# Patient Record
Sex: FEMALE | Race: BLACK OR AFRICAN AMERICAN | Employment: OTHER | URBAN - METROPOLITAN AREA
[De-identification: names, ages, dates, MRNs, and addresses within clinical notes are randomized per-mention and may not be internally consistent; named-entity substitution may affect disease eponyms.]

---

## 2018-05-13 ENCOUNTER — APPOINTMENT (OUTPATIENT)
Dept: GENERAL RADIOLOGY | Facility: HOSPITAL | Age: 63
DRG: 193 | End: 2018-05-13
Attending: EMERGENCY MEDICINE
Payer: COMMERCIAL

## 2018-05-13 ENCOUNTER — APPOINTMENT (OUTPATIENT)
Dept: CT IMAGING | Facility: HOSPITAL | Age: 63
DRG: 193 | End: 2018-05-13
Attending: EMERGENCY MEDICINE
Payer: COMMERCIAL

## 2018-05-13 ENCOUNTER — HOSPITAL ENCOUNTER (INPATIENT)
Facility: HOSPITAL | Age: 63
LOS: 6 days | Discharge: HOME | DRG: 193 | End: 2018-05-19
Attending: EMERGENCY MEDICINE | Admitting: HOSPITALIST
Payer: COMMERCIAL

## 2018-05-13 DIAGNOSIS — E87.6 HYPOKALEMIA: ICD-10-CM

## 2018-05-13 DIAGNOSIS — E10.10 TYPE 1 DIABETES MELLITUS WITH KETOACIDOSIS WITHOUT COMA (HCC): ICD-10-CM

## 2018-05-13 DIAGNOSIS — J18.9 PNEUMONIA OF LEFT LOWER LOBE DUE TO INFECTIOUS ORGANISM: Primary | ICD-10-CM

## 2018-05-13 PROBLEM — D64.9 ANEMIA: Status: ACTIVE | Noted: 2018-05-13

## 2018-05-13 PROBLEM — R79.89 AZOTEMIA: Status: ACTIVE | Noted: 2018-05-13

## 2018-05-13 PROBLEM — R73.9 HYPERGLYCEMIA: Status: ACTIVE | Noted: 2018-05-13

## 2018-05-13 PROBLEM — E87.1 HYPONATREMIA: Status: ACTIVE | Noted: 2018-05-13

## 2018-05-13 PROCEDURE — 99254 IP/OBS CNSLTJ NEW/EST MOD 60: CPT | Performed by: INTERNAL MEDICINE

## 2018-05-13 PROCEDURE — 71045 X-RAY EXAM CHEST 1 VIEW: CPT | Performed by: EMERGENCY MEDICINE

## 2018-05-13 PROCEDURE — 99223 1ST HOSP IP/OBS HIGH 75: CPT | Performed by: HOSPITALIST

## 2018-05-13 PROCEDURE — 71260 CT THORAX DX C+: CPT | Performed by: EMERGENCY MEDICINE

## 2018-05-13 RX ORDER — HYDROMORPHONE HYDROCHLORIDE 1 MG/ML
0.4 INJECTION, SOLUTION INTRAMUSCULAR; INTRAVENOUS; SUBCUTANEOUS EVERY 2 HOUR PRN
Status: DISCONTINUED | OUTPATIENT
Start: 2018-05-13 | End: 2018-05-15

## 2018-05-13 RX ORDER — LISINOPRIL 40 MG/1
40 TABLET ORAL DAILY
Status: DISCONTINUED | OUTPATIENT
Start: 2018-05-14 | End: 2018-05-19

## 2018-05-13 RX ORDER — SODIUM CHLORIDE 0.9 % (FLUSH) 0.9 %
3 SYRINGE (ML) INJECTION AS NEEDED
Status: DISCONTINUED | OUTPATIENT
Start: 2018-05-13 | End: 2018-05-19

## 2018-05-13 RX ORDER — SODIUM PHOSPHATE, DIBASIC AND SODIUM PHOSPHATE, MONOBASIC 7; 19 G/133ML; G/133ML
1 ENEMA RECTAL ONCE AS NEEDED
Status: DISCONTINUED | OUTPATIENT
Start: 2018-05-13 | End: 2018-05-19

## 2018-05-13 RX ORDER — LISINOPRIL 40 MG/1
40 TABLET ORAL DAILY
COMMUNITY
End: 2021-03-21

## 2018-05-13 RX ORDER — INSULIN ASPART 100 [IU]/ML
0.2 INJECTION, SOLUTION INTRAVENOUS; SUBCUTANEOUS ONCE
Status: COMPLETED | OUTPATIENT
Start: 2018-05-13 | End: 2018-05-13

## 2018-05-13 RX ORDER — INSULIN LISPRO 100 [IU]/ML
4 INJECTION, SOLUTION INTRAVENOUS; SUBCUTANEOUS
Status: ON HOLD | COMMUNITY
End: 2018-05-19

## 2018-05-13 RX ORDER — MORPHINE SULFATE 4 MG/ML
2 INJECTION, SOLUTION INTRAMUSCULAR; INTRAVENOUS ONCE
Status: COMPLETED | OUTPATIENT
Start: 2018-05-13 | End: 2018-05-13

## 2018-05-13 RX ORDER — HYDROMORPHONE HYDROCHLORIDE 1 MG/ML
0.2 INJECTION, SOLUTION INTRAMUSCULAR; INTRAVENOUS; SUBCUTANEOUS EVERY 2 HOUR PRN
Status: DISCONTINUED | OUTPATIENT
Start: 2018-05-13 | End: 2018-05-15

## 2018-05-13 RX ORDER — POTASSIUM CHLORIDE 14.9 MG/ML
20 INJECTION INTRAVENOUS ONCE
Status: COMPLETED | OUTPATIENT
Start: 2018-05-13 | End: 2018-05-14

## 2018-05-13 RX ORDER — HYDROMORPHONE HYDROCHLORIDE 1 MG/ML
0.8 INJECTION, SOLUTION INTRAMUSCULAR; INTRAVENOUS; SUBCUTANEOUS EVERY 2 HOUR PRN
Status: DISCONTINUED | OUTPATIENT
Start: 2018-05-13 | End: 2018-05-15

## 2018-05-13 RX ORDER — MORPHINE SULFATE 4 MG/ML
INJECTION, SOLUTION INTRAMUSCULAR; INTRAVENOUS
Status: COMPLETED
Start: 2018-05-13 | End: 2018-05-13

## 2018-05-13 RX ORDER — ACETAMINOPHEN 325 MG/1
650 TABLET ORAL EVERY 6 HOURS PRN
Status: DISCONTINUED | OUTPATIENT
Start: 2018-05-13 | End: 2018-05-19

## 2018-05-13 RX ORDER — POTASSIUM CHLORIDE 14.9 MG/ML
20 INJECTION INTRAVENOUS ONCE
Status: DISCONTINUED | OUTPATIENT
Start: 2018-05-13 | End: 2018-05-19

## 2018-05-13 RX ORDER — MORPHINE SULFATE 4 MG/ML
2 INJECTION, SOLUTION INTRAMUSCULAR; INTRAVENOUS ONCE
Status: DISCONTINUED | OUTPATIENT
Start: 2018-05-13 | End: 2018-05-13

## 2018-05-13 RX ORDER — POLYETHYLENE GLYCOL 3350 17 G/17G
17 POWDER, FOR SOLUTION ORAL DAILY PRN
Status: DISCONTINUED | OUTPATIENT
Start: 2018-05-13 | End: 2018-05-19

## 2018-05-13 RX ORDER — HEPARIN SODIUM 5000 [USP'U]/ML
5000 INJECTION, SOLUTION INTRAVENOUS; SUBCUTANEOUS EVERY 12 HOURS SCHEDULED
Status: DISCONTINUED | OUTPATIENT
Start: 2018-05-13 | End: 2018-05-19

## 2018-05-13 RX ORDER — HYDROCHLOROTHIAZIDE 25 MG/1
25 TABLET ORAL DAILY
Status: ON HOLD | COMMUNITY
End: 2018-05-19

## 2018-05-13 RX ORDER — ONDANSETRON 2 MG/ML
4 INJECTION INTRAMUSCULAR; INTRAVENOUS EVERY 6 HOURS PRN
Status: DISCONTINUED | OUTPATIENT
Start: 2018-05-13 | End: 2018-05-19

## 2018-05-13 RX ORDER — IPRATROPIUM BROMIDE AND ALBUTEROL SULFATE 2.5; .5 MG/3ML; MG/3ML
3 SOLUTION RESPIRATORY (INHALATION)
Status: DISCONTINUED | OUTPATIENT
Start: 2018-05-13 | End: 2018-05-15

## 2018-05-13 RX ORDER — SODIUM CHLORIDE 9 MG/ML
INJECTION, SOLUTION INTRAVENOUS CONTINUOUS
Status: DISCONTINUED | OUTPATIENT
Start: 2018-05-13 | End: 2018-05-14

## 2018-05-13 RX ORDER — BISACODYL 10 MG
10 SUPPOSITORY, RECTAL RECTAL
Status: DISCONTINUED | OUTPATIENT
Start: 2018-05-13 | End: 2018-05-19

## 2018-05-13 RX ORDER — POTASSIUM CHLORIDE 20 MEQ/1
40 TABLET, EXTENDED RELEASE ORAL ONCE
Status: COMPLETED | OUTPATIENT
Start: 2018-05-13 | End: 2018-05-13

## 2018-05-13 NOTE — CONSULTS
[facility]    Report of Consultation    Hector Maldonado Patient Status:  Inpatient    10/6/1955 MRN [id number]   Location [facility] 2W/SW Attending Sunday Asif MD   Hosp Day # 1 PCP No primary care provider on file.      Date of HYDROmorphone HCl (DILAUDID) 1 MG/ML injection 0.4 mg, 0.4 mg, Intravenous, Q2H PRN **OR** HYDROmorphone HCl (DILAUDID) 1 MG/ML injection 0.8 mg, 0.8 mg, Intravenous, Q2H PRN  •  PEG 3350 (MIRALAX) powder packet 17 g, 17 g, Oral, Daily PRN  •  magnesium hy the consult. We will follow.      Oral Mackey

## 2018-05-13 NOTE — H&P
[place] [facility] - [facility]  History & Physical     Hannah Knapp  : 10/6/1955    Status: Inpatient  Day #: 0    Attending: Kathi Beltrán MD  PCP: No primary care provider on file.      Date of Encounter:  2018  Date of Admission:  2018     Chief Clear to auscultation bilaterally, respirations unlabored  Gastrointestinal:  Soft, non-tender, normal bowel sounds  Musculoskeletal:  No joint swelling  Extremities:  No edema, no cyanosis, no clubbing  Neurologic:  nonfocal  Psychiatric:  Normal affect, drip  -endocrinology consulted from ED    HTN  -monitor on home BP meds    Hypokalemia  -replete    Dehydration - IVF    DVT proph: heparin sq           Aria Rinaldi MD

## 2018-05-13 NOTE — CONSULTS
[facility]    Consult Note    Date:  5/13/2018  Date of Admission:  5/13/2018      Chief Complaint:   Sonam Gomez is a(n) 63year old female with cough and chest congestion.     HPI:   The patient described how she got a head cold severa normal. Bladder function normal. No depression. No thyroid disease. No rash. Muscles and joints unremarkable. No weight loss no weight gain.     Physical Exam:   Vital Signs:  Blood pressure 132/72, pulse 79, temperature 98.5 °F (36.9 °C), temperature sourc temporarily  2.  4-6 weeks of antibiotics, and switch to oral at discharge  3. Repeat CT scan the chest at the 6-8 week interval  4. Nebulizer therapy  5. Will follow clinically. 6.  Follow white blood cell count  7.   If lesions persist, patient will n

## 2018-05-14 ENCOUNTER — APPOINTMENT (OUTPATIENT)
Dept: GENERAL RADIOLOGY | Facility: HOSPITAL | Age: 63
DRG: 193 | End: 2018-05-14
Attending: HOSPITALIST
Payer: COMMERCIAL

## 2018-05-14 PROCEDURE — 99254 IP/OBS CNSLTJ NEW/EST MOD 60: CPT | Performed by: INTERNAL MEDICINE

## 2018-05-14 PROCEDURE — 71045 X-RAY EXAM CHEST 1 VIEW: CPT | Performed by: HOSPITALIST

## 2018-05-14 PROCEDURE — 99233 SBSQ HOSP IP/OBS HIGH 50: CPT | Performed by: INTERNAL MEDICINE

## 2018-05-14 PROCEDURE — 99233 SBSQ HOSP IP/OBS HIGH 50: CPT | Performed by: HOSPITALIST

## 2018-05-14 RX ORDER — POTASSIUM CHLORIDE 20 MEQ/1
40 TABLET, EXTENDED RELEASE ORAL EVERY 4 HOURS
Status: COMPLETED | OUTPATIENT
Start: 2018-05-14 | End: 2018-05-14

## 2018-05-14 RX ORDER — POTASSIUM CHLORIDE 20 MEQ/1
40 TABLET, EXTENDED RELEASE ORAL EVERY 4 HOURS
Status: COMPLETED | OUTPATIENT
Start: 2018-05-14 | End: 2018-05-15

## 2018-05-14 NOTE — PLAN OF CARE
Problem: Patient Centered Care  Goal: Patient preferences are identified and integrated in the patient's plan of care  Interventions:  - What would you like us to know as we care for you?  Keep family informed  - Provide timely, complete, and accurate infor of falls.   - [place] fall precautions as indicated by assessment.  - Educate pt/family on patient safety including physical limitations  - Instruct pt to call for assistance with activity based on assessment  - Modify environment to reduce risk of injury Respiratory Therapy support as indicated  - Manage/alleviate anxiety  - Monitor for signs/symptoms of CO2 retention   Outcome: Progressing  Patient on 2L NC at this time.  patien on meropenum for pneumonia

## 2018-05-14 NOTE — CM/SW NOTE
SW received order for POL. SW met w/ pt to discuss eventual discharge needs. Pt lives at home w/ her 2 adult children in [place]. Pt lives in a 2 level house w/ 10 stairs. Pt stated that both of her children go to college during the day.  Pt reports to w

## 2018-05-14 NOTE — ED PROVIDER NOTES
Patient Seen in: [facility] 2w/sw    History   Patient presents with:  Cough/URI    Stated Complaint: COUGH/CHEST PAIN    HPI    Patient complains of shortness of breath that began 4 days ago.  + cough. + sharp pleuritic l sided chest pain.    sub side of chest with coughing  HEAD: normocephalic, atraumatic,   EYES: PERRLA, EOMI,  THROAT: mm dry, no lesions  NECK: supple, no meningeal signs  LUNGS:rhonchi l mid upper lungs, with exp wheezing bilateral  CARDIO: rr  GI: abdomen is soft and non tender, Notable for the following:     POC Glucose  >500 (*)     All other components within normal limits   POCT GLUCOSE - Abnormal; Notable for the following:     POC Glucose  482 (*)     All other components within normal limits   POCT GLUCOSE - Abnormal; Notab components within normal limits   CBC W/ DIFFERENTIAL - Abnormal; Notable for the following:     WBC 18.8 (*)     HGB 10.1 (*)     HCT 30.2 (*)     MCV 78.0 (*)     MCH 26.0 (*)     RDW 15.4 (*)     Neutrophil Absolute 15.8 (*)     All other components wit containing central areas of lucency. Cavitating pneumonia? Cavitating neoplasm? Close followup suggested. 3. Atherosclerosis. 4. Osteoarthritis. Dictated by (CST): Vikas Gil MD on 5/13/2018 at 9:43     Approved by (CST):  Vikas Gil MD on specified.     Medications Prescribed:  Current Discharge Medication List        Present on Admission           ICD-10-CM Noted POA    * (Principal)Pneumonia of left lower lobe due to infectious organism ([facility] [address] J18.1 5/13/2018     Anemia D64.9 5/13/2018 Yes

## 2018-05-14 NOTE — PLAN OF CARE
PAIN - ADULT    • Verbalizes/displays adequate comfort level or patient's stated pain goal  States generalized pain/left abdomen, hydromorphone IVP per dr's order.  Pain level reassessed frequently  Not Progressing          Diabetes/Glucose Control    • Glu

## 2018-05-14 NOTE — PROGRESS NOTES
[facility] HOSP - [facility]  Progress Note     Alma Burnett  : 10/6/1955    Status: Inpatient  Day #: 1    Attending: Kaiden Nino MD  PCP: No primary care provider on file. Assessment and Plan     Pneumonia  -CT chest images reviewed.  ?cavitary PNA BUN  27*   --    --   15   CREATSERUM  1.22   --    --   0.89   GFRAA  56*   --    --   >60   GFRNAA  48*   --    --   >60   CA  9.1   --    --   8.5   NA  131*   --    --   139   K  2.1*   --   2.4*  3.0*  3.0*   CL  94*   --    --   112*   CO2  23   -- Report  Interpretation  -------------------------- Sinus Rhythm - Nonspecific ST-abnormality.  ABNORMAL No previous ECGs available Electronically signed on 05/13/2018 at 22:47 by Chaim Reyes.     Medications     • Potassium Chloride ER  40 mEq Oral Q4H

## 2018-05-14 NOTE — PLAN OF CARE
+continuing ABX for LLL pneumonia and UTI  +transitioned off of insulin gtt, prandial insulin coverage obtained via endocrinology    Diabetes/Glucose Control    • Glucose maintained within prescribed range Progressing        PAIN - ADULT    • Verbalizes/di

## 2018-05-14 NOTE — PROGRESS NOTES
[facility] - [facility]    Progress Note      Assessment and Plan:    1. Infiltrative lung disease with abscess– the patient likely has an infective process although certainly neoplasm could manifest in this way.   The fact that she has yellow phlegm wi without hepatosplenomegaly and no mass appreciable. Extremities without clubbing cyanosis nor edema. Neurologic grossly intact with symmetric tone and strength and reflex.      Results:     Lab Results  Component Value Date   WBC 21.7 05/14/2018   HGB 9

## 2018-05-14 NOTE — PLAN OF CARE
Problem: Diabetes/Glucose Control  Goal: Glucose maintained within prescribed range  INTERVENTIONS:  - Monitor Blood Glucose as ordered  - Assess for signs and symptoms of hyperglycemia and hypoglycemia  - Administer ordered medications to maintain glucose improved. NSR on the monitor. Up to bathroom with standby assist. Patient turning self in bed. Bed in low locked position, bed alarm on, call light within reach. Hourly rounding done to assess patient needs. Will continue to monitor.

## 2018-05-15 ENCOUNTER — APPOINTMENT (OUTPATIENT)
Dept: GENERAL RADIOLOGY | Facility: HOSPITAL | Age: 63
DRG: 193 | End: 2018-05-15
Attending: HOSPITALIST
Payer: COMMERCIAL

## 2018-05-15 PROCEDURE — 99232 SBSQ HOSP IP/OBS MODERATE 35: CPT | Performed by: INTERNAL MEDICINE

## 2018-05-15 PROCEDURE — 71045 X-RAY EXAM CHEST 1 VIEW: CPT | Performed by: HOSPITALIST

## 2018-05-15 PROCEDURE — 99233 SBSQ HOSP IP/OBS HIGH 50: CPT | Performed by: HOSPITALIST

## 2018-05-15 RX ORDER — IPRATROPIUM BROMIDE AND ALBUTEROL SULFATE 2.5; .5 MG/3ML; MG/3ML
3 SOLUTION RESPIRATORY (INHALATION) EVERY 6 HOURS PRN
Status: DISCONTINUED | OUTPATIENT
Start: 2018-05-15 | End: 2018-05-19

## 2018-05-15 RX ORDER — IPRATROPIUM BROMIDE AND ALBUTEROL SULFATE 2.5; .5 MG/3ML; MG/3ML
3 SOLUTION RESPIRATORY (INHALATION)
Status: DISCONTINUED | OUTPATIENT
Start: 2018-05-15 | End: 2018-05-16

## 2018-05-15 RX ORDER — TRAMADOL HYDROCHLORIDE 50 MG/1
100 TABLET ORAL EVERY 6 HOURS PRN
Status: DISCONTINUED | OUTPATIENT
Start: 2018-05-15 | End: 2018-05-19

## 2018-05-15 RX ORDER — TRAMADOL HYDROCHLORIDE 50 MG/1
50 TABLET ORAL EVERY 6 HOURS PRN
Status: DISCONTINUED | OUTPATIENT
Start: 2018-05-15 | End: 2018-05-19

## 2018-05-15 RX ORDER — HYDROMORPHONE HYDROCHLORIDE 1 MG/ML
0.2 INJECTION, SOLUTION INTRAMUSCULAR; INTRAVENOUS; SUBCUTANEOUS EVERY 4 HOURS PRN
Status: DISCONTINUED | OUTPATIENT
Start: 2018-05-15 | End: 2018-05-16

## 2018-05-15 NOTE — PROGRESS NOTES
[facility]    Progress Note    Luz Bautista Patient Status:  Inpatient    10/6/1955 MRN [id number]   Location [facility] 5SW/SE Attending Tiffanie Sidhu MD   Hosp Day # 2 PCP No primary care provider on file.      Subjective:  Fee

## 2018-05-15 NOTE — PLAN OF CARE
Problem: Patient Centered Care  Goal: Patient preferences are identified and integrated in the patient's plan of care  Interventions:  - What would you like us to know as we care for you?  Keep family informed  - Provide timely, complete, and accurate infor to reduce risk of injury  - Provide assistive devices as appropriate  - Consider OT/PT consult to assist with strengthening/mobility  - Encourage toileting schedule   Outcome: Progressing      Problem: PAIN - ADULT  Goal: Verbalizes/displays adequate comfo

## 2018-05-15 NOTE — PLAN OF CARE
Problem: Patient Centered Care  Goal: Patient preferences are identified and integrated in the patient's plan of care  Interventions:  - What would you like us to know as we care for you?  Keep family informed  - Provide timely, complete, and accurate infor needs  - Identify cognitive and physical deficits and behaviors that affect risk of falls.   - [place] fall precautions as indicated by assessment.  - Educate pt/family on patient safety including physical limitations  - Instruct pt to call for assistance needed  - Assess and instruct to report SOB or any respiratory difficulty  - Respiratory Therapy support as indicated  - Manage/alleviate anxiety  - Monitor for signs/symptoms of CO2 retention   Outcome: Progressing  Patient on room at this time.  patien on

## 2018-05-15 NOTE — PROGRESS NOTES
[place] [facility] - [facility]     Progress Note        Sonam Gomez Patient Status:  Inpatient    10/6/1955 MRN [id number]   Location [facility] 5SW/SE Attending Odette Mays MD   Hosp Day # 2 PCP No primary care provider on file.        Subjective: magnesium hydroxide (MILK OF MAGNESIA) 400 MG/5ML suspension 30 mL 30 mL Oral Daily PRN   bisacodyl (DULCOLAX) rectal suppository 10 mg 10 mg Rectal Daily PRN   FLEET ENEMA (FLEET) 7-19 GM/118ML enema 133 mL 1 enema Rectal Once PRN   ondansetron HCl (ZOF possible cavitary pneumonia like process versus abscess.   -Agreeable with 4-6 week course of antibiotic therapy  -CT chest in 6-8 weeks time to monitor resolution of disease  -TB QuantiFERON gold pending  -We will transition to oral antibiotics upon discha

## 2018-05-15 NOTE — PROGRESS NOTES
[facility]  Progress Note     Hector Maldonado  : 10/6/1955    Status: Inpatient  Day #: 2    Attending: Norah Tipton MD  PCP: No primary care provider on file. Assessment and Plan     Pneumonia  -CT chest images reviewed.  ?cavitary PNA 500 ml   Net              527 ml         Recent Labs   Lab  05/13/18   0727  05/14/18   0421  05/15/18   0535   WBC  18.8*  21.7*  15.0*   HGB  10.1*  9.1*  7.9*   HCT  30.2*  26.7*  23.7*   PLT  372  405*  372   RBC  3.88  3.48*  3.03*   MCV  78.0*  76 left upper and left lower lobes containing central areas of lucency. Cavitating pneumonia? Cavitating neoplasm? Close followup suggested. 3. Atherosclerosis. 4. Osteoarthritis. Dictated by (CST):  Leonor Jean MD on 5/13/2018 at 9:43     Approved by

## 2018-05-15 NOTE — PAYOR COMM NOTE
--------------  ADMISSION REVIEW     Payor: [employer] (NON CONTRACTED IPA)  Subscriber #:  [id number]  Authorization Number: [id number]    Admit date: 5/13/18  Admit time: 1109         Patient Seen in: [facility] EXTREMITIES: from, 5/5 strength in all 4 ext,no edema  NEURO: alert and oiented *3, 2-12 intact, no focal deficit noted  SKIN: good skin turgor, no  rashes  PSYCH: calm, cooperative,    Differential includes: pneumonia vs. CHF vs. bronchitis vs. PE Glucose  361 (*)     All other components within normal limits   POCT GLUCOSE - Abnormal; Notable for the following:     POC Glucose  220 (*)     All other components within normal limits   POCT GLUCOSE - Abnormal; Notable for the following:     POC Glucos Report.   Rate: 90  Rhythm: Sinus Rhythm  Reading: non spec st changes, nl intervals      Clinical Impression:  Pneumonia of left lower lobe due to infectious organism ([facility] [address]  (primary encounter diagnosis)  Type 1 diabetes mellitus with ketoacidosis without No joint swelling  Extremities:  No edema, no cyanosis, no clubbing  Neurologic:  nonfocal  Psychiatric:  Normal affect, calm and appropriate  Skin:  No rash, no lesion     Results     Recent Labs   Lab  05/13/18   0727   WBC  18.8*   HGB  10.1*   HCT  30 sq           5/14/18    Assessment and Plan      Pneumonia  -CT chest images reviewed. ?cavitary PNA  -pulmonary consulted  -abx - azithro/ctx. Will need 4-6 weeks abx per pulm  -nebs  -quantiferon gold pending.  Low likelihood of TB.     L chest pain - ple 9.1   --    --   8.5   NA  131*   --    --   139   K  2.1*   --   2.4*  3.0*  3.0*   CL  94*   --    --   112*   CO2  23   --    --   22   GLU  518*   --    --   125*   DDIMER  2.41*   --    --    --    TROP  0.04*  0.03   --    --          Xr Chest Ap Por ECGs available Electronically signed on 05/13/2018 at 22:47 by Juan Antonio Vu.      Medications      • Potassium Chloride ER  40 mEq Oral Q4H   • Insulin Aspart Pen  1-5 Units Subcutaneous TID CC   • [START ON 5/15/2018] insulin detemir  30 Units Subcutane

## 2018-05-16 PROCEDURE — 99232 SBSQ HOSP IP/OBS MODERATE 35: CPT | Performed by: INTERNAL MEDICINE

## 2018-05-16 PROCEDURE — 99233 SBSQ HOSP IP/OBS HIGH 50: CPT | Performed by: HOSPITALIST

## 2018-05-16 PROCEDURE — 99233 SBSQ HOSP IP/OBS HIGH 50: CPT | Performed by: INTERNAL MEDICINE

## 2018-05-16 RX ORDER — IPRATROPIUM BROMIDE AND ALBUTEROL SULFATE 2.5; .5 MG/3ML; MG/3ML
3 SOLUTION RESPIRATORY (INHALATION)
Status: DISCONTINUED | OUTPATIENT
Start: 2018-05-16 | End: 2018-05-19

## 2018-05-16 NOTE — PROGRESS NOTES
[facility]    Progress Note    Jaxson Damian Patient Status:  Inpatient    10/6/1955 MRN [id number]   Location [facility] 5SW/SE Attending Artur Rios MD   Hosp Day # 3 PCP No primary care provider on file.      Subjective:  Fee

## 2018-05-16 NOTE — PROGRESS NOTES
[facility]    Progress Note    Louieeliseo Palmer Patient Status:  Inpatient    10/6/1955 MRN [id number]   Location [facility] 5SW/SE Attending Sy Rodriguez, [address] [facility] Day # 3 PCP No primary care provider on file.        Subjective iron level low  - check ferritin  - check stool hemoccult     DM2, poorly controlled  - insulin drip - transitioned to sq  - endocrinology on consult  - sq insulin per endocrinology     Mild acute encephalopathy  Due to dilaudid. Improved.   - stop dilaudi

## 2018-05-16 NOTE — PROGRESS NOTES
[place] [facility] - [facility]    Progress Note    Hannah Knapp Patient Status:  Inpatient    10/6/1955 MRN [id number]   Location [facility] 5SW/SE Attending Madeleine Ornelas,  [facility]  Day # 3 PCP No primary care provider on file.      Subjective 5/15/2018  CONCLUSION:  1. Unchanged nodular density left mid chest thought to represent pneumonia, but it was better seen on previous CT scan imaging. Followup chest x-ray and/or CT scanning is advised. Minimal bibasilar atelectasis.      Dictated by (CST)

## 2018-05-16 NOTE — PLAN OF CARE
Problem: Patient Centered Care  Goal: Patient preferences are identified and integrated in the patient's plan of care  Interventions:  - What would you like us to know as we care for you?  Keep family informed  - Provide timely, complete, and accurate infor to reduce risk of injury  - Provide assistive devices as appropriate  - Consider OT/PT consult to assist with strengthening/mobility  - Encourage toileting schedule   Outcome: Progressing      Problem: RESPIRATORY - ADULT  Goal: Achieves optimal ventilatio

## 2018-05-17 PROCEDURE — 99232 SBSQ HOSP IP/OBS MODERATE 35: CPT | Performed by: INTERNAL MEDICINE

## 2018-05-17 PROCEDURE — 99233 SBSQ HOSP IP/OBS HIGH 50: CPT | Performed by: HOSPITALIST

## 2018-05-17 RX ORDER — POLYETHYLENE GLYCOL 3350 17 G/17G
17 POWDER, FOR SOLUTION ORAL ONCE
Status: COMPLETED | OUTPATIENT
Start: 2018-05-17 | End: 2018-05-17

## 2018-05-17 RX ORDER — MELATONIN
325 2 TIMES DAILY WITH MEALS
Status: DISCONTINUED | OUTPATIENT
Start: 2018-05-17 | End: 2018-05-19

## 2018-05-17 NOTE — PROGRESS NOTES
[facility]    Progress Note    Allen Govea Patient Status:  Inpatient    10/6/1955 MRN [id number]   Location [facility] 5SW/SE Attending Brandon Soliman, [address] [facility] Day # 4 PCP No primary care provider on file.        Subjective dilaudid  - tramadol prn     Smoker  - counseled on cessation     HTN  - monitor on home BP med     Hypokalemia  - replete per protocol     Dehydration - Resolved with IVF.     dvt proph:   heparin     Code status:   DNR     >35 minutes spent     [employer]

## 2018-05-17 NOTE — PHYSICAL THERAPY NOTE
PHYSICAL THERAPY QUICK EVALUATION - INPATIENT    Room Number: 535/535-A  Evaluation Date: 5/17/2018  Presenting Problem: PNA  Physician Order: PT Eval and Treat    Problem List  Principal Problem:    Pneumonia of left lower lobe due to infectious organis walk in hospital room?: None   -   Climbing 3-5 steps with a railing?: A Little       AM-PAC Score:  Raw Score: 23   PT Approx Degree of Impairment Score: 11.2%   Standardized Score (AM-PAC Scale): 56.93   CMS Modifier (G-Code): CI      FUNCTIONAL ABILITY

## 2018-05-17 NOTE — PROGRESS NOTES
[facility]    [facility] Note    Berna Gowers Patient Status:  Inpatient    10/6/1955 MRN [id number]   Location [facility] 5SW/SE Attending Javid Light MD   Hosp Day # 4 PCP No primary care provider on file.      Subjective:  Fee

## 2018-05-17 NOTE — PAYOR COMM NOTE
INITIAL CLINICALS FAXED ON 5/15/18- REQUESTING APPROVAL FOR DAYS      5/16/18  Subjective:      Pt notes left chest soreness due to coughing.    Denies SOB.     Objective:   Blood pressure 125/67, pulse 94, temperature 98.5 °F (36.9 °C), temperature source insulin per endocrinology     Mild acute encephalopathy  Due to dilaudid. Improved.   - stop dilaudid  - tramadol prn       HTN  - monitor on home BP med     Hypokalemia  - replete per protocol     Dehydration - Resolved with IVF.     dvt proph:   heparin acute encephalopathy  Due to dilaudid. Improved.   - off dilaudid  - tramadol prn       HTN  - monitor on home BP med     Hypokalemia  - replete per protocol     Dehydration - Resolved with IVF.     dvt proph:   heparin     Code status: [facility]

## 2018-05-17 NOTE — DIABETES ED
[facility]    Diabetes Education  Note    Giovany Nava Patient Status:  Inpatient   10/6/1955 MRN [id number]  Location [facility] [place] 5SW/SE Attending Steven Alexander, [address] Day # 4 PCP No primary care provider on file.     Reaso

## 2018-05-17 NOTE — PROGRESS NOTES
[facility]     Progress Note        Hannah Knapp Patient Status:  Inpatient    10/6/1955 MRN [id number]   Location [facility] 5SW/SE Attending Mary Dyson MD   Hosp Day # 4 PCP No primary care provider on file.        Subjective: mL Oral Daily PRN   bisacodyl (DULCOLAX) rectal suppository 10 mg 10 mg Rectal Daily PRN   FLEET ENEMA (FLEET) 7-19 GM/118ML enema 133 mL 1 enema Rectal Once PRN   ondansetron HCl (ZOFRAN) injection 4 mg 4 mg Intravenous Q6H PRN   lisinopril (PRINIVIL,ZEST

## 2018-05-18 PROCEDURE — 99232 SBSQ HOSP IP/OBS MODERATE 35: CPT | Performed by: INTERNAL MEDICINE

## 2018-05-18 PROCEDURE — 99233 SBSQ HOSP IP/OBS HIGH 50: CPT | Performed by: HOSPITALIST

## 2018-05-18 RX ORDER — POLYETHYLENE GLYCOL 3350 17 G/17G
17 POWDER, FOR SOLUTION ORAL DAILY
Status: DISCONTINUED | OUTPATIENT
Start: 2018-05-18 | End: 2018-05-19

## 2018-05-18 RX ORDER — SENNA AND DOCUSATE SODIUM 50; 8.6 MG/1; MG/1
1 TABLET, FILM COATED ORAL 2 TIMES DAILY
Status: DISCONTINUED | OUTPATIENT
Start: 2018-05-18 | End: 2018-05-19

## 2018-05-18 RX ORDER — POTASSIUM CHLORIDE 20 MEQ/1
40 TABLET, EXTENDED RELEASE ORAL ONCE
Status: COMPLETED | OUTPATIENT
Start: 2018-05-18 | End: 2018-05-18

## 2018-05-18 RX ORDER — PANTOPRAZOLE SODIUM 40 MG/1
40 TABLET, DELAYED RELEASE ORAL
Status: DISCONTINUED | OUTPATIENT
Start: 2018-05-18 | End: 2018-05-19

## 2018-05-18 NOTE — PROGRESS NOTES
[facility]    Progress Note    Rad Gutierrez Patient Status:  Inpatient    10/6/1955 MRN [id number]   Location [facility] 5SW/SE Attending Saskia Collado, [address] [facility] Day # 5 PCP No primary care provider on file.        Subjective endocrinology on consult  - sq insulin per endocrinology     Mild acute encephalopathy  Due to dilaudid. Resolved.   - off dilaudid  - tramadol prn     Smoker  - counseled on cessation     HTN  - monitor on lisinopril     Hypokalemia  - replete per abraham

## 2018-05-18 NOTE — PROGRESS NOTES
[facility]     Progress Note        Alma Burnett Patient Status:  Inpatient    10/6/1955 MRN [id number]   Location [facility] 5SW/SE Attending Sarah Smith MD   Hosp Day # 5 PCP No primary care provider on file.        Subjective: Oral Daily PRN   bisacodyl (DULCOLAX) rectal suppository 10 mg 10 mg Rectal Daily PRN   FLEET ENEMA (FLEET) 7-19 GM/118ML enema 133 mL 1 enema Rectal Once PRN   ondansetron HCl (ZOFRAN) injection 4 mg 4 mg Intravenous Q6H PRN   lisinopril (PRINIVIL,ZESTRIL

## 2018-05-18 NOTE — PROGRESS NOTES
[facility]    Progress Note    Jaxson Damian Patient Status:  Inpatient    10/6/1955 MRN [id number]   Location [facility] 5SW/SE Attending Artur Rios MD   Hosp Day # 5 PCP No primary care provider on file.      Subjective:  Fee

## 2018-05-18 NOTE — PLAN OF CARE
Diabetes/Glucose Control    • Glucose maintained within prescribed range Progressing        PAIN - ADULT    • Verbalizes/displays adequate comfort level or patient's stated pain goal Progressing        Patient Centered Care    • Patient preferences are reji

## 2018-05-19 VITALS
OXYGEN SATURATION: 99 % | TEMPERATURE: 98 F | BODY MASS INDEX: 27.32 KG/M2 | RESPIRATION RATE: 18 BRPM | SYSTOLIC BLOOD PRESSURE: 127 MMHG | HEIGHT: 63 IN | HEART RATE: 97 BPM | WEIGHT: 154.19 LBS | DIASTOLIC BLOOD PRESSURE: 66 MMHG

## 2018-05-19 PROCEDURE — 99239 HOSP IP/OBS DSCHRG MGMT >30: CPT | Performed by: HOSPITALIST

## 2018-05-19 PROCEDURE — 99232 SBSQ HOSP IP/OBS MODERATE 35: CPT | Performed by: INTERNAL MEDICINE

## 2018-05-19 RX ORDER — AMOXICILLIN AND CLAVULANATE POTASSIUM 875; 125 MG/1; MG/1
1 TABLET, FILM COATED ORAL 2 TIMES DAILY
Qty: 10 TABLET | Refills: 0 | Status: SHIPPED | OUTPATIENT
Start: 2018-05-19 | End: 2018-05-19

## 2018-05-19 RX ORDER — IPRATROPIUM BROMIDE AND ALBUTEROL SULFATE 2.5; .5 MG/3ML; MG/3ML
3 SOLUTION RESPIRATORY (INHALATION)
Status: DISCONTINUED | OUTPATIENT
Start: 2018-05-19 | End: 2018-05-19

## 2018-05-19 RX ORDER — MELATONIN
325 2 TIMES DAILY WITH MEALS
Qty: 60 TABLET | Refills: 1 | Status: SHIPPED | OUTPATIENT
Start: 2018-05-19 | End: 2019-11-01

## 2018-05-19 RX ORDER — CEFPODOXIME PROXETIL 200 MG/1
200 TABLET, FILM COATED ORAL 2 TIMES DAILY
Qty: 56 TABLET | Refills: 0 | Status: SHIPPED | OUTPATIENT
Start: 2018-05-19 | End: 2018-06-16

## 2018-05-19 NOTE — PROGRESS NOTES
[facility]    Progress Note    Tyshawn Han Patient Status:  Inpatient    10/6/1955 MRN [id number]   Location [facility] 5SW/SE Attending Ida Najera MD   Hosp Day # 6 PCP No primary care provider on file.      Subjective:  Fee

## 2018-05-19 NOTE — PROGRESS NOTES
Pulmonary/Critical Care Follow Up Note    HPI:   Brandon Veras is a 63year old female with Patient presents with:  Cough/URI      PCP No primary care provider on file.   Admission Attending Елена Bhakta 15 Day #6    No fever  No cough  No (FLEET) 7-19 GM/118ML enema 133 mL, 1 enema, Rectal, Once PRN  •  ondansetron HCl (ZOFRAN) injection 4 mg, 4 mg, Intravenous, Q6H PRN  •  lisinopril (PRINIVIL,ZESTRIL) tab 40 mg, 40 mg, Oral, Daily      Allergies:    Penicillins             ANGIOEDEMA    C

## 2018-05-19 NOTE — PLAN OF CARE
Problem: Patient Centered Care  Goal: Patient preferences are identified and integrated in the patient's plan of care  Interventions:  - What would you like us to know as we care for you?  Keep family informed  - Provide timely, complete, and accurate infor based on assessment  - Modify environment to reduce risk of injury  - Provide assistive devices as appropriate  - Consider OT/PT consult to assist with strengthening/mobility  - Encourage toileting schedule   Outcome: Adequate for Discharge      Problem: P

## 2018-05-19 NOTE — DISCHARGE SUMMARY
[place] [facility] - [facility]    Discharge Summary    Vernell Ken Patient Status:  Inpatient    10/6/1955 MRN [id number]   Location [facility] 5SW/SE Attending No att. providers found   [facility] Day # 6 PCP No primary care provider on file.      Melissa Carmen no PE but she had evidence of left upper and lower pneumonia, possibly cavitating. No recent travel outside the country. She is being admitted for further management.     Hospital Course:     Pt was admitted and treated as below:    Pneumonia  - CT chest Tbec      Take 1 tablet (325 mg total) by mouth 2 (two) times daily with meals. Quantity:  60 tablet  Refills:  1     insulin detemir 100 UNIT/ML Sopn  Commonly known as:  LEVEMIR  Start taking on:  5/20/2018      Inject 30 Units into the skin daily.    Q 15341  [phone]                 Hospital Discharge Diagnoses: Cavitary pneumonia    Lace+ Score: 56  59-90 High Risk  29-58 Medium Risk  0-28   Low Risk. TCM Follow-Up Recommendation:  LACE > 58:  High Risk of readmission after discharge from the hos

## 2018-05-21 ENCOUNTER — TELEPHONE (OUTPATIENT)
Dept: CARDIOLOGY UNIT | Facility: HOSPITAL | Age: 63
End: 2018-05-21

## 2018-05-21 NOTE — SIGNIFICANT EVENT
Pt called me after discharge and said she woke up with half of her face swollen. She was discharged on cefpodoxime and has a hx of PCN allergy however has tolerated amoxicillin and meropenem. I told her to stop the cefpodoxime.   I called in a prescriptio

## 2018-09-30 ENCOUNTER — APPOINTMENT (OUTPATIENT)
Dept: MRI IMAGING | Facility: HOSPITAL | Age: 63
End: 2018-09-30
Attending: EMERGENCY MEDICINE
Payer: COMMERCIAL

## 2018-09-30 ENCOUNTER — HOSPITAL ENCOUNTER (EMERGENCY)
Facility: HOSPITAL | Age: 63
Discharge: HOME | End: 2018-09-30
Attending: EMERGENCY MEDICINE
Payer: COMMERCIAL

## 2018-09-30 VITALS
TEMPERATURE: 97 F | DIASTOLIC BLOOD PRESSURE: 83 MMHG | RESPIRATION RATE: 16 BRPM | HEART RATE: 83 BPM | WEIGHT: 163 LBS | HEIGHT: 63 IN | BODY MASS INDEX: 28.88 KG/M2 | OXYGEN SATURATION: 99 % | SYSTOLIC BLOOD PRESSURE: 104 MMHG

## 2018-09-30 DIAGNOSIS — M54.9 BACK PAIN WITH RADIATION: Primary | ICD-10-CM

## 2018-09-30 DIAGNOSIS — R73.9 HYPERGLYCEMIA: ICD-10-CM

## 2018-09-30 DIAGNOSIS — E87.6 HYPOKALEMIA: ICD-10-CM

## 2018-09-30 PROCEDURE — 80048 BASIC METABOLIC PNL TOTAL CA: CPT | Performed by: EMERGENCY MEDICINE

## 2018-09-30 PROCEDURE — 82962 GLUCOSE BLOOD TEST: CPT

## 2018-09-30 PROCEDURE — 96366 THER/PROPH/DIAG IV INF ADDON: CPT

## 2018-09-30 PROCEDURE — A9575 INJ GADOTERATE MEGLUMI 0.1ML: HCPCS | Performed by: EMERGENCY MEDICINE

## 2018-09-30 PROCEDURE — 72158 MRI LUMBAR SPINE W/O & W/DYE: CPT | Performed by: EMERGENCY MEDICINE

## 2018-09-30 PROCEDURE — 96365 THER/PROPH/DIAG IV INF INIT: CPT

## 2018-09-30 PROCEDURE — 99285 EMERGENCY DEPT VISIT HI MDM: CPT

## 2018-09-30 PROCEDURE — 83735 ASSAY OF MAGNESIUM: CPT | Performed by: EMERGENCY MEDICINE

## 2018-09-30 RX ORDER — LIDOCAINE 50 MG/G
2 PATCH TOPICAL EVERY 24 HOURS
Qty: 10 PATCH | Refills: 0 | Status: SHIPPED | OUTPATIENT
Start: 2018-09-30 | End: 2018-10-05

## 2018-09-30 RX ORDER — SODIUM CHLORIDE 9 MG/ML
125 INJECTION, SOLUTION INTRAVENOUS CONTINUOUS
Status: DISCONTINUED | OUTPATIENT
Start: 2018-09-30 | End: 2018-09-30

## 2018-09-30 RX ORDER — LIDOCAINE 50 MG/G
1 PATCH TOPICAL ONCE
Status: DISCONTINUED | OUTPATIENT
Start: 2018-09-30 | End: 2018-09-30

## 2018-09-30 RX ORDER — HYDROCHLOROTHIAZIDE 25 MG/1
25 TABLET ORAL DAILY
COMMUNITY
End: 2020-01-03

## 2018-09-30 RX ORDER — POTASSIUM CHLORIDE 20 MEQ/1
80 TABLET, EXTENDED RELEASE ORAL ONCE
Status: COMPLETED | OUTPATIENT
Start: 2018-09-30 | End: 2018-09-30

## 2018-09-30 RX ORDER — GABAPENTIN 300 MG/1
300 CAPSULE ORAL 3 TIMES DAILY
COMMUNITY
End: 2019-07-05

## 2018-09-30 RX ORDER — POTASSIUM CHLORIDE 14.9 MG/ML
20 INJECTION INTRAVENOUS ONCE
Status: COMPLETED | OUTPATIENT
Start: 2018-09-30 | End: 2018-09-30

## 2018-09-30 RX ORDER — POTASSIUM CHLORIDE 20 MEQ/1
40 TABLET, EXTENDED RELEASE ORAL ONCE
Status: COMPLETED | OUTPATIENT
Start: 2018-09-30 | End: 2018-09-30

## 2018-09-30 RX ORDER — SODIUM CHLORIDE AND POTASSIUM CHLORIDE .9; .15 G/100ML; G/100ML
SOLUTION INTRAVENOUS CONTINUOUS
Status: DISCONTINUED | OUTPATIENT
Start: 2018-09-30 | End: 2018-09-30

## 2018-09-30 RX ORDER — POTASSIUM CHLORIDE 20 MEQ/1
20 TABLET, EXTENDED RELEASE ORAL 2 TIMES DAILY
Qty: 10 TABLET | Refills: 0 | Status: SHIPPED | OUTPATIENT
Start: 2018-09-30 | End: 2018-10-05

## 2018-09-30 NOTE — ED NOTES
Discharge instructions and prescriptions given to pt. Pt verbalized understanding of home care, medication use, and to follow up with pcp in next few days for K+ recheck. Pt denied further questions or concerns.  Pt discharged to lobby via wheelchair, pt di

## 2018-09-30 NOTE — ED PROVIDER NOTES
Patient Seen in: [facility] AND [facility] Emergency Department    History   Patient presents with:  Back Pain (musculoskeletal)    Stated Complaint: feeling sick for 4-5 days    HPI    24-year-old female with history of chronic low back pain status post lumbar Resp 16   Temp 97.3 °F (36.3 °C)   Temp src Oral   SpO2 98 %   O2 Device None (Room air)       Current:/62   Pulse 80   Temp 97.3 °F (36.3 °C) (Oral)   Resp 18   Ht 160 cm (5' 3\")   Wt 73.9 kg   SpO2 98%   BMI 28.87 kg/m²         Physical Exam   C DARK GREEN   RAINBOW DRAW LIGHT GREEN   RAINBOW DRAW GOLD   RAINBOW DRAW LAVENDER TALL (BNP)          Pulse Ox: 98%, Normal, RA    Radiology findings: Mri Spine Lumbar (w+wo) (cpt=72158)    Result Date: 9/30/2018  CONCLUSION:  1.  Levoscoliosis and multilev Clinical Impression:  Back pain with radiation  (primary encounter diagnosis)  Hypokalemia  Hyperglycemia    Disposition:  There is no disposition on file for this visit. There is no disposition time on file for this visit.     Follow-up:  No follow-up

## 2018-09-30 NOTE — CM/SW NOTE
PT does not want to be transferred. Understands the possibility of coverage through her insurance.  Dr Lachelle Kebede aware

## 2018-09-30 NOTE — ED PROVIDER NOTES
Patient endorsed pending BMP. K+ slighly decreased, likely due to hydration and blood glucose reduction. Discussed with Dr. Zoie Parker who still recommends discharge with clinic follow up.  Patient comfortable with this, discussed potassium rich diet, stable fo

## 2018-09-30 NOTE — ED INITIAL ASSESSMENT (HPI)
Patient has history of back issue and had surgery in 2015. Patient complain of increasing back pain for the last few days.

## 2019-04-25 ENCOUNTER — APPOINTMENT (OUTPATIENT)
Dept: GENERAL RADIOLOGY | Facility: HOSPITAL | Age: 64
End: 2019-04-25
Attending: EMERGENCY MEDICINE
Payer: COMMERCIAL

## 2019-04-25 ENCOUNTER — HOSPITAL ENCOUNTER (EMERGENCY)
Facility: HOSPITAL | Age: 64
Discharge: HOME | End: 2019-04-25
Attending: EMERGENCY MEDICINE
Payer: COMMERCIAL

## 2019-04-25 VITALS
TEMPERATURE: 98 F | BODY MASS INDEX: 25.47 KG/M2 | RESPIRATION RATE: 18 BRPM | WEIGHT: 143.75 LBS | DIASTOLIC BLOOD PRESSURE: 54 MMHG | SYSTOLIC BLOOD PRESSURE: 120 MMHG | OXYGEN SATURATION: 96 % | HEART RATE: 88 BPM | HEIGHT: 63 IN

## 2019-04-25 DIAGNOSIS — M54.50 ACUTE BILATERAL LOW BACK PAIN WITHOUT SCIATICA: Primary | ICD-10-CM

## 2019-04-25 DIAGNOSIS — N39.0 URINARY TRACT INFECTION WITHOUT HEMATURIA, SITE UNSPECIFIED: ICD-10-CM

## 2019-04-25 PROCEDURE — 81001 URINALYSIS AUTO W/SCOPE: CPT | Performed by: EMERGENCY MEDICINE

## 2019-04-25 PROCEDURE — 87088 URINE BACTERIA CULTURE: CPT | Performed by: EMERGENCY MEDICINE

## 2019-04-25 PROCEDURE — 87186 SC STD MICRODIL/AGAR DIL: CPT | Performed by: EMERGENCY MEDICINE

## 2019-04-25 PROCEDURE — 72100 X-RAY EXAM L-S SPINE 2/3 VWS: CPT | Performed by: EMERGENCY MEDICINE

## 2019-04-25 PROCEDURE — 99284 EMERGENCY DEPT VISIT MOD MDM: CPT

## 2019-04-25 PROCEDURE — 87086 URINE CULTURE/COLONY COUNT: CPT | Performed by: EMERGENCY MEDICINE

## 2019-04-25 RX ORDER — SULFAMETHOXAZOLE AND TRIMETHOPRIM 800; 160 MG/1; MG/1
1 TABLET ORAL 2 TIMES DAILY
Qty: 14 TABLET | Refills: 0 | Status: SHIPPED | OUTPATIENT
Start: 2019-04-25 | End: 2019-05-02

## 2019-04-25 RX ORDER — NAPROXEN 500 MG/1
500 TABLET ORAL 2 TIMES DAILY WITH MEALS
Status: DISCONTINUED | OUTPATIENT
Start: 2019-04-25 | End: 2019-04-25

## 2019-04-25 RX ORDER — NAPROXEN 500 MG/1
500 TABLET ORAL 2 TIMES DAILY PRN
Qty: 20 TABLET | Refills: 0 | Status: SHIPPED | OUTPATIENT
Start: 2019-04-25 | End: 2019-05-02

## 2019-04-25 NOTE — ED PROVIDER NOTES
Patient Seen in: [facility] AND [facility] Emergency Department    History   Patient presents with:  Abdomen/Flank Pain (GI/)    Stated Complaint: right side low back pain x 2 weeks    HPI    Patient is a 37-year-old female who presents to the emergency depart Constitutional: She is oriented to person, place, and time. She appears well-developed and well-nourished. HENT:   Head: Normocephalic and atraumatic. Eyes: Pupils are equal, round, and reactive to light.  Conjunctivae and EOM are normal.   Neck: Neck s Take 1 tablet by mouth 2 (two) times daily for 7 days. Qty: 14 tablet Refills: 0    naproxen 500 MG Oral Tab  Take 1 tablet (500 mg total) by mouth 2 (two) times daily as needed.   Qty: 20 tablet Refills: 0

## 2019-04-25 NOTE — ED INITIAL ASSESSMENT (HPI)
Pt c/o right side flank pain radiating up into back. No urinary symptoms, denies n/v/d. No fever. Denies sob, denies c/p.

## 2019-05-14 ENCOUNTER — OFFICE VISIT (OUTPATIENT)
Dept: INTERNAL MEDICINE CLINIC | Facility: CLINIC | Age: 64
End: 2019-05-14

## 2019-05-14 ENCOUNTER — TELEPHONE (OUTPATIENT)
Dept: INTERNAL MEDICINE CLINIC | Facility: CLINIC | Age: 64
End: 2019-05-14

## 2019-05-14 VITALS
RESPIRATION RATE: 18 BRPM | SYSTOLIC BLOOD PRESSURE: 135 MMHG | HEART RATE: 78 BPM | DIASTOLIC BLOOD PRESSURE: 80 MMHG | BODY MASS INDEX: 25 KG/M2 | WEIGHT: 143 LBS | TEMPERATURE: 99 F

## 2019-05-14 DIAGNOSIS — Z76.89 ENCOUNTER TO ESTABLISH CARE: Primary | ICD-10-CM

## 2019-05-14 DIAGNOSIS — Z00.00 ANNUAL PHYSICAL EXAM: ICD-10-CM

## 2019-05-14 DIAGNOSIS — I10 HTN (HYPERTENSION), BENIGN: ICD-10-CM

## 2019-05-14 DIAGNOSIS — Z12.39 SCREENING FOR BREAST CANCER: ICD-10-CM

## 2019-05-14 PROBLEM — J18.9 PNEUMONIA: Status: RESOLVED | Noted: 2018-05-13 | Resolved: 2019-05-14

## 2019-05-14 PROBLEM — E87.1 HYPONATREMIA: Status: RESOLVED | Noted: 2018-05-13 | Resolved: 2019-05-14

## 2019-05-14 PROBLEM — R73.9 HYPERGLYCEMIA: Status: RESOLVED | Noted: 2018-05-13 | Resolved: 2019-05-14

## 2019-05-14 PROBLEM — E87.6 HYPOKALEMIA: Status: RESOLVED | Noted: 2018-05-13 | Resolved: 2019-05-14

## 2019-05-14 PROBLEM — J18.9 PNEUMONIA OF LEFT LOWER LOBE DUE TO INFECTIOUS ORGANISM: Status: RESOLVED | Noted: 2018-05-13 | Resolved: 2019-05-14

## 2019-05-14 PROCEDURE — 99203 OFFICE O/P NEW LOW 30 MIN: CPT | Performed by: INTERNAL MEDICINE

## 2019-05-14 NOTE — PATIENT INSTRUCTIONS
Diabetes type 2 on insulin advised her to watch her diet avoid carbohydrates advised her to check her blood sugar fasting and bring logbook next visit advised to continue with current medication will need to check her HbA1c.   Request records from her previ

## 2019-05-14 NOTE — PROGRESS NOTES
HPI:    Patient ID: Sasha Dan is a 66year old female. HPI  She  is here to establish care    htn -she is not checking her blood pressure at home but she is taking her medications regularly. She is trying to watch her diet.      dm type 2 -she i OR Take by mouth 2 (two) times daily as needed. Disp:  Rfl:    hydrochlorothiazide 25 MG Oral Tab Take 25 mg by mouth daily. Disp:  Rfl:    MetFORMIN HCl 1000 MG Oral Tab Take 1,000 mg by mouth 2 (two) times daily.  Disp:  Rfl:    insulin detemir 100 UNIT/M Uvula is midline, oropharynx is clear and moist and mucous membranes are normal. Mucous membranes are not cyanotic. No oropharyngeal exudate, posterior oropharyngeal edema or posterior oropharyngeal erythema.    Eyes: Pupils are equal, round, and reactive t PCP    htn-advised her to check blood pressure at home and local pharmacy and bring logbook next visit advised to watch her diet avoid salty food continue with current medication. Smoker advised her to quit -we will try Chantix discussed side effects.

## 2019-05-14 NOTE — TELEPHONE ENCOUNTER
ivonne signed and mailed:    [facility]   Dr Alexis Last  [address] [address]

## 2019-06-18 ENCOUNTER — APPOINTMENT (OUTPATIENT)
Dept: LAB | Facility: HOSPITAL | Age: 64
End: 2019-06-18
Attending: INTERNAL MEDICINE
Payer: COMMERCIAL

## 2019-06-18 PROCEDURE — 80053 COMPREHEN METABOLIC PANEL: CPT | Performed by: INTERNAL MEDICINE

## 2019-06-18 PROCEDURE — 85025 COMPLETE CBC W/AUTO DIFF WBC: CPT | Performed by: INTERNAL MEDICINE

## 2019-06-18 PROCEDURE — 80061 LIPID PANEL: CPT | Performed by: INTERNAL MEDICINE

## 2019-06-18 PROCEDURE — 83036 HEMOGLOBIN GLYCOSYLATED A1C: CPT | Performed by: INTERNAL MEDICINE

## 2019-06-18 PROCEDURE — 36415 COLL VENOUS BLD VENIPUNCTURE: CPT | Performed by: INTERNAL MEDICINE

## 2019-06-18 PROCEDURE — 84439 ASSAY OF FREE THYROXINE: CPT | Performed by: INTERNAL MEDICINE

## 2019-06-18 PROCEDURE — 84443 ASSAY THYROID STIM HORMONE: CPT | Performed by: INTERNAL MEDICINE

## 2019-06-25 ENCOUNTER — APPOINTMENT (OUTPATIENT)
Dept: LAB | Facility: HOSPITAL | Age: 64
End: 2019-06-25
Attending: INTERNAL MEDICINE
Payer: COMMERCIAL

## 2019-06-25 ENCOUNTER — HOSPITAL ENCOUNTER (OUTPATIENT)
Dept: ULTRASOUND IMAGING | Facility: HOSPITAL | Age: 64
Discharge: HOME | End: 2019-06-25
Attending: INTERNAL MEDICINE
Payer: COMMERCIAL

## 2019-06-25 DIAGNOSIS — N17.9 AKI (ACUTE KIDNEY INJURY) (HCC): ICD-10-CM

## 2019-06-25 PROCEDURE — 36415 COLL VENOUS BLD VENIPUNCTURE: CPT

## 2019-06-25 PROCEDURE — 80048 BASIC METABOLIC PNL TOTAL CA: CPT

## 2019-06-25 PROCEDURE — 76775 US EXAM ABDO BACK WALL LIM: CPT | Performed by: INTERNAL MEDICINE

## 2019-07-05 ENCOUNTER — OFFICE VISIT (OUTPATIENT)
Dept: ENDOCRINOLOGY CLINIC | Facility: CLINIC | Age: 64
End: 2019-07-05

## 2019-07-05 VITALS
WEIGHT: 140.63 LBS | DIASTOLIC BLOOD PRESSURE: 79 MMHG | BODY MASS INDEX: 25 KG/M2 | SYSTOLIC BLOOD PRESSURE: 153 MMHG | HEART RATE: 89 BPM

## 2019-07-05 DIAGNOSIS — E78.1 HYPERTRIGLYCERIDEMIA: Primary | ICD-10-CM

## 2019-07-05 DIAGNOSIS — E10.10 TYPE 1 DIABETES MELLITUS WITH KETOACIDOSIS WITHOUT COMA (HCC): ICD-10-CM

## 2019-07-05 LAB
GLUCOSE BLOOD: 210
TEST STRIP LOT #: NORMAL NUMERIC

## 2019-07-05 PROCEDURE — 36416 COLLJ CAPILLARY BLOOD SPEC: CPT | Performed by: NURSE PRACTITIONER

## 2019-07-05 PROCEDURE — 99205 OFFICE O/P NEW HI 60 MIN: CPT | Performed by: NURSE PRACTITIONER

## 2019-07-05 PROCEDURE — 82962 GLUCOSE BLOOD TEST: CPT | Performed by: NURSE PRACTITIONER

## 2019-07-05 RX ORDER — GLIMEPIRIDE 2 MG/1
2 TABLET ORAL
Qty: 30 TABLET | Refills: 0 | Status: SHIPPED | OUTPATIENT
Start: 2019-07-05 | End: 2019-07-27

## 2019-07-05 RX ORDER — AMITRIPTYLINE HYDROCHLORIDE 25 MG/1
25 TABLET, FILM COATED ORAL NIGHTLY PRN
Qty: 30 TABLET | Refills: 0 | Status: SHIPPED | OUTPATIENT
Start: 2019-07-05 | End: 2019-07-27

## 2019-07-05 RX ORDER — ATORVASTATIN CALCIUM 20 MG/1
20 TABLET, FILM COATED ORAL NIGHTLY
Qty: 30 TABLET | Refills: 0 | Status: SHIPPED | OUTPATIENT
Start: 2019-07-05 | End: 2019-07-27

## 2019-07-05 NOTE — PATIENT INSTRUCTIONS
Levemir 30 units SC inject daily at hs   Metformin 1,000 mg po bid   ADD   Trulicity 5.97 mg SC inject weekly   Glimepiride 2 mg po daily

## 2019-07-05 NOTE — PROGRESS NOTES
Diabetes Consult    Name: Lester Melendez  Date: 7/5/2019    Referring Physician: Edelmira Hunter    HISTORY OF PRESENT ILLNESS   Lester Melendez is a 66year old female who presents for diabetes consult     Diagnosed with TY2 diabetes when she was [de-identified] Yes    Skin: Infection or ulceration: No            Follows with Podiatry: Yes @ [id number]     Feet: Denies History of ulceration, amputation, Charcot foot, angioplasty or vascular surgery, Positive neuropathy (pain, burning, numbness)     Osteoporosis: No of children: Not on file      Years of education: Not on file      Highest education level: Not on file    Tobacco Use      Smoking status: Current Every Day Smoker        Packs/day: 0.00      Smokeless tobacco: Never Used      Tobacco comment: per pt, 1-2 Mellitus Type 2 Uncontrolled  Target (6.5%)  Type 2 DM Reviewed Diabetes mellitus type 2 is an endocrine disorder with insulin resistance and deficiency, resulting in high blood sugars.  Complications can include cardiovascular disease, neuropathy (nerve da in patients with a history of Multiple Endocrine Neoplasia syndrome type 2.  Denies pancreatitis/ gastroparesis/ Thyroid tumor/ MENST2/ understands risk/ benefit/ s/e Demonstrated injection technique in the office. / discussed weight in office / pt to monit is MOST accurate when individual's Triglyceride (TG) is <200. Calculation is COMPROMISED when TG is 200-400 mg/dl.     Calculation is INVALID when patient: is Non-Fasting, has TG > 400 mg/dl, Chylomicrons are present, Type III Hyperlipidemia, or Type II

## 2019-07-09 ENCOUNTER — OFFICE VISIT (OUTPATIENT)
Dept: INTERNAL MEDICINE CLINIC | Facility: CLINIC | Age: 64
End: 2019-07-09

## 2019-07-09 VITALS
WEIGHT: 141 LBS | HEART RATE: 74 BPM | TEMPERATURE: 99 F | BODY MASS INDEX: 25 KG/M2 | SYSTOLIC BLOOD PRESSURE: 128 MMHG | DIASTOLIC BLOOD PRESSURE: 78 MMHG | RESPIRATION RATE: 18 BRPM

## 2019-07-09 DIAGNOSIS — E11.00 TYPE 2 DIABETES MELLITUS WITH HYPEROSMOLARITY WITHOUT COMA, WITH LONG-TERM CURRENT USE OF INSULIN (HCC): ICD-10-CM

## 2019-07-09 DIAGNOSIS — D50.8 OTHER IRON DEFICIENCY ANEMIA: Primary | ICD-10-CM

## 2019-07-09 DIAGNOSIS — Z79.4 TYPE 2 DIABETES MELLITUS WITH HYPEROSMOLARITY WITHOUT COMA, WITH LONG-TERM CURRENT USE OF INSULIN (HCC): ICD-10-CM

## 2019-07-09 DIAGNOSIS — F17.200 TOBACCO USE DISORDER: ICD-10-CM

## 2019-07-09 DIAGNOSIS — J98.4 CAVITATING MASS OF LUNG: ICD-10-CM

## 2019-07-09 LAB
DEPRECATED HBV CORE AB SER IA-ACNC: 150.4 NG/ML (ref 18–340)
IRON SATURATION: 11 % (ref 15–50)
IRON SERPL-MCNC: 34 UG/DL (ref 50–170)
TOTAL IRON BINDING CAPACITY: 320 UG/DL (ref 240–450)
TRANSFERRIN SERPL-MCNC: 215 MG/DL (ref 200–360)

## 2019-07-09 PROCEDURE — 99407 BEHAV CHNG SMOKING > 10 MIN: CPT | Performed by: INTERNAL MEDICINE

## 2019-07-09 PROCEDURE — 99214 OFFICE O/P EST MOD 30 MIN: CPT | Performed by: INTERNAL MEDICINE

## 2019-07-09 NOTE — PATIENT INSTRUCTIONS
Dm type -2 not well controlled , continue with levemir 30 unit, metformin, trulicity and glimepiride , follow up with ophthalmology , check blood sugar fasting and bring log book next vis t   diabetic neuropathy - continue with  amytriptilin   microcystic

## 2019-07-09 NOTE — PROGRESS NOTES
HPI:    Patient ID: Yamilex Dixon is a 66year old female. HPI she is here for follow up on her labs   Dm type 2- she was seeing endocrinology and they added trulicity and glimeperide, on top of metformin and levemir 30 units.  She is watching her di Hematological: Negative for adenopathy. Does not bruise/bleed easily. Psychiatric/Behavioral: Negative for agitation, behavioral problems, confusion, hallucinations and sleep disturbance. The patient is not nervous/anxious.             Current Outpatien Tobacco Use      Smoking status: Current Every Day Smoker        Packs/day: 0.00      Smokeless tobacco: Never Used      Tobacco comment: per pt, 1-2 cigarettes per day    Alcohol use: Not Currently      Frequency: Never      Comment: socially    Drug use: no tenderness. There is no rigidity, no rebound, no guarding and no CVA tenderness. Musculoskeletal: Normal range of motion. She exhibits no edema or tenderness.    Bilateral barefoot skin diabetic exam is normal, visualized feet and the appearance is nor

## 2019-07-18 ENCOUNTER — HOSPITAL ENCOUNTER (OUTPATIENT)
Dept: CT IMAGING | Facility: HOSPITAL | Age: 64
Discharge: HOME | End: 2019-07-18
Attending: INTERNAL MEDICINE
Payer: COMMERCIAL

## 2019-07-18 DIAGNOSIS — J98.4 CAVITATING MASS OF LUNG: ICD-10-CM

## 2019-07-18 PROCEDURE — 71260 CT THORAX DX C+: CPT | Performed by: INTERNAL MEDICINE

## 2019-07-23 ENCOUNTER — OFFICE VISIT (OUTPATIENT)
Dept: NEPHROLOGY | Facility: CLINIC | Age: 64
End: 2019-07-23

## 2019-07-23 VITALS
HEART RATE: 85 BPM | WEIGHT: 142.38 LBS | DIASTOLIC BLOOD PRESSURE: 80 MMHG | TEMPERATURE: 99 F | SYSTOLIC BLOOD PRESSURE: 123 MMHG | HEIGHT: 64 IN | BODY MASS INDEX: 24.31 KG/M2

## 2019-07-23 DIAGNOSIS — N17.9 AKI (ACUTE KIDNEY INJURY) (HCC): Primary | ICD-10-CM

## 2019-07-23 DIAGNOSIS — E83.52 HYPERCALCEMIA: ICD-10-CM

## 2019-07-23 PROCEDURE — 99245 OFF/OP CONSLTJ NEW/EST HI 55: CPT | Performed by: INTERNAL MEDICINE

## 2019-07-23 NOTE — PROGRESS NOTES
Consult Requested By: Dr. Ulices Bautista    Reason for Consult: OMEGA and hypercalcemia     HPI:     Patient is a 66 yrs old female with pmh of DM II x 40 yrs, HTN, anemia, current smoking, back surgery, sickle cell trait who presented for work up and evaluation Use      Smoking status: Current Every Day Smoker        Packs/day: 0.00      Smokeless tobacco: Never Used      Tobacco comment: per pt, 1-2 cigarettes per day    Alcohol use: Not Currently      Frequency: Never      Comment: socially    Drug use:  No weight loss   Genitourinary:  Negative for dysuria and hematuria  Endocrine:  Negative for abnormal sleep patterns, increased activity  Hema/Lymph:  Negative for easy bleeding and easy bruising  Integumentary:  Negative for pruritus and rash  Musculoskelet months  - Hgb improved 10 g/dl    4. HTN  - on lisinopril and hctz - stable BP   - doesn't check BP at home     Counseled for smoking cessation     Total time >40 mins       Orders This Visit:  No orders of the defined types were placed in this encounter.

## 2019-07-23 NOTE — PATIENT INSTRUCTIONS
Try to reduce and stop ibuprofen  Blood and urine test as ordered in next 1-2 days  Follow up in 2-3 weeks

## 2019-07-27 ENCOUNTER — HOSPITAL ENCOUNTER (OUTPATIENT)
Dept: MAMMOGRAPHY | Facility: HOSPITAL | Age: 64
Discharge: HOME | End: 2019-07-27
Attending: INTERNAL MEDICINE
Payer: COMMERCIAL

## 2019-07-27 DIAGNOSIS — Z12.39 SCREENING FOR BREAST CANCER: ICD-10-CM

## 2019-07-27 DIAGNOSIS — E78.1 HYPERTRIGLYCERIDEMIA: ICD-10-CM

## 2019-07-27 DIAGNOSIS — E10.10 TYPE 1 DIABETES MELLITUS WITH KETOACIDOSIS WITHOUT COMA (HCC): ICD-10-CM

## 2019-07-27 PROCEDURE — 77067 SCR MAMMO BI INCL CAD: CPT | Performed by: INTERNAL MEDICINE

## 2019-07-27 PROCEDURE — 77063 BREAST TOMOSYNTHESIS BI: CPT | Performed by: INTERNAL MEDICINE

## 2019-07-29 ENCOUNTER — TELEPHONE (OUTPATIENT)
Dept: ENDOCRINOLOGY CLINIC | Facility: CLINIC | Age: 64
End: 2019-07-29

## 2019-07-29 DIAGNOSIS — E10.10 TYPE 1 DIABETES MELLITUS WITH KETOACIDOSIS WITHOUT COMA (HCC): Primary | ICD-10-CM

## 2019-07-29 RX ORDER — GLIMEPIRIDE 2 MG/1
2 TABLET ORAL
Qty: 30 TABLET | Refills: 0 | Status: SHIPPED | OUTPATIENT
Start: 2019-07-29 | End: 2019-08-28

## 2019-07-29 RX ORDER — AMITRIPTYLINE HYDROCHLORIDE 25 MG/1
25 TABLET, FILM COATED ORAL NIGHTLY PRN
Qty: 30 TABLET | Refills: 0 | Status: SHIPPED | OUTPATIENT
Start: 2019-07-29 | End: 2019-08-21

## 2019-07-29 RX ORDER — ATORVASTATIN CALCIUM 20 MG/1
TABLET, FILM COATED ORAL
Qty: 30 TABLET | Refills: 0 | Status: ON HOLD | OUTPATIENT
Start: 2019-07-29 | End: 2019-11-09

## 2019-07-30 NOTE — TELEPHONE ENCOUNTER
ZEKETCB to review message below. Endo RNs: Spoke with Shanika Blandon. There is a BMP ordered by Dr. Eldon Obrien on 6/25/19. Pt will be due for labs in 2-3 weeks as ordered by Dr. Ramana Price.  She can repeat the ordered BMP at that time with the nephro labs for further

## 2019-07-30 NOTE — TELEPHONE ENCOUNTER
Called the patient. Informed her of Anna's message below. Pt states she does not have chronic kidney disease. She states that she had an acute kidney problem at at last visit with nephrologist was told her kidneys are doing well.      Carlos Alberto Phlegm please adv

## 2019-07-30 NOTE — TELEPHONE ENCOUNTER
Called the patient. She is experiencing symptoms of peripheral neuropathy at night bilateral feet. It is keeping her awake at night. States she has tried amitriptyline x 2 weeks and it is not working.  Requesting Rx for lyrica sent as alternative to [facility] in

## 2019-07-30 NOTE — TELEPHONE ENCOUNTER
I suggest trying 50 mg of the Amitriptyline to see if that is more effective.    I would avoid the lyrica due to her CKD   Thank you   LG

## 2019-07-30 NOTE — TELEPHONE ENCOUNTER
LAst GFR 46 which is decreased from previous GFR of 48 normal previously,   I still recommend trying increasing the dose of Amitrtyline to 50 mg prior to introducing a renal medication. As we discussed at LOV the 25 mg is the lowest starting dose.       Her

## 2019-08-20 ENCOUNTER — OFFICE VISIT (OUTPATIENT)
Dept: PODIATRY CLINIC | Facility: CLINIC | Age: 64
End: 2019-08-20

## 2019-08-20 DIAGNOSIS — E11.42 TYPE 2 DIABETES MELLITUS WITH DIABETIC POLYNEUROPATHY, WITHOUT LONG-TERM CURRENT USE OF INSULIN (HCC): Primary | ICD-10-CM

## 2019-08-20 DIAGNOSIS — B35.1 ONYCHOMYCOSIS: ICD-10-CM

## 2019-08-20 PROCEDURE — 11721 DEBRIDE NAIL 6 OR MORE: CPT | Performed by: PODIATRIST

## 2019-08-20 PROCEDURE — 99243 OFF/OP CNSLTJ NEW/EST LOW 30: CPT | Performed by: PODIATRIST

## 2019-08-20 RX ORDER — IBUPROFEN 600 MG/1
TABLET ORAL
Refills: 12 | Status: ON HOLD | COMMUNITY
Start: 2019-07-29 | End: 2019-11-12

## 2019-08-20 NOTE — PROGRESS NOTES
HPI:    Patient ID: Gale Elliott is a 66year old female. This 62-year-old diabetic presents as new patient to me on consult from [address] [place] [address]. Patient states that she is here for diabetic foot evaluation and care associated with her toenails.   She stat dorsalis pedis pulses noted but quite diminished. I was unable to elicit the posterior tibial pulse. She has mild edema in both feet and lower legs. She has bunion deformities with a callus on the interphalangeal joint.   Her nails are dystrophic with ch

## 2019-08-21 DIAGNOSIS — E10.10 TYPE 1 DIABETES MELLITUS WITH KETOACIDOSIS WITHOUT COMA (HCC): ICD-10-CM

## 2019-08-21 RX ORDER — AMITRIPTYLINE HYDROCHLORIDE 25 MG/1
25 TABLET, FILM COATED ORAL NIGHTLY PRN
Qty: 30 TABLET | Refills: 0 | Status: SHIPPED | OUTPATIENT
Start: 2019-08-21 | End: 2019-08-26

## 2019-08-21 NOTE — TELEPHONE ENCOUNTER
Pt requesting refills for Amitriptylene      Current Outpatient Medications:  AMITRIPTYLINE HCL 25 MG Oral Tab TAKE 1 TABLET (25 MG TOTAL) BY MOUTH NIGHTLY AS NEEDED FOR PAIN (FOR PERIPHERAL NEUROPATHY).  Disp: 30 tablet Rfl: 0

## 2019-08-26 ENCOUNTER — TELEPHONE (OUTPATIENT)
Dept: ENDOCRINOLOGY CLINIC | Facility: CLINIC | Age: 64
End: 2019-08-26

## 2019-08-26 DIAGNOSIS — E10.10 TYPE 1 DIABETES MELLITUS WITH KETOACIDOSIS WITHOUT COMA (HCC): ICD-10-CM

## 2019-08-26 RX ORDER — AMITRIPTYLINE HYDROCHLORIDE 25 MG/1
50 TABLET, FILM COATED ORAL NIGHTLY PRN
Qty: 60 TABLET | Refills: 2 | Status: SHIPPED | OUTPATIENT
Start: 2019-08-26 | End: 2019-08-27

## 2019-08-26 NOTE — TELEPHONE ENCOUNTER
Per TE from 7/29/2019 Anna advised increasing amitriptyline to 50mg PO daily.  Updated prescription as written in note from 7/29

## 2019-08-26 NOTE — TELEPHONE ENCOUNTER
Current Outpatient Medications:  Amitriptyline HCl 25 MG Oral Tab Take 1 tablet (25 mg total) by mouth nightly as needed for Pain (for peripheral neuropathy).  Disp: 30 tablet Rfl: 0     Per pharmacy pt states she was told to take 2 tabs now pls clarify

## 2019-08-27 DIAGNOSIS — E10.10 TYPE 1 DIABETES MELLITUS WITH KETOACIDOSIS WITHOUT COMA (HCC): ICD-10-CM

## 2019-08-27 RX ORDER — AMITRIPTYLINE HYDROCHLORIDE 25 MG/1
25 TABLET, FILM COATED ORAL NIGHTLY PRN
Qty: 30 TABLET | Refills: 0 | Status: SHIPPED | OUTPATIENT
Start: 2019-08-27 | End: 2019-09-26

## 2019-09-03 ENCOUNTER — LAB ENCOUNTER (OUTPATIENT)
Dept: LAB | Facility: HOSPITAL | Age: 64
End: 2019-09-03
Attending: INTERNAL MEDICINE
Payer: COMMERCIAL

## 2019-09-03 ENCOUNTER — HOSPITAL ENCOUNTER (OUTPATIENT)
Dept: CT IMAGING | Facility: HOSPITAL | Age: 64
Discharge: HOME | End: 2019-09-03
Attending: INTERNAL MEDICINE
Payer: COMMERCIAL

## 2019-09-03 ENCOUNTER — TELEPHONE (OUTPATIENT)
Dept: NEPHROLOGY | Facility: CLINIC | Age: 64
End: 2019-09-03

## 2019-09-03 DIAGNOSIS — E11.00 TYPE 2 DIABETES MELLITUS WITH HYPEROSMOLARITY WITHOUT COMA, WITHOUT LONG-TERM CURRENT USE OF INSULIN (HCC): ICD-10-CM

## 2019-09-03 DIAGNOSIS — D50.8 OTHER IRON DEFICIENCY ANEMIA: ICD-10-CM

## 2019-09-03 DIAGNOSIS — E83.52 HYPERCALCEMIA: ICD-10-CM

## 2019-09-03 DIAGNOSIS — N17.9 AKI (ACUTE KIDNEY INJURY) (HCC): ICD-10-CM

## 2019-09-03 DIAGNOSIS — N28.89 PERINEPHRIC FLUID COLLECTION: ICD-10-CM

## 2019-09-03 DIAGNOSIS — N17.9 AKI (ACUTE KIDNEY INJURY) (HCC): Primary | ICD-10-CM

## 2019-09-03 DIAGNOSIS — N18.30 CKD (CHRONIC KIDNEY DISEASE) STAGE 3, GFR 30-59 ML/MIN (HCC): ICD-10-CM

## 2019-09-03 LAB
25(OH)D3 SERPL-MCNC: 13 NG/ML (ref 30–100)
ANION GAP SERPL CALC-SCNC: 9 MMOL/L (ref 0–18)
BASOPHILS # BLD AUTO: 0.13 X10(3) UL (ref 0–0.2)
BASOPHILS NFR BLD AUTO: 1.6 %
BILIRUB UR QL: NEGATIVE
BUN BLD-MCNC: 37 MG/DL (ref 7–18)
BUN/CREAT SERPL: 33 (ref 10–20)
CALCIUM BLD-MCNC: 10.1 MG/DL (ref 8.5–10.1)
CHLORIDE SERPL-SCNC: 108 MMOL/L (ref 98–112)
CLARITY UR: CLEAR
CO2 SERPL-SCNC: 25 MMOL/L (ref 21–32)
COLOR UR: YELLOW
CREAT BLD-MCNC: 1.12 MG/DL (ref 0.55–1.02)
CREAT UR-SCNC: 14 MG/DL
DEPRECATED RDW RBC AUTO: 51.5 FL (ref 35.1–46.3)
EOSINOPHIL # BLD AUTO: 0.41 X10(3) UL (ref 0–0.7)
EOSINOPHIL NFR BLD AUTO: 5.1 %
ERYTHROCYTE [DISTWIDTH] IN BLOOD BY AUTOMATED COUNT: 16.5 % (ref 11–15)
ERYTHROCYTE [SEDIMENTATION RATE] IN BLOOD: 50 MM/HR (ref 0–30)
GLUCOSE BLD-MCNC: 99 MG/DL (ref 70–99)
GLUCOSE UR-MCNC: NEGATIVE MG/DL
HCT VFR BLD AUTO: 34.9 % (ref 35–48)
HGB BLD-MCNC: 11.3 G/DL (ref 12–16)
IGA SERPL-MCNC: 378 MG/DL (ref 70–312)
IGM SERPL-MCNC: 95.2 MG/DL (ref 43–279)
IMM GRANULOCYTES # BLD AUTO: 0.02 X10(3) UL (ref 0–1)
IMM GRANULOCYTES NFR BLD: 0.2 %
IMMUNOGLOBULIN PNL SER-MCNC: 1320 MG/DL (ref 791–1643)
KETONES UR-MCNC: NEGATIVE MG/DL
LYMPHOCYTES # BLD AUTO: 2.68 X10(3) UL (ref 1–4)
LYMPHOCYTES NFR BLD AUTO: 33.1 %
MCH RBC QN AUTO: 27.6 PG (ref 26–34)
MCHC RBC AUTO-ENTMCNC: 32.4 G/DL (ref 31–37)
MCV RBC AUTO: 85.3 FL (ref 80–100)
MICROALBUMIN UR-MCNC: 2.68 MG/DL
MICROALBUMIN/CREAT 24H UR-RTO: 191.4 UG/MG (ref ?–30)
MONOCYTES # BLD AUTO: 0.57 X10(3) UL (ref 0.1–1)
MONOCYTES NFR BLD AUTO: 7 %
NEUTROPHILS # BLD AUTO: 4.29 X10 (3) UL (ref 1.5–7.7)
NEUTROPHILS # BLD AUTO: 4.29 X10(3) UL (ref 1.5–7.7)
NEUTROPHILS NFR BLD AUTO: 53 %
NITRITE UR QL STRIP.AUTO: NEGATIVE
OSMOLALITY SERPL CALC.SUM OF ELEC: 303 MOSM/KG (ref 275–295)
PH UR: 7 [PH] (ref 5–8)
PLATELET # BLD AUTO: 370 10(3)UL (ref 150–450)
POTASSIUM SERPL-SCNC: 4.7 MMOL/L (ref 3.5–5.1)
PROT UR-MCNC: 10.2 MG/DL
PROT UR-MCNC: NEGATIVE MG/DL
PTH-INTACT SERPL-MCNC: 59.6 PG/ML (ref 18.5–88)
RBC # BLD AUTO: 4.09 X10(6)UL (ref 3.8–5.3)
RBC #/AREA URNS AUTO: 4 /HPF
SODIUM SERPL-SCNC: 142 MMOL/L (ref 136–145)
SP GR UR STRIP: 1.01 (ref 1–1.03)
UROBILINOGEN UR STRIP-ACNC: <2
VIT B12 SERPL-MCNC: 963 PG/ML (ref 193–986)
VIT C UR-MCNC: NEGATIVE MG/DL
WBC # BLD AUTO: 8.1 X10(3) UL (ref 4–11)
WBC #/AREA URNS AUTO: 76 /HPF

## 2019-09-03 PROCEDURE — 84165 PROTEIN E-PHORESIS SERUM: CPT

## 2019-09-03 PROCEDURE — 80048 BASIC METABOLIC PNL TOTAL CA: CPT

## 2019-09-03 PROCEDURE — 83516 IMMUNOASSAY NONANTIBODY: CPT | Performed by: INTERNAL MEDICINE

## 2019-09-03 PROCEDURE — 83876 ASSAY MYELOPEROXIDASE: CPT | Performed by: INTERNAL MEDICINE

## 2019-09-03 PROCEDURE — 82542 COL CHROMOTOGRAPHY QUAL/QUAN: CPT

## 2019-09-03 PROCEDURE — 74176 CT ABD & PELVIS W/O CONTRAST: CPT | Performed by: INTERNAL MEDICINE

## 2019-09-03 PROCEDURE — 82043 UR ALBUMIN QUANTITATIVE: CPT

## 2019-09-03 PROCEDURE — 85025 COMPLETE CBC W/AUTO DIFF WBC: CPT

## 2019-09-03 PROCEDURE — 82607 VITAMIN B-12: CPT

## 2019-09-03 PROCEDURE — 82652 VIT D 1 25-DIHYDROXY: CPT

## 2019-09-03 PROCEDURE — 85652 RBC SED RATE AUTOMATED: CPT

## 2019-09-03 PROCEDURE — 83883 ASSAY NEPHELOMETRY NOT SPEC: CPT

## 2019-09-03 PROCEDURE — 83970 ASSAY OF PARATHORMONE: CPT

## 2019-09-03 PROCEDURE — 84156 ASSAY OF PROTEIN URINE: CPT

## 2019-09-03 PROCEDURE — 86334 IMMUNOFIX E-PHORESIS SERUM: CPT

## 2019-09-03 PROCEDURE — 86038 ANTINUCLEAR ANTIBODIES: CPT

## 2019-09-03 PROCEDURE — 81001 URINALYSIS AUTO W/SCOPE: CPT

## 2019-09-03 PROCEDURE — 86255 FLUORESCENT ANTIBODY SCREEN: CPT | Performed by: INTERNAL MEDICINE

## 2019-09-03 PROCEDURE — 82570 ASSAY OF URINE CREATININE: CPT

## 2019-09-03 PROCEDURE — 82306 VITAMIN D 25 HYDROXY: CPT | Performed by: INTERNAL MEDICINE

## 2019-09-03 PROCEDURE — 36415 COLL VENOUS BLD VENIPUNCTURE: CPT

## 2019-09-03 PROCEDURE — 82784 ASSAY IGA/IGD/IGG/IGM EACH: CPT

## 2019-09-03 NOTE — TELEPHONE ENCOUNTER
Spoke to pt and relayed Dr. Odin Rodriguez message. appt scheduled for 9/12/19 at 1420. Pt agreed to appt. No further action required.

## 2019-09-04 LAB — 1,25-DIHYDROXYVITAMIN D: 17.9 PG/ML

## 2019-09-05 LAB
ALBUMIN SERPL ELPH-MCNC: 4.01 G/DL (ref 3.75–5.21)
ALBUMIN/GLOB SERPL: 1.26 {RATIO} (ref 1–2)
ALPHA1 GLOB SERPL ELPH-MCNC: 0.36 G/DL (ref 0.19–0.46)
ALPHA2 GLOB SERPL ELPH-MCNC: 0.73 G/DL (ref 0.48–1.05)
B-GLOBULIN SERPL ELPH-MCNC: 0.91 G/DL (ref 0.68–1.23)
GAMMA GLOB SERPL ELPH-MCNC: 1.19 G/DL (ref 0.62–1.7)
NUCLEAR IGG TITR SER IF: NEGATIVE {TITER}
TOTAL PROTEIN (SPECIAL TESTING): 7.2 G/DL (ref 6.5–9.1)

## 2019-09-06 LAB
KAPPA FREE LIGHT CHAIN: 4.65 MG/DL (ref 0.33–1.94)
KAPPA/LAMBDA FLC RATIO: 1.57 (ref 0.26–1.65)
LAMBDA FREE LIGHT CHAIN: 2.96 MG/DL (ref 0.57–2.63)
MYELOPEROX ANTIBODIES, IGG: 0 AU/ML
SERINE PROTEASE3, IGG: 5 AU/ML

## 2019-09-07 LAB — PTH RELATED PEPTIDE: 3.2 PMOL/L

## 2019-09-10 DIAGNOSIS — R89.9 ABNORMAL LABORATORY TEST: Primary | ICD-10-CM

## 2019-09-12 ENCOUNTER — OFFICE VISIT (OUTPATIENT)
Dept: NEPHROLOGY | Facility: CLINIC | Age: 64
End: 2019-09-12

## 2019-09-12 ENCOUNTER — TELEPHONE (OUTPATIENT)
Dept: INTERNAL MEDICINE CLINIC | Facility: CLINIC | Age: 64
End: 2019-09-12

## 2019-09-12 VITALS
HEART RATE: 78 BPM | DIASTOLIC BLOOD PRESSURE: 67 MMHG | HEIGHT: 64 IN | SYSTOLIC BLOOD PRESSURE: 119 MMHG | TEMPERATURE: 99 F | WEIGHT: 143.63 LBS | BODY MASS INDEX: 24.52 KG/M2

## 2019-09-12 DIAGNOSIS — E83.52 HYPERCALCEMIA: ICD-10-CM

## 2019-09-12 DIAGNOSIS — N17.9 AKI (ACUTE KIDNEY INJURY) (HCC): ICD-10-CM

## 2019-09-12 DIAGNOSIS — R31.29 MICROSCOPIC HEMATURIA: Primary | ICD-10-CM

## 2019-09-12 DIAGNOSIS — N18.2 CKD (CHRONIC KIDNEY DISEASE), STAGE II: ICD-10-CM

## 2019-09-12 DIAGNOSIS — N18.30 CKD (CHRONIC KIDNEY DISEASE) STAGE 3, GFR 30-59 ML/MIN (HCC): Primary | ICD-10-CM

## 2019-09-12 PROCEDURE — 99214 OFFICE O/P EST MOD 30 MIN: CPT | Performed by: INTERNAL MEDICINE

## 2019-09-12 NOTE — PROGRESS NOTES
Progress Note:      Patient is a 66 yrs old female with pmh of DM II x 40 yrs c/b diabetic neuropathy, HTN, anemia, current smoking, back surgery, sickle cell trait who presented for follow up      Lab test showed BUN/Cr 42/1.43 mg/dl with an eGFR 46 m transplant due to complications of DM   • Kidney Disease Sister         Kidney failure secondary to diabetes; received kidney transplant in 2004   • Diabetes Brother    • Sickle Cell Son         Cause of death      Social History: Social History    Tobacco wheezing  Gastrointestinal:  Negative for abdominal pain, constipation.  Decrease appetite and some weight loss   Genitourinary:  Negative for dysuria and hematuria  Endocrine:  Negative for abnormal sleep patterns, increased activity  Hema/Lymph:  Negative lesion - +ve nodule which needs 6 months follow up   - chronic smoker - cut down to 10 cigs a day - CT chest noted from July 2019 - 6 months follow up on 4 mm right UL nodule   - PTH wnl. PTHrp wnl.  Vitamin D, 25 and 1,25 low - start vitamin D 1000 units o

## 2019-09-12 NOTE — TELEPHONE ENCOUNTER
Pt called in stating that she received a call from Dr. Russ Mills office because they do not have a referral on file for her.      She is asking for one prior to her appt at 2pm. She is also asking for a call back ASAP so she knows if she should cancel the

## 2019-09-12 NOTE — PATIENT INSTRUCTIONS
Follow up with endocrinologist for diabetes and hypercalcemia   Increase fluid intake to 55-60 ouces a day   Stop ibuprofen   Start on vitamin D 1000 units a day   Lab test in 4 weeks   Follow up in 6 months

## 2019-09-12 NOTE — TELEPHONE ENCOUNTER
Created referral, one on file to Dr. Shakir Kirby- but no visits left.     Please sign off referral      Thanks,    [facility]

## 2019-09-13 ENCOUNTER — HOSPITAL ENCOUNTER (OUTPATIENT)
Dept: GENERAL RADIOLOGY | Facility: HOSPITAL | Age: 64
Discharge: HOME | End: 2019-09-13
Attending: INTERNAL MEDICINE
Payer: COMMERCIAL

## 2019-09-13 ENCOUNTER — MED REC SCAN ONLY (OUTPATIENT)
Dept: INTERNAL MEDICINE CLINIC | Facility: CLINIC | Age: 64
End: 2019-09-13

## 2019-09-13 ENCOUNTER — HOSPITAL ENCOUNTER (OUTPATIENT)
Dept: ULTRASOUND IMAGING | Facility: HOSPITAL | Age: 64
Discharge: HOME | End: 2019-09-13
Attending: INTERNAL MEDICINE
Payer: COMMERCIAL

## 2019-09-13 ENCOUNTER — OFFICE VISIT (OUTPATIENT)
Dept: INTERNAL MEDICINE CLINIC | Facility: CLINIC | Age: 64
End: 2019-09-13

## 2019-09-13 VITALS
WEIGHT: 146 LBS | OXYGEN SATURATION: 96 % | BODY MASS INDEX: 24.92 KG/M2 | HEIGHT: 64 IN | DIASTOLIC BLOOD PRESSURE: 78 MMHG | SYSTOLIC BLOOD PRESSURE: 128 MMHG | HEART RATE: 81 BPM | TEMPERATURE: 99 F | RESPIRATION RATE: 18 BRPM

## 2019-09-13 DIAGNOSIS — M79.89 PAIN AND SWELLING OF LEFT LOWER LEG: ICD-10-CM

## 2019-09-13 DIAGNOSIS — M79.662 PAIN AND SWELLING OF LEFT LOWER LEG: ICD-10-CM

## 2019-09-13 DIAGNOSIS — M79.89 PAIN AND SWELLING OF LEFT LOWER LEG: Primary | ICD-10-CM

## 2019-09-13 DIAGNOSIS — M79.662 PAIN AND SWELLING OF LEFT LOWER LEG: Primary | ICD-10-CM

## 2019-09-13 PROCEDURE — 99214 OFFICE O/P EST MOD 30 MIN: CPT | Performed by: INTERNAL MEDICINE

## 2019-09-13 PROCEDURE — 93971 EXTREMITY STUDY: CPT | Performed by: INTERNAL MEDICINE

## 2019-09-13 PROCEDURE — 73590 X-RAY EXAM OF LOWER LEG: CPT | Performed by: INTERNAL MEDICINE

## 2019-09-13 RX ORDER — GABAPENTIN 100 MG/1
100 CAPSULE ORAL 3 TIMES DAILY
Qty: 90 CAPSULE | Refills: 0 | Status: ON HOLD | OUTPATIENT
Start: 2019-09-13 | End: 2019-11-09

## 2019-09-13 NOTE — PROGRESS NOTES
HPI:    Patient ID: Eva Chavarria is a 66year old female. HPI She is here complaining of left leg pain      She is complaining of left leg and foot swelling . Per her is ongoing for 2 months . She does have pain and tenderness. No redness.   She is disturbance. The patient is not nervous/anxious. Current Outpatient Medications:  AMITRIPTYLINE HCL 25 MG Oral Tab TAKE 1 TABLET (25 MG TOTAL) BY MOUTH NIGHTLY AS NEEDED FOR PAIN (FOR PERIPHERAL NEUROPATHY).  Disp: 30 tablet Rfl: 0   ibuprofen 60 per pt, 1-2 cigarettes per day    Alcohol use: Not Currently      Frequency: Never      Comment: socially    Drug use: No       PHYSICAL EXAM:    Physical Exam   Constitutional: She is oriented to person, place, and time.  She appears well-developed and wel swelling , tender to touch, no redness , , pulsesl palpable, weak,Lymphadenopathy:     She has no cervical adenopathy. She has no axillary adenopathy. Neurological: She is alert and oriented to person, place, and time. She has normal reflexes.  No cra

## 2019-10-15 ENCOUNTER — OFFICE VISIT (OUTPATIENT)
Dept: OPHTHALMOLOGY | Facility: CLINIC | Age: 64
End: 2019-10-15

## 2019-10-15 DIAGNOSIS — H25.13 AGE-RELATED NUCLEAR CATARACT OF BOTH EYES: ICD-10-CM

## 2019-10-15 DIAGNOSIS — E11.9 TYPE 2 DIABETES MELLITUS WITHOUT RETINOPATHY (HCC): Primary | ICD-10-CM

## 2019-10-15 DIAGNOSIS — H40.003 GLAUCOMA SUSPECT OF BOTH EYES: ICD-10-CM

## 2019-10-15 PROCEDURE — 99243 OFF/OP CNSLTJ NEW/EST LOW 30: CPT | Performed by: OPHTHALMOLOGY

## 2019-10-15 PROCEDURE — 92250 FUNDUS PHOTOGRAPHY W/I&R: CPT | Performed by: OPHTHALMOLOGY

## 2019-10-15 NOTE — PATIENT INSTRUCTIONS
Age-related nuclear cataract of both eyes  Discussed mild cataracts in both eyes that are not affecting vision and are not surgical at this time. Suggest +2.50 over the counter glasses for reading.       Type 2 diabetes mellitus without retinopathy ([address]

## 2019-10-15 NOTE — ASSESSMENT & PLAN NOTE
Discussed mild cataracts in both eyes that are not affecting vision and are not surgical at this time. Suggest +2.50 over the counter glasses for reading.

## 2019-10-15 NOTE — ASSESSMENT & PLAN NOTE
Discussed with patient that she is a glaucoma suspect based on increased cupping of the optic nerves in both eyes and family history of 2 sisters with glaucoma. Retinal photos taken today to document optic nerves. Glaucoma diagnostic testing ordered.   Thamas Necessary

## 2019-10-15 NOTE — PROGRESS NOTES
Melissa Lew is a 60year old female.     HPI:     HPI     Consult      Additional comments: Consult per Dr. Pompa Shoulder              Diabetic Eye Exam      Additional comments: Pt has been a diabetic for 20 years  20 years on pills/  3 years on Insulin pepe HOURS FOR 30 DAYS, Disp: , Rfl: 12  ATORVASTATIN 20 MG Oral Tab, TAKE 1 TABLET BY MOUTH EVERY DAY AT NIGHT, Disp: 30 tablet, Rfl: 0  Varenicline Tartrate (CHANTIX STARTING MONTH MELIDA) 0.5 MG X 11 & 1 MG X 42 Oral Misc, Follow instruction, Disp: 1 Package, R Deep and quiet    Iris Normal Normal    Lens 1+ Nuclear sclerosis, 1+ Cortical cataract 1+ Nuclear sclerosis, 1+ Cortical cataract    Vitreous Clear Clear          Fundus Exam       Right Left    Disc Sloping margin, Temporal crescent Sloping margin, thin Placed This Encounter      *FUTURE OCT  - OU - Both Eyes      *FUTURE VF- OU - Both Eyes      Fundus Photos - OU - Both Eyes      Meds This Visit:  Requested Prescriptions      No prescriptions requested or ordered in this encounter        Follow up instru

## 2019-10-16 ENCOUNTER — HOSPITAL ENCOUNTER (OUTPATIENT)
Dept: ULTRASOUND IMAGING | Facility: HOSPITAL | Age: 64
Discharge: HOME | End: 2019-10-16
Attending: INTERNAL MEDICINE
Payer: COMMERCIAL

## 2019-10-16 DIAGNOSIS — I70.219 ATHEROSCLEROTIC PVD WITH INTERMITTENT CLAUDICATION (HCC): ICD-10-CM

## 2019-10-16 PROCEDURE — 93923 UPR/LXTR ART STDY 3+ LVLS: CPT | Performed by: INTERNAL MEDICINE

## 2019-10-30 ENCOUNTER — NURSE ONLY (OUTPATIENT)
Dept: OPHTHALMOLOGY | Facility: CLINIC | Age: 64
End: 2019-10-30

## 2019-10-30 ENCOUNTER — LAB ENCOUNTER (OUTPATIENT)
Dept: LAB | Facility: HOSPITAL | Age: 64
End: 2019-10-30
Attending: INTERNAL MEDICINE
Payer: COMMERCIAL

## 2019-10-30 DIAGNOSIS — N18.2 CKD (CHRONIC KIDNEY DISEASE), STAGE II: ICD-10-CM

## 2019-10-30 DIAGNOSIS — E11.00 TYPE 2 DIABETES MELLITUS WITH HYPEROSMOLARITY WITHOUT COMA, WITHOUT LONG-TERM CURRENT USE OF INSULIN (HCC): ICD-10-CM

## 2019-10-30 DIAGNOSIS — R31.29 MICROSCOPIC HEMATURIA: ICD-10-CM

## 2019-10-30 DIAGNOSIS — H40.003 GLAUCOMA SUSPECT OF BOTH EYES: ICD-10-CM

## 2019-10-30 DIAGNOSIS — R89.9 ABNORMAL LABORATORY TEST: ICD-10-CM

## 2019-10-30 DIAGNOSIS — E83.52 HYPERCALCEMIA: ICD-10-CM

## 2019-10-30 PROCEDURE — 76514 ECHO EXAM OF EYE THICKNESS: CPT | Performed by: OPHTHALMOLOGY

## 2019-10-30 PROCEDURE — 92133 CPTRZD OPH DX IMG PST SGM ON: CPT | Performed by: OPHTHALMOLOGY

## 2019-10-30 PROCEDURE — 87086 URINE CULTURE/COLONY COUNT: CPT

## 2019-10-30 PROCEDURE — 83036 HEMOGLOBIN GLYCOSYLATED A1C: CPT

## 2019-10-30 PROCEDURE — 83540 ASSAY OF IRON: CPT

## 2019-10-30 PROCEDURE — 80048 BASIC METABOLIC PNL TOTAL CA: CPT

## 2019-10-30 PROCEDURE — 81001 URINALYSIS AUTO W/SCOPE: CPT

## 2019-10-30 PROCEDURE — 92083 EXTENDED VISUAL FIELD XM: CPT | Performed by: OPHTHALMOLOGY

## 2019-10-30 PROCEDURE — 87088 URINE BACTERIA CULTURE: CPT

## 2019-10-30 PROCEDURE — 36415 COLL VENOUS BLD VENIPUNCTURE: CPT

## 2019-10-30 PROCEDURE — 85025 COMPLETE CBC W/AUTO DIFF WBC: CPT

## 2019-10-30 PROCEDURE — 87186 SC STD MICRODIL/AGAR DIL: CPT

## 2019-10-30 PROCEDURE — 84466 ASSAY OF TRANSFERRIN: CPT

## 2019-10-30 NOTE — PROGRESS NOTES
Frankey Plank is a 60year old female.     HPI:     HPI     Pt is here for a VF, OCT and PACHY with no MD.     Last edited by Zane Reese OT on 10/30/2019  7:52 AM. (History)        Patient History:  Past Medical History:   Diagnosis Date   • Back pa sulfate 325 (65 FE) MG Oral Tab EC, Take 1 tablet (325 mg total) by mouth 2 (two) times daily with meals. , Disp: 60 tablet, Rfl: 1  lisinopril 40 MG Oral Tab, Take 40 mg by mouth daily. , Disp: , Rfl:         Allergies:    Penicillins             ANGIOEDEMA

## 2019-11-01 ENCOUNTER — TELEPHONE (OUTPATIENT)
Dept: NEPHROLOGY | Facility: CLINIC | Age: 64
End: 2019-11-01

## 2019-11-01 RX ORDER — MELATONIN
325
Qty: 90 TABLET | Refills: 2 | Status: SHIPPED | OUTPATIENT
Start: 2019-11-01 | End: 2021-08-20

## 2019-11-01 RX ORDER — NITROFURANTOIN 25; 75 MG/1; MG/1
100 CAPSULE ORAL 2 TIMES DAILY
Qty: 14 CAPSULE | Refills: 0 | Status: ON HOLD | OUTPATIENT
Start: 2019-11-01 | End: 2019-11-12

## 2019-11-01 NOTE — TELEPHONE ENCOUNTER
Patient needs refill on ferrous sulfate,out of medication.  advised patient to continue ferrous sulfate. Patient currently takes ferrous sulfate 325mg three times daily.

## 2019-11-01 NOTE — TELEPHONE ENCOUNTER
Contacted pt and notified her of RSA's message below. Pt recalls having hives on her eyelids when she took PCN. It happened when she was in her 25s. Angioedema is also listed as her reaction.

## 2019-11-01 NOTE — TELEPHONE ENCOUNTER
Kidney function stable.    Urine CS +ve for infection - start on nitrofurantoin 100 mg BID x 7 days    Also find out what kind of allergy she has to PCN

## 2019-11-08 ENCOUNTER — APPOINTMENT (OUTPATIENT)
Dept: CT IMAGING | Facility: HOSPITAL | Age: 64
DRG: 192 | End: 2019-11-08
Attending: EMERGENCY MEDICINE
Payer: COMMERCIAL

## 2019-11-08 ENCOUNTER — APPOINTMENT (OUTPATIENT)
Dept: GENERAL RADIOLOGY | Facility: HOSPITAL | Age: 64
DRG: 192 | End: 2019-11-08
Attending: EMERGENCY MEDICINE
Payer: COMMERCIAL

## 2019-11-08 ENCOUNTER — HOSPITAL ENCOUNTER (INPATIENT)
Facility: HOSPITAL | Age: 64
LOS: 4 days | Discharge: HOME | DRG: 192 | End: 2019-11-12
Attending: EMERGENCY MEDICINE | Admitting: HOSPITALIST
Payer: COMMERCIAL

## 2019-11-08 DIAGNOSIS — R73.9 HYPERGLYCEMIA: ICD-10-CM

## 2019-11-08 DIAGNOSIS — J43.9 PULMONARY EMPHYSEMA, UNSPECIFIED EMPHYSEMA TYPE (HCC): Primary | ICD-10-CM

## 2019-11-08 PROCEDURE — 99223 1ST HOSP IP/OBS HIGH 75: CPT | Performed by: HOSPITALIST

## 2019-11-08 PROCEDURE — 71260 CT THORAX DX C+: CPT | Performed by: EMERGENCY MEDICINE

## 2019-11-08 PROCEDURE — 71045 X-RAY EXAM CHEST 1 VIEW: CPT | Performed by: EMERGENCY MEDICINE

## 2019-11-08 PROCEDURE — 99254 IP/OBS CNSLTJ NEW/EST MOD 60: CPT | Performed by: INTERNAL MEDICINE

## 2019-11-08 RX ORDER — HEPARIN SODIUM 5000 [USP'U]/ML
5000 INJECTION, SOLUTION INTRAVENOUS; SUBCUTANEOUS EVERY 12 HOURS SCHEDULED
Status: DISCONTINUED | OUTPATIENT
Start: 2019-11-08 | End: 2019-11-09

## 2019-11-08 RX ORDER — AZITHROMYCIN 250 MG/1
250 TABLET, FILM COATED ORAL DAILY
Status: DISCONTINUED | OUTPATIENT
Start: 2019-11-09 | End: 2019-11-08

## 2019-11-08 RX ORDER — ALBUTEROL SULFATE 2.5 MG/3ML
SOLUTION RESPIRATORY (INHALATION)
Status: COMPLETED
Start: 2019-11-08 | End: 2019-11-08

## 2019-11-08 RX ORDER — IPRATROPIUM BROMIDE AND ALBUTEROL SULFATE 2.5; .5 MG/3ML; MG/3ML
3 SOLUTION RESPIRATORY (INHALATION) EVERY 6 HOURS
Status: DISCONTINUED | OUTPATIENT
Start: 2019-11-08 | End: 2019-11-12

## 2019-11-08 RX ORDER — SODIUM CHLORIDE 0.9 % (FLUSH) 0.9 %
3 SYRINGE (ML) INJECTION AS NEEDED
Status: DISCONTINUED | OUTPATIENT
Start: 2019-11-08 | End: 2019-11-12

## 2019-11-08 RX ORDER — METHYLPREDNISOLONE SODIUM SUCCINATE 125 MG/2ML
125 INJECTION, POWDER, LYOPHILIZED, FOR SOLUTION INTRAMUSCULAR; INTRAVENOUS ONCE
Status: COMPLETED | OUTPATIENT
Start: 2019-11-08 | End: 2019-11-08

## 2019-11-08 RX ORDER — AZITHROMYCIN 250 MG/1
500 TABLET, FILM COATED ORAL ONCE
Status: COMPLETED | OUTPATIENT
Start: 2019-11-08 | End: 2019-11-08

## 2019-11-08 RX ORDER — AZITHROMYCIN 250 MG/1
500 TABLET, FILM COATED ORAL DAILY
Status: COMPLETED | OUTPATIENT
Start: 2019-11-09 | End: 2019-11-12

## 2019-11-08 RX ORDER — GABAPENTIN 300 MG/1
300 CAPSULE ORAL 2 TIMES DAILY
COMMUNITY
End: 2020-08-13

## 2019-11-08 RX ORDER — IPRATROPIUM BROMIDE AND ALBUTEROL SULFATE 2.5; .5 MG/3ML; MG/3ML
3 SOLUTION RESPIRATORY (INHALATION) CONTINUOUS
Status: DISCONTINUED | OUTPATIENT
Start: 2019-11-08 | End: 2019-11-08

## 2019-11-08 RX ORDER — ACETAMINOPHEN 325 MG/1
650 TABLET ORAL EVERY 6 HOURS PRN
Status: DISCONTINUED | OUTPATIENT
Start: 2019-11-08 | End: 2019-11-12

## 2019-11-08 RX ORDER — SODIUM CHLORIDE 9 MG/ML
INJECTION, SOLUTION INTRAVENOUS CONTINUOUS
Status: DISCONTINUED | OUTPATIENT
Start: 2019-11-08 | End: 2019-11-09

## 2019-11-08 RX ORDER — ONDANSETRON 2 MG/ML
4 INJECTION INTRAMUSCULAR; INTRAVENOUS EVERY 6 HOURS PRN
Status: DISCONTINUED | OUTPATIENT
Start: 2019-11-08 | End: 2019-11-12

## 2019-11-08 RX ORDER — NICOTINE 21 MG/24HR
1 PATCH, TRANSDERMAL 24 HOURS TRANSDERMAL DAILY
Status: DISCONTINUED | OUTPATIENT
Start: 2019-11-08 | End: 2019-11-12

## 2019-11-08 RX ORDER — ALBUTEROL SULFATE 2.5 MG/3ML
10 SOLUTION RESPIRATORY (INHALATION) CONTINUOUS
Status: DISCONTINUED | OUTPATIENT
Start: 2019-11-08 | End: 2019-11-09

## 2019-11-08 RX ORDER — IPRATROPIUM BROMIDE AND ALBUTEROL SULFATE 2.5; .5 MG/3ML; MG/3ML
3 SOLUTION RESPIRATORY (INHALATION) EVERY 6 HOURS PRN
Status: DISCONTINUED | OUTPATIENT
Start: 2019-11-08 | End: 2019-11-12

## 2019-11-08 RX ORDER — METHYLPREDNISOLONE SODIUM SUCCINATE 40 MG/ML
40 INJECTION, POWDER, LYOPHILIZED, FOR SOLUTION INTRAMUSCULAR; INTRAVENOUS EVERY 6 HOURS
Status: DISCONTINUED | OUTPATIENT
Start: 2019-11-08 | End: 2019-11-09

## 2019-11-08 NOTE — PROGRESS NOTES
Lino'd telephone report from JESSIE Reyes in ER. Patient arrived to pccu via ED cart, patient was able to move self from cart to bed but was SOB after movement.  VSS, was on 6L o2 via NC when arrived, but o2 saturation was %, patient now currently down

## 2019-11-08 NOTE — CONSULTS
Pulmonary/Critical Care Consultation Note    HPI:   Sasha Dan is a 60year old female with Patient presents with:  Dyspnea FARIDEH SOB (respiratory)    Castillo Alva MD    Pt is a 61 yo smoker with presumed COPD, DM, HL, HTN, mild CKD, and other med p nebulizer solution 3 mL, 3 mL, Nebulization, Q6H PRN  methylPREDNISolone Sodium Succ (Solu-MEDROL) injection 40 mg, 40 mg, Intravenous, Q6H  [COMPLETED] azithromycin (ZITHROMAX) tab 500 mg, 500 mg, Oral, Once    Followed by  [START ON 11/9/2019] azithromyc to diabetes; received kidney transplant in 2004   • Glaucoma Sister    • Diabetes Brother    • Sickle Cell Son         Cause of death   • Macular degeneration Neg             PHYSICAL EXAM:   /71 (BP Location: Right arm)   Pulse 99   Temp 97.9 °F (36

## 2019-11-08 NOTE — ED INITIAL ASSESSMENT (HPI)
Patient reports cough and feeling sick since yesterday. Worsening sob today. EMS administered 2 neb tx for SpO2 of 87% on room air, .

## 2019-11-08 NOTE — ED PROVIDER NOTES
Patient Seen in: [facility] AND [facility] Emergency Department      History   Patient presents with:  Dyspnea FARIDEH SOB (respiratory)    Stated Complaint:     HPI    44-year-old female with CKD, diabetes, hypertension, emphysema, sickle cell trait, current smoker Cardiovascular:      Rate and Rhythm: Regular rhythm. Tachycardia present. Heart sounds: Normal heart sounds.       Comments: No peripheral edema  Pulmonary:      Effort: Pulmonary effort is normal.      Comments: Tachypnea, bilateral and expiratory CBC WITH DIFFERENTIAL WITH PLATELET.   Procedure                               Abnormality         Status                     ---------                               -----------         ------                     CBC W/ DIFFERENTIAL[[id number]]          Abno arrival to the ED, she is still very tight with diminished air movement. She was given another DuoNeb. On reassessment after 2 DuoNeb's, will start continuous albuterol, ipratropium treatment.     Differential to include COPD exacerbation, pneumonia, PE

## 2019-11-08 NOTE — H&P
[facility]    PATIENT'S NAME: Spike, [de-identified] D   ATTENDING PHYSICIAN: Arash Galloway MD   PATIENT ACCOUNT#:   [id number]    LOCATION:  [place] [address]  MEDICAL RECORD #:   [id number]       YOB: 1955  ADMISSION DATE:       11/0 No fever or chills. Cough is nonproductive of phlegm. No body aches. No abdominal pain. Other 12-point review of systems negative. PHYSICAL EXAMINATION:    GENERAL:  Alert. Oriented to time, place, and person. Moderate distress.   VITAL SIGNS:  T

## 2019-11-08 NOTE — PROGRESS NOTES
[facility] Pharmacy Note:  Renal Adjustment for azithromycin (Anisa Ringer)    Bethanie Burkitt is a 60year old female who has been prescribed azithromycin (ZITHROMAX) 250 mg every 24 hrs.   CrCl is estimated creatinine clearance is 47.1 mL/min (A) (based on SCr of

## 2019-11-09 PROCEDURE — 99232 SBSQ HOSP IP/OBS MODERATE 35: CPT | Performed by: INTERNAL MEDICINE

## 2019-11-09 PROCEDURE — 99222 1ST HOSP IP/OBS MODERATE 55: CPT | Performed by: INTERNAL MEDICINE

## 2019-11-09 PROCEDURE — 99233 SBSQ HOSP IP/OBS HIGH 50: CPT | Performed by: HOSPITALIST

## 2019-11-09 RX ORDER — MELATONIN
325
Status: DISCONTINUED | OUTPATIENT
Start: 2019-11-09 | End: 2019-11-12

## 2019-11-09 RX ORDER — LISINOPRIL 40 MG/1
40 TABLET ORAL DAILY
Status: DISCONTINUED | OUTPATIENT
Start: 2019-11-09 | End: 2019-11-12

## 2019-11-09 RX ORDER — HEPARIN SODIUM 5000 [USP'U]/ML
5000 INJECTION, SOLUTION INTRAVENOUS; SUBCUTANEOUS EVERY 12 HOURS SCHEDULED
Status: DISCONTINUED | OUTPATIENT
Start: 2019-11-09 | End: 2019-11-12

## 2019-11-09 RX ORDER — BENZONATATE 100 MG/1
100 CAPSULE ORAL 3 TIMES DAILY PRN
Status: DISCONTINUED | OUTPATIENT
Start: 2019-11-09 | End: 2019-11-12

## 2019-11-09 RX ORDER — DEXTROSE MONOHYDRATE 25 G/50ML
50 INJECTION, SOLUTION INTRAVENOUS AS NEEDED
Status: DISCONTINUED | OUTPATIENT
Start: 2019-11-09 | End: 2019-11-12

## 2019-11-09 RX ORDER — GABAPENTIN 300 MG/1
300 CAPSULE ORAL 2 TIMES DAILY
Status: DISCONTINUED | OUTPATIENT
Start: 2019-11-09 | End: 2019-11-12

## 2019-11-09 RX ORDER — POTASSIUM CHLORIDE 20 MEQ/1
40 TABLET, EXTENDED RELEASE ORAL EVERY 4 HOURS
Status: COMPLETED | OUTPATIENT
Start: 2019-11-09 | End: 2019-11-09

## 2019-11-09 RX ORDER — METHYLPREDNISOLONE SODIUM SUCCINATE 40 MG/ML
40 INJECTION, POWDER, LYOPHILIZED, FOR SOLUTION INTRAMUSCULAR; INTRAVENOUS EVERY 8 HOURS
Status: DISCONTINUED | OUTPATIENT
Start: 2019-11-09 | End: 2019-11-10

## 2019-11-09 NOTE — PROGRESS NOTES
[facility]  Progress Note     Sandra Carnes  : 10/6/1955    Status: Inpatient  Day #: 1    Attending: Holli Silverman MD  PCP: Ximena Mcconnell MD      Assessment and Plan     COPD with acute exacerbation  Acute bronchitis, acute rhinovirus acute disease in the chest.    Dictated by (CST): Gisela Stack MD on 11/08/2019 at 10:43     Approved by (CST): Gisela Stack MD on 11/08/2019 at 10:45          Ct Chest Pain/pe (iv Only) (cpt=71260)    Result Date: 11/8/2019  CONCLUSION:  [place]

## 2019-11-09 NOTE — CONSULTS
[facility]    Report of Consultation    Yamilex Dixon Patient Status:  Inpatient    10/6/1955 MRN [id number]   Location [facility] 2W/SW Attending Roger James MD   Hosp Day # 1 PCP Breanna Juárez MD     Date of Admissio reports that she does not use drugs. Allergies:    Penicillins             ANGIOEDEMA    Comment:Hives on eyelids (occurred when pt was in her             25s).  Tolerated amoxicillin per chart    Medications:    Current Facility-Administered Medications flexpen 5 Units, 5 Units, Subcutaneous, TID CC    A comprehensive 10 point review of systems was completed. Pertinent positives and negatives noted in the the HPI.     Physical Exam:  Blood pressure (!) 122/105, pulse 89, temperature 97.7 °F (36.5 °C), tem

## 2019-11-09 NOTE — PROGRESS NOTES
Report called to Anthony Goetz. Double RN skin check done prior to transfer off Unit. Skin check performed by this RN and [facility].      Wounds are as follows: None    Will remain available for any further questions or concerns at ext [phone]

## 2019-11-09 NOTE — PROGRESS NOTES
[facility]     Progress Note        Leida Rubinstein Patient Status:  Inpatient    10/6/1955 MRN [id number]   Location [facility] 2W/SW Attending Peri Carr MD   [facility] Day # 1 PCP Sarah Barksdale MD       Subjective:   Patient seen Intravenous, PRN  0.9% NaCl infusion, , Intravenous, Continuous  acetaminophen (TYLENOL) tab 650 mg, 650 mg, Oral, Q6H PRN  ondansetron HCl (ZOFRAN) injection 4 mg, 4 mg, Intravenous, Q6H PRN  nicotine (NICODERM CQ) 14 MG/24HR 1 patch, 1 patch, Transdermal in morphology to previous. 6. Lesser incidental findings as above.     Dictated by (CST): Lisbet Martinez MD on 11/08/2019 at 11:50     Approved by (CST): Lisbet Martinez MD on 11/08/2019 at 11:59            Ekg 12-lead    Result Date: 11/8/2019  ECG Repo

## 2019-11-10 PROCEDURE — 99233 SBSQ HOSP IP/OBS HIGH 50: CPT | Performed by: HOSPITALIST

## 2019-11-10 PROCEDURE — 99232 SBSQ HOSP IP/OBS MODERATE 35: CPT | Performed by: INTERNAL MEDICINE

## 2019-11-10 RX ORDER — METHYLPREDNISOLONE SODIUM SUCCINATE 40 MG/ML
40 INJECTION, POWDER, LYOPHILIZED, FOR SOLUTION INTRAMUSCULAR; INTRAVENOUS 2 TIMES DAILY
Status: COMPLETED | OUTPATIENT
Start: 2019-11-10 | End: 2019-11-11

## 2019-11-10 NOTE — PLAN OF CARE
Problem: Patient Centered Care  Goal: Patient preferences are identified and integrated in the patient's plan of care  Description  Interventions:  - What would you like us to know as we care for you?  Lives at home with family, unit secretary at [address] limitations  - Instruct pt to call for assistance with activity based on assessment  - Modify environment to reduce risk of injury  - Provide assistive devices as appropriate  - Consider OT/PT consult to assist with strengthening/mobility  - Encourage toil

## 2019-11-10 NOTE — PROGRESS NOTES
[facility] HOSP - [facility]  Progress Note     Camden Crigler  : 10/6/1955    Status: Inpatient  Day #: 2    Attending: Kaiden Nino MD  PCP: Jessica Berry MD      Assessment and Plan     COPD with acute exacerbation  Acute bronchitis, acute rhinovirus Medications     • MethylPREDNISolone Sodium Succ  40 mg Intravenous BID   • Heparin Sodium (Porcine)  5,000 Units Subcutaneous 2 times per day   • insulin detemir  20 Units Subcutaneous Daily   • Insulin Aspart Pen  8 Units Subcutaneous TID CC   • I

## 2019-11-10 NOTE — PLAN OF CARE
Problem: Patient Centered Care  Goal: Patient preferences are identified and integrated in the patient's plan of care  Description  Interventions:  - What would you like us to know as we care for you?  Lives at home with family, unit secretary at [address]

## 2019-11-10 NOTE — PROGRESS NOTES
Pulmonary/Critical Care Follow Up Note    HPI:   Sasha Dan is a 60year old female with Patient presents with:  Dyspnea FARIDEH SOB (respiratory)      PCP Castillo Alva MD  Admission Attending David Thornton MD    Hospital Day #2    Feeling much bett Saline Flush 0.9 % injection 3 mL, 3 mL, Intravenous, PRN  •  acetaminophen (TYLENOL) tab 650 mg, 650 mg, Oral, Q6H PRN  •  ondansetron HCl (ZOFRAN) injection 4 mg, 4 mg, Intravenous, Q6H PRN  •  nicotine (NICODERM CQ) 14 MG/24HR 1 patch, 1 patch, Transder

## 2019-11-10 NOTE — CM/SW NOTE
SW received MDO for home health assessment. SW spoke with pt for assessment and d/c planning. Pt is a 60 y.o female admitted on 11/08 with COPD exacerbation and emphysema. Pt is A&O and reports living with two adult children in a 2-story home.  Pt reports

## 2019-11-10 NOTE — PLAN OF CARE
Problem: Patient Centered Care  Goal: Patient preferences are identified and integrated in the patient's plan of care  Description  Interventions:  - What would you like us to know as we care for you?  Lives at home with family, unit secretary at [address] pt/family on patient safety including physical limitations  - Instruct pt to call for assistance with activity based on assessment  - Modify environment to reduce risk of injury  - Provide assistive devices as appropriate  - Consider OT/PT consult to reina

## 2019-11-11 PROCEDURE — 99232 SBSQ HOSP IP/OBS MODERATE 35: CPT | Performed by: INTERNAL MEDICINE

## 2019-11-11 PROCEDURE — 99232 SBSQ HOSP IP/OBS MODERATE 35: CPT | Performed by: HOSPITALIST

## 2019-11-11 RX ORDER — PREDNISONE 20 MG/1
40 TABLET ORAL
Status: DISCONTINUED | OUTPATIENT
Start: 2019-11-12 | End: 2019-11-12

## 2019-11-11 NOTE — PROGRESS NOTES
[facility] HOSP - [facility]  Progress Note     Luis Miguel Duffy  : 10/6/1955    Status: Inpatient  Day #: 3    Attending: Cristofer Dominique MD  PCP: Toya Shah MD      Assessment and Plan     COPD with acute exacerbation  Acute bronchitis, acute rhinovirus Medications     • Insulin Aspart Pen  12 Units Subcutaneous TID CC   • [START ON 11/12/2019] insulin detemir  24 Units Subcutaneous Daily   • MethylPREDNISolone Sodium Succ  40 mg Intravenous BID   • Heparin Sodium (Porcine)  5,000 Units Subcutaneous 2

## 2019-11-11 NOTE — PLAN OF CARE
Problem: Patient Centered Care  Goal: Patient preferences are identified and integrated in the patient's plan of care  Description  Interventions:  - What would you like us to know as we care for you?  Lives at home with family, unit secretary at [address] behavior  - Position to facilitate oxygenation and minimize respiratory effort  - Oxygen supplementation based on oxygen saturation or ABGs  - Provide Smoking Cessation handout, if applicable  - Encourage broncho-pulmonary hygiene including cough, deep lesvia

## 2019-11-11 NOTE — DIABETES ED
[facility] HOSP - [facility]    Diabetes Education  Note    Sandra Carnes Patient Status:  Inpatient   10/6/1955 MRN [id number]  Location [facility] 5SW/SE Attending Gregorio Engel MD  Hosp Day # 3 PCP Ximena Mcconnell MD    Reason for Visit:

## 2019-11-11 NOTE — PLAN OF CARE
Problem: Patient Centered Care  Goal: Patient preferences are identified and integrated in the patient's plan of care  Description  Interventions:  - What would you like us to know as we care for you?  Lives at home with family, unit secretary at [address] limitations  - Instruct pt to call for assistance with activity based on assessment  - Modify environment to reduce risk of injury  - Provide assistive devices as appropriate  - Consider OT/PT consult to assist with strengthening/mobility  - Encourage toil appropriate  - Assess patient's ability to be responsible for managing their own health  - Refer to Case Management Department for coordinating discharge planning if the patient needs post-hospital services based on physician/LIP order or complex needs rel

## 2019-11-11 NOTE — PROGRESS NOTES
Pulmonary/Critical Care Follow Up Note    HPI:   Shanice Cano is a 60year old female with Patient presents with:  Dyspnea FARIDEH SOB (respiratory)      PCP oK Higuera MD  Admission Attending Son Kaye MD    Hospital Day #3    Feeling much bett Nebulization, Q6H PRN  •  Normal Saline Flush 0.9 % injection 3 mL, 3 mL, Intravenous, PRN  •  acetaminophen (TYLENOL) tab 650 mg, 650 mg, Oral, Q6H PRN  •  ondansetron HCl (ZOFRAN) injection 4 mg, 4 mg, Intravenous, Q6H PRN  •  nicotine (NICODERM CQ) 14 M

## 2019-11-11 NOTE — PLAN OF CARE
Pulmonary Rehab handout given to patient. Discussed features of Pulm Rehab, and answered questions. Instructed patient to discuss rehab options with Doctor after discharge, and obtain an order if appropriate.   Sharad Goldsmith RN

## 2019-11-11 NOTE — PROGRESS NOTES
[facility]    Progress Note    Redd Gabriel Patient Status:  Inpatient    10/6/1955 MRN [id number]   Location [facility] 5SW/SE Attending David Colmenares MD   Hosp Day # 3 PCP Solitario Mesa MD     Subjective:  Feels wel

## 2019-11-12 VITALS
SYSTOLIC BLOOD PRESSURE: 140 MMHG | DIASTOLIC BLOOD PRESSURE: 64 MMHG | TEMPERATURE: 97 F | HEIGHT: 64 IN | WEIGHT: 175 LBS | RESPIRATION RATE: 20 BRPM | HEART RATE: 91 BPM | OXYGEN SATURATION: 98 % | BODY MASS INDEX: 29.88 KG/M2

## 2019-11-12 PROCEDURE — 99232 SBSQ HOSP IP/OBS MODERATE 35: CPT | Performed by: INTERNAL MEDICINE

## 2019-11-12 PROCEDURE — 99239 HOSP IP/OBS DSCHRG MGMT >30: CPT | Performed by: HOSPITALIST

## 2019-11-12 RX ORDER — IPRATROPIUM BROMIDE AND ALBUTEROL SULFATE 2.5; .5 MG/3ML; MG/3ML
3 SOLUTION RESPIRATORY (INHALATION) EVERY 6 HOURS PRN
Qty: 100 VIAL | Refills: 0 | Status: SHIPPED | OUTPATIENT
Start: 2019-11-12

## 2019-11-12 RX ORDER — NICOTINE 21 MG/24HR
1 PATCH, TRANSDERMAL 24 HOURS TRANSDERMAL DAILY
Qty: 30 PATCH | Refills: 0 | Status: ON HOLD | OUTPATIENT
Start: 2019-11-13 | End: 2020-05-03

## 2019-11-12 RX ORDER — PREDNISONE 20 MG/1
TABLET ORAL
Qty: 9 TABLET | Refills: 0 | Status: SHIPPED | OUTPATIENT
Start: 2019-11-13 | End: 2019-11-19

## 2019-11-12 RX ORDER — ALBUTEROL SULFATE 90 UG/1
1 AEROSOL, METERED RESPIRATORY (INHALATION) EVERY 4 HOURS PRN
Qty: 1 INHALER | Refills: 0 | Status: SHIPPED | OUTPATIENT
Start: 2019-11-12 | End: 2020-08-13

## 2019-11-12 NOTE — PROGRESS NOTES
[facility]     Progress Note        Viola Crawford Patient Status:  Inpatient    10/6/1955 MRN [id number]   Location [facility] 2W/SW Attending Janice Hernández MD   Hosp Day # 4 PCP Jade Duran MD       Subjective:   Patient seen PRN  acetaminophen (TYLENOL) tab 650 mg, 650 mg, Oral, Q6H PRN  ondansetron HCl (ZOFRAN) injection 4 mg, 4 mg, Intravenous, Q6H PRN  nicotine (NICODERM CQ) 14 MG/24HR 1 patch, 1 patch, Transdermal, Daily        Continuous Infusions:       Physical Exam  Co

## 2019-11-12 NOTE — DISCHARGE SUMMARY
[facility]    Discharge Summary    Jaya Stuart Patient Status:  Inpatient    10/6/1955 MRN [id number]   Location [facility] 5SW/SE Attending Christie Rahman MD   Hosp Day # 4 PCP Manfred Foreman MD     Date of Admission: prenisone 20 mg daily  Levemir 28 daily  Novolog 12 with meals along with medium dose scale     Once of prednisone, please resume levemir 30 daily and Metformin as she was previously taking     Check sugars before meals and at bedtime  Call PCP/ Endocrine 06/18/2019    T4F 1.3 06/18/2019    TSH 1.060 06/18/2019    DDIMER 1.45 (H) 11/08/2019    ESRML 50 (H) 09/03/2019    MG 2.0 09/30/2018    TROP <0.045 11/08/2019    B12 963 09/03/2019       Recent Labs   Lab 11/08/19  0920 11/09/19  0432   RBC 4.50 3.45* HFA).   Quantity:  100 vial  Refills:  0     nicotine 14 MG/24HR Pt24  Commonly known as:  Leia Riggins  Start taking on:  November 13, 2019      Place 1 patch onto the skin daily.    Quantity:  30 patch  Refills:  0     predniSONE 20 MG Tabs  Commonly known [address] [phone], [phone]  [address] [place] [address] Road    Phone:  [phone]   · Albuterol Sulfate  (90 Base) MCG/ACT Aers  · Insulin Aspart Pen 100 UNIT/ML Sopn  · insulin detemir 100 UNIT/ML Sopn  · ipratropium-albuterol 0.

## 2019-11-12 NOTE — HOME CARE LIAISON
Received update during discharge rounds that patient may no longer have home health needs. Spoke with patient who states she does not feel she needs home health at this time and will be returning to work in 2 weeks.  Referral with [facility]

## 2019-11-12 NOTE — PROGRESS NOTES
Patient dc home. IV removed. Understands to follow up with PCP in 1 week. Patient understands to  scripts from pharmacy.        RN spoke with Shala Spencer from [address] [place] [address] who will set up neb machine and have it delivered to patient's home, per HME ok to dc and w

## 2019-11-12 NOTE — PLAN OF CARE
Problem: Patient Centered Care  Goal: Patient preferences are identified and integrated in the patient's plan of care  Description  Interventions:  - What would you like us to know as we care for you?  Lives at home with family, unit secretary at [address] patient safety including physical limitations  - Instruct pt to call for assistance with activity based on assessment  - Modify environment to reduce risk of injury  - Provide assistive devices as appropriate  - Consider OT/PT consult to assist with streng preferences of patient/family/discharge partner  - Complete POLST form as appropriate  - Assess patient's ability to be responsible for managing their own health  - Refer to Case Management Department for coordinating discharge planning if the patient need

## 2019-11-12 NOTE — PROGRESS NOTES
[facility]    Progress Note    Frankey Plank Patient Status:  Inpatient    10/6/1955 MRN [id number]   Location [facility] 5SW/SE Attending Ashlie Melchor MD   Hosp Day # 4 PCP Kavon Valerio MD     Subjective:  Feels wel 20 mg daily  Levemir 28 daily  Novolog 12 with meals along with medium dose scale    Once of prednisone, please resume levemir 30 daily and Metformin as she was previously taking    Check sugars before meals and at bedtime  Call PCP/ Endocrine clinic ( 31

## 2019-11-12 NOTE — CM/SW NOTE
MDO received for home health services. However, pt does not intend to be homebound, plans to return to work. Referral cancelled.     Christal Warren, [facility], [facility], [address] [facility]

## 2019-11-13 ENCOUNTER — PATIENT OUTREACH (OUTPATIENT)
Dept: CASE MANAGEMENT | Age: 64
End: 2019-11-13

## 2019-11-13 DIAGNOSIS — R73.9 HYPERGLYCEMIA: ICD-10-CM

## 2019-11-13 DIAGNOSIS — Z02.9 ENCOUNTERS FOR UNSPECIFIED ADMINISTRATIVE PURPOSE: ICD-10-CM

## 2019-11-13 PROCEDURE — 1111F DSCHRG MED/CURRENT MED MERGE: CPT

## 2019-11-13 NOTE — PROGRESS NOTES
Initial Post Discharge Follow Up   Discharge Date: 11/12/19  Contact Date: 11/13/2019    Consent Verification:  Assessment Completed With: Patient  HIPAA Verified?   Yes    Discharge Dx:  COPD Exacerbation  Hyperglycemia    General:   • How have you been Dispense Refill   • ipratropium-albuterol 0.5-2.5 (3) MG/3ML Inhalation Solution Take 3 mL by nebulization every 6 (six) hours as needed (shortness of breath refractory to [facility]).  100 vial 0   • nicotine 14 MG/24HR Transdermal Patch 24 Hr Place 1 patch onto yes  o If so, were these medication changes discussed with you prior to leaving the hospital? yes  • (NCM) If a new medication was prescribed:    o Was the new medication’s purpose & side effects reviewed?  yes  o Do you have any questions about your new me Clinic, [facility]  [address]  [address] [address]  [phone]          PCP TCM/HFU appointment: scheduled at D/C within 7-14 days  no     NCM Reviewed/scheduled/rescheduled PCP TCM/HFU appointment with

## 2019-11-14 ENCOUNTER — OFFICE VISIT (OUTPATIENT)
Dept: INTERNAL MEDICINE CLINIC | Facility: CLINIC | Age: 64
End: 2019-11-14

## 2019-11-14 VITALS
DIASTOLIC BLOOD PRESSURE: 75 MMHG | HEIGHT: 64 IN | BODY MASS INDEX: 24.24 KG/M2 | TEMPERATURE: 98 F | RESPIRATION RATE: 18 BRPM | HEART RATE: 86 BPM | WEIGHT: 142 LBS | SYSTOLIC BLOOD PRESSURE: 150 MMHG

## 2019-11-14 DIAGNOSIS — J42 CHRONIC BRONCHITIS, UNSPECIFIED CHRONIC BRONCHITIS TYPE (HCC): Primary | ICD-10-CM

## 2019-11-14 PROCEDURE — 99495 TRANSJ CARE MGMT MOD F2F 14D: CPT | Performed by: INTERNAL MEDICINE

## 2019-11-14 PROCEDURE — 1111F DSCHRG MED/CURRENT MED MERGE: CPT | Performed by: INTERNAL MEDICINE

## 2019-11-14 NOTE — PROGRESS NOTES
HPI:    Leida Rubinstein is a 60year old female here today for hospital follow up.    She was discharged from Inpatient hospital, [facility]  to Home   Admission Date: 11/8/19   Discharge Date: 11/12/19  Hospital Discharge Diagnoses (since 10/15/20 with breakfast for 3 days, THEN 1 tablet (20 mg total) daily with breakfast for 3 days. Albuterol Sulfate  (90 Base) MCG/ACT Inhalation Aero Soln, Inhale 1 puff into the lungs every 4 (four) hours as needed for Wheezing or Shortness of Breath.   met her brother, mother, and sister; Glaucoma in her sister; Kidney Disease in her sister; Sickle Cell in her son. She  reports that she has been smoking cigarettes. She has a 30.00 pack-year smoking history.  She has never used smokeless tobacco. She reports atraumatic. Eyes: Pupils are equal, round, and reactive to light. EOM are normal.   Neck: Normal range of motion. Neck supple. Cardiovascular: Normal rate and regular rhythm. Exam reveals no gallop and no friction rub. No murmur heard.   Pulmonary/C of Data to Be Reviewed: moderate  · Risk of Significant Complications, Morbidity, and/or Mortality: moderate    Overall Risk:   moderate    Patient seen within 7 days from date of discharge.      Castillo Alva MD, 11/14/2019

## 2019-11-14 NOTE — PATIENT INSTRUCTIONS
COPD exacerbation improving continue with tapering dose of steroids nebulizer every 4-6 hours as needed she will follow-up with pulmonary    Diabetes type 2 on insulin blood sugars better continue with current medication for now check blood sugar fasting a

## 2019-11-15 ENCOUNTER — TELEPHONE (OUTPATIENT)
Dept: PULMONOLOGY | Facility: CLINIC | Age: 64
End: 2019-11-15

## 2019-11-15 ENCOUNTER — TELEPHONE (OUTPATIENT)
Dept: INTERNAL MEDICINE CLINIC | Facility: CLINIC | Age: 64
End: 2019-11-15

## 2019-11-15 DIAGNOSIS — J44.89 COPD (CHRONIC OBSTRUCTIVE PULMONARY DISEASE) WITH CHRONIC BRONCHITIS: Primary | ICD-10-CM

## 2019-11-15 NOTE — TELEPHONE ENCOUNTER
Managed Care received fax from [facility] requesting referral for below codes:     - Nebulizer with compression   - Nondisposable nebulizer set   - Nebulizer administration set    [facility]  [address]  [address] [facility] [address]  P [phone]  F [phone]    Please include DX code and sign off if agree.   Thank you,  [address] [place] [address] Road

## 2019-11-19 RX ORDER — AMITRIPTYLINE HYDROCHLORIDE 25 MG/1
25 TABLET, FILM COATED ORAL NIGHTLY PRN
Qty: 60 TABLET | Refills: 0 | Status: SHIPPED | OUTPATIENT
Start: 2019-11-19 | End: 2020-07-07

## 2019-11-19 NOTE — TELEPHONE ENCOUNTER
Current Outpatient Medications   Medication Sig Dispense Refill   Medication: Amitripytline HCL 25mg Tab #60 requesting 2 refills  Sig:  Take 2 tablets (50 mg Total) by mouth nightly as needed for pain (For Peripheral Neuropathy).

## 2019-11-25 NOTE — TELEPHONE ENCOUNTER
Pt Informed of an cancellation for 11/27 at 11:30. Pt accepted aparna time and date. Pt provided with [employer] ins. Pt voiced understanding. Pt denied any further questions at this time.

## 2019-11-26 ENCOUNTER — OFFICE VISIT (OUTPATIENT)
Dept: INTERNAL MEDICINE CLINIC | Facility: CLINIC | Age: 64
End: 2019-11-26

## 2019-11-26 VITALS
WEIGHT: 139 LBS | TEMPERATURE: 99 F | DIASTOLIC BLOOD PRESSURE: 70 MMHG | HEIGHT: 64 IN | SYSTOLIC BLOOD PRESSURE: 134 MMHG | HEART RATE: 74 BPM | BODY MASS INDEX: 23.73 KG/M2 | RESPIRATION RATE: 18 BRPM

## 2019-11-26 DIAGNOSIS — Z23 NEED FOR VACCINATION: ICD-10-CM

## 2019-11-26 DIAGNOSIS — Z12.4 SCREENING FOR CERVICAL CANCER: Primary | ICD-10-CM

## 2019-11-26 DIAGNOSIS — F17.200 SMOKER: ICD-10-CM

## 2019-11-26 PROCEDURE — 99396 PREV VISIT EST AGE 40-64: CPT | Performed by: INTERNAL MEDICINE

## 2019-11-26 PROCEDURE — 90732 PPSV23 VACC 2 YRS+ SUBQ/IM: CPT | Performed by: INTERNAL MEDICINE

## 2019-11-26 PROCEDURE — 99406 BEHAV CHNG SMOKING 3-10 MIN: CPT | Performed by: INTERNAL MEDICINE

## 2019-11-26 PROCEDURE — 90471 IMMUNIZATION ADMIN: CPT | Performed by: INTERNAL MEDICINE

## 2019-11-26 NOTE — PROGRESS NOTES
HPI:   Redd Gabriel is a 60year old female who presents for a complete physical exam. She denies any complains   She feels much better    She has aparna with pulmonary tomorrow . She quit smoking .       Wt Readings from Last 3 Encounters:  11/26/19 : 13 Solution Pen-injector 28 units daily while on prednisone then back to 30 units daily once off. 1 Device 1   • Insulin Aspart Pen 100 UNIT/ML Subcutaneous Solution Pen-injector Inject 12 Units into the skin 3 (three) times daily with meals.  1 pen 0   • Insu of death   • Macular degeneration Neg       Social History:   Social History    Tobacco Use      Smoking status: Current Every Day Smoker        Packs/day: 1.00        Years: 30.00        Pack years: 30        Types: Cigarettes      Smokeless tobacco: Bernadette Hollow Normal. Oropharynx - clear no erythema or exudate   Neck Exam Normal Palpation - Normal. No thyromegaly or lymphadenopathy   Breast Normal Inspection, palpation, nipples normal bilaterally. Self breast exam has been taught.  Patient performs self breast exa an in depth therapy session with Jose L Siomara about her tobacco use risks and options using the USPSTF's Five A's approach:    Ask: Suhas Stephens is using tobacco products. Assess:  We asked about/assessed behavioral health risk and factors affecting choice o

## 2019-11-27 ENCOUNTER — OFFICE VISIT (OUTPATIENT)
Dept: PULMONOLOGY | Facility: CLINIC | Age: 64
End: 2019-11-27

## 2019-11-27 ENCOUNTER — TELEPHONE (OUTPATIENT)
Dept: INTERNAL MEDICINE CLINIC | Facility: CLINIC | Age: 64
End: 2019-11-27

## 2019-11-27 VITALS
OXYGEN SATURATION: 98 % | HEART RATE: 83 BPM | BODY MASS INDEX: 23.73 KG/M2 | WEIGHT: 139 LBS | SYSTOLIC BLOOD PRESSURE: 134 MMHG | DIASTOLIC BLOOD PRESSURE: 72 MMHG | HEIGHT: 64 IN

## 2019-11-27 DIAGNOSIS — J43.2 CENTRILOBULAR EMPHYSEMA (HCC): Primary | ICD-10-CM

## 2019-11-27 DIAGNOSIS — Z72.0 TOBACCO ABUSE: ICD-10-CM

## 2019-11-27 DIAGNOSIS — R91.1 PULMONARY NODULE: ICD-10-CM

## 2019-11-27 PROCEDURE — 99214 OFFICE O/P EST MOD 30 MIN: CPT | Performed by: INTERNAL MEDICINE

## 2019-11-27 NOTE — PATIENT INSTRUCTIONS
If you feel short of breath or chest tightness or cough, you can use your albuterol inhaler OR nebulizer 4 times per day As Needed.

## 2019-11-27 NOTE — PROGRESS NOTES
Pulmonary Follow Up Note    HPI:   Kirk Ziegler is a 60year old female with Patient presents with:   Follow - Up  COPD    Tash William MD    No more smoking  Still on patch  Worried about getting another AECOPD    Cough at times    Inhalers  Proair daily with meals. 90 tablet 2   • hydrochlorothiazide 25 MG Oral Tab Take 25 mg by mouth daily. • lisinopril 40 MG Oral Tab Take 40 mg by mouth daily.            Allergies:    Penicillins             ANGIOEDEMA    Comment:Hives on eyelids (occurred when Rhinitis  CTD Connective tissue disease, ex lupus  GGO Ground glass opacity  HFpEF Heart failure with preserved ejection fraction  HRCT High Resolution CT scan of the chest  HVS Hyperventilation syndrome  ICS Inhaled corticosteroids  ARASELI Short acting beta

## 2019-11-29 ENCOUNTER — TELEPHONE (OUTPATIENT)
Dept: PULMONOLOGY | Facility: CLINIC | Age: 64
End: 2019-11-29

## 2019-11-29 RX ORDER — BUDESONIDE AND FORMOTEROL FUMARATE DIHYDRATE 160; 4.5 UG/1; UG/1
2 AEROSOL RESPIRATORY (INHALATION) 2 TIMES DAILY
Qty: 3 INHALER | Refills: 4 | Status: SHIPPED | OUTPATIENT
Start: 2019-11-29 | End: 2020-08-06 | Stop reason: ALTCHOICE

## 2019-12-11 ENCOUNTER — HOSPITAL ENCOUNTER (OUTPATIENT)
Dept: RESPIRATORY THERAPY | Facility: HOSPITAL | Age: 64
Discharge: HOME | End: 2019-12-11
Attending: INTERNAL MEDICINE
Payer: COMMERCIAL

## 2019-12-11 DIAGNOSIS — J43.2 CENTRILOBULAR EMPHYSEMA (HCC): ICD-10-CM

## 2019-12-11 PROCEDURE — 94060 EVALUATION OF WHEEZING: CPT | Performed by: INTERNAL MEDICINE

## 2019-12-11 PROCEDURE — 94726 PLETHYSMOGRAPHY LUNG VOLUMES: CPT | Performed by: INTERNAL MEDICINE

## 2019-12-11 PROCEDURE — 94729 DIFFUSING CAPACITY: CPT | Performed by: INTERNAL MEDICINE

## 2019-12-13 NOTE — PROCEDURES
[facility]    Patient's Name Jose L Stringer MRN [id number]    10/6/1955 Pulmonologist Italo Ireland MD   Location [address] PCP Claudia Chicas MD     IMPRESSION:    The PFTs are Abnormal.    There is moder

## 2019-12-13 NOTE — ADDENDUM NOTE
Encounter addended by: Chace Shoemaker MD on: 12/13/2019 3:59 PM   Actions taken: Clinical Note Signed, Charge Capture section accepted

## 2020-01-03 RX ORDER — HYDROCHLOROTHIAZIDE 25 MG/1
TABLET ORAL
Qty: 90 TABLET | Refills: 1 | Status: SHIPPED | OUTPATIENT
Start: 2020-01-03 | End: 2020-04-14

## 2020-01-03 NOTE — TELEPHONE ENCOUNTER
pls advise on refill request? rx passes protocol, however does not appear to have been previously refilled.   Requested Prescriptions   Pending Prescriptions Disp Refills   • HYDROCHLOROTHIAZIDE 25 MG Oral Tab [Pharmacy Med Name: HYDROCHLOROTHIAZIDE 25 MG T

## 2020-01-06 ENCOUNTER — TELEPHONE (OUTPATIENT)
Dept: PULMONOLOGY | Facility: CLINIC | Age: 65
End: 2020-01-06

## 2020-01-06 NOTE — TELEPHONE ENCOUNTER
Patient calling for results from Pulmonary function test completed in December, please call at:[phone],thanks.

## 2020-01-07 NOTE — TELEPHONE ENCOUNTER
Spoke to pt and relayed Dr. Digna Phillips message as seen below. Pt verbalized understanding of whole message appt scheduled for 3/18/2020. Pt agreed to appt scheduled. No further action required at this time.

## 2020-01-07 NOTE — TELEPHONE ENCOUNTER
Can you let her know it is c/w moderate COPD? We can discuss more when she f/u in clinic on the date as per my last note.     thx

## 2020-01-14 ENCOUNTER — TELEPHONE (OUTPATIENT)
Dept: INTERNAL MEDICINE CLINIC | Facility: CLINIC | Age: 65
End: 2020-01-14

## 2020-01-14 ENCOUNTER — OFFICE VISIT (OUTPATIENT)
Dept: ENDOCRINOLOGY CLINIC | Facility: CLINIC | Age: 65
End: 2020-01-14

## 2020-01-14 VITALS
BODY MASS INDEX: 25 KG/M2 | SYSTOLIC BLOOD PRESSURE: 125 MMHG | DIASTOLIC BLOOD PRESSURE: 71 MMHG | WEIGHT: 148.19 LBS | HEART RATE: 69 BPM

## 2020-01-14 DIAGNOSIS — E11.65 TYPE 2 DIABETES MELLITUS WITH HYPERGLYCEMIA, WITH LONG-TERM CURRENT USE OF INSULIN (HCC): Primary | ICD-10-CM

## 2020-01-14 DIAGNOSIS — E78.1 HYPERTRIGLYCERIDEMIA: ICD-10-CM

## 2020-01-14 DIAGNOSIS — Z79.4 TYPE 2 DIABETES MELLITUS WITH HYPERGLYCEMIA, WITH LONG-TERM CURRENT USE OF INSULIN (HCC): Primary | ICD-10-CM

## 2020-01-14 LAB
CARTRIDGE LOT#: ABNORMAL NUMERIC
GLUCOSE BLOOD: 151
HEMOGLOBIN A1C: 7.8 % (ref 4.3–5.6)
TEST STRIP LOT #: NORMAL NUMERIC

## 2020-01-14 PROCEDURE — 82962 GLUCOSE BLOOD TEST: CPT | Performed by: INTERNAL MEDICINE

## 2020-01-14 PROCEDURE — 83036 HEMOGLOBIN GLYCOSYLATED A1C: CPT | Performed by: INTERNAL MEDICINE

## 2020-01-14 PROCEDURE — 99213 OFFICE O/P EST LOW 20 MIN: CPT | Performed by: INTERNAL MEDICINE

## 2020-01-14 PROCEDURE — 36416 COLLJ CAPILLARY BLOOD SPEC: CPT | Performed by: INTERNAL MEDICINE

## 2020-01-14 NOTE — H&P
CHIEF COMPLAINT:  Patient presents with:  Consult: Diabetes/post hospitalization visit from Nov       HISTORY OF PRESENT ILLNESS:   Giovany Nava is a 60year old female who is here to establish care for DM.      DM HISTORY  Diagnosed: around age 38 • gabapentin 300 MG Oral Cap Take 300 mg by mouth 2 (two) times daily. • ferrous sulfate 325 (65 FE) MG Oral Tab EC Take 1 tablet (325 mg total) by mouth 3 (three) times daily with meals.  90 tablet 2   • lisinopril 40 MG Oral Tab Take 40 mg by mouth in 2004   • Glaucoma Sister    • Diabetes Brother    • Sickle Cell Son         Cause of death   • Macular degeneration Neg        ASSESSMENTS:        REVIEW OF SYSTEMS:  Constitutional: Negative for: weight change, fever, fatigue, cold/heat intolerance  Ey ketoacidosis and various micro vascular and macrovascular complications. a).  Medications:  C/w Metformin 1000 mg BID  Discussed GI SE  Take with food    C/w levemir 30 daily    Discussed adding januvia, patient states that she has been very non comp

## 2020-02-04 ENCOUNTER — OFFICE VISIT (OUTPATIENT)
Dept: PODIATRY CLINIC | Facility: CLINIC | Age: 65
End: 2020-02-04

## 2020-02-04 DIAGNOSIS — B35.1 ONYCHOMYCOSIS: ICD-10-CM

## 2020-02-04 DIAGNOSIS — E11.42 TYPE 2 DIABETES MELLITUS WITH DIABETIC POLYNEUROPATHY, WITHOUT LONG-TERM CURRENT USE OF INSULIN (HCC): Primary | ICD-10-CM

## 2020-02-04 DIAGNOSIS — I73.9 PERIPHERAL VASCULAR DISEASE (HCC): ICD-10-CM

## 2020-02-04 PROCEDURE — 11721 DEBRIDE NAIL 6 OR MORE: CPT | Performed by: PODIATRIST

## 2020-02-04 RX ORDER — TRAMADOL HYDROCHLORIDE 50 MG/1
50 TABLET ORAL DAILY
Qty: 20 TABLET | Refills: 0 | Status: SHIPPED | OUTPATIENT
Start: 2020-02-04 | End: 2020-06-16

## 2020-02-04 RX ORDER — TRAMADOL HYDROCHLORIDE 50 MG/1
50 TABLET ORAL DAILY
Qty: 20 TABLET | Refills: 0 | Status: SHIPPED | OUTPATIENT
Start: 2020-02-04 | End: 2020-02-04

## 2020-02-04 NOTE — PROGRESS NOTES
HPI:    Patient ID: Abbey Mcghee is a 60year old female. This 28-year-old diabetic presents to the office today having not been seen in about 6 months. She saw Pablo Gusman on January 14, 2020.   Her most recent A1c was 7.8 and her fasting blood sug Inhaler 0   • metFORMIN HCl 1000 MG Oral Tab Resume in one week when you are off of prednisone  0   • insulin detemir 100 UNIT/ML Subcutaneous Solution Pen-injector 28 units daily while on prednisone then back to 30 units daily once off. 1 Device 1   • dinorah an understanding of my concerns         ASSESSMENT/PLAN:   Type 2 diabetes mellitus with diabetic polyneuropathy, without long-term current use of insulin (hcc)  (primary encounter diagnosis)  Onychomycosis  Peripheral vascular disease (hcc)    No orders o

## 2020-02-20 NOTE — TELEPHONE ENCOUNTER
Current Outpatient Medications:   ••  insulin detemir 100 UNIT/ML Subcutaneous Solution Pen-injector, 28 units daily while on prednisone then back to 30 units daily once off., Disp: 1 Device, Rfl: 1  •

## 2020-02-21 NOTE — TELEPHONE ENCOUNTER
LR 11-12-19 by Marci Ames MD    Diabetes Medications  Protocol Criteria:  · Appointment scheduled in the past 6 months or the next 3 months  · A1C < 7.5 in the past 6 months  · Creatinine in the past 12 months  · Creatinine result < 1.5   Recent Ou

## 2020-02-28 DIAGNOSIS — I73.9 PAD (PERIPHERAL ARTERY DISEASE) (HCC): Primary | ICD-10-CM

## 2020-03-10 ENCOUNTER — HOSPITAL ENCOUNTER (OUTPATIENT)
Dept: CT IMAGING | Facility: HOSPITAL | Age: 65
Discharge: HOME | End: 2020-03-10
Attending: RADIOLOGY
Payer: COMMERCIAL

## 2020-03-10 ENCOUNTER — TELEPHONE (OUTPATIENT)
Dept: INTERNAL MEDICINE CLINIC | Facility: CLINIC | Age: 65
End: 2020-03-10

## 2020-03-10 DIAGNOSIS — I73.9 PAD (PERIPHERAL ARTERY DISEASE) (HCC): ICD-10-CM

## 2020-03-10 LAB — CREAT BLD-MCNC: 2.2 MG/DL (ref 0.55–1.02)

## 2020-03-10 PROCEDURE — 82565 ASSAY OF CREATININE: CPT

## 2020-03-10 NOTE — TELEPHONE ENCOUNTER
Please call the patient and I need to see her at the office I did talk to interventional radiology she had to worsen kidney function.

## 2020-03-12 ENCOUNTER — OFFICE VISIT (OUTPATIENT)
Dept: INTERNAL MEDICINE CLINIC | Facility: CLINIC | Age: 65
End: 2020-03-12

## 2020-03-12 VITALS
TEMPERATURE: 98 F | SYSTOLIC BLOOD PRESSURE: 128 MMHG | BODY MASS INDEX: 25 KG/M2 | DIASTOLIC BLOOD PRESSURE: 69 MMHG | WEIGHT: 144 LBS | HEART RATE: 85 BPM | OXYGEN SATURATION: 99 %

## 2020-03-12 DIAGNOSIS — N17.9 AKI (ACUTE KIDNEY INJURY) (HCC): Primary | ICD-10-CM

## 2020-03-12 DIAGNOSIS — E11.00 TYPE 2 DIABETES MELLITUS WITH HYPEROSMOLARITY WITHOUT COMA, WITH LONG-TERM CURRENT USE OF INSULIN (HCC): ICD-10-CM

## 2020-03-12 DIAGNOSIS — Z79.4 TYPE 2 DIABETES MELLITUS WITH HYPEROSMOLARITY WITHOUT COMA, WITH LONG-TERM CURRENT USE OF INSULIN (HCC): ICD-10-CM

## 2020-03-12 PROCEDURE — 1111F DSCHRG MED/CURRENT MED MERGE: CPT | Performed by: INTERNAL MEDICINE

## 2020-03-12 PROCEDURE — 99214 OFFICE O/P EST MOD 30 MIN: CPT | Performed by: INTERNAL MEDICINE

## 2020-03-12 RX ORDER — ATORVASTATIN CALCIUM 20 MG/1
20 TABLET, FILM COATED ORAL NIGHTLY
Qty: 90 TABLET | Refills: 0 | Status: SHIPPED | OUTPATIENT
Start: 2020-03-12 | End: 2020-06-04

## 2020-03-12 RX ORDER — CYCLOBENZAPRINE HCL 5 MG
5 TABLET ORAL NIGHTLY PRN
Qty: 30 TABLET | Refills: 0 | Status: SHIPPED | OUTPATIENT
Start: 2020-03-12 | End: 2020-04-08

## 2020-03-12 NOTE — PATIENT INSTRUCTIONS
pvd - continue with aspirin and statin, angiogram on hold due to worsened kidney function ,  Repeat bmp, follow up with nephrology    andres - dc metformin and hctz, drink more fluids  Will recheck in 3 days , follow up with nephrology      lower back painCar

## 2020-03-12 NOTE — PROGRESS NOTES
HPI:    Patient ID: Bethanie Burkitt is a 60year old female. HPI She is here today for follow up  She was not able to do the angiogram yesterday because of worsening kidney function.   She states that she still is in severe pain in her leg is worse whe Negative. Neurological: Negative for dizziness, tremors, speech difficulty, weakness, light-headedness, numbness and headaches. Hematological: Negative for adenopathy. Does not bruise/bleed easily.    Psychiatric/Behavioral: Negative for agitation, beh Allergies:  Penicillins             ANGIOEDEMA    Comment:Hives on eyelids (occurred when pt was in her             25s).  Tolerated amoxicillin per chart    HISTORY:  Past Medical History:   Diagnosis Date   • Anemia    • Back pain    • Chronic kidney sinus tenderness and no frontal sinus tenderness. Left sinus exhibits no maxillary sinus tenderness and no frontal sinus tenderness.    Mouth/Throat: Uvula is midline, oropharynx is clear and moist and mucous membranes are normal. Mucous membranes are not c and affect. Her behavior is normal.   Vitals reviewed.            ASSESSMENT/PLAN:   pvdd - continue with aspirin and statin, angiogram on hold due to worsened kidney function ,  Repeat bmp, follow up with nephrology    andres - dc metformin and hctz, drink mo

## 2020-03-14 ENCOUNTER — APPOINTMENT (OUTPATIENT)
Dept: LAB | Facility: HOSPITAL | Age: 65
End: 2020-03-14
Attending: INTERNAL MEDICINE
Payer: COMMERCIAL

## 2020-03-14 DIAGNOSIS — N17.9 AKI (ACUTE KIDNEY INJURY) (HCC): ICD-10-CM

## 2020-03-14 LAB
ANION GAP SERPL CALC-SCNC: 8 MMOL/L (ref 0–18)
BUN BLD-MCNC: 43 MG/DL (ref 7–18)
BUN/CREAT SERPL: 26.1 (ref 10–20)
CALCIUM BLD-MCNC: 9.4 MG/DL (ref 8.5–10.1)
CHLORIDE SERPL-SCNC: 114 MMOL/L (ref 98–112)
CO2 SERPL-SCNC: 18 MMOL/L (ref 21–32)
CREAT BLD-MCNC: 1.65 MG/DL (ref 0.55–1.02)
GLUCOSE BLD-MCNC: 287 MG/DL (ref 70–99)
OSMOLALITY SERPL CALC.SUM OF ELEC: 311 MOSM/KG (ref 275–295)
PATIENT FASTING Y/N/NP: YES
POTASSIUM SERPL-SCNC: 2.9 MMOL/L (ref 3.5–5.1)
SODIUM SERPL-SCNC: 140 MMOL/L (ref 136–145)

## 2020-03-14 PROCEDURE — 80048 BASIC METABOLIC PNL TOTAL CA: CPT

## 2020-03-14 PROCEDURE — 36415 COLL VENOUS BLD VENIPUNCTURE: CPT

## 2020-03-16 ENCOUNTER — TELEPHONE (OUTPATIENT)
Dept: INTERNAL MEDICINE CLINIC | Facility: CLINIC | Age: 65
End: 2020-03-16

## 2020-03-17 ENCOUNTER — TELEPHONE (OUTPATIENT)
Dept: INTERNAL MEDICINE CLINIC | Facility: CLINIC | Age: 65
End: 2020-03-17

## 2020-03-18 ENCOUNTER — OFFICE VISIT (OUTPATIENT)
Dept: PULMONOLOGY | Facility: CLINIC | Age: 65
End: 2020-03-18

## 2020-03-18 ENCOUNTER — LAB ENCOUNTER (OUTPATIENT)
Dept: LAB | Facility: HOSPITAL | Age: 65
End: 2020-03-18
Attending: INTERNAL MEDICINE
Payer: COMMERCIAL

## 2020-03-18 VITALS
HEART RATE: 80 BPM | WEIGHT: 151 LBS | RESPIRATION RATE: 18 BRPM | HEIGHT: 64 IN | BODY MASS INDEX: 25.78 KG/M2 | OXYGEN SATURATION: 100 % | SYSTOLIC BLOOD PRESSURE: 149 MMHG | DIASTOLIC BLOOD PRESSURE: 81 MMHG

## 2020-03-18 DIAGNOSIS — Z79.4 TYPE 2 DIABETES MELLITUS WITH HYPEROSMOLARITY WITHOUT COMA, WITH LONG-TERM CURRENT USE OF INSULIN (HCC): ICD-10-CM

## 2020-03-18 DIAGNOSIS — Z71.6 TOBACCO ABUSE COUNSELING: ICD-10-CM

## 2020-03-18 DIAGNOSIS — E11.00 TYPE 2 DIABETES MELLITUS WITH HYPEROSMOLARITY WITHOUT COMA, WITH LONG-TERM CURRENT USE OF INSULIN (HCC): ICD-10-CM

## 2020-03-18 DIAGNOSIS — R91.8 PULMONARY NODULES: ICD-10-CM

## 2020-03-18 DIAGNOSIS — D50.8 OTHER IRON DEFICIENCY ANEMIA: ICD-10-CM

## 2020-03-18 DIAGNOSIS — E87.6 LOW SERUM POTASSIUM LEVEL: ICD-10-CM

## 2020-03-18 DIAGNOSIS — J43.2 CENTRILOBULAR EMPHYSEMA (HCC): Primary | ICD-10-CM

## 2020-03-18 DIAGNOSIS — E78.1 HYPERTRIGLYCERIDEMIA: ICD-10-CM

## 2020-03-18 LAB
BASOPHILS # BLD AUTO: 0.15 X10(3) UL (ref 0–0.2)
BASOPHILS NFR BLD AUTO: 1.6 %
CHOLEST SMN-MCNC: 125 MG/DL (ref ?–200)
DEPRECATED RDW RBC AUTO: 44.4 FL (ref 35.1–46.3)
EOSINOPHIL # BLD AUTO: 0.45 X10(3) UL (ref 0–0.7)
EOSINOPHIL NFR BLD AUTO: 4.7 %
ERYTHROCYTE [DISTWIDTH] IN BLOOD BY AUTOMATED COUNT: 15.4 % (ref 11–15)
EST. AVERAGE GLUCOSE BLD GHB EST-MCNC: 183 MG/DL (ref 68–126)
HBA1C MFR BLD HPLC: 8 % (ref ?–5.7)
HCT VFR BLD AUTO: 28.6 % (ref 35–48)
HDLC SERPL-MCNC: 40 MG/DL (ref 40–59)
HGB BLD-MCNC: 9.6 G/DL (ref 12–16)
IMM GRANULOCYTES # BLD AUTO: 0.09 X10(3) UL (ref 0–1)
IMM GRANULOCYTES NFR BLD: 0.9 %
LDLC SERPL CALC-MCNC: 57 MG/DL (ref ?–100)
LYMPHOCYTES # BLD AUTO: 1.68 X10(3) UL (ref 1–4)
LYMPHOCYTES NFR BLD AUTO: 17.4 %
MCH RBC QN AUTO: 26.5 PG (ref 26–34)
MCHC RBC AUTO-ENTMCNC: 33.6 G/DL (ref 31–37)
MCV RBC AUTO: 79 FL (ref 80–100)
MONOCYTES # BLD AUTO: 0.81 X10(3) UL (ref 0.1–1)
MONOCYTES NFR BLD AUTO: 8.4 %
NEUTROPHILS # BLD AUTO: 6.48 X10 (3) UL (ref 1.5–7.7)
NEUTROPHILS # BLD AUTO: 6.48 X10(3) UL (ref 1.5–7.7)
NEUTROPHILS NFR BLD AUTO: 67 %
NONHDLC SERPL-MCNC: 85 MG/DL (ref ?–130)
PATIENT FASTING Y/N/NP: YES
PLATELET # BLD AUTO: 373 10(3)UL (ref 150–450)
RBC # BLD AUTO: 3.62 X10(6)UL (ref 3.8–5.3)
TRIGL SERPL-MCNC: 141 MG/DL (ref 30–149)
VLDLC SERPL CALC-MCNC: 28 MG/DL (ref 0–30)
WBC # BLD AUTO: 9.7 X10(3) UL (ref 4–11)

## 2020-03-18 PROCEDURE — 85025 COMPLETE CBC W/AUTO DIFF WBC: CPT

## 2020-03-18 PROCEDURE — 83036 HEMOGLOBIN GLYCOSYLATED A1C: CPT

## 2020-03-18 PROCEDURE — 36415 COLL VENOUS BLD VENIPUNCTURE: CPT

## 2020-03-18 PROCEDURE — 80061 LIPID PANEL: CPT

## 2020-03-18 PROCEDURE — 99214 OFFICE O/P EST MOD 30 MIN: CPT | Performed by: INTERNAL MEDICINE

## 2020-03-18 PROCEDURE — 80048 BASIC METABOLIC PNL TOTAL CA: CPT

## 2020-03-18 NOTE — PROGRESS NOTES
Pulmonary Follow Up Note    HPI:   Sasha Dan is a 60year old female with Patient presents with:   Follow - Up: PFT results     Castillo Alva MD    Breathing w/o sob   Occ use of alb MDI  Worse with rainy weather    Now on Symbicort  2 puff BID nightly as needed for Pain. 60 tablet 0   • ipratropium-albuterol 0.5-2.5 (3) MG/3ML Inhalation Solution Take 3 mL by nebulization every 6 (six) hours as needed (shortness of breath refractory to [facility]).  100 vial 0   • nicotine 14 MG/24HR Transdermal Patch 2 edema  Skin No rash  Psych Normal mood           ASSESSMENT/PLAN:     (J43.2) Centrilobular emphysema (HCC)  (primary encounter diagnosis)  Doing well  No sx now  Worried about URIs and COVID  Plan: cont Symbicort   Prn alb   Vax up to date    (R91.8) Pulm

## 2020-03-19 LAB
ANION GAP SERPL CALC-SCNC: 8 MMOL/L (ref 0–18)
BUN BLD-MCNC: 21 MG/DL (ref 7–18)
BUN/CREAT SERPL: 15.6 (ref 10–20)
CALCIUM BLD-MCNC: 9.4 MG/DL (ref 8.5–10.1)
CHLORIDE SERPL-SCNC: 113 MMOL/L (ref 98–112)
CO2 SERPL-SCNC: 18 MMOL/L (ref 21–32)
CREAT BLD-MCNC: 1.35 MG/DL (ref 0.55–1.02)
GLUCOSE BLD-MCNC: 251 MG/DL (ref 70–99)
OSMOLALITY SERPL CALC.SUM OF ELEC: 299 MOSM/KG (ref 275–295)
POTASSIUM SERPL-SCNC: 4.4 MMOL/L (ref 3.5–5.1)
SODIUM SERPL-SCNC: 139 MMOL/L (ref 136–145)

## 2020-04-02 ENCOUNTER — TELEPHONE (OUTPATIENT)
Dept: NEPHROLOGY | Facility: CLINIC | Age: 65
End: 2020-04-02

## 2020-04-07 ENCOUNTER — VIRTUAL PHONE E/M (OUTPATIENT)
Dept: NEPHROLOGY | Facility: CLINIC | Age: 65
End: 2020-04-07

## 2020-04-07 DIAGNOSIS — E55.9 VITAMIN D DEFICIENCY: ICD-10-CM

## 2020-04-07 DIAGNOSIS — N18.30 CKD (CHRONIC KIDNEY DISEASE), STAGE III (HCC): ICD-10-CM

## 2020-04-07 DIAGNOSIS — N18.2 CKD (CHRONIC KIDNEY DISEASE), STAGE II: Primary | ICD-10-CM

## 2020-04-07 DIAGNOSIS — N17.9 AKI (ACUTE KIDNEY INJURY) (HCC): ICD-10-CM

## 2020-04-07 PROCEDURE — 99441 PHONE E/M BY PHYS 5-10 MIN: CPT | Performed by: INTERNAL MEDICINE

## 2020-04-07 NOTE — PROGRESS NOTES
Virtual/Telephone Check-In    Batista End verbally consents to a Virtual/Telephone Check-In service on 04/07/20. Patient understands and accepts financial responsibility for any deductible, co-insurance and/or co-pays associated with this service. prior to next visit

## 2020-04-08 RX ORDER — CYCLOBENZAPRINE HCL 5 MG
TABLET ORAL
Qty: 30 TABLET | Refills: 0 | Status: SHIPPED | OUTPATIENT
Start: 2020-04-08 | End: 2020-05-03

## 2020-04-10 ENCOUNTER — TELEPHONE (OUTPATIENT)
Dept: INTERNAL MEDICINE CLINIC | Facility: CLINIC | Age: 65
End: 2020-04-10

## 2020-04-14 ENCOUNTER — VIRTUAL PHONE E/M (OUTPATIENT)
Dept: ENDOCRINOLOGY CLINIC | Facility: CLINIC | Age: 65
End: 2020-04-14

## 2020-04-14 DIAGNOSIS — E78.5 DYSLIPIDEMIA: ICD-10-CM

## 2020-04-14 DIAGNOSIS — E11.65 TYPE 2 DIABETES MELLITUS WITH HYPERGLYCEMIA, UNSPECIFIED WHETHER LONG TERM INSULIN USE (HCC): Primary | ICD-10-CM

## 2020-04-14 PROCEDURE — 99213 OFFICE O/P EST LOW 20 MIN: CPT | Performed by: INTERNAL MEDICINE

## 2020-04-14 NOTE — PROGRESS NOTES
Virtual Telephone Check-In    Jeison Rainey verbally consents to a Virtual/Telephone Check-In visit on 04/14/20. Patient understands and accepts financial responsibility for any deductible, co-insurance and/or co-pays associated with this service. Disp: , Rfl:   lisinopril 40 MG Oral Tab, Take 40 mg by mouth daily. , Disp: , Rfl:   nicotine 14 MG/24HR Transdermal Patch 24 Hr, Place 1 patch onto the skin daily. , Disp: 30 patch, Rfl: 0  Albuterol Sulfate  (90 Base) MCG/ACT Inhalation Aero Soln, mg daily     Do not resume Metformin    Continue to check sugars and call in a week with update. b). Has CKD, discussed importance of good blood glucose ontrol  c). Instructed on importance of annual eye exams  d).  Foot exam: Daily feet exam explained

## 2020-04-15 ENCOUNTER — TELEPHONE (OUTPATIENT)
Dept: ENDOCRINOLOGY CLINIC | Facility: CLINIC | Age: 65
End: 2020-04-15

## 2020-04-15 DIAGNOSIS — E11.65 UNCONTROLLED TYPE 2 DIABETES MELLITUS WITH HYPERGLYCEMIA (HCC): Primary | ICD-10-CM

## 2020-04-15 NOTE — TELEPHONE ENCOUNTER
Noted.     Lets d/c Tradjenta and start Januvia 50mg once daily. Please inform patient that both medications are in the same drug class and they both function similarly and have similar side effects as discussed in her virtual visit yesterday.      We c

## 2020-04-15 NOTE — TELEPHONE ENCOUNTER
Called patient and gave her message by provider about medication change due to insurance coverage. verbalized understanding, reviewed se.      Patient had no further questions

## 2020-04-15 NOTE — TELEPHONE ENCOUNTER
Received fax from pharmacy. Tradjenta not covered. Onglyza and Januvia are preferred/covered. Please advise?

## 2020-04-20 ENCOUNTER — NURSE TRIAGE (OUTPATIENT)
Dept: INTERNAL MEDICINE CLINIC | Facility: CLINIC | Age: 65
End: 2020-04-20

## 2020-04-20 ENCOUNTER — VIRTUAL PHONE E/M (OUTPATIENT)
Dept: INTERNAL MEDICINE CLINIC | Facility: CLINIC | Age: 65
End: 2020-04-20

## 2020-04-20 DIAGNOSIS — I73.9 PVD (PERIPHERAL VASCULAR DISEASE) (HCC): ICD-10-CM

## 2020-04-20 PROCEDURE — 99442 PHONE E/M BY PHYS 11-20 MIN: CPT | Performed by: INTERNAL MEDICINE

## 2020-04-20 RX ORDER — HYDROCODONE BITARTRATE AND ACETAMINOPHEN 5; 325 MG/1; MG/1
1 TABLET ORAL EVERY 8 HOURS PRN
Qty: 30 TABLET | Refills: 0 | Status: CANCELLED | OUTPATIENT
Start: 2020-04-20

## 2020-04-20 RX ORDER — HYDROCODONE BITARTRATE AND ACETAMINOPHEN 5; 325 MG/1; MG/1
1 TABLET ORAL EVERY 6 HOURS PRN
Qty: 30 TABLET | Refills: 0 | Status: ON HOLD | OUTPATIENT
Start: 2020-04-20 | End: 2020-05-05

## 2020-04-20 NOTE — TELEPHONE ENCOUNTER
Action Requested: Summary for Provider     []  Critical Lab, Recommendations Needed  [] Need Additional Advice  []   FYI    []   Need Orders  [] Need Medications Sent to Pharmacy  []  Other     SUMMARY: Dr Francesca Denver, Patient c/o severe leg pain, worse

## 2020-04-20 NOTE — PROGRESS NOTES
Virtual Telephone Check-In    Jaya Stuart verbally consents to a Virtual/Telephone Check-In visit on 04/20/20. Patient understands and accepts financial responsibility for any deductible, co-insurance and/or co-pays associated with this service.

## 2020-04-28 ENCOUNTER — HOSPITAL ENCOUNTER (OUTPATIENT)
Dept: CT IMAGING | Facility: HOSPITAL | Age: 65
Discharge: HOME | End: 2020-04-28
Attending: RADIOLOGY
Payer: COMMERCIAL

## 2020-04-28 PROCEDURE — 75635 CT ANGIO ABDOMINAL ARTERIES: CPT | Performed by: RADIOLOGY

## 2020-04-28 PROCEDURE — 82565 ASSAY OF CREATININE: CPT

## 2020-05-03 ENCOUNTER — HOSPITAL ENCOUNTER (OUTPATIENT)
Facility: HOSPITAL | Age: 65
Setting detail: OBSERVATION
Discharge: HOME | End: 2020-05-05
Attending: EMERGENCY MEDICINE | Admitting: HOSPITALIST
Payer: COMMERCIAL

## 2020-05-03 ENCOUNTER — TELEPHONE (OUTPATIENT)
Dept: INTERNAL MEDICINE CLINIC | Facility: CLINIC | Age: 65
End: 2020-05-03

## 2020-05-03 DIAGNOSIS — I73.9 PVD (PERIPHERAL VASCULAR DISEASE) (HCC): ICD-10-CM

## 2020-05-03 DIAGNOSIS — M79.605 PAIN OF LEFT LOWER EXTREMITY: Primary | ICD-10-CM

## 2020-05-03 DIAGNOSIS — D64.9 ANEMIA, UNSPECIFIED TYPE: ICD-10-CM

## 2020-05-03 DIAGNOSIS — L97.529: ICD-10-CM

## 2020-05-03 RX ORDER — ATORVASTATIN CALCIUM 20 MG/1
20 TABLET, FILM COATED ORAL NIGHTLY
Status: DISCONTINUED | OUTPATIENT
Start: 2020-05-03 | End: 2020-05-05

## 2020-05-03 RX ORDER — HEPARIN SODIUM 5000 [USP'U]/ML
5000 INJECTION, SOLUTION INTRAVENOUS; SUBCUTANEOUS EVERY 8 HOURS SCHEDULED
Status: DISCONTINUED | OUTPATIENT
Start: 2020-05-03 | End: 2020-05-05

## 2020-05-03 RX ORDER — LISINOPRIL 40 MG/1
40 TABLET ORAL DAILY
Status: DISCONTINUED | OUTPATIENT
Start: 2020-05-03 | End: 2020-05-05

## 2020-05-03 RX ORDER — MELATONIN
325
Status: DISCONTINUED | OUTPATIENT
Start: 2020-05-03 | End: 2020-05-05

## 2020-05-03 RX ORDER — DEXTROSE MONOHYDRATE 25 G/50ML
50 INJECTION, SOLUTION INTRAVENOUS
Status: DISCONTINUED | OUTPATIENT
Start: 2020-05-03 | End: 2020-05-05

## 2020-05-03 RX ORDER — AMITRIPTYLINE HYDROCHLORIDE 25 MG/1
25 TABLET, FILM COATED ORAL NIGHTLY PRN
Status: DISCONTINUED | OUTPATIENT
Start: 2020-05-03 | End: 2020-05-05

## 2020-05-03 RX ORDER — GABAPENTIN 300 MG/1
300 CAPSULE ORAL 2 TIMES DAILY
Status: DISCONTINUED | OUTPATIENT
Start: 2020-05-03 | End: 2020-05-05

## 2020-05-03 RX ORDER — MORPHINE SULFATE 4 MG/ML
4 INJECTION, SOLUTION INTRAMUSCULAR; INTRAVENOUS ONCE
Status: COMPLETED | OUTPATIENT
Start: 2020-05-03 | End: 2020-05-03

## 2020-05-03 RX ORDER — ONDANSETRON 2 MG/ML
4 INJECTION INTRAMUSCULAR; INTRAVENOUS EVERY 6 HOURS PRN
Status: DISCONTINUED | OUTPATIENT
Start: 2020-05-03 | End: 2020-05-05

## 2020-05-03 RX ORDER — HYDROCODONE BITARTRATE AND ACETAMINOPHEN 5; 325 MG/1; MG/1
2 TABLET ORAL EVERY 6 HOURS PRN
Status: DISCONTINUED | OUTPATIENT
Start: 2020-05-03 | End: 2020-05-05

## 2020-05-03 RX ORDER — MORPHINE SULFATE 2 MG/ML
2 INJECTION, SOLUTION INTRAMUSCULAR; INTRAVENOUS EVERY 8 HOURS PRN
Status: DISCONTINUED | OUTPATIENT
Start: 2020-05-03 | End: 2020-05-04

## 2020-05-03 NOTE — ED PROVIDER NOTES
Patient Seen in: [facility] AND [facility] Emergency Department      History   Patient presents with:  Pain    Stated Complaint: LLE pain    HPI     28-year-old female with multiple medical problems including PVD presents for evaluation of left leg pain.   Todd src Temporal   SpO2 100 %   O2 Device None (Room air)       Current:BP (!) 193/91   Pulse 83   Temp 97.6 °F (36.4 °C) (Temporal)   Resp 16   Ht 160 cm (5' 3\")   Wt 72.6 kg   SpO2 100%   BMI 28.34 kg/m²         Physical Exam  Vitals signs and nursing note complexity of this ED evaluation.     Oxygen Saturation: 99% on room air, Normal    Consults: Orders Placed This Encounter      ED Consult to Primary Care/Medicine      Consult to Interventional Radiology      MD Discussions or Sign-Outs: Discussed with and

## 2020-05-03 NOTE — ED NOTES
Orders for admission, patient is aware of plan and ready to go upstairs. Any questions, please call ED JESSIE Charles  at extension [phone]. Patient is A&Ox4, ambulatory and independent.

## 2020-05-04 ENCOUNTER — APPOINTMENT (OUTPATIENT)
Dept: INTERVENTIONAL RADIOLOGY/VASCULAR | Facility: HOSPITAL | Age: 65
End: 2020-05-04
Attending: CLINICAL NURSE SPECIALIST
Payer: COMMERCIAL

## 2020-05-04 PROCEDURE — B41F1ZZ FLUOROSCOPY OF RIGHT LOWER EXTREMITY ARTERIES USING LOW OSMOLAR CONTRAST: ICD-10-PCS | Performed by: RADIOLOGY

## 2020-05-04 PROCEDURE — B41G1ZZ FLUOROSCOPY OF LEFT LOWER EXTREMITY ARTERIES USING LOW OSMOLAR CONTRAST: ICD-10-PCS | Performed by: RADIOLOGY

## 2020-05-04 PROCEDURE — 047L3Z1 DILATION OF LEFT FEMORAL ARTERY USING DRUG-COATED BALLOON, PERCUTANEOUS APPROACH: ICD-10-PCS | Performed by: RADIOLOGY

## 2020-05-04 RX ORDER — CLOPIDOGREL BISULFATE 75 MG/1
75 TABLET ORAL DAILY
Status: DISCONTINUED | OUTPATIENT
Start: 2020-05-05 | End: 2020-05-05

## 2020-05-04 RX ORDER — MORPHINE SULFATE 2 MG/ML
2 INJECTION, SOLUTION INTRAMUSCULAR; INTRAVENOUS EVERY 4 HOURS PRN
Status: DISCONTINUED | OUTPATIENT
Start: 2020-05-04 | End: 2020-05-05

## 2020-05-04 RX ORDER — CLOPIDOGREL BISULFATE 75 MG/1
TABLET ORAL
Status: COMPLETED
Start: 2020-05-04 | End: 2020-05-04

## 2020-05-04 RX ORDER — ASPIRIN 81 MG/1
81 TABLET ORAL DAILY
Status: DISCONTINUED | OUTPATIENT
Start: 2020-05-04 | End: 2020-05-05

## 2020-05-04 RX ORDER — LIDOCAINE HYDROCHLORIDE 20 MG/ML
INJECTION, SOLUTION EPIDURAL; INFILTRATION; INTRACAUDAL; PERINEURAL
Status: COMPLETED
Start: 2020-05-04 | End: 2020-05-04

## 2020-05-04 RX ORDER — TRAMADOL HYDROCHLORIDE 50 MG/1
50 TABLET ORAL NIGHTLY PRN
Status: DISCONTINUED | OUTPATIENT
Start: 2020-05-04 | End: 2020-05-05

## 2020-05-04 RX ORDER — ASPIRIN 81 MG/1
TABLET, CHEWABLE ORAL
Status: COMPLETED
Start: 2020-05-04 | End: 2020-05-04

## 2020-05-04 RX ORDER — SODIUM CHLORIDE 9 MG/ML
INJECTION, SOLUTION INTRAVENOUS CONTINUOUS
Status: DISCONTINUED | OUTPATIENT
Start: 2020-05-04 | End: 2020-05-05

## 2020-05-04 RX ORDER — ASPIRIN 81 MG/1
81 TABLET, CHEWABLE ORAL DAILY
Status: DISCONTINUED | OUTPATIENT
Start: 2020-05-05 | End: 2020-05-04

## 2020-05-04 RX ORDER — MIDAZOLAM HYDROCHLORIDE 1 MG/ML
INJECTION INTRAMUSCULAR; INTRAVENOUS
Status: COMPLETED
Start: 2020-05-04 | End: 2020-05-04

## 2020-05-04 RX ORDER — HEPARIN SODIUM 1000 [USP'U]/ML
INJECTION, SOLUTION INTRAVENOUS; SUBCUTANEOUS
Status: COMPLETED
Start: 2020-05-04 | End: 2020-05-04

## 2020-05-04 NOTE — CM/SW NOTE
Patient has [place] 2. Due to pt's insurance [facility] hospitalized and Dr. Francesca Denver was assigned as pt's attending as she is pt's PCP.   Paged and notified Dr. Quezada Skill

## 2020-05-04 NOTE — PROCEDURES
[facility]  Procedure Note    Gale Earl Patient Status:  Observation    10/6/1955 MRN [id number]   Location [facility] Attending Marielena Fuchs # 0 PCP Alisa Ellis MD     Proc
Oriented - self; Oriented - place; Oriented - time

## 2020-05-04 NOTE — CONSULTS
[facility]  Vascular Interventional Radiology Consultation    Monique Hassan Patient Status:  Observation    10/6/1955 MRN [id number]   Location [facility] 5SW/SE Attending Marielena Fuchs [place] Day # 0 PCP Kamaljit Trujillo MD tobacco. She reports previous alcohol use. She reports that she does not use drugs. Allergies:    Penicillins             ANGIOEDEMA    Comment:Hives on eyelids (occurred when pt was in her             25s).  Tolerated amoxicillin per chart    Medication drainage/congestion  CHEST/CVS: denies cough, sputum, trouble breathing/SOB, chest pain, SELBY  SKIN: denies any unusual skin lesions or rashes  MUSCULOSKELETAL: c/o left calf cramping, left foot pain.  Denies back pain, joint pain, leg swelling  All other sy

## 2020-05-04 NOTE — TELEPHONE ENCOUNTER
Paging    Message # [id number] 2020 09:51a [AMANDA]  To:  From: GRAHAM Rock MD:  Phone#:  ----------------------------------------------------------------------  WALT RHODES [phone]  [birth year] RE: SEVERE LEG PAIN  Paged  at number : PAGE: 6

## 2020-05-04 NOTE — PROGRESS NOTES
Post midnight follow up note. Patient was seen and examined. C/o left leg pain. No cough or sob. Feels well otherwise. Hopes to get discharged tomorrow if possible. Creatinine 1.28, improved from March.    Plan for angioplasty today   Will need to be on

## 2020-05-04 NOTE — PROGRESS NOTES
Procedure hand off report given to Yoana Bo RN. Procedure site remains dry and intact with no signs of bleeding and hematoma, no symptoms of infection. Bedrest maintained per order, 4 hours from 1600.  Patient stable at transfer, transferred to floor with An

## 2020-05-04 NOTE — PLAN OF CARE
Problem: Patient Centered Care  Goal: Patient preferences are identified and integrated in the patient's plan of care  Description  Interventions:  - What would you like us to know as we care for you?  \" I still work as a \"  - Provide timely, compl type and severity of pain and evaluate response  - Implement non-pharmacological measures as appropriate and evaluate response  - Consider cultural and social influences on pain and pain management  - Manage/alleviate anxiety  - Utilize distraction and/or ability to be responsible for managing their own health  - Refer to Case Management Department for coordinating discharge planning if the patient needs post-hospital services based on physician/LIP order or complex needs related to functional status, cogni

## 2020-05-04 NOTE — H&P
[facility]    PATIENT'S NAME: Vinay Mitchell   ATTENDING PHYSICIAN: Mary Christy MD   PATIENT ACCOUNT#:   [id number]    LOCATION:  [address] [place] [address] Street RECORD #:   [id number]       YOB: 1955  ADMISSION DATE: tibial artery and dorsalis pedis artery. There was occluded left anterior tibial artery with distal reconstruction of the dorsalis pedal artery.   The patient was to undergo lower extremity angiography with PCI or stenting of the right lower extremity pop at 60.  She had coronary artery disease and diabetes. She  following a coronary angiogram.  She does not know much about her father's history. SOCIAL HISTORY:  She was a fairly heavy smoker, quit about 25 years ago.   Does not drink alcohol or use d Interventional Radiology's intervention. 4.   Chronic kidney disease, stage 3, stable to improved. 5.   Anemia, likely of chronic disease. Hemoglobin stable to slightly improved. 6.   DVT prophylaxis, subcutaneous heparin following procedure.   7.   Per

## 2020-05-04 NOTE — PROGRESS NOTES
ADMISSION NOTE    60year old female with h/o DM, HTN HL PVD Known to have lower extremity arterial disease presents with intractable L foot pain. Available medical records partially reviewed. Dictation to follow.     Stanford Morse M.D.  5/4/2020

## 2020-05-04 NOTE — CM/SW NOTE
Flaco Hunter - Dr. Peter Olmstead is o/c for Dr. Edelmira Hunter.   Dr. Peter Olmstead returned page informed him of pt's insurance and [facility] and that [facility], [facility]. already informed Dr. Gale Fleischer of th

## 2020-05-05 VITALS
SYSTOLIC BLOOD PRESSURE: 152 MMHG | RESPIRATION RATE: 19 BRPM | BODY MASS INDEX: 28.35 KG/M2 | WEIGHT: 160 LBS | DIASTOLIC BLOOD PRESSURE: 62 MMHG | HEIGHT: 63 IN | TEMPERATURE: 98 F | HEART RATE: 87 BPM | OXYGEN SATURATION: 100 %

## 2020-05-05 PROCEDURE — 99217 OBSERVATION CARE DISCHARGE: CPT | Performed by: NURSE PRACTITIONER

## 2020-05-05 RX ORDER — HYDROCODONE BITARTRATE AND ACETAMINOPHEN 5; 325 MG/1; MG/1
1 TABLET ORAL EVERY 8 HOURS PRN
Qty: 20 TABLET | Refills: 0 | Status: SHIPPED | OUTPATIENT
Start: 2020-05-05 | End: 2020-06-18

## 2020-05-05 RX ORDER — PANTOPRAZOLE SODIUM 40 MG/1
40 TABLET, DELAYED RELEASE ORAL
Status: DISCONTINUED | OUTPATIENT
Start: 2020-05-06 | End: 2020-05-05

## 2020-05-05 RX ORDER — CLOPIDOGREL BISULFATE 75 MG/1
75 TABLET ORAL DAILY
Qty: 30 TABLET | Refills: 0 | Status: SHIPPED | OUTPATIENT
Start: 2020-05-06 | End: 2020-07-07

## 2020-05-05 NOTE — PLAN OF CARE
Problem: Patient Centered Care  Goal: Patient preferences are identified and integrated in the patient's plan of care  Description  Interventions:  - What would you like us to know as we care for you?  \" I still work as a \"  - Provide timely, compl severity of pain and evaluate response  - Implement non-pharmacological measures as appropriate and evaluate response  - Consider cultural and social influences on pain and pain management  - Manage/alleviate anxiety  - Utilize distraction and/or relaxatio responsible for managing their own health  - Refer to Case Management Department for coordinating discharge planning if the patient needs post-hospital services based on physician/LIP order or complex needs related to functional status, cognitive ability o

## 2020-05-05 NOTE — DISCHARGE SUMMARY
[facility]    Discharge Summary    Ritu Zhang Patient Status:  Observation    10/6/1955 MRN [id number]   Location [facility] 5SW/SE Attending Marielena Fuchs # 0 PCP Chapo Germain MD     Date of Admiss Apparently, initially, she was diagnosed with peripheral neuropathy, but medication did not help very much.   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 She also reports that there is a dime-sized ulceration. She has been unable to sleep because of the pain. After receiving morphine and Norco in the emergency room, her pain is much better controlled currently.     Hospital Course:   Peripheral Quantity:  20 tablet  Refills:  0        CONTINUE taking these medications      Instructions Prescription details   Albuterol Sulfate  (90 Base) MCG/ACT Aers      Inhale 1 puff into the lungs every 4 (four) hours as needed for Wheezing or Shortness [facility] [address], [address] [facility]  [address] [place] [address] [phone], [phone]  [facility] [address], [address] [facility]    Phone:  [phone]   · Clopidogrel Bisulfate 75 MG Tabs  · HYDROcodone-acetaminophen 5-325 MG Tabs         Side Effects: watch for drow

## 2020-05-05 NOTE — PROGRESS NOTES
[facility] - [facility]  Progress Note    Kristofer Erwin Patient Status:  Observation    10/6/1955 MRN [id number]   Location [facility] 5SW/SE Attending No att. providers found   Hosp Day # 0 PCP Raquel Kaye MD       Subjective:   Left f

## 2020-05-05 NOTE — PROGRESS NOTES
Pt seen 10/10 pain in right groin and leg; pulse =68 pt talking on phone, eating dinner  Tops of feet were tender left > right earlier all tenderness gone now post angio  Post tibial pulse found with doppler right ankle  No swelling or pain in calf  Groin

## 2020-05-05 NOTE — PROGRESS NOTES
[facility] Note: Route Optimization for Pantoprazole (PROTONIX)    Patient is currently on Pantoprazole (PROTONIX) 40 mg IV every 24 hours.    The patient meets the criteria to convert to the oral equivalent as established by the IV to Oral conversion pro

## 2020-05-05 NOTE — PLAN OF CARE
Patient A/Ox4, VSS. Patient had angioplasty without stent placement. No complications during surgery. Pt received on floor, c/o pain 10/10 right groin and thigh. MD paged. Insertion site monitored for bleeding or complication.  Pain medication order changed PT/OT eval  - See additional Care Plan goals for specific interventions   Outcome: Progressing     Problem: PAIN - ADULT  Goal: Verbalizes/displays adequate comfort level or patient's stated pain goal  Description  INTERVENTIONS:  - Encourage pt to monitor partner in discharge planning  - Arrange for needed discharge resources and transportation as appropriate  - Identify discharge learning needs (meds, wound care, etc)  - Arrange for interpreters to assist at discharge as needed  - Consider post-discharge p

## 2020-05-06 ENCOUNTER — TELEPHONE (OUTPATIENT)
Dept: MEDSURG UNIT | Facility: HOSPITAL | Age: 65
End: 2020-05-06

## 2020-05-06 ENCOUNTER — TELEPHONE (OUTPATIENT)
Dept: INTERNAL MEDICINE CLINIC | Facility: CLINIC | Age: 65
End: 2020-05-06

## 2020-05-06 ENCOUNTER — VIRTUAL PHONE E/M (OUTPATIENT)
Dept: INTERNAL MEDICINE CLINIC | Facility: CLINIC | Age: 65
End: 2020-05-06

## 2020-05-06 ENCOUNTER — PATIENT OUTREACH (OUTPATIENT)
Dept: CASE MANAGEMENT | Age: 65
End: 2020-05-06

## 2020-05-06 DIAGNOSIS — I73.9 PVD (PERIPHERAL VASCULAR DISEASE) (HCC): ICD-10-CM

## 2020-05-06 DIAGNOSIS — M79.605 PAIN OF LEFT LOWER EXTREMITY: ICD-10-CM

## 2020-05-06 DIAGNOSIS — S91.302D OPEN WOUND OF LEFT FOOT, SUBSEQUENT ENCOUNTER: Primary | ICD-10-CM

## 2020-05-06 DIAGNOSIS — Z02.9 ENCOUNTERS FOR ADMINISTRATIVE PURPOSE: ICD-10-CM

## 2020-05-06 DIAGNOSIS — M54.9 BACK PAIN WITH RADIATION: ICD-10-CM

## 2020-05-06 PROBLEM — J43.9 PULMONARY EMPHYSEMA (HCC): Status: RESOLVED | Noted: 2019-11-08 | Resolved: 2020-05-06

## 2020-05-06 PROBLEM — R73.9 HYPERGLYCEMIA: Status: RESOLVED | Noted: 2019-11-08 | Resolved: 2020-05-06

## 2020-05-06 PROCEDURE — 1111F DSCHRG MED/CURRENT MED MERGE: CPT

## 2020-05-06 PROCEDURE — 99213 OFFICE O/P EST LOW 20 MIN: CPT | Performed by: INTERNAL MEDICINE

## 2020-05-06 RX ORDER — DOCUSATE SODIUM 100 MG/1
100 CAPSULE, LIQUID FILLED ORAL 2 TIMES DAILY PRN
COMMUNITY
End: 2020-10-13

## 2020-05-06 NOTE — TELEPHONE ENCOUNTER
Patient states she was trying to make an appointment for wound care at [facility], however, patient states she was advised by Wound Care office to request referral first to proceed. Ph# [phone]    Patient couldn't relay further detail.

## 2020-05-06 NOTE — TELEPHONE ENCOUNTER
Patient was seen today by Dr. Tammy Quinteros, referral was entered. This message was taken prior to patient being seen.

## 2020-05-06 NOTE — PROGRESS NOTES
Virtual Telephone Check-In    Leida Rubinstein verbally consents to a Virtual/Telephone Check-In visit on 05/06/20. Patient understands and accepts financial responsibility for any deductible, co-insurance and/or co-pays associated with this service.

## 2020-05-06 NOTE — TELEPHONE ENCOUNTER
Pt dc'd 5/5/2020. She states that since coming home she has developed a strange sensation in her right buttock. She states that when she tries going up or down the stairs she has a feeling of her muscles in her right buttock pulling.  She could not explain

## 2020-05-06 NOTE — PROGRESS NOTES
Initial Post Discharge Follow Up   Discharge Date: 5/5/20  Contact Date: 5/6/2020    Consent Verification:  Assessment Completed With: Patient  HIPAA Verified?   Yes    Discharge Dx:  Pain of left lower extremity         General:   • How have you been sin by mouth nightly. 90 tablet 0   • insulin detemir 100 UNIT/ML Subcutaneous Solution Pen-injector Inject 30 Units into the skin daily. 5 pen 1   • traMADol HCl 50 MG Oral Tab Take 1 tablet (50 mg total) by mouth daily.  Take at bedtime 20 tablet 0   • Budeso before your next visit with your PCP?  (DME, meds, disease concerns, Etc): Yes     Follow up appointments:      Your appointments     Date & Time Appointment Department [facility])    May 14, 2020 10:45 AM T Hospital Follow Up with MD Adiel Canothanh

## 2020-05-08 ENCOUNTER — HOSPITAL ENCOUNTER (OUTPATIENT)
Dept: GENERAL RADIOLOGY | Facility: HOSPITAL | Age: 65
Discharge: HOME | End: 2020-05-08
Attending: NURSE PRACTITIONER
Payer: COMMERCIAL

## 2020-05-08 ENCOUNTER — OFFICE VISIT (OUTPATIENT)
Dept: WOUND CARE | Facility: HOSPITAL | Age: 65
End: 2020-05-08
Attending: NURSE PRACTITIONER
Payer: COMMERCIAL

## 2020-05-08 ENCOUNTER — LAB ENCOUNTER (OUTPATIENT)
Dept: LAB | Facility: HOSPITAL | Age: 65
End: 2020-05-08
Attending: NURSE PRACTITIONER
Payer: COMMERCIAL

## 2020-05-08 DIAGNOSIS — E08.621 DIABETIC ULCER OF MIDFOOT ASSOCIATED WITH DIABETES MELLITUS DUE TO UNDERLYING CONDITION, WITH FAT LAYER EXPOSED, UNSPECIFIED LATERALITY (HCC): ICD-10-CM

## 2020-05-08 DIAGNOSIS — L97.402 DIABETIC ULCER OF MIDFOOT ASSOCIATED WITH DIABETES MELLITUS DUE TO UNDERLYING CONDITION, WITH FAT LAYER EXPOSED, UNSPECIFIED LATERALITY (HCC): Primary | ICD-10-CM

## 2020-05-08 DIAGNOSIS — L97.402 DIABETIC ULCER OF MIDFOOT ASSOCIATED WITH DIABETES MELLITUS DUE TO UNDERLYING CONDITION, WITH FAT LAYER EXPOSED, UNSPECIFIED LATERALITY (HCC): ICD-10-CM

## 2020-05-08 DIAGNOSIS — D64.9 ANEMIA, UNSPECIFIED TYPE: ICD-10-CM

## 2020-05-08 DIAGNOSIS — E08.621 DIABETIC ULCER OF MIDFOOT ASSOCIATED WITH DIABETES MELLITUS DUE TO UNDERLYING CONDITION, WITH FAT LAYER EXPOSED, UNSPECIFIED LATERALITY (HCC): Primary | ICD-10-CM

## 2020-05-08 PROBLEM — L97.422 DIABETIC ULCER OF LEFT MIDFOOT ASSOCIATED WITH TYPE 2 DIABETES MELLITUS, WITH FAT LAYER EXPOSED (HCC): Status: ACTIVE | Noted: 2020-05-08

## 2020-05-08 PROBLEM — E11.621 DIABETIC ULCER OF LEFT MIDFOOT ASSOCIATED WITH TYPE 2 DIABETES MELLITUS, WITH FAT LAYER EXPOSED (HCC): Status: ACTIVE | Noted: 2020-05-08

## 2020-05-08 PROCEDURE — 86140 C-REACTIVE PROTEIN: CPT

## 2020-05-08 PROCEDURE — 99215 OFFICE O/P EST HI 40 MIN: CPT

## 2020-05-08 PROCEDURE — 85025 COMPLETE CBC W/AUTO DIFF WBC: CPT

## 2020-05-08 PROCEDURE — 36415 COLL VENOUS BLD VENIPUNCTURE: CPT

## 2020-05-08 PROCEDURE — 85652 RBC SED RATE AUTOMATED: CPT

## 2020-05-08 PROCEDURE — 73630 X-RAY EXAM OF FOOT: CPT | Performed by: NURSE PRACTITIONER

## 2020-05-08 RX ORDER — CYCLOBENZAPRINE HCL 5 MG
TABLET ORAL
Qty: 30 TABLET | Refills: 0 | Status: SHIPPED | OUTPATIENT
Start: 2020-05-08 | End: 2020-06-25

## 2020-05-08 NOTE — PROGRESS NOTES
Subjective    Chief Complaint  This information was obtained from the patient  The patient is new to the [address] [facility] here for an initial visit for the evaluation and management of non-healing wound(s).  left diabetic foot ulcer    Allergies  penicillin (Se Patient has a surgical history of:  excis lumbar disk one    Review of Systems (ROS)  This information was obtained from the patient    Complaints and Symptoms  Patient complains of:  Musculoskeletal: Decreased Activity, Muscle Pain  Integumentary (Hair/Sk Norco - oral 5 mg-325 mg tablet every 8 hours as needed  aspirin - oral 81 mg tablet,chewable once daily  Plavix - oral 75 mg tablet once daily  amitriptyline - oral 25 mg tablet once daily        Objective    Wound Assessment(s)  Wound #1 Left, Medial [facility] Musculoskeletal:  BLE with no joint deformity, muscle strength 5/5, full range of motion, no clubbing, no cyanosis of digits and nails, missing left great toe nail, left hallux hammer toe, gait is effected by foot and leg pain.     Integumentary (Hair, Skin Hydrofera ready  Other: - secure with tape  Change dressing three times per week. Additional Orders:    Offloading  Other: - two offloading felt cut in the shape donuts    Misc / Additional orders  Supplement with a daily multivitamin.   Increase dietary Discussed the documentation needed for obtaining diabetic shoe with insert to prevent re-ulceration. Discussed the treatment plan with patient, patient verbalized understanding and agreed to these plan.     Electronic Signature(s)  Signed By: Date:  Enio Cox

## 2020-05-11 ENCOUNTER — TELEPHONE (OUTPATIENT)
Dept: WOUND CARE | Facility: HOSPITAL | Age: 65
End: 2020-05-11

## 2020-05-11 NOTE — TELEPHONE ENCOUNTER
Please place a referral for a skin substitute Epifix code  for 10 applications x 30 units  CPT code for application: 52729 as part of the the treatment plan for her diabetic foot ulcer. For any questions , please call the outpatient wound center.   Panchito Lilly

## 2020-05-14 ENCOUNTER — OFFICE VISIT (OUTPATIENT)
Dept: INTERNAL MEDICINE CLINIC | Facility: CLINIC | Age: 65
End: 2020-05-14

## 2020-05-14 VITALS
BODY MASS INDEX: 28 KG/M2 | SYSTOLIC BLOOD PRESSURE: 155 MMHG | DIASTOLIC BLOOD PRESSURE: 80 MMHG | OXYGEN SATURATION: 97 % | TEMPERATURE: 98 F | HEART RATE: 80 BPM | WEIGHT: 159 LBS

## 2020-05-14 DIAGNOSIS — I73.9 PVD (PERIPHERAL VASCULAR DISEASE) (HCC): Primary | ICD-10-CM

## 2020-05-14 PROCEDURE — 99495 TRANSJ CARE MGMT MOD F2F 14D: CPT | Performed by: INTERNAL MEDICINE

## 2020-05-14 PROCEDURE — 1111F DSCHRG MED/CURRENT MED MERGE: CPT | Performed by: INTERNAL MEDICINE

## 2020-05-14 RX ORDER — AMLODIPINE BESYLATE 10 MG/1
10 TABLET ORAL DAILY
Qty: 90 TABLET | Refills: 0 | Status: SHIPPED | OUTPATIENT
Start: 2020-05-14 | End: 2020-08-13

## 2020-05-14 NOTE — PROGRESS NOTES
HPI:    Shanice Cano is a 60year old female here today for hospital follow up.    She was discharged from Inpatient hospital[facility]  to Home   Admission Date: 5/3/20   Discharge Date: 5/5/20  Hospital Discharge Diagnoses (since 4/14/2020) penicillins. Current Meds:  CYCLOBENZAPRINE 5 MG Oral Tab, TAKE 1 TABLET BY MOUTH NIGHTLY AS NEEDED FOR MUSCLE SPASMS  docusate sodium 100 MG Oral Cap, Take 100 mg by mouth 2 (two) times daily.   HYDROcodone-acetaminophen 5-325 MG Oral Tab, Take 1 tablet obstructive pulmonary disease) ([facility] Diabetes ([facility] [address] Essential hypertension, PNA (pneumonia) (05/2018), Pulmonary nodule, Renal disorder, Sickle-cell anemia ([facility] [address] and Tobacco abuse.     She  has a past surgical history that includes excis lumbar disk,one Sitting, Cuff Size: adult)   Pulse 80   Temp 97.7 °F (36.5 °C) (Tympanic)   Wt 159 lb (72.1 kg)   SpO2 97%   BMI 28.17 kg/m²   Physical Exam   Nursing note and vitals reviewed. Constitutional: She is oriented to person, place, and time.  She appears well- placed in this encounter.       Meds & Refills for this Visit:  Requested Prescriptions      No prescriptions requested or ordered in this encounter       Imaging & Consults:  None         Transitional Care Management Certification:  I certify that the foll

## 2020-05-19 ENCOUNTER — TELEPHONE (OUTPATIENT)
Dept: PAIN CLINIC | Facility: HOSPITAL | Age: 65
End: 2020-05-19

## 2020-05-19 NOTE — TELEPHONE ENCOUNTER
Pt called inquiring regarding becoming out pt. Pt is looking for pain medication due to a sore pt has on her foot. Pt will have PCP write a referral out.

## 2020-05-20 ENCOUNTER — TELEPHONE (OUTPATIENT)
Dept: WOUND CARE | Facility: HOSPITAL | Age: 65
End: 2020-05-20

## 2020-05-20 ENCOUNTER — OFFICE VISIT (OUTPATIENT)
Dept: WOUND CARE | Facility: HOSPITAL | Age: 65
End: 2020-05-20
Attending: NURSE PRACTITIONER
Payer: COMMERCIAL

## 2020-05-20 DIAGNOSIS — E08.621 DIABETIC ULCER OF MIDFOOT ASSOCIATED WITH DIABETES MELLITUS DUE TO UNDERLYING CONDITION, WITH FAT LAYER EXPOSED, UNSPECIFIED LATERALITY (HCC): ICD-10-CM

## 2020-05-20 DIAGNOSIS — L97.422 DIABETIC ULCER OF LEFT MIDFOOT ASSOCIATED WITH TYPE 2 DIABETES MELLITUS, WITH FAT LAYER EXPOSED (HCC): Primary | ICD-10-CM

## 2020-05-20 DIAGNOSIS — E11.621 DIABETIC ULCER OF LEFT MIDFOOT ASSOCIATED WITH TYPE 2 DIABETES MELLITUS, WITH FAT LAYER EXPOSED (HCC): Primary | ICD-10-CM

## 2020-05-20 DIAGNOSIS — L97.402 DIABETIC ULCER OF MIDFOOT ASSOCIATED WITH DIABETES MELLITUS DUE TO UNDERLYING CONDITION, WITH FAT LAYER EXPOSED, UNSPECIFIED LATERALITY (HCC): ICD-10-CM

## 2020-05-20 PROCEDURE — 97597 DBRDMT OPN WND 1ST 20 CM/<: CPT

## 2020-05-20 NOTE — PROGRESS NOTES
Subjective    Chief Complaint  This information was obtained from the patient  The patient was seen today for follow up and management of non-healing left diabetic foot ulcer. Patient verbalized pain on the wound area.     Allergies  penicillin (Severity: S Genitourinary (): Urinary Incontinence  Musculoskeletal: Assistive Devices  Neurological: Abnormal Gait, Headaches  Endocrine: Cold Intolerance, Heat Intolerance  Hematologic/Lymphatic: Swollen Glands  Allergic/Immunologic: Frequent Rashes, Recurrent Fev (Encounter Diagnosis) E11.621 - Type 2 diabetes mellitus with foot ulcer  (Encounter Diagnosis) I73.9 - Peripheral vascular disease, unspecified    Diagnoses    ICD-10  I73.9: Peripheral vascular disease, unspecified  E11.621: Type 2 diabetes mellitus with Entered By: Elena Walter on 05/20/2020 11:13:56 AM       Treatment Notes Summary  Wound #1 (Left, Medial Foot)  . Wound Treatment Note  Assessed patient’s pain status and effectiveness of pain management plan.   Limb cleansed using Soap and water (1)  Clean

## 2020-05-20 NOTE — TELEPHONE ENCOUNTER
Patient needs a referral for a Terri NPWT  to be placed under DME as part of the treatment plan for her diabetic foot ulcer. Procedure code: 60347   Vendor name: Vince Guerrier  If you have any questions, please call the wound care clinic. Thank you.

## 2020-05-20 NOTE — TELEPHONE ENCOUNTER
That needs to be ordered from wound clinic , I have tried in the past for different patient they always denied

## 2020-05-21 NOTE — TELEPHONE ENCOUNTER
Dr. Kimmy Rubio, wound clinic is the one requesting the DME order. This will need to come from PCP due to patients insurance. Order has been pended.      Please reply to pool: [facility] MANAGED CARE - MAIN

## 2020-05-22 NOTE — TELEPHONE ENCOUNTER
Hello, please note that [employer] is not within patient's network. Leila Rizo can supply DME supply. Please advise if referral can be changed to Leila Rizo. Thank you, Alia Perez Specialist    Managed Care.

## 2020-05-22 NOTE — TELEPHONE ENCOUNTER
Spoke to pt, she is ok with us switching DME companies. Also she s requesting a DME for a BP machine.

## 2020-05-22 NOTE — TELEPHONE ENCOUNTER
Hello,    Please note that BP machines are not covered by health plan. Per health plan this is an out of pocket expense. Thank you, George Feliciano Specialist    Managed Care.

## 2020-05-26 NOTE — TELEPHONE ENCOUNTER
Called patient, no answer. Left VM informing her that BP machine is an out of pocket expense, not covered by her insurance.

## 2020-05-28 ENCOUNTER — APPOINTMENT (OUTPATIENT)
Dept: WOUND CARE | Facility: HOSPITAL | Age: 65
End: 2020-05-28
Attending: NURSE PRACTITIONER
Payer: COMMERCIAL

## 2020-05-29 ENCOUNTER — APPOINTMENT (OUTPATIENT)
Dept: WOUND CARE | Facility: HOSPITAL | Age: 65
End: 2020-05-29
Attending: NURSE PRACTITIONER
Payer: COMMERCIAL

## 2020-06-02 ENCOUNTER — OFFICE VISIT (OUTPATIENT)
Dept: WOUND CARE | Facility: HOSPITAL | Age: 65
End: 2020-06-02
Attending: NURSE PRACTITIONER
Payer: COMMERCIAL

## 2020-06-02 DIAGNOSIS — L97.422 DIABETIC ULCER OF LEFT MIDFOOT ASSOCIATED WITH TYPE 2 DIABETES MELLITUS, WITH FAT LAYER EXPOSED (HCC): Primary | ICD-10-CM

## 2020-06-02 DIAGNOSIS — E11.621 DIABETIC ULCER OF LEFT MIDFOOT ASSOCIATED WITH TYPE 2 DIABETES MELLITUS, WITH FAT LAYER EXPOSED (HCC): Primary | ICD-10-CM

## 2020-06-02 PROCEDURE — 97597 DBRDMT OPN WND 1ST 20 CM/<: CPT

## 2020-06-02 NOTE — PROGRESS NOTES
Subjective    Chief Complaint  This information was obtained from the patient  The patient was seen today for follow up and management of non-healing left diabetic foot ulcer. 6/2 - no additional concerns for todays visit.     Allergies  penicillin (Severi Allergic/Immunologic: Frequent Rashes, Recurrent Fevers  Psychiatric: Memory Loss, Suicidal        Objective    Wound Assessment(s)  Wound #1 Left, Medial Foot is a chronic Krishnan Grade 1 Diabetic Ulcer and has received a status of Not Healed.  Subsequent w Height/Length: 64 in (162.56 cm), Weight: 160 lbs (72.73 kgs), BMI: 27.5, Temperature: 98.9 °F (37.17 °C), Pulse: 81 bpm, Respiratory Rate: 18 breaths/min, Blood Pressure: 171/75 mmHg, Pulse Oximetry: 100 %.   Vitals Signs Notes: 6/2/20 blood sugar this am Wound #1 (Diabetic Ulcer) is located on the left, medial foot. A selective debridement with a total area debrided of 0.36 sq cm was performed by MARIO Rhoades to remove devitalized tissue: slough. The following instrument(s) were used: curette. A mi - Record current medications and treatments. Status: Initiated Date: 5/8/2020  - Doppler if unable to palpate pulse.   Status: Initiated Date: 5/8/2020  - X-ray Stage III-IV wounds or Krishnan Grade 3 or > wounds, and full thickness wounds with exposed struc Physician / Extender: Rudi Young: Outpatient  Debridement Performed for Assessment: Wound #1 Left, Medial Foot  Performed By: Physician LUPE Owen-PIA  Debridement: Selective  Post Debridement Measurements  Length: (cm) 0.6  Width: (cm)

## 2020-06-04 RX ORDER — ATORVASTATIN CALCIUM 20 MG/1
TABLET, FILM COATED ORAL
Qty: 90 TABLET | Refills: 0 | Status: SHIPPED | OUTPATIENT
Start: 2020-06-04 | End: 2021-02-09

## 2020-06-08 ENCOUNTER — APPOINTMENT (OUTPATIENT)
Dept: WOUND CARE | Facility: HOSPITAL | Age: 65
End: 2020-06-08
Attending: NURSE PRACTITIONER
Payer: COMMERCIAL

## 2020-06-12 ENCOUNTER — HOSPITAL ENCOUNTER (EMERGENCY)
Facility: HOSPITAL | Age: 65
Discharge: HOME | End: 2020-06-12
Attending: EMERGENCY MEDICINE
Payer: COMMERCIAL

## 2020-06-12 VITALS
HEART RATE: 94 BPM | WEIGHT: 158.94 LBS | OXYGEN SATURATION: 100 % | DIASTOLIC BLOOD PRESSURE: 84 MMHG | BODY MASS INDEX: 28 KG/M2 | RESPIRATION RATE: 18 BRPM | SYSTOLIC BLOOD PRESSURE: 175 MMHG | TEMPERATURE: 98 F

## 2020-06-12 DIAGNOSIS — L02.31 ABSCESS, GLUTEAL, LEFT: Primary | ICD-10-CM

## 2020-06-12 DIAGNOSIS — R73.9 HYPERGLYCEMIA: ICD-10-CM

## 2020-06-12 PROCEDURE — 10061 I&D ABSCESS COMP/MULTIPLE: CPT

## 2020-06-12 PROCEDURE — 80048 BASIC METABOLIC PNL TOTAL CA: CPT | Performed by: EMERGENCY MEDICINE

## 2020-06-12 PROCEDURE — 85025 COMPLETE CBC W/AUTO DIFF WBC: CPT | Performed by: EMERGENCY MEDICINE

## 2020-06-12 PROCEDURE — 36415 COLL VENOUS BLD VENIPUNCTURE: CPT

## 2020-06-12 PROCEDURE — 99285 EMERGENCY DEPT VISIT HI MDM: CPT

## 2020-06-12 RX ORDER — CLINDAMYCIN HYDROCHLORIDE 300 MG/1
300 CAPSULE ORAL 3 TIMES DAILY
Qty: 30 CAPSULE | Refills: 0 | Status: SHIPPED | OUTPATIENT
Start: 2020-06-12 | End: 2020-06-22

## 2020-06-12 RX ORDER — HYDROCODONE BITARTRATE AND ACETAMINOPHEN 5; 325 MG/1; MG/1
1 TABLET ORAL ONCE
Status: COMPLETED | OUTPATIENT
Start: 2020-06-12 | End: 2020-06-12

## 2020-06-12 RX ORDER — HYDROCODONE BITARTRATE AND ACETAMINOPHEN 5; 325 MG/1; MG/1
2 TABLET ORAL EVERY 6 HOURS PRN
Qty: 16 TABLET | Refills: 0 | Status: SHIPPED | OUTPATIENT
Start: 2020-06-12 | End: 2020-06-16

## 2020-06-12 RX ORDER — GARLIC EXTRACT 500 MG
1 CAPSULE ORAL DAILY
Qty: 14 CAPSULE | Refills: 0 | Status: SHIPPED | OUTPATIENT
Start: 2020-06-12 | End: 2020-06-26

## 2020-06-12 NOTE — ED INITIAL ASSESSMENT (HPI)
Patient here with c/o abscess to left buttocks x 2 weeks. Patient is diabetic. States she has been self treating with warm compresses.

## 2020-06-13 NOTE — ED PROVIDER NOTES
Patient Seen in: [facility] AND [facility] Emergency Department      History   Patient presents with:  Abscess    Stated Complaint: left buttock abscess     HPI    60year old female with h/o DM, CKD, COPD, HTN who presents with L buttock abscess.  Pt states her Current:BP (!) 175/84   Pulse 94   Temp 98.3 °F (36.8 °C) (Temporal)   Resp 18   Wt 72.1 kg   SpO2 100%   BMI 28.16 kg/m²         Physical Exam  Vitals signs and nursing note reviewed. Constitutional:       General: She is not in acute distress.      Appe Please view results for these tests on the individual orders.    RAINBOW DRAW BLUE   RAINBOW DRAW LAVENDER   RAINBOW DRAW LIGHT GREEN   RAINBOW DRAW GOLD          MDM     Pulse Ox: 100%, Normal, RA    Medications   HYDROcodone-acetaminophen (NORCO) 5-325 MG

## 2020-06-15 ENCOUNTER — OFFICE VISIT (OUTPATIENT)
Dept: WOUND CARE | Facility: HOSPITAL | Age: 65
End: 2020-06-15
Attending: NURSE PRACTITIONER
Payer: COMMERCIAL

## 2020-06-15 DIAGNOSIS — E11.621 DIABETIC ULCER OF LEFT MIDFOOT ASSOCIATED WITH TYPE 2 DIABETES MELLITUS, WITH FAT LAYER EXPOSED (HCC): Primary | ICD-10-CM

## 2020-06-15 DIAGNOSIS — L97.422 DIABETIC ULCER OF LEFT MIDFOOT ASSOCIATED WITH TYPE 2 DIABETES MELLITUS, WITH FAT LAYER EXPOSED (HCC): Primary | ICD-10-CM

## 2020-06-15 PROCEDURE — 97597 DBRDMT OPN WND 1ST 20 CM/<: CPT

## 2020-06-15 NOTE — PROGRESS NOTES
Subjective    Chief Complaint  This information was obtained from the patient  The patient was seen today for follow up and management of non-healing left diabetic foot ulcer. No additional concerns for today's visit.     Allergies  penicillin (Severity: Se Musculoskeletal: Assistive Devices  Integumentary (Hair/Skin/Nails): Hemosiderin Staining  Neurological: Abnormal Gait, Headaches  Endocrine: Cold Intolerance, Heat Intolerance  Hematologic/Lymphatic: Swollen Glands  Allergic/Immunologic: Frequent Rashes, Height/Length: 64 in (162.56 cm), Weight: 160 lbs (72.73 kgs), BMI: 27.5, Temperature: 97.9 °F (36.61 °C), Pulse: 69 bpm, Respiratory Rate: 17 breaths/min, Blood Pressure: 155/80 mmHg, Pulse Oximetry: 99 %.   Vitals Signs Notes: B/S last night 180    Physic Wound #1 (Diabetic Ulcer) is located on the left, medial foot. A selective debridement with a total area debrided of 0.12 sq cm was performed by MARIO Kelly to remove devitalized tissue: biofilm. The following instrument(s) were used: blade.  Pain Applied topical product to fly-wound area avoiding wound base. using Zinc Paste (1)  Applied topical agent to base of wound bed. using Medihoney gel (1)  Applied Primary Wound Dressing.  using Hydrofera ready 4x5 (1)  Dressing secured with non-allergenic t

## 2020-06-16 ENCOUNTER — OFFICE VISIT (OUTPATIENT)
Dept: SURGERY | Facility: CLINIC | Age: 65
End: 2020-06-16

## 2020-06-16 ENCOUNTER — TELEPHONE (OUTPATIENT)
Dept: INTERNAL MEDICINE CLINIC | Facility: CLINIC | Age: 65
End: 2020-06-16

## 2020-06-16 ENCOUNTER — OFFICE VISIT (OUTPATIENT)
Dept: INTERNAL MEDICINE CLINIC | Facility: CLINIC | Age: 65
End: 2020-06-16

## 2020-06-16 VITALS
HEART RATE: 74 BPM | DIASTOLIC BLOOD PRESSURE: 75 MMHG | TEMPERATURE: 99 F | OXYGEN SATURATION: 98 % | WEIGHT: 161 LBS | SYSTOLIC BLOOD PRESSURE: 150 MMHG | BODY MASS INDEX: 29 KG/M2

## 2020-06-16 VITALS
SYSTOLIC BLOOD PRESSURE: 148 MMHG | WEIGHT: 161 LBS | BODY MASS INDEX: 29 KG/M2 | DIASTOLIC BLOOD PRESSURE: 82 MMHG | HEART RATE: 88 BPM

## 2020-06-16 DIAGNOSIS — L02.31 LEFT BUTTOCK ABSCESS: Primary | ICD-10-CM

## 2020-06-16 DIAGNOSIS — L02.91 ABSCESS: Primary | ICD-10-CM

## 2020-06-16 DIAGNOSIS — Z79.4 TYPE 2 DIABETES MELLITUS WITH HYPEROSMOLARITY WITHOUT COMA, WITH LONG-TERM CURRENT USE OF INSULIN (HCC): ICD-10-CM

## 2020-06-16 DIAGNOSIS — S91.301D OPEN WOUND OF RIGHT FOOT, SUBSEQUENT ENCOUNTER: ICD-10-CM

## 2020-06-16 DIAGNOSIS — E11.00 TYPE 2 DIABETES MELLITUS WITH HYPEROSMOLARITY WITHOUT COMA, WITH LONG-TERM CURRENT USE OF INSULIN (HCC): ICD-10-CM

## 2020-06-16 PROCEDURE — 99203 OFFICE O/P NEW LOW 30 MIN: CPT | Performed by: SURGERY

## 2020-06-16 PROCEDURE — 10060 I&D ABSCESS SIMPLE/SINGLE: CPT | Performed by: SURGERY

## 2020-06-16 PROCEDURE — 99214 OFFICE O/P EST MOD 30 MIN: CPT | Performed by: INTERNAL MEDICINE

## 2020-06-16 RX ORDER — HYDROCHLOROTHIAZIDE 25 MG/1
25 TABLET ORAL DAILY
Qty: 90 TABLET | Refills: 0 | Status: SHIPPED | OUTPATIENT
Start: 2020-06-16 | End: 2020-06-28

## 2020-06-16 NOTE — TELEPHONE ENCOUNTER
Patient dropped off [facility] paper work to [employer] office forms faxed to [address] [place] [address] [facility] [employer] [facility] [place] [facility]

## 2020-06-16 NOTE — PATIENT INSTRUCTIONS
Left buttock abscess  (primary encounter diagnosis)-advised to continue with clindamycin I will refer her to see surgeon for I&D  Diabetes type 2 not very well controlled advised to increase her Levemir to 32 units at night and continue with Januvia advise

## 2020-06-16 NOTE — PROCEDURES
Procedure Note      Preop:  Left gluteal abscess  Postop:  Same  Procedure: Incision and drainage  Anesthesia:  Local  EBL:  Minimal  Description:  The skin was prepped and draped in sterile fashion.   The skin and subcutaneous tissue of the left gluteal a

## 2020-06-16 NOTE — PROGRESS NOTES
HPI:    Patient ID: Amil Crigler is a 60year old female. HPI she is here today for follow-up after she was discharged from emergency room. She went there due to her left  buttock abscess.   They did lancet while she was in the ER and they did a pa adenopathy. Does not bruise/bleed easily. Psychiatric/Behavioral: Negative for agitation, behavioral problems, confusion, hallucinations and sleep disturbance. The patient is not nervous/anxious.           Current Outpatient Medications   Medication Sig D sulfate 325 (65 FE) MG Oral Tab EC Take 1 tablet (325 mg total) by mouth 3 (three) times daily with meals. 90 tablet 2   • lisinopril 40 MG Oral Tab Take 40 mg by mouth daily.      • Amitriptyline HCl 25 MG Oral Tab Take 1 tablet (25 mg total) by mouth gris Constitutional: She is oriented to person, place, and time. She appears well-developed and well-nourished. No distress. HENT:   Head: Normocephalic and atraumatic. Right Ear: Tympanic membrane and external ear normal. No drainage or tenderness.  No ma adenopathy. She has no axillary adenopathy. Neurological: She is alert and oriented to person, place, and time. She has normal reflexes. No cranial nerve deficit. She exhibits normal muscle tone.  Coordination normal.   Skin: Skin is warm, dry and int

## 2020-06-16 NOTE — PROGRESS NOTES
HPI:    Patient ID: Melissa Lew is a 60year old female presenting with left gluteal discomfort. She underwent an incision and drainage in ER. Clinically slightly less but persistent discomfort. [place]     HPI    Past Medical History:   Diagnosis Date Alcohol use: Not Currently        Frequency: Never        Comment: socially      Drug use: No      Sexual activity: Not on file    Lifestyle      Physical activity:        Days per week: Not on file        Minutes per session: Not on file      Stress: • amLODIPine Besylate 10 MG Oral Tab Take 1 tablet (10 mg total) by mouth daily.  90 tablet 0   • CYCLOBENZAPRINE 5 MG Oral Tab TAKE 1 TABLET BY MOUTH NIGHTLY AS NEEDED FOR MUSCLE SPASMS 30 tablet 0   • docusate sodium 100 MG Oral Cap Take 100 mg by mouth 2 Cardiovascular: Normal rate. Pulmonary/Chest: Effort normal.   Abdominal: Soft. Musculoskeletal: Normal range of motion. Neurological: She is alert and oriented to person, place, and time. Skin: Skin is warm and dry. Lesion noted.         Psychiatric

## 2020-06-18 ENCOUNTER — TELEPHONE (OUTPATIENT)
Dept: INTERNAL MEDICINE CLINIC | Facility: CLINIC | Age: 65
End: 2020-06-18

## 2020-06-18 RX ORDER — HYDROCODONE BITARTRATE AND ACETAMINOPHEN 5; 325 MG/1; MG/1
1-2 TABLET ORAL EVERY 6 HOURS PRN
Qty: 25 TABLET | Refills: 0 | Status: CANCELLED | OUTPATIENT
Start: 2020-06-18

## 2020-06-18 NOTE — TELEPHONE ENCOUNTER
Spoke with patient, (  verified ) informed of Dr. Erica Kayser  instructions below    Rx was sent and has f/u appt     Future Appointments   Date Time Provider Marielena Plaza   2020 10:00 AM Richard Jean[address]   2020 11:1

## 2020-06-18 NOTE — TELEPHONE ENCOUNTER
Patient requesting pain medication, states on 6/12/20 her left buttock was cleaned and re pack again with the surgeon, now hurting a lot and did not go to work due to pain, states family member clean  and pack the wound with gauze today,states cannot take

## 2020-06-22 NOTE — TELEPHONE ENCOUNTER
Dr. Olvin Dailey,    Pt wants intermittent  FMLA for flare ups and pain. She is seeking 1-3 days per month. Do you approve?     Thank you,  Carlos

## 2020-06-23 ENCOUNTER — OFFICE VISIT (OUTPATIENT)
Dept: WOUND CARE | Facility: HOSPITAL | Age: 65
End: 2020-06-23
Attending: NURSE PRACTITIONER
Payer: COMMERCIAL

## 2020-06-23 DIAGNOSIS — E11.621 DIABETIC ULCER OF LEFT MIDFOOT ASSOCIATED WITH TYPE 2 DIABETES MELLITUS, WITH FAT LAYER EXPOSED (HCC): Primary | ICD-10-CM

## 2020-06-23 DIAGNOSIS — L97.422 DIABETIC ULCER OF LEFT MIDFOOT ASSOCIATED WITH TYPE 2 DIABETES MELLITUS, WITH FAT LAYER EXPOSED (HCC): Primary | ICD-10-CM

## 2020-06-23 PROCEDURE — 97597 DBRDMT OPN WND 1ST 20 CM/<: CPT

## 2020-06-23 NOTE — PROGRESS NOTES
Subjective    Chief Complaint  This information was obtained from the patient  The patient was seen today for follow up and management of non-healing left diabetic foot ulcer. No additional concerns for today's visit.     Allergies  penicillin (Severity: Se Allergic/Immunologic: Frequent Rashes, Recurrent Fevers  Psychiatric: Memory Loss, Suicidal        Objective    Wound Assessment(s)  Wound #1 Left, Medial Foot is a chronic Krishnan Grade 1 Diabetic Ulcer and has received a status of Not Healed.  Subsequent w Appropriate judgement and insight. Oriented to time, place and person. Appropriate mood and affect.         Assessment    Active Problems    ICD-10  (Encounter Diagnosis) E11.621 - Type 2 diabetes mellitus with foot ulcer  (Encounter Diagnosis) I73.9 - Lisbet Additional Orders: Follow-Up Appointments  Return Appointment in 2 weeks. - APRN visit for 30 mins x 2    Misc / Additional orders  Supplement with a daily multivitamin. Increase dietary protein intake. Moisturizer.   S/S of infection - monitor for S&S Vanessa Ulloa RN 06/23/2020 11:46:10 AM  Bradley Reyes FNP-BC 06/23/2020 12:05:11 PM       Entered By: Vanessa Ulloa RN on 06/23/2020 10:28:45 AM

## 2020-06-23 NOTE — TELEPHONE ENCOUNTER
Dr. Tammy Quinteros,     Please sign off on form: Fmla   -Highlight the patient and hit \"Chart\" button.   -In Chart Review, w/in the Encounter tab - click 1 time on the Telephone call encounter for 6/16/2020 Scroll down the telephone encounter.  -Click \"scan on\"

## 2020-06-25 RX ORDER — CYCLOBENZAPRINE HCL 5 MG
TABLET ORAL
Qty: 30 TABLET | Refills: 0 | Status: SHIPPED | OUTPATIENT
Start: 2020-06-25 | End: 2020-07-14

## 2020-06-26 ENCOUNTER — OFFICE VISIT (OUTPATIENT)
Dept: INTERNAL MEDICINE CLINIC | Facility: CLINIC | Age: 65
End: 2020-06-26

## 2020-06-26 VITALS
DIASTOLIC BLOOD PRESSURE: 75 MMHG | WEIGHT: 156.38 LBS | OXYGEN SATURATION: 97 % | RESPIRATION RATE: 19 BRPM | HEART RATE: 94 BPM | TEMPERATURE: 98 F | SYSTOLIC BLOOD PRESSURE: 138 MMHG | BODY MASS INDEX: 26.7 KG/M2 | HEIGHT: 64 IN

## 2020-06-26 DIAGNOSIS — N17.9 AKI (ACUTE KIDNEY INJURY) (HCC): Primary | ICD-10-CM

## 2020-06-26 DIAGNOSIS — E11.00 TYPE 2 DIABETES MELLITUS WITH HYPEROSMOLARITY WITHOUT COMA, WITH LONG-TERM CURRENT USE OF INSULIN (HCC): ICD-10-CM

## 2020-06-26 DIAGNOSIS — Z79.4 TYPE 2 DIABETES MELLITUS WITH HYPEROSMOLARITY WITHOUT COMA, WITH LONG-TERM CURRENT USE OF INSULIN (HCC): ICD-10-CM

## 2020-06-26 PROCEDURE — 36415 COLL VENOUS BLD VENIPUNCTURE: CPT | Performed by: INTERNAL MEDICINE

## 2020-06-26 PROCEDURE — 99214 OFFICE O/P EST MOD 30 MIN: CPT | Performed by: INTERNAL MEDICINE

## 2020-06-26 RX ORDER — HYDROCODONE BITARTRATE AND ACETAMINOPHEN 5; 325 MG/1; MG/1
1 TABLET ORAL EVERY 8 HOURS PRN
Qty: 30 TABLET | Refills: 0 | Status: SHIPPED | OUTPATIENT
Start: 2020-06-26 | End: 2020-07-07

## 2020-06-26 NOTE — PATIENT INSTRUCTIONS
htn-could be precipitated by pain as well advised to continue with current medication will check a BMP since he started on hydrochlorothiazide  Diabetes type 2 not very well controlled I will increase her Levemir to 34 units continue with Januvia, anthony

## 2020-06-26 NOTE — PROGRESS NOTES
HPI:    Patient ID: Lary Fletcher is a 60year old female. HPI she is here today for follow-up.   On her buttock abscess and blood pressure. ,  And right leg pain    htn-she noticed that the blood pressure is running on the higher side, she started t Negative. Neurological: Negative for dizziness, tremors, speech difficulty, weakness, light-headedness, numbness and headaches. Hematological: Negative for adenopathy. Does not bruise/bleed easily.    Psychiatric/Behavioral: Negative for agitation, beh Shortness of Breath. 1 Inhaler 0   • gabapentin 300 MG Oral Cap Take 300 mg by mouth 2 (two) times daily. • ferrous sulfate 325 (65 FE) MG Oral Tab EC Take 1 tablet (325 mg total) by mouth 3 (three) times daily with meals.  90 tablet 2   • lisinopril 40 No       PHYSICAL EXAM:    Physical Exam   Constitutional: She is oriented to person, place, and time. She appears well-developed and well-nourished. No distress. HENT:   Head: Normocephalic and atraumatic.    Right Ear: Tympanic membrane and external ear adenopathy. Neurological: She is alert and oriented to person, place, and time. She has normal reflexes. No cranial nerve deficit. She exhibits normal muscle tone. Coordination normal.   Skin: Skin is warm, dry and intact. No rash noted.  No cyanosis or e

## 2020-06-30 ENCOUNTER — APPOINTMENT (OUTPATIENT)
Dept: WOUND CARE | Facility: HOSPITAL | Age: 65
End: 2020-06-30
Attending: NURSE PRACTITIONER
Payer: COMMERCIAL

## 2020-07-06 ENCOUNTER — HOSPITAL ENCOUNTER (OUTPATIENT)
Dept: CT IMAGING | Facility: HOSPITAL | Age: 65
Discharge: HOME | End: 2020-07-06
Attending: INTERNAL MEDICINE
Payer: COMMERCIAL

## 2020-07-06 ENCOUNTER — LAB ENCOUNTER (OUTPATIENT)
Dept: LAB | Facility: HOSPITAL | Age: 65
End: 2020-07-06
Attending: RADIOLOGY
Payer: COMMERCIAL

## 2020-07-06 DIAGNOSIS — R91.8 PULMONARY NODULES: ICD-10-CM

## 2020-07-06 DIAGNOSIS — Z01.818 PRE-OP TESTING: ICD-10-CM

## 2020-07-06 PROCEDURE — 71250 CT THORAX DX C-: CPT | Performed by: INTERNAL MEDICINE

## 2020-07-07 LAB — SARS-COV-2 RNA RESP QL NAA+PROBE: NOT DETECTED

## 2020-07-08 ENCOUNTER — HOSPITAL ENCOUNTER (OUTPATIENT)
Dept: INTERVENTIONAL RADIOLOGY/VASCULAR | Facility: HOSPITAL | Age: 65
Setting detail: OBSERVATION
Discharge: HOME | End: 2020-07-09
Attending: RADIOLOGY | Admitting: RADIOLOGY
Payer: COMMERCIAL

## 2020-07-08 ENCOUNTER — APPOINTMENT (OUTPATIENT)
Dept: INTERVENTIONAL RADIOLOGY/VASCULAR | Facility: HOSPITAL | Age: 65
End: 2020-07-08
Attending: RADIOLOGY
Payer: COMMERCIAL

## 2020-07-08 DIAGNOSIS — I73.9 PAD (PERIPHERAL ARTERY DISEASE) (HCC): ICD-10-CM

## 2020-07-08 DIAGNOSIS — Z01.818 PRE-OP TESTING: Primary | ICD-10-CM

## 2020-07-08 LAB
GLUCOSE BLDC GLUCOMTR-MCNC: 238 MG/DL (ref 70–99)
GLUCOSE BLDC GLUCOMTR-MCNC: 356 MG/DL (ref 70–99)
GLUCOSE BLDC GLUCOMTR-MCNC: 398 MG/DL (ref 70–99)
ISTAT ACTIVATED CLOTTING TIME: 142 SECONDS (ref 125–137)
ISTAT ACTIVATED CLOTTING TIME: 186 SECONDS (ref 125–137)
ISTAT ACTIVATED CLOTTING TIME: 186 SECONDS (ref 125–137)
ISTAT ACTIVATED CLOTTING TIME: 191 SECONDS (ref 125–137)
ISTAT ACTIVATED CLOTTING TIME: 197 SECONDS (ref 125–137)
ISTAT ACTIVATED CLOTTING TIME: 197 SECONDS (ref 125–137)
ISTAT ACTIVATED CLOTTING TIME: 202 SECONDS (ref 125–137)

## 2020-07-08 PROCEDURE — 047K3DZ DILATION OF RIGHT FEMORAL ARTERY WITH INTRALUMINAL DEVICE, PERCUTANEOUS APPROACH: ICD-10-PCS | Performed by: RADIOLOGY

## 2020-07-08 PROCEDURE — 99220 INITIAL OBSERVATION CARE,LEVL III: CPT | Performed by: HOSPITALIST

## 2020-07-08 RX ORDER — GABAPENTIN 300 MG/1
300 CAPSULE ORAL 2 TIMES DAILY
Status: DISCONTINUED | OUTPATIENT
Start: 2020-07-08 | End: 2020-07-09

## 2020-07-08 RX ORDER — IPRATROPIUM BROMIDE AND ALBUTEROL SULFATE 2.5; .5 MG/3ML; MG/3ML
3 SOLUTION RESPIRATORY (INHALATION) EVERY 6 HOURS PRN
Status: DISCONTINUED | OUTPATIENT
Start: 2020-07-08 | End: 2020-07-09

## 2020-07-08 RX ORDER — SODIUM CHLORIDE 9 MG/ML
1 INJECTION, SOLUTION INTRAVENOUS CONTINUOUS
Status: DISCONTINUED | OUTPATIENT
Start: 2020-07-08 | End: 2020-07-09

## 2020-07-08 RX ORDER — MIDAZOLAM HYDROCHLORIDE 1 MG/ML
INJECTION INTRAMUSCULAR; INTRAVENOUS
Status: COMPLETED
Start: 2020-07-08 | End: 2020-07-08

## 2020-07-08 RX ORDER — HYDRALAZINE HYDROCHLORIDE 25 MG/1
25 TABLET, FILM COATED ORAL 2 TIMES DAILY
Status: DISCONTINUED | OUTPATIENT
Start: 2020-07-08 | End: 2020-07-08

## 2020-07-08 RX ORDER — CYCLOBENZAPRINE HCL 5 MG
5 TABLET ORAL NIGHTLY PRN
Status: DISCONTINUED | OUTPATIENT
Start: 2020-07-08 | End: 2020-07-09

## 2020-07-08 RX ORDER — DOCUSATE SODIUM 100 MG/1
100 CAPSULE, LIQUID FILLED ORAL 2 TIMES DAILY PRN
Status: DISCONTINUED | OUTPATIENT
Start: 2020-07-08 | End: 2020-07-09

## 2020-07-08 RX ORDER — MORPHINE SULFATE 2 MG/ML
INJECTION, SOLUTION INTRAMUSCULAR; INTRAVENOUS
Status: COMPLETED
Start: 2020-07-08 | End: 2020-07-08

## 2020-07-08 RX ORDER — MORPHINE SULFATE 2 MG/ML
2 INJECTION, SOLUTION INTRAMUSCULAR; INTRAVENOUS EVERY 2 HOUR PRN
Status: DISCONTINUED | OUTPATIENT
Start: 2020-07-08 | End: 2020-07-09

## 2020-07-08 RX ORDER — AMLODIPINE BESYLATE 5 MG/1
10 TABLET ORAL DAILY
Status: DISCONTINUED | OUTPATIENT
Start: 2020-07-09 | End: 2020-07-08

## 2020-07-08 RX ORDER — AMLODIPINE BESYLATE 5 MG/1
TABLET ORAL
Status: COMPLETED
Start: 2020-07-08 | End: 2020-07-08

## 2020-07-08 RX ORDER — AMLODIPINE BESYLATE 10 MG/1
10 TABLET ORAL DAILY
Status: DISCONTINUED | OUTPATIENT
Start: 2020-07-08 | End: 2020-07-09

## 2020-07-08 RX ORDER — MORPHINE SULFATE 2 MG/ML
2 INJECTION, SOLUTION INTRAMUSCULAR; INTRAVENOUS ONCE
Status: COMPLETED | OUTPATIENT
Start: 2020-07-08 | End: 2020-07-08

## 2020-07-08 RX ORDER — MORPHINE SULFATE 2 MG/ML
3 INJECTION, SOLUTION INTRAMUSCULAR; INTRAVENOUS ONCE
Status: DISCONTINUED | OUTPATIENT
Start: 2020-07-08 | End: 2020-07-09

## 2020-07-08 RX ORDER — DEXTROSE MONOHYDRATE 25 G/50ML
50 INJECTION, SOLUTION INTRAVENOUS
Status: DISCONTINUED | OUTPATIENT
Start: 2020-07-08 | End: 2020-07-09

## 2020-07-08 RX ORDER — ACETAMINOPHEN 325 MG/1
650 TABLET ORAL EVERY 6 HOURS PRN
Status: DISCONTINUED | OUTPATIENT
Start: 2020-07-08 | End: 2020-07-09

## 2020-07-08 RX ORDER — ONDANSETRON 2 MG/ML
4 INJECTION INTRAMUSCULAR; INTRAVENOUS EVERY 6 HOURS PRN
Status: DISCONTINUED | OUTPATIENT
Start: 2020-07-08 | End: 2020-07-09

## 2020-07-08 RX ORDER — CLOPIDOGREL BISULFATE 75 MG/1
75 TABLET ORAL DAILY
Status: DISCONTINUED | OUTPATIENT
Start: 2020-07-08 | End: 2020-07-09

## 2020-07-08 RX ORDER — METOCLOPRAMIDE HYDROCHLORIDE 5 MG/ML
5 INJECTION INTRAMUSCULAR; INTRAVENOUS EVERY 8 HOURS PRN
Status: DISCONTINUED | OUTPATIENT
Start: 2020-07-08 | End: 2020-07-09

## 2020-07-08 RX ORDER — CLOPIDOGREL BISULFATE 75 MG/1
TABLET ORAL
Status: COMPLETED
Start: 2020-07-08 | End: 2020-07-08

## 2020-07-08 RX ORDER — MELATONIN
325
Status: DISCONTINUED | OUTPATIENT
Start: 2020-07-08 | End: 2020-07-09

## 2020-07-08 RX ORDER — MORPHINE SULFATE 4 MG/ML
4 INJECTION, SOLUTION INTRAMUSCULAR; INTRAVENOUS EVERY 2 HOUR PRN
Status: DISCONTINUED | OUTPATIENT
Start: 2020-07-08 | End: 2020-07-09

## 2020-07-08 RX ORDER — ASPIRIN 81 MG/1
81 TABLET ORAL DAILY
Status: DISCONTINUED | OUTPATIENT
Start: 2020-07-08 | End: 2020-07-09

## 2020-07-08 RX ORDER — MORPHINE SULFATE 2 MG/ML
1 INJECTION, SOLUTION INTRAMUSCULAR; INTRAVENOUS EVERY 2 HOUR PRN
Status: DISCONTINUED | OUTPATIENT
Start: 2020-07-08 | End: 2020-07-09

## 2020-07-08 RX ORDER — SODIUM CHLORIDE 0.9 % (FLUSH) 0.9 %
3 SYRINGE (ML) INJECTION AS NEEDED
Status: DISCONTINUED | OUTPATIENT
Start: 2020-07-08 | End: 2020-07-09

## 2020-07-08 RX ORDER — LISINOPRIL 40 MG/1
40 TABLET ORAL DAILY
Status: DISCONTINUED | OUTPATIENT
Start: 2020-07-09 | End: 2020-07-09

## 2020-07-08 RX ORDER — LIDOCAINE HYDROCHLORIDE 20 MG/ML
INJECTION, SOLUTION EPIDURAL; INFILTRATION; INTRACAUDAL; PERINEURAL
Status: COMPLETED
Start: 2020-07-08 | End: 2020-07-08

## 2020-07-08 RX ORDER — NITROGLYCERIN 20 MG/100ML
INJECTION INTRAVENOUS
Status: COMPLETED
Start: 2020-07-08 | End: 2020-07-08

## 2020-07-08 RX ORDER — HYDRALAZINE HYDROCHLORIDE 25 MG/1
25 TABLET, FILM COATED ORAL 2 TIMES DAILY
Status: DISCONTINUED | OUTPATIENT
Start: 2020-07-08 | End: 2020-07-09

## 2020-07-08 RX ORDER — ASPIRIN 81 MG/1
TABLET ORAL
Status: COMPLETED
Start: 2020-07-08 | End: 2020-07-08

## 2020-07-08 RX ORDER — HYDROCODONE BITARTRATE AND ACETAMINOPHEN 5; 325 MG/1; MG/1
1-2 TABLET ORAL EVERY 8 HOURS PRN
Status: DISCONTINUED | OUTPATIENT
Start: 2020-07-08 | End: 2020-07-09

## 2020-07-08 RX ORDER — ALBUTEROL SULFATE 90 UG/1
1 AEROSOL, METERED RESPIRATORY (INHALATION) EVERY 4 HOURS PRN
Status: DISCONTINUED | OUTPATIENT
Start: 2020-07-08 | End: 2020-07-09

## 2020-07-08 RX ORDER — VERAPAMIL HYDROCHLORIDE 2.5 MG/ML
INJECTION, SOLUTION INTRAVENOUS
Status: COMPLETED
Start: 2020-07-08 | End: 2020-07-08

## 2020-07-08 RX ORDER — ATORVASTATIN CALCIUM 20 MG/1
20 TABLET, FILM COATED ORAL NIGHTLY
Status: DISCONTINUED | OUTPATIENT
Start: 2020-07-08 | End: 2020-07-09

## 2020-07-08 RX ORDER — HYDRALAZINE HYDROCHLORIDE 25 MG/1
TABLET, FILM COATED ORAL
Status: COMPLETED
Start: 2020-07-08 | End: 2020-07-08

## 2020-07-08 RX ORDER — HYDROCODONE BITARTRATE AND ACETAMINOPHEN 5; 325 MG/1; MG/1
TABLET ORAL
Status: COMPLETED
Start: 2020-07-08 | End: 2020-07-08

## 2020-07-08 RX ORDER — MORPHINE SULFATE 4 MG/ML
INJECTION, SOLUTION INTRAMUSCULAR; INTRAVENOUS
Status: COMPLETED
Start: 2020-07-08 | End: 2020-07-08

## 2020-07-08 RX ORDER — PROTAMINE SULFATE 10 MG/ML
INJECTION, SOLUTION INTRAVENOUS
Status: COMPLETED
Start: 2020-07-08 | End: 2020-07-08

## 2020-07-08 RX ORDER — HYDROCODONE BITARTRATE AND ACETAMINOPHEN 5; 325 MG/1; MG/1
2 TABLET ORAL ONCE
Status: COMPLETED | OUTPATIENT
Start: 2020-07-08 | End: 2020-07-08

## 2020-07-08 RX ORDER — HEPARIN SODIUM 1000 [USP'U]/ML
INJECTION, SOLUTION INTRAVENOUS; SUBCUTANEOUS
Status: COMPLETED
Start: 2020-07-08 | End: 2020-07-08

## 2020-07-08 RX ADMIN — SODIUM CHLORIDE 3 ML/KG/HR: 9 INJECTION, SOLUTION INTRAVENOUS at 07:45:00

## 2020-07-08 RX ADMIN — HYDROCODONE BITARTRATE AND ACETAMINOPHEN 2 TABLET: 5; 325 TABLET ORAL at 15:00:00

## 2020-07-08 RX ADMIN — CLOPIDOGREL BISULFATE 75 MG: 75 TABLET ORAL at 16:35:00

## 2020-07-08 RX ADMIN — AMLODIPINE BESYLATE 10 MG: 5 TABLET ORAL at 16:35:00

## 2020-07-08 RX ADMIN — HYDRALAZINE HYDROCHLORIDE 25 MG: 25 TABLET, FILM COATED ORAL at 16:36:00

## 2020-07-08 RX ADMIN — ATORVASTATIN CALCIUM 20 MG: 20 TABLET, FILM COATED ORAL at 20:59:00

## 2020-07-08 RX ADMIN — MORPHINE SULFATE 2 MG: 2 INJECTION, SOLUTION INTRAMUSCULAR; INTRAVENOUS at 14:35:00

## 2020-07-08 RX ADMIN — GABAPENTIN 300 MG: 300 CAPSULE ORAL at 20:59:00

## 2020-07-08 RX ADMIN — MORPHINE SULFATE 2 MG: 2 INJECTION, SOLUTION INTRAMUSCULAR; INTRAVENOUS at 16:53:00

## 2020-07-08 RX ADMIN — SODIUM CHLORIDE 1 ML/KG/HR: 9 INJECTION, SOLUTION INTRAVENOUS at 08:45:00

## 2020-07-08 RX ADMIN — ASPIRIN 81 MG: 81 TABLET ORAL at 16:36:00

## 2020-07-08 RX ADMIN — MORPHINE SULFATE 3 MG: 4 INJECTION, SOLUTION INTRAMUSCULAR; INTRAVENOUS at 12:45:00

## 2020-07-08 RX ADMIN — MELATONIN 325 MG: at 18:10:00

## 2020-07-08 RX ADMIN — AMLODIPINE BESYLATE 10 MG: 10 TABLET ORAL at 16:35:00

## 2020-07-08 RX ADMIN — MORPHINE SULFATE 2 MG: 2 INJECTION, SOLUTION INTRAMUSCULAR; INTRAVENOUS at 18:56:00

## 2020-07-08 RX ADMIN — HYDROCODONE BITARTRATE AND ACETAMINOPHEN 2 TABLET: 5; 325 TABLET ORAL at 20:59:00

## 2020-07-08 NOTE — H&P
[facility]    PATIENT'S NAME: [facility]   ATTENDING PHYSICIAN: Tenisha Sprague MD   PATIENT ACCOUNT#:   [id number]    LOCATION:  [address] [place] [address] Street RECORD #:   [id number]       YOB: 1955  ADMISSION DATE:       07/08/20 catheterization holding area. She has no pain. Other 12-point review of systems negative. PHYSICAL EXAMINATION:    GENERAL:  Alert, oriented to time, place, and person, no acute distress.   VITAL SIGNS:  Temperature 98.0, pulse 88, respiratory rate 1

## 2020-07-08 NOTE — PROCEDURES
[facility] HOSP - [facility]  Procedure Note    Kristofer Erwin Patient Status:  Outpatient in a Bed    10/6/1955 MRN [id number]   Location [facility] Attending Miladys Zamarripa MD   Hosp Day # 0 PCP Raquel Kaye MD     Proce

## 2020-07-08 NOTE — IVS NOTE
Procedure hand off report given to [facility]. Pt's vital signs are stable. Procedural access site is dry and intact with no signs and symptoms of bleeding and hematoma.    Pt in bedrest status   Dr. Anam Cha and Dr. Zeeshan Mabry spoke with patient/famil

## 2020-07-09 VITALS
BODY MASS INDEX: 28 KG/M2 | OXYGEN SATURATION: 99 % | SYSTOLIC BLOOD PRESSURE: 158 MMHG | DIASTOLIC BLOOD PRESSURE: 77 MMHG | WEIGHT: 160.88 LBS | TEMPERATURE: 98 F | RESPIRATION RATE: 18 BRPM | HEART RATE: 90 BPM

## 2020-07-09 LAB
ANION GAP SERPL CALC-SCNC: 6 MMOL/L (ref 0–18)
BASOPHILS # BLD AUTO: 0.07 X10(3) UL (ref 0–0.2)
BASOPHILS NFR BLD AUTO: 0.6 %
BUN BLD-MCNC: 22 MG/DL (ref 7–18)
BUN/CREAT SERPL: 18.3 (ref 10–20)
CALCIUM BLD-MCNC: 9 MG/DL (ref 8.5–10.1)
CHLORIDE SERPL-SCNC: 111 MMOL/L (ref 98–112)
CO2 SERPL-SCNC: 23 MMOL/L (ref 21–32)
CREAT BLD-MCNC: 1.2 MG/DL (ref 0.55–1.02)
DEPRECATED RDW RBC AUTO: 45 FL (ref 35.1–46.3)
EOSINOPHIL # BLD AUTO: 0.15 X10(3) UL (ref 0–0.7)
EOSINOPHIL NFR BLD AUTO: 1.3 %
ERYTHROCYTE [DISTWIDTH] IN BLOOD BY AUTOMATED COUNT: 14.7 % (ref 11–15)
GLUCOSE BLD-MCNC: 147 MG/DL (ref 70–99)
GLUCOSE BLDC GLUCOMTR-MCNC: 165 MG/DL (ref 70–99)
GLUCOSE BLDC GLUCOMTR-MCNC: 230 MG/DL (ref 70–99)
HCT VFR BLD AUTO: 26.8 % (ref 35–48)
HGB BLD-MCNC: 8.5 G/DL (ref 12–16)
IMM GRANULOCYTES # BLD AUTO: 0.07 X10(3) UL (ref 0–1)
IMM GRANULOCYTES NFR BLD: 0.6 %
LYMPHOCYTES # BLD AUTO: 1.39 X10(3) UL (ref 1–4)
LYMPHOCYTES NFR BLD AUTO: 12.2 %
MCH RBC QN AUTO: 26.6 PG (ref 26–34)
MCHC RBC AUTO-ENTMCNC: 31.7 G/DL (ref 31–37)
MCV RBC AUTO: 83.8 FL (ref 80–100)
MONOCYTES # BLD AUTO: 1.1 X10(3) UL (ref 0.1–1)
MONOCYTES NFR BLD AUTO: 9.7 %
NEUTROPHILS # BLD AUTO: 8.58 X10 (3) UL (ref 1.5–7.7)
NEUTROPHILS # BLD AUTO: 8.58 X10(3) UL (ref 1.5–7.7)
NEUTROPHILS NFR BLD AUTO: 75.6 %
OSMOLALITY SERPL CALC.SUM OF ELEC: 296 MOSM/KG (ref 275–295)
PLATELET # BLD AUTO: 283 10(3)UL (ref 150–450)
POTASSIUM SERPL-SCNC: 4.5 MMOL/L (ref 3.5–5.1)
RBC # BLD AUTO: 3.2 X10(6)UL (ref 3.8–5.3)
SODIUM SERPL-SCNC: 140 MMOL/L (ref 136–145)
WBC # BLD AUTO: 11.4 X10(3) UL (ref 4–11)

## 2020-07-09 PROCEDURE — 99225 SUBSEQUENT OBSERVATION CARE: CPT | Performed by: HOSPITALIST

## 2020-07-09 RX ORDER — HYDROCODONE BITARTRATE AND ACETAMINOPHEN 5; 325 MG/1; MG/1
1-2 TABLET ORAL EVERY 8 HOURS PRN
Qty: 10 TABLET | Refills: 0 | Status: SHIPPED | OUTPATIENT
Start: 2020-07-09 | End: 2020-07-14

## 2020-07-09 RX ADMIN — MELATONIN 325 MG: at 12:15:00

## 2020-07-09 RX ADMIN — MORPHINE SULFATE 2 MG: 2 INJECTION, SOLUTION INTRAMUSCULAR; INTRAVENOUS at 08:59:00

## 2020-07-09 RX ADMIN — GABAPENTIN 300 MG: 300 CAPSULE ORAL at 08:23:00

## 2020-07-09 RX ADMIN — HYDROCODONE BITARTRATE AND ACETAMINOPHEN 2 TABLET: 5; 325 TABLET ORAL at 06:05:00

## 2020-07-09 RX ADMIN — ASPIRIN 81 MG: 81 TABLET ORAL at 08:23:00

## 2020-07-09 RX ADMIN — HYDROCODONE BITARTRATE AND ACETAMINOPHEN 1 TABLET: 5; 325 TABLET ORAL at 13:50:00

## 2020-07-09 RX ADMIN — LISINOPRIL 40 MG: 40 TABLET ORAL at 08:23:00

## 2020-07-09 RX ADMIN — MELATONIN 325 MG: at 08:23:00

## 2020-07-09 RX ADMIN — CLOPIDOGREL BISULFATE 75 MG: 75 TABLET ORAL at 08:23:00

## 2020-07-09 RX ADMIN — HYDRALAZINE HYDROCHLORIDE 25 MG: 25 TABLET, FILM COATED ORAL at 08:22:00

## 2020-07-09 RX ADMIN — AMLODIPINE BESYLATE 10 MG: 10 TABLET ORAL at 08:23:00

## 2020-07-09 NOTE — PLAN OF CARE
Aox4, PRN norco given for pain to right groin. Dressings clean, dry, intact. Site is soft, no evidence of hematoma. Pulses checked by doppler. Denies any numbness/tingling. VSS. Standby assist to bathroom.  Bed locked lowest position, call light within reac

## 2020-07-09 NOTE — PLAN OF CARE
Problem: Patient Centered Care  Goal: Patient preferences are identified and integrated in the patient's plan of care  Description  Interventions:  - What would you like us to know as we care for you?  I live at home with my family  - Provide timely, comp

## 2020-07-09 NOTE — PROGRESS NOTES
[facility]  Progress Note    Dariel Villalba Patient Status:  Observation    10/6/1955 MRN [id number]   Location [facility] 3W/SW Attending Cassius Balderas MD   Hosp Day # 0 PCP Onofre Gordon MD       Subjective:   Mild disc

## 2020-07-10 ENCOUNTER — TELEPHONE (OUTPATIENT)
Dept: INTERNAL MEDICINE CLINIC | Facility: CLINIC | Age: 65
End: 2020-07-10

## 2020-07-10 ENCOUNTER — PATIENT OUTREACH (OUTPATIENT)
Dept: CASE MANAGEMENT | Age: 65
End: 2020-07-10

## 2020-07-10 DIAGNOSIS — I73.9 PAD (PERIPHERAL ARTERY DISEASE) (HCC): ICD-10-CM

## 2020-07-10 DIAGNOSIS — Z02.9 ENCOUNTERS FOR ADMINISTRATIVE PURPOSE: ICD-10-CM

## 2020-07-10 PROCEDURE — 1111F DSCHRG MED/CURRENT MED MERGE: CPT

## 2020-07-10 NOTE — PROGRESS NOTES
Initial Post Discharge Follow Up   Discharge Date: 7/9/20  Contact Date: 7/10/2020    Consent Verification:  Assessment Completed With: Patient  HIPAA Verified?   Yes    Discharge Dx:   PAD        General:   • How have you been since your discharge from t mg total) by mouth daily. 90 tablet 0   • hydrALAzine HCl 25 MG Oral Tab Take 1 tablet (25 mg total) by mouth 2 (two) times a day.  180 tablet 0   • CYCLOBENZAPRINE 5 MG Oral Tab TAKE 1 TABLET BY MOUTH NIGHTLY AS NEEDED FOR MUSCLE SPASMS 30 tablet 0   • DELFINA are home, are there any needs or concerns you need addressed before your next visit with your PCP?  (DME, meds, disease concerns, Etc): No     Follow up appointments:          PCP TCM/HFU appointment: scheduled at D/C within 7-14 days  no     NCM Reviewed/

## 2020-07-10 NOTE — DISCHARGE SUMMARY
[facility]    PATIENT'S NAME: Lc Mckenzie   ATTENDING PHYSICIAN: Roxie Conteh MD   PATIENT ACCOUNT#:   [id number]    LOCATION:  [address] [place] [address] RECORD #:   [id number]       YOB: 1955  ADMISSION DATE:       07/08 sounds. EXTREMITIES:  Peripheral pulses are positive. NEUROLOGIC:  No focal neurologic deficits noted at this time. LABORATORY DATA:  Reviewed. CONDITION ON DISCHARGE:  At this time, the patient is deemed stable to be discharged home.     Dictated B

## 2020-07-10 NOTE — TELEPHONE ENCOUNTER
Advised patient of Dr Juliet acevedo. Patient verbalized understanding and had no further questions.

## 2020-07-10 NOTE — TELEPHONE ENCOUNTER
Patient dc'd 7/9/2020, PAD. She states that the pain in her right leg is a 10/10 (but tolerable) she states it's severe but she is handling it.  She states that the [address] does not work for her and does not decrease her pain in the slightest. She states that

## 2020-07-14 ENCOUNTER — OFFICE VISIT (OUTPATIENT)
Dept: INTERNAL MEDICINE CLINIC | Facility: CLINIC | Age: 65
End: 2020-07-14

## 2020-07-14 VITALS
OXYGEN SATURATION: 98 % | DIASTOLIC BLOOD PRESSURE: 75 MMHG | SYSTOLIC BLOOD PRESSURE: 150 MMHG | TEMPERATURE: 99 F | BODY MASS INDEX: 28 KG/M2 | WEIGHT: 161 LBS | HEART RATE: 87 BPM

## 2020-07-14 DIAGNOSIS — I73.9 PVD (PERIPHERAL VASCULAR DISEASE) (HCC): Primary | ICD-10-CM

## 2020-07-14 PROCEDURE — 99495 TRANSJ CARE MGMT MOD F2F 14D: CPT | Performed by: INTERNAL MEDICINE

## 2020-07-14 PROCEDURE — 1111F DSCHRG MED/CURRENT MED MERGE: CPT | Performed by: INTERNAL MEDICINE

## 2020-07-14 RX ORDER — HYDROCODONE BITARTRATE AND ACETAMINOPHEN 5; 325 MG/1; MG/1
1-2 TABLET ORAL EVERY 8 HOURS PRN
Qty: 60 TABLET | Refills: 0 | Status: SHIPPED | OUTPATIENT
Start: 2020-07-14 | End: 2020-08-13

## 2020-07-14 NOTE — PROGRESS NOTES
HPI:    Luis Miguel Duffy is a 60year old female here today for hospital follow up.    She was discharged from Inpatient hospital[facility]  to Home   Admission Date: 7/8/20   Discharge Date: 7/9/20  Hospital Discharge Diagnoses (since 6/14/2020) Tab, TAKE 1 TABLET BY MOUTH EVERY DAY AT NIGHT  insulin detemir 100 UNIT/ML Subcutaneous Solution Pen-injector, INJECT 30 UNITS INTO THE SKIN DAILY.  (Patient taking differently: Inject 36 Units into the skin nightly.  )  amLODIPine Besylate 10 MG Oral Tab, in her sister; Kidney Disease in her sister; Sickle Cell in her son. She  reports that she quit smoking about 8 months ago. Her smoking use included cigarettes. She has a 30.00 pack-year smoking history.  She has never used smokeless tobacco. She reports and no friction rub. No murmur heard. Pulmonary/Chest: Effort normal and breath sounds normal. No respiratory distress. She has no wheezes. She has no rales. She exhibits no tenderness. no palpable masses  Abdominal: Soft.  Bowel sounds are normal. She

## 2020-07-14 NOTE — PATIENT INSTRUCTIONS
htn- not weel controlled , take hydralazine 25 mg twice a day not once a day , amlodipine 10 mg and lisinopril 40mg, check bp at home and call with bp readings within one week,     pvd s/p stent - I will give her norco for pain, aspirin and plavix , follow

## 2020-07-20 ENCOUNTER — OFFICE VISIT (OUTPATIENT)
Dept: WOUND CARE | Facility: HOSPITAL | Age: 65
End: 2020-07-20
Attending: NURSE PRACTITIONER
Payer: COMMERCIAL

## 2020-07-20 DIAGNOSIS — E11.621 DIABETIC ULCER OF LEFT MIDFOOT ASSOCIATED WITH TYPE 2 DIABETES MELLITUS, WITH FAT LAYER EXPOSED (HCC): ICD-10-CM

## 2020-07-20 DIAGNOSIS — L97.422 DIABETIC ULCER OF LEFT MIDFOOT ASSOCIATED WITH TYPE 2 DIABETES MELLITUS, WITH FAT LAYER EXPOSED (HCC): ICD-10-CM

## 2020-07-20 PROCEDURE — 11055 PARING/CUTG B9 HYPRKER LES 1: CPT

## 2020-07-20 NOTE — PROGRESS NOTES
Subjective    Chief Complaint  This information was obtained from the patient  The patient was seen today for follow up and management of non-healing left diabetic foot ulcer. No additional concerns for today's visit.  7/20/20 no additional concerns    Earl Genitourinary (): Urinary Incontinence  Musculoskeletal: Assistive Devices  Neurological: Abnormal Gait, Headaches  Endocrine: Cold Intolerance, Heat Intolerance  Hematologic/Lymphatic: Swollen Glands  Allergic/Immunologic: Frequent Rashes, Recurrent Fev (Encounter Diagnosis) I73.9 - Peripheral vascular disease, unspecified    Diagnoses    ICD-10  I73.9: Peripheral vascular disease, unspecified  E11.621: Type 2 diabetes mellitus with foot ulcer        Left dorsal foot, base of the 1st great toe:  -100% socorro Please do not hesitate to call with concern or questions.     Electronic Signature(s)  Signed By: Date:  Ana Simon FNP-BC 07/20/2020 11:33:24 AM       Entered By: Ana Simon on 07/20/2020 11:33:04 AM       Treatment Notes Summary  Wound #1 (Left, Med

## 2020-07-21 ENCOUNTER — HOSPITAL ENCOUNTER (EMERGENCY)
Facility: HOSPITAL | Age: 65
Discharge: HOME | End: 2020-07-22
Attending: EMERGENCY MEDICINE
Payer: COMMERCIAL

## 2020-07-21 ENCOUNTER — APPOINTMENT (OUTPATIENT)
Dept: ULTRASOUND IMAGING | Facility: HOSPITAL | Age: 65
End: 2020-07-21
Attending: EMERGENCY MEDICINE
Payer: COMMERCIAL

## 2020-07-21 ENCOUNTER — TELEPHONE (OUTPATIENT)
Dept: INTERNAL MEDICINE CLINIC | Facility: CLINIC | Age: 65
End: 2020-07-21

## 2020-07-21 VITALS
WEIGHT: 160 LBS | SYSTOLIC BLOOD PRESSURE: 158 MMHG | RESPIRATION RATE: 16 BRPM | TEMPERATURE: 98 F | HEART RATE: 84 BPM | DIASTOLIC BLOOD PRESSURE: 74 MMHG | BODY MASS INDEX: 27.31 KG/M2 | OXYGEN SATURATION: 100 % | HEIGHT: 64 IN

## 2020-07-21 DIAGNOSIS — R60.0 LOWER EXTREMITY EDEMA: Primary | ICD-10-CM

## 2020-07-21 DIAGNOSIS — G56.21 ULNAR NEUROPATHY AT ELBOW OF RIGHT UPPER EXTREMITY: ICD-10-CM

## 2020-07-21 LAB
ANION GAP SERPL CALC-SCNC: 11 MMOL/L (ref 0–18)
BASOPHILS # BLD AUTO: 0.05 X10(3) UL (ref 0–0.2)
BASOPHILS NFR BLD AUTO: 0.7 %
BUN BLD-MCNC: 16 MG/DL (ref 7–18)
BUN/CREAT SERPL: 11.4 (ref 10–20)
CALCIUM BLD-MCNC: 9.2 MG/DL (ref 8.5–10.1)
CHLORIDE SERPL-SCNC: 111 MMOL/L (ref 98–112)
CO2 SERPL-SCNC: 19 MMOL/L (ref 21–32)
CREAT BLD-MCNC: 1.4 MG/DL (ref 0.55–1.02)
DEPRECATED RDW RBC AUTO: 47.1 FL (ref 35.1–46.3)
EOSINOPHIL # BLD AUTO: 0.44 X10(3) UL (ref 0–0.7)
EOSINOPHIL NFR BLD AUTO: 5.8 %
ERYTHROCYTE [DISTWIDTH] IN BLOOD BY AUTOMATED COUNT: 15.6 % (ref 11–15)
GLUCOSE BLD-MCNC: 292 MG/DL (ref 70–99)
HCT VFR BLD AUTO: 26.7 % (ref 35–48)
HGB BLD-MCNC: 8.7 G/DL (ref 12–16)
IMM GRANULOCYTES # BLD AUTO: 0.06 X10(3) UL (ref 0–1)
IMM GRANULOCYTES NFR BLD: 0.8 %
LYMPHOCYTES # BLD AUTO: 1.18 X10(3) UL (ref 1–4)
LYMPHOCYTES NFR BLD AUTO: 15.6 %
MCH RBC QN AUTO: 26.9 PG (ref 26–34)
MCHC RBC AUTO-ENTMCNC: 32.6 G/DL (ref 31–37)
MCV RBC AUTO: 82.4 FL (ref 80–100)
MONOCYTES # BLD AUTO: 0.59 X10(3) UL (ref 0.1–1)
MONOCYTES NFR BLD AUTO: 7.8 %
NEUTROPHILS # BLD AUTO: 5.24 X10 (3) UL (ref 1.5–7.7)
NEUTROPHILS # BLD AUTO: 5.24 X10(3) UL (ref 1.5–7.7)
NEUTROPHILS NFR BLD AUTO: 69.3 %
OSMOLALITY SERPL CALC.SUM OF ELEC: 304 MOSM/KG (ref 275–295)
PLATELET # BLD AUTO: 403 10(3)UL (ref 150–450)
POTASSIUM SERPL-SCNC: 3.6 MMOL/L (ref 3.5–5.1)
RBC # BLD AUTO: 3.24 X10(6)UL (ref 3.8–5.3)
SODIUM SERPL-SCNC: 141 MMOL/L (ref 136–145)
WBC # BLD AUTO: 7.6 X10(3) UL (ref 4–11)

## 2020-07-21 PROCEDURE — 85025 COMPLETE CBC W/AUTO DIFF WBC: CPT | Performed by: EMERGENCY MEDICINE

## 2020-07-21 PROCEDURE — 93005 ELECTROCARDIOGRAM TRACING: CPT

## 2020-07-21 PROCEDURE — 93010 ELECTROCARDIOGRAM REPORT: CPT | Performed by: EMERGENCY MEDICINE

## 2020-07-21 PROCEDURE — 80048 BASIC METABOLIC PNL TOTAL CA: CPT | Performed by: EMERGENCY MEDICINE

## 2020-07-21 PROCEDURE — 36415 COLL VENOUS BLD VENIPUNCTURE: CPT

## 2020-07-21 PROCEDURE — 99284 EMERGENCY DEPT VISIT MOD MDM: CPT

## 2020-07-21 PROCEDURE — 93971 EXTREMITY STUDY: CPT | Performed by: EMERGENCY MEDICINE

## 2020-07-22 ENCOUNTER — TELEPHONE (OUTPATIENT)
Dept: INTERNAL MEDICINE CLINIC | Facility: CLINIC | Age: 65
End: 2020-07-22

## 2020-07-22 NOTE — ED INITIAL ASSESSMENT (HPI)
R leg peripheral stent placed 7/8, entry through left groin. C/o right arm numbness and heaviness for a \"couple days. \"  Went to work today.

## 2020-07-22 NOTE — ED PROVIDER NOTES
Patient Seen in: [facility] Emergency Department      History   Patient presents with:  Numbness Weakness    Stated Complaint: stent placed on 7/08/2020 pt states tingling in r arm    HPI    31-year-old female with past medical history significant Alcohol use: Not Currently      Frequency: Never      Comment: socially    Drug use: No             Review of Systems    Positive for stated complaint: stent placed on 7/08/2020 pt states tingling in r arm  Other systems are as noted in HPI.   Constitutiona Creatinine 1.40 (*)     Calculated Osmolality 304 (*)     GFR, Non- 40 (*)     GFR, -American 46 (*)     All other components within normal limits   CBC W/ DIFFERENTIAL - Abnormal; Notable for the following components:    RBC 3.24 Raul Wheeling Please call [phone] ext [phone] and ask for the next available Radiologist.      Desmond Gunter MD  This report has been electronically signed and verified by the Radiologist whose name is printed above.     DD:  07/21/2020/DT:  07/22/2020          ANTIONETTE CONDE

## 2020-07-22 NOTE — TELEPHONE ENCOUNTER
Called patient and she states her leg is swollen and throbbing. Right arm tingling and feeling heavy, slight headache. No chest pain, no nausea or vomiting. She is more concerned about the tingling and heaviness in right arm.  She wanted to know if she s

## 2020-07-22 NOTE — TELEPHONE ENCOUNTER
Message # [id number] 2020 07:28p [DANTEM]  To:  From: GRAHAM Rock MD:  Phone#:  ----------------------------------------------------------------------  [phone] PT ELVIA LOU, REINALDO [birth year] RE; PT HAD A PROCEDURE  DONE BEFORE AND IS NOW HAVING

## 2020-07-23 ENCOUNTER — OFFICE VISIT (OUTPATIENT)
Dept: INTERNAL MEDICINE CLINIC | Facility: CLINIC | Age: 65
End: 2020-07-23

## 2020-07-23 VITALS
DIASTOLIC BLOOD PRESSURE: 70 MMHG | OXYGEN SATURATION: 98 % | WEIGHT: 162 LBS | HEART RATE: 76 BPM | SYSTOLIC BLOOD PRESSURE: 139 MMHG | TEMPERATURE: 98 F | BODY MASS INDEX: 28 KG/M2

## 2020-07-23 DIAGNOSIS — R20.2 NUMBNESS AND TINGLING OF RIGHT ARM: Primary | ICD-10-CM

## 2020-07-23 DIAGNOSIS — R20.0 NUMBNESS AND TINGLING OF RIGHT ARM: Primary | ICD-10-CM

## 2020-07-23 PROCEDURE — 99214 OFFICE O/P EST MOD 30 MIN: CPT | Performed by: INTERNAL MEDICINE

## 2020-07-23 PROCEDURE — 3078F DIAST BP <80 MM HG: CPT | Performed by: INTERNAL MEDICINE

## 2020-07-23 PROCEDURE — 3075F SYST BP GE 130 - 139MM HG: CPT | Performed by: INTERNAL MEDICINE

## 2020-07-23 NOTE — PROGRESS NOTES
HPI:    Patient ID: Barbara Vanessa is a 60year old female. HPI She is here today for follow up from er . She went there due to tingling on her right arm. She states that  Happened few days ago .  She woke up  With right arm tingling but no pain or num Refill   • traMADol HCl 50 MG Oral Tab Take 1 tablet (50 mg total) by mouth every 6 (six) hours as needed for Pain. 30 tablet 0   • HYDROcodone-acetaminophen 5-325 MG Oral Tab Take 1-2 tablets by mouth every 8 (eight) hours as needed for Pain.  60 tablet 0 Allergies:  Penicillins             ANGIOEDEMA    Comment:Hives on eyelids (occurred when pt was in her             25s).  Tolerated amoxicillin per chart    HISTORY:  Past Medical History:   Diagnosis Date   • Anemia    • Back pain    • Chronic kidney tenderness. No mastoid tenderness. Nose: Nose normal. Right sinus exhibits no maxillary sinus tenderness and no frontal sinus tenderness. Left sinus exhibits no maxillary sinus tenderness and no frontal sinus tenderness.    Mouth/Throat: Uvula is midline, behavior is normal.   Vitals reviewed. ASSESSMENT/PLAN:   Right arm tingling -?   Radiculopathy advised her to avoid pressure on the elbow when sitting on the table, can use splint , if persisist follow up     Right leg swelling her ultrasound michael

## 2020-08-06 ENCOUNTER — TELEPHONE (OUTPATIENT)
Dept: CASE MANAGEMENT | Age: 65
End: 2020-08-06

## 2020-08-06 NOTE — TELEPHONE ENCOUNTER
Insurance prefers Advair Diskus inhaler over Symbicort. Spoke with patient stating ok to switch to Advair inhaler. Provided education on administration as well as rinsing mouth after each use.  Pended prescription for Advair 250-50 mcg/dose to preferred

## 2020-08-08 ENCOUNTER — HOSPITAL ENCOUNTER (EMERGENCY)
Facility: HOSPITAL | Age: 65
Discharge: HOME | End: 2020-08-08
Attending: EMERGENCY MEDICINE
Payer: COMMERCIAL

## 2020-08-08 ENCOUNTER — APPOINTMENT (OUTPATIENT)
Dept: GENERAL RADIOLOGY | Facility: HOSPITAL | Age: 65
End: 2020-08-08
Attending: EMERGENCY MEDICINE
Payer: COMMERCIAL

## 2020-08-08 VITALS
OXYGEN SATURATION: 98 % | TEMPERATURE: 98 F | RESPIRATION RATE: 17 BRPM | DIASTOLIC BLOOD PRESSURE: 71 MMHG | HEIGHT: 64 IN | BODY MASS INDEX: 27.31 KG/M2 | WEIGHT: 160 LBS | SYSTOLIC BLOOD PRESSURE: 160 MMHG | HEART RATE: 73 BPM

## 2020-08-08 DIAGNOSIS — M54.31 SCIATICA OF RIGHT SIDE: Primary | ICD-10-CM

## 2020-08-08 PROCEDURE — 72100 X-RAY EXAM L-S SPINE 2/3 VWS: CPT | Performed by: EMERGENCY MEDICINE

## 2020-08-08 PROCEDURE — 99283 EMERGENCY DEPT VISIT LOW MDM: CPT

## 2020-08-08 RX ORDER — HYDROCODONE BITARTRATE AND ACETAMINOPHEN 5; 325 MG/1; MG/1
1 TABLET ORAL ONCE
Status: COMPLETED | OUTPATIENT
Start: 2020-08-08 | End: 2020-08-08

## 2020-08-08 RX ORDER — HYDROCODONE BITARTRATE AND ACETAMINOPHEN 5; 325 MG/1; MG/1
1-2 TABLET ORAL EVERY 6 HOURS PRN
Qty: 6 TABLET | Refills: 0 | Status: SHIPPED | OUTPATIENT
Start: 2020-08-08 | End: 2020-08-13

## 2020-08-08 RX ORDER — METHYLPREDNISOLONE 4 MG/1
TABLET ORAL
Qty: 1 PACKAGE | Refills: 0 | Status: SHIPPED | OUTPATIENT
Start: 2020-08-08 | End: 2020-09-30 | Stop reason: ALTCHOICE

## 2020-08-08 NOTE — ED PROVIDER NOTES
Patient Seen in: [facility] Emergency Department      History   Patient presents with:  Leg Pain    Stated Complaint: bilateral leg pain    HPI    Patient is a 82-year-old female with a history of progressive vascular disease.   She complains of pa reviewed and negative except as noted above.     Physical Exam     ED Triage Vitals [08/08/20 1113]   BP (!) 172/78   Pulse 82   Resp 20   Temp 98 °F (36.7 °C)   Temp src Oral   SpO2 98 %   O2 Device None (Room air)       Current:BP (!) 168/74   Pulse 81 Nursing note and vitals reviewed. ED Course   Labs Reviewed - No data to display       Xr Lumbar Spine (min 2 Views) (cpt=72100)    Result Date: 8/8/2020  CONCLUSION:  1.  Redemonstration of posterior fusion changes at L4-L5 with intervertebral d

## 2020-08-08 NOTE — ED NOTES
Actions and side effects of Norco and Medrol dose pack reviewed. Importance of MD follow up reviewed.

## 2020-08-08 NOTE — ED NOTES
Pt to ED with c/o bilateral leg pain right worse than left for 2 days. Pt denies fall or injury. Pt s/p Right leg angiogram with recanalization of SFA with stent 7-8-2020 here at [facility] with IR. Pt ambulating with slow steady gait.  Bilateral pedal pulses +wit

## 2020-08-10 RX ORDER — FLUTICASONE PROPIONATE AND SALMETEROL 50; 250 UG/1; UG/1
1 POWDER RESPIRATORY (INHALATION) EVERY 12 HOURS SCHEDULED
Qty: 3 EACH | Refills: 1 | Status: SHIPPED | OUTPATIENT
Start: 2020-08-10 | End: 2021-12-03

## 2020-08-13 ENCOUNTER — OFFICE VISIT (OUTPATIENT)
Dept: INTERNAL MEDICINE CLINIC | Facility: CLINIC | Age: 65
End: 2020-08-13

## 2020-08-13 VITALS
WEIGHT: 158 LBS | BODY MASS INDEX: 27 KG/M2 | OXYGEN SATURATION: 98 % | DIASTOLIC BLOOD PRESSURE: 71 MMHG | TEMPERATURE: 100 F | HEART RATE: 80 BPM | SYSTOLIC BLOOD PRESSURE: 133 MMHG

## 2020-08-13 DIAGNOSIS — M54.40 ACUTE RIGHT-SIDED LOW BACK PAIN WITH SCIATICA, SCIATICA LATERALITY UNSPECIFIED: Primary | ICD-10-CM

## 2020-08-13 PROCEDURE — 3075F SYST BP GE 130 - 139MM HG: CPT | Performed by: INTERNAL MEDICINE

## 2020-08-13 PROCEDURE — 3078F DIAST BP <80 MM HG: CPT | Performed by: INTERNAL MEDICINE

## 2020-08-13 PROCEDURE — 99214 OFFICE O/P EST MOD 30 MIN: CPT | Performed by: INTERNAL MEDICINE

## 2020-08-13 RX ORDER — DOXYCYCLINE HYCLATE 100 MG
100 TABLET ORAL 2 TIMES DAILY
Qty: 10 TABLET | Refills: 0 | Status: SHIPPED | OUTPATIENT
Start: 2020-08-13 | End: 2020-09-30 | Stop reason: ALTCHOICE

## 2020-08-13 RX ORDER — ALBUTEROL SULFATE 90 UG/1
1 AEROSOL, METERED RESPIRATORY (INHALATION) EVERY 4 HOURS PRN
Qty: 1 INHALER | Refills: 0 | Status: SHIPPED | OUTPATIENT
Start: 2020-08-13 | End: 2021-10-07

## 2020-08-13 RX ORDER — HYDROCODONE BITARTRATE AND ACETAMINOPHEN 5; 325 MG/1; MG/1
1 TABLET ORAL EVERY 12 HOURS PRN
Qty: 50 TABLET | Refills: 0 | Status: ON HOLD | OUTPATIENT
Start: 2020-08-13 | End: 2020-10-06

## 2020-08-13 RX ORDER — AMLODIPINE BESYLATE 10 MG/1
10 TABLET ORAL DAILY
Qty: 90 TABLET | Refills: 0 | Status: SHIPPED | OUTPATIENT
Start: 2020-08-13 | End: 2020-11-04

## 2020-08-13 RX ORDER — GABAPENTIN 300 MG/1
300 CAPSULE ORAL 2 TIMES DAILY
Qty: 180 CAPSULE | Refills: 0 | Status: SHIPPED | OUTPATIENT
Start: 2020-08-13 | End: 2021-05-10

## 2020-08-13 NOTE — PROGRESS NOTES
HPI:    Patient ID: Dariel Villalba is a 60year old female. HPI She is here today for follow up after er visit. She went there due to right sided back pain radiating to her right buttock and leg.   They told her that is sciatica   Patient was started Neurological: Negative for dizziness, tremors, speech difficulty, weakness, light-headedness, numbness and headaches. Hematological: Negative for adenopathy. Does not bruise/bleed easily.    Psychiatric/Behavioral: Negative for agitation, behavioral probl • insulin detemir 100 UNIT/ML Subcutaneous Solution Pen-injector INJECT 30 UNITS INTO THE SKIN DAILY. (Patient taking differently: Inject 36 Units into the skin nightly.  ) 15 pen 1   • aspirin 81 MG Oral Tab Take 81 mg by mouth daily.      • ipratropium-al Smoking status: Former Smoker        Packs/day: 1.00        Years: 30.00        Pack years: 30        Types: Cigarettes        Quit date: 2019        Years since quittin.7      Smokeless tobacco: Never Used    Alcohol use: Not Currently      F Abdominal: Soft. Bowel sounds are normal. She exhibits no shifting dullness, no distension and no mass. There is no hepatosplenomegaly. There is no tenderness. There is no rigidity, no rebound, no guarding and no CVA tenderness.    Musculoskeletal: • Albuterol Sulfate  (90 Base) MCG/ACT Inhalation Aero Soln 1 Inhaler 0     Sig: Inhale 1 puff into the lungs every 4 (four) hours as needed for Wheezing or Shortness of Breath.    • HYDROcodone-acetaminophen 5-325 MG Oral Tab 50 tablet 0     Sig: Ta

## 2020-09-24 ENCOUNTER — HOSPITAL ENCOUNTER (OUTPATIENT)
Dept: ULTRASOUND IMAGING | Facility: HOSPITAL | Age: 65
Discharge: HOME | End: 2020-09-24
Attending: INTERNAL MEDICINE
Payer: COMMERCIAL

## 2020-09-24 DIAGNOSIS — I73.9 PAD (PERIPHERAL ARTERY DISEASE) (HCC): ICD-10-CM

## 2020-09-24 PROCEDURE — 93926 LOWER EXTREMITY STUDY: CPT | Performed by: CLINICAL NURSE SPECIALIST

## 2020-09-25 RX ORDER — HYDRALAZINE HYDROCHLORIDE 25 MG/1
TABLET, FILM COATED ORAL
Qty: 180 TABLET | Refills: 0 | Status: SHIPPED | OUTPATIENT
Start: 2020-09-25 | End: 2021-02-09

## 2020-09-25 RX ORDER — SITAGLIPTIN 100 MG/1
TABLET, FILM COATED ORAL
Qty: 90 TABLET | Refills: 0 | Status: ON HOLD | OUTPATIENT
Start: 2020-09-25 | End: 2020-10-06

## 2020-09-29 ENCOUNTER — APPOINTMENT (OUTPATIENT)
Dept: LAB | Facility: HOSPITAL | Age: 65
End: 2020-09-29
Attending: RADIOLOGY
Payer: COMMERCIAL

## 2020-09-29 ENCOUNTER — NURSE ONLY (OUTPATIENT)
Dept: LAB | Facility: HOSPITAL | Age: 65
End: 2020-09-29
Attending: RADIOLOGY
Payer: COMMERCIAL

## 2020-09-29 DIAGNOSIS — Z01.818 PRE-OP TESTING: ICD-10-CM

## 2020-09-29 PROCEDURE — 36415 COLL VENOUS BLD VENIPUNCTURE: CPT

## 2020-09-29 PROCEDURE — 85025 COMPLETE CBC W/AUTO DIFF WBC: CPT

## 2020-09-29 PROCEDURE — 80048 BASIC METABOLIC PNL TOTAL CA: CPT

## 2020-10-02 ENCOUNTER — APPOINTMENT (OUTPATIENT)
Dept: INTERVENTIONAL RADIOLOGY/VASCULAR | Facility: HOSPITAL | Age: 65
DRG: 271 | End: 2020-10-02
Attending: CLINICAL NURSE SPECIALIST
Payer: COMMERCIAL

## 2020-10-02 ENCOUNTER — HOSPITAL ENCOUNTER (INPATIENT)
Dept: INTERVENTIONAL RADIOLOGY/VASCULAR | Facility: HOSPITAL | Age: 65
LOS: 4 days | Discharge: HOME | DRG: 271 | End: 2020-10-06
Attending: RADIOLOGY | Admitting: RADIOLOGY
Payer: COMMERCIAL

## 2020-10-02 DIAGNOSIS — I73.9 PAD (PERIPHERAL ARTERY DISEASE) (HCC): ICD-10-CM

## 2020-10-02 DIAGNOSIS — Z01.818 PRE-OP TESTING: Primary | ICD-10-CM

## 2020-10-02 PROCEDURE — 3E06317 INTRODUCTION OF OTHER THROMBOLYTIC INTO CENTRAL ARTERY, PERCUTANEOUS APPROACH: ICD-10-PCS | Performed by: RADIOLOGY

## 2020-10-02 PROCEDURE — 99223 1ST HOSP IP/OBS HIGH 75: CPT | Performed by: HOSPITALIST

## 2020-10-02 PROCEDURE — 04CR3ZZ EXTIRPATION OF MATTER FROM RIGHT POSTERIOR TIBIAL ARTERY, PERCUTANEOUS APPROACH: ICD-10-PCS | Performed by: RADIOLOGY

## 2020-10-02 PROCEDURE — 0YH733Z INSERTION OF INFUSION DEVICE INTO RIGHT FEMORAL REGION, PERCUTANEOUS APPROACH: ICD-10-PCS | Performed by: RADIOLOGY

## 2020-10-02 PROCEDURE — 04HM3DZ INSERTION OF INTRALUMINAL DEVICE INTO RIGHT POPLITEAL ARTERY, PERCUTANEOUS APPROACH: ICD-10-PCS | Performed by: RADIOLOGY

## 2020-10-02 PROCEDURE — 04CK3ZZ EXTIRPATION OF MATTER FROM RIGHT FEMORAL ARTERY, PERCUTANEOUS APPROACH: ICD-10-PCS | Performed by: RADIOLOGY

## 2020-10-02 PROCEDURE — B41F1ZZ FLUOROSCOPY OF RIGHT LOWER EXTREMITY ARTERIES USING LOW OSMOLAR CONTRAST: ICD-10-PCS | Performed by: RADIOLOGY

## 2020-10-02 RX ORDER — HEPARIN SODIUM AND DEXTROSE 10000; 5 [USP'U]/100ML; G/100ML
INJECTION INTRAVENOUS CONTINUOUS
Status: DISCONTINUED | OUTPATIENT
Start: 2020-10-02 | End: 2020-10-03

## 2020-10-02 RX ORDER — INSULIN ASPART 100 [IU]/ML
INJECTION, SOLUTION INTRAVENOUS; SUBCUTANEOUS
Status: COMPLETED
Start: 2020-10-02 | End: 2020-10-02

## 2020-10-02 RX ORDER — LIDOCAINE HYDROCHLORIDE 20 MG/ML
INJECTION, SOLUTION EPIDURAL; INFILTRATION; INTRACAUDAL; PERINEURAL
Status: COMPLETED
Start: 2020-10-02 | End: 2020-10-02

## 2020-10-02 RX ORDER — INSULIN ASPART 100 [IU]/ML
INJECTION, SOLUTION INTRAVENOUS; SUBCUTANEOUS ONCE
Status: COMPLETED | OUTPATIENT
Start: 2020-10-02 | End: 2020-10-02

## 2020-10-02 RX ORDER — SODIUM CHLORIDE 0.9 % (FLUSH) 0.9 %
10 SYRINGE (ML) INJECTION AS NEEDED
Status: DISCONTINUED | OUTPATIENT
Start: 2020-10-02 | End: 2020-10-06

## 2020-10-02 RX ORDER — DEXTROSE MONOHYDRATE 25 G/50ML
50 INJECTION, SOLUTION INTRAVENOUS
Status: DISCONTINUED | OUTPATIENT
Start: 2020-10-02 | End: 2020-10-06

## 2020-10-02 RX ORDER — ONDANSETRON 2 MG/ML
4 INJECTION INTRAMUSCULAR; INTRAVENOUS EVERY 6 HOURS PRN
Status: DISCONTINUED | OUTPATIENT
Start: 2020-10-02 | End: 2020-10-06

## 2020-10-02 RX ORDER — ACETAMINOPHEN 325 MG/1
650 TABLET ORAL EVERY 6 HOURS PRN
Status: DISCONTINUED | OUTPATIENT
Start: 2020-10-02 | End: 2020-10-06

## 2020-10-02 RX ORDER — SODIUM CHLORIDE 9 MG/ML
INJECTION, SOLUTION INTRAVENOUS CONTINUOUS
Status: DISCONTINUED | OUTPATIENT
Start: 2020-10-02 | End: 2020-10-05

## 2020-10-02 RX ORDER — HEPARIN SODIUM AND DEXTROSE 10000; 5 [USP'U]/100ML; G/100ML
12 INJECTION INTRAVENOUS ONCE
Status: DISCONTINUED | OUTPATIENT
Start: 2020-10-02 | End: 2020-10-02

## 2020-10-02 RX ORDER — NITROGLYCERIN 20 MG/100ML
INJECTION INTRAVENOUS
Status: COMPLETED
Start: 2020-10-02 | End: 2020-10-02

## 2020-10-02 RX ORDER — SODIUM CHLORIDE 0.9 % (FLUSH) 0.9 %
3 SYRINGE (ML) INJECTION AS NEEDED
Status: DISCONTINUED | OUTPATIENT
Start: 2020-10-02 | End: 2020-10-06

## 2020-10-02 RX ORDER — DEXTROSE MONOHYDRATE 50 MG/ML
INJECTION, SOLUTION INTRAVENOUS CONTINUOUS
Status: DISCONTINUED | OUTPATIENT
Start: 2020-10-02 | End: 2020-10-05

## 2020-10-02 RX ORDER — ENALAPRILAT 2.5 MG/2ML
1.25 INJECTION INTRAVENOUS EVERY 6 HOURS PRN
Status: DISCONTINUED | OUTPATIENT
Start: 2020-10-02 | End: 2020-10-06

## 2020-10-02 RX ORDER — METOCLOPRAMIDE HYDROCHLORIDE 5 MG/ML
5 INJECTION INTRAMUSCULAR; INTRAVENOUS EVERY 8 HOURS PRN
Status: DISCONTINUED | OUTPATIENT
Start: 2020-10-02 | End: 2020-10-04

## 2020-10-02 RX ORDER — TEMAZEPAM 7.5 MG/1
7.5 CAPSULE ORAL NIGHTLY PRN
Status: DISCONTINUED | OUTPATIENT
Start: 2020-10-02 | End: 2020-10-06

## 2020-10-02 RX ORDER — SODIUM CHLORIDE 9 MG/ML
INJECTION, SOLUTION INTRAVENOUS CONTINUOUS
Status: DISCONTINUED | OUTPATIENT
Start: 2020-10-02 | End: 2020-10-02

## 2020-10-02 RX ORDER — MIDAZOLAM HYDROCHLORIDE 1 MG/ML
INJECTION INTRAMUSCULAR; INTRAVENOUS
Status: COMPLETED
Start: 2020-10-02 | End: 2020-10-02

## 2020-10-02 RX ORDER — DEXTROSE MONOHYDRATE 25 G/50ML
50 INJECTION, SOLUTION INTRAVENOUS
Status: DISCONTINUED | OUTPATIENT
Start: 2020-10-02 | End: 2020-10-05

## 2020-10-02 RX ORDER — HEPARIN SODIUM AND DEXTROSE 10000; 5 [USP'U]/100ML; G/100ML
12 INJECTION INTRAVENOUS ONCE
Status: COMPLETED | OUTPATIENT
Start: 2020-10-02 | End: 2020-10-02

## 2020-10-02 RX ORDER — HEPARIN SODIUM AND DEXTROSE 10000; 5 [USP'U]/100ML; G/100ML
INJECTION INTRAVENOUS CONTINUOUS
Status: DISCONTINUED | OUTPATIENT
Start: 2020-10-03 | End: 2020-10-02

## 2020-10-02 RX ORDER — HYDRALAZINE HYDROCHLORIDE 20 MG/ML
INJECTION INTRAMUSCULAR; INTRAVENOUS
Status: COMPLETED
Start: 2020-10-02 | End: 2020-10-02

## 2020-10-02 RX ORDER — HEPARIN SODIUM 1000 [USP'U]/ML
INJECTION, SOLUTION INTRAVENOUS; SUBCUTANEOUS
Status: COMPLETED
Start: 2020-10-02 | End: 2020-10-02

## 2020-10-02 RX ADMIN — SODIUM CHLORIDE: 9 INJECTION, SOLUTION INTRAVENOUS at 09:20:00

## 2020-10-02 RX ADMIN — SODIUM CHLORIDE: 9 INJECTION, SOLUTION INTRAVENOUS at 12:42:00

## 2020-10-02 RX ADMIN — SODIUM CHLORIDE: 9 INJECTION, SOLUTION INTRAVENOUS at 20:19:00

## 2020-10-02 RX ADMIN — HEPARIN SODIUM AND DEXTROSE 10.55 UNITS/KG/HR: 10000; 5 INJECTION INTRAVENOUS at 21:09:00

## 2020-10-02 RX ADMIN — HEPARIN SODIUM AND DEXTROSE 12 UNITS/KG/HR: 10000; 5 INJECTION INTRAVENOUS at 20:28:00

## 2020-10-02 RX ADMIN — INSULIN ASPART 5 UNITS: 100 INJECTION, SOLUTION INTRAVENOUS; SUBCUTANEOUS at 09:45:00

## 2020-10-02 NOTE — H&P
[facility]    PATIENT'S NAME: Spike, [de-identified] D   ATTENDING PHYSICIAN: Belkys Marino MD   PATIENT ACCOUNT#:   [id number]    LOCATION:  [place] [address]  MEDICAL RECORD #:   [id number]       YOB: 1955  ADMISSION DATE: even at rest, has been progressive for several weeks. Other 12-point review of systems is negative. PHYSICAL EXAMINATION:    GENERAL:  Alert. Oriented to time, place, and person. Currently on IV tPA infusion. No acute distress.     VITAL SIGNS:  Te

## 2020-10-02 NOTE — PROGRESS NOTES
[facility]  Progress Note    Jaya Stuart Patient Status:  Inpatient    10/6/1955 MRN [id number]   Location [facility] 2W/SW Attending Armando Edge MD   Hosp Day # 0 PCP Manfred Foreman MD       Subjective:   Right on and off

## 2020-10-02 NOTE — PLAN OF CARE
Anjel Fairchild is A&O x4. She was admitted here this afternoon after a scheduled IR procedure. Upon arrival she had x2 TPA infusion and x1 heparin infusion running to the left groin that was to be left dwelling x6 hours per MD orders.   She then went back to IR this

## 2020-10-02 NOTE — PLAN OF CARE
Received pt @ 1200 from IR with JESSIE Guajardo). Pt was A&O x 4. Left groin dressing in place, site soft, old drainage.  The following infusions were running:  Alteplase @ 0.75 mg/hr  Alteplase (infucsion B) @ 0.25 mg/hr  D5 w/ 8000 units of Heparin running @ 30

## 2020-10-02 NOTE — INTERVAL H&P NOTE
The above referenced H&P was reviewed by Jaja Clark MD on 10/2/2020, the patient was examined and no significant changes have occurred in the patient's condition since the H&P was performed.   Risks, benefits, alternative treatments and consequences of no

## 2020-10-02 NOTE — BRIEF PROCEDURE NOTE
[facility] HOSP - [facility]  Procedure Note    Richrd Blank Patient Status:  Outpatient in a Bed    10/6/1955 MRN [id number]   Location [facility] Attending Surya Torres MD   Hosp Day # 0 PCP Onofre Gordon MD     P

## 2020-10-03 ENCOUNTER — APPOINTMENT (OUTPATIENT)
Dept: CV DIAGNOSTICS | Facility: HOSPITAL | Age: 65
DRG: 271 | End: 2020-10-03
Attending: HOSPITALIST
Payer: COMMERCIAL

## 2020-10-03 PROCEDURE — 93306 TTE W/DOPPLER COMPLETE: CPT | Performed by: HOSPITALIST

## 2020-10-03 PROCEDURE — 99233 SBSQ HOSP IP/OBS HIGH 50: CPT | Performed by: HOSPITALIST

## 2020-10-03 PROCEDURE — 99254 IP/OBS CNSLTJ NEW/EST MOD 60: CPT | Performed by: SURGERY

## 2020-10-03 RX ORDER — CLOPIDOGREL BISULFATE 75 MG/1
75 TABLET ORAL DAILY
Status: DISCONTINUED | OUTPATIENT
Start: 2020-10-03 | End: 2020-10-06

## 2020-10-03 RX ORDER — HYDROCODONE BITARTRATE AND ACETAMINOPHEN 10; 325 MG/1; MG/1
2 TABLET ORAL EVERY 4 HOURS PRN
Status: DISCONTINUED | OUTPATIENT
Start: 2020-10-03 | End: 2020-10-06

## 2020-10-03 RX ORDER — HYDROCODONE BITARTRATE AND ACETAMINOPHEN 10; 325 MG/1; MG/1
1 TABLET ORAL EVERY 4 HOURS PRN
Status: DISCONTINUED | OUTPATIENT
Start: 2020-10-03 | End: 2020-10-06

## 2020-10-03 RX ORDER — HYDROCODONE BITARTRATE AND ACETAMINOPHEN 5; 325 MG/1; MG/1
1 TABLET ORAL EVERY 6 HOURS PRN
Status: DISCONTINUED | OUTPATIENT
Start: 2020-10-03 | End: 2020-10-03

## 2020-10-03 RX ORDER — HYDROXYZINE HYDROCHLORIDE 10 MG/1
25 TABLET, FILM COATED ORAL EVERY 4 HOURS PRN
Status: DISCONTINUED | OUTPATIENT
Start: 2020-10-03 | End: 2020-10-04

## 2020-10-03 RX ADMIN — HYDROCODONE BITARTRATE AND ACETAMINOPHEN 2 TABLET: 10; 325 TABLET ORAL at 12:19:00

## 2020-10-03 RX ADMIN — CLOPIDOGREL BISULFATE 75 MG: 75 TABLET ORAL at 12:17:00

## 2020-10-03 RX ADMIN — HYDROCODONE BITARTRATE AND ACETAMINOPHEN 1 TABLET: 5; 325 TABLET ORAL at 08:36:00

## 2020-10-03 RX ADMIN — SODIUM CHLORIDE: 9 INJECTION, SOLUTION INTRAVENOUS at 23:38:00

## 2020-10-03 RX ADMIN — HYDROCODONE BITARTRATE AND ACETAMINOPHEN 1 TABLET: 5; 325 TABLET ORAL at 03:03:00

## 2020-10-03 RX ADMIN — SODIUM CHLORIDE: 9 INJECTION, SOLUTION INTRAVENOUS at 16:23:00

## 2020-10-03 RX ADMIN — ENALAPRILAT 1.25 MG: 2.5 INJECTION INTRAVENOUS at 18:02:00

## 2020-10-03 RX ADMIN — HYDROCODONE BITARTRATE AND ACETAMINOPHEN 2 TABLET: 10; 325 TABLET ORAL at 17:32:00

## 2020-10-03 RX ADMIN — ACETAMINOPHEN 650 MG: 325 TABLET ORAL at 23:40:00

## 2020-10-03 RX ADMIN — HEPARIN SODIUM AND DEXTROSE 800 UNITS/HR: 10000; 5 INJECTION INTRAVENOUS at 03:00:00

## 2020-10-03 NOTE — PLAN OF CARE
Pt is A&O x4. She underwent a thrombolytic study with Dr Lakisha Hyde where she had her occluded stent in SFA cleared with a laser and a popliteal stent placed. Her left groin site remains intact, soft with no signs of a hematoma.  She remained flat for 2hrs post p

## 2020-10-03 NOTE — PROGRESS NOTES
[facility]    Progress Note    Redd Gabriel Patient Status:  Inpatient    10/6/1955 MRN [id number]   [facility] 2W/SW Attending Marielena Fuchs Day # 1 PCP Solitario Mesa MD        Subjective: (H) 10/03/2020    HGB 8.6 (L) 10/03/2020    HCT 26.3 (L) 10/03/2020    .0 10/03/2020    .0 10/03/2020    CREATSERUM 1.33 (H) 10/03/2020    BUN 22 (H) 10/03/2020     10/03/2020    K 3.5 10/03/2020     (H) 10/03/2020    CO2 20.0 (L)

## 2020-10-03 NOTE — PROGRESS NOTES
Patient was transferred to room 324 in stable condition. Patient stated that she notified son of transfer to new room. Skin assessment was done with HARDEEP Collins and this nurse. Patient noted to have an abscessed area to her sacral area.  Scattered rednes

## 2020-10-03 NOTE — CONSULTS
[facility]    Report of Consultation    Eva Chavarria Patient Status:  Inpatient    10/6/1955 MRN [id number]   Location [facility] 3W/SW Attending Marielena Fuchs Day # 1 PCP Tato Doran MD     Date of Admi No    Allergies/Medications: Allergies:   Penicillins             ANGIOEDEMA    Comment:Hives on eyelids (occurred when pt was in her             25s).  Tolerated amoxicillin per chart    •  HYDRALAZINE HCL 25 MG Oral Tab, TAKE 1 TABLET BY MOUTH TWICE A D Tab, Take 40 mg by mouth daily.         Review of Systems:   Review of Systems     Review of Systems:   GENERAL: feels generally well  SKIN: no ulcerated or worrisome skin lesions  EYES:denies blurred vision or double vision  HEENT: denies new nasal congest T4F 1.3 06/18/2019    TSH 1.060 06/18/2019    DDIMER 1.45 (H) 11/08/2019    ESRML 27 05/08/2020    CRP 3.11 (H) 05/08/2020    MG 2.0 10/03/2020    PHOS 3.3 10/03/2020    TROP <0.045 11/08/2019    B12 963 09/03/2019     No results found.   Ekg 12-lead    Res

## 2020-10-03 NOTE — BRIEF PROCEDURE NOTE
BP noted, continue to monitor. [facility] HOSP - [facility]  Procedure Note    Aneesh Ace Patient Status:  Inpatient    10/6/1955 MRN [id number]   Location [facility] Attending Sherri Kirkpatrick MD   Hosp Day # 0 PCP Rom Bautista MD     Procedur

## 2020-10-03 NOTE — PLAN OF CARE
Problem: Patient Centered Care  Goal: Patient preferences are identified and integrated in the patient's plan of care  Description: Interventions:  - What would you like us to know as we care for you? I like to have red juice from [employer].    - Provide

## 2020-10-04 ENCOUNTER — APPOINTMENT (OUTPATIENT)
Dept: GENERAL RADIOLOGY | Facility: HOSPITAL | Age: 65
DRG: 271 | End: 2020-10-04
Attending: HOSPITALIST
Payer: COMMERCIAL

## 2020-10-04 PROCEDURE — 71046 X-RAY EXAM CHEST 2 VIEWS: CPT | Performed by: HOSPITALIST

## 2020-10-04 PROCEDURE — 10061 I&D ABSCESS COMP/MULTIPLE: CPT | Performed by: SURGERY

## 2020-10-04 PROCEDURE — 99232 SBSQ HOSP IP/OBS MODERATE 35: CPT | Performed by: SURGERY

## 2020-10-04 PROCEDURE — 99233 SBSQ HOSP IP/OBS HIGH 50: CPT | Performed by: HOSPITALIST

## 2020-10-04 PROCEDURE — 0J990ZZ DRAINAGE OF BUTTOCK SUBCUTANEOUS TISSUE AND FASCIA, OPEN APPROACH: ICD-10-PCS | Performed by: SURGERY

## 2020-10-04 RX ORDER — HYDROXYZINE HYDROCHLORIDE 25 MG/1
50 TABLET, FILM COATED ORAL EVERY 6 HOURS PRN
Status: DISCONTINUED | OUTPATIENT
Start: 2020-10-04 | End: 2020-10-05

## 2020-10-04 RX ORDER — POTASSIUM CHLORIDE 20 MEQ/1
40 TABLET, EXTENDED RELEASE ORAL ONCE
Status: COMPLETED | OUTPATIENT
Start: 2020-10-04 | End: 2020-10-04

## 2020-10-04 RX ORDER — VANCOMYCIN HYDROCHLORIDE
25 ONCE
Status: COMPLETED | OUTPATIENT
Start: 2020-10-04 | End: 2020-10-04

## 2020-10-04 RX ORDER — METOCLOPRAMIDE 10 MG/1
5 TABLET ORAL EVERY 8 HOURS PRN
Status: DISCONTINUED | OUTPATIENT
Start: 2020-10-04 | End: 2020-10-06

## 2020-10-04 RX ORDER — LIDOCAINE HYDROCHLORIDE AND EPINEPHRINE 10; 10 MG/ML; UG/ML
10 INJECTION, SOLUTION INFILTRATION; PERINEURAL ONCE
Status: COMPLETED | OUTPATIENT
Start: 2020-10-04 | End: 2020-10-04

## 2020-10-04 RX ADMIN — LIDOCAINE HYDROCHLORIDE AND EPINEPHRINE 10 ML: 10; 10 INJECTION, SOLUTION INFILTRATION; PERINEURAL at 19:39:00

## 2020-10-04 RX ADMIN — CLOPIDOGREL BISULFATE 75 MG: 75 TABLET ORAL at 08:55:00

## 2020-10-04 RX ADMIN — TEMAZEPAM 7.5 MG: 7.5 CAPSULE ORAL at 21:09:00

## 2020-10-04 RX ADMIN — SODIUM CHLORIDE: 9 INJECTION, SOLUTION INTRAVENOUS at 17:56:00

## 2020-10-04 RX ADMIN — POTASSIUM CHLORIDE 40 MEQ: 20 TABLET, EXTENDED RELEASE ORAL at 08:55:00

## 2020-10-04 RX ADMIN — VANCOMYCIN HYDROCHLORIDE 1500 MG: at 12:12:00

## 2020-10-04 RX ADMIN — HYDROCODONE BITARTRATE AND ACETAMINOPHEN 2 TABLET: 10; 325 TABLET ORAL at 16:13:00

## 2020-10-04 RX ADMIN — SODIUM CHLORIDE: 9 INJECTION, SOLUTION INTRAVENOUS at 03:38:00

## 2020-10-04 RX ADMIN — ENALAPRILAT 1.25 MG: 2.5 INJECTION INTRAVENOUS at 05:20:00

## 2020-10-04 RX ADMIN — HYDROCODONE BITARTRATE AND ACETAMINOPHEN 2 TABLET: 10; 325 TABLET ORAL at 09:09:00

## 2020-10-04 NOTE — OCCUPATIONAL THERAPY NOTE
OCCUPATIONAL THERAPY EVALUATION- INPATIENT     Room Number: 334/334-A  Evaluation Date: 10/4/2020  Type of Evaluation: Initial  Presenting Problem: PAD, RT buttocks abcess    Physician Order: IP Consult to Occupational Therapy  Reason for Therapy: ADL/IA admission.     OCCUPATIONAL THERAPY MEDICAL/SOCIAL HISTORY     Problem List  Active Problems:    PAD (peripheral artery disease) ([facility] [address]    Pre-op testing      Past Medical History  Past Medical History:   Diagnosis Date   • Anemia    • Back pain    • Chronic taking off regular lower body clothing?: None  -   Bathing (including washing, rinsing, drying)?: A Little  -   Toileting, which includes using toilet, bedpan or urinal? : None  -   Putting on and taking off regular upper body clothing?: None  -   Taking c

## 2020-10-04 NOTE — PROGRESS NOTES
[facility]    Progress Note    Eva Chavarria Patient Status:  Inpatient    10/6/1955 MRN [id number]   Location [facility] 3W/SW Attending Marielena Fuchs Day # 2 PCP Tato Doran MD        Subjective:    Dam[place] 10/04/2020    PHOS 2.3 (L) 10/04/2020    TROP <0.045 11/08/2019    B12 963 09/03/2019       Xr Chest Pa + Lat Chest (cpt=71046)    Result Date: 10/4/2020  CONCLUSION:  Stable chest without radiographically evident acute intrathoracic process.     Dictated b

## 2020-10-04 NOTE — PLAN OF CARE
Seen by md & updated, temp , cxr, bl.cx, uac/s, iv vanco, iv fluids, cont. Pasquale Burnett To monitor rt. Buttock area absess, l.groin prior procedure access site.    Problem: Patient Centered Care  Goal: Patient preferences are identified and integrated in Diabetes/Glucose Control  Goal: Glucose maintained within prescribed range  Description: INTERVENTIONS:  - Monitor Blood Glucose as ordered  - Assess for signs and symptoms of hyperglycemia and hypoglycemia  - Administer ordered medications to maintain glu appropriate  - Consider OT/PT consult to assist with strengthening/mobility  - Encourage toileting schedule  Outcome: Progressing     Problem: DISCHARGE PLANNING  Goal: Discharge to home or other facility with appropriate resources  Description: INTERVENTI consults as needed  - Advance activity as appropriate  - Communicate ordered activity level and limitations with patient/family  Outcome: Progressing     Problem: Impaired Functional Mobility  Goal: Achieve highest/safest level of mobility/gait  Descriptio

## 2020-10-04 NOTE — PROGRESS NOTES
[facility] Dosing Service    Initial Pharmacokinetic Consult for Vancomycin Dosing     Gale Elliott is a 60year old patient who is being treated for cellulitis. Pharmacy has been asked to dose Vancomycin by Dr. Siobhan Bustos.      India Perera

## 2020-10-04 NOTE — PLAN OF CARE
R buttock abscess appears popped and spontaneously draining bloody/purulent discharge. Site is hard and painful to the touch. Mepilex applied to sacrum and onto abscess. Heat pack applied to affected area per order. Pt admits to skin picking.  Slight fever Long Term Goal  Description: Patient's Long Term Goal: Go home    Interventions:  - Med compliance  - Tele monitoring  - Px management  - See additional Care Plan goals for specific interventions  Outcome: Progressing  Goal: Patient/Family Short Term Goal Problem: SAFETY ADULT - FALL  Goal: Free from fall injury  Description: INTERVENTIONS:  - Assess pt frequently for physical needs  - Identify cognitive and physical deficits and behaviors that affect risk of falls.   - [place] fall precautions as indica S/S of infection  Description: INTERVENTIONS:  - Assess and document risk factors for pressure ulcer development  - Assess and document skin integrity  - Assess and document dressing/incision, wound bed, drain sites and surrounding tissue  - Implement woun

## 2020-10-04 NOTE — PROGRESS NOTES
[facility]    Progress Note    Aneesh Ace Patient Status:  Inpatient    10/6/1955 MRN [id number]   [facility] 2W/SW Attending Marielena Fuchs Day # 2 PCP Rom Bautista MD        Subjective: Component Value Date    WBC 16.4 (H) 10/04/2020    HGB 8.7 (L) 10/04/2020    HCT 27.5 (L) 10/04/2020    .0 10/04/2020    .0 10/04/2020    CREATSERUM 1.15 (H) 10/04/2020    BUN 22 (H) 10/04/2020     10/04/2020    K 3.8 10/04/2020    CL

## 2020-10-04 NOTE — PROGRESS NOTES
[facility] Pharmacy Note: Route Optimization for  metoclopramide. Patient is currently on  metoclopramide  5 mg IV every 8 hours.    The patient meets the criteria to convert to the oral equivalent as established by the IV to Oral conversion protocol approved

## 2020-10-05 PROCEDURE — 99233 SBSQ HOSP IP/OBS HIGH 50: CPT | Performed by: HOSPITALIST

## 2020-10-05 RX ORDER — POTASSIUM CHLORIDE 20 MEQ/1
40 TABLET, EXTENDED RELEASE ORAL EVERY 4 HOURS
Status: COMPLETED | OUTPATIENT
Start: 2020-10-05 | End: 2020-10-05

## 2020-10-05 RX ORDER — DOXEPIN HYDROCHLORIDE 25 MG/1
25 CAPSULE ORAL NIGHTLY
Status: DISCONTINUED | OUTPATIENT
Start: 2020-10-05 | End: 2020-10-06

## 2020-10-05 RX ORDER — HYDROXYZINE HYDROCHLORIDE 25 MG/1
75 TABLET, FILM COATED ORAL EVERY 6 HOURS PRN
Status: DISCONTINUED | OUTPATIENT
Start: 2020-10-05 | End: 2020-10-06

## 2020-10-05 RX ORDER — DEXTROSE MONOHYDRATE 25 G/50ML
50 INJECTION, SOLUTION INTRAVENOUS
Status: DISCONTINUED | OUTPATIENT
Start: 2020-10-05 | End: 2020-10-05

## 2020-10-05 RX ADMIN — HYDROXYZINE HYDROCHLORIDE 50 MG: 25 TABLET, FILM COATED ORAL at 00:11:00

## 2020-10-05 RX ADMIN — POTASSIUM CHLORIDE 40 MEQ: 20 TABLET, EXTENDED RELEASE ORAL at 10:07:00

## 2020-10-05 RX ADMIN — HYDROXYZINE HYDROCHLORIDE 75 MG: 25 TABLET, FILM COATED ORAL at 14:49:00

## 2020-10-05 RX ADMIN — POTASSIUM CHLORIDE 40 MEQ: 20 TABLET, EXTENDED RELEASE ORAL at 13:04:00

## 2020-10-05 RX ADMIN — DOXEPIN HYDROCHLORIDE 25 MG: 25 CAPSULE ORAL at 20:41:00

## 2020-10-05 RX ADMIN — HYDROCODONE BITARTRATE AND ACETAMINOPHEN 2 TABLET: 10; 325 TABLET ORAL at 14:43:00

## 2020-10-05 RX ADMIN — SODIUM CHLORIDE: 9 INJECTION, SOLUTION INTRAVENOUS at 06:39:00

## 2020-10-05 RX ADMIN — CLOPIDOGREL BISULFATE 75 MG: 75 TABLET ORAL at 10:07:00

## 2020-10-05 RX ADMIN — HYDROCODONE BITARTRATE AND ACETAMINOPHEN 2 TABLET: 10; 325 TABLET ORAL at 06:39:00

## 2020-10-05 NOTE — PROGRESS NOTES
[facility] Dosing Service  Antibiotic Dosing    Monique Hassan is a 60year old for whom pharmacy is dosing meropenem for treatment of  UTI. Allergies: is allergic to penicillins. Vitals: /56 (BP Location: Right arm)   Pulse 79   Temp 99.

## 2020-10-05 NOTE — PLAN OF CARE
Problem: Patient Centered Care  Goal: Patient preferences are identified and integrated in the patient's plan of care  Description: Interventions:  - What would you like us to know as we care for you?  Hx of abscesses  - Provide timely, complete, and accu symptoms of hyperglycemia and hypoglycemia  - Administer ordered medications to maintain glucose within target range  - Assess barriers to adequate nutritional intake and initiate nutrition consult as needed  - Instruct patient on self management of diabet PLANNING  Goal: Discharge to home or other facility with appropriate resources  Description: INTERVENTIONS:  - Identify barriers to discharge w/pt and caregiver  - Include patient/family/discharge partner in discharge planning  - Arrange for needed dischar Assist with transfers and ambulation using safe patient handling equipment as needed  - Ensure adequate protection for wounds/incisions during mobilization  - Obtain PT/OT consults as needed  - Advance activity as appropriate  - Communicate ordered activit

## 2020-10-05 NOTE — CM/SW NOTE
12: 25PM  SW self referred pt for d/c planning. Per chart review, [facility] Per RN rounds, pt was started on IV anbx Daniel 10/4 and it is unknown if pt will need long term at this time. SW met w/ pt in her room.  Pt verified her remain available for support and/or discharge planning.        Katlin Linares, [address] [facility]

## 2020-10-05 NOTE — PROGRESS NOTES
[facility]    Progress Note    Shanice Cano Patient Status:  Inpatient    10/6/1955 MRN [id number]   [facility] 2W/SW Attending Marielena Fuchsissa Day # 3 PCP Ko Higuera MD        Subjective: Results:     Lab Results   Component Value Date    WBC 13.6 (H) 10/05/2020    HGB 8.2 (L) 10/05/2020    HCT 24.8 (L) 10/05/2020    .0 10/05/2020    CREATSERUM 1.15 (H) 10/05/2020    BUN 24 (H) 10/05/2020     10/05/2020    K 3.6 10/05/2020

## 2020-10-05 NOTE — PROCEDURES
Procedure Note  The risks, benefits, alternatives and expected recovery were explained. The pt expressed understanding and wished to proceed with the procedure. Time out performed. Preop:  Right buttock abscess  Postop:  Same  Procedure:   Incision a

## 2020-10-06 VITALS
RESPIRATION RATE: 18 BRPM | SYSTOLIC BLOOD PRESSURE: 142 MMHG | WEIGHT: 157.19 LBS | DIASTOLIC BLOOD PRESSURE: 66 MMHG | BODY MASS INDEX: 27 KG/M2 | HEART RATE: 81 BPM | TEMPERATURE: 98 F | OXYGEN SATURATION: 99 %

## 2020-10-06 PROCEDURE — 99239 HOSP IP/OBS DSCHRG MGMT >30: CPT | Performed by: HOSPITALIST

## 2020-10-06 PROCEDURE — 3E023GC INTRODUCTION OF OTHER THERAPEUTIC SUBSTANCE INTO MUSCLE, PERCUTANEOUS APPROACH: ICD-10-PCS | Performed by: HOSPITALIST

## 2020-10-06 RX ORDER — HYDROXYZINE 50 MG/1
50 TABLET, FILM COATED ORAL EVERY 6 HOURS PRN
Qty: 30 TABLET | Refills: 0 | Status: SHIPPED | OUTPATIENT
Start: 2020-10-06 | End: 2020-10-22

## 2020-10-06 RX ORDER — DOXEPIN HYDROCHLORIDE 25 MG/1
25 CAPSULE ORAL NIGHTLY
Qty: 30 CAPSULE | Refills: 0 | Status: SHIPPED | OUTPATIENT
Start: 2020-10-06 | End: 2020-10-22

## 2020-10-06 RX ORDER — HYDROCODONE BITARTRATE AND ACETAMINOPHEN 10; 325 MG/1; MG/1
1 TABLET ORAL EVERY 6 HOURS PRN
Qty: 15 TABLET | Refills: 0 | Status: SHIPPED | OUTPATIENT
Start: 2020-10-06 | End: 2020-10-13

## 2020-10-06 RX ORDER — ACETAMINOPHEN 325 MG/1
650 TABLET ORAL EVERY 6 HOURS PRN
Qty: 1 TABLET | Refills: 0 | Status: SHIPPED | COMMUNITY
Start: 2020-10-06 | End: 2020-10-13

## 2020-10-06 RX ORDER — NITROFURANTOIN 25; 75 MG/1; MG/1
100 CAPSULE ORAL 2 TIMES DAILY
Qty: 7 CAPSULE | Refills: 0 | Status: SHIPPED | OUTPATIENT
Start: 2020-10-06 | End: 2020-10-10

## 2020-10-06 RX ADMIN — HYDROCODONE BITARTRATE AND ACETAMINOPHEN 2 TABLET: 10; 325 TABLET ORAL at 06:48:00

## 2020-10-06 RX ADMIN — HYDROCODONE BITARTRATE AND ACETAMINOPHEN 2 TABLET: 10; 325 TABLET ORAL at 11:21:00

## 2020-10-06 RX ADMIN — ENALAPRILAT 1.25 MG: 2.5 INJECTION INTRAVENOUS at 05:38:00

## 2020-10-06 RX ADMIN — CLOPIDOGREL BISULFATE 75 MG: 75 TABLET ORAL at 10:18:00

## 2020-10-06 NOTE — PLAN OF CARE
Ambulates with assistance. Receiving IV abx, switched to PO for DC. Accucheck. Received PRN norco for pain in buttocks. Medically cleared for DC.     Problem: Patient Centered Care  Goal: Patient preferences are identified and integrated in the patient's pl Discharge     Problem: Diabetes/Glucose Control  Goal: Glucose maintained within prescribed range  Description: INTERVENTIONS:  - Monitor Blood Glucose as ordered  - Assess for signs and symptoms of hyperglycemia and hypoglycemia  - Administer ordered medi to reduce risk of injury  - Provide assistive devices as appropriate  - Consider OT/PT consult to assist with strengthening/mobility  - Encourage toileting schedule  Outcome: Adequate for Discharge     Problem: DISCHARGE PLANNING  Goal: Discharge to home o Discharge     Problem: MUSCULOSKELETAL - ADULT  Goal: Return mobility to safest level of function  Description: INTERVENTIONS:  - Assess patient stability and activity tolerance for standing, transferring and ambulating w/ or w/o assistive devices  - [employer]

## 2020-10-06 NOTE — DISCHARGE SUMMARY
Dc summary#[id number]  > 30 min spent on [address] Discharge Diagnoses: per arterial diease; gluteal abscess    Lace+ Score: 65  59-90 High Risk  29-58 Medium Risk  0-28   Low Risk. TCM Follow-Up Recommendation:  LACE > 58:  High Risk of readmission a

## 2020-10-06 NOTE — CM/SW NOTE
FAREED called pt's pharmacy at: [phone] and spoke w/ Jaylyn Vargas. She confirmed pt has a co-pay of $40. Per Jaylyn Vargas, pt qualifies for free 30 day of Xarelto. Pt's prescription will be ready for  later today.     Per request, FAREED updated Dr. Rick Larsen who con

## 2020-10-06 NOTE — PLAN OF CARE
IVABX for UTI. PT OT. Up with assistance. Prn meds for fever/pain. Electrolytes covered. Plan for home.       Problem: Patient Centered Care  Goal: Patient preferences are identified and integrated in the patient's plan of care  Description: Interventions: prescribed range  Description: INTERVENTIONS:  - Monitor Blood Glucose as ordered  - Assess for signs and symptoms of hyperglycemia and hypoglycemia  - Administer ordered medications to maintain glucose within target range  - Assess barriers to adequate nu strengthening/mobility  - Encourage toileting schedule  Outcome: Progressing     Problem: DISCHARGE PLANNING  Goal: Discharge to home or other facility with appropriate resources  Description: INTERVENTIONS:  - Identify barriers to discharge w/pt and careg and activity tolerance for standing, transferring and ambulating w/ or w/o assistive devices  - Assist with transfers and ambulation using safe patient handling equipment as needed  - Ensure adequate protection for wounds/incisions during mobilization  - O

## 2020-10-06 NOTE — PLAN OF CARE
Problem: Patient Centered Care  Goal: Patient preferences are identified and integrated in the patient's plan of care  Description: Interventions:  - What would you like us to know as we care for you?  Hx of abscesses  - Provide timely, complete, and accu Progressing     Problem: RISK FOR INFECTION - ADULT  Goal: Absence of fever/infection during anticipated neutropenic period  Description: INTERVENTIONS  - Monitor WBC  - Administer growth factors as ordered  - Implement neutropenic guidelines  Outcome: Pro

## 2020-10-07 ENCOUNTER — PATIENT OUTREACH (OUTPATIENT)
Dept: CASE MANAGEMENT | Age: 65
End: 2020-10-07

## 2020-10-07 ENCOUNTER — TELEPHONE (OUTPATIENT)
Dept: MEDSURG UNIT | Facility: HOSPITAL | Age: 65
End: 2020-10-07

## 2020-10-07 ENCOUNTER — TELEPHONE (OUTPATIENT)
Dept: INTERNAL MEDICINE CLINIC | Facility: CLINIC | Age: 65
End: 2020-10-07

## 2020-10-07 DIAGNOSIS — I73.9 PVD (PERIPHERAL VASCULAR DISEASE) (HCC): Primary | ICD-10-CM

## 2020-10-07 DIAGNOSIS — L02.31 LEFT BUTTOCK ABSCESS: ICD-10-CM

## 2020-10-07 DIAGNOSIS — Z02.9 ENCOUNTERS FOR ADMINISTRATIVE PURPOSE: ICD-10-CM

## 2020-10-07 PROCEDURE — 1111F DSCHRG MED/CURRENT MED MERGE: CPT

## 2020-10-07 NOTE — TELEPHONE ENCOUNTER
Dr. Ariana Winters, patient is requesting a referral to Dr. Katharine Vaz. Referral has been pended, please advise.

## 2020-10-07 NOTE — TELEPHONE ENCOUNTER
Per patient she needs a referral to go and see Dr Halle Donohue for follow up after surgery. She needs this referral so that she can make an appointment as soon as possible.

## 2020-10-08 NOTE — PROGRESS NOTES
Initial Post Discharge Follow Up   Discharge Date: 10/6/20  Contact Date: 10/7/2020    Consent Verification:  Assessment Completed With: Patient  HIPAA Verified?   Yes    Discharge Dx:   per arterial diease; gluteal abscess        General:   • How have y capsule 0   • hydrOXYzine HCl 50 MG Oral Tab Take 1 tablet (50 mg total) by mouth every 6 (six) hours as needed for Itching.  30 tablet 0   • Insulin Aspart Pen 100 UNIT/ML Subcutaneous Solution Pen-injector Inject 1-11 Units into the skin 3 (three) times d Tab EC Take 1 tablet (325 mg total) by mouth 3 (three) times daily with meals. 90 tablet 2   • lisinopril 40 MG Oral Tab Take 40 mg by mouth daily.        • (NCM)  Were there any medication changes noted on AVS?  yes  o If so, were these medication changes attention with the patient. She states that she is feeling better. She states she still has pain in the right leg but not any worse.  She has completed her Norco. NCM explained that she could use Tylenol but she simply stated that she does not use that and

## 2020-10-08 NOTE — DISCHARGE SUMMARY
[facility]    PATIENT'S NAME: WALT LOU   ATTENDING PHYSICIAN: Isamar Fuchs MD   PATIENT ACCOUNT#:   [id number]    LOCATION:  [address] [place] [address] RECORD #:   [id number]       YOB: 1955  ADMISSION DATE:       10/0 Soft.  EXTREMITIES:  Without significant edema. NEUROLOGIC:  She is alert, oriented, friendly, and cooperative. She understands my discharge instructions. I also discussed them with her sister with the patient's permission. ASSESSMENT AND PLAN:    1. tolerated. FOLLOWUP:  Dr. Groves Most as he wishes and Dr. Roman Orozco, call for followup advice. Return if fevers, chills, sweats, nausea, vomiting, chest pain, shortness of breath, or any other complaints. RISK OF READMISSION:  High. TCM followup recommended.

## 2020-10-13 ENCOUNTER — OFFICE VISIT (OUTPATIENT)
Dept: INTERNAL MEDICINE CLINIC | Facility: CLINIC | Age: 65
End: 2020-10-13

## 2020-10-13 VITALS
OXYGEN SATURATION: 97 % | SYSTOLIC BLOOD PRESSURE: 170 MMHG | BODY MASS INDEX: 28.99 KG/M2 | HEIGHT: 63 IN | TEMPERATURE: 98 F | DIASTOLIC BLOOD PRESSURE: 90 MMHG | HEART RATE: 96 BPM | WEIGHT: 163.63 LBS

## 2020-10-13 DIAGNOSIS — I73.9 PVD (PERIPHERAL VASCULAR DISEASE) (HCC): Primary | ICD-10-CM

## 2020-10-13 PROCEDURE — 3008F BODY MASS INDEX DOCD: CPT | Performed by: INTERNAL MEDICINE

## 2020-10-13 PROCEDURE — 3077F SYST BP >= 140 MM HG: CPT | Performed by: INTERNAL MEDICINE

## 2020-10-13 PROCEDURE — 1111F DSCHRG MED/CURRENT MED MERGE: CPT | Performed by: INTERNAL MEDICINE

## 2020-10-13 PROCEDURE — 99495 TRANSJ CARE MGMT MOD F2F 14D: CPT | Performed by: INTERNAL MEDICINE

## 2020-10-13 PROCEDURE — 3080F DIAST BP >= 90 MM HG: CPT | Performed by: INTERNAL MEDICINE

## 2020-10-13 RX ORDER — SULFAMETHOXAZOLE AND TRIMETHOPRIM 800; 160 MG/1; MG/1
1 TABLET ORAL 2 TIMES DAILY
Qty: 10 TABLET | Refills: 0 | Status: SHIPPED | OUTPATIENT
Start: 2020-10-13 | End: 2021-01-05

## 2020-10-13 RX ORDER — LANCETS
EACH MISCELLANEOUS
COMMUNITY
Start: 2020-10-08

## 2020-10-13 RX ORDER — HYDROCODONE BITARTRATE AND ACETAMINOPHEN 10; 325 MG/1; MG/1
1 TABLET ORAL EVERY 8 HOURS PRN
Qty: 30 TABLET | Refills: 0 | Status: SHIPPED | OUTPATIENT
Start: 2020-10-13 | End: 2020-10-22

## 2020-10-13 NOTE — PROGRESS NOTES
HPI:    Gale Elliott is a 63year old female here today for hospital follow up.    She was discharged from Inpatient hospital, [facility]  to Home   Admission Date: 10/2/20   Discharge Date: 10/6/20  Hospital Discharge Diagnoses (since 9/13/2020 Take 1 tablet (50 mg total) by mouth every 6 (six) hours as needed for Itching. •  Insulin Aspart Pen 100 UNIT/ML Subcutaneous Solution Pen-injector, Inject 1-11 Units into the skin 3 (three) times daily with meals.     •  rivaroxaban 2.5 MG Oral Tab, Ta anemia ([address] and Tobacco abuse. She  has a past surgical history that includes excis lumbar disk,one level.     She family history includes Diabetes in her brother, mother, and sister; Glaucoma in her sister; Kidney Disease in her sister; Sickle Cell in round, and reactive to light. EOM are normal.   Cardiovascular: Normal rate and regular rhythm. Exam reveals no gallop and no friction rub. No murmur heard. Pulmonary/Chest: Effort normal and breath sounds normal. No respiratory distress.  She has no w types were placed in this encounter.       Meds & Refills for this Visit:  Requested Prescriptions     Signed Prescriptions Disp Refills   • Sulfamethoxazole-TMP -160 MG Oral Tab per tablet 10 tablet 0     Sig: Take 1 tablet by mouth 2 (two) times stan

## 2020-10-13 NOTE — PROGRESS NOTES
HPI:   Jeison Rainey is a 63year old female who presents for a {Medicare Annual Wellness Description:340}.     ***  Annual Physical due on 2020        Fall/Risk Assessment   She has been screened for Falls and is low risk: Fall/Risk Scorin directives standard forms performed Face to Face with patient and Family/surrogate (if present), and forms available to patient in AVS\"}       She smoked tobacco in the past but just quit smoking in the last 12 months.   Social History    Tobacco Use HGB 7.9 (L) 10/06/2020    .0 10/06/2020        ALLERGIES:   She is allergic to penicillins.     CURRENT MEDICATIONS:     •  Accu-Chek FastClix Lancets Does not apply Misc,     •  HYDROcodone-acetaminophen  MG Oral Tab, Take 1 tablet by mouth lisinopril 40 MG Oral Tab, Take 40 mg by mouth daily.        MEDICAL INFORMATION:   She  has a past medical history of Anemia, Back pain, Chronic kidney disease (CKD), CKD (chronic kidney disease), COPD (chronic obstructive pulmonary disease) ([facility] [address] Diabet older PFS 0.5 ml (00153) 11/02/2019   • Pneumovax 23 11/26/2019        ASSESSMENT AND OTHER RELEVANT CHRONIC CONDITIONS:   Dariel Villalba is a 63year old female who presents for a Medicare Assessment.      PLAN SUMMARY:   Diagnoses and all orders for th and Pelvic      Pap: Every 3 yrs age 21-65 or Pap+HPV every 5 yrs age 33-67, age 72 and older at high risk Pap Smear,3 Years due on 11/26/2022 Update Health Maintenance if applicable    Chlamydia  Annually if high risk No results found for: CHLAMYDIA No fl found.       Diabetes      HgbA1C  Annually HEMOGLOBIN A1C (%)   Date Value   01/14/2020 7.8 (A)     HgbA1C (%)   Date Value   10/02/2020 8.2 (H)       No flowsheet data found.     Creat/alb ratio  Annually Malb/Cre Calc (ug/mg)   Date Value   09/03/2019 19

## 2020-10-13 NOTE — PATIENT INSTRUCTIONS
Ava Bowen's SCREENING SCHEDULE   Tests on this list are recommended by your physician but may not be covered, or covered at this frequency, by your insurer. Please check with your insurance carrier before scheduling to verify coverage.    Emerita Joya Screening  Covered up to Age 76     Colonoscopy Screen   Covered every 10 years- more often if abnormal Colonoscopy due on 01/15/2026 Update Health Maintenance if applicable    Flex Sigmoidoscopy Screen  Covered every 5 years No results found for this or a birthday    Pneumococcal 23 (Pneumovax)  Covered Once after 72 Orders placed or performed in visit on 11/26/19   • PNEUMOCOCCAL IMM (PNEUMOVAX)    Please get once after your 65th birthday    Hepatitis B for Moderate/High Risk       No orders found for this

## 2020-10-14 DIAGNOSIS — I73.9 PAD (PERIPHERAL ARTERY DISEASE) (HCC): Primary | ICD-10-CM

## 2020-10-19 ENCOUNTER — OFFICE VISIT (OUTPATIENT)
Dept: SURGERY | Facility: CLINIC | Age: 65
End: 2020-10-19

## 2020-10-19 ENCOUNTER — TELEPHONE (OUTPATIENT)
Dept: INTERNAL MEDICINE CLINIC | Facility: CLINIC | Age: 65
End: 2020-10-19

## 2020-10-19 DIAGNOSIS — L02.31 ABSCESS OF RIGHT BUTTOCK: Primary | ICD-10-CM

## 2020-10-19 PROCEDURE — 99243 OFF/OP CNSLTJ NEW/EST LOW 30: CPT | Performed by: SURGERY

## 2020-10-19 RX ORDER — SULFAMETHOXAZOLE AND TRIMETHOPRIM 800; 160 MG/1; MG/1
1 TABLET ORAL 2 TIMES DAILY
Qty: 28 TABLET | Refills: 0 | Status: SHIPPED | OUTPATIENT
Start: 2020-10-19 | End: 2020-10-22

## 2020-10-19 NOTE — H&P
Reason for Consultation:   Consults     History provided by:patient  HPI:   No chief complaint on file.     HPI  Consult requested for gluteal pain, swelling, possible abscess.  She has a history of R groin and other abscesses in the past.       History     Tab, TAKE 1 TABLET BY MOUTH TWICE A DAY     •  JANUVIA 100 MG Oral Tab, TAKE 1 TABLET BY MOUTH EVERY DAY     •  HYDROcodone-acetaminophen 5-325 MG Oral Tab, Take 1-2 tablets by mouth every 6 (six) hours as needed for Pain.     •  amLODIPine Besylate 10 MG blurred vision or double vision  HEENT: denies new nasal congestion, sinus pain or ST  LUNGS: denies shortness of breath with exertion  CARDIOVASCULAR: denies chest pain on exertion  GI: no hematemesis, no BRBPR, no worsening heartburn  : no dysuria, no 3.3 10/03/2020     TROP <0.045 11/08/2019     B12 963 09/03/2019      No results found.   Ekg 12-lead     Result Date: 10/3/2020  ECG Report  Interpretation  --------------------------         Impression:     PAD (peripheral artery disease) ([facility])       Misael

## 2020-10-19 NOTE — TELEPHONE ENCOUNTER
Received fax from [employer] [id number] for AccuChek meter, lancets, strips and for 100 pen needles for her insulin. Per patient she already has these items.

## 2020-10-22 ENCOUNTER — OFFICE VISIT (OUTPATIENT)
Dept: INTERNAL MEDICINE CLINIC | Facility: CLINIC | Age: 65
End: 2020-10-22

## 2020-10-22 VITALS
OXYGEN SATURATION: 97 % | HEART RATE: 80 BPM | DIASTOLIC BLOOD PRESSURE: 90 MMHG | HEIGHT: 62.25 IN | SYSTOLIC BLOOD PRESSURE: 160 MMHG | TEMPERATURE: 98 F | BODY MASS INDEX: 28.82 KG/M2 | WEIGHT: 158.63 LBS

## 2020-10-22 DIAGNOSIS — Z12.31 ENCOUNTER FOR SCREENING MAMMOGRAM FOR MALIGNANT NEOPLASM OF BREAST: ICD-10-CM

## 2020-10-22 DIAGNOSIS — E11.00 TYPE 2 DIABETES MELLITUS WITH HYPEROSMOLARITY WITHOUT COMA, WITH LONG-TERM CURRENT USE OF INSULIN (HCC): ICD-10-CM

## 2020-10-22 DIAGNOSIS — I10 ESSENTIAL HYPERTENSION: Primary | ICD-10-CM

## 2020-10-22 DIAGNOSIS — Z79.4 TYPE 2 DIABETES MELLITUS WITH HYPEROSMOLARITY WITHOUT COMA, WITH LONG-TERM CURRENT USE OF INSULIN (HCC): ICD-10-CM

## 2020-10-22 DIAGNOSIS — M79.651 RIGHT THIGH PAIN: ICD-10-CM

## 2020-10-22 DIAGNOSIS — I73.9 PVD (PERIPHERAL VASCULAR DISEASE) (HCC): ICD-10-CM

## 2020-10-22 PROCEDURE — 3008F BODY MASS INDEX DOCD: CPT | Performed by: INTERNAL MEDICINE

## 2020-10-22 PROCEDURE — 3077F SYST BP >= 140 MM HG: CPT | Performed by: INTERNAL MEDICINE

## 2020-10-22 PROCEDURE — 3080F DIAST BP >= 90 MM HG: CPT | Performed by: INTERNAL MEDICINE

## 2020-10-22 PROCEDURE — 99214 OFFICE O/P EST MOD 30 MIN: CPT | Performed by: INTERNAL MEDICINE

## 2020-10-22 RX ORDER — DOXYCYCLINE HYCLATE 100 MG
100 TABLET ORAL 2 TIMES DAILY
COMMUNITY
Start: 2020-08-13 | End: 2020-10-22

## 2020-10-22 RX ORDER — HYDROCODONE BITARTRATE AND ACETAMINOPHEN 10; 325 MG/1; MG/1
1 TABLET ORAL EVERY 8 HOURS PRN
Qty: 30 TABLET | Refills: 0 | Status: SHIPPED | OUTPATIENT
Start: 2020-10-22 | End: 2021-01-05

## 2020-10-22 RX ORDER — CYCLOBENZAPRINE HCL 10 MG
5 TABLET ORAL
COMMUNITY
End: 2021-01-05

## 2020-10-22 RX ORDER — HYDROCHLOROTHIAZIDE 25 MG/1
25 TABLET ORAL
COMMUNITY
End: 2020-10-22

## 2020-10-22 RX ORDER — BLOOD-GLUCOSE METER
1 EACH MISCELLANEOUS AS DIRECTED
COMMUNITY
Start: 2020-10-07

## 2020-10-22 RX ORDER — PEN NEEDLE, DIABETIC 32GX 5/32"
1 NEEDLE, DISPOSABLE MISCELLANEOUS 4 TIMES DAILY
COMMUNITY
Start: 2020-10-06

## 2020-10-22 RX ORDER — BLOOD SUGAR DIAGNOSTIC
STRIP MISCELLANEOUS
COMMUNITY
Start: 2020-10-07 | End: 2020-11-08

## 2020-10-22 RX ORDER — IBUPROFEN 600 MG/1
TABLET ORAL
COMMUNITY
Start: 2020-10-04 | End: 2020-10-22

## 2020-10-22 RX ORDER — METHYLPREDNISOLONE 4 MG/1
TABLET ORAL
COMMUNITY
Start: 2020-08-08 | End: 2020-10-22

## 2020-10-22 NOTE — PROGRESS NOTES
HPI:    Patient ID: Dariel Villalba is a 63year old female. HPI    She is here today for follow-up on her blood pressure and her pain. According to the patient she still complains of right leg pain it is not better.   She spoke with Dr. Tanvir Cordero she has Neurological: Negative for dizziness, tremors, speech difficulty, weakness, light-headedness, numbness and headaches. Hematological: Negative for adenopathy. Does not bruise/bleed easily.    Psychiatric/Behavioral: Negative for agitation, behavioral probl • gabapentin 300 MG Oral Cap Take 1 capsule (300 mg total) by mouth 2 (two) times daily. 180 capsule 0   • ADVAIR DISKUS 250-50 MCG/DOSE Inhalation Aerosol Powder, Breath Activated Inhale 1 puff into the lungs every 12 (twelve) hours.  3 each 1   • Clopidog • Glaucoma Sister    • Diabetes Brother    • Sickle Cell Son         Cause of death   • Macular degeneration Neg       Social History: Social History    Tobacco Use      Smoking status: Former Smoker        Packs/day: 1.00        Years: 30.00        Pack y Radial pulses are 2+ on the right side and 2+ on the left side. Femoral pulses are 2+ on the right side and 2+ on the left side. Popliteal pulses are 2+ on the right side and 2+ on the left side.         Dorsalis pedis pulses are 2+ on the Right thigh pain-etiology remains unclear it could be referred pain from her hip due to osteoarthritis less likely due to to her peripheral vascular disease I will refill her Norco for this  time and I did advise her to follow-up with the pain management. negative...

## 2020-11-04 RX ORDER — AMLODIPINE BESYLATE 10 MG/1
TABLET ORAL
Qty: 90 TABLET | Refills: 0 | Status: SHIPPED | OUTPATIENT
Start: 2020-11-04 | End: 2021-02-09

## 2020-11-08 RX ORDER — DOXEPIN HYDROCHLORIDE 25 MG/1
CAPSULE ORAL
Qty: 30 CAPSULE | Refills: 0 | Status: SHIPPED | OUTPATIENT
Start: 2020-11-08 | End: 2020-11-23

## 2020-11-08 RX ORDER — BLOOD SUGAR DIAGNOSTIC
STRIP MISCELLANEOUS
Qty: 100 STRIP | Refills: 0 | Status: SHIPPED | OUTPATIENT
Start: 2020-11-08

## 2020-11-23 ENCOUNTER — TELEPHONE (OUTPATIENT)
Dept: INTERNAL MEDICINE CLINIC | Facility: CLINIC | Age: 65
End: 2020-11-23

## 2020-11-23 RX ORDER — DOXEPIN HYDROCHLORIDE 25 MG/1
25 CAPSULE ORAL NIGHTLY
Qty: 90 CAPSULE | Refills: 0 | Status: SHIPPED | OUTPATIENT
Start: 2020-11-23 | End: 2021-02-02

## 2020-12-23 ENCOUNTER — TELEPHONE (OUTPATIENT)
Dept: ENDOCRINOLOGY CLINIC | Facility: CLINIC | Age: 65
End: 2020-12-23

## 2020-12-24 NOTE — TELEPHONE ENCOUNTER
Called patient to inquire about refill. Patient states medication prescribed by dr. Charles Ortiz. Please advise.

## 2020-12-27 NOTE — TELEPHONE ENCOUNTER
Please call and book a phone visit if patient is okay with it , this Wednesday  a1c is high at 8.2 %   If she wants this to be addressed by Dr. Ariana Winters then please have patient call their office and forward this encounter to Dr Lorette Shone staff    Thanks

## 2020-12-28 ENCOUNTER — HOSPITAL ENCOUNTER (OUTPATIENT)
Dept: ULTRASOUND IMAGING | Facility: HOSPITAL | Age: 65
Discharge: HOME | End: 2020-12-28
Attending: CLINICAL NURSE SPECIALIST
Payer: COMMERCIAL

## 2020-12-28 DIAGNOSIS — I73.9 PAD (PERIPHERAL ARTERY DISEASE) (HCC): ICD-10-CM

## 2020-12-28 PROCEDURE — 93926 LOWER EXTREMITY STUDY: CPT | Performed by: CLINICAL NURSE SPECIALIST

## 2020-12-28 RX ORDER — TRAMADOL HYDROCHLORIDE 50 MG/1
TABLET ORAL EVERY 8 HOURS PRN
Qty: 20 TABLET | Refills: 0 | Status: SHIPPED | OUTPATIENT
Start: 2020-12-28 | End: 2021-02-02

## 2020-12-30 ENCOUNTER — TELEMEDICINE (OUTPATIENT)
Dept: ENDOCRINOLOGY CLINIC | Facility: CLINIC | Age: 65
End: 2020-12-30

## 2020-12-30 DIAGNOSIS — E11.65 TYPE 2 DIABETES MELLITUS WITH HYPERGLYCEMIA, WITH LONG-TERM CURRENT USE OF INSULIN (HCC): Primary | ICD-10-CM

## 2020-12-30 DIAGNOSIS — Z79.4 TYPE 2 DIABETES MELLITUS WITH HYPERGLYCEMIA, WITH LONG-TERM CURRENT USE OF INSULIN (HCC): Primary | ICD-10-CM

## 2020-12-30 DIAGNOSIS — E78.5 DYSLIPIDEMIA: ICD-10-CM

## 2020-12-30 PROCEDURE — 99214 OFFICE O/P EST MOD 30 MIN: CPT | Performed by: INTERNAL MEDICINE

## 2020-12-30 NOTE — PROGRESS NOTES
Spoke to pt. Daughter. Informed her about results. Recommendations and medication. Pt daughter verbalized understanding    Telehealth outside of [address] Verbal Consent   I conducted a telehealth visit with Sandra Carnes today, 12/30/20, which was completed using two-way, real-time interactive audio and video communication.  This has been done in good santhosh to pr yes     ASSOCIATED COMPLICATIONS:   HTN: yes  Hyperlipidemia: denies, noted high TG on labs  Coronary Artery Disease:  denies  Cerebrovascular Disease: denies  + PAD        HOME GLUCOSE READINGS:   Before  lunch  160-180     No low BG under 70        CURRE with meals.  60 tablet 0   • HYDRALAZINE HCL 25 MG Oral Tab TAKE 1 TABLET BY MOUTH TWICE A  tablet 0   • Albuterol Sulfate  (90 Base) MCG/ACT Inhalation Aero Soln Inhale 1 puff into the lungs every 4 (four) hours as needed for Wheezing or Hanna constipation, bleeding  Neurology: Negative for: headache, numbness, weakness  Genito-Urinary: Negative for: dysuria, frequency  Psychiatric: Negative for:  depression, anxiety  Hematology/Lymphatics: Negative for: bruising, lower extremity edema  Endocrin Discussed importance of good BG control. c). Discussed importance of annual eye exams. d). Foot exam: Daily feet exam explained. e). BG log maintainence explained in great detail, to get log and glucometer on next visit. f).  Life style changes review

## 2021-01-07 ENCOUNTER — HOSPITAL ENCOUNTER (OUTPATIENT)
Dept: INTERVENTIONAL RADIOLOGY/VASCULAR | Facility: HOSPITAL | Age: 66
Discharge: HOME | End: 2021-01-07
Attending: RADIOLOGY | Admitting: RADIOLOGY
Payer: COMMERCIAL

## 2021-01-07 VITALS
WEIGHT: 170 LBS | RESPIRATION RATE: 17 BRPM | DIASTOLIC BLOOD PRESSURE: 73 MMHG | BODY MASS INDEX: 31 KG/M2 | SYSTOLIC BLOOD PRESSURE: 158 MMHG | TEMPERATURE: 99 F | HEART RATE: 68 BPM | OXYGEN SATURATION: 98 %

## 2021-01-07 DIAGNOSIS — I73.9 PAD (PERIPHERAL ARTERY DISEASE) (HCC): ICD-10-CM

## 2021-01-07 LAB
ANION GAP SERPL CALC-SCNC: 6 MMOL/L (ref 0–18)
BUN BLD-MCNC: 23 MG/DL (ref 7–18)
BUN/CREAT SERPL: 13.1 (ref 10–20)
CALCIUM BLD-MCNC: 10.3 MG/DL (ref 8.5–10.1)
CHLORIDE SERPL-SCNC: 109 MMOL/L (ref 98–112)
CO2 SERPL-SCNC: 28 MMOL/L (ref 21–32)
CREAT BLD-MCNC: 1.75 MG/DL
DEPRECATED RDW RBC AUTO: 40.6 FL (ref 35.1–46.3)
ERYTHROCYTE [DISTWIDTH] IN BLOOD BY AUTOMATED COUNT: 14.6 % (ref 11–15)
GLUCOSE BLD-MCNC: 200 MG/DL (ref 70–99)
GLUCOSE BLDC GLUCOMTR-MCNC: 215 MG/DL (ref 70–99)
HCT VFR BLD AUTO: 30 %
HGB BLD-MCNC: 9.9 G/DL
ISTAT ACTIVATED CLOTTING TIME: 186 SECONDS (ref 125–137)
MCH RBC QN AUTO: 25.8 PG (ref 26–34)
MCHC RBC AUTO-ENTMCNC: 33 G/DL (ref 31–37)
MCV RBC AUTO: 78.3 FL
OSMOLALITY SERPL CALC.SUM OF ELEC: 305 MOSM/KG (ref 275–295)
PLATELET # BLD AUTO: 399 10(3)UL (ref 150–450)
POTASSIUM SERPL-SCNC: 3.6 MMOL/L (ref 3.5–5.1)
RBC # BLD AUTO: 3.83 X10(6)UL
SARS-COV-2 RNA RESP QL NAA+PROBE: NOT DETECTED
SODIUM SERPL-SCNC: 143 MMOL/L (ref 136–145)
WBC # BLD AUTO: 9.7 X10(3) UL (ref 4–11)

## 2021-01-07 PROCEDURE — 047L3ZZ DILATION OF LEFT FEMORAL ARTERY, PERCUTANEOUS APPROACH: ICD-10-PCS | Performed by: RADIOLOGY

## 2021-01-07 PROCEDURE — 99153 MOD SED SAME PHYS/QHP EA: CPT

## 2021-01-07 PROCEDURE — 85027 COMPLETE CBC AUTOMATED: CPT | Performed by: RADIOLOGY

## 2021-01-07 PROCEDURE — 36415 COLL VENOUS BLD VENIPUNCTURE: CPT

## 2021-01-07 PROCEDURE — X27J385 DILATION OF LEFT FEMORAL ARTERY WITH SUSTAINED RELEASE DRUG-ELUTING INTRALUMINAL DEVICE, PERCUTANEOUS APPROACH, NEW TECHNOLOGY GROUP 5: ICD-10-PCS | Performed by: RADIOLOGY

## 2021-01-07 PROCEDURE — 82962 GLUCOSE BLOOD TEST: CPT

## 2021-01-07 PROCEDURE — 75710 ARTERY X-RAYS ARM/LEG: CPT

## 2021-01-07 PROCEDURE — 85347 COAGULATION TIME ACTIVATED: CPT

## 2021-01-07 PROCEDURE — 37226 HC TRANSLUM ANGIOPLASTY W STENT FEM POP UNILAT: CPT

## 2021-01-07 PROCEDURE — 37228 HC TRANSLUMINAL ANGIO TIBIAL PERONEAL UNILAT INIT: CPT

## 2021-01-07 PROCEDURE — 80048 BASIC METABOLIC PNL TOTAL CA: CPT | Performed by: RADIOLOGY

## 2021-01-07 PROCEDURE — 99152 MOD SED SAME PHYS/QHP 5/>YRS: CPT

## 2021-01-07 RX ORDER — HEPARIN SODIUM 1000 [USP'U]/ML
INJECTION, SOLUTION INTRAVENOUS; SUBCUTANEOUS
Status: COMPLETED
Start: 2021-01-07 | End: 2021-01-07

## 2021-01-07 RX ORDER — SODIUM CHLORIDE 9 MG/ML
INJECTION, SOLUTION INTRAVENOUS CONTINUOUS
Status: DISCONTINUED | OUTPATIENT
Start: 2021-01-07 | End: 2021-01-07

## 2021-01-07 RX ORDER — LIDOCAINE HYDROCHLORIDE 20 MG/ML
INJECTION, SOLUTION EPIDURAL; INFILTRATION; INTRACAUDAL; PERINEURAL
Status: COMPLETED
Start: 2021-01-07 | End: 2021-01-07

## 2021-01-07 RX ORDER — PROTAMINE SULFATE 10 MG/ML
INJECTION, SOLUTION INTRAVENOUS
Status: COMPLETED
Start: 2021-01-07 | End: 2021-01-07

## 2021-01-07 RX ORDER — MIDAZOLAM HYDROCHLORIDE 1 MG/ML
INJECTION INTRAMUSCULAR; INTRAVENOUS
Status: COMPLETED
Start: 2021-01-07 | End: 2021-01-07

## 2021-01-07 RX ORDER — HYDROCODONE BITARTRATE AND ACETAMINOPHEN 10; 325 MG/1; MG/1
1 TABLET ORAL EVERY 8 HOURS PRN
Qty: 10 TABLET | Refills: 0 | Status: SHIPPED | OUTPATIENT
Start: 2021-01-07 | End: 2021-02-02

## 2021-01-07 NOTE — IVS NOTE
DISCHARGE NOTE     Pt is able to sit up and ambulate without difficulty. Pt voided and tolerated fluids and food. Procedural site remains dry and intact with good circulation, motion, and sensation. No signs and symptoms of bleeding/hematoma noted.  Instr

## 2021-01-07 NOTE — PROCEDURES
[facility] HOSP - [facility]  Procedure Note    Leida Rubinstein Patient Status:  Outpatient in a Bed    10/6/1955 MRN [id number]   Location [facility] Attending Jenny Marte MD   Hosp Day # 0 PCP Sarah Barksdale MD     Proce

## 2021-01-07 NOTE — PRE-SEDATION ASSESSMENT
[facility]  IR Pre-Procedure Sedation Assessment    History of snoring or sleep or apnea?    No    History of previous problems with anesthesia or sedation  No    Physical Findings:  Neck: nl ROM  CV: RRR  PULM: normal respiratory rate/effor

## 2021-01-07 NOTE — INTERVAL H&P NOTE
The above referenced H&P was reviewed by Yves Olsen MD on 1/7/2021, the patient was examined and no significant changes have occurred in the patient's condition since the H&P was performed.   Risks, benefits, alternative treatments and consequences

## 2021-01-26 ENCOUNTER — TELEPHONE (OUTPATIENT)
Dept: INTERNAL MEDICINE CLINIC | Facility: CLINIC | Age: 66
End: 2021-01-26

## 2021-02-02 ENCOUNTER — OFFICE VISIT (OUTPATIENT)
Dept: INTERNAL MEDICINE CLINIC | Facility: CLINIC | Age: 66
End: 2021-02-02

## 2021-02-02 VITALS
DIASTOLIC BLOOD PRESSURE: 80 MMHG | SYSTOLIC BLOOD PRESSURE: 160 MMHG | BODY MASS INDEX: 29.43 KG/M2 | WEIGHT: 162 LBS | HEART RATE: 70 BPM | HEIGHT: 62.25 IN

## 2021-02-02 DIAGNOSIS — Z12.2 ENCOUNTER FOR SCREENING FOR LUNG CANCER: ICD-10-CM

## 2021-02-02 DIAGNOSIS — E11.00 TYPE 2 DIABETES MELLITUS WITH HYPEROSMOLARITY WITHOUT COMA, WITHOUT LONG-TERM CURRENT USE OF INSULIN (HCC): Primary | ICD-10-CM

## 2021-02-02 LAB
EST. AVERAGE GLUCOSE BLD GHB EST-MCNC: 174 MG/DL (ref 68–126)
HBA1C MFR BLD HPLC: 7.7 % (ref ?–5.7)

## 2021-02-02 PROCEDURE — 3051F HG A1C>EQUAL 7.0%<8.0%: CPT | Performed by: INTERNAL MEDICINE

## 2021-02-02 PROCEDURE — 3079F DIAST BP 80-89 MM HG: CPT | Performed by: INTERNAL MEDICINE

## 2021-02-02 PROCEDURE — 3008F BODY MASS INDEX DOCD: CPT | Performed by: INTERNAL MEDICINE

## 2021-02-02 PROCEDURE — 36415 COLL VENOUS BLD VENIPUNCTURE: CPT | Performed by: INTERNAL MEDICINE

## 2021-02-02 PROCEDURE — 99215 OFFICE O/P EST HI 40 MIN: CPT | Performed by: INTERNAL MEDICINE

## 2021-02-02 PROCEDURE — 3077F SYST BP >= 140 MM HG: CPT | Performed by: INTERNAL MEDICINE

## 2021-02-02 RX ORDER — CLOPIDOGREL BISULFATE 75 MG/1
75 TABLET ORAL DAILY
Qty: 90 TABLET | Refills: 2 | Status: SHIPPED | OUTPATIENT
Start: 2021-02-02 | End: 2021-02-04

## 2021-02-02 RX ORDER — HYDROCODONE BITARTRATE AND ACETAMINOPHEN 10; 325 MG/1; MG/1
1 TABLET ORAL EVERY 8 HOURS PRN
Qty: 60 TABLET | Refills: 0 | Status: SHIPPED | OUTPATIENT
Start: 2021-02-02 | End: 2021-04-06

## 2021-02-02 NOTE — PROGRESS NOTES
HPI:    Patient ID: Shanice Cano is a 63year old female. HPI    She came in today for follow-up by interventional radiology and had angio plasty .   According to her she continues to have pain they took her off Xarelto and advised to continue only speech difficulty, weakness, light-headedness, numbness and headaches. Hematological: Negative for adenopathy. Does not bruise/bleed easily.    Psychiatric/Behavioral: Negative for agitation, behavioral problems, confusion, hallucinations and sleep distur every 12 (twelve) hours. 3 each 1   • ATORVASTATIN 20 MG Oral Tab TAKE 1 TABLET BY MOUTH EVERY DAY AT NIGHT 90 tablet 0   • aspirin 81 MG Oral Tab Take 81 mg by mouth daily.      • ipratropium-albuterol 0.5-2.5 (3) MG/3ML Inhalation Solution Take 3 mL by ne Tobacco Use      Smoking status: Former Smoker        Packs/day: 1.00        Years: 30.00        Pack years: 30        Types: Cigarettes        Quit date: 2019        Years since quittin.2      Smokeless tobacco: Never Used    Alcohol use: Not Cur distension and no mass. There is no hepatosplenomegaly. There is no abdominal tenderness. There is no rigidity, no rebound, no guarding and no CVA tenderness. Musculoskeletal: Normal range of motion. General: No edema.    Lymphadenopathy:     She mouth every 8 (eight) hours as needed for Pain.        Imaging & Referrals:  OPHTHALMOLOGY - INTERNAL  CT LUNG LD SCREENING(CPT=71271)        OP#[id number]

## 2021-02-02 NOTE — PATIENT INSTRUCTIONS
htn- not well controlled , increase hydralazine to 50 mg tid, continue with lisinopril, amlodpine , check bp at home and bring log book next vistt    Severe pvd - s/p angioplasty - continue plavix, will add norco for pain control, follow up with pain md

## 2021-02-04 ENCOUNTER — DOCUMENTATION ONLY (OUTPATIENT)
Dept: PAIN CLINIC | Facility: HOSPITAL | Age: 66
End: 2021-02-04

## 2021-02-04 ENCOUNTER — OFFICE VISIT (OUTPATIENT)
Dept: PAIN CLINIC | Facility: HOSPITAL | Age: 66
End: 2021-02-04
Attending: INTERNAL MEDICINE
Payer: COMMERCIAL

## 2021-02-04 VITALS — WEIGHT: 158 LBS | HEIGHT: 60 IN | RESPIRATION RATE: 18 BRPM | BODY MASS INDEX: 31.02 KG/M2

## 2021-02-04 DIAGNOSIS — M79.651 RIGHT THIGH PAIN: ICD-10-CM

## 2021-02-04 DIAGNOSIS — I73.9 LEFT LEG CLAUDICATION (HCC): ICD-10-CM

## 2021-02-04 DIAGNOSIS — I73.9 PAD (PERIPHERAL ARTERY DISEASE) (HCC): ICD-10-CM

## 2021-02-04 DIAGNOSIS — M54.42 LOW BACK PAIN DUE TO BILATERAL SCIATICA: Primary | ICD-10-CM

## 2021-02-04 DIAGNOSIS — M54.41 LOW BACK PAIN DUE TO BILATERAL SCIATICA: Primary | ICD-10-CM

## 2021-02-04 PROCEDURE — 99211 OFF/OP EST MAY X REQ PHY/QHP: CPT

## 2021-02-04 RX ORDER — PREGABALIN 25 MG/1
25 CAPSULE ORAL 2 TIMES DAILY
Qty: 60 CAPSULE | Refills: 0 | Status: SHIPPED | OUTPATIENT
Start: 2021-02-04 | End: 2021-03-06

## 2021-02-04 NOTE — PROGRESS NOTES
02/04/2021-Presents ambutory to [facility] to establish care; NEW CONSULT C/O LBP RADIATING DOWN BILAT. LOER EXTREMITIES; rates her pain 8/10;   Pt referred to [facility] by Dr. Perico Baldwin;  Pt has hx of multiple angiogram /c stent placement, last  1/7/21 by Dr. Starla Aguilar;  Since

## 2021-02-04 NOTE — PROGRESS NOTES
Procedure code [address] [place] [facility]--- PA started via [facility] [employer] web portal   All clinicals submitted    Ref # [id number]

## 2021-02-04 NOTE — CHRONIC PAIN
[facility]  Pain Consultation     HISTORY OF PRESENT ILLNESS:  Jennifer Monet is a 63year old old female referred to the pain clinic by Dr. Estevez for Low back pain due to bilateral sciatica  (primary encounter diagnosis)  PAD (periphera Medication Sig Dispense Refill   • HYDROcodone-acetaminophen  MG Oral Tab Take 1 tablet by mouth every 8 (eight) hours as needed for Pain. 60 tablet 0   • SITagliptin Phosphate (JANUVIA) 100 MG Oral Tab Take 1 tablet (100 mg total) by mouth daily. every 6 (six) hours as needed (shortness of breath refractory to [facility]). 100 vial 0   • ferrous sulfate 325 (65 FE) MG Oral Tab EC Take 1 tablet (325 mg total) by mouth 3 (three) times daily with meals.  90 tablet 2   • lisinopril 40 MG Oral Tab Take 40 mg by Diabetes ([facility] [address]     DM neuropathy   • Essential hypertension    • H/O angioplasty     07/08/2020   • High blood pressure    • High cholesterol    • Peripheral vascular disease (HCC)    • PNA (pneumonia) 05/2018   • Pulmonary nodule     4 mm RUL   • Renal di member of club or organization: Not on file        Attends meetings of clubs or organizations: Not on file        Relationship status: Not on file      Intimate partner violence        Fear of current or ex partner: Not on file        Emotionally abused: N IMAGING:  PROCEDURE:  XR LUMBAR SPINE (MIN 2 VIEWS) (CPT=72100)     COMPARISON: [facility], XR LUMBAR SPINE (MIN 2 VIEWS) (CPT=72100), 4/25/2019, 10:05 AM.     INDICATIONS:  Right posterior thigh to right hip pain x two days.   No known suspected pathology. Images were performed before and after the administration of intravenous gadolinium   contrast.       FINDINGS:          PARASPINAL AREA:     Normal with no visible mass. BONES:             Levoscoliosis and multilevel spondylosis. Levoscoliosis and multilevel lumbar spondylosis. Disc osteophyte complex at each lumbar levels contributes to ventral effacement of the thecal sac. Hypertrophic facet arthrosis throughout. 2. L3-4: Severe central spinal stenosis on a multifactorial basis. and draped in sterile fashion. Under sonographic guidance, retrograde access to the right common femoral artery was obtained with a 21G needle.   Over a guidewire, a 6Fr vascular sheath was placed.     A 5 [employer] Omni flush catheter was advanced level balloon angioplasty. Post angioplasty angiography demonstrates residual moderate grade (50%) stenosis of the proximal superficial femoral artery.   Given this finding and history of recent drug-coated balloon angioplasty, endovascular stent placement was u angiography demonstrates occlusion of existing stents within the left superficial femoral artery.     Successful 5 mm drug-eluting stent placement within the left superficial femoral artery resulting in resolution of stenoses.     Successful 4 mm balloon a inflammatories, muscle relaxants, neuropathic medications, oral steroids, analgesics), injections, and further testing. Risks and benefits of all options were discussed at length to patients satisfaction during a comprehensive interactive discussion.  All q

## 2021-02-09 RX ORDER — HYDRALAZINE HYDROCHLORIDE 25 MG/1
TABLET, FILM COATED ORAL
Qty: 180 TABLET | Refills: 0 | Status: SHIPPED | OUTPATIENT
Start: 2021-02-09 | End: 2021-05-27

## 2021-02-09 RX ORDER — ATORVASTATIN CALCIUM 20 MG/1
TABLET, FILM COATED ORAL
Qty: 90 TABLET | Refills: 0 | Status: SHIPPED | OUTPATIENT
Start: 2021-02-09 | End: 2021-07-22

## 2021-02-09 RX ORDER — AMLODIPINE BESYLATE 10 MG/1
TABLET ORAL
Qty: 90 TABLET | Refills: 0 | Status: SHIPPED | OUTPATIENT
Start: 2021-02-09 | End: 2021-06-16

## 2021-02-12 ENCOUNTER — DOCUMENTATION ONLY (OUTPATIENT)
Dept: PAIN CLINIC | Facility: HOSPITAL | Age: 66
End: 2021-02-12

## 2021-02-12 ENCOUNTER — TELEPHONE (OUTPATIENT)
Dept: INTERVENTIONAL RADIOLOGY/VASCULAR | Facility: HOSPITAL | Age: 66
End: 2021-02-12

## 2021-02-12 NOTE — PROGRESS NOTES
Procedure code 40080---86/22/2021    [facility] -- PA received   auth # [id number]  Valid from 02/04/21---05/05/21      Order form faxed to the surgery center, confirmation rcv;d

## 2021-02-24 ENCOUNTER — SURGERY CENTER DOCUMENTATION (OUTPATIENT)
Dept: SURGERY | Age: 66
End: 2021-02-24

## 2021-02-24 NOTE — PROCEDURES
Joseph [address].            Patient: Melissa Lew  MR #: [id number]  : 10/6/1955        Operative Report        Date of procedure: 2021    Preoperative diagnosis:  Failed back surgery syndrome/ radiculopathy  Postop diagnosis:F

## 2021-03-01 ENCOUNTER — TELEPHONE (OUTPATIENT)
Dept: INTERNAL MEDICINE CLINIC | Facility: CLINIC | Age: 66
End: 2021-03-01

## 2021-03-01 NOTE — TELEPHONE ENCOUNTER
Spoke with Janee Arteaga from [facility], pt   verified, she stated pt told her that she takes hydralazine 25 mg  2 po BID. Per EMR, hydralazine 25 mg one tab BID.    Per LOV 21 notes - htn- not well controlled , increase hydralazine to 50 mg tid, continue

## 2021-03-09 ENCOUNTER — OFFICE VISIT (OUTPATIENT)
Dept: PAIN CLINIC | Facility: HOSPITAL | Age: 66
End: 2021-03-09
Attending: NURSE PRACTITIONER
Payer: COMMERCIAL

## 2021-03-09 VITALS — OXYGEN SATURATION: 95 % | HEART RATE: 70 BPM | DIASTOLIC BLOOD PRESSURE: 77 MMHG | SYSTOLIC BLOOD PRESSURE: 177 MMHG

## 2021-03-09 DIAGNOSIS — M79.651 RIGHT THIGH PAIN: ICD-10-CM

## 2021-03-09 DIAGNOSIS — M54.41 LOW BACK PAIN DUE TO BILATERAL SCIATICA: Primary | ICD-10-CM

## 2021-03-09 DIAGNOSIS — I73.9 PAD (PERIPHERAL ARTERY DISEASE) (HCC): ICD-10-CM

## 2021-03-09 DIAGNOSIS — I73.9 LEFT LEG CLAUDICATION (HCC): ICD-10-CM

## 2021-03-09 DIAGNOSIS — M54.42 LOW BACK PAIN DUE TO BILATERAL SCIATICA: Primary | ICD-10-CM

## 2021-03-09 RX ORDER — PREGABALIN 25 MG/1
25 CAPSULE ORAL 3 TIMES DAILY
Qty: 90 CAPSULE | Refills: 1 | Status: SHIPPED | OUTPATIENT
Start: 2021-03-09 | End: 2021-04-08

## 2021-03-09 RX ORDER — HYDROCODONE BITARTRATE AND ACETAMINOPHEN 10; 325 MG/1; MG/1
1 TABLET ORAL DAILY PRN
Qty: 30 TABLET | Refills: 0 | Status: SHIPPED | OUTPATIENT
Start: 2021-03-09 | End: 2021-04-06

## 2021-03-09 RX ORDER — PREGABALIN 25 MG/1
25 CAPSULE ORAL 2 TIMES DAILY
Qty: 60 CAPSULE | Refills: 0 | OUTPATIENT
Start: 2021-03-09 | End: 2021-04-08

## 2021-03-09 NOTE — CHRONIC PAIN
Follow-up Note  CC: injection follow up   HISTORY OF PRESENT ILLNESS:  Kirk Ziegler is a 63year old old female, originally referred to the pain clinic by Dr. Tia torres.  provider found, with history of Low back pain due to bilateral sciatica  (primary en (JANUVIA) 100 MG Oral Tab Take 1 tablet (100 mg total) by mouth daily. 90 tablet 0   • HYDROcodone-acetaminophen 5-325 MG Oral Tab Take 1-2 tablets by mouth every 6 (six) hours as needed for Pain.  (Patient not taking: Reported on 2/2/2021 ) 30 tablet 0   • above  Weight Loss: Negative   Fever: Negative   Cardiovascular:  No current chest pain or palpitations   Respiratory:  No current shortness of breath   Gastrointestinal:  No active ulcer  Genitourinary:  Negative  Integumentary :  Negative  Psychiatric: Diabetes Brother    • Sickle Cell Son         Cause of death   • Macular degeneration Neg        SOCIAL HISTORY:  Social History    Socioeconomic History      Marital status: Single      Spouse name: Not on file      Number of children: Not on file      [place] oriented x3, NAD, appears stated age, appropriate disposition and demeanor, answers questions appropriately   Head: normocephalic, atraumatic  Eyes: anicteric; no injection  Ears: no obvious deformities noted   Nose: externally grossly within normal limits analgesics), injections, and further testing. Risks and benefits of all options were discussed at length to patients satisfaction during a comprehensive interactive discussion. All questions were answered during extended questions and answer session.  Cathi

## 2021-03-12 ENCOUNTER — DOCUMENTATION ONLY (OUTPATIENT)
Dept: PAIN CLINIC | Facility: HOSPITAL | Age: 66
End: 2021-03-12

## 2021-03-12 NOTE — PROGRESS NOTES
Procedure code 37136---IISLMQI APPROVAL       [employer]--PA needed via [facility] web portal   All clinicals submitted via the web portal   Once case has been approved writer will reach out to pt and schedule procedure

## 2021-03-17 DIAGNOSIS — Z23 NEED FOR VACCINATION: ICD-10-CM

## 2021-03-21 RX ORDER — LISINOPRIL 40 MG/1
TABLET ORAL
Qty: 90 TABLET | Refills: 1 | Status: SHIPPED | OUTPATIENT
Start: 2021-03-21 | End: 2021-09-17

## 2021-03-25 RX ORDER — HYDRALAZINE HYDROCHLORIDE 50 MG/1
TABLET, FILM COATED ORAL
Qty: 90 TABLET | Refills: 0 | Status: SHIPPED | OUTPATIENT
Start: 2021-03-25 | End: 2021-04-17

## 2021-03-27 ENCOUNTER — TELEPHONE (OUTPATIENT)
Dept: ENDOCRINOLOGY CLINIC | Facility: CLINIC | Age: 66
End: 2021-03-27

## 2021-03-27 RX ORDER — SITAGLIPTIN 100 MG/1
TABLET, FILM COATED ORAL
Qty: 90 TABLET | Refills: 0 | Status: SHIPPED | OUTPATIENT
Start: 2021-03-27 | End: 2021-09-15

## 2021-03-30 ENCOUNTER — DOCUMENTATION ONLY (OUTPATIENT)
Dept: PAIN CLINIC | Facility: HOSPITAL | Age: 66
End: 2021-03-30

## 2021-03-30 NOTE — PROGRESS NOTES
Procedure code 62264--APPROVED 4/22/2021      [facility]-- krissy lopes'nicholas pt scheduled for 4/22/2021.      auth # [id number]  Valid from 03/12/21---06/10/21

## 2021-04-05 RX ORDER — RIVAROXABAN 10 MG/1
TABLET, FILM COATED ORAL
Qty: 20 TABLET | Refills: 0 | Status: SHIPPED | OUTPATIENT
Start: 2021-04-05 | End: 2021-04-06

## 2021-04-06 ENCOUNTER — OFFICE VISIT (OUTPATIENT)
Dept: INTERNAL MEDICINE CLINIC | Facility: CLINIC | Age: 66
End: 2021-04-06

## 2021-04-06 ENCOUNTER — NURSE TRIAGE (OUTPATIENT)
Dept: INTERNAL MEDICINE CLINIC | Facility: CLINIC | Age: 66
End: 2021-04-06

## 2021-04-06 VITALS
DIASTOLIC BLOOD PRESSURE: 85 MMHG | SYSTOLIC BLOOD PRESSURE: 165 MMHG | HEIGHT: 60 IN | OXYGEN SATURATION: 98 % | HEART RATE: 80 BPM | WEIGHT: 165 LBS | BODY MASS INDEX: 32.39 KG/M2

## 2021-04-06 DIAGNOSIS — M54.16 SPINAL STENOSIS OF LUMBAR REGION WITH RADICULOPATHY: Primary | ICD-10-CM

## 2021-04-06 DIAGNOSIS — M48.061 SPINAL STENOSIS OF LUMBAR REGION WITH RADICULOPATHY: Primary | ICD-10-CM

## 2021-04-06 PROCEDURE — 99214 OFFICE O/P EST MOD 30 MIN: CPT | Performed by: INTERNAL MEDICINE

## 2021-04-06 PROCEDURE — 3077F SYST BP >= 140 MM HG: CPT | Performed by: INTERNAL MEDICINE

## 2021-04-06 PROCEDURE — 3079F DIAST BP 80-89 MM HG: CPT | Performed by: INTERNAL MEDICINE

## 2021-04-06 PROCEDURE — 3008F BODY MASS INDEX DOCD: CPT | Performed by: INTERNAL MEDICINE

## 2021-04-06 RX ORDER — CLOPIDOGREL BISULFATE 75 MG/1
75 TABLET ORAL DAILY
COMMUNITY
End: 2021-09-14

## 2021-04-06 RX ORDER — HYDROCODONE BITARTRATE AND ACETAMINOPHEN 10; 325 MG/1; MG/1
1 TABLET ORAL EVERY 6 HOURS PRN
Qty: 30 TABLET | Refills: 0 | Status: SHIPPED | OUTPATIENT
Start: 2021-04-06 | End: 2021-05-06

## 2021-04-06 RX ORDER — CYCLOBENZAPRINE HCL 5 MG
5 TABLET ORAL NIGHTLY PRN
Qty: 20 TABLET | Refills: 0 | Status: SHIPPED | OUTPATIENT
Start: 2021-04-06 | End: 2021-04-27

## 2021-04-06 NOTE — TELEPHONE ENCOUNTER
Pt stated that she has been having back pain since 2015. Pt had a procedure done at the pain clinic on 2/24/2021 and it only helped her for 2 weeks.  (Caudal epidural steroid injection fluoroscopic guidance and  contrast) Pt started to have the lower back p

## 2021-04-06 NOTE — PROGRESS NOTES
HPI/Subjective:     Patient ID: Redd Gabriel is a 63year old female. HPI she came in today complaining of lower back pain.   According to the patient she was seen by pain management beginning of March, for follow-up after her injection she did not arthralgias, gait problem, joint swelling, myalgias, neck pain and neck stiffness. Allergic/Immunologic: Negative. Neurological: Negative for dizziness, tremors, speech difficulty, weakness, light-headedness, numbness and headaches.    Hematological: N 1 pen 1   • Albuterol Sulfate  (90 Base) MCG/ACT Inhalation Aero Soln Inhale 1 puff into the lungs every 4 (four) hours as needed for Wheezing or Shortness of Breath.  1 Inhaler 0   • gabapentin 300 MG Oral Cap Take 1 capsule (300 mg total) by mouth Family History   Problem Relation Age of Onset   • Diabetes Mother         Passed from DM complications   • Diabetes Sister         Had kidney transplant due to complications of DM   • Kidney Disease Sister         Kidney failure secondary to diabet Cardiovascular:      Rate and Rhythm: Normal rate and regular rhythm. Heart sounds: Normal heart sounds. No murmur heard. No friction rub. No gallop.     Pulmonary:      Effort: Pulmonary effort is normal. No accessory muscle usage or respiratory d food  No orders of the defined types were placed in this encounter.       Meds This Visit:  Requested Prescriptions     Signed Prescriptions Disp Refills   • cyclobenzaprine 5 MG Oral Tab 20 tablet 0     Sig: Take 1 tablet (5 mg total) by mouth nightly as n

## 2021-04-17 RX ORDER — HYDRALAZINE HYDROCHLORIDE 50 MG/1
TABLET, FILM COATED ORAL
Qty: 90 TABLET | Refills: 0 | Status: SHIPPED | OUTPATIENT
Start: 2021-04-17 | End: 2021-05-23

## 2021-04-19 ENCOUNTER — LAB REQUISITION (OUTPATIENT)
Dept: LAB | Facility: HOSPITAL | Age: 66
End: 2021-04-19
Payer: COMMERCIAL

## 2021-04-19 DIAGNOSIS — Z01.818 ENCOUNTER FOR OTHER PREPROCEDURAL EXAMINATION: ICD-10-CM

## 2021-04-22 ENCOUNTER — TELEPHONE (OUTPATIENT)
Dept: INTERNAL MEDICINE CLINIC | Facility: CLINIC | Age: 66
End: 2021-04-22

## 2021-04-22 NOTE — TELEPHONE ENCOUNTER
Pt is due for yearly mammogram, pt has been informed, mammogram order in system. Pt states she will call to schedule appt.

## 2021-04-26 ENCOUNTER — TELEPHONE (OUTPATIENT)
Dept: PAIN CLINIC | Facility: HOSPITAL | Age: 66
End: 2021-04-26

## 2021-04-26 NOTE — TELEPHONE ENCOUNTER
Returned patients call. She reports increased pain this morning. She did a \"warm soak\" and felt a little better. She went to work and the pain increased some. Denies changes in pain pattern. Denies new injury.  We discussed allowing the full 2 weeks to se

## 2021-04-27 RX ORDER — CYCLOBENZAPRINE HCL 5 MG
TABLET ORAL
Qty: 20 TABLET | Refills: 0 | Status: SHIPPED | OUTPATIENT
Start: 2021-04-27 | End: 2021-05-28

## 2021-05-04 ENCOUNTER — OFFICE VISIT (OUTPATIENT)
Dept: OPHTHALMOLOGY | Facility: CLINIC | Age: 66
End: 2021-05-04

## 2021-05-04 DIAGNOSIS — Z83.511 FAMILY HISTORY OF GLAUCOMA IN SISTER: ICD-10-CM

## 2021-05-04 DIAGNOSIS — E11.9 TYPE 2 DIABETES MELLITUS WITHOUT RETINOPATHY (HCC): ICD-10-CM

## 2021-05-04 DIAGNOSIS — H25.13 AGE-RELATED NUCLEAR CATARACT OF BOTH EYES: ICD-10-CM

## 2021-05-04 DIAGNOSIS — H40.003 GLAUCOMA SUSPECT OF BOTH EYES: Primary | ICD-10-CM

## 2021-05-04 PROCEDURE — 92014 COMPRE OPH EXAM EST PT 1/>: CPT | Performed by: OPHTHALMOLOGY

## 2021-05-04 PROCEDURE — 92250 FUNDUS PHOTOGRAPHY W/I&R: CPT | Performed by: OPHTHALMOLOGY

## 2021-05-04 NOTE — ASSESSMENT & PLAN NOTE
Discussed mild cataracts in both eyes that are not affecting vision and are not surgical at this time. Patient declines glasses at this time and will continue with over the counter glasses for reading.

## 2021-05-04 NOTE — ASSESSMENT & PLAN NOTE
Discussed with patient that she is a glaucoma suspect based on increased cupping of the optic nerves in both eyes and family history of 2 sisters with glaucoma. Retinal photos taken today to document optic nerves. Glaucoma diagnostic testing ordered.   Salima Rodgers

## 2021-05-04 NOTE — PROGRESS NOTES
Jennifer Monet is a 63year old female.     HPI:     HPI     Consult      Additional comments: Consult per Dr. Luz Oneil              Diabetic Eye Exam      Additional comments: Pt has been a diabetic for 22 years       Pt's diabetes is currently controlled 2019        Years since quittin.4      Smokeless tobacco: Never Used    Vaping Use      Vaping Use: Never used    Alcohol use: Not Currently      Comment: socially    Drug use: No      Medications:  Current Outpatient Medications   Medication Sig Breath. 1 Inhaler 0   • gabapentin 300 MG Oral Cap Take 1 capsule (300 mg total) by mouth 2 (two) times daily. (Patient taking differently: Take 300 mg by mouth 2 (two) times daily.  As needed ) 180 capsule 0   • ADVAIR DISKUS 250-50 MCG/DOSE Inhalation Aer Left    Lids/Lashes Dermatochalasis, Meibomian gland dysfunction Dermatochalasis, Meibomian gland dysfunction    Conjunctiva/Sclera ocular melanosis  ocular melanosis     Cornea Clear Clear    Anterior Chamber Deep and quiet Deep and quiet    Iris Normal N counter glasses for reading.        Orders Placed This Encounter      Fundus Photos - OU - Both Eyes      *FUTURE OCT  - OU - Both Eyes      *FUTURE VF- OU - Both Eyes      Meds This Visit:  Requested Prescriptions      No prescriptions requested or ordered

## 2021-05-04 NOTE — PATIENT INSTRUCTIONS
Glaucoma suspect of both eyes  Discussed with patient that she is a glaucoma suspect based on increased cupping of the optic nerves in both eyes and family history of 2 sisters with glaucoma. Retinal photos taken today to document optic nerves.   Glaucoma loss of side (peripheral) vision. You may not even notice changes until much of your vision is lost.   Closed-angle glaucoma  Closed-angle glaucoma is less common than open-angle. It often comes on quickly.  The drainage area in the eye suddenly becomes com professional's instructions.

## 2021-05-10 ENCOUNTER — OFFICE VISIT (OUTPATIENT)
Dept: PAIN CLINIC | Facility: HOSPITAL | Age: 66
End: 2021-05-10
Attending: ANESTHESIOLOGY
Payer: COMMERCIAL

## 2021-05-10 VITALS
BODY MASS INDEX: 32.39 KG/M2 | DIASTOLIC BLOOD PRESSURE: 88 MMHG | HEIGHT: 60 IN | SYSTOLIC BLOOD PRESSURE: 162 MMHG | WEIGHT: 165 LBS | RESPIRATION RATE: 18 BRPM | HEART RATE: 78 BPM

## 2021-05-10 DIAGNOSIS — M54.41 LOW BACK PAIN DUE TO BILATERAL SCIATICA: Primary | ICD-10-CM

## 2021-05-10 DIAGNOSIS — I73.9 LEFT LEG CLAUDICATION (HCC): ICD-10-CM

## 2021-05-10 DIAGNOSIS — M79.651 RIGHT THIGH PAIN: ICD-10-CM

## 2021-05-10 DIAGNOSIS — M54.42 LOW BACK PAIN DUE TO BILATERAL SCIATICA: Primary | ICD-10-CM

## 2021-05-10 DIAGNOSIS — I73.9 PAD (PERIPHERAL ARTERY DISEASE) (HCC): ICD-10-CM

## 2021-05-10 PROCEDURE — 99211 OFF/OP EST MAY X REQ PHY/QHP: CPT

## 2021-05-10 RX ORDER — HYDROCODONE BITARTRATE AND ACETAMINOPHEN 10; 325 MG/1; MG/1
1 TABLET ORAL DAILY PRN
Qty: 30 TABLET | Refills: 0 | Status: SHIPPED | OUTPATIENT
Start: 2021-06-09 | End: 2021-05-27

## 2021-05-10 RX ORDER — HYDROCODONE BITARTRATE AND ACETAMINOPHEN 10; 325 MG/1; MG/1
1 TABLET ORAL DAILY PRN
Qty: 30 TABLET | Refills: 0 | Status: SHIPPED | OUTPATIENT
Start: 2021-05-10 | End: 2021-05-27

## 2021-05-10 RX ORDER — PREGABALIN 25 MG/1
25 CAPSULE ORAL 3 TIMES DAILY
Qty: 90 CAPSULE | Refills: 1 | Status: SHIPPED | OUTPATIENT
Start: 2021-05-10 | End: 2021-06-09

## 2021-05-10 NOTE — CHRONIC PAIN
Follow-up Note  CC: injection follow up   HISTORY OF PRESENT ILLNESS:  Rivera Terry is a 63year old old female, originally referred to the pain clinic by Dr. Weber ref.  provider found, with history of Low back pain due to bilateral sciatica  (primary en TAKE 1 TABLET BY MOUTH EVERY DAY 90 tablet 1   • ATORVASTATIN 20 MG Oral Tab TAKE 1 TABLET BY MOUTH EVERY DAY AT NIGHT 90 tablet 0   • AMLODIPINE BESYLATE 10 MG Oral Tab TAKE 1 TABLET BY MOUTH EVERY DAY 90 tablet 0   • HYDRALAZINE HCL 25 MG Oral Tab TAKE 1 Incontinence: as above  Coughing/sneezing/straining does not exacerbate the pain.   Numbness/tingling: as above  Weakness: as above  Weight Loss: Negative   Fever: Negative   Cardiovascular:  No current chest pain or palpitations   Respiratory:  No current due to complications of DM   • Kidney Disease Sister         Kidney failure secondary to diabetes; received kidney transplant in 2004   • Glaucoma Sister    • Diabetes Brother    • Sickle Cell Son         Cause of death   • Macular degeneration Neg Abused:   ADVANCE CARE PLANNING:    Advance Care Plan NOT discussed.   PHYSICAL EXAMINATION:   05/10/21  0854   Resp: 18      General: Alert and oriented x3, NAD, appears stated age, appropriate disposition and demeanor, answers questions appropriately   He injections, PT, and non narcotic medications. She verbalized understanding      Pt will return to clinic for followup 2  months following physical therapy and a surgical consult   total Time: 25 minutes   Comprehensive analgesic plan was formulated.  Patel

## 2021-05-10 NOTE — PROGRESS NOTES
5/10/2021-presents ambulatory to [facility];  F?U POST CAUDAL /C MAC 4/22/2021-BELAVIC;  Pt reports she had relief for 3-4 days then her pain retuned;'  Rates her pain 9/10;  Still c/o LBP Down BLE;  Seen by Dr. Mini Martínez; refer to dictation for the continued plan

## 2021-05-12 ENCOUNTER — NURSE ONLY (OUTPATIENT)
Dept: OPHTHALMOLOGY | Facility: CLINIC | Age: 66
End: 2021-05-12

## 2021-05-12 DIAGNOSIS — H40.003 GLAUCOMA SUSPECT OF BOTH EYES: ICD-10-CM

## 2021-05-12 PROCEDURE — 92133 CPTRZD OPH DX IMG PST SGM ON: CPT | Performed by: OPHTHALMOLOGY

## 2021-05-12 PROCEDURE — 92083 EXTENDED VISUAL FIELD XM: CPT | Performed by: OPHTHALMOLOGY

## 2021-05-12 NOTE — PROGRESS NOTES
Brian Rivera is a 63year old female.     HPI:     HPI     Here for a VF and OCT no MD.     Last edited by Don Lujan O.T. on 5/12/2021  3:13 PM. (History)        Patient History:  Past Medical History:   Diagnosis Date   • Anemia    • Back pain HYDROcodone-acetaminophen  MG Oral Tab Take 1 tablet by mouth daily as needed for Pain.  30 tablet 0   • CYCLOBENZAPRINE 5 MG Oral Tab TAKE 1 TABLET BY MOUTH NIGHTLY AS NEEDED FOR MUSCLE SPASMS 20 tablet 0   • HYDRALAZINE HCL 50 MG Oral Tab TAKE 1 TAB nebulization every 6 (six) hours as needed (shortness of breath refractory to [facility]). 100 vial 0   • ferrous sulfate 325 (65 FE) MG Oral Tab EC Take 1 tablet (325 mg total) by mouth 3 (three) times daily with meals.  90 tablet 2       Allergies:    Penicillin

## 2021-05-13 ENCOUNTER — HOSPITAL ENCOUNTER (OUTPATIENT)
Dept: MAMMOGRAPHY | Facility: HOSPITAL | Age: 66
Discharge: HOME | End: 2021-05-13
Attending: INTERNAL MEDICINE
Payer: COMMERCIAL

## 2021-05-13 DIAGNOSIS — Z12.31 ENCOUNTER FOR SCREENING MAMMOGRAM FOR MALIGNANT NEOPLASM OF BREAST: ICD-10-CM

## 2021-05-13 PROCEDURE — 77063 BREAST TOMOSYNTHESIS BI: CPT | Performed by: INTERNAL MEDICINE

## 2021-05-13 PROCEDURE — 77067 SCR MAMMO BI INCL CAD: CPT | Performed by: INTERNAL MEDICINE

## 2021-05-17 ENCOUNTER — TELEPHONE (OUTPATIENT)
Dept: CASE MANAGEMENT | Age: 66
End: 2021-05-17

## 2021-05-17 DIAGNOSIS — E11.65 UNCONTROLLED TYPE 2 DIABETES MELLITUS WITH HYPERGLYCEMIA (HCC): ICD-10-CM

## 2021-05-17 DIAGNOSIS — J43.9 PULMONARY EMPHYSEMA, UNSPECIFIED EMPHYSEMA TYPE (HCC): Primary | ICD-10-CM

## 2021-05-17 RX ORDER — CYCLOBENZAPRINE HCL 5 MG
TABLET ORAL
Qty: 20 TABLET | Refills: 0 | OUTPATIENT
Start: 2021-05-17

## 2021-05-17 NOTE — TELEPHONE ENCOUNTER
Pt calling and requesting referrals for upcoming appointments.    Please sign off on referrals so pt can continue care

## 2021-05-18 ENCOUNTER — OFFICE VISIT (OUTPATIENT)
Dept: ENDOCRINOLOGY CLINIC | Facility: CLINIC | Age: 66
End: 2021-05-18

## 2021-05-18 VITALS
SYSTOLIC BLOOD PRESSURE: 189 MMHG | WEIGHT: 165 LBS | HEART RATE: 94 BPM | BODY MASS INDEX: 32 KG/M2 | DIASTOLIC BLOOD PRESSURE: 82 MMHG

## 2021-05-18 DIAGNOSIS — E78.5 DYSLIPIDEMIA: ICD-10-CM

## 2021-05-18 DIAGNOSIS — E11.65 TYPE 2 DIABETES MELLITUS WITH HYPERGLYCEMIA, WITH LONG-TERM CURRENT USE OF INSULIN (HCC): Primary | ICD-10-CM

## 2021-05-18 DIAGNOSIS — Z79.4 TYPE 2 DIABETES MELLITUS WITH HYPERGLYCEMIA, WITH LONG-TERM CURRENT USE OF INSULIN (HCC): Primary | ICD-10-CM

## 2021-05-18 PROCEDURE — 82947 ASSAY GLUCOSE BLOOD QUANT: CPT | Performed by: INTERNAL MEDICINE

## 2021-05-18 PROCEDURE — 99214 OFFICE O/P EST MOD 30 MIN: CPT | Performed by: INTERNAL MEDICINE

## 2021-05-18 PROCEDURE — 36416 COLLJ CAPILLARY BLOOD SPEC: CPT | Performed by: INTERNAL MEDICINE

## 2021-05-18 PROCEDURE — 3079F DIAST BP 80-89 MM HG: CPT | Performed by: INTERNAL MEDICINE

## 2021-05-18 PROCEDURE — 83036 HEMOGLOBIN GLYCOSYLATED A1C: CPT | Performed by: INTERNAL MEDICINE

## 2021-05-18 PROCEDURE — 3046F HEMOGLOBIN A1C LEVEL >9.0%: CPT | Performed by: INTERNAL MEDICINE

## 2021-05-18 PROCEDURE — 3077F SYST BP >= 140 MM HG: CPT | Performed by: INTERNAL MEDICINE

## 2021-05-18 RX ORDER — GLIPIZIDE 5 MG/1
5 TABLET ORAL
Qty: 180 TABLET | Refills: 0 | Status: SHIPPED | OUTPATIENT
Start: 2021-05-18 | End: 2021-08-20

## 2021-05-18 NOTE — PROGRESS NOTES
Return Office Visit     CHIEF COMPLAINT:    DM     HISTORY OF PRESENT ILLNESS:  Leida Rubinstein is a 63year old female who presents for follow up for DM    DM HISTORY  Diagnosed: around age 38        HISTORY OF DIABETES COMPLICATIONS: :  History of  tablet 0   • HYDROcodone-acetaminophen 5-325 MG Oral Tab Take 1-2 tablets by mouth every 6 (six) hours as needed for Pain.  30 tablet 0   • ACCU-CHEK GUIDE In Vitro Strip USE 4 TIMES A DAY TO TEST BLOOD SUGAR 3 TIMES A DAY WITH MEALS AND AT BEDTIME Negative for:  Visual changes, proptosis, blurring  ENT: Negative for:  dysphagia, neck swelling, dysphonia  Respiratory: Negative for:  dyspnea, cough  Cardiovascular: Negative for:  chest pain, palpitations, orthopnea  GI: Negative for:  abdominal pain, hypoglycemia and hence importance of eating regular meals    She also  work on dietary changes        b). She has CKD  c). Discussed importance of annual eye exams. d). Foot exam: Daily feet exam explained. e).  BG log maintainence explained in great det

## 2021-05-23 ENCOUNTER — HOSPITAL ENCOUNTER (EMERGENCY)
Facility: HOSPITAL | Age: 66
Discharge: HOME | End: 2021-05-23
Attending: EMERGENCY MEDICINE
Payer: COMMERCIAL

## 2021-05-23 VITALS
SYSTOLIC BLOOD PRESSURE: 163 MMHG | RESPIRATION RATE: 18 BRPM | OXYGEN SATURATION: 95 % | DIASTOLIC BLOOD PRESSURE: 84 MMHG | HEART RATE: 83 BPM | HEIGHT: 64 IN | WEIGHT: 165 LBS | TEMPERATURE: 99 F | BODY MASS INDEX: 28.17 KG/M2

## 2021-05-23 DIAGNOSIS — M54.16 LUMBAR RADICULOPATHY: Primary | ICD-10-CM

## 2021-05-23 PROCEDURE — 99283 EMERGENCY DEPT VISIT LOW MDM: CPT

## 2021-05-23 PROCEDURE — 96372 THER/PROPH/DIAG INJ SC/IM: CPT

## 2021-05-23 RX ORDER — HYDROCODONE BITARTRATE AND ACETAMINOPHEN 10; 325 MG/1; MG/1
1 TABLET ORAL EVERY 6 HOURS PRN
Qty: 5 TABLET | Refills: 0 | Status: SHIPPED | OUTPATIENT
Start: 2021-05-23 | End: 2021-05-27

## 2021-05-23 RX ORDER — HYDRALAZINE HYDROCHLORIDE 50 MG/1
TABLET, FILM COATED ORAL
Qty: 90 TABLET | Refills: 0 | Status: SHIPPED | OUTPATIENT
Start: 2021-05-23 | End: 2021-06-09

## 2021-05-23 RX ORDER — MORPHINE SULFATE 4 MG/ML
6 INJECTION, SOLUTION INTRAMUSCULAR; INTRAVENOUS ONCE
Status: COMPLETED | OUTPATIENT
Start: 2021-05-23 | End: 2021-05-23

## 2021-05-23 NOTE — ED PROVIDER NOTES
Patient Seen in: [facility] AND [facility] Emergency Department    History   Patient presents with:  Back Pain    Stated Complaint: back pain    HPI    Chief complaint: Back pain    73-year-old female with longstanding history of low back pain.   No falls accidents HYDROcodone-acetaminophen  MG Oral Tab,  Take 1 tablet by mouth daily as needed for Pain. CYCLOBENZAPRINE 5 MG Oral Tab,  TAKE 1 TABLET BY MOUTH NIGHTLY AS NEEDED FOR MUSCLE SPASMS   Clopidogrel Bisulfate 75 MG Oral Tab,  Take 75 mg by mouth daily. Problem Relation Age of Onset   • Diabetes Mother         Passed from DM complications   • Diabetes Sister         Had kidney transplant due to complications of DM   • Kidney Disease Sister         Kidney failure secondary to diabetes; received kidney tr and extension, dorsiflexion and plantarflexion of the foot preserved bilateral 5 out of 5 strength, sensation intact from sacral region throughout lower extremities down to the dorsal surface of the foot, 2+ DTRs of the knee and ankle  Extremities:Nontende

## 2021-05-23 NOTE — ED INITIAL ASSESSMENT (HPI)
Paradise Schaffer is here for evaluation of acute on chronic lower back pain. States pain radiates from buttocks down both legs. She sees a pain management doctor. She is prescribed norco and lyrica.   She states her pain is managed well with these medications but s

## 2021-05-27 ENCOUNTER — OFFICE VISIT (OUTPATIENT)
Dept: INTERNAL MEDICINE CLINIC | Facility: CLINIC | Age: 66
End: 2021-05-27

## 2021-05-27 VITALS
WEIGHT: 168 LBS | OXYGEN SATURATION: 96 % | TEMPERATURE: 98 F | BODY MASS INDEX: 29 KG/M2 | DIASTOLIC BLOOD PRESSURE: 85 MMHG | HEART RATE: 91 BPM | SYSTOLIC BLOOD PRESSURE: 175 MMHG

## 2021-05-27 DIAGNOSIS — I10 ESSENTIAL HYPERTENSION: Primary | ICD-10-CM

## 2021-05-27 PROCEDURE — 3077F SYST BP >= 140 MM HG: CPT | Performed by: INTERNAL MEDICINE

## 2021-05-27 PROCEDURE — 3079F DIAST BP 80-89 MM HG: CPT | Performed by: INTERNAL MEDICINE

## 2021-05-27 PROCEDURE — 99214 OFFICE O/P EST MOD 30 MIN: CPT | Performed by: INTERNAL MEDICINE

## 2021-05-27 RX ORDER — HYDRALAZINE HYDROCHLORIDE 25 MG/1
25 TABLET, FILM COATED ORAL 3 TIMES DAILY
Qty: 270 TABLET | Refills: 0 | Status: SHIPPED | OUTPATIENT
Start: 2021-05-27 | End: 2021-08-03 | Stop reason: ALTCHOICE

## 2021-05-27 RX ORDER — HYDROCODONE BITARTRATE AND ACETAMINOPHEN 10; 325 MG/1; MG/1
1 TABLET ORAL DAILY PRN
Qty: 30 TABLET | Refills: 0 | Status: SHIPPED | OUTPATIENT
Start: 2021-05-27 | End: 2021-06-24

## 2021-05-27 NOTE — PATIENT INSTRUCTIONS
hypertension not very well controlled it could be precipitated by pain I will increase her hydralazine to 75 mg 3 times a day continue with rest of medication, watch diet avoid salty food check blood pressure at home and bring logbook next visit follow-up

## 2021-05-27 NOTE — PROGRESS NOTES
HPI/Subjective:     Patient ID: Leida Rubinstein is a 63year old female. She came in today for follow-up from emergency room.   She went to emergency room due to severe back pain according to her she is not able to see pain management on 10 Ginny 2015 and swelling, myalgias, neck pain and neck stiffness. Allergic/Immunologic: Negative. Neurological: Negative for dizziness, tremors, speech difficulty, weakness, light-headedness, numbness and headaches. Hematological: Negative for adenopathy.  Does not differently: Inject 38 Units into the skin nightly.  ) 1 pen 1   • Albuterol Sulfate  (90 Base) MCG/ACT Inhalation Aero Soln Inhale 1 puff into the lungs every 4 (four) hours as needed for Wheezing or Shortness of Breath.  1 Inhaler 0   • ADVAIR DISK Mother         Passed from DM complications   • Diabetes Sister         Had kidney transplant due to complications of DM   • Kidney Disease Sister         Kidney failure secondary to diabetes; received kidney transplant in 2004   • Glaucoma Sister    • Ivone Heart sounds: Normal heart sounds. No murmur heard. No friction rub. No gallop. Pulmonary:      Effort: Pulmonary effort is normal. No accessory muscle usage or respiratory distress. Breath sounds: Normal breath sounds. No wheezing or rales.    Ch defined types were placed in this encounter. Meds This Visit:  Requested Prescriptions     Pending Prescriptions Disp Refills   • insulin detemir 100 UNIT/ML Subcutaneous Solution Pen-injector 3 each 0     Sig: Inject 38 Units into the skin daily.    •

## 2021-05-28 RX ORDER — CYCLOBENZAPRINE HCL 5 MG
TABLET ORAL
Qty: 20 TABLET | Refills: 0 | Status: SHIPPED | OUTPATIENT
Start: 2021-05-28 | End: 2021-06-25

## 2021-06-08 ENCOUNTER — TELEPHONE (OUTPATIENT)
Dept: ENDOCRINOLOGY CLINIC | Facility: CLINIC | Age: 66
End: 2021-06-08

## 2021-06-08 ENCOUNTER — TELEPHONE (OUTPATIENT)
Dept: PHYSICAL THERAPY | Facility: HOSPITAL | Age: 66
End: 2021-06-08

## 2021-06-08 NOTE — TELEPHONE ENCOUNTER
Pharmacy states medication refill requires PA. Pharmacist also states pt is unsure if she should be taking medication. Please contact pt to clarify.       Current Outpatient Medications:     •  JANUVIA 100 MG Oral Tab, TAKE 1 TABLET BY MOUTH EVERY DAY, Disp

## 2021-06-08 NOTE — TELEPHONE ENCOUNTER
Chart reviewed and per LOV 5/18/21, patient is supposed to be on Januvia 100 mg, it was not discontinued. Phoned patient and informed her about this as well as the need for the PA.   She was made aware office will reach out to her again once a determinatio

## 2021-06-09 ENCOUNTER — APPOINTMENT (OUTPATIENT)
Dept: PHYSICAL THERAPY | Facility: HOSPITAL | Age: 66
End: 2021-06-09
Attending: ANESTHESIOLOGY
Payer: COMMERCIAL

## 2021-06-09 RX ORDER — HYDRALAZINE HYDROCHLORIDE 50 MG/1
50 TABLET, FILM COATED ORAL 3 TIMES DAILY
Qty: 180 TABLET | Refills: 0 | Status: SHIPPED | OUTPATIENT
Start: 2021-06-09 | End: 2021-08-03 | Stop reason: ALTCHOICE

## 2021-06-10 NOTE — TELEPHONE ENCOUNTER
Medication PA Requested: Januvia 100 mg                                                          CoverMyMeds Used: Yes  Key:[id number]  Sig: Take one tablet by mouth daily  DX Code: E11.65         PA submitted with last office visit note and A1C results.  Nemo

## 2021-06-14 ENCOUNTER — OFFICE VISIT (OUTPATIENT)
Dept: PHYSICAL THERAPY | Facility: HOSPITAL | Age: 66
End: 2021-06-14
Attending: ANESTHESIOLOGY
Payer: COMMERCIAL

## 2021-06-14 DIAGNOSIS — M54.42 LOW BACK PAIN DUE TO BILATERAL SCIATICA: ICD-10-CM

## 2021-06-14 DIAGNOSIS — M54.41 LOW BACK PAIN DUE TO BILATERAL SCIATICA: ICD-10-CM

## 2021-06-14 PROCEDURE — 97162 PT EVAL MOD COMPLEX 30 MIN: CPT

## 2021-06-14 PROCEDURE — 97110 THERAPEUTIC EXERCISES: CPT

## 2021-06-14 NOTE — PROGRESS NOTES
LUMBAR SPINE EVALUATION:   Referring Physician: Dr. Thena Gowers  Diagnosis:  Low back pain due to bilateral sciatica (M54.41,M54.42)  Initial FOTO: 37/100   Projected FOTO: 49/100  FOTO at 5th visit: **/100  FOTO at DC:  **/100   Insurance: [facility] O[employer] PPO E-Stimulator for pain management, but is \"tired of surgeries\" and is still thinking about Stimulator for pain.      Current functional limitations include : standing limited 10 mins, and walking 8 mins, can't do housework, sleeping with medication of nor Therapy to address the above impairments to pursue goals as described  .      OBJECTIVE:   Observation/Posture: moderate guarding in standing, wide based posture, increased fwd head posture  Gait: wide based minimal antalgia also guarded motion  ROM: Trunk demonstrate reduction of back and LE pain to less than 5/10 90% of time by 8-12 sessions to allow for painfree transfers and bed mobility  4.  Patient will be able to demonstrate independent HEP for basic lower extremity and spinal mobility and basic core s

## 2021-06-14 NOTE — TELEPHONE ENCOUNTER
PA has been approved. Contacted [facility] pharmacy in [employer] and notified of PA approval. Prescription is ready for . Endo staff, can you please let patient know of PA approval? MyChart is pending. Thank you!

## 2021-06-14 NOTE — TELEPHONE ENCOUNTER
Called patient and informed her of below approval.  She voiced understanding and denied further questions at this time.

## 2021-06-16 ENCOUNTER — OFFICE VISIT (OUTPATIENT)
Dept: PAIN CLINIC | Facility: HOSPITAL | Age: 66
End: 2021-06-16
Attending: ANESTHESIOLOGY
Payer: COMMERCIAL

## 2021-06-16 VITALS — SYSTOLIC BLOOD PRESSURE: 174 MMHG | OXYGEN SATURATION: 99 % | DIASTOLIC BLOOD PRESSURE: 78 MMHG | HEART RATE: 98 BPM

## 2021-06-16 DIAGNOSIS — M48.062 SPINAL STENOSIS OF LUMBAR REGION WITH NEUROGENIC CLAUDICATION: ICD-10-CM

## 2021-06-16 DIAGNOSIS — M54.41 LOW BACK PAIN DUE TO BILATERAL SCIATICA: ICD-10-CM

## 2021-06-16 DIAGNOSIS — M54.42 LOW BACK PAIN DUE TO BILATERAL SCIATICA: ICD-10-CM

## 2021-06-16 DIAGNOSIS — M96.1 FAILED BACK SURGICAL SYNDROME: Primary | ICD-10-CM

## 2021-06-16 DIAGNOSIS — Z01.818 PREOPERATIVE EVALUATION TO RULE OUT SURGICAL CONTRAINDICATION: ICD-10-CM

## 2021-06-16 PROCEDURE — 99211 OFF/OP EST MAY X REQ PHY/QHP: CPT

## 2021-06-16 RX ORDER — HYDROCODONE BITARTRATE AND ACETAMINOPHEN 10; 325 MG/1; MG/1
1 TABLET ORAL EVERY 12 HOURS PRN
Qty: 60 TABLET | Refills: 0 | Status: SHIPPED | OUTPATIENT
Start: 2021-06-23 | End: 2021-07-23

## 2021-06-16 RX ORDER — AMLODIPINE BESYLATE 10 MG/1
TABLET ORAL
Qty: 90 TABLET | Refills: 0 | Status: SHIPPED | OUTPATIENT
Start: 2021-06-16 | End: 2021-06-24

## 2021-06-16 NOTE — PROGRESS NOTES
PT presents ambulatory to the [facility].  Pt reports LBP that radiates in to her buttocks. Dr Helen Rivera saw PT for med eval.  See notes for POC.

## 2021-06-16 NOTE — CHRONIC PAIN
Follow-up Note  CC: injection follow up   HISTORY OF PRESENT ILLNESS:  Brian Rivera is a 63year old old female, originally referred to the pain clinic by Dr. Josiah De Luna, with history of Failed back surgical syndrome  (primary encounter diagnosis)  Low willian caudal Rax 4/22/2021    ALLERGIES:    Penicillins             ANGIOEDEMA    Comment:Hives on eyelids (occurred when pt was in her             25s).  Tolerated amoxicillin per chart    MEDICATION LIST:  Current Outpatient Medications   Medication Sig Dispens Inhalation Aero Soln Inhale 1 puff into the lungs every 4 (four) hours as needed for Wheezing or Shortness of Breath.  1 Inhaler 0   • ADVAIR DISKUS 250-50 MCG/DOSE Inhalation Aerosol Powder, Breath Activated Inhale 1 puff into the lungs every 12 (twelve) h kidney disease (CKD)    • CKD (chronic kidney disease)    • COPD (chronic obstructive pulmonary disease) ([facility])     FEV 1 1.25 50% 2/20   • Diabetes ([facility])     DM neuropathy   • Essential hypertension    • H/O angioplasty     07/08/2020   • High blood pressu Running Out of Food in the Last Year:       Ran Out of Food in the Last Year:   Transportation Needs:       Lack of Transportation (Medical):       Lack of Transportation (Non-Medical):   Physical Activity:       Days of Exercise per Week:       Minutes of Female, with history of severe peripheral vascular disease in her lower extremities as well as failed back surgery syndrome with severe spinal stenosis and  neurogenic claudication aS well as peripheral neuropathy.   - we discussed treating cause vs treatin discussing the care with the patients health care providers.

## 2021-06-17 ENCOUNTER — OFFICE VISIT (OUTPATIENT)
Dept: PHYSICAL THERAPY | Facility: HOSPITAL | Age: 66
End: 2021-06-17
Attending: ANESTHESIOLOGY
Payer: COMMERCIAL

## 2021-06-17 PROCEDURE — 97110 THERAPEUTIC EXERCISES: CPT

## 2021-06-17 NOTE — PROGRESS NOTES
Referring Physician: Dr. Sia Walls  Diagnosis:  Low back pain due to bilateral sciatica (M54.41,M54.42)  Initial FOTO: 37/100   Projected FOTO: 49/100  FOTO at 5th visit: **/100  FOTO at DC:  **/100   Insurance: [facility] OOS PPO       Date of Onset: chronic  Pre s/l 10 x5 sec  -S/L clams 10x with TA  Activation  -TA activation H/L 5 secs x 10 reps  -TA activation/stabilization with pressure feedback  -Nu step arms and legs seat #8 level 2 5 mins                            HEP: initial Piriformis stretch  6/17/21:

## 2021-06-22 ENCOUNTER — APPOINTMENT (OUTPATIENT)
Dept: PHYSICAL THERAPY | Facility: HOSPITAL | Age: 66
End: 2021-06-22
Payer: COMMERCIAL

## 2021-06-22 ENCOUNTER — TELEPHONE (OUTPATIENT)
Dept: PHYSICAL THERAPY | Age: 66
End: 2021-06-22

## 2021-06-22 RX ORDER — CYCLOBENZAPRINE HCL 5 MG
TABLET ORAL
Qty: 20 TABLET | Refills: 0 | OUTPATIENT
Start: 2021-06-22

## 2021-06-24 ENCOUNTER — OFFICE VISIT (OUTPATIENT)
Dept: INTERNAL MEDICINE CLINIC | Facility: CLINIC | Age: 66
End: 2021-06-24
Payer: COMMERCIAL

## 2021-06-24 ENCOUNTER — OFFICE VISIT (OUTPATIENT)
Dept: PHYSICAL THERAPY | Facility: HOSPITAL | Age: 66
End: 2021-06-24
Attending: ANESTHESIOLOGY
Payer: COMMERCIAL

## 2021-06-24 VITALS
HEIGHT: 64 IN | OXYGEN SATURATION: 98 % | HEART RATE: 80 BPM | WEIGHT: 166 LBS | SYSTOLIC BLOOD PRESSURE: 160 MMHG | DIASTOLIC BLOOD PRESSURE: 85 MMHG | BODY MASS INDEX: 28.34 KG/M2

## 2021-06-24 DIAGNOSIS — I10 ESSENTIAL HYPERTENSION: ICD-10-CM

## 2021-06-24 DIAGNOSIS — N18.30 STAGE 3 CHRONIC KIDNEY DISEASE, UNSPECIFIED WHETHER STAGE 3A OR 3B CKD (HCC): Primary | ICD-10-CM

## 2021-06-24 PROCEDURE — 97140 MANUAL THERAPY 1/> REGIONS: CPT

## 2021-06-24 PROCEDURE — 99396 PREV VISIT EST AGE 40-64: CPT | Performed by: INTERNAL MEDICINE

## 2021-06-24 PROCEDURE — 3079F DIAST BP 80-89 MM HG: CPT | Performed by: INTERNAL MEDICINE

## 2021-06-24 PROCEDURE — 97110 THERAPEUTIC EXERCISES: CPT

## 2021-06-24 PROCEDURE — 3077F SYST BP >= 140 MM HG: CPT | Performed by: INTERNAL MEDICINE

## 2021-06-24 PROCEDURE — 3008F BODY MASS INDEX DOCD: CPT | Performed by: INTERNAL MEDICINE

## 2021-06-24 PROCEDURE — 36415 COLL VENOUS BLD VENIPUNCTURE: CPT | Performed by: INTERNAL MEDICINE

## 2021-06-24 RX ORDER — AMLODIPINE BESYLATE 10 MG/1
10 TABLET ORAL DAILY
Qty: 90 TABLET | Refills: 0 | Status: SHIPPED | OUTPATIENT
Start: 2021-06-24 | End: 2021-12-19

## 2021-06-24 NOTE — PATIENT INSTRUCTIONS
htn- not well controlled , I will increase hydralazine to 100 mg, continue with lisinopril, amlodipine, check bmp , referl for nephrology

## 2021-06-24 NOTE — PROGRESS NOTES
HPI:   Melissa Lew is a 63year old female who presents for a complete physical exam.  Her blood pressure is still high. She says that she is taking medications regularly and she is trying to watch her diet.     She was seen by pain management kole Subcutaneous Solution Pen-injector Inject 38 Units into the skin daily. 7 each 0   • hydrALAzine HCl 25 MG Oral Tab Take 1 tablet (25 mg total) by mouth 3 (three) times daily.  270 tablet 0   • glipiZIDE 5 MG Oral Tab Take 1 tablet (5 mg total) by mouth 2 ( 5/27/2021 ) 30 tablet 0   • ACCU-CHEK GUIDE In Vitro Strip USE 4 TIMES A DAY TO TEST BLOOD SUGAR 3 TIMES A DAY WITH MEALS AND AT BEDTIME 100 strip 0      Past Medical History:   Diagnosis Date   • Anemia    • Back pain    • Chronic kidney disease (CKD) heartbeat/palpitations. GI Negative Abdominal pain, blood in stool, constipation, diarrhea, heartburn, nausea and vomiting.  Negative Dysuria, hematuria, urinary incontinence. Menses regular, not heavy.    Endocrine Negative Cold intolerance and heat Memory - Normal. Sensory - Normal. Motor - Normal. Balance & gait - Normal.   Psychiatric Normal Orientation - Oriented to time, place, person & situation. Appropriate mood and affect.           ASSESSMENT AND PLAN:   Ritu Zhang is a 63year old fema

## 2021-06-24 NOTE — PROGRESS NOTES
Referring Physician: Dr. Ruchi Watt  Diagnosis:  Low back pain due to bilateral sciatica (M54.41,M54.42)  Initial FOTO: 37/100   Projected FOTO: 49/100  FOTO at 5th visit: **/100  FOTO at DC:  **/100   Insurance: [facility] OOS PPO       Date of Onset: chronic  Pre Continue to work on core strengthening ex. Date: 6/17/2021  TX#: 2/8 Date:   6/24/21           TX#: 3/8 Date:                 TX#: 4/ Date:                 TX#: 5/ Date:    Tx#: 6/ Therap ex:   -transfer training,   -TA activation s/l 10 x5 sec  -S/L cla

## 2021-06-25 RX ORDER — CYCLOBENZAPRINE HCL 5 MG
TABLET ORAL
Qty: 20 TABLET | Refills: 0 | Status: SHIPPED | OUTPATIENT
Start: 2021-06-25 | End: 2021-07-29

## 2021-06-29 ENCOUNTER — OFFICE VISIT (OUTPATIENT)
Dept: PHYSICAL THERAPY | Facility: HOSPITAL | Age: 66
End: 2021-06-29
Attending: ANESTHESIOLOGY
Payer: COMMERCIAL

## 2021-06-29 PROCEDURE — 97140 MANUAL THERAPY 1/> REGIONS: CPT

## 2021-06-29 PROCEDURE — 97110 THERAPEUTIC EXERCISES: CPT

## 2021-06-29 NOTE — PROGRESS NOTES
Referring Physician: Dr. Alarcon Greg  Diagnosis:  Low back pain due to bilateral sciatica (M54.41,M54.42)  Initial FOTO: 37/100   Projected FOTO: 49/100  FOTO at 5th visit: **/100  FOTO at DC:  **/100   Insurance: [employer] OOS PPO       Date of Onset: chronic  Pre or better by 12 sessions         Plan: Continue to work on core strengthening ex, lower extremity strengthening, balance training and downtraining at both TFL  Date: 6/17/2021  TX#: 2/8 Date:   6/24/21           TX#: 3/8 Date: 6/29/21                TX#: 4 min  Total Treatment Time: 53 min

## 2021-07-01 ENCOUNTER — APPOINTMENT (OUTPATIENT)
Dept: PHYSICAL THERAPY | Facility: HOSPITAL | Age: 66
End: 2021-07-01
Payer: COMMERCIAL

## 2021-07-06 ENCOUNTER — OFFICE VISIT (OUTPATIENT)
Dept: PHYSICAL THERAPY | Facility: HOSPITAL | Age: 66
End: 2021-07-06
Attending: ANESTHESIOLOGY
Payer: COMMERCIAL

## 2021-07-06 PROCEDURE — 97110 THERAPEUTIC EXERCISES: CPT

## 2021-07-06 PROCEDURE — 97140 MANUAL THERAPY 1/> REGIONS: CPT

## 2021-07-06 NOTE — PROGRESS NOTES
Referring Physician: Dr. Sandra Holguin  Diagnosis:  Low back pain due to bilateral sciatica (M54.41,M54.42)  Initial FOTO: 37/100   Projected FOTO: 49/100  FOTO at 5th visit: **/100  FOTO at DC:  **/100   Insurance: [facility] OOS PPO       Date of Onset: chronic  Pre independent HEP for basic lower extremity and spinal mobility and basic core stabilization program by 8 sessions to assist in being able to achieve safe body mechanics/transfers/posture  5.  Patient will be able to demonstrate good understanding of basics transfers   -prone laying hip IR, ext and abd on right with long leg pull  -patient educated in sleep postures and trial use of blanket or bolster under knees for alternate rest postures  -     Manual therapy:   -STM to right and left L-S p/s while perform

## 2021-07-12 ENCOUNTER — TELEPHONE (OUTPATIENT)
Dept: INTERNAL MEDICINE CLINIC | Facility: CLINIC | Age: 66
End: 2021-07-12

## 2021-07-12 ENCOUNTER — HOSPITAL ENCOUNTER (OUTPATIENT)
Dept: MRI IMAGING | Age: 66
Discharge: HOME | End: 2021-07-12
Attending: ANESTHESIOLOGY
Payer: COMMERCIAL

## 2021-07-12 ENCOUNTER — TELEPHONE (OUTPATIENT)
Dept: PAIN CLINIC | Facility: HOSPITAL | Age: 66
End: 2021-07-12

## 2021-07-12 DIAGNOSIS — M54.41 LOW BACK PAIN DUE TO BILATERAL SCIATICA: ICD-10-CM

## 2021-07-12 DIAGNOSIS — M96.1 FAILED BACK SURGICAL SYNDROME: ICD-10-CM

## 2021-07-12 DIAGNOSIS — Z01.818 PREOPERATIVE EVALUATION TO RULE OUT SURGICAL CONTRAINDICATION: ICD-10-CM

## 2021-07-12 DIAGNOSIS — M48.062 SPINAL STENOSIS OF LUMBAR REGION WITH NEUROGENIC CLAUDICATION: ICD-10-CM

## 2021-07-12 DIAGNOSIS — M54.42 LOW BACK PAIN DUE TO BILATERAL SCIATICA: ICD-10-CM

## 2021-07-12 PROCEDURE — 72146 MRI CHEST SPINE W/O DYE: CPT | Performed by: ANESTHESIOLOGY

## 2021-07-12 PROCEDURE — 72148 MRI LUMBAR SPINE W/O DYE: CPT | Performed by: ANESTHESIOLOGY

## 2021-07-12 RX ORDER — DIAZEPAM 5 MG/1
5 TABLET ORAL
Qty: 2 TABLET | Refills: 0 | Status: SHIPPED | OUTPATIENT
Start: 2021-07-12 | End: 2021-07-12

## 2021-07-12 NOTE — TELEPHONE ENCOUNTER
RX for valium 5mg one tab one hour prior to MRI, may repeat 1X.  Patient informed she will need a  home from MRI

## 2021-07-13 ENCOUNTER — OFFICE VISIT (OUTPATIENT)
Dept: PHYSICAL THERAPY | Facility: HOSPITAL | Age: 66
End: 2021-07-13
Attending: ANESTHESIOLOGY
Payer: COMMERCIAL

## 2021-07-13 ENCOUNTER — DOCUMENTATION ONLY (OUTPATIENT)
Dept: PAIN CLINIC | Facility: HOSPITAL | Age: 66
End: 2021-07-13

## 2021-07-13 ENCOUNTER — TELEPHONE (OUTPATIENT)
Dept: INTERNAL MEDICINE CLINIC | Facility: CLINIC | Age: 66
End: 2021-07-13

## 2021-07-13 PROCEDURE — 97140 MANUAL THERAPY 1/> REGIONS: CPT

## 2021-07-13 PROCEDURE — 97110 THERAPEUTIC EXERCISES: CPT

## 2021-07-13 NOTE — PROGRESS NOTES
Referring Physician: Dr. Dann Brown  Diagnosis:  Low back pain due to bilateral sciatica (M54.41,M54.42)  Initial FOTO: 37/100   Projected FOTO: 49/100  FOTO at 5th visit: **/100  FOTO at DC:  **/100   Insurance: [facility] OOS PPO       Date of Onset: chronic  Pre assist in being able to achieve safe body mechanics/transfers/posture  5.  Patient will be able to demonstrate good understanding of basics of proper body mechanics and posture by 8 sessions  6.  Patient will demonstrate improved tolerance of ADL's as evid ex:   -PROM: right hip flexion  -sit to stand transfers   -prone laying hip IR, ext and abd on right with long leg pull  -patient educated in sleep postures and trial use of blanket or bolster under knees for alternate rest postures  - Therap ex:   -PROM ri

## 2021-07-13 NOTE — PROGRESS NOTES
Procedure code 56855, [id number]-- PENDING APPROVAL     [facility]---- PA needed via [employer] at # [phone]  Called at 8:13 am spoke with Mona Fair, clinical info given over the phone, case is in pending medical review.  Office MD or APN need to call [facility] and do a peer to pe

## 2021-07-13 NOTE — TELEPHONE ENCOUNTER
Pt states that dose for Hydralazine has increased to 100 mg 3X daily, please send new Rx to pharmacy. None

## 2021-07-15 ENCOUNTER — TELEPHONE (OUTPATIENT)
Dept: PAIN CLINIC | Facility: HOSPITAL | Age: 66
End: 2021-07-15

## 2021-07-15 NOTE — TELEPHONE ENCOUNTER
Peer to Peer done with Dr. Mine Nelson on 7/15/21 at 5586 for patients MRI lumbar spine and MRI thoracic spine. Approved.  Auth # [id number]

## 2021-07-20 ENCOUNTER — OFFICE VISIT (OUTPATIENT)
Dept: PHYSICAL THERAPY | Facility: HOSPITAL | Age: 66
End: 2021-07-20
Attending: ANESTHESIOLOGY
Payer: COMMERCIAL

## 2021-07-20 PROCEDURE — 97110 THERAPEUTIC EXERCISES: CPT

## 2021-07-20 PROCEDURE — 97140 MANUAL THERAPY 1/> REGIONS: CPT

## 2021-07-20 NOTE — PROGRESS NOTES
Referring Physician: Dr. Sandra Holguin  Diagnosis:  Low back pain due to bilateral sciatica (M54.41,M54.42)  Initial FOTO: 37/100   Projected FOTO: 49/100  FOTO at 5th visit: **/100  FOTO at DC:  **/100   Insurance: [facility] OOS PPO       Date of Onset: chronic  Pre demonstrate reduction of back and LE pain to less than 5/10 90% of time by 8-12 sessions to allow for painfree transfers and bed mobility  4.  Patient will be able to demonstrate independent HEP for basic lower extremity and spinal mobility and basic core s flexion  -Hamstring stretches 4 x 15 secs  -SAQ on bolster 10 x 2 sets  -Passive Hip ER/IR left and right  -h/l: clams 10 x RTB  -LAQ 10x lower on left more difficult than right  -standing hip abd in II bars  -Nu Step for arms and legs seat and arms at #8 leg pull supine  -light foam rolling to quad on right Manual therapy:  -STM to right and left L-S p/s while performing LTR bilateral  -STM to both TFL Supine with knees on bolsters   -Rhythmic harmonics for TFL hip Rotation  Right LE on bolster for ER/IR

## 2021-07-22 ENCOUNTER — APPOINTMENT (OUTPATIENT)
Dept: PHYSICAL THERAPY | Facility: HOSPITAL | Age: 66
End: 2021-07-22
Attending: ANESTHESIOLOGY
Payer: COMMERCIAL

## 2021-07-22 RX ORDER — ATORVASTATIN CALCIUM 20 MG/1
TABLET, FILM COATED ORAL
Qty: 90 TABLET | Refills: 0 | Status: SHIPPED | OUTPATIENT
Start: 2021-07-22

## 2021-07-24 RX ORDER — INSULIN DETEMIR 100 [IU]/ML
38 INJECTION, SOLUTION SUBCUTANEOUS DAILY
Qty: 34.2 ML | Refills: 0 | Status: SHIPPED | OUTPATIENT
Start: 2021-07-24 | End: 2021-08-20 | Stop reason: ALTCHOICE

## 2021-07-26 ENCOUNTER — DOCUMENTATION ONLY (OUTPATIENT)
Dept: PAIN CLINIC | Facility: HOSPITAL | Age: 66
End: 2021-07-26

## 2021-07-26 ENCOUNTER — TELEPHONE (OUTPATIENT)
Dept: INTERNAL MEDICINE CLINIC | Facility: CLINIC | Age: 66
End: 2021-07-26

## 2021-07-26 DIAGNOSIS — M54.40 ACUTE RIGHT-SIDED LOW BACK PAIN WITH SCIATICA, SCIATICA LATERALITY UNSPECIFIED: ICD-10-CM

## 2021-07-26 DIAGNOSIS — M54.50 LOW BACK PAIN, UNSPECIFIED BACK PAIN LATERALITY, UNSPECIFIED CHRONICITY, UNSPECIFIED WHETHER SCIATICA PRESENT: Primary | ICD-10-CM

## 2021-07-27 NOTE — TELEPHONE ENCOUNTER
Can put the referral in for Dr. Daisy Zhang who is out on vacation. But not sure the reason. There is nothing documented that I am seeing.

## 2021-07-27 NOTE — TELEPHONE ENCOUNTER
----- Message from Inocencio Min sent at 5/26/2020 10:10 AM CDT -----  Regarding: Referrals Needed  Good Morning,  Can you please submit additional referral for 8 visits to [facility].   Thanks  [facility]

## 2021-07-28 ENCOUNTER — TELEPHONE (OUTPATIENT)
Dept: INTERNAL MEDICINE CLINIC | Facility: CLINIC | Age: 66
End: 2021-07-28

## 2021-07-28 DIAGNOSIS — E11.00 TYPE 2 DIABETES MELLITUS WITH HYPEROSMOLARITY WITHOUT COMA, WITHOUT LONG-TERM CURRENT USE OF INSULIN (HCC): Primary | ICD-10-CM

## 2021-07-28 NOTE — TELEPHONE ENCOUNTER
Endo staff please see pharmacy message below. Pt stated that's he does see Cherryl Sever.  Pt will like a 6 month supply

## 2021-07-28 NOTE — TELEPHONE ENCOUNTER
Pharm states that pt's insurance no longer covers levemir. Options that are covered are lantus and toujeo. .please advise.

## 2021-07-28 NOTE — TELEPHONE ENCOUNTER
Dr. Brandi Sears,  Patient's insurance does not cover levemir - lantus is covered    RX for lantus at same dose (38 units daily) pended for approval    F/U scheduled 10/12/21    Thanks

## 2021-07-29 RX ORDER — INSULIN GLARGINE 100 [IU]/ML
38 INJECTION, SOLUTION SUBCUTANEOUS DAILY
Qty: 36 ML | Refills: 0 | Status: SHIPPED | OUTPATIENT
Start: 2021-07-29

## 2021-07-30 ENCOUNTER — HOSPITAL ENCOUNTER (OUTPATIENT)
Dept: CT IMAGING | Facility: HOSPITAL | Age: 66
Discharge: HOME | End: 2021-07-30
Attending: NEUROLOGICAL SURGERY
Payer: COMMERCIAL

## 2021-07-30 DIAGNOSIS — M48.062 SPINAL STENOSIS OF LUMBAR REGION WITH NEUROGENIC CLAUDICATION: ICD-10-CM

## 2021-07-30 DIAGNOSIS — M43.16 SPONDYLOLISTHESIS, LUMBAR REGION: ICD-10-CM

## 2021-07-30 DIAGNOSIS — M96.1 POSTLAMINECTOMY SYNDROME, LUMBAR REGION: ICD-10-CM

## 2021-07-30 PROCEDURE — 72131 CT LUMBAR SPINE W/O DYE: CPT | Performed by: NEUROLOGICAL SURGERY

## 2021-07-30 RX ORDER — CYCLOBENZAPRINE HCL 5 MG
5 TABLET ORAL NIGHTLY PRN
Qty: 20 TABLET | Refills: 0 | Status: SHIPPED | OUTPATIENT
Start: 2021-07-30 | End: 2021-08-15

## 2021-07-30 NOTE — TELEPHONE ENCOUNTER
MyChart message sent,  Will postpone for a day and will re check the [employer].

## 2021-08-02 NOTE — TELEPHONE ENCOUNTER
Spoke to patient and she said she needs a referral for physical therapy for her low back pain not wound care.      Referral has been pended please approve if appropriate

## 2021-08-03 ENCOUNTER — OFFICE VISIT (OUTPATIENT)
Dept: NEPHROLOGY | Facility: CLINIC | Age: 66
End: 2021-08-03
Payer: COMMERCIAL

## 2021-08-03 ENCOUNTER — TELEPHONE (OUTPATIENT)
Dept: PHYSICAL THERAPY | Facility: HOSPITAL | Age: 66
End: 2021-08-03

## 2021-08-03 ENCOUNTER — APPOINTMENT (OUTPATIENT)
Dept: PHYSICAL THERAPY | Facility: HOSPITAL | Age: 66
End: 2021-08-03
Attending: INTERNAL MEDICINE
Payer: COMMERCIAL

## 2021-08-03 VITALS
WEIGHT: 170 LBS | HEART RATE: 108 BPM | SYSTOLIC BLOOD PRESSURE: 187 MMHG | HEIGHT: 64 IN | BODY MASS INDEX: 29.02 KG/M2 | DIASTOLIC BLOOD PRESSURE: 71 MMHG

## 2021-08-03 DIAGNOSIS — I10 UNCONTROLLED HYPERTENSION: Primary | ICD-10-CM

## 2021-08-03 DIAGNOSIS — N18.30 STAGE 3 CHRONIC KIDNEY DISEASE, UNSPECIFIED WHETHER STAGE 3A OR 3B CKD (HCC): ICD-10-CM

## 2021-08-03 DIAGNOSIS — E83.52 HYPERCALCEMIA: ICD-10-CM

## 2021-08-03 PROCEDURE — 3008F BODY MASS INDEX DOCD: CPT | Performed by: INTERNAL MEDICINE

## 2021-08-03 PROCEDURE — 3077F SYST BP >= 140 MM HG: CPT | Performed by: INTERNAL MEDICINE

## 2021-08-03 PROCEDURE — 99215 OFFICE O/P EST HI 40 MIN: CPT | Performed by: INTERNAL MEDICINE

## 2021-08-03 PROCEDURE — 3078F DIAST BP <80 MM HG: CPT | Performed by: INTERNAL MEDICINE

## 2021-08-03 RX ORDER — HYDROCHLOROTHIAZIDE 25 MG/1
25 TABLET ORAL DAILY
Qty: 30 TABLET | Refills: 2 | Status: SHIPPED | OUTPATIENT
Start: 2021-08-03 | End: 2021-09-01

## 2021-08-03 NOTE — PATIENT INSTRUCTIONS
Increase water intake 65 ounces a day and stop soda   US of kidney with doppler  Start on hydrochlorothiazide at 25 mg daily dose   Monitor blood sugars - need to be better controlled  Lab test in one week   Follow up in 8 weeks   call in 2 weeks with josselyn

## 2021-08-03 NOTE — TELEPHONE ENCOUNTER
Patient was called re no show had to cancel due to need for new referral due to change in Insurance. Was rescheduled for Thursday.

## 2021-08-03 NOTE — PROGRESS NOTES
Progress Note:      Patient is a 63 yrs old female with pmh of DM II x 40 yrs c/b diabetic neuropathy, HTN, anemia, current smoking, back surgery, sickle cell trait, PVD s/p LAKHWINDER in left superficial femoral artery  who presented for follow up      Lab t Sickle-cell anemia (HCC)    • Tobacco abuse       Past Surgical History:   Procedure Laterality Date   • EXCIS LUMBAR DISK,ONE LEVEL        Family History   Problem Relation Age of Onset   • Diabetes Mother         Passed from DM complications   • Diabetes MG Oral Tab Take 1-2 tablets by mouth every 6 (six) hours as needed for Pain.  30 tablet 0   • ACCU-CHEK GUIDE In Vitro Strip USE 4 TIMES A DAY TO TEST BLOOD SUGAR 3 TIMES A DAY WITH MEALS AND AT BEDTIME 100 strip 0   • Blood Glucose Monitoring Suppl (ACCU- eye discharge and vision loss  Cardiovascular:  Negative for chest pain, sobs  Respiratory:  Negative for cough, dyspnea and wheezing  Gastrointestinal:  Negative for abdominal pain, constipation.  Decrease appetite and some weight loss   Genitourinary:  Ne or vitamin D  - drinks 2 glasses of water a day - increase water intake and repeat   - chronic smoker - stopped in Nov 2019  -  CT chest noted from July 2019 - 6 months follow up on 4 mm right UL nodule - follows with Dr. Lynette Verdugo wnl. PTHrp wnl.  Leila

## 2021-08-05 ENCOUNTER — APPOINTMENT (OUTPATIENT)
Dept: PHYSICAL THERAPY | Facility: HOSPITAL | Age: 66
End: 2021-08-05
Attending: ANESTHESIOLOGY
Payer: COMMERCIAL

## 2021-08-10 ENCOUNTER — OFFICE VISIT (OUTPATIENT)
Dept: PAIN CLINIC | Facility: HOSPITAL | Age: 66
End: 2021-08-10
Attending: NURSE PRACTITIONER
Payer: COMMERCIAL

## 2021-08-10 ENCOUNTER — APPOINTMENT (OUTPATIENT)
Dept: PHYSICAL THERAPY | Facility: HOSPITAL | Age: 66
End: 2021-08-10
Payer: COMMERCIAL

## 2021-08-10 VITALS — SYSTOLIC BLOOD PRESSURE: 169 MMHG | DIASTOLIC BLOOD PRESSURE: 84 MMHG | HEART RATE: 95 BPM | OXYGEN SATURATION: 98 %

## 2021-08-10 DIAGNOSIS — M96.1 FAILED BACK SURGICAL SYNDROME: ICD-10-CM

## 2021-08-10 DIAGNOSIS — M48.062 SPINAL STENOSIS OF LUMBAR REGION WITH NEUROGENIC CLAUDICATION: ICD-10-CM

## 2021-08-10 DIAGNOSIS — M54.41 LOW BACK PAIN DUE TO BILATERAL SCIATICA: Primary | ICD-10-CM

## 2021-08-10 DIAGNOSIS — M54.42 LOW BACK PAIN DUE TO BILATERAL SCIATICA: Primary | ICD-10-CM

## 2021-08-10 DIAGNOSIS — M79.651 RIGHT THIGH PAIN: ICD-10-CM

## 2021-08-10 DIAGNOSIS — Z01.818 PREOPERATIVE EVALUATION TO RULE OUT SURGICAL CONTRAINDICATION: ICD-10-CM

## 2021-08-10 PROCEDURE — 99211 OFF/OP EST MAY X REQ PHY/QHP: CPT

## 2021-08-10 RX ORDER — PREGABALIN 25 MG/1
25 CAPSULE ORAL 3 TIMES DAILY
Qty: 90 CAPSULE | Refills: 0 | Status: ON HOLD | OUTPATIENT
Start: 2021-08-10 | End: 2021-09-14

## 2021-08-10 RX ORDER — HYDROCODONE BITARTRATE AND ACETAMINOPHEN 10; 325 MG/1; MG/1
1 TABLET ORAL EVERY 12 HOURS PRN
Qty: 60 TABLET | Refills: 0 | Status: SHIPPED | OUTPATIENT
Start: 2021-08-10 | End: 2021-09-14

## 2021-08-10 NOTE — PROGRESS NOTES
PT presents ambulatory to the CPM with use of a straight cane. Pt reports back surgery scheduled on 08/2521. ZAIRA Cary saw PT for 2 month med eval.  See notes for POC.

## 2021-08-10 NOTE — CHRONIC PAIN
Follow-up Note  CC: follow up   HISTORY OF PRESENT ILLNESS:  Jose L Stringer is a 63year old old female, originally referred to the pain clinic by Dr. Weber ref.  provider found, with history of Low back pain due to bilateral sciatica  (primary encounter di that she saw Dr. Perlita Talamantes. She states that shes scheduled for surgery on 8/25. She states that Dr. Perlita Talamantes said the stimulator wont work. She continues to have the same pain, unchanged. She is unsure what she plans to do.  She continues to take lyrica and norco NH HYDROcodone-acetaminophen 5-325 MG Oral Tab Take 1-2 tablets by mouth every 6 (six) hours as needed for Pain.  30 tablet 0   • ACCU-CHEK GUIDE In Vitro Strip USE 4 TIMES A DAY TO TEST BLOOD SUGAR 3 TIMES A DAY WITH MEALS AND AT BEDTIME 100 strip 0   • Blood Anemia     Azotemia     Back pain with radiation     Type 2 diabetes mellitus without retinopathy (HCC)     Age-related nuclear cataract of both eyes     Glaucoma suspect of both eyes     Pulmonary emphysema, unspecified emphysema type (HCC)     PVD (perip years: 30        Types: Cigarettes        Quit date: 2019        Years since quittin.7      Smokeless tobacco: Never Used    Vaping Use      Vaping Use: Never used    Substance and Sexual Activity      Alcohol use: Not Currently        Comment: so ROM:    LUMBAR SPINE    Degree Pain   Flexion 70 No   Extension 15 No                         KNEES    Degree Pain   Right Flexion 120 No   Right Extension 0 No   Left Flexion 120 No   Left Extension 0 No     MOTOR EXAMINATION:  LOWER EXTREMITY      LEF questions and answer session. Patient agreeable to discussion plan.  Greater than 50% of the time was spent with counseling (nature of discussion centered around pain, therapy, and treatment options), face to face time, time spent reviewing data, obtaining

## 2021-08-12 ENCOUNTER — TELEPHONE (OUTPATIENT)
Dept: PHYSICAL THERAPY | Facility: HOSPITAL | Age: 66
End: 2021-08-12

## 2021-08-12 ENCOUNTER — APPOINTMENT (OUTPATIENT)
Dept: PHYSICAL THERAPY | Facility: HOSPITAL | Age: 66
End: 2021-08-12
Payer: COMMERCIAL

## 2021-08-13 ENCOUNTER — OFFICE VISIT (OUTPATIENT)
Dept: INTERNAL MEDICINE CLINIC | Facility: CLINIC | Age: 66
End: 2021-08-13
Payer: COMMERCIAL

## 2021-08-13 VITALS
BODY MASS INDEX: 28.68 KG/M2 | DIASTOLIC BLOOD PRESSURE: 67 MMHG | OXYGEN SATURATION: 98 % | SYSTOLIC BLOOD PRESSURE: 106 MMHG | HEART RATE: 93 BPM | WEIGHT: 168 LBS | HEIGHT: 64 IN

## 2021-08-13 DIAGNOSIS — M48.061 NEUROFORAMINAL STENOSIS OF LUMBAR SPINE: Primary | ICD-10-CM

## 2021-08-13 DIAGNOSIS — Z01.818 PREOP EXAM FOR INTERNAL MEDICINE: ICD-10-CM

## 2021-08-13 DIAGNOSIS — Z01.810 PREOP CARDIOVASCULAR EXAM: ICD-10-CM

## 2021-08-13 PROCEDURE — 99214 OFFICE O/P EST MOD 30 MIN: CPT | Performed by: INTERNAL MEDICINE

## 2021-08-13 PROCEDURE — 3074F SYST BP LT 130 MM HG: CPT | Performed by: INTERNAL MEDICINE

## 2021-08-13 PROCEDURE — 3078F DIAST BP <80 MM HG: CPT | Performed by: INTERNAL MEDICINE

## 2021-08-13 PROCEDURE — 3008F BODY MASS INDEX DOCD: CPT | Performed by: INTERNAL MEDICINE

## 2021-08-13 NOTE — PROGRESS NOTES
Aneesh Pinedo is a 63year old female who presents for a pre-operative physical exam. Patient is to have laminectomy lumbar spine , to be done by Dr. Janes Saez at [facility]  on 8/25/2021    Pt has had previous anesthesia:  Yes.   Previous complicatio Does not apply route 4 (four) times daily.      • Accu-Chek FastClix Lancets Does not apply Misc      • Albuterol Sulfate  (90 Base) MCG/ACT Inhalation Aero Soln Inhale 1 puff into the lungs every 4 (four) hours as needed for Wheezing or Shortness of LEVEL        Family History   Problem Relation Age of Onset   • Diabetes Mother         Passed from DM complications   • Diabetes Sister         Had kidney transplant due to complications of DM   • Kidney Disease Sister         Kidney failure secondary to are clear  NECK: supple,no adenopathy,no bruits  CHEST: no chest tenderness  BREAST: no dominant or suspicious mass  LUNGS: clear to auscultation  CARDIO: RRR without murmur  GI: good BS's,no masses, HSM or tenderness  : deferred  EXTREMITIES: edema  JACKIE

## 2021-08-15 RX ORDER — CYCLOBENZAPRINE HCL 5 MG
TABLET ORAL
Qty: 20 TABLET | Refills: 0 | Status: SHIPPED | OUTPATIENT
Start: 2021-08-15 | End: 2021-09-14

## 2021-08-17 ENCOUNTER — LAB ENCOUNTER (OUTPATIENT)
Dept: LAB | Facility: HOSPITAL | Age: 66
End: 2021-08-17
Attending: NEUROLOGICAL SURGERY
Payer: COMMERCIAL

## 2021-08-17 ENCOUNTER — HOSPITAL ENCOUNTER (OUTPATIENT)
Dept: GENERAL RADIOLOGY | Facility: HOSPITAL | Age: 66
Discharge: HOME | End: 2021-08-17
Attending: NEUROLOGICAL SURGERY
Payer: COMMERCIAL

## 2021-08-17 ENCOUNTER — EKG ENCOUNTER (OUTPATIENT)
Dept: LAB | Facility: HOSPITAL | Age: 66
End: 2021-08-17
Attending: INTERNAL MEDICINE
Payer: COMMERCIAL

## 2021-08-17 DIAGNOSIS — Z01.818 PREOP EXAM FOR INTERNAL MEDICINE: ICD-10-CM

## 2021-08-17 DIAGNOSIS — Z01.810 PRE-OPERATIVE CARDIOVASCULAR EXAMINATION: Primary | ICD-10-CM

## 2021-08-17 DIAGNOSIS — Z01.811 ENCOUNTER FOR PREPROCEDURAL RESPIRATORY EXAMINATION: ICD-10-CM

## 2021-08-17 LAB
ANION GAP SERPL CALC-SCNC: 9 MMOL/L (ref 0–18)
BASOPHILS # BLD AUTO: 0.1 X10(3) UL (ref 0–0.2)
BASOPHILS NFR BLD AUTO: 1.3 %
BILIRUB UR QL: NEGATIVE
BUN BLD-MCNC: 38 MG/DL (ref 7–18)
BUN/CREAT SERPL: 16.6 (ref 10–20)
CALCIUM BLD-MCNC: 9.7 MG/DL (ref 8.5–10.1)
CHLORIDE SERPL-SCNC: 110 MMOL/L (ref 98–112)
CO2 SERPL-SCNC: 22 MMOL/L (ref 21–32)
COLOR UR: YELLOW
CREAT BLD-MCNC: 2.29 MG/DL
DEPRECATED RDW RBC AUTO: 42.8 FL (ref 35.1–46.3)
EOSINOPHIL # BLD AUTO: 0.4 X10(3) UL (ref 0–0.7)
EOSINOPHIL NFR BLD AUTO: 5 %
ERYTHROCYTE [DISTWIDTH] IN BLOOD BY AUTOMATED COUNT: 14 % (ref 11–15)
GLUCOSE BLD-MCNC: 241 MG/DL (ref 70–99)
GLUCOSE UR-MCNC: NEGATIVE MG/DL
HCT VFR BLD AUTO: 32.1 %
HGB BLD-MCNC: 10 G/DL
IMM GRANULOCYTES # BLD AUTO: 0.04 X10(3) UL (ref 0–1)
IMM GRANULOCYTES NFR BLD: 0.5 %
INR BLD: 1.09 (ref 0.9–1.2)
KETONES UR-MCNC: NEGATIVE MG/DL
LYMPHOCYTES # BLD AUTO: 1.55 X10(3) UL (ref 1–4)
LYMPHOCYTES NFR BLD AUTO: 19.5 %
MCH RBC QN AUTO: 26 PG (ref 26–34)
MCHC RBC AUTO-ENTMCNC: 31.2 G/DL (ref 31–37)
MCV RBC AUTO: 83.6 FL
MONOCYTES # BLD AUTO: 0.83 X10(3) UL (ref 0.1–1)
MONOCYTES NFR BLD AUTO: 10.5 %
MRSA DNA SPEC QL NAA+PROBE: NEGATIVE
NEUTROPHILS # BLD AUTO: 5.01 X10 (3) UL (ref 1.5–7.7)
NEUTROPHILS # BLD AUTO: 5.01 X10(3) UL (ref 1.5–7.7)
NEUTROPHILS NFR BLD AUTO: 63.2 %
NITRITE UR QL STRIP.AUTO: NEGATIVE
OSMOLALITY SERPL CALC.SUM OF ELEC: 309 MOSM/KG (ref 275–295)
PATIENT FASTING Y/N/NP: NO
PH UR: 5 [PH] (ref 5–8)
PLATELET # BLD AUTO: 364 10(3)UL (ref 150–450)
POTASSIUM SERPL-SCNC: 4.4 MMOL/L (ref 3.5–5.1)
PROT UR-MCNC: 100 MG/DL
PROTHROMBIN TIME: 13.9 SECONDS (ref 11.8–14.5)
RBC # BLD AUTO: 3.84 X10(6)UL
RBC #/AREA URNS AUTO: >10 /HPF
SODIUM SERPL-SCNC: 141 MMOL/L (ref 136–145)
SP GR UR STRIP: 1.02 (ref 1–1.03)
UROBILINOGEN UR STRIP-ACNC: <2
WBC # BLD AUTO: 7.9 X10(3) UL (ref 4–11)
WBC #/AREA URNS AUTO: >50 /HPF
WBC CLUMPS UR QL AUTO: PRESENT /HPF

## 2021-08-17 PROCEDURE — 87086 URINE CULTURE/COLONY COUNT: CPT

## 2021-08-17 PROCEDURE — 71046 X-RAY EXAM CHEST 2 VIEWS: CPT | Performed by: NEUROLOGICAL SURGERY

## 2021-08-17 PROCEDURE — 85610 PROTHROMBIN TIME: CPT

## 2021-08-17 PROCEDURE — 87186 SC STD MICRODIL/AGAR DIL: CPT

## 2021-08-17 PROCEDURE — 81001 URINALYSIS AUTO W/SCOPE: CPT

## 2021-08-17 PROCEDURE — 36415 COLL VENOUS BLD VENIPUNCTURE: CPT

## 2021-08-17 PROCEDURE — 93010 ELECTROCARDIOGRAM REPORT: CPT | Performed by: NEUROLOGICAL SURGERY

## 2021-08-17 PROCEDURE — 87088 URINE BACTERIA CULTURE: CPT

## 2021-08-17 PROCEDURE — 87641 MR-STAPH DNA AMP PROBE: CPT

## 2021-08-17 PROCEDURE — 93005 ELECTROCARDIOGRAM TRACING: CPT

## 2021-08-17 PROCEDURE — 80048 BASIC METABOLIC PNL TOTAL CA: CPT

## 2021-08-17 PROCEDURE — 85025 COMPLETE CBC W/AUTO DIFF WBC: CPT

## 2021-08-20 ENCOUNTER — TELEPHONE (OUTPATIENT)
Dept: NEPHROLOGY | Facility: CLINIC | Age: 66
End: 2021-08-20

## 2021-08-20 DIAGNOSIS — N18.30 STAGE 3 CHRONIC KIDNEY DISEASE, UNSPECIFIED WHETHER STAGE 3A OR 3B CKD (HCC): Primary | ICD-10-CM

## 2021-08-20 RX ORDER — GLIPIZIDE 5 MG/1
5 TABLET ORAL
Qty: 180 TABLET | Refills: 0 | Status: SHIPPED | OUTPATIENT
Start: 2021-08-20 | End: 2021-10-07

## 2021-08-20 NOTE — TELEPHONE ENCOUNTER
Spoke to patient. She was started back on hydrochlorothiazide about 2 weeks ago.  Dr. Matthias Serna did labs before patient's surgery scheduled for 8/25/21 and noted that kidney numbers are higher so Dr. Matthias Serna told patient to check and see if she should stop taking

## 2021-08-20 NOTE — TELEPHONE ENCOUNTER
Patient reqeusting to speak with Dr. Arben Alfaro about new medication Hydrocodone, please call at [phone],[id number].     Current Outpatient Medications:   •  sulfamethoxazole-trimethoprim 400-80 MG   •  HYDROcodone-acetaminophen  MG Oral Tab, Take 1 ta

## 2021-08-23 NOTE — TELEPHONE ENCOUNTER
Hold hydrochlorothiazide and repeat BMP in 3-5 days.  pls order     Also remind patient to schedule the US as was ordered

## 2021-08-23 NOTE — TELEPHONE ENCOUNTER
BMP order placed. Spoke with patient, discussed below message. Pt verbalizes understanding. Central scheduling phone number provided to patient to set up US kidney.

## 2021-08-26 ENCOUNTER — HOSPITAL ENCOUNTER (OUTPATIENT)
Dept: CV DIAGNOSTICS | Facility: HOSPITAL | Age: 66
Discharge: HOME | End: 2021-08-26
Attending: INTERNAL MEDICINE
Payer: COMMERCIAL

## 2021-08-26 ENCOUNTER — HOSPITAL ENCOUNTER (OUTPATIENT)
Dept: NUCLEAR MEDICINE | Facility: HOSPITAL | Age: 66
Discharge: HOME | End: 2021-08-26
Attending: INTERNAL MEDICINE
Payer: COMMERCIAL

## 2021-08-26 DIAGNOSIS — Z01.810 PREOP CARDIOVASCULAR EXAM: ICD-10-CM

## 2021-08-26 PROCEDURE — 78452 HT MUSCLE IMAGE SPECT MULT: CPT | Performed by: INTERNAL MEDICINE

## 2021-08-26 PROCEDURE — 93018 CV STRESS TEST I&R ONLY: CPT | Performed by: INTERNAL MEDICINE

## 2021-08-26 PROCEDURE — 93016 CV STRESS TEST SUPVJ ONLY: CPT | Performed by: INTERNAL MEDICINE

## 2021-08-26 PROCEDURE — 93017 CV STRESS TEST TRACING ONLY: CPT | Performed by: INTERNAL MEDICINE

## 2021-08-31 RX ORDER — HYDRALAZINE HYDROCHLORIDE 25 MG/1
25 TABLET, FILM COATED ORAL 3 TIMES DAILY
Qty: 270 TABLET | Refills: 1 | OUTPATIENT
Start: 2021-08-31

## 2021-08-31 NOTE — TELEPHONE ENCOUNTER
Please review. Protocol failed/ No protocol.     Requested Prescriptions   Pending Prescriptions Disp Refills    HYDRALAZINE 25 MG Oral Tab [Pharmacy Med Name: HYDRALAZINE 25 MG TABLET] 270 tablet 0     Sig: TAKE 1 TABLET BY MOUTH THREE TIMES A DAY        Hypertensive Medications Protocol Failed - 8/31/2021 12:41 AM        Failed - GFR  > 50     Lab Results   Component Value Date    GFRAA 25 (L) 08/17/2021               Passed - CMP or BMP in past 12 months        Passed - Appointment in past 6 or next 3 months              Recent Outpatient Visits              2 weeks ago Neuroforaminal stenosis of lumbar spine    [facility], [place], Kevan Angelucci, MD    Office Visit    3 weeks ago Low back pain due to bilateral sciatica    [facility] KARINE Talbot    Office Visit    4 weeks ago Uncontrolled hypertension    [facility] Asa Older, MD    Office Visit    1 month ago     [facility] [address], [place]    Office Visit    1 month ago     [facility] [address], [place]    Office Visit            Future Appointments         Provider Department Appt Notes    In 6 days [address] [facility] Lab draw t&s bmp    In 3 weeks rBian Cartagena MD [facility], [address] [facility], [place], [facility] informed    In 1 month Seth Aranda, [address] [facility] Endocrinology DM f/u

## 2021-09-01 ENCOUNTER — TELEPHONE (OUTPATIENT)
Dept: NEPHROLOGY | Facility: CLINIC | Age: 66
End: 2021-09-01

## 2021-09-01 NOTE — TELEPHONE ENCOUNTER
Spoke with patient- states she has lumbar spine surgery on 9/9/21. States she needs pre op clearance- advised pt you are out of the office this week and next. Ok to do virtual visit or any tests should be ordered? Thank you!

## 2021-09-04 RX ORDER — HYDRALAZINE HYDROCHLORIDE 50 MG/1
TABLET, FILM COATED ORAL
Qty: 180 TABLET | Refills: 0 | Status: SHIPPED | OUTPATIENT
Start: 2021-09-04 | End: 2021-09-08

## 2021-09-06 ENCOUNTER — LAB ENCOUNTER (OUTPATIENT)
Dept: LAB | Facility: HOSPITAL | Age: 66
End: 2021-09-06
Attending: NEUROLOGICAL SURGERY
Payer: COMMERCIAL

## 2021-09-06 DIAGNOSIS — N17.9 AKI (ACUTE KIDNEY INJURY) (HCC): ICD-10-CM

## 2021-09-06 DIAGNOSIS — Z01.818 PRE-OP TESTING: ICD-10-CM

## 2021-09-06 LAB
ANION GAP SERPL CALC-SCNC: 9 MMOL/L (ref 0–18)
ANTIBODY SCREEN: NEGATIVE
BUN BLD-MCNC: 32 MG/DL (ref 7–18)
BUN/CREAT SERPL: 20.4 (ref 10–20)
CALCIUM BLD-MCNC: 9.1 MG/DL (ref 8.5–10.1)
CHLORIDE SERPL-SCNC: 109 MMOL/L (ref 98–112)
CO2 SERPL-SCNC: 21 MMOL/L (ref 21–32)
CREAT BLD-MCNC: 1.57 MG/DL
GLUCOSE BLD-MCNC: 353 MG/DL (ref 70–99)
OSMOLALITY SERPL CALC.SUM OF ELEC: 309 MOSM/KG (ref 275–295)
PATIENT FASTING Y/N/NP: YES
POTASSIUM SERPL-SCNC: 4.6 MMOL/L (ref 3.5–5.1)
RH BLOOD TYPE: POSITIVE
SODIUM SERPL-SCNC: 139 MMOL/L (ref 136–145)

## 2021-09-06 PROCEDURE — 86850 RBC ANTIBODY SCREEN: CPT

## 2021-09-06 PROCEDURE — 80048 BASIC METABOLIC PNL TOTAL CA: CPT

## 2021-09-06 PROCEDURE — 36415 COLL VENOUS BLD VENIPUNCTURE: CPT

## 2021-09-06 PROCEDURE — 86900 BLOOD TYPING SEROLOGIC ABO: CPT

## 2021-09-06 PROCEDURE — 86901 BLOOD TYPING SEROLOGIC RH(D): CPT

## 2021-09-07 LAB — SARS-COV-2 RNA RESP QL NAA+PROBE: NOT DETECTED

## 2021-09-07 NOTE — PROGRESS NOTES
Pt informed of results, will continue to hold hydrochlorothiazide, pt informed she is cleared for surgery.  Pt states she is in extreme pain and would like something for pain until then just a couple of pills, pt is crying from pain, surgeons office never r

## 2021-09-08 ENCOUNTER — TELEPHONE (OUTPATIENT)
Dept: INTERNAL MEDICINE CLINIC | Facility: CLINIC | Age: 66
End: 2021-09-08

## 2021-09-08 NOTE — TELEPHONE ENCOUNTER
Pain management is managing her pain medication I do see that she prescribed Norco 60 pills in August 10 and she should have enough pills until tomorrow sinc she has surgery .   Since they are managing her pain I cannot send refill

## 2021-09-08 NOTE — TELEPHONE ENCOUNTER
Patient informed of provider's response below and voiced understating and denied further questions at this time.

## 2021-09-08 NOTE — TELEPHONE ENCOUNTER
Dr. Ruchi Brooks progress notes and Dr. Claudene Rei progress notes have been faxed to [facility] at Dr. Wendi Blanco office.

## 2021-09-08 NOTE — PAT NURSING NOTE
Spoke with DR. Chapman's office regarding Nephrology clearance and 353 glucose on repeat BMP done 9/6. Office will discuss with patient and return call to PAT office.

## 2021-09-08 NOTE — TELEPHONE ENCOUNTER
Patient calling to follow-up on message below. Requesting Rx for hydrocodone for low back pain. Surgery tomorrow on the lower back but in a lot of pain today. Please advise.         Ena Anne   9/7/2021 12:01 PM CDT       Pt informed of results, will con

## 2021-09-08 NOTE — TELEPHONE ENCOUNTER
Ayla/Dr. Chapman's office called to request surgery clearance. Pt is having surgery tomorrow. Please fax to [phone].

## 2021-09-09 ENCOUNTER — ANESTHESIA EVENT (OUTPATIENT)
Dept: SURGERY | Facility: HOSPITAL | Age: 66
DRG: 454 | End: 2021-09-09
Payer: COMMERCIAL

## 2021-09-09 ENCOUNTER — ANESTHESIA (OUTPATIENT)
Dept: SURGERY | Facility: HOSPITAL | Age: 66
DRG: 454 | End: 2021-09-09
Payer: COMMERCIAL

## 2021-09-09 ENCOUNTER — APPOINTMENT (OUTPATIENT)
Dept: GENERAL RADIOLOGY | Facility: HOSPITAL | Age: 66
DRG: 454 | End: 2021-09-09
Attending: NEUROLOGICAL SURGERY
Payer: COMMERCIAL

## 2021-09-09 ENCOUNTER — HOSPITAL ENCOUNTER (INPATIENT)
Facility: HOSPITAL | Age: 66
LOS: 5 days | Discharge: NURSING, REHAB OR LONG-TERM CARE | DRG: 454 | End: 2021-09-14
Attending: NEUROLOGICAL SURGERY | Admitting: NEUROLOGICAL SURGERY
Payer: COMMERCIAL

## 2021-09-09 DIAGNOSIS — M54.9 BACK PAIN WITH RADIATION: ICD-10-CM

## 2021-09-09 DIAGNOSIS — Z01.818 PRE-OP TESTING: Primary | ICD-10-CM

## 2021-09-09 DIAGNOSIS — Z98.890 S/P LAMINECTOMY: ICD-10-CM

## 2021-09-09 LAB
GLUCOSE BLDC GLUCOMTR-MCNC: 241 MG/DL (ref 70–99)
GLUCOSE BLDC GLUCOMTR-MCNC: 265 MG/DL (ref 70–99)
GLUCOSE BLDC GLUCOMTR-MCNC: 319 MG/DL (ref 70–99)
GLUCOSE BLDC GLUCOMTR-MCNC: 357 MG/DL (ref 70–99)
GLUCOSE BLDC GLUCOMTR-MCNC: 384 MG/DL (ref 70–99)
GLUCOSE BLDC GLUCOMTR-MCNC: 385 MG/DL (ref 70–99)
GLUCOSE BLDC GLUCOMTR-MCNC: 460 MG/DL (ref 70–99)

## 2021-09-09 PROCEDURE — 00NY0ZZ RELEASE LUMBAR SPINAL CORD, OPEN APPROACH: ICD-10-PCS | Performed by: NEUROLOGICAL SURGERY

## 2021-09-09 PROCEDURE — 76000 FLUOROSCOPY <1 HR PHYS/QHP: CPT | Performed by: NEUROLOGICAL SURGERY

## 2021-09-09 PROCEDURE — 99232 SBSQ HOSP IP/OBS MODERATE 35: CPT | Performed by: INTERNAL MEDICINE

## 2021-09-09 PROCEDURE — 0SG0071 FUSION OF LUMBAR VERTEBRAL JOINT WITH AUTOLOGOUS TISSUE SUBSTITUTE, POSTERIOR APPROACH, POSTERIOR COLUMN, OPEN APPROACH: ICD-10-PCS | Performed by: NEUROLOGICAL SURGERY

## 2021-09-09 PROCEDURE — 4A11X4G MONITORING OF PERIPHERAL NERVOUS ELECTRICAL ACTIVITY, INTRAOPERATIVE, EXTERNAL APPROACH: ICD-10-PCS | Performed by: NEUROLOGICAL SURGERY

## 2021-09-09 PROCEDURE — 0SG00A0 FUSION OF LUMBAR VERTEBRAL JOINT WITH INTERBODY FUSION DEVICE, ANTERIOR APPROACH, ANTERIOR COLUMN, OPEN APPROACH: ICD-10-PCS | Performed by: NEUROLOGICAL SURGERY

## 2021-09-09 PROCEDURE — 3E0U0GB INTRODUCTION OF RECOMBINANT BONE MORPHOGENETIC PROTEIN INTO JOINTS, OPEN APPROACH: ICD-10-PCS | Performed by: NEUROLOGICAL SURGERY

## 2021-09-09 DEVICE — XLIF AMS PLATE, 12MM DUAL-SIDED
Type: IMPLANTABLE DEVICE | Site: BACK | Status: FUNCTIONAL
Brand: XLIF

## 2021-09-09 DEVICE — COHERE XLW, 12X22X50MM 10°
Type: IMPLANTABLE DEVICE | Site: BACK | Status: FUNCTIONAL
Brand: COHERE

## 2021-09-09 DEVICE — IMPLANTABLE DEVICE: Type: IMPLANTABLE DEVICE | Site: BACK | Status: FUNCTIONAL

## 2021-09-09 DEVICE — BONE GRAFT KIT 7510200 INFUSE SMALL
Type: IMPLANTABLE DEVICE | Site: BACK | Status: FUNCTIONAL
Brand: INFUSE® BONE GRAFT

## 2021-09-09 DEVICE — OSTEOCEL PRO LARGE BULK BUY: Type: IMPLANTABLE DEVICE | Site: BACK | Status: FUNCTIONAL

## 2021-09-09 RX ORDER — HYDROMORPHONE HYDROCHLORIDE 1 MG/ML
0.4 INJECTION, SOLUTION INTRAMUSCULAR; INTRAVENOUS; SUBCUTANEOUS EVERY 5 MIN PRN
Status: DISCONTINUED | OUTPATIENT
Start: 2021-09-09 | End: 2021-09-09 | Stop reason: HOSPADM

## 2021-09-09 RX ORDER — LISINOPRIL 40 MG/1
40 TABLET ORAL DAILY
Status: DISCONTINUED | OUTPATIENT
Start: 2021-09-10 | End: 2021-09-14

## 2021-09-09 RX ORDER — HYDROMORPHONE HYDROCHLORIDE 1 MG/ML
0.2 INJECTION, SOLUTION INTRAMUSCULAR; INTRAVENOUS; SUBCUTANEOUS EVERY 5 MIN PRN
Status: DISCONTINUED | OUTPATIENT
Start: 2021-09-09 | End: 2021-09-09 | Stop reason: HOSPADM

## 2021-09-09 RX ORDER — DEXTROSE MONOHYDRATE 25 G/50ML
50 INJECTION, SOLUTION INTRAVENOUS
Status: DISCONTINUED | OUTPATIENT
Start: 2021-09-09 | End: 2021-09-09 | Stop reason: HOSPADM

## 2021-09-09 RX ORDER — PROCHLORPERAZINE EDISYLATE 5 MG/ML
5 INJECTION INTRAMUSCULAR; INTRAVENOUS ONCE AS NEEDED
Status: DISCONTINUED | OUTPATIENT
Start: 2021-09-09 | End: 2021-09-09 | Stop reason: HOSPADM

## 2021-09-09 RX ORDER — BUPIVACAINE HYDROCHLORIDE AND EPINEPHRINE 2.5; 5 MG/ML; UG/ML
INJECTION, SOLUTION INFILTRATION; PERINEURAL AS NEEDED
Status: DISCONTINUED | OUTPATIENT
Start: 2021-09-09 | End: 2021-09-09 | Stop reason: HOSPADM

## 2021-09-09 RX ORDER — SODIUM CHLORIDE, SODIUM LACTATE, POTASSIUM CHLORIDE, CALCIUM CHLORIDE 600; 310; 30; 20 MG/100ML; MG/100ML; MG/100ML; MG/100ML
INJECTION, SOLUTION INTRAVENOUS CONTINUOUS
Status: DISCONTINUED | OUTPATIENT
Start: 2021-09-09 | End: 2021-09-11

## 2021-09-09 RX ORDER — DEXTROSE MONOHYDRATE 25 G/50ML
50 INJECTION, SOLUTION INTRAVENOUS
Status: DISCONTINUED | OUTPATIENT
Start: 2021-09-09 | End: 2021-09-14

## 2021-09-09 RX ORDER — SODIUM CHLORIDE, SODIUM LACTATE, POTASSIUM CHLORIDE, CALCIUM CHLORIDE 600; 310; 30; 20 MG/100ML; MG/100ML; MG/100ML; MG/100ML
INJECTION, SOLUTION INTRAVENOUS CONTINUOUS
Status: DISCONTINUED | OUTPATIENT
Start: 2021-09-09 | End: 2021-09-09 | Stop reason: HOSPADM

## 2021-09-09 RX ORDER — ONDANSETRON 2 MG/ML
4 INJECTION INTRAMUSCULAR; INTRAVENOUS ONCE AS NEEDED
Status: DISCONTINUED | OUTPATIENT
Start: 2021-09-09 | End: 2021-09-09 | Stop reason: HOSPADM

## 2021-09-09 RX ORDER — DEXAMETHASONE SODIUM PHOSPHATE 4 MG/ML
VIAL (ML) INJECTION AS NEEDED
Status: DISCONTINUED | OUTPATIENT
Start: 2021-09-09 | End: 2021-09-09 | Stop reason: SURG

## 2021-09-09 RX ORDER — ONDANSETRON 2 MG/ML
INJECTION INTRAMUSCULAR; INTRAVENOUS AS NEEDED
Status: DISCONTINUED | OUTPATIENT
Start: 2021-09-09 | End: 2021-09-09 | Stop reason: SURG

## 2021-09-09 RX ORDER — MORPHINE SULFATE 4 MG/ML
4 INJECTION, SOLUTION INTRAMUSCULAR; INTRAVENOUS EVERY 10 MIN PRN
Status: DISCONTINUED | OUTPATIENT
Start: 2021-09-09 | End: 2021-09-09 | Stop reason: HOSPADM

## 2021-09-09 RX ORDER — MORPHINE SULFATE 4 MG/ML
2 INJECTION, SOLUTION INTRAMUSCULAR; INTRAVENOUS EVERY 2 HOUR PRN
Status: DISCONTINUED | OUTPATIENT
Start: 2021-09-09 | End: 2021-09-13

## 2021-09-09 RX ORDER — LIDOCAINE HYDROCHLORIDE 10 MG/ML
INJECTION, SOLUTION EPIDURAL; INFILTRATION; INTRACAUDAL; PERINEURAL AS NEEDED
Status: DISCONTINUED | OUTPATIENT
Start: 2021-09-09 | End: 2021-09-09 | Stop reason: SURG

## 2021-09-09 RX ORDER — HYDROCODONE BITARTRATE AND ACETAMINOPHEN 5; 325 MG/1; MG/1
1 TABLET ORAL AS NEEDED
Status: DISCONTINUED | OUTPATIENT
Start: 2021-09-09 | End: 2021-09-09 | Stop reason: HOSPADM

## 2021-09-09 RX ORDER — HYDROMORPHONE HYDROCHLORIDE 1 MG/ML
0.6 INJECTION, SOLUTION INTRAMUSCULAR; INTRAVENOUS; SUBCUTANEOUS EVERY 5 MIN PRN
Status: DISCONTINUED | OUTPATIENT
Start: 2021-09-09 | End: 2021-09-09 | Stop reason: HOSPADM

## 2021-09-09 RX ORDER — MORPHINE SULFATE 4 MG/ML
2 INJECTION, SOLUTION INTRAMUSCULAR; INTRAVENOUS EVERY 10 MIN PRN
Status: DISCONTINUED | OUTPATIENT
Start: 2021-09-09 | End: 2021-09-09 | Stop reason: HOSPADM

## 2021-09-09 RX ORDER — PREGABALIN 25 MG/1
25 CAPSULE ORAL 3 TIMES DAILY
Status: DISCONTINUED | OUTPATIENT
Start: 2021-09-09 | End: 2021-09-14

## 2021-09-09 RX ORDER — MORPHINE SULFATE 10 MG/ML
INJECTION, SOLUTION INTRAMUSCULAR; INTRAVENOUS AS NEEDED
Status: DISCONTINUED | OUTPATIENT
Start: 2021-09-09 | End: 2021-09-09 | Stop reason: SURG

## 2021-09-09 RX ORDER — VANCOMYCIN HYDROCHLORIDE
15 ONCE
Status: DISCONTINUED | OUTPATIENT
Start: 2021-09-09 | End: 2021-09-09 | Stop reason: HOSPADM

## 2021-09-09 RX ORDER — ACETAMINOPHEN 500 MG
1000 TABLET ORAL ONCE
Status: DISCONTINUED | OUTPATIENT
Start: 2021-09-09 | End: 2021-09-09 | Stop reason: HOSPADM

## 2021-09-09 RX ORDER — HYDROCODONE BITARTRATE AND ACETAMINOPHEN 5; 325 MG/1; MG/1
2 TABLET ORAL AS NEEDED
Status: DISCONTINUED | OUTPATIENT
Start: 2021-09-09 | End: 2021-09-09 | Stop reason: HOSPADM

## 2021-09-09 RX ORDER — INSULIN ASPART 100 [IU]/ML
INJECTION, SOLUTION INTRAVENOUS; SUBCUTANEOUS ONCE
Status: COMPLETED | OUTPATIENT
Start: 2021-09-09 | End: 2021-09-09

## 2021-09-09 RX ORDER — METHOCARBAMOL 500 MG/1
1000 TABLET, FILM COATED ORAL 2 TIMES DAILY
Status: DISCONTINUED | OUTPATIENT
Start: 2021-09-09 | End: 2021-09-13

## 2021-09-09 RX ORDER — ATORVASTATIN CALCIUM 20 MG/1
20 TABLET, FILM COATED ORAL NIGHTLY
Status: DISCONTINUED | OUTPATIENT
Start: 2021-09-09 | End: 2021-09-14

## 2021-09-09 RX ORDER — ONDANSETRON 2 MG/ML
4 INJECTION INTRAMUSCULAR; INTRAVENOUS EVERY 6 HOURS PRN
Status: DISCONTINUED | OUTPATIENT
Start: 2021-09-09 | End: 2021-09-14

## 2021-09-09 RX ORDER — GLYCOPYRROLATE 0.2 MG/ML
INJECTION, SOLUTION INTRAMUSCULAR; INTRAVENOUS AS NEEDED
Status: DISCONTINUED | OUTPATIENT
Start: 2021-09-09 | End: 2021-09-09 | Stop reason: SURG

## 2021-09-09 RX ORDER — MORPHINE SULFATE 10 MG/ML
6 INJECTION, SOLUTION INTRAMUSCULAR; INTRAVENOUS EVERY 10 MIN PRN
Status: DISCONTINUED | OUTPATIENT
Start: 2021-09-09 | End: 2021-09-09 | Stop reason: HOSPADM

## 2021-09-09 RX ORDER — HALOPERIDOL 5 MG/ML
0.25 INJECTION INTRAMUSCULAR ONCE AS NEEDED
Status: DISCONTINUED | OUTPATIENT
Start: 2021-09-09 | End: 2021-09-09 | Stop reason: HOSPADM

## 2021-09-09 RX ORDER — HYDROCODONE BITARTRATE AND ACETAMINOPHEN 10; 325 MG/1; MG/1
1 TABLET ORAL EVERY 4 HOURS PRN
Status: DISCONTINUED | OUTPATIENT
Start: 2021-09-09 | End: 2021-09-11

## 2021-09-09 RX ORDER — NALOXONE HYDROCHLORIDE 0.4 MG/ML
80 INJECTION, SOLUTION INTRAMUSCULAR; INTRAVENOUS; SUBCUTANEOUS AS NEEDED
Status: DISCONTINUED | OUTPATIENT
Start: 2021-09-09 | End: 2021-09-09 | Stop reason: HOSPADM

## 2021-09-09 RX ORDER — PHENYLEPHRINE HCL 10 MG/ML
VIAL (ML) INJECTION AS NEEDED
Status: DISCONTINUED | OUTPATIENT
Start: 2021-09-09 | End: 2021-09-09 | Stop reason: SURG

## 2021-09-09 RX ORDER — AMLODIPINE BESYLATE 10 MG/1
10 TABLET ORAL DAILY
Status: DISCONTINUED | OUTPATIENT
Start: 2021-09-10 | End: 2021-09-14

## 2021-09-09 RX ADMIN — GLYCOPYRROLATE 0.1 MG: 0.2 INJECTION, SOLUTION INTRAMUSCULAR; INTRAVENOUS at 10:45:00

## 2021-09-09 RX ADMIN — MORPHINE SULFATE 2 MG: 10 INJECTION, SOLUTION INTRAMUSCULAR; INTRAVENOUS at 13:54:00

## 2021-09-09 RX ADMIN — MORPHINE SULFATE 1 MG: 10 INJECTION, SOLUTION INTRAMUSCULAR; INTRAVENOUS at 12:51:00

## 2021-09-09 RX ADMIN — MORPHINE SULFATE 1 MG: 10 INJECTION, SOLUTION INTRAMUSCULAR; INTRAVENOUS at 13:06:00

## 2021-09-09 RX ADMIN — LIDOCAINE HYDROCHLORIDE 30 MG: 10 INJECTION, SOLUTION EPIDURAL; INFILTRATION; INTRACAUDAL; PERINEURAL at 10:23:00

## 2021-09-09 RX ADMIN — DEXAMETHASONE SODIUM PHOSPHATE 4 MG: 4 MG/ML VIAL (ML) INJECTION at 12:44:00

## 2021-09-09 RX ADMIN — PHENYLEPHRINE HCL 100 MCG: 10 MG/ML VIAL (ML) INJECTION at 10:39:00

## 2021-09-09 RX ADMIN — MORPHINE SULFATE 1 MG: 10 INJECTION, SOLUTION INTRAMUSCULAR; INTRAVENOUS at 12:57:00

## 2021-09-09 RX ADMIN — MORPHINE SULFATE 1 MG: 10 INJECTION, SOLUTION INTRAMUSCULAR; INTRAVENOUS at 12:37:00

## 2021-09-09 RX ADMIN — MORPHINE SULFATE 1 MG: 10 INJECTION, SOLUTION INTRAMUSCULAR; INTRAVENOUS at 13:55:00

## 2021-09-09 RX ADMIN — GLYCOPYRROLATE 0.2 MG: 0.2 INJECTION, SOLUTION INTRAMUSCULAR; INTRAVENOUS at 10:23:00

## 2021-09-09 RX ADMIN — PHENYLEPHRINE HCL 100 MCG: 10 MG/ML VIAL (ML) INJECTION at 10:35:00

## 2021-09-09 RX ADMIN — MORPHINE SULFATE 1 MG: 10 INJECTION, SOLUTION INTRAMUSCULAR; INTRAVENOUS at 11:36:00

## 2021-09-09 RX ADMIN — PHENYLEPHRINE HCL 50 MCG: 10 MG/ML VIAL (ML) INJECTION at 10:56:00

## 2021-09-09 RX ADMIN — MORPHINE SULFATE 2 MG: 10 INJECTION, SOLUTION INTRAMUSCULAR; INTRAVENOUS at 12:25:00

## 2021-09-09 RX ADMIN — SODIUM CHLORIDE, SODIUM LACTATE, POTASSIUM CHLORIDE, CALCIUM CHLORIDE: 600; 310; 30; 20 INJECTION, SOLUTION INTRAVENOUS at 10:21:00

## 2021-09-09 RX ADMIN — ONDANSETRON 4 MG: 2 INJECTION INTRAMUSCULAR; INTRAVENOUS at 12:44:00

## 2021-09-09 RX ADMIN — LIDOCAINE HYDROCHLORIDE 20 MG: 10 INJECTION, SOLUTION EPIDURAL; INFILTRATION; INTRACAUDAL; PERINEURAL at 10:24:00

## 2021-09-09 RX ADMIN — SODIUM CHLORIDE, SODIUM LACTATE, POTASSIUM CHLORIDE, CALCIUM CHLORIDE: 600; 310; 30; 20 INJECTION, SOLUTION INTRAVENOUS at 12:44:00

## 2021-09-09 RX ADMIN — PHENYLEPHRINE HCL 50 MCG: 10 MG/ML VIAL (ML) INJECTION at 10:44:00

## 2021-09-09 NOTE — ANESTHESIA POSTPROCEDURE EVALUATION
Patient: Rivera Terry    Procedure Summary     Date: 09/09/21 Room / Location: [address] [place] [address] MAIN OR 09 / [address] [facility] MAIN OR    Anesthesia Start: 9693 Anesthesia Stop: 6176    Procedures:       Right L3-4 extreme lateral interbody fusion, posterior decompression (Righ

## 2021-09-09 NOTE — ANESTHESIA PROCEDURE NOTES
Airway  Date/Time: 9/9/2021 10:27 AM  Urgency: Elective    Airway not difficult    General Information and Staff    Patient location during procedure: OR  Anesthesiologist: Judith Monahan MD  Resident/CRNA: Trina Duncan CRNA  Performed: CRNA     Indications a

## 2021-09-09 NOTE — ANESTHESIA PREPROCEDURE EVALUATION
Anesthesia PreOp Note    HPI:     Eva Chavarria is a 63year old female who presents for preoperative consultation requested by: German Craven MD    Date of Surgery: 9/9/2021    Procedure(s):  Right L3-4 extreme lateral interbody fusion, posterior decom Renal disorder     monitoring kidney labs   • Sickle cell trait (HCC)    • Sickle-cell anemia (HCC)    • Tobacco abuse        Past Surgical History:   Procedure Laterality Date   • BACK SURGERY Right 09/09/2021    Right L3-4 extreme lateral interbody fusio Tab, Take 81 mg by mouth daily. , Disp: , Rfl: , 9/5/2021  ACCU-CHEK GUIDE In Vitro Strip, USE 4 TIMES A DAY TO TEST BLOOD SUGAR 3 TIMES A DAY WITH MEALS AND AT BEDTIME, Disp: 100 strip, Rfl: 0  Blood Glucose Monitoring Suppl (ACCU-CHEK GUIDE) w/Device Does death   • Macular degeneration Neg      Social History    Socioeconomic History      Marital status: Single      Spouse name: Not on file      Number of children: Not on file      Years of education: Not on file      Highest education level: Not on file 08/17/2021    RDW 14.0 08/17/2021    .0 08/17/2021     Lab Results   Component Value Date     09/06/2021    K 4.6 09/06/2021     09/06/2021    CO2 21.0 09/06/2021    BUN 32 (H) 09/06/2021    CREATSERUM 1.57 (H) 09/06/2021     (H)

## 2021-09-09 NOTE — PLAN OF CARE
Arrived post op. Alert and oriented. HX COPD, Room air. Pascual patent. Right flank incision and lumbar incision with skin glue KARISHMA, without drainage. Ice gel packs for pain. Robaxin and Norco given for pain. SCDs on bilaterally.  Turned with assist and repo Verbalizes/displays adequate comfort level or patient's stated pain goal  Description: INTERVENTIONS:  - Encourage pt to monitor pain and request assistance  - Assess pain using appropriate pain scale  - Administer analgesics based on type and severity of learning needs (meds, wound care, etc)  - Arrange for interpreters to assist at discharge as needed  - Consider post-discharge preferences of patient/family/discharge partner  - Complete POLST form as appropriate  - Assess patient's ability to be responsib

## 2021-09-09 NOTE — PROGRESS NOTES
[facility] HOSP - [facility]    Progress Note    Melissa Lew Patient Status:  Inpatient    10/6/1955 MRN [id number]   Location [address] [facility] Attending Willeen Mortimer, MD   Hosp Day # 0 PCP Rusty Shoemaker MD     HPI/Subje pvd - on aspirin and plavix - resume  When ok with nsx  Gi an dvt prophylaxis                   Ko Higuera MD  9/9/2021

## 2021-09-09 NOTE — PLAN OF CARE
NEUROLOGICAL SURGERY & SPINE SURGERY      9/9/2021  9:25 AM     PRE-OPERATIVE CONSULTATION    Camden Joseer was again informed of the nature of the problem, planned treatment, indications and alternatives.   We again reviewed the expected cira

## 2021-09-09 NOTE — OPERATIVE REPORT
[facility]    PATIENT'S NAME: Rufina Smith   ATTENDING PHYSICIAN: Elsy Rojas MD   OPERATING PHYSICIAN: Elsy Rojas MD   PATIENT ACCOUNT#:   [id number]    LOCATION:  [address] [place] [address] RECORD #:   [id number]       DATE OF BIRTH:  [birth year] and we proceeded to the operating room on the 9th of September 2021. OPERATIVE TECHNIQUE:  Patient was brought to the operating room, put to sleep, intubated. Electrophysiologic monitoring was initiated after electrodes were applied.   She was position disc knife, and then we proceeded with a complete disc resection utilizing curettes, box cutters, Kerrisons, and rasps. Once the endplates were prepared for arthrodesis by decorticating the surface, we irrigated.   We trialed for the proper size interbody utilized for the rest of the procedure as we completed it with microsurgical technique.   Soft tissues overlying the bony structures were removed with monopolar and bipolar electrocautery, and then we used a high-speed bur to drill into the lamina as well a

## 2021-09-10 LAB
EST. AVERAGE GLUCOSE BLD GHB EST-MCNC: 272 MG/DL (ref 68–126)
GLUCOSE BLDC GLUCOMTR-MCNC: 186 MG/DL (ref 70–99)
GLUCOSE BLDC GLUCOMTR-MCNC: 256 MG/DL (ref 70–99)
GLUCOSE BLDC GLUCOMTR-MCNC: 286 MG/DL (ref 70–99)
GLUCOSE BLDC GLUCOMTR-MCNC: 311 MG/DL (ref 70–99)
HBA1C MFR BLD HPLC: 11.1 % (ref ?–5.7)

## 2021-09-10 PROCEDURE — 99232 SBSQ HOSP IP/OBS MODERATE 35: CPT | Performed by: INTERNAL MEDICINE

## 2021-09-10 PROCEDURE — 3046F HEMOGLOBIN A1C LEVEL >9.0%: CPT | Performed by: INTERNAL MEDICINE

## 2021-09-10 RX ORDER — HYDRALAZINE HYDROCHLORIDE 50 MG/1
100 TABLET, FILM COATED ORAL EVERY 8 HOURS SCHEDULED
Status: DISCONTINUED | OUTPATIENT
Start: 2021-09-10 | End: 2021-09-14

## 2021-09-10 RX ORDER — PANTOPRAZOLE SODIUM 40 MG/1
40 TABLET, DELAYED RELEASE ORAL
Status: DISCONTINUED | OUTPATIENT
Start: 2021-09-11 | End: 2021-09-14

## 2021-09-10 NOTE — CONSULTS
[facility]  Report of Consultation    Melissa Lew Patient Status:  Inpatient    10/6/1955 MRN [id number]   Location [facility] 4W/SW/SE Attending Willeen Mortimer, MD   Hosp Day # 1 PCP Rusty Shoemaker MD     Date of Admission:  5 kidney transplant in 2004   • Glaucoma Sister    • Diabetes Brother    • Sickle Cell Son         Cause of death   • Macular degeneration Neg       reports that she quit smoking about 22 months ago. Her smoking use included cigarettes.  She has a 30.00 pack- Oral, TID  •  ondansetron (ZOFRAN) injection 4 mg, 4 mg, Intravenous, Q6H PRN    Review of Systems:  Pertinent items are noted in HPI. Physical Exam:  Blood pressure 158/59, pulse 75, temperature 99.6 °F (37.6 °C), temperature source Oral, resp.  rate 16 were discussed at length including pharmacotherapy (eg. Anti- inflammatories, muscle relaxants, neuropathic medications, oral steroids, analgesics), injections, and further testing.  Risks and benefits of all options were discussed at length to patients sat

## 2021-09-10 NOTE — CM/SW NOTE
PT/OT evaluations pending. Met with patient for assessment. She lives with her sister Frances Mayfield & son in a 2 level home w/ 14 stairs. She uses a cane. She states she is independent with ADLs & Frances Mayfield manages IADLs. Denies h/o SNF or HH.     Discussed disch

## 2021-09-10 NOTE — OCCUPATIONAL THERAPY NOTE
OCCUPATIONAL THERAPY EVALUATION - INPATIENT      Room Number: 662/978-B  Evaluation Date: 9/10/2021  Type of Evaluation: Initial  Presenting Problem:  (L3-4 anterior lateral fusion)    Physician Order: IP Consult to Occupational Therapy  Reason for Therapy RECOMMENDATIONS  OT Discharge Recommendations: Home;24 hour care/supervision  OT Device Recommendations: TBD    PLAN  OT Treatment Plan: Compensatory technique education;Patient/Family training;Patient/Family education; Endurance training;Functional transfe tub. I don't shower\" Need to avoid tub baths and recommendation made for shower chair which pt reports she has ordered    OCCUPATIONAL THERAPY EXAMINATION     OBJECTIVE  Precautions: Spine;Bed/chair alarm  Fall Risk: Standard fall risk    WEIGHT BEARING R strategies practiced. Patient End of Session: Up in chair;Needs met;Call light within reach;RN aware of session/findings; All patient questions and concerns addressed; Alarm set; Ice applied    OT Goals  Patient self-stated goal is: to return home     [address]

## 2021-09-10 NOTE — PROGRESS NOTES
[facility] Note: Route Optimization for Pantoprazole (PROTONIX)    Patient is currently on Pantoprazole (PROTONIX) 40 mg IV every 24 hours.    The patient meets the criteria to convert to the oral equivalent as established by the IV to Oral conversion pro

## 2021-09-10 NOTE — PROGRESS NOTES
[facility]    Neurosurgery Progress Note    Aneesh Ace Patient Status:  Inpatient    10/6/1955 MRN [id number]   Location [facility] 4W/SW/SE Attending Cristina Walters MD   Hosp Day # 1 PCP Rom Bautista MD     Subjective: (PROTONIX) 40 mg in Sodium Chloride (PF) 0.9 % 10 mL IV push, 40 mg, Intravenous, Daily    Labs:  Lab Results   Component Value Date    WBC 7.9 08/17/2021    HGB 10.0 (L) 08/17/2021    .0 08/17/2021    BUN 32 (H) 09/06/2021     09/06/2021    K

## 2021-09-10 NOTE — PHYSICAL THERAPY NOTE
PHYSICAL THERAPY EVALUATION - INPATIENT     Room Number: 045/219-O  Evaluation Date: 9/10/2021  Type of Evaluation: Initial   Physician Order: PT Eval and Treat    Presenting Problem:  s/p L3-4 XLIF with posterior decompression and arthrodesis  Reason for activated- ice pack applied to back. The patient's Approx Degree of Impairment: 50.57% has been calculated based on documentation in the [facility] '6 clicks' Inpatient Basic Mobility Short Form.   Research supports that patients with this level of impairme HOME SITUATION  Type of Home: House   Home Layout: Two level  Stairs to Enter : 3  Railing: Yes  Stairs to Bedroom: 12  Railing: Yes    Lives With: Son (sister. Son works during the day.  Sister is at home w her)  Drives: No  Patient Owned Equipment: Approx Degree of Impairment: 50.57%   Standardized Score (AM-PAC Scale): 42.13   CMS Modifier (G-Code): CK    FUNCTIONAL ABILITY STATUS  Gait Assessment   Gait Assistance: Minimum assistance;Contact guard assist  Distance (ft): 40ft  Assistive Device: Ro

## 2021-09-10 NOTE — PLAN OF CARE
Patient is POD 1, alert and oriented X4, on room air and resting in bed. Patient is on Accuchecks AC and HS. Patient BG elevated, MD notified, med given per orders (see MAR). Patient has a joseph in place to DC in AM per protocol.  Patient is moving side to anxiety  - Utilize distraction and/or relaxation techniques  - Monitor for opioid side effects  - Notify MD/LIP if interventions unsuccessful or patient reports new pain  - Anticipate increased pain with activity and pre-medicate as appropriate  Outcome: P related to functional status, cognitive ability or social support system  Outcome: Progressing

## 2021-09-10 NOTE — PROGRESS NOTES
[facility] - [facility]    Progress Note    Sandra Carnes Patient Status:  Inpatient    10/6/1955 MRN [id number]   Location [address] UNIT Attending Nir Haney MD   Hosp Day # 1 PCP Ximena Mcconnell MD     HPI/Subje 2.4 (L) 10/06/2020    TROP <0.045 11/08/2019    B12 963 09/03/2019       XR FLUOROSCOPY C-ARM TIME <1 HOUR  (CPT=76000)    Result Date: 9/9/2021  CONCLUSION:   FLUOROSCOPY TIME: 142.1 sec # OF FLUOROGRAPHIC IMAGES SAVED: 3  Fluoroscopy and imaging was prov

## 2021-09-11 LAB
ANION GAP SERPL CALC-SCNC: 7 MMOL/L (ref 0–18)
BASOPHILS # BLD AUTO: 0.07 X10(3) UL (ref 0–0.2)
BASOPHILS NFR BLD AUTO: 0.7 %
BUN BLD-MCNC: 19 MG/DL (ref 7–18)
BUN/CREAT SERPL: 16.4 (ref 10–20)
CALCIUM BLD-MCNC: 8.9 MG/DL (ref 8.5–10.1)
CHLORIDE SERPL-SCNC: 115 MMOL/L (ref 98–112)
CO2 SERPL-SCNC: 20 MMOL/L (ref 21–32)
CREAT BLD-MCNC: 1.16 MG/DL
DEPRECATED RDW RBC AUTO: 44.1 FL (ref 35.1–46.3)
EOSINOPHIL # BLD AUTO: 0.15 X10(3) UL (ref 0–0.7)
EOSINOPHIL NFR BLD AUTO: 1.6 %
ERYTHROCYTE [DISTWIDTH] IN BLOOD BY AUTOMATED COUNT: 14.8 % (ref 11–15)
GLUCOSE BLD-MCNC: 153 MG/DL (ref 70–99)
GLUCOSE BLDC GLUCOMTR-MCNC: 138 MG/DL (ref 70–99)
GLUCOSE BLDC GLUCOMTR-MCNC: 192 MG/DL (ref 70–99)
GLUCOSE BLDC GLUCOMTR-MCNC: 217 MG/DL (ref 70–99)
GLUCOSE BLDC GLUCOMTR-MCNC: 250 MG/DL (ref 70–99)
HCT VFR BLD AUTO: 28.4 %
HGB BLD-MCNC: 9.2 G/DL
IMM GRANULOCYTES # BLD AUTO: 0.05 X10(3) UL (ref 0–1)
IMM GRANULOCYTES NFR BLD: 0.5 %
LYMPHOCYTES # BLD AUTO: 1.43 X10(3) UL (ref 1–4)
LYMPHOCYTES NFR BLD AUTO: 15.1 %
MCH RBC QN AUTO: 26.4 PG (ref 26–34)
MCHC RBC AUTO-ENTMCNC: 32.4 G/DL (ref 31–37)
MCV RBC AUTO: 81.4 FL
MONOCYTES # BLD AUTO: 1.03 X10(3) UL (ref 0.1–1)
MONOCYTES NFR BLD AUTO: 10.9 %
NEUTROPHILS # BLD AUTO: 6.72 X10 (3) UL (ref 1.5–7.7)
NEUTROPHILS # BLD AUTO: 6.72 X10(3) UL (ref 1.5–7.7)
NEUTROPHILS NFR BLD AUTO: 71.2 %
OSMOLALITY SERPL CALC.SUM OF ELEC: 299 MOSM/KG (ref 275–295)
PLATELET # BLD AUTO: 229 10(3)UL (ref 150–450)
POTASSIUM SERPL-SCNC: 3.4 MMOL/L (ref 3.5–5.1)
RBC # BLD AUTO: 3.49 X10(6)UL
SODIUM SERPL-SCNC: 142 MMOL/L (ref 136–145)
WBC # BLD AUTO: 9.5 X10(3) UL (ref 4–11)

## 2021-09-11 PROCEDURE — 99233 SBSQ HOSP IP/OBS HIGH 50: CPT | Performed by: INTERNAL MEDICINE

## 2021-09-11 RX ORDER — HYDROCODONE BITARTRATE AND ACETAMINOPHEN 10; 325 MG/1; MG/1
1-2 TABLET ORAL EVERY 6 HOURS PRN
Status: DISCONTINUED | OUTPATIENT
Start: 2021-09-11 | End: 2021-09-14

## 2021-09-11 RX ORDER — POTASSIUM CHLORIDE 20 MEQ/1
40 TABLET, EXTENDED RELEASE ORAL ONCE
Status: COMPLETED | OUTPATIENT
Start: 2021-09-11 | End: 2021-09-11

## 2021-09-11 NOTE — PLAN OF CARE
Problem: Patient Centered Care  Goal: Patient preferences are identified and integrated in the patient's plan of care  Description: Interventions:  - What would you like us to know as we care for you?  Pt would like to meet with spiritual care for prayers appropriate  Outcome: Progressing     Problem: RISK FOR INFECTION - ADULT  Goal: Absence of fever/infection during anticipated neutropenic period  Description: INTERVENTIONS  - Monitor WBC  - Administer growth factors as ordered  - Implement neutropenic gu Glucose as ordered  - Assess for signs and symptoms of hyperglycemia and hypoglycemia  - Administer ordered medications to maintain glucose within target range  - Assess barriers to adequate nutritional intake and initiate nutrition consult as needed  - In

## 2021-09-11 NOTE — PLAN OF CARE
Pt is A&Ox4. [place] Awilda is in place. Bilateral SCDs below the knee are on. Pt roll side to side and is up with 1 assist, GB and walker. Pt is a type II Diabetic and has ordered accuchecks.  Pt's blood sugar was 311 last night; 38 units of Levemir and a one ti Encourage pt to monitor pain and request assistance  - Assess pain using appropriate pain scale  - Administer analgesics based on type and severity of pain and evaluate response  - Implement non-pharmacological measures as appropriate and evaluate response Consider post-discharge preferences of patient/family/discharge partner  - Complete POLST form as appropriate  - Assess patient's ability to be responsible for managing their own health  - Refer to Case Management Department for coordinating discharge plan

## 2021-09-11 NOTE — PROGRESS NOTES
[facility]    Progress Note    Kirk Ziegler Patient Status:  Inpatient    10/6/1955 MRN [id number]   Location [address] [facility] Attending Andrea Almaraz MD   Hosp Day # 2 PCP MD Juanjose Jacome 9/9/2021  CONCLUSION:   FLUOROSCOPY TIME: 142.1 sec # OF FLUOROGRAPHIC IMAGES SAVED: 3  Fluoroscopy and imaging was provided in the operating room. Dictated by (CST): Emley Ojeda MD on 9/09/2021 at 3:04 PM     Finalized by (CST):  Emely Ojeda,

## 2021-09-11 NOTE — PLAN OF CARE
Patient alert and oriented x 4, on room air, voiding freely through [address], tolerating general diet, Accuchecks ACHS. Pain clinic put on consult today- MD advised Norco q 4 hour and IV Morphine for breakthrough pain.  Worked with PT/OT today, up with 1 an on pain and pain management  - Manage/alleviate anxiety  - Utilize distraction and/or relaxation techniques  - Monitor for opioid side effects  - Notify MD/LIP if interventions unsuccessful or patient reports new pain  - Anticipate increased pain with acti services based on physician/LIP order or complex needs related to functional status, cognitive ability or social support system  Outcome: Progressing     Problem: Diabetes/Glucose Control  Goal: Glucose maintained within prescribed range  Description: INTE

## 2021-09-11 NOTE — PHYSICAL THERAPY NOTE
PHYSICAL THERAPY TREATMENT NOTE - INPATIENT     Room Number: 556/363-J       Presenting Problem:  s/p L3-4 XLIF with posterior decompression and arthrodesis    Problem List  Active Problems:    Pre-op testing    Primary hypertension    S/P laminectomy education;  Family education; Gait training; Range of motion; Strengthening; Stoop training; Transfer training; Stair training; Balance training    SUBJECTIVE  patient agreeable to therapy session    OBJECTIVE  Precautions: Spine; Bed/chair alarm    WEIGHT modified independent with walker - rolling     Goal #2  Current Status CGA   Goal #3 Patient is able to ambulate 300 feet with assist device: walker - rolling at assistance level: supervision   Goal #3   Current Status CGA   Goal #4 Patient will negotiate

## 2021-09-11 NOTE — PROGRESS NOTES
NEUROSURGERY - POD #2 S/P L3-4 XLIF    S:  Patient complaining of bilateral anterior thigh pain, right more than left. Pain better controlled under direction of pain management.   Has been up ambulating with walker    O;   09/11/21  0630   BP: 153/67   Pul

## 2021-09-11 NOTE — PROGRESS NOTES
Pt. sitting in chair with TV on when CP arrived. Pt. muted TV for visit. Pt. wanted to see a CP for prayer. Pt. attends a [facility]. She is experiencing some pain post surgery. Pt. Indicated that pain wears her out.  CP validated feelings and prayed f

## 2021-09-11 NOTE — CHRONIC PAIN
[facility]  Progress note    Bethanie Burkitt Patient Status:  Inpatient    10/6/1955 MRN [id number]   Location [facility] 4W/SW/SE Attending Cassidy Liang MD   Hosp Day # 3 PCP Fatou Tamayo MD         Reason for Consultation/Ch transplant in 2004   • Glaucoma Sister    • Diabetes Brother    • Sickle Cell Son         Cause of death   • Macular degeneration Neg       reports that she quit smoking about 22 months ago. Her smoking use included cigarettes.  She has a 30.00 pack-year sm (ZOFRAN) injection 4 mg, 4 mg, Intravenous, Q6H PRN    Review of Systems:  Pertinent items are noted in HPI. Physical Exam:  Blood pressure 124/67, pulse 83, temperature 99.1 °F (37.3 °C), temperature source Oral, resp.  rate 18, height 5' 4\" (1.626 m), norco 10/325mg 1-2 tab every 6 hours PRN pain   Continue Morphine for now and plan to wean later today/tomorrow       Thank you for allowing me to participate in the care of your patient.     Total Time: 15 minutes     Comprehensive analgesic plan was formu

## 2021-09-12 LAB
GLUCOSE BLDC GLUCOMTR-MCNC: 158 MG/DL (ref 70–99)
GLUCOSE BLDC GLUCOMTR-MCNC: 218 MG/DL (ref 70–99)
GLUCOSE BLDC GLUCOMTR-MCNC: 330 MG/DL (ref 70–99)
GLUCOSE BLDC GLUCOMTR-MCNC: 355 MG/DL (ref 70–99)
POTASSIUM SERPL-SCNC: 4.1 MMOL/L (ref 3.5–5.1)

## 2021-09-12 PROCEDURE — 99232 SBSQ HOSP IP/OBS MODERATE 35: CPT | Performed by: INTERNAL MEDICINE

## 2021-09-12 NOTE — PLAN OF CARE
Problem: Patient Centered Care  Goal: Patient preferences are identified and integrated in the patient's plan of care  Description: Interventions:  - What would you like us to know as we care for you?  Pt would like to meet with spiritual care for prayers appropriate  Outcome: Not Progressing  Note: RTC pain meds for leg pain.  CNS intact     Problem: RISK FOR INFECTION - ADULT  Goal: Absence of fever/infection during anticipated neutropenic period  Description: INTERVENTIONS  - Monitor WBC  - Administer estuardo range  Description: INTERVENTIONS:  - Monitor Blood Glucose as ordered  - Assess for signs and symptoms of hyperglycemia and hypoglycemia  - Administer ordered medications to maintain glucose within target range  - Assess barriers to adequate nutritional i

## 2021-09-12 NOTE — PROGRESS NOTES
NEUROSURGERY - POD #3 S/P L3-4 XLIF     S:  Patient states pain is a little better. Continues to complain of right anterior thigh pain, especially when putting weight on the leg.   Has been up ambulating with walker with therapy for short distances in room

## 2021-09-12 NOTE — PHYSICAL THERAPY NOTE
PHYSICAL THERAPY TREATMENT NOTE - INPATIENT     Room Number: 489/863-T       Presenting Problem:  s/p L3-4 XLIF with posterior decompression and arthrodesis    Problem List  Active Problems:    Hypokalemia    Pre-op testing    Primary hypertension    S/P l PLAN  PT Treatment Plan: Bed mobility; Body mechanics; Endurance; Energy conservation; Patient education; Family education; Gait training; Range of motion; Neuromuscular re-educate; Strengthening; Stoop training; Transfer training; Stair training;  Lorenza Och Shuffle; Comment (moderate instability)  Stoop/Curb Assistance: Not tested     Patient End of Session: Up in chair; Needs met; RN aware of session/findings; Call light within reach; All patient questions and concerns addressed;  Alarm set    CURRENT GOALS

## 2021-09-12 NOTE — PLAN OF CARE
Patient alert and oriented, reports pain is relieved by norco prn, while morphine relaxes her. Managing pain with norco q4h. Plan for LEESA, needs referrals/placement.      Problem: Patient Centered Care  Goal: Patient preferences are identified and Rm Zimmerman Monitor for opioid side effects  - Notify MD/LIP if interventions unsuccessful or patient reports new pain  - Anticipate increased pain with activity and pre-medicate as appropriate  Outcome: Progressing     Problem: RISK FOR INFECTION - ADULT  Goal: Absen interpreters to assist at discharge as needed  - Consider post-discharge preferences of patient/family/discharge partner  - Complete POLST form as appropriate  - Assess patient's ability to be responsible for managing their own health  - Refer to [facility]

## 2021-09-12 NOTE — CM/SW NOTE
09/12/21 0900   CM/SW Referral Data   Referral Source    Reason for Referral Discharge planning   Informant Patient   Patient Info   Patient's Current Mental Status at Time of Assessment Alert; [address]

## 2021-09-12 NOTE — PROGRESS NOTES
[facility]    Progress Note    Diana Hernadez Patient Status:  Inpatient    10/6/1955 MRN [id number]   Location [address] [facility] Attending Iliana Apodaca MD   Hosp Day # 3 PCP MD Marimar Arriola Assessment & Plan:   Lumbar stenosis with neurogenic claudication s/p fusion and laminectomy-pain management optimizing pain. Physical therapy recommended rehab    Hypokalemia-resolved    Diabetes-blood sugars are better after increasing Levemir.     H

## 2021-09-13 LAB
ANION GAP SERPL CALC-SCNC: 7 MMOL/L (ref 0–18)
BUN BLD-MCNC: 53 MG/DL (ref 7–18)
BUN/CREAT SERPL: 28.6 (ref 10–20)
CALCIUM BLD-MCNC: 9.3 MG/DL (ref 8.5–10.1)
CHLORIDE SERPL-SCNC: 114 MMOL/L (ref 98–112)
CO2 SERPL-SCNC: 20 MMOL/L (ref 21–32)
CREAT BLD-MCNC: 1.85 MG/DL
DEPRECATED RDW RBC AUTO: 44 FL (ref 35.1–46.3)
ERYTHROCYTE [DISTWIDTH] IN BLOOD BY AUTOMATED COUNT: 14.6 % (ref 11–15)
GLUCOSE BLD-MCNC: 165 MG/DL (ref 70–99)
GLUCOSE BLDC GLUCOMTR-MCNC: 179 MG/DL (ref 70–99)
GLUCOSE BLDC GLUCOMTR-MCNC: 241 MG/DL (ref 70–99)
GLUCOSE BLDC GLUCOMTR-MCNC: 285 MG/DL (ref 70–99)
GLUCOSE BLDC GLUCOMTR-MCNC: 334 MG/DL (ref 70–99)
HCT VFR BLD AUTO: 28.1 %
HGB BLD-MCNC: 9.1 G/DL
MCH RBC QN AUTO: 26.8 PG (ref 26–34)
MCHC RBC AUTO-ENTMCNC: 32.4 G/DL (ref 31–37)
MCV RBC AUTO: 82.6 FL
OSMOLALITY SERPL CALC.SUM OF ELEC: 310 MOSM/KG (ref 275–295)
PLATELET # BLD AUTO: 249 10(3)UL (ref 150–450)
POTASSIUM SERPL-SCNC: 4.1 MMOL/L (ref 3.5–5.1)
RBC # BLD AUTO: 3.4 X10(6)UL
SODIUM SERPL-SCNC: 141 MMOL/L (ref 136–145)
WBC # BLD AUTO: 10.3 X10(3) UL (ref 4–11)

## 2021-09-13 PROCEDURE — 99232 SBSQ HOSP IP/OBS MODERATE 35: CPT | Performed by: INTERNAL MEDICINE

## 2021-09-13 RX ORDER — SODIUM CHLORIDE 9 MG/ML
INJECTION, SOLUTION INTRAVENOUS CONTINUOUS
Status: DISCONTINUED | OUTPATIENT
Start: 2021-09-13 | End: 2021-09-14

## 2021-09-13 RX ORDER — METHOCARBAMOL 500 MG/1
500 TABLET, FILM COATED ORAL 2 TIMES DAILY PRN
Status: DISCONTINUED | OUTPATIENT
Start: 2021-09-13 | End: 2021-09-14

## 2021-09-13 NOTE — PHYSICAL THERAPY NOTE
PHYSICAL THERAPY TREATMENT NOTE - INPATIENT     Room Number: 911/619-Q       Presenting Problem:  s/p L3-4 XLIF with posterior decompression and arthrodesis    Problem List  Active Problems:    Hypokalemia    Pre-op testing    Primary hypertension    S/P l training; Balance training    SUBJECTIVE  \"I am doing much better each day but still think I need some rehab. \"     OBJECTIVE  Precautions: Spine; Bed/chair alarm    WEIGHT BEARING RESTRICTION  Weight Bearing Restriction: None                PAIN ASSESSME by:  9/17/21  Patient Goal Patient's self-stated goal is: return to PLOF   Goal #1 Patient is able to demonstrate supine - sit EOB @ level: independent      Goal #1   Current Status Supine to sit, CGA   Goal #2 Patient is able to demonstrate transfers Sit t

## 2021-09-13 NOTE — PROGRESS NOTES
[facility]    Progress Note    Melissa Lew Patient Status:  Inpatient    10/6/1955 MRN [id number]   Location [facility] 4W/SW/SE Attending Willeen Mortimer, MD   Hosp Day # 4 PCP Rusty Shoemaker MD     Subjective:   Subjective: better , plan for rehab    Hypokalemia-resolved     Diabetes-blood sugars are better continue with ssi, levemir , hypoglycemia protocol     Hypertension controlled monitor bp     Anemia without any evidence of bleeding-hemoglobin stable     andres onCKD-creat

## 2021-09-13 NOTE — CHRONIC PAIN
[facility]  Progress note    Juanita Key Patient Status:  Inpatient    10/6/1955 MRN [id number]   Location [facility] 4W/SW/SE Attending Janette Vo MD   Hosp Day # 5 PCP Yael Serna MD         Reason for Consultation/Ch death   • Macular degeneration Neg       reports that she quit smoking about 22 months ago. Her smoking use included cigarettes. She has a 30.00 pack-year smoking history. She has never used smokeless tobacco. She reports previous alcohol use.  She reports Oral, resp. rate 16, height 5' 4\" (1.626 m), weight 161 lb (73 kg), SpO2 95 %, not currently breastfeeding.     General: Alert and oriented x3, NAD, appears stated age, appropriate disposition and demeanor, answers questions appropriately sitting in chair to 500 bid  Cont lyrica 25 tid       Thank you for allowing me to participate in the care of your patient. Total Time: 16 minutes     Comprehensive analgesic plan was formulated.  Conservative vs. Aggressive measures were discussed at length including ph

## 2021-09-13 NOTE — PLAN OF CARE
Problem: Patient Centered Care  Goal: Patient preferences are identified and integrated in the patient's plan of care  Description: Interventions:  - What would you like us to know as we care for you?  Pt would like to meet with spiritual care for prayers measures as appropriate and evaluate response  - Consider cultural and social influences on pain and pain management  - Manage/alleviate anxiety  - Utilize distraction and/or relaxation techniques  - Monitor for opioid side effects  - Notify MD/LIP if inte for interpreters to assist at discharge as needed  - Consider post-discharge preferences of patient/family/discharge partner  - Complete POLST form as appropriate  - Assess patient's ability to be responsible for managing their own health  - Refer to Case

## 2021-09-13 NOTE — CM/SW NOTE
SW followed up on DC planning. SW printed out SNF list for pt to review. Pt after review would like [facility] pending med clear/ ins Gem needed for Rehab    PLAN: [facility] pending ins auth     Cassandra Elder, LSW, MSW ext.  [phone]

## 2021-09-14 VITALS
DIASTOLIC BLOOD PRESSURE: 83 MMHG | RESPIRATION RATE: 18 BRPM | BODY MASS INDEX: 27.49 KG/M2 | HEIGHT: 64 IN | OXYGEN SATURATION: 95 % | WEIGHT: 161 LBS | TEMPERATURE: 99 F | HEART RATE: 92 BPM | SYSTOLIC BLOOD PRESSURE: 126 MMHG

## 2021-09-14 LAB
ANION GAP SERPL CALC-SCNC: 8 MMOL/L (ref 0–18)
BUN BLD-MCNC: 39 MG/DL (ref 7–18)
BUN/CREAT SERPL: 28.7 (ref 10–20)
CALCIUM BLD-MCNC: 9.2 MG/DL (ref 8.5–10.1)
CHLORIDE SERPL-SCNC: 117 MMOL/L (ref 98–112)
CO2 SERPL-SCNC: 17 MMOL/L (ref 21–32)
CREAT BLD-MCNC: 1.36 MG/DL
GLUCOSE BLD-MCNC: 168 MG/DL (ref 70–99)
GLUCOSE BLDC GLUCOMTR-MCNC: 142 MG/DL (ref 70–99)
GLUCOSE BLDC GLUCOMTR-MCNC: 330 MG/DL (ref 70–99)
OSMOLALITY SERPL CALC.SUM OF ELEC: 307 MOSM/KG (ref 275–295)
POTASSIUM SERPL-SCNC: 4 MMOL/L (ref 3.5–5.1)
SODIUM SERPL-SCNC: 142 MMOL/L (ref 136–145)

## 2021-09-14 PROCEDURE — 99239 HOSP IP/OBS DSCHRG MGMT >30: CPT | Performed by: INTERNAL MEDICINE

## 2021-09-14 RX ORDER — DOCUSATE SODIUM 100 MG/1
100 CAPSULE, LIQUID FILLED ORAL 2 TIMES DAILY
Status: DISCONTINUED | OUTPATIENT
Start: 2021-09-14 | End: 2021-09-14

## 2021-09-14 RX ORDER — POLYETHYLENE GLYCOL 3350 17 G/17G
17 POWDER, FOR SOLUTION ORAL DAILY PRN
Status: DISCONTINUED | OUTPATIENT
Start: 2021-09-14 | End: 2021-09-14

## 2021-09-14 RX ORDER — BISACODYL 10 MG
10 SUPPOSITORY, RECTAL RECTAL
Status: DISCONTINUED | OUTPATIENT
Start: 2021-09-14 | End: 2021-09-14

## 2021-09-14 RX ORDER — HYDROCODONE BITARTRATE AND ACETAMINOPHEN 10; 325 MG/1; MG/1
1-2 TABLET ORAL EVERY 6 HOURS PRN
Qty: 30 TABLET | Refills: 0 | Status: SHIPPED | OUTPATIENT
Start: 2021-09-14 | End: 2021-10-07

## 2021-09-14 RX ORDER — HYDRALAZINE HYDROCHLORIDE 100 MG/1
100 TABLET, FILM COATED ORAL EVERY 8 HOURS SCHEDULED
Qty: 30 TABLET | Refills: 0 | Status: SHIPPED | COMMUNITY
Start: 2021-09-14

## 2021-09-14 RX ORDER — PREGABALIN 25 MG/1
25 CAPSULE ORAL 3 TIMES DAILY
Qty: 90 CAPSULE | Refills: 0 | Status: SHIPPED | OUTPATIENT
Start: 2021-09-14 | End: 2021-10-14

## 2021-09-14 RX ORDER — METHOCARBAMOL 500 MG/1
500 TABLET, FILM COATED ORAL 2 TIMES DAILY PRN
Qty: 20 TABLET | Refills: 0 | Status: SHIPPED | OUTPATIENT
Start: 2021-09-14 | End: 2021-09-30

## 2021-09-14 NOTE — DISCHARGE SUMMARY
DISCHARGE SUMMARY     Jeison Rainey Patient Status:  Inpatient    10/6/1955 MRN [id number]   Location [facility] 4W/SW/SE Attending Elías Najera MD   Hosp Day # 5 PCP Hilaria Mobley MD     Date of Admission: 2021  Date of Discharge:  tenderness   Neurological: She is alert and oriented to person, place, and time.    Psychiatric: She has a normal mood and affect.             Discharge Medication List:     Discharge Medications      START taking these medications      Instructions Prescri tablet  Refills: 0     atorvastatin 20 MG Tabs  Commonly known as: LIPITOR      TAKE 1 TABLET BY MOUTH EVERY DAY AT NIGHT   Quantity: 90 tablet  Refills: 0     BD Pen Needle Rita U/F 32G X 4 MM Misc  Generic drug: Insulin Pen Needle      1 each by Does not tablets by mouth every 6 (six) hours as needed. pregabalin 25 MG Oral Cap  Take 1 capsule (25 mg total) by mouth 3 (three) times daily.       Home Meds - Unchanged    GLIPIZIDE 5 MG Oral Tab  TAKE 1 TABLET (5 MG TOTAL) BY MOUTH 2 (TWO) TIMES DAILY BEFORE 00524  [phone]    In 2 weeks        Vital signs:  Temp:  [98.5 °F (36.9 °C)-99 °F (37.2 °C)] 98.8 °F (37.1 °C)  Pulse:  [83-95] 92  Resp:  [16-18] 18  BP: (817-146)/(5983) 126/83    --------------------------------------------------------------------

## 2021-09-14 NOTE — PROGRESS NOTES
[facility]    Progress Note    Frankey Plank Patient Status:  Inpatient    10/6/1955 MRN [id number]   Location [facility] 4W/SW/SE Attending Sara Matute MD   Hosp Day # 5 PCP Kavon Valerio MD     Subjective:   Subjective: with ptot , pain management . managing the pain , better , plan for rehab, continue ptot    Constipation- mirilax , senna      Diabetes-blood sugars are better continue with ssi, levemir , hypoglycemia protocol     Hypertension controlled monitor bp     Ane

## 2021-09-14 NOTE — PHYSICAL THERAPY NOTE
PHYSICAL THERAPY ASSESSMENT   PHYSICAL THERAPY TREATMENT NOTE - INPATIENT     Room Number: 826/089-A       Presenting Problem:  s/p L3-4 XLIF with posterior decompression and arthrodesis    Problem List  Active Problems:    Hypokalemia    Pre-op testin Bearing Restriction: None                PAIN ASSESSMENT   Ratin  Location: R>L leg  Management Techniques: Activity promotion;  Body mechanics    BALANCE rolling      Goal #2  Current Status Sit to stand, RW, Min A   Goal #3 Patient is able to ambulate 300 feet with assist device: walker - rolling at assistance level: supervision   Goal #3   Current Status 12 ft RW and min A   Goal #4 Patient will negotiate

## 2021-09-14 NOTE — PLAN OF CARE
Problem: PAIN - ADULT  Goal: Verbalizes/displays adequate comfort level or patient's stated pain goal  Description: INTERVENTIONS:  - Encourage pt to monitor pain and request assistance  - Assess pain using appropriate pain scale  - Administer analgesics resources and transportation as appropriate  - Identify discharge learning needs (meds, wound care, etc)  - Arrange for interpreters to assist at discharge as needed  - Consider post-discharge preferences of patient/family/discharge partner  - Complete YIFAN

## 2021-09-14 NOTE — CHRONIC PAIN
[facility]  Progress note    Salome Vieyra Patient Status:  Inpatient    10/6/1955 MRN [id number]   Location [facility] 4W/SW/SE Attending Lizeth Baird MD   Hosp Day # 6 PCP Jazmin Holbrook MD         Reason for Consultation/Ch Sickle Cell Son         Cause of death   • Macular degeneration Neg       reports that she quit smoking about 22 months ago. Her smoking use included cigarettes. She has a 30.00 pack-year smoking history.  She has never used smokeless tobacco. She reports p source Oral, resp. rate 16, height 5' 4\" (1.626 m), weight 161 lb (73 kg), SpO2 98 %, not currently breastfeeding.     General: Alert and oriented x3, NAD, appears stated age, appropriate disposition and demeanor, answers questions appropriately sitting in relaxants, neuropathic medications, oral steroids, analgesics), injections, and further testing. Risks and benefits of all options were discussed at length to patients satisfaction during a comprehensive interactive discussion.  All questions were answered

## 2021-09-14 NOTE — PLAN OF CARE
Postop day 4 to 5. Patient up with 1 assist and rolling walker. Bedtime blood sugar 285, 40 units Levemir administered. IVF ordered and running d/t decreased kidney function. Purewick in place d/t incont. Plan for LEESA once medically cleared.     Proble effects  - Notify MD/LIP if interventions unsuccessful or patient reports new pain  - Anticipate increased pain with activity and pre-medicate as appropriate  Outcome: Progressing     Problem: RISK FOR INFECTION - ADULT  Goal: Absence of fever/infection du Progressing     Problem: Diabetes/Glucose Control  Goal: Glucose maintained within prescribed range  Description: INTERVENTIONS:  - Monitor Blood Glucose as ordered  - Assess for signs and symptoms of hyperglycemia and hypoglycemia  - Administer ordered me

## 2021-09-14 NOTE — CM/SW NOTE
FAREED received confirmation of insurance authorization. FAREED confirmed with RN that pt is medically ready for discharge today. FAREED called and spoke with Ar Beasley to arrange a time for discharge. RN is aware of discharge time and location and will inform family.  RN

## 2021-09-15 PROCEDURE — 99306 1ST NF CARE HIGH MDM 50: CPT | Performed by: INTERNAL MEDICINE

## 2021-09-15 RX ORDER — SITAGLIPTIN 100 MG/1
TABLET, FILM COATED ORAL
Qty: 90 TABLET | Refills: 0 | Status: SHIPPED | OUTPATIENT
Start: 2021-09-15 | End: 2021-12-22

## 2021-09-16 ENCOUNTER — EXTERNAL FACILITY (OUTPATIENT)
Dept: INTERNAL MEDICINE CLINIC | Facility: CLINIC | Age: 66
End: 2021-09-16

## 2021-09-16 DIAGNOSIS — I10 PRIMARY HYPERTENSION: ICD-10-CM

## 2021-09-16 DIAGNOSIS — G89.18 POST-OPERATIVE PAIN: ICD-10-CM

## 2021-09-16 DIAGNOSIS — M54.9 BACK PAIN WITH RADIATION: ICD-10-CM

## 2021-09-16 DIAGNOSIS — I73.9 PAD (PERIPHERAL ARTERY DISEASE) (HCC): ICD-10-CM

## 2021-09-16 NOTE — PROGRESS NOTES
twila 9/15/2021   63year old female with multiple medical problems admitted to rehab s/p lumbar spine laminectomy  HISTORY:  Past Medical History:  Diagnosis Date  • Anemia    • Back pain    • Chronic kidney disease (CKD)    • CKD (chronic kidney disease)   normocephalic  Eyes/Vision: normal extraocular motion is intact  Nose/Mouth/Throat:mucous membranes are moist   Neck/Thyroid: neck is supple without adenopathy  Lymphatic: no abnormal cervical  Respiratory:  lungs are clear to auscultation bilaterally  Car

## 2021-09-17 PROCEDURE — 99308 SBSQ NF CARE LOW MDM 20: CPT | Performed by: INTERNAL MEDICINE

## 2021-09-17 NOTE — TELEPHONE ENCOUNTER
Please review. Protocol failed / No protocol.     Requested Prescriptions   Pending Prescriptions Disp Refills    LISINOPRIL 40 MG Oral Tab [Pharmacy Med Name: LISINOPRIL 40 MG TABLET] 90 tablet 1     Sig: TAKE 1 TABLET BY MOUTH EVERY DAY        Hypertensi

## 2021-09-18 ENCOUNTER — EXTERNAL FACILITY (OUTPATIENT)
Dept: INTERNAL MEDICINE CLINIC | Facility: CLINIC | Age: 66
End: 2021-09-18

## 2021-09-18 DIAGNOSIS — E11.65 TYPE 2 DIABETES MELLITUS WITH HYPERGLYCEMIA, WITH LONG-TERM CURRENT USE OF INSULIN (HCC): ICD-10-CM

## 2021-09-18 DIAGNOSIS — I73.9 PAD (PERIPHERAL ARTERY DISEASE) (HCC): ICD-10-CM

## 2021-09-18 DIAGNOSIS — Z98.890 S/P LAMINECTOMY: ICD-10-CM

## 2021-09-18 DIAGNOSIS — Z79.4 TYPE 2 DIABETES MELLITUS WITH HYPERGLYCEMIA, WITH LONG-TERM CURRENT USE OF INSULIN (HCC): ICD-10-CM

## 2021-09-18 RX ORDER — LISINOPRIL 40 MG/1
40 TABLET ORAL DAILY
Qty: 90 TABLET | Refills: 1 | Status: SHIPPED | OUTPATIENT
Start: 2021-09-18

## 2021-09-18 NOTE — PROGRESS NOTES
follow up   seen 9/17/2021  Past Medical History:  Diagnosis Date  Anemia  Back pain  Chronic kidney disease (CKD)  CKD (chronic kidney disease)  COPD (chronic obstructive pulmonary disease) (HCC)  FEV 1 1.25 50% 2/20  Diabetes ([facility]  DM neuropathy  Essent abnormalities noted  Musculoskeletal: no deformities  Extremities: ankle edema  Neurological: Grossly normal      Lumbar stenosis with neurogenic claudication s/p fusion and laminectomy-continue with ptot , pain management , pain is better controlled    Co

## 2021-09-21 ENCOUNTER — EXTERNAL FACILITY (OUTPATIENT)
Dept: INTERNAL MEDICINE CLINIC | Facility: CLINIC | Age: 66
End: 2021-09-21

## 2021-09-21 DIAGNOSIS — E11.621 DIABETIC ULCER OF LEFT MIDFOOT ASSOCIATED WITH TYPE 2 DIABETES MELLITUS, WITH FAT LAYER EXPOSED (HCC): ICD-10-CM

## 2021-09-21 DIAGNOSIS — I73.9 PAD (PERIPHERAL ARTERY DISEASE) (HCC): ICD-10-CM

## 2021-09-21 DIAGNOSIS — I10 PRIMARY HYPERTENSION: ICD-10-CM

## 2021-09-21 DIAGNOSIS — L97.422 DIABETIC ULCER OF LEFT MIDFOOT ASSOCIATED WITH TYPE 2 DIABETES MELLITUS, WITH FAT LAYER EXPOSED (HCC): ICD-10-CM

## 2021-09-21 PROCEDURE — 99308 SBSQ NF CARE LOW MDM 20: CPT | Performed by: INTERNAL MEDICINE

## 2021-09-21 NOTE — PROGRESS NOTES
follow up     Past Medical History:  Diagnosis Date  Anemia  Back pain  Chronic kidney disease (CKD)  CKD (chronic kidney disease)  COPD (chronic obstructive pulmonary disease) (HCC)  FEV 1 1.25 50% 2/20  Diabetes ([facility]  DM neuropathy  Essential hypertensi noted  Musculoskeletal: no deformities  Extremities: ankle edema  Neurological: Grossly normal    a/p        Lumbar stenosis with neurogenic claudication s/p fusion and laminectomy-continue with ptot , pain management , pain is slightly better but still pr

## 2021-09-23 ENCOUNTER — EXTERNAL FACILITY (OUTPATIENT)
Dept: INTERNAL MEDICINE CLINIC | Facility: CLINIC | Age: 66
End: 2021-09-23

## 2021-09-23 DIAGNOSIS — Z98.890 S/P LAMINECTOMY: ICD-10-CM

## 2021-09-23 DIAGNOSIS — I73.9 PAD (PERIPHERAL ARTERY DISEASE) (HCC): ICD-10-CM

## 2021-09-23 DIAGNOSIS — M54.9 BACK PAIN WITH RADIATION: ICD-10-CM

## 2021-09-23 DIAGNOSIS — L97.422 DIABETIC ULCER OF LEFT MIDFOOT ASSOCIATED WITH TYPE 2 DIABETES MELLITUS, WITH FAT LAYER EXPOSED (HCC): ICD-10-CM

## 2021-09-23 DIAGNOSIS — E11.621 DIABETIC ULCER OF LEFT MIDFOOT ASSOCIATED WITH TYPE 2 DIABETES MELLITUS, WITH FAT LAYER EXPOSED (HCC): ICD-10-CM

## 2021-09-23 PROCEDURE — 99308 SBSQ NF CARE LOW MDM 20: CPT | Performed by: INTERNAL MEDICINE

## 2021-09-23 NOTE — PROGRESS NOTES
follow up    Past Medical History:  Diagnosis Date  Anemia  Back pain  Chronic kidney disease (CKD)  CKD (chronic kidney disease)  COPD (chronic obstructive pulmonary disease) (HCC)  FEV 1 1.25 50% 2/20  Diabetes ([facility]  DM neuropathy  Essential hypertensio noted  Musculoskeletal: no deformities  Extremities: ankle edema  Neurological:   a/p   a/p  a/p    Lumbar stenosis with neurogenic claudication s/p fusion and laminectomy-continue with ptot , pain management , pain is better , s [place] with dr Beauchamp Lies today

## 2021-09-27 ENCOUNTER — ORDER TRANSCRIPTION (OUTPATIENT)
Dept: PHYSICAL THERAPY | Facility: HOSPITAL | Age: 66
End: 2021-09-27

## 2021-09-27 DIAGNOSIS — M48.062 SPINAL STENOSIS OF LUMBAR REGION WITH NEUROGENIC CLAUDICATION: Primary | ICD-10-CM

## 2021-09-28 ENCOUNTER — EXTERNAL FACILITY (OUTPATIENT)
Dept: INTERNAL MEDICINE CLINIC | Facility: CLINIC | Age: 66
End: 2021-09-28

## 2021-09-28 DIAGNOSIS — I10 PRIMARY HYPERTENSION: ICD-10-CM

## 2021-09-28 DIAGNOSIS — Z98.890 S/P LAMINECTOMY: ICD-10-CM

## 2021-09-28 DIAGNOSIS — I73.9 PAD (PERIPHERAL ARTERY DISEASE) (HCC): ICD-10-CM

## 2021-09-28 PROCEDURE — 99308 SBSQ NF CARE LOW MDM 20: CPT | Performed by: INTERNAL MEDICINE

## 2021-09-28 NOTE — PROGRESS NOTES
dc summary  Past Medical History:  Diagnosis Date  Anemia  Back pain  Chronic kidney disease (CKD)  CKD (chronic kidney disease)  COPD (chronic obstructive pulmonary disease) (HCC)  FEV 1 1.25 50% 2/20  Diabetes ([facility]  DM neuropathy  Essential hypertension noted  Musculoskeletal: no deformities  Extremities: ankle edema  Neurological:  a/p    Lumbar stenosis with neurogenic claudication s/p fusion and laminectomy-continue with ptot , pain management , pain is better , see by dr Alfredo Judge, continue with same medic

## 2021-09-30 DIAGNOSIS — M54.9 BACK PAIN WITH RADIATION: ICD-10-CM

## 2021-09-30 DIAGNOSIS — Z98.890 S/P LAMINECTOMY: ICD-10-CM

## 2021-09-30 RX ORDER — METHOCARBAMOL 500 MG/1
500 TABLET, FILM COATED ORAL 2 TIMES DAILY PRN
Qty: 20 TABLET | Refills: 0 | Status: SHIPPED | OUTPATIENT
Start: 2021-09-30 | End: 2021-10-07

## 2021-09-30 RX ORDER — HYDROCODONE BITARTRATE AND ACETAMINOPHEN 10; 325 MG/1; MG/1
1-2 TABLET ORAL EVERY 6 HOURS PRN
Qty: 30 TABLET | Refills: 0 | OUTPATIENT
Start: 2021-09-30

## 2021-09-30 RX ORDER — METHOCARBAMOL 500 MG/1
500 TABLET, FILM COATED ORAL 2 TIMES DAILY PRN
Qty: 20 TABLET | Refills: 0 | OUTPATIENT
Start: 2021-09-30

## 2021-09-30 NOTE — TELEPHONE ENCOUNTER
Please review. Protocol failed/ No protocol      Requested Prescriptions   Pending Prescriptions Disp Refills    methocarbamol 500 MG Oral Tab 20 tablet 0     Sig: Take 1 tablet (500 mg total) by mouth 2 (two) times daily as needed. There is no refill protocol information for this order        HYDROcodone-acetaminophen  MG Oral Tab 30 tablet 0     Sig: Take 1-2 tablets by mouth every 6 (six) hours as needed.         There is no refill protocol information for this order           Future Appointments         Provider Department Appt Notes    In 1 week Katie Oseguera MD [facility], [facility], [address] / scheduled by [facility]     In 1 week Clif Tipton MD [facility], [facility] Endocrinology DM f/u             Recent Outpatient Visits              1 month ago Neuroforaminal stenosis of lumbar spine    [facility], CliffordKarine MD    Office Visit    1 month ago Low back pain due to bilateral sciatica    [facility] KARINE Hoffman    Office Visit    1 month ago Uncontrolled hypertension    [facility], Dianne Lyman MD    Office Visit    2 months ago     [address], [place]    Office Visit    2 months ago     [address] [facility] Doren Kayser, [place]    Office Visit

## 2021-09-30 NOTE — TELEPHONE ENCOUNTER
HYDROcodone-acetaminophen  MG Oral Tab    Pt calling stating that she needs refill on Norco and also her muscle relaxer medication. Please advise .

## 2021-10-07 ENCOUNTER — OFFICE VISIT (OUTPATIENT)
Dept: INTERNAL MEDICINE CLINIC | Facility: CLINIC | Age: 66
End: 2021-10-07
Payer: COMMERCIAL

## 2021-10-07 ENCOUNTER — TELEPHONE (OUTPATIENT)
Dept: INTERNAL MEDICINE CLINIC | Facility: CLINIC | Age: 66
End: 2021-10-07

## 2021-10-07 VITALS
BODY MASS INDEX: 29.95 KG/M2 | HEART RATE: 84 BPM | WEIGHT: 169 LBS | HEIGHT: 63 IN | SYSTOLIC BLOOD PRESSURE: 138 MMHG | DIASTOLIC BLOOD PRESSURE: 70 MMHG

## 2021-10-07 DIAGNOSIS — M54.9 BACK PAIN WITH RADIATION: ICD-10-CM

## 2021-10-07 DIAGNOSIS — Z98.890 S/P LAMINECTOMY: ICD-10-CM

## 2021-10-07 PROCEDURE — 3078F DIAST BP <80 MM HG: CPT | Performed by: INTERNAL MEDICINE

## 2021-10-07 PROCEDURE — 99495 TRANSJ CARE MGMT MOD F2F 14D: CPT | Performed by: INTERNAL MEDICINE

## 2021-10-07 PROCEDURE — 3008F BODY MASS INDEX DOCD: CPT | Performed by: INTERNAL MEDICINE

## 2021-10-07 PROCEDURE — 3075F SYST BP GE 130 - 139MM HG: CPT | Performed by: INTERNAL MEDICINE

## 2021-10-07 RX ORDER — HYDROCODONE BITARTRATE AND ACETAMINOPHEN 10; 325 MG/1; MG/1
1-2 TABLET ORAL EVERY 6 HOURS PRN
Qty: 30 TABLET | Refills: 0 | Status: CANCELLED | OUTPATIENT
Start: 2021-10-07

## 2021-10-07 RX ORDER — GLIPIZIDE 5 MG/1
5 TABLET ORAL
Qty: 180 TABLET | Refills: 0 | Status: SHIPPED | OUTPATIENT
Start: 2021-10-07 | End: 2021-11-17

## 2021-10-07 RX ORDER — ASPIRIN 81 MG/1
81 TABLET ORAL DAILY
COMMUNITY

## 2021-10-07 RX ORDER — CLOPIDOGREL BISULFATE 75 MG/1
75 TABLET ORAL DAILY
COMMUNITY

## 2021-10-07 RX ORDER — ALBUTEROL SULFATE 90 UG/1
1 AEROSOL, METERED RESPIRATORY (INHALATION) EVERY 6 HOURS PRN
Qty: 1 EACH | Refills: 0 | Status: SHIPPED | OUTPATIENT
Start: 2021-10-07

## 2021-10-07 NOTE — PROGRESS NOTES
HPI:    Luis Miguel Duffy is a 61year old female here today for hospital follow up.    She was discharged from Inpatient hospital, [facility] to Home   Admission Date: 9/9/21   Discharge Date: 9/14/21  Hospital Discharge Diagnoses (since 9/7/2021 daily.  GLIPIZIDE 5 MG Oral Tab, TAKE 1 TABLET (5 MG TOTAL) BY MOUTH 2 (TWO) TIMES DAILY BEFORE MEALS. insulin glargine (LANTUS SOLOSTAR) 100 UNIT/ML Subcutaneous Solution Pen-injector, Inject 38 Units into the skin daily.  (Patient taking differently: Inj trait ([facility] Sickle-cell anemia ([facility] and Tobacco abuse. She  has a past surgical history that includes excis lumbar disk,one level; ; other; and back surgery (Right, 2021).     She family history includes Diabetes in her brother, mother, Heart sounds: Normal heart sounds. Pulmonary:      Effort: Pulmonary effort is normal.      Breath sounds: Normal breath sounds. Musculoskeletal:      Cervical back: Normal range of motion and neck supple. Lumbar back: Tenderness present.  No swell

## 2021-10-07 NOTE — TELEPHONE ENCOUNTER
Patient stated that saw Dr Kimmy Rubio today in the office and was going to send hydrocodone to the pharmacy but nothing sent. Please advise.

## 2021-10-12 ENCOUNTER — OFFICE VISIT (OUTPATIENT)
Dept: ENDOCRINOLOGY CLINIC | Facility: CLINIC | Age: 66
End: 2021-10-12
Payer: COMMERCIAL

## 2021-10-12 VITALS
SYSTOLIC BLOOD PRESSURE: 144 MMHG | WEIGHT: 168 LBS | BODY MASS INDEX: 30 KG/M2 | HEART RATE: 84 BPM | DIASTOLIC BLOOD PRESSURE: 70 MMHG

## 2021-10-12 DIAGNOSIS — E11.65 TYPE 2 DIABETES MELLITUS WITH HYPERGLYCEMIA, WITH LONG-TERM CURRENT USE OF INSULIN (HCC): Primary | ICD-10-CM

## 2021-10-12 DIAGNOSIS — Z79.4 TYPE 2 DIABETES MELLITUS WITH HYPERGLYCEMIA, WITH LONG-TERM CURRENT USE OF INSULIN (HCC): Primary | ICD-10-CM

## 2021-10-12 PROCEDURE — 36416 COLLJ CAPILLARY BLOOD SPEC: CPT | Performed by: INTERNAL MEDICINE

## 2021-10-12 PROCEDURE — 82947 ASSAY GLUCOSE BLOOD QUANT: CPT | Performed by: INTERNAL MEDICINE

## 2021-10-12 PROCEDURE — 99213 OFFICE O/P EST LOW 20 MIN: CPT | Performed by: INTERNAL MEDICINE

## 2021-10-12 PROCEDURE — 3077F SYST BP >= 140 MM HG: CPT | Performed by: INTERNAL MEDICINE

## 2021-10-12 PROCEDURE — 3078F DIAST BP <80 MM HG: CPT | Performed by: INTERNAL MEDICINE

## 2021-10-12 PROCEDURE — 83036 HEMOGLOBIN GLYCOSYLATED A1C: CPT | Performed by: INTERNAL MEDICINE

## 2021-10-12 RX ORDER — BLOOD-GLUCOSE METER
1 EACH MISCELLANEOUS AS NEEDED
Qty: 1 KIT | Refills: 0 | Status: SHIPPED | OUTPATIENT
Start: 2021-10-12

## 2021-10-12 RX ORDER — BLOOD SUGAR DIAGNOSTIC
STRIP MISCELLANEOUS
Qty: 200 EACH | Refills: 0 | Status: SHIPPED | OUTPATIENT
Start: 2021-10-12

## 2021-10-12 RX ORDER — LANCETS 33 GAUGE
EACH MISCELLANEOUS
Qty: 200 EACH | Refills: 0 | Status: SHIPPED | OUTPATIENT
Start: 2021-10-12

## 2021-10-12 NOTE — PROGRESS NOTES
Return Office Visit     CHIEF COMPLAINT:    DM     HISTORY OF PRESENT ILLNESS:  Flo Obregon is a 61year old female who presents for follow up for DM    DM HISTORY  Diagnosed: around age 38        HISTORY OF DIABETES COMPLICATIONS: :  History of 100 MG Oral Tab TAKE 1 TABLET BY MOUTH EVERY DAY 90 tablet 0   • pregabalin 25 MG Oral Cap Take 1 capsule (25 mg total) by mouth 3 (three) times daily.  90 capsule 0   • hydrALAZINE 100 MG Oral Tab Take 1 tablet (100 mg total) by mouth every 8 (eight) hours blurring  ENT: Negative for:  dysphagia, neck swelling, dysphonia  Respiratory: Negative for:  dyspnea, cough  Cardiovascular: Negative for:  chest pain, palpitations, orthopnea  GI: Negative for:  abdominal pain, nausea, vomiting, diarrhea, constipation, prescribed    C/w levemir 34 daily    Januvia 100 mg daily        Glipizide 5 mg with BF and 5 mg with dinner  Take with food  Reviewed risk of hypoglycemia and hence importance of eating regular meals       b).  She has CKD, reviewed importance if good BG

## 2021-10-13 ENCOUNTER — TELEPHONE (OUTPATIENT)
Dept: PHYSICAL THERAPY | Facility: HOSPITAL | Age: 66
End: 2021-10-13

## 2021-10-14 RX ORDER — PREGABALIN 25 MG/1
25 CAPSULE ORAL 3 TIMES DAILY
Qty: 90 CAPSULE | Refills: 0 | OUTPATIENT
Start: 2021-10-14 | End: 2021-11-13

## 2021-10-15 ENCOUNTER — APPOINTMENT (OUTPATIENT)
Dept: PHYSICAL THERAPY | Facility: HOSPITAL | Age: 66
End: 2021-10-15
Attending: NEUROLOGICAL SURGERY
Payer: COMMERCIAL

## 2021-10-15 ENCOUNTER — TELEPHONE (OUTPATIENT)
Dept: PHYSICAL THERAPY | Facility: HOSPITAL | Age: 66
End: 2021-10-15

## 2021-10-15 NOTE — TELEPHONE ENCOUNTER
Called the patient when she DNS for her PT appointment. The pt stated she had a death in the family and attempted to cancel via MyChart and stated she is unable to come in today. Confirmed Monday's appointment.

## 2021-10-18 ENCOUNTER — OFFICE VISIT (OUTPATIENT)
Dept: PHYSICAL THERAPY | Facility: HOSPITAL | Age: 66
End: 2021-10-18
Attending: NEUROLOGICAL SURGERY
Payer: COMMERCIAL

## 2021-10-18 DIAGNOSIS — M48.062 SPINAL STENOSIS OF LUMBAR REGION WITH NEUROGENIC CLAUDICATION: ICD-10-CM

## 2021-10-18 PROCEDURE — 97110 THERAPEUTIC EXERCISES: CPT | Performed by: PHYSICAL THERAPIST

## 2021-10-18 PROCEDURE — 97161 PT EVAL LOW COMPLEX 20 MIN: CPT | Performed by: PHYSICAL THERAPIST

## 2021-10-18 PROCEDURE — 97116 GAIT TRAINING THERAPY: CPT | Performed by: PHYSICAL THERAPIST

## 2021-10-18 NOTE — PROGRESS NOTES
LUMBAR SPINE EVALUATION:   Referring Physician: Dr. Michael Cope  Diagnosis:       :    Associated DX:  Spinal stenosis of lumbar region with neurogenic claudication (P60.030)                            Date of Service: 10/18/2021   Date of Onset: Sept 9th, 202 level; ; other; and back surgery (Right, 2021). She family history includes Diabetes in her brother, mother, and sister; Glaucoma in her sister; Kidney Disease in her sister; Sickle Cell in her son.    She  reports that she quit smoking ab ER 40 degrees 40 degrees   IR 10 degrees 10 degrees       Outcome Surverys  Date    FOTO See FOTO   Modified Oswestry    FABQ          Today’s Treatment and Response:     10/18/2021  Visit # 1     Manual Therapy    Therapeutic Exercise Educated in centra [phone].  If you have any questions, please contact me at Dept: [phone]    Sincerely,  Electronically signed by therapist: Tanya Zacarias, PT    [de-identified] certification required: Yes  I certify the need for these services furnished under this plan

## 2021-10-21 ENCOUNTER — OFFICE VISIT (OUTPATIENT)
Dept: PHYSICAL THERAPY | Facility: HOSPITAL | Age: 66
End: 2021-10-21
Attending: NEUROLOGICAL SURGERY
Payer: COMMERCIAL

## 2021-10-21 DIAGNOSIS — M48.062 SPINAL STENOSIS OF LUMBAR REGION WITH NEUROGENIC CLAUDICATION: ICD-10-CM

## 2021-10-21 PROCEDURE — 97110 THERAPEUTIC EXERCISES: CPT | Performed by: PHYSICAL THERAPIST

## 2021-10-21 PROCEDURE — 97116 GAIT TRAINING THERAPY: CPT | Performed by: PHYSICAL THERAPIST

## 2021-10-21 NOTE — PROGRESS NOTES
10/21/2021  Dx:    Spinal stenosis of lumbar region with neurogenic claudication (W80.552), s/p surgery        Authorized # of Visits:  18 visits on POC      Next MD visit: none   Fall Risk: standard         Precautions:  Post surgical precautions, fall ri

## 2021-10-22 ENCOUNTER — APPOINTMENT (OUTPATIENT)
Dept: PHYSICAL THERAPY | Facility: HOSPITAL | Age: 66
End: 2021-10-22
Attending: NEUROLOGICAL SURGERY
Payer: COMMERCIAL

## 2021-10-26 ENCOUNTER — OFFICE VISIT (OUTPATIENT)
Dept: PAIN CLINIC | Facility: HOSPITAL | Age: 66
End: 2021-10-26
Attending: NURSE PRACTITIONER
Payer: COMMERCIAL

## 2021-10-26 VITALS — OXYGEN SATURATION: 99 % | HEART RATE: 87 BPM | DIASTOLIC BLOOD PRESSURE: 74 MMHG | SYSTOLIC BLOOD PRESSURE: 145 MMHG

## 2021-10-26 DIAGNOSIS — M79.651 RIGHT THIGH PAIN: ICD-10-CM

## 2021-10-26 DIAGNOSIS — M54.41 LOW BACK PAIN DUE TO BILATERAL SCIATICA: Primary | ICD-10-CM

## 2021-10-26 DIAGNOSIS — M48.062 SPINAL STENOSIS OF LUMBAR REGION WITH NEUROGENIC CLAUDICATION: ICD-10-CM

## 2021-10-26 DIAGNOSIS — M54.42 LOW BACK PAIN DUE TO BILATERAL SCIATICA: Primary | ICD-10-CM

## 2021-10-26 DIAGNOSIS — M96.1 FAILED BACK SURGICAL SYNDROME: ICD-10-CM

## 2021-10-26 PROCEDURE — 99211 OFF/OP EST MAY X REQ PHY/QHP: CPT

## 2021-10-26 RX ORDER — PREGABALIN 100 MG/1
100 CAPSULE ORAL 2 TIMES DAILY
Qty: 60 CAPSULE | Refills: 1 | Status: SHIPPED | OUTPATIENT
Start: 2021-10-26 | End: 2021-11-25

## 2021-10-26 RX ORDER — HYDROCODONE BITARTRATE AND ACETAMINOPHEN 10; 325 MG/1; MG/1
1-2 TABLET ORAL EVERY 12 HOURS PRN
Qty: 120 TABLET | Refills: 0 | Status: SHIPPED | OUTPATIENT
Start: 2021-10-26 | End: 2021-11-25

## 2021-10-26 NOTE — PROGRESS NOTES
PT presents ambulatory to the [facility] with use of a straight cane. Pt reports 7/10  right sided hip and leg pain S/P back surgery from  08/09/21 ZAIRA Cary saw PT for 2 month med eval.  See notes for POC.

## 2021-10-26 NOTE — CHRONIC PAIN
Follow-up Note  CC: follow up   HISTORY OF PRESENT ILLNESS:  Dariel Villalba is a 61year old old female, originally referred to the pain clinic by Dr. Weber ref.  provider found, with history of Low back pain due to bilateral sciatica  (primary encounter di surgery on 8/25. She states that Dr. Susana Van said the stimulator wont work. She continues to have the same pain, unchanged. She is unsure what she plans to do. She continues to take lyrica and norco PRN. She has been using two tabs per day.  She takes two tabs 1-2 tablets by mouth every 6 (six) hours as needed. 30 tablet 0   • lisinopril 40 MG Oral Tab Take 1 tablet (40 mg total) by mouth daily.  90 tablet 1   • JANUVIA 100 MG Oral Tab TAKE 1 TABLET BY MOUTH EVERY DAY 90 tablet 0   • hydrALAZINE 100 MG Oral Tab T lymphedema  Allergic/Immunologic:  Negative  Musculoskeletal: As above  Neurological: As above  Denies chest pain, shortness of breath.     MEDICAL HISTORY:  Patient Active Problem List:     Anemia     Azotemia     Hypokalemia     Back pain with radiation Kidney Disease Sister         Kidney failure secondary to diabetes; received kidney transplant in 2004   • Glaucoma Sister    • Diabetes Brother    • Sickle Cell Son         Cause of death   • Macular degeneration Neg        SOCIAL HISTORY:  Social History Partner Violence:       Fear of Current or Ex-Partner: Not on file      Emotionally Abused: Not on file      Physically Abused: Not on file      Sexually Abused: Not on file  Housing Stability:       Unable to Pay for Housing in the Last Year: Not on file Time: 28 minutes   Comprehensive analgesic plan was formulated. Conservative vs. Aggressive measures were discussed at length including pharmacotherapy (eg.  Anti- inflammatories, muscle relaxants, neuropathic medications, oral steroids, analgesics), inject

## 2021-11-01 ENCOUNTER — APPOINTMENT (OUTPATIENT)
Dept: PHYSICAL THERAPY | Facility: HOSPITAL | Age: 66
End: 2021-11-01
Attending: NEUROLOGICAL SURGERY
Payer: COMMERCIAL

## 2021-11-01 ENCOUNTER — TELEPHONE (OUTPATIENT)
Dept: PHYSICAL THERAPY | Facility: HOSPITAL | Age: 66
End: 2021-11-01

## 2021-11-10 ENCOUNTER — OFFICE VISIT (OUTPATIENT)
Dept: PODIATRY CLINIC | Facility: CLINIC | Age: 66
End: 2021-11-10
Payer: COMMERCIAL

## 2021-11-10 VITALS — HEIGHT: 64 IN | WEIGHT: 165 LBS | BODY MASS INDEX: 28.17 KG/M2

## 2021-11-10 DIAGNOSIS — B35.1 ONYCHOMYCOSIS: ICD-10-CM

## 2021-11-10 DIAGNOSIS — E11.42 TYPE 2 DIABETES MELLITUS WITH DIABETIC POLYNEUROPATHY, WITHOUT LONG-TERM CURRENT USE OF INSULIN (HCC): Primary | ICD-10-CM

## 2021-11-10 DIAGNOSIS — L84 FOOT CALLUS: ICD-10-CM

## 2021-11-10 PROCEDURE — 11056 PARNG/CUTG B9 HYPRKR LES 2-4: CPT | Performed by: PODIATRIST

## 2021-11-10 PROCEDURE — 11721 DEBRIDE NAIL 6 OR MORE: CPT | Performed by: PODIATRIST

## 2021-11-10 PROCEDURE — 3008F BODY MASS INDEX DOCD: CPT | Performed by: PODIATRIST

## 2021-11-10 NOTE — PROGRESS NOTES
HPI:    Patient ID: Sandra Carnes is a 61year old female. This 79-year-old diabetic presents to the office today having not been seen in almost 2 years.   On my last visit she was referred for vascular evaluation and underwent surgery in both extrem tablet 0   • insulin glargine (LANTUS SOLOSTAR) 100 UNIT/ML Subcutaneous Solution Pen-injector Inject 38 Units into the skin daily.  (Patient taking differently: Inject 34 Units into the skin nightly.) 36 mL 0   • ATORVASTATIN 20 MG Oral Tab TAKE 1 TABLET B incident. I reduced nail, subungual debris, and some keratosis and in fact reduced more than 50% of the nail. Sharp blade debridement of the area of callus on both feet was accomplished as well.   I cautioned this patient in reference to daily inspection,

## 2021-11-12 ENCOUNTER — OFFICE VISIT (OUTPATIENT)
Dept: PHYSICAL THERAPY | Facility: HOSPITAL | Age: 66
End: 2021-11-12
Attending: NEUROLOGICAL SURGERY
Payer: COMMERCIAL

## 2021-11-12 DIAGNOSIS — M48.062 SPINAL STENOSIS OF LUMBAR REGION WITH NEUROGENIC CLAUDICATION: ICD-10-CM

## 2021-11-12 PROCEDURE — 97110 THERAPEUTIC EXERCISES: CPT | Performed by: PHYSICAL THERAPIST

## 2021-11-12 NOTE — PROGRESS NOTES
11/12/2021  Dx:    Spinal stenosis of lumbar region with neurogenic claudication (M42.094), s/p surgery        Authorized # of Visits:  18 visits on POC      Next MD visit: none   Fall Risk: standard         Precautions:  Post surgical precautions, fall ri Total Timed Treatment: 45 min  Total Treatment Time: 45 min

## 2021-11-16 ENCOUNTER — OFFICE VISIT (OUTPATIENT)
Dept: PHYSICAL THERAPY | Facility: HOSPITAL | Age: 66
End: 2021-11-16
Attending: NEUROLOGICAL SURGERY
Payer: COMMERCIAL

## 2021-11-16 DIAGNOSIS — M48.062 SPINAL STENOSIS OF LUMBAR REGION WITH NEUROGENIC CLAUDICATION: ICD-10-CM

## 2021-11-16 PROCEDURE — 97110 THERAPEUTIC EXERCISES: CPT | Performed by: PHYSICAL THERAPIST

## 2021-11-16 NOTE — PROGRESS NOTES
11/16/2021  Dx:    Spinal stenosis of lumbar region with neurogenic claudication (P78.270), s/p surgery        Authorized # of Visits:  18 visits on POC      Next MD visit: none   Fall Risk: standard         Precautions:  Post surgical precautions, fall ri roll for the ball squeeze.       Charges: TE3      Total Timed Treatment: 45 min  Total Treatment Time: 45 min

## 2021-11-17 RX ORDER — GLIPIZIDE 5 MG/1
5 TABLET ORAL
Qty: 180 TABLET | Refills: 0 | Status: SHIPPED | OUTPATIENT
Start: 2021-11-17

## 2021-11-19 ENCOUNTER — TELEPHONE (OUTPATIENT)
Dept: PHYSICAL THERAPY | Facility: HOSPITAL | Age: 66
End: 2021-11-19

## 2021-11-19 ENCOUNTER — APPOINTMENT (OUTPATIENT)
Dept: PHYSICAL THERAPY | Facility: HOSPITAL | Age: 66
End: 2021-11-19
Attending: NEUROLOGICAL SURGERY
Payer: COMMERCIAL

## 2021-11-26 ENCOUNTER — OFFICE VISIT (OUTPATIENT)
Dept: PHYSICAL THERAPY | Facility: HOSPITAL | Age: 66
End: 2021-11-26
Attending: NEUROLOGICAL SURGERY
Payer: COMMERCIAL

## 2021-11-26 DIAGNOSIS — M48.062 SPINAL STENOSIS OF LUMBAR REGION WITH NEUROGENIC CLAUDICATION: ICD-10-CM

## 2021-11-26 PROCEDURE — 97140 MANUAL THERAPY 1/> REGIONS: CPT | Performed by: PHYSICAL THERAPIST

## 2021-11-26 PROCEDURE — 97110 THERAPEUTIC EXERCISES: CPT | Performed by: PHYSICAL THERAPIST

## 2021-11-26 NOTE — PROGRESS NOTES
11/26/2021    Dx:    Spinal stenosis of lumbar region with neurogenic claudication (A56.021), s/p surgery        Authorized # of Visits:  18 visits on POC      Next MD visit: none   Fall Risk: standard         Precautions:  Post surgical precautions, fall to continue to use her straight cane to avoid falls.     Charges: Wileen Marking     Total Timed Treatment: 45 min  Total Treatment Time: 45 min

## 2021-11-29 ENCOUNTER — OFFICE VISIT (OUTPATIENT)
Dept: PHYSICAL THERAPY | Facility: HOSPITAL | Age: 66
End: 2021-11-29
Attending: NEUROLOGICAL SURGERY
Payer: COMMERCIAL

## 2021-11-29 ENCOUNTER — OFFICE VISIT (OUTPATIENT)
Dept: PAIN CLINIC | Facility: HOSPITAL | Age: 66
End: 2021-11-29
Attending: NURSE PRACTITIONER
Payer: COMMERCIAL

## 2021-11-29 DIAGNOSIS — M48.062 SPINAL STENOSIS OF LUMBAR REGION WITH NEUROGENIC CLAUDICATION: ICD-10-CM

## 2021-11-29 DIAGNOSIS — M79.651 RIGHT THIGH PAIN: ICD-10-CM

## 2021-11-29 DIAGNOSIS — M96.1 FAILED BACK SURGICAL SYNDROME: ICD-10-CM

## 2021-11-29 DIAGNOSIS — M54.41 LOW BACK PAIN DUE TO BILATERAL SCIATICA: Primary | ICD-10-CM

## 2021-11-29 DIAGNOSIS — M54.42 LOW BACK PAIN DUE TO BILATERAL SCIATICA: Primary | ICD-10-CM

## 2021-11-29 PROCEDURE — 97110 THERAPEUTIC EXERCISES: CPT | Performed by: PHYSICAL THERAPIST

## 2021-11-29 PROCEDURE — 99211 OFF/OP EST MAY X REQ PHY/QHP: CPT

## 2021-11-29 PROCEDURE — 97140 MANUAL THERAPY 1/> REGIONS: CPT | Performed by: PHYSICAL THERAPIST

## 2021-11-29 RX ORDER — PREGABALIN 100 MG/1
100 CAPSULE ORAL 2 TIMES DAILY
Qty: 60 CAPSULE | Refills: 1 | Status: SHIPPED | OUTPATIENT
Start: 2021-11-29 | End: 2021-12-29

## 2021-11-29 RX ORDER — HYDROCODONE BITARTRATE AND ACETAMINOPHEN 10; 325 MG/1; MG/1
1 TABLET ORAL EVERY 6 HOURS PRN
Qty: 120 TABLET | Refills: 0 | Status: SHIPPED | OUTPATIENT
Start: 2021-12-29 | End: 2021-12-27

## 2021-11-29 RX ORDER — HYDROCODONE BITARTRATE AND ACETAMINOPHEN 10; 325 MG/1; MG/1
1 TABLET ORAL EVERY 6 HOURS PRN
Qty: 120 TABLET | Refills: 0 | Status: SHIPPED | OUTPATIENT
Start: 2021-11-29 | End: 2021-12-29

## 2021-11-29 NOTE — PROGRESS NOTES
Pt presents to [facility] ambulatory for med eval. Pt states still having low back pain has had no improvement since having surgery. Pt states the only thing that help with pain is the meds and hot bath. ZAIRA Mcmillan to see pt see notes for POC.

## 2021-11-29 NOTE — PROGRESS NOTES
11/29/2021    Dx:    Spinal stenosis of lumbar region with neurogenic claudication (R74.107), s/p surgery        Authorized # of Visits:  18 visits on POC      Next MD visit: none   Fall Risk: standard         Precautions:  Post surgical precautions, fall

## 2021-11-29 NOTE — CHRONIC PAIN
Follow-up Note  CC: follow up   HISTORY OF PRESENT ILLNESS:  Redd Gabriel is a 61year old old female, originally referred to the pain clinic by Dr. Weber ref.  provider found, with history of Low back pain due to bilateral sciatica  (primary encounter di surgery on 8/25. She states that Dr. Hollis Zazueat said the stimulator wont work. She continues to have the same pain, unchanged. She is unsure what she plans to do. She continues to take lyrica and norco PRN. She has been using two tabs per day.  She takes two tabs Vitro Strip Check sugars twice a day 200 each 0   • OneTouch Delica Lancets 43T Does not apply Misc Check sugars twice a day 200 each 0   • albuterol 108 (90 Base) MCG/ACT Inhalation Aero Soln Inhale 1 puff into the lungs every 6 (six) hours as needed for Bowel/Bladder Incontinence: as above  Coughing/sneezing/straining does not exacerbate the pain.   Numbness/tingling: as above  Weakness: as above  Weight Loss: Negative   Fever: Negative   Cardiovascular:  No current chest pain or palpitations   Respirato Date   • BACK SURGERY Right 2021    Right L3-4 extreme lateral interbody fusion, posterior decompression; LUMBAR LAMINECTOMY 1 LEVEL   •       x3   • EXCIS LUMBAR DISK,ONE LEVEL         • OTHER      bilateral leg stents  and  NAD, appears stated age, appropriate disposition and demeanor, answers questions appropriately   Head: normocephalic, atraumatic  Eyes: anicteric; no injection  Ears: no obvious deformities noted   Nose: externally grossly within normal limits, no unusual during a comprehensive interactive discussion. All questions were answered during extended questions and answer session. Patient agreeable to discussion plan.  Greater than 50% of the time was spent with counseling (nature of discussion centered around pain

## 2021-12-03 ENCOUNTER — OFFICE VISIT (OUTPATIENT)
Dept: PULMONOLOGY | Facility: CLINIC | Age: 66
End: 2021-12-03
Payer: COMMERCIAL

## 2021-12-03 VITALS
RESPIRATION RATE: 14 BRPM | WEIGHT: 169 LBS | SYSTOLIC BLOOD PRESSURE: 147 MMHG | HEIGHT: 64 IN | HEART RATE: 97 BPM | OXYGEN SATURATION: 97 % | DIASTOLIC BLOOD PRESSURE: 78 MMHG | BODY MASS INDEX: 28.85 KG/M2

## 2021-12-03 DIAGNOSIS — Z01.812 ENCOUNTER FOR PREPROCEDURE SCREENING LABORATORY TESTING FOR COVID-19: ICD-10-CM

## 2021-12-03 DIAGNOSIS — Z20.822 ENCOUNTER FOR PREPROCEDURE SCREENING LABORATORY TESTING FOR COVID-19: ICD-10-CM

## 2021-12-03 DIAGNOSIS — Z87.891 HISTORY OF TOBACCO USE: ICD-10-CM

## 2021-12-03 DIAGNOSIS — J43.2 CENTRILOBULAR EMPHYSEMA (HCC): Primary | ICD-10-CM

## 2021-12-03 PROCEDURE — 3078F DIAST BP <80 MM HG: CPT | Performed by: PHYSICIAN ASSISTANT

## 2021-12-03 PROCEDURE — 3008F BODY MASS INDEX DOCD: CPT | Performed by: PHYSICIAN ASSISTANT

## 2021-12-03 PROCEDURE — 90471 IMMUNIZATION ADMIN: CPT | Performed by: PHYSICIAN ASSISTANT

## 2021-12-03 PROCEDURE — 3077F SYST BP >= 140 MM HG: CPT | Performed by: PHYSICIAN ASSISTANT

## 2021-12-03 PROCEDURE — 90686 IIV4 VACC NO PRSV 0.5 ML IM: CPT | Performed by: PHYSICIAN ASSISTANT

## 2021-12-03 PROCEDURE — 99213 OFFICE O/P EST LOW 20 MIN: CPT | Performed by: PHYSICIAN ASSISTANT

## 2021-12-03 RX ORDER — FLUTICASONE PROPIONATE 50 MCG
1 SPRAY, SUSPENSION (ML) NASAL 2 TIMES DAILY
Qty: 1 EACH | Refills: 0 | Status: SHIPPED | OUTPATIENT
Start: 2021-12-03 | End: 2022-01-02

## 2021-12-03 RX ORDER — BUDESONIDE AND FORMOTEROL FUMARATE DIHYDRATE 160; 4.5 UG/1; UG/1
2 AEROSOL RESPIRATORY (INHALATION) 2 TIMES DAILY
Qty: 1 EACH | Refills: 2 | Status: SHIPPED | OUTPATIENT
Start: 2021-12-03

## 2021-12-03 NOTE — PROGRESS NOTES
Pulmonary Progress Note    History of Present Illness:  Leonel Hooper is a 61year old female presenting to pulmonary clinic today for follow-up. The patient is a known patient to Dr. Lin Eng, last seen March 2020 for COPD.   The patient notes that she has gain.    Physical Exam:  /78   Pulse 97   Resp 14   Ht 5' 4\" (1.626 m)   Wt 169 lb (76.7 kg)   SpO2 97%   BMI 29.01 kg/m²    Constitutional: No acute distress. HEENT: Head NC/AT. PEERL. No tonsillar or uvula enlargement.    Cardio: Regular rate and

## 2021-12-06 ENCOUNTER — APPOINTMENT (OUTPATIENT)
Dept: PHYSICAL THERAPY | Facility: HOSPITAL | Age: 66
End: 2021-12-06
Attending: NEUROLOGICAL SURGERY
Payer: MEDICAID

## 2021-12-07 ENCOUNTER — ORDER TRANSCRIPTION (OUTPATIENT)
Dept: PHYSICAL THERAPY | Facility: HOSPITAL | Age: 66
End: 2021-12-07

## 2021-12-07 DIAGNOSIS — M48.062 SPINAL STENOSIS, LUMBAR REGION WITH NEUROGENIC CLAUDICATION: Primary | ICD-10-CM

## 2021-12-13 ENCOUNTER — OFFICE VISIT (OUTPATIENT)
Dept: PHYSICAL THERAPY | Facility: HOSPITAL | Age: 66
End: 2021-12-13
Attending: NEUROLOGICAL SURGERY
Payer: MEDICAID

## 2021-12-13 DIAGNOSIS — M48.062 SPINAL STENOSIS, LUMBAR REGION WITH NEUROGENIC CLAUDICATION: ICD-10-CM

## 2021-12-13 PROCEDURE — 97162 PT EVAL MOD COMPLEX 30 MIN: CPT | Performed by: PHYSICAL THERAPIST

## 2021-12-13 PROCEDURE — 97110 THERAPEUTIC EXERCISES: CPT | Performed by: PHYSICAL THERAPIST

## 2021-12-13 NOTE — PROGRESS NOTES
LUMBAR SPINE EVALUATION:   Referring Physician: Dr. Leslie Knight  Diagnosis:     Spinal stenosis, lumbar region with neurogenic claudication (M39.813)   Date of Service: 12/13/2021   Date of Onset: Sept 9, 2021    PATIENT SUMMARY   Jaya Stuart is a 61 yea kidney disease (CKD)    • COPD (chronic obstructive pulmonary disease) ([facility])     FEV 1 1.25 50% 2/20   • Diabetes ([facility])     DM neuropathy   • Essential hypertension    • H/O angioplasty     07/08/2020   • High blood pressure    • High cholesterol    • Lisbet Knee Flexion 5 4    Ankle DF (L4) 5 4    EHL (L5) 5 4    Ankle PF (S1) 5 4    Hip Abduction NT NT    Hip Extension NT NT      Flexibility:      R L   Hamstrings 65 65   Piriformis short WFL   Hip Flexor short WFL     Neuro Screen: difficutly with toe wal stand to cook a meal without an increase in symptoms. Frequency / Duration: Patient will be seen for 1-2 x/week or a total of 15 visits over a 90 day period. Treatment will include: Manual Therapy; Therapeutic Exercises; Neuromuscular Re-education;  The

## 2021-12-17 ENCOUNTER — TELEPHONE (OUTPATIENT)
Dept: PHYSICAL THERAPY | Facility: HOSPITAL | Age: 66
End: 2021-12-17

## 2021-12-17 ENCOUNTER — APPOINTMENT (OUTPATIENT)
Dept: PHYSICAL THERAPY | Facility: HOSPITAL | Age: 66
End: 2021-12-17
Attending: NEUROLOGICAL SURGERY
Payer: MEDICAID

## 2021-12-19 RX ORDER — AMLODIPINE BESYLATE 10 MG/1
TABLET ORAL
Qty: 90 TABLET | Refills: 0 | Status: SHIPPED | OUTPATIENT
Start: 2021-12-19

## 2021-12-20 ENCOUNTER — APPOINTMENT (OUTPATIENT)
Dept: PHYSICAL THERAPY | Facility: HOSPITAL | Age: 66
End: 2021-12-20
Attending: NEUROLOGICAL SURGERY
Payer: MEDICAID

## 2021-12-22 ENCOUNTER — TELEPHONE (OUTPATIENT)
Dept: ENDOCRINOLOGY CLINIC | Facility: CLINIC | Age: 66
End: 2021-12-22

## 2021-12-22 RX ORDER — SITAGLIPTIN 100 MG/1
TABLET, FILM COATED ORAL
Qty: 90 TABLET | Refills: 0 | Status: SHIPPED | OUTPATIENT
Start: 2021-12-22

## 2021-12-22 NOTE — TELEPHONE ENCOUNTER
Last filled: 9/15/21  RTC: 10/12/21 - 3months     Future Appointments   Date Time Provider Marielena Plaza   1/7/2022 11:45 AM Sharita Mackey, PT [facility] NEURO PT EM [facility]   1/10/2022 11:45 AM Sharita Mackey, PT [facility] NEURO PT EM [facility]   1/24/2022 12:45 PM Dereje Mei

## 2021-12-27 ENCOUNTER — TELEPHONE (OUTPATIENT)
Dept: PAIN CLINIC | Facility: HOSPITAL | Age: 66
End: 2021-12-27

## 2021-12-27 RX ORDER — HYDROCODONE BITARTRATE AND ACETAMINOPHEN 10; 325 MG/1; MG/1
1 TABLET ORAL EVERY 6 HOURS PRN
Qty: 120 TABLET | Refills: 0 | Status: SHIPPED | OUTPATIENT
Start: 2021-12-29 | End: 2022-01-28

## 2022-01-07 ENCOUNTER — OFFICE VISIT (OUTPATIENT)
Dept: PHYSICAL THERAPY | Facility: HOSPITAL | Age: 67
End: 2022-01-07
Attending: NEUROLOGICAL SURGERY
Payer: MEDICAID

## 2022-01-07 PROCEDURE — 97110 THERAPEUTIC EXERCISES: CPT | Performed by: PHYSICAL THERAPIST

## 2022-01-07 PROCEDURE — 97140 MANUAL THERAPY 1/> REGIONS: CPT | Performed by: PHYSICAL THERAPIST

## 2022-01-07 NOTE — PROGRESS NOTES
1/7/2022  Dx:     Spinal stenosis, lumbar region with neurogenic claudication (P38.952)      Authorized # of Visits:  15 visits authorized until 2/11/22        Next MD visit: none   Fall Risk: standard         Precautions:  Fall risk  Medication Changes si able to walk community distances with a straight cane. 5.  The pt will report a 50% decrease in pain. 6.  The pt will be able to perform sit to stand from the chair without an increase in pain.   7.  The pt will be able stand to cook a meal without an inc fall.  Reports she is now experiencing urine leakage. Denies bowel leakage. Feels like the DEST[employer]Y GOODMAN is going to give out on her. Got out of rehab on 9/28. Lives with her son, sister and her . Using a shower chair and takes baths.  Feels better [employer] with a straight cane for short community distances. She continues to display increased weakness on the R LE and difficutly with full WB on that side.   Functional limitations include limited standing, walking, unable to bend/lift/twist.  She will benefit f

## 2022-01-10 ENCOUNTER — OFFICE VISIT (OUTPATIENT)
Dept: PHYSICAL THERAPY | Facility: HOSPITAL | Age: 67
End: 2022-01-10
Attending: NEUROLOGICAL SURGERY
Payer: MEDICAID

## 2022-01-10 PROCEDURE — 97110 THERAPEUTIC EXERCISES: CPT | Performed by: PHYSICAL THERAPIST

## 2022-01-10 PROCEDURE — 97140 MANUAL THERAPY 1/> REGIONS: CPT | Performed by: PHYSICAL THERAPIST

## 2022-01-10 NOTE — PROGRESS NOTES
1/10/2022  Dx:     Spinal stenosis, lumbar region with neurogenic claudication (D60.590)      Authorized # of Visits:  15 visits authorized until 2/11/22        Next MD visit: none   Fall Risk: standard         Precautions:  Fall risk  Medication Changes s answered. 1.  The pt will be independent in their HEP. 2.  Centralization of symptoms to the lumbar spine. 3.  The pt will be independent in a modified workout program.  4.  The pt will be able to walk community distances with a straight cane.   5.  T for 5 years. Reports she then developed R hip pain, worked with Chemo Mcmahon after therapy which was helpful. Reports she is now walking with a RW at home and straight cane when in the community. Denies fall. Reports she is now experiencing urine leakage.   Amelie Jones hurting yourself? No    Have you tried to hurt yourself in the past?  No      ASSESSMENT:   Miss Layla Wei continues therapy post lumbar spine surgery that was done on Sept 9, 2021. She is now walking with a straight cane for short community distances.

## 2022-01-13 NOTE — PLAN OF CARE
Problem: PAIN - ADULT  Goal: Verbalizes/displays adequate comfort level or patient's stated pain goal  Description: INTERVENTIONS:  - Encourage pt to monitor pain and request assistance  - Assess pain using appropriate pain scale  - Administer analgesics Right 7 -12 rib fractures, with small right pleural effusion  - rib fracture protocol  - multimodal pain control  - incentive spirometer  - APS for pain management  Continue p o   Pain regimen at this time  - will likely discharge home today 1/14 with home health appropriate  - Identify discharge learning needs (meds, wound care, etc)  - Arrange for interpreters to assist at discharge as needed  - Consider post-discharge preferences of patient/family/discharge partner  - Complete POLST form as appropriate  - Assess

## 2022-01-17 ENCOUNTER — OFFICE VISIT (OUTPATIENT)
Dept: PHYSICAL THERAPY | Facility: HOSPITAL | Age: 67
End: 2022-01-17
Attending: NEUROLOGICAL SURGERY
Payer: MEDICAID

## 2022-01-17 PROCEDURE — 97140 MANUAL THERAPY 1/> REGIONS: CPT | Performed by: PHYSICAL THERAPIST

## 2022-01-17 PROCEDURE — 97110 THERAPEUTIC EXERCISES: CPT | Performed by: PHYSICAL THERAPIST

## 2022-01-17 NOTE — PROGRESS NOTES
1/17/2022  Dx:     Spinal stenosis, lumbar region with neurogenic claudication (G88.168)      Authorized # of Visits:  15 visits authorized until 2/11/22        Next MD visit: none   Fall Risk: standard         Precautions:  Fall risk  Medication Changes s cane.  5.  The pt will report a 50% decrease in pain. 6.  The pt will be able to perform sit to stand from the chair without an increase in pain. 7.  The pt will be able stand to cook a meal without an increase in symptoms.     Frequency / Duration: Cathi leakage. Denies bowel leakage. Feels like the [employer] is going to give out on her. Got out of rehab on 9/28. Lives with her son, sister and her . Using a shower chair and takes baths.  Feels better taking a bath.        Current functional limitatio distances. She continues to display increased weakness on the R LE and difficutly with full WB on that side. Functional limitations include limited standing, walking, unable to bend/lift/twist.  She will benefit form skilled PT to return to her PLOF.

## 2022-01-24 ENCOUNTER — APPOINTMENT (OUTPATIENT)
Dept: PHYSICAL THERAPY | Facility: HOSPITAL | Age: 67
End: 2022-01-24
Attending: NEUROLOGICAL SURGERY
Payer: MEDICAID

## 2022-01-24 ENCOUNTER — TELEPHONE (OUTPATIENT)
Dept: PHYSICAL THERAPY | Facility: HOSPITAL | Age: 67
End: 2022-01-24

## 2022-01-26 ENCOUNTER — OFFICE VISIT (OUTPATIENT)
Dept: PAIN CLINIC | Facility: HOSPITAL | Age: 67
End: 2022-01-26
Attending: ANESTHESIOLOGY
Payer: MEDICAID

## 2022-01-26 VITALS — OXYGEN SATURATION: 98 % | HEART RATE: 88 BPM | SYSTOLIC BLOOD PRESSURE: 160 MMHG | DIASTOLIC BLOOD PRESSURE: 71 MMHG

## 2022-01-26 DIAGNOSIS — M54.41 LOW BACK PAIN DUE TO BILATERAL SCIATICA: ICD-10-CM

## 2022-01-26 DIAGNOSIS — M48.062 SPINAL STENOSIS OF LUMBAR REGION WITH NEUROGENIC CLAUDICATION: ICD-10-CM

## 2022-01-26 DIAGNOSIS — M54.42 LOW BACK PAIN DUE TO BILATERAL SCIATICA: ICD-10-CM

## 2022-01-26 DIAGNOSIS — Z51.81 ENCOUNTER FOR MONITORING OPIOID MAINTENANCE THERAPY: ICD-10-CM

## 2022-01-26 DIAGNOSIS — Z79.891 ENCOUNTER FOR MONITORING OPIOID MAINTENANCE THERAPY: ICD-10-CM

## 2022-01-26 DIAGNOSIS — M96.1 FAILED BACK SURGICAL SYNDROME: Primary | ICD-10-CM

## 2022-01-26 LAB
AMPHET UR QL SCN: NEGATIVE
BARBITURATES UR QL SCN: NEGATIVE
BENZODIAZ UR QL SCN: NEGATIVE
CANNABINOIDS UR QL SCN: NEGATIVE
COCAINE UR QL: NEGATIVE
CREAT UR-SCNC: 71.8 MG/DL
MDMA UR QL SCN: NEGATIVE
METHADONE UR QL SCN: NEGATIVE
OXYCODONE UR QL SCN: NEGATIVE
PCP UR QL SCN: NEGATIVE

## 2022-01-26 PROCEDURE — 99211 OFF/OP EST MAY X REQ PHY/QHP: CPT

## 2022-01-26 PROCEDURE — 80361 OPIATES 1 OR MORE: CPT | Performed by: ANESTHESIOLOGY

## 2022-01-26 PROCEDURE — 80307 DRUG TEST PRSMV CHEM ANLYZR: CPT | Performed by: ANESTHESIOLOGY

## 2022-01-26 RX ORDER — HYDROCODONE BITARTRATE AND ACETAMINOPHEN 10; 325 MG/1; MG/1
1 TABLET ORAL EVERY 6 HOURS PRN
Qty: 120 TABLET | Refills: 0 | Status: SHIPPED | OUTPATIENT
Start: 2022-02-26 | End: 2022-03-28

## 2022-01-26 RX ORDER — HYDROCODONE BITARTRATE AND ACETAMINOPHEN 10; 325 MG/1; MG/1
1 TABLET ORAL EVERY 6 HOURS PRN
Qty: 120 TABLET | Refills: 0 | Status: SHIPPED | OUTPATIENT
Start: 2022-01-27 | End: 2022-02-26

## 2022-01-26 RX ORDER — PREGABALIN 100 MG/1
100 CAPSULE ORAL 2 TIMES DAILY
Qty: 60 CAPSULE | Refills: 1 | Status: SHIPPED | OUTPATIENT
Start: 2022-01-27 | End: 2022-02-26

## 2022-01-26 NOTE — CHRONIC PAIN
Follow-up Note  CC: follow up   HISTORY OF PRESENT ILLNESS:  Lindsay Menendez is a 61year old old female, originally referred to the pain clinic by  No ref.  provider found, with history of Encounter for monitoring opioid maintenance therapy  (primary states that shes scheduled for surgery on 8/25. She states that Dr. Cash Love said the stimulator wont work. She continues to have the same pain, unchanged. She is unsure what she plans to do. She continues to take lyrica and norco PRN.  She has been using two t INTERVENTIONS:  Medications:  Norco, lyrica   Interventions:  Lumbar surgery 2015, caudal KEYSHA 2/26/21, caudal Rax 4/22/2021    ALLERGIES:    Penicillins             ANGIOEDEMA    Comment:Hives on eyelids (occurred when pt was in her             25s).  Kobe Chase ATORVASTATIN 20 MG Oral Tab TAKE 1 TABLET BY MOUTH EVERY DAY AT NIGHT 90 tablet 0   • ACCU-CHEK GUIDE In Vitro Strip USE 4 TIMES A DAY TO TEST BLOOD SUGAR 3 TIMES A DAY WITH MEALS AND AT BEDTIME 100 strip 0   • Blood Glucose Monitoring Suppl (ACCU-CHEK DUSTIN diabetes mellitus with hyperglycemia, with long-term current use of insulin (HCC)    Past Medical History:   Diagnosis Date   • Anemia    • Back pain    • Chronic kidney disease (CKD)    • COPD (chronic obstructive pulmonary disease) (HCC)     FEV 1 1.25 5 Substance and Sexual Activity      Alcohol use: Not Currently        Comment: socially      Drug use: No      Sexual activity: Not on file    Other Topics      Concerns:        Not on file    Social History Narrative      [facility]       completed for SCS trial, we discussed this could be considered once healed from surgery if pain persistent but currently not ready to proceed as she feels slightly better but will reevaluate after activity levels increase  - recommended talking with pharma

## 2022-01-26 NOTE — PROGRESS NOTES
PT presents ambulatory to the [facility] with use of a straight cane. Chronic LBP with right sided radiculopathy. NA & UDS renewed.   Dr. Ronnie Reeves saw PT for med eval.  Refer to dictation for POC

## 2022-01-28 ENCOUNTER — OFFICE VISIT (OUTPATIENT)
Dept: PHYSICAL THERAPY | Facility: HOSPITAL | Age: 67
End: 2022-01-28
Attending: NEUROLOGICAL SURGERY
Payer: MEDICAID

## 2022-01-28 LAB
OPIATES, UR, 6-ACETYLMORPHINE: <10 NG/ML
OPIATES, URINE, CODEINE: <20 NG/ML
OPIATES, URINE, HYDROCODONE: 3794 NG/ML
OPIATES, URINE, HYDROMORPHONE: <20 NG/ML
OPIATES, URINE, MORPHINE: <20 NG/ML
OPIATES, URINE, NORHYDROCODONE: 2703 NG/ML
OPIATES, URINE, NOROXYCODONE: <20 NG/ML
OPIATES, URINE, NOROXYMORPHONE: <20 NG/ML
OPIATES, URINE, OXYCODONE: <20 NG/ML
OPIATES, URINE, OXYMORPHONE: <20 NG/ML

## 2022-01-28 PROCEDURE — 97110 THERAPEUTIC EXERCISES: CPT | Performed by: PHYSICAL THERAPIST

## 2022-01-28 NOTE — PROGRESS NOTES
1/28/2022  Dx:     Spinal stenosis, lumbar region with neurogenic claudication (Z37.069)      Authorized # of Visits:  15 visits authorized until 2/11/22        Next MD visit: none   Fall Risk: standard         Precautions:  Fall risk  Medication Changes s community distances with a straight cane. 5.  The pt will report a 50% decrease in pain. 6.  The pt will be able to perform sit to stand from the chair without an increase in pain.   7.  The pt will be able stand to cook a meal without an increase in symp is now experiencing urine leakage. Denies bowel leakage. Feels like the R REX is going to give out on her. Got out of rehab on 9/28. Lives with her son, sister and her . Using a shower chair and takes baths.  Feels better taking a bath.        Cu cane for short community distances. She continues to display increased weakness on the R LE and difficutly with full WB on that side.   Functional limitations include limited standing, walking, unable to bend/lift/twist.  She will benefit form skilled PT t

## 2022-02-01 ENCOUNTER — OFFICE VISIT (OUTPATIENT)
Dept: PHYSICAL THERAPY | Facility: HOSPITAL | Age: 67
End: 2022-02-01
Attending: NEUROLOGICAL SURGERY
Payer: MEDICAID

## 2022-02-01 ENCOUNTER — HOSPITAL ENCOUNTER (OUTPATIENT)
Dept: GENERAL RADIOLOGY | Facility: HOSPITAL | Age: 67
Discharge: HOME | End: 2022-02-01
Attending: NEUROLOGICAL SURGERY
Payer: MEDICAID

## 2022-02-01 DIAGNOSIS — M48.062 LUMBAR STENOSIS WITH NEUROGENIC CLAUDICATION: ICD-10-CM

## 2022-02-01 PROCEDURE — 97140 MANUAL THERAPY 1/> REGIONS: CPT | Performed by: PHYSICAL THERAPIST

## 2022-02-01 PROCEDURE — 97110 THERAPEUTIC EXERCISES: CPT | Performed by: PHYSICAL THERAPIST

## 2022-02-01 PROCEDURE — 72100 X-RAY EXAM L-S SPINE 2/3 VWS: CPT | Performed by: NEUROLOGICAL SURGERY

## 2022-02-04 ENCOUNTER — OFFICE VISIT (OUTPATIENT)
Dept: PHYSICAL THERAPY | Facility: HOSPITAL | Age: 67
End: 2022-02-04
Attending: NEUROLOGICAL SURGERY
Payer: MEDICAID

## 2022-02-04 PROCEDURE — 97140 MANUAL THERAPY 1/> REGIONS: CPT | Performed by: PHYSICAL THERAPIST

## 2022-02-04 PROCEDURE — 97110 THERAPEUTIC EXERCISES: CPT | Performed by: PHYSICAL THERAPIST

## 2022-02-10 ENCOUNTER — APPOINTMENT (OUTPATIENT)
Dept: PHYSICAL THERAPY | Facility: HOSPITAL | Age: 67
End: 2022-02-10
Attending: NEUROLOGICAL SURGERY
Payer: MEDICAID

## 2022-02-10 ENCOUNTER — TELEPHONE (OUTPATIENT)
Dept: PHYSICAL THERAPY | Facility: HOSPITAL | Age: 67
End: 2022-02-10

## 2022-02-11 RX ORDER — GLIPIZIDE 5 MG/1
TABLET ORAL
Qty: 180 TABLET | Refills: 0 | Status: SHIPPED | OUTPATIENT
Start: 2022-02-11

## 2022-02-16 ENCOUNTER — TELEPHONE (OUTPATIENT)
Dept: ENDOCRINOLOGY CLINIC | Facility: CLINIC | Age: 67
End: 2022-02-16

## 2022-02-16 NOTE — TELEPHONE ENCOUNTER
Received a fax from [facility] stating that the pt was approved for a short-term transition supply for the Microlet Mis Lancets. According to pt's LOV Note pt was using OneTouch Delica Lancets 30R. Called pt to verify which type of lancets she was using. Pt stated she switched insurances and now has Medicaid.

## 2022-02-17 ENCOUNTER — OFFICE VISIT (OUTPATIENT)
Dept: PHYSICAL THERAPY | Facility: HOSPITAL | Age: 67
End: 2022-02-17
Attending: NEUROLOGICAL SURGERY
Payer: MEDICAID

## 2022-02-17 PROCEDURE — 97110 THERAPEUTIC EXERCISES: CPT | Performed by: PHYSICAL THERAPIST

## 2022-02-22 ENCOUNTER — OFFICE VISIT (OUTPATIENT)
Dept: INTERNAL MEDICINE CLINIC | Facility: CLINIC | Age: 67
End: 2022-02-22
Payer: MEDICAID

## 2022-02-22 VITALS
HEART RATE: 78 BPM | BODY MASS INDEX: 30.39 KG/M2 | SYSTOLIC BLOOD PRESSURE: 160 MMHG | OXYGEN SATURATION: 97 % | TEMPERATURE: 99 F | WEIGHT: 178 LBS | DIASTOLIC BLOOD PRESSURE: 75 MMHG | HEIGHT: 64 IN

## 2022-02-22 DIAGNOSIS — E11.00 TYPE 2 DIABETES MELLITUS WITH HYPEROSMOLARITY WITHOUT COMA, WITHOUT LONG-TERM CURRENT USE OF INSULIN (HCC): ICD-10-CM

## 2022-02-22 DIAGNOSIS — I10 PRIMARY HYPERTENSION: Primary | ICD-10-CM

## 2022-02-22 DIAGNOSIS — E78.00 HYPERCHOLESTEROLEMIA: ICD-10-CM

## 2022-02-22 DIAGNOSIS — Z12.31 ENCOUNTER FOR SCREENING MAMMOGRAM FOR MALIGNANT NEOPLASM OF BREAST: ICD-10-CM

## 2022-02-22 PROCEDURE — 3077F SYST BP >= 140 MM HG: CPT | Performed by: INTERNAL MEDICINE

## 2022-02-22 PROCEDURE — 3078F DIAST BP <80 MM HG: CPT | Performed by: INTERNAL MEDICINE

## 2022-02-22 PROCEDURE — 3008F BODY MASS INDEX DOCD: CPT | Performed by: INTERNAL MEDICINE

## 2022-02-22 PROCEDURE — 99214 OFFICE O/P EST MOD 30 MIN: CPT | Performed by: INTERNAL MEDICINE

## 2022-02-22 RX ORDER — ATORVASTATIN CALCIUM 20 MG/1
20 TABLET, FILM COATED ORAL NIGHTLY
Qty: 90 TABLET | Refills: 0 | Status: SHIPPED | OUTPATIENT
Start: 2022-02-22

## 2022-02-22 RX ORDER — AMLODIPINE BESYLATE 10 MG/1
10 TABLET ORAL DAILY
Qty: 90 TABLET | Refills: 0 | Status: SHIPPED | OUTPATIENT
Start: 2022-02-22

## 2022-02-22 RX ORDER — CARVEDILOL 3.12 MG/1
3.12 TABLET ORAL 2 TIMES DAILY WITH MEALS
Qty: 180 TABLET | Refills: 0 | Status: SHIPPED | OUTPATIENT
Start: 2022-02-22

## 2022-02-22 RX ORDER — IPRATROPIUM BROMIDE AND ALBUTEROL SULFATE 2.5; .5 MG/3ML; MG/3ML
3 SOLUTION RESPIRATORY (INHALATION) EVERY 6 HOURS PRN
Qty: 100 EACH | Refills: 0 | Status: SHIPPED | OUTPATIENT
Start: 2022-02-22

## 2022-02-22 RX ORDER — CLOPIDOGREL BISULFATE 75 MG/1
75 TABLET ORAL DAILY
Qty: 90 TABLET | Refills: 0 | Status: SHIPPED | OUTPATIENT
Start: 2022-02-22

## 2022-02-22 RX ORDER — LISINOPRIL 40 MG/1
40 TABLET ORAL DAILY
Qty: 90 TABLET | Refills: 1 | Status: SHIPPED | OUTPATIENT
Start: 2022-02-22

## 2022-02-22 RX ORDER — BUDESONIDE AND FORMOTEROL FUMARATE DIHYDRATE 160; 4.5 UG/1; UG/1
2 AEROSOL RESPIRATORY (INHALATION) 2 TIMES DAILY
Qty: 1 EACH | Refills: 2 | Status: SHIPPED | OUTPATIENT
Start: 2022-02-22

## 2022-02-22 RX ORDER — HYDRALAZINE HYDROCHLORIDE 100 MG/1
100 TABLET, FILM COATED ORAL EVERY 8 HOURS SCHEDULED
Qty: 30 TABLET | Refills: 0 | Status: SHIPPED | OUTPATIENT
Start: 2022-02-22

## 2022-02-22 RX ORDER — ALBUTEROL SULFATE 90 UG/1
1 AEROSOL, METERED RESPIRATORY (INHALATION) EVERY 6 HOURS PRN
Qty: 1 EACH | Refills: 0 | Status: SHIPPED | OUTPATIENT
Start: 2022-02-22

## 2022-02-24 ENCOUNTER — OFFICE VISIT (OUTPATIENT)
Dept: PHYSICAL THERAPY | Facility: HOSPITAL | Age: 67
End: 2022-02-24
Attending: NEUROLOGICAL SURGERY
Payer: MEDICAID

## 2022-02-24 PROCEDURE — 97110 THERAPEUTIC EXERCISES: CPT | Performed by: PHYSICAL THERAPIST

## 2022-02-24 RX ORDER — ALBUTEROL SULFATE 90 UG/1
AEROSOL, METERED RESPIRATORY (INHALATION)
Qty: 8.5 G | Refills: 0 | OUTPATIENT
Start: 2022-02-24

## 2022-03-03 ENCOUNTER — TELEPHONE (OUTPATIENT)
Dept: PHYSICAL THERAPY | Facility: HOSPITAL | Age: 67
End: 2022-03-03

## 2022-03-03 ENCOUNTER — APPOINTMENT (OUTPATIENT)
Dept: PHYSICAL THERAPY | Facility: HOSPITAL | Age: 67
End: 2022-03-03
Attending: NEUROLOGICAL SURGERY
Payer: MEDICAID

## 2022-03-04 ENCOUNTER — OFFICE VISIT (OUTPATIENT)
Dept: ENDOCRINOLOGY CLINIC | Facility: CLINIC | Age: 67
End: 2022-03-04
Payer: MEDICAID

## 2022-03-04 VITALS
BODY MASS INDEX: 30 KG/M2 | DIASTOLIC BLOOD PRESSURE: 72 MMHG | HEART RATE: 74 BPM | SYSTOLIC BLOOD PRESSURE: 130 MMHG | WEIGHT: 174 LBS

## 2022-03-04 DIAGNOSIS — E78.5 DYSLIPIDEMIA: ICD-10-CM

## 2022-03-04 DIAGNOSIS — E11.65 TYPE 2 DIABETES MELLITUS WITH HYPERGLYCEMIA, WITH LONG-TERM CURRENT USE OF INSULIN (HCC): Primary | ICD-10-CM

## 2022-03-04 DIAGNOSIS — Z79.4 TYPE 2 DIABETES MELLITUS WITH HYPERGLYCEMIA, WITH LONG-TERM CURRENT USE OF INSULIN (HCC): Primary | ICD-10-CM

## 2022-03-04 DIAGNOSIS — E11.00 TYPE 2 DIABETES MELLITUS WITH HYPEROSMOLARITY WITHOUT COMA, WITHOUT LONG-TERM CURRENT USE OF INSULIN (HCC): ICD-10-CM

## 2022-03-04 LAB
CARTRIDGE LOT#: ABNORMAL NUMERIC
GLUCOSE BLOOD: 94
HEMOGLOBIN A1C: 7.8 % (ref 4.3–5.6)
TEST STRIP LOT #: NORMAL NUMERIC

## 2022-03-04 PROCEDURE — 3078F DIAST BP <80 MM HG: CPT | Performed by: INTERNAL MEDICINE

## 2022-03-04 PROCEDURE — 99214 OFFICE O/P EST MOD 30 MIN: CPT | Performed by: INTERNAL MEDICINE

## 2022-03-04 PROCEDURE — 83036 HEMOGLOBIN GLYCOSYLATED A1C: CPT | Performed by: INTERNAL MEDICINE

## 2022-03-04 PROCEDURE — 3075F SYST BP GE 130 - 139MM HG: CPT | Performed by: INTERNAL MEDICINE

## 2022-03-04 PROCEDURE — 36416 COLLJ CAPILLARY BLOOD SPEC: CPT | Performed by: INTERNAL MEDICINE

## 2022-03-04 PROCEDURE — 3051F HG A1C>EQUAL 7.0%<8.0%: CPT | Performed by: INTERNAL MEDICINE

## 2022-03-04 PROCEDURE — 82947 ASSAY GLUCOSE BLOOD QUANT: CPT | Performed by: INTERNAL MEDICINE

## 2022-03-04 RX ORDER — LIRAGLUTIDE 6 MG/ML
INJECTION SUBCUTANEOUS
Qty: 9 ML | Refills: 0 | Status: SHIPPED | OUTPATIENT
Start: 2022-03-04 | End: 2022-06-02

## 2022-03-04 RX ORDER — INSULIN GLARGINE 100 [IU]/ML
38 INJECTION, SOLUTION SUBCUTANEOUS NIGHTLY
Qty: 34.2 ML | Refills: 0 | Status: SHIPPED | OUTPATIENT
Start: 2022-03-04 | End: 2022-06-02

## 2022-03-10 ENCOUNTER — TELEPHONE (OUTPATIENT)
Dept: PHYSICAL THERAPY | Facility: HOSPITAL | Age: 67
End: 2022-03-10

## 2022-03-10 ENCOUNTER — APPOINTMENT (OUTPATIENT)
Dept: PHYSICAL THERAPY | Facility: HOSPITAL | Age: 67
End: 2022-03-10
Attending: NEUROLOGICAL SURGERY
Payer: MEDICAID

## 2022-03-17 ENCOUNTER — APPOINTMENT (OUTPATIENT)
Dept: PHYSICAL THERAPY | Facility: HOSPITAL | Age: 67
End: 2022-03-17
Attending: NEUROLOGICAL SURGERY
Payer: MEDICAID

## 2022-03-17 ENCOUNTER — OFFICE VISIT (OUTPATIENT)
Dept: PAIN CLINIC | Facility: HOSPITAL | Age: 67
End: 2022-03-17
Attending: ANESTHESIOLOGY
Payer: MEDICAID

## 2022-03-17 VITALS
BODY MASS INDEX: 30.39 KG/M2 | SYSTOLIC BLOOD PRESSURE: 162 MMHG | DIASTOLIC BLOOD PRESSURE: 72 MMHG | HEART RATE: 73 BPM | HEIGHT: 64 IN | RESPIRATION RATE: 18 BRPM | WEIGHT: 178 LBS

## 2022-03-17 DIAGNOSIS — M79.18 PAIN IN RIGHT BUTTOCK: Primary | ICD-10-CM

## 2022-03-17 DIAGNOSIS — M79.10 MYALGIA: Chronic | ICD-10-CM

## 2022-03-17 DIAGNOSIS — M54.9 BACK PAIN WITH RADIATION: ICD-10-CM

## 2022-03-17 PROCEDURE — 99211 OFF/OP EST MAY X REQ PHY/QHP: CPT

## 2022-03-17 PROCEDURE — 20552 NJX 1/MLT TRIGGER POINT 1/2: CPT

## 2022-03-17 RX ORDER — BUPIVACAINE HYDROCHLORIDE 2.5 MG/ML
10 INJECTION, SOLUTION EPIDURAL; INFILTRATION; INTRACAUDAL ONCE
Status: ACTIVE | OUTPATIENT
Start: 2022-03-17

## 2022-03-17 RX ORDER — HYDROCODONE BITARTRATE AND ACETAMINOPHEN 10; 325 MG/1; MG/1
1 TABLET ORAL EVERY 6 HOURS PRN
Qty: 120 TABLET | Refills: 0 | Status: SHIPPED | OUTPATIENT
Start: 2022-03-28 | End: 2022-04-27

## 2022-03-17 RX ORDER — HYDROCODONE BITARTRATE AND ACETAMINOPHEN 10; 325 MG/1; MG/1
1 TABLET ORAL EVERY 6 HOURS PRN
Qty: 120 TABLET | Refills: 0 | Status: SHIPPED | OUTPATIENT
Start: 2022-04-27 | End: 2022-05-27

## 2022-03-17 RX ORDER — METHYLPREDNISOLONE ACETATE 40 MG/ML
40 INJECTION, SUSPENSION INTRA-ARTICULAR; INTRALESIONAL; INTRAMUSCULAR; SOFT TISSUE ONCE
Status: ACTIVE | OUTPATIENT
Start: 2022-03-17

## 2022-03-17 RX ORDER — PREGABALIN 100 MG/1
100 CAPSULE ORAL 2 TIMES DAILY
Qty: 60 CAPSULE | Refills: 1 | Status: SHIPPED | OUTPATIENT
Start: 2022-03-28 | End: 2022-04-27

## 2022-03-17 NOTE — PROGRESS NOTES
3/17/2022-presents ambulatory to [facility] using her cane;  Medical management chronic lbp r/t FBSS;  Pt had spinal surgery 9/2021; \"I was going to do the SCS, I sometimes think I should have done that\"; rates her pain 8/10; She reports using the [address] does give her some relief-\"I have good and bad days\"; Seen by Dr. Serena Schilling; refer to dictation for the continued plan of care.     MEDICATION  NORCO 10/326 Q6H 4/D #120/MO  PREGABLIN 100MG BID-----#60/MO R1   Refills given

## 2022-03-22 ENCOUNTER — OFFICE VISIT (OUTPATIENT)
Dept: INTERNAL MEDICINE CLINIC | Facility: CLINIC | Age: 67
End: 2022-03-22
Payer: MEDICAID

## 2022-03-22 VITALS
OXYGEN SATURATION: 98 % | TEMPERATURE: 97 F | HEIGHT: 64 IN | DIASTOLIC BLOOD PRESSURE: 70 MMHG | BODY MASS INDEX: 28.85 KG/M2 | HEART RATE: 73 BPM | SYSTOLIC BLOOD PRESSURE: 138 MMHG | WEIGHT: 169 LBS

## 2022-03-22 DIAGNOSIS — E11.00 TYPE 2 DIABETES MELLITUS WITH HYPEROSMOLARITY WITHOUT COMA, WITH LONG-TERM CURRENT USE OF INSULIN (HCC): Primary | ICD-10-CM

## 2022-03-22 DIAGNOSIS — Z79.4 TYPE 2 DIABETES MELLITUS WITH HYPEROSMOLARITY WITHOUT COMA, WITH LONG-TERM CURRENT USE OF INSULIN (HCC): Primary | ICD-10-CM

## 2022-03-22 PROCEDURE — 3075F SYST BP GE 130 - 139MM HG: CPT | Performed by: INTERNAL MEDICINE

## 2022-03-22 PROCEDURE — 99213 OFFICE O/P EST LOW 20 MIN: CPT | Performed by: INTERNAL MEDICINE

## 2022-03-22 PROCEDURE — 3008F BODY MASS INDEX DOCD: CPT | Performed by: INTERNAL MEDICINE

## 2022-03-22 PROCEDURE — 3078F DIAST BP <80 MM HG: CPT | Performed by: INTERNAL MEDICINE

## 2022-03-23 ENCOUNTER — OFFICE VISIT (OUTPATIENT)
Dept: PODIATRY CLINIC | Facility: CLINIC | Age: 67
End: 2022-03-23
Payer: MEDICAID

## 2022-03-23 DIAGNOSIS — E11.42 TYPE 2 DIABETES MELLITUS WITH DIABETIC POLYNEUROPATHY, WITHOUT LONG-TERM CURRENT USE OF INSULIN (HCC): Primary | ICD-10-CM

## 2022-03-23 DIAGNOSIS — L84 FOOT CALLUS: ICD-10-CM

## 2022-03-23 DIAGNOSIS — B35.1 ONYCHOMYCOSIS: ICD-10-CM

## 2022-03-23 PROCEDURE — 11721 DEBRIDE NAIL 6 OR MORE: CPT | Performed by: PODIATRIST

## 2022-03-23 PROCEDURE — 11055 PARING/CUTG B9 HYPRKER LES 1: CPT | Performed by: PODIATRIST

## 2022-03-25 ENCOUNTER — OFFICE VISIT (OUTPATIENT)
Dept: PHYSICAL THERAPY | Facility: HOSPITAL | Age: 67
End: 2022-03-25
Attending: NEUROLOGICAL SURGERY
Payer: MEDICAID

## 2022-03-25 PROCEDURE — 97110 THERAPEUTIC EXERCISES: CPT | Performed by: PHYSICAL THERAPIST

## 2022-03-25 NOTE — PROGRESS NOTES
3/25/2022    Patient Name: Nadine Vizcarra  YOB: 1955          MRN number:  [id number]  Referring Physician:  Gabe Ellison    Discharge Summary    Dx: Spinal stenosis, lumbar region with neurogenic claudication (I09.542)      Authorized # of Visits:  15 visits authorized until 22        Next MD visit: none   Fall Risk: standard         Precautions:  Fall risk  Medication Changes since last visit?: No    Subjective:  Reports she had to watch her grand kids because her daughter was in the hospital.  Reports she is using her straight cane most of the day. Can go without her cane for a couple of hours but then feels like she needs it. States she is being careful. Got the injection from the pain center. State it only helped slightly. Has not taken nay pain meds today. Feeling good how she is moving around but has a hard time with the stairs. Feels like she has improved tremendously. Going to start walking a blocks with her sister. Pain Ratin-7/10 VAS    Objective:     STRENGTH:   5/5 MMT Scale   Left  Right Comments   Hip Flexion (L2) 5  4-    Knee Extension (L3) 5 5    Knee Flexion 5 4    Ankle DF (L4) 5 4-    EHL (L5) 5 3+    Ankle PF (S1) 5 4    Hip Abduction NT NT    Hip Extension NT NT      Flexibility:      R L   Hamstrings 75 75   Piriformis [facility]   Hip Flexor [facility]        2022  Visit #6 2022  Visit #7 2022  Visit #8 2022  Visit #9 3/25/2022  Visit #10   Manual Therapy Manual neutral tension stretching    Talus joint mobs    Manual DF stretching Manual neutral tension stretching    Talus joint mobs    Manual DF stretching    STM R calf      Therapeutic Exercise Gastro and soleus stretch in standing  1. Knee straight   2. Knee bent    Self neutral tension stretching    SAQ with DF over bolster    Standing hip abduction on 2 inch step SAQ with 2 lbs weight    Clam shells R     LAQ in sitting    Adductor squeeze  1. Sitting  2.   Supine    SAQ in sitting with 2 lbs    Core stabilization with alternating shoulder flexion Adductor squeeze in sitting    Ball raises OH with exhale in sitting    LAQ in sitting    Sit to stands from chair Adductor squeeze in sitting    Ball raises OH with exhale in sitting  LAQ in sitting    Sit to stands from chair    Core stabilization with alternating shoulder flexion   Therapeutic Activity        Neuromuscular Education        TNE Education        HEP Gastroc and soleus stretch in standing SAQ with 2 lbs weight    Clam shells R LAQ in sitting    Adductor squeeze  1. Sitting  2. Supine    SAQ in sitting with 2 lbs    Core stabilization with alternating shoulder flexion Adductor squeeze in sitting    Ball raises OH with exhale in sitting    LAQ in sitting    Sit to stands from chair Continue with current HEP       Assessment: Miss Louis Nunez has completed 9 visits of PT and has progressed well. She is now able to walk very short community distances without her straight cane and short to moderate community distances with her straight cane. She is able to ascend/descend stairs with one railing, step to step and denies falls. Given that her fall risk continues to be high (secondary to L LE weakness and loss of balance), recommend the patient continue to use her straight at all time to prevent falls. She displays a R Trendelenburg with ambulation. Strength in her R LE is decreased to 3+/5 in the R great toe and 4-/5 in the R DF. She continue to report 6/10 VAS but reports her pain is more manageable at this time and feels ready for discharge. SShe has been advised to continue her HEP and daily walking. Also advised to call with questions. Will DC PT at this time. Goals: The pt was educated on the plan of care, purpose and individual goals for therapy, precautions for therapy. All questions were answered. 1.  The pt will be independent in their HEP. MET 3/25/2022  2. Centralization of symptoms to the lumbar spine. PROGRESSING 3/25/2022  3. The pt will be independent in a modified workout program.  MET 3/25/2022  4. The pt will be able to walk community distances with a straight cane. MET 3/25/202  5. The pt will report a 50% decrease in pain. MET 3/25/2022  6. The pt will be able to perform sit to stand from the chair without an increase in pain. MET 3/25/2022  7. The pt will be able stand to cook a meal without an increase in symptoms. MET 3/25/2022    Plan:  DC PT a this time    Education or treatment limitation: None  Rehab Potential good    Charges:  TE3 Total Timed Treatment: 45 min  Total Treatment Time: 45 min         Patient was advised of these findings, precautions, and treatment options and has agreed to actively participate in planning and for this course of care. Thank you for your referral. Please co-sign or sign and return this letter via fax as soon as possible to [phone]. If you have any questions, please contact me at Dept: [phone]. Sincerely,  Jeanmarie Singer PT    Electronically signed by therapist: Jeanmarie Singer PT    [de-identified] certification required: Yes  I certify the need for these services furnished under this plan of treatment and while under my care.     X___________________________________________________ Date____________________    Certification From: 4/66/2037  To:6/23/2022

## 2022-04-18 ENCOUNTER — TELEPHONE (OUTPATIENT)
Dept: PULMONOLOGY | Facility: CLINIC | Age: 67
End: 2022-04-18

## 2022-04-18 RX ORDER — ALBUTEROL SULFATE 90 UG/1
1 AEROSOL, METERED RESPIRATORY (INHALATION) EVERY 6 HOURS PRN
Qty: 1 EACH | Refills: 2 | Status: SHIPPED | OUTPATIENT
Start: 2022-04-18

## 2022-04-18 RX ORDER — BUDESONIDE AND FORMOTEROL FUMARATE DIHYDRATE 160; 4.5 UG/1; UG/1
2 AEROSOL RESPIRATORY (INHALATION) 2 TIMES DAILY
Qty: 1 EACH | Refills: 2 | Status: SHIPPED | OUTPATIENT
Start: 2022-04-18

## 2022-04-18 NOTE — TELEPHONE ENCOUNTER
Spoke to patient who is complaining of mouth pain. Switched inhalers from Symbicort to Advair a few months back and thinks its related. Patient is at the dentist now and denies current infection or thrush. Jordan TIRADONF-A-hjormoobnp refill of Symbicort. States the prescription in February was sent to the wrong pharmacy.

## 2022-04-18 NOTE — TELEPHONE ENCOUNTER
I signed the order for Symbicort. Please ensure she is rinsing mouth, gargling, and spitting to follow inhalation. If she continues to have issues, then may need to stop the inhaler to see if it improves.

## 2022-04-18 NOTE — TELEPHONE ENCOUNTER
Pt states she is having pain in her mouth and thinks it has to do with medication advair. Pt states she is currently at the dentist office and was advised there is no infection in her mouth.  Please follow up

## 2022-04-18 NOTE — TELEPHONE ENCOUNTER
Noted. Spoke to patient and relayed Arnaldo Muro PA-C's message below. Patient will follow-up with Pulmo if issues continue. Arnaldo Muro PA-C-patient requesting refill of rescue inhaler too. Please see pended order.

## 2022-04-29 ENCOUNTER — APPOINTMENT (OUTPATIENT)
Dept: PHYSICAL THERAPY | Facility: HOSPITAL | Age: 67
End: 2022-04-29
Attending: NEUROLOGICAL SURGERY

## 2022-05-02 ENCOUNTER — ORDER TRANSCRIPTION (OUTPATIENT)
Dept: ADMINISTRATIVE | Facility: HOSPITAL | Age: 67
End: 2022-05-02

## 2022-05-03 ENCOUNTER — LAB ENCOUNTER (OUTPATIENT)
Dept: LAB | Facility: HOSPITAL | Age: 67
End: 2022-05-03
Attending: PHYSICIAN ASSISTANT
Payer: MEDICAID

## 2022-05-03 DIAGNOSIS — Z20.822 ENCOUNTER FOR PREPROCEDURE SCREENING LABORATORY TESTING FOR COVID-19: ICD-10-CM

## 2022-05-03 DIAGNOSIS — Z01.812 ENCOUNTER FOR PREPROCEDURE SCREENING LABORATORY TESTING FOR COVID-19: ICD-10-CM

## 2022-05-03 LAB — SARS-COV-2 RNA RESP QL NAA+PROBE: NOT DETECTED

## 2022-05-06 ENCOUNTER — HOSPITAL ENCOUNTER (OUTPATIENT)
Dept: RESPIRATORY THERAPY | Facility: HOSPITAL | Age: 67
Discharge: HOME | End: 2022-05-06
Attending: PHYSICIAN ASSISTANT
Payer: MEDICAID

## 2022-05-06 PROBLEM — J43.2 CENTRILOBULAR EMPHYSEMA (HCC): Status: ACTIVE | Noted: 2019-11-08

## 2022-05-06 PROCEDURE — 94729 DIFFUSING CAPACITY: CPT

## 2022-05-06 PROCEDURE — 94726 PLETHYSMOGRAPHY LUNG VOLUMES: CPT

## 2022-05-06 PROCEDURE — 94060 EVALUATION OF WHEEZING: CPT

## 2022-05-20 ENCOUNTER — OFFICE VISIT (OUTPATIENT)
Dept: PAIN CLINIC | Facility: HOSPITAL | Age: 67
End: 2022-05-20
Attending: ANESTHESIOLOGY
Payer: MEDICAID

## 2022-05-20 DIAGNOSIS — M96.1 FAILED BACK SYNDROME OF LUMBAR SPINE: Chronic | ICD-10-CM

## 2022-05-20 DIAGNOSIS — M54.9 BACK PAIN WITH RADIATION: Primary | ICD-10-CM

## 2022-05-20 PROBLEM — M51.36 DDD (DEGENERATIVE DISC DISEASE), LUMBAR: Chronic | Status: ACTIVE | Noted: 2022-05-20

## 2022-05-20 PROCEDURE — 99211 OFF/OP EST MAY X REQ PHY/QHP: CPT

## 2022-05-20 RX ORDER — PREGABALIN 100 MG/1
100 CAPSULE ORAL 2 TIMES DAILY
Qty: 60 CAPSULE | Refills: 2 | Status: SHIPPED | OUTPATIENT
Start: 2022-05-27 | End: 2022-06-26

## 2022-05-20 RX ORDER — HYDROCODONE BITARTRATE AND ACETAMINOPHEN 10; 325 MG/1; MG/1
1 TABLET ORAL EVERY 6 HOURS PRN
Qty: 120 TABLET | Refills: 0 | Status: SHIPPED | OUTPATIENT
Start: 2022-05-27 | End: 2022-06-26

## 2022-05-20 RX ORDER — HYDROCODONE BITARTRATE AND ACETAMINOPHEN 10; 325 MG/1; MG/1
1 TABLET ORAL EVERY 6 HOURS PRN
Qty: 120 TABLET | Refills: 0 | Status: SHIPPED | OUTPATIENT
Start: 2022-06-26 | End: 2022-07-26

## 2022-05-20 RX ORDER — HYDROCODONE BITARTRATE AND ACETAMINOPHEN 10; 325 MG/1; MG/1
1 TABLET ORAL EVERY 6 HOURS PRN
Qty: 120 TABLET | Refills: 0 | Status: SHIPPED | OUTPATIENT
Start: 2022-07-26 | End: 2022-08-25

## 2022-05-20 NOTE — PROGRESS NOTES
Pt presents to CPM ambulatory with cane for medication follow up. Pt states doing well no changes, pain is 7/10 at the moment. Pt is still taking Norco 10,4-6 pills per day with good relief.  Dr. Ashleigh Portillo to see pt see notes for POC

## 2022-05-23 ENCOUNTER — TELEPHONE (OUTPATIENT)
Dept: PULMONOLOGY | Facility: CLINIC | Age: 67
End: 2022-05-23

## 2022-05-23 DIAGNOSIS — J44.9 CHRONIC OBSTRUCTIVE PULMONARY DISEASE, UNSPECIFIED COPD TYPE (HCC): Primary | ICD-10-CM

## 2022-05-23 NOTE — TELEPHONE ENCOUNTER
She can start Spiriva once daily in addition to the Symbicort. Please ensure she continues Symbicort 2 puffs twice daily. She is candidate for pulmonary rehabilitation which I would recommend as well.  Please let me know if she is agreeable and I can send referral.

## 2022-05-23 NOTE — TELEPHONE ENCOUNTER
Spoke with patient informed her of Pili Coelho PA-C's order. Patient verbalized understanding. Patient is agreeable to pulmonary rehab, provided patient phone number for pulmonary rehab. Marv Hung - Please review/sign order for pulmonary rehab.

## 2022-05-23 NOTE — TELEPHONE ENCOUNTER
----- Message from Tai Tang PA-C sent at 5/20/2022  2:05 PM CDT -----  RN: Please call the patient to let her know pulmonary function testing is consistent with severe COPD. Recommend continuing current plan with Symbicort.

## 2022-05-28 RX ORDER — CARVEDILOL 3.12 MG/1
TABLET ORAL
Qty: 180 TABLET | Refills: 0 | Status: SHIPPED | OUTPATIENT
Start: 2022-05-28

## 2022-05-28 RX ORDER — CLOPIDOGREL BISULFATE 75 MG/1
TABLET ORAL
Qty: 90 TABLET | Refills: 0 | Status: SHIPPED | OUTPATIENT
Start: 2022-05-28

## 2022-05-28 RX ORDER — HYDRALAZINE HYDROCHLORIDE 100 MG/1
TABLET, FILM COATED ORAL
Qty: 30 TABLET | Refills: 0 | Status: CANCELLED | OUTPATIENT
Start: 2022-05-28

## 2022-05-28 RX ORDER — HYDRALAZINE HYDROCHLORIDE 100 MG/1
TABLET, FILM COATED ORAL
Qty: 90 TABLET | Refills: 0 | Status: SHIPPED | OUTPATIENT
Start: 2022-05-28

## 2022-05-28 RX ORDER — ALBUTEROL SULFATE 90 UG/1
AEROSOL, METERED RESPIRATORY (INHALATION)
Qty: 8.5 G | Refills: 0 | OUTPATIENT
Start: 2022-05-28

## 2022-05-28 RX ORDER — ATORVASTATIN CALCIUM 20 MG/1
TABLET, FILM COATED ORAL
Qty: 90 TABLET | Refills: 0 | Status: SHIPPED | OUTPATIENT
Start: 2022-05-28

## 2022-05-28 NOTE — TELEPHONE ENCOUNTER
Refill passed per [facility] protocol.     Requested Prescriptions   Pending Prescriptions Disp Refills    ATORVASTATIN 20 MG Oral Tab [Pharmacy Med Name: ATORVASTATIN 20MG TABLETS] 90 tablet 0     Sig: TAKE 1 TABLET(20 MG) BY MOUTH EVERY NIGHT        Cholesterol Medication Protocol Failed - 5/28/2022 11:35 AM        Failed - ALT in past 12 months        Failed - LDL in past 12 months        Failed - Last ALT < 80       Lab Results   Component Value Date    ALT 8 (L) 10/06/2020             Failed - Last LDL < 130     Lab Results   Component Value Date    LDL 57 03/18/2020               Passed - Appointment in past 12 or next 3 months           HYDRALAZINE 100 MG Oral Tab [Pharmacy Med Name: HYDRALAZINE 100MG (HUNDRED MG) TABS] 90 tablet 0     Sig: TAKE 1 TABLET BY MOUTH EVERY 8 HOURS( 6:00 AM, 2:00 PM, AND 10:00 PM)        Hypertensive Medications Protocol Failed - 5/28/2022 11:35 AM        Failed - GFR  > 50     Lab Results   Component Value Date    GFRAA 48 (L) 09/14/2021                 Passed - CMP or BMP in past 12 months        Passed - Appointment in past 6 or next 3 months           HYDRALAZINE 100 MG Oral Tab [Pharmacy Med Name: HYDRALAZINE 100MG (HUNDRED MG) TABS] 30 tablet 0     Sig: TAKE 1 TABLET(100 MG) BY MOUTH EVERY 8 HOURS        Hypertensive Medications Protocol Failed - 5/28/2022 11:35 AM        Failed - GFR  > 50     Lab Results   Component Value Date    GFRAA 48 (L) 09/14/2021                 Passed - CMP or BMP in past 12 months        Passed - Appointment in past 6 or next 3 months           CLOPIDOGREL 75 MG Oral Tab [Pharmacy Med Name: CLOPIDOGREL 75MG TABLETS] 90 tablet 0     Sig: TAKE 1 TABLET(75 MG) BY MOUTH DAILY        There is no refill protocol information for this order        ALBUTEROL 108 (90 Base) MCG/ACT Inhalation Aero Soln [Pharmacy Med Name: ALBUTEROL HFA INH (200 PUFFS)8.5GM] 8.5 g 0     Sig: INHALE 1 PUFF INTO THE LUNGS EVERY 6 HOURS AS NEEDED FOR WHEEZING OR SHORTNESS OF BREATH        Asthma & COPD Medication Protocol Passed - 5/28/2022 11:35 AM        Passed - Appointment in past 6 or next 3 months           CARVEDILOL 3.125 MG Oral Tab [Pharmacy Med Name: CARVEDILOL 3.125MG TABLETS] 180 tablet 0     Sig: TAKE 1 TABLET(3.125 MG) BY MOUTH TWICE DAILY WITH MEALS        Hypertensive Medications Protocol Failed - 5/28/2022 11:35 AM        Failed - GFR  > 50     Lab Results   Component Value Date    GFRAA 48 (L) 09/14/2021                 Passed - CMP or BMP in past 12 months        Passed - Appointment in past 6 or next 3 months            Recent Outpatient Visits              1 week ago Back pain with radiation    [facility] Yumiko Fong MD    Office Visit    2 months ago     [address] [place], [place]    Office Visit    2 months ago Type 2 diabetes mellitus with diabetic polyneuropathy, without long-term current use of insulin [facility])    [facility], [address], [place], [place]    Office Visit    2 months ago Type 2 diabetes mellitus with hyperosmolarity without coma, with long-term current use of insulin [facility])    [facility], [facility], [facility], Jose Serna MD    Office Visit    2 months ago Pain in right buttock    [facility] Yumiko Fong MD    Office Visit          Future Appointments         Provider Department Appt Notes    In 4 days Miriam Sanderson, [address] Endocrinology     In 3 weeks CANDIDA Rossi [address] follow up/ informed fo policies    In 2 months Jim Wright, [phone] for Pain Management 3 month med eval   10/22/20

## 2022-05-28 NOTE — TELEPHONE ENCOUNTER
Please review. Protocol Failed or has No Protocol.     Requested Prescriptions   Pending Prescriptions Disp Refills    ATORVASTATIN 20 MG Oral Tab [Pharmacy Med Name: ATORVASTATIN 20MG TABLETS] 90 tablet 0     Sig: TAKE 1 TABLET(20 MG) BY MOUTH EVERY NIGHT        Cholesterol Medication Protocol Failed - 5/28/2022 11:35 AM        Failed - ALT in past 12 months        Failed - LDL in past 12 months        Failed - Last ALT < 80       Lab Results   Component Value Date    ALT 8 (L) 10/06/2020             Failed - Last LDL < 130     Lab Results   Component Value Date    LDL 57 03/18/2020               Passed - Appointment in past 12 or next 3 months           HYDRALAZINE 100 MG Oral Tab [Pharmacy Med Name: HYDRALAZINE 100MG (HUNDRED MG) TABS] 90 tablet 0     Sig: TAKE 1 TABLET BY MOUTH EVERY 8 HOURS( 6:00 AM, 2:00 PM, AND 10:00 PM)        Hypertensive Medications Protocol Failed - 5/28/2022 11:35 AM        Failed - GFR  > 50     Lab Results   Component Value Date    GFRAA 48 (L) 09/14/2021                 Passed - CMP or BMP in past 12 months        Passed - Appointment in past 6 or next 3 months           HYDRALAZINE 100 MG Oral Tab [Pharmacy Med Name: HYDRALAZINE 100MG (HUNDRED MG) TABS] 30 tablet 0     Sig: TAKE 1 TABLET(100 MG) BY MOUTH EVERY 8 HOURS        Hypertensive Medications Protocol Failed - 5/28/2022 11:35 AM        Failed - GFR  > 50     Lab Results   Component Value Date    GFRAA 48 (L) 09/14/2021                 Passed - CMP or BMP in past 12 months        Passed - Appointment in past 6 or next 3 months           CLOPIDOGREL 75 MG Oral Tab [Pharmacy Med Name: CLOPIDOGREL 75MG TABLETS] 90 tablet 0     Sig: TAKE 1 TABLET(75 MG) BY MOUTH DAILY        There is no refill protocol information for this order        ALBUTEROL 108 (90 Base) MCG/ACT Inhalation Aero Soln [Pharmacy Med Name: ALBUTEROL HFA INH (200 PUFFS)8.5GM] 8.5 g 0     Sig: INHALE 1 PUFF INTO THE LUNGS EVERY 6 HOURS AS NEEDED FOR WHEEZING OR SHORTNESS OF BREATH        Asthma & COPD Medication Protocol Passed - 5/28/2022 11:35 AM        Passed - Appointment in past 6 or next 3 months           CARVEDILOL 3.125 MG Oral Tab [Pharmacy Med Name: CARVEDILOL 3.125MG TABLETS] 180 tablet 0     Sig: TAKE 1 TABLET(3.125 MG) BY MOUTH TWICE DAILY WITH MEALS        Hypertensive Medications Protocol Failed - 5/28/2022 11:35 AM        Failed - GFR  > 50     Lab Results   Component Value Date    GFRAA 48 (L) 09/14/2021                 Passed - CMP or BMP in past 12 months        Passed - Appointment in past 6 or next 3 months              Recent Outpatient Visits              1 week ago Back pain with radiation    [facility] Dyan Grossman MD    Office Visit    2 months ago     [address] [place], [place]    Office Visit    2 months ago Type 2 diabetes mellitus with diabetic polyneuropathy, without long-term current use of insulin [facility])    [facility], [address], [facility] [place] [place]    Office Visit    2 months ago Type 2 diabetes mellitus with hyperosmolarity without coma, with long-term current use of insulin [facility])    [address] Dorrie [place], [place]    Office Visit    2 months ago Pain in right buttock    [facility] Dyan Grossman MD    Office Visit            Future Appointments         Provider Department Appt Notes    In 4 days Jerry Shed, [address] [facility] Endocrinology     In 3 weeks CANDIDA Alonzo [address] follow up/ informed fo policies    In 2 months Adalgisa Chang, [phone] for Pain Management 3 month med eval   10/22/20

## 2022-06-01 RX ORDER — ALBUTEROL SULFATE 90 UG/1
1 AEROSOL, METERED RESPIRATORY (INHALATION) EVERY 6 HOURS PRN
Qty: 1 EACH | Refills: 3 | Status: SHIPPED | OUTPATIENT
Start: 2022-06-01

## 2022-06-01 NOTE — TELEPHONE ENCOUNTER
Pt called in to get refills she is out albuterol 108 (90 Base) MCG/ACT Inhalation Aero Soln and symbicort.  Please follow up

## 2022-06-01 NOTE — TELEPHONE ENCOUNTER
LOV: 12/3/2021  Last refill: 4/18/2022    *Aware Dr. Iliana Piña is out of the office. Dr. Lennox Auerbach review and sign pended prescription if agreeable.

## 2022-06-02 RX ORDER — HYDRALAZINE HYDROCHLORIDE 100 MG/1
100 TABLET, FILM COATED ORAL EVERY 8 HOURS
Qty: 270 TABLET | Refills: 1 | Status: SHIPPED | OUTPATIENT
Start: 2022-06-02

## 2022-06-02 RX ORDER — ATORVASTATIN CALCIUM 20 MG/1
TABLET, FILM COATED ORAL
Qty: 90 TABLET | Refills: 0 | OUTPATIENT
Start: 2022-06-02

## 2022-06-02 RX ORDER — CARVEDILOL 3.12 MG/1
TABLET ORAL
Qty: 180 TABLET | Refills: 0 | OUTPATIENT
Start: 2022-06-02

## 2022-06-02 NOTE — TELEPHONE ENCOUNTER
Hydralazine's SIG =three times a day, was sent total of 90 tablets only, pended medication with the right amount for approval.  Atorvastatin and carvedilol was just sent in 5/28/22. Please review; protocol failed/no protocol.      Requested Prescriptions   Pending Prescriptions Disp Refills    HYDRALAZINE 100 MG Oral Tab [Pharmacy Med Name: HYDRALAZINE 100MG (HUNDRED MG) TABS] 90 tablet 0     Sig: TAKE 1 TABLET BY MOUTH EVERY 8 HOURS( AT 6AM, 2PM, AND 10PM)        Hypertensive Medications Protocol Failed - 6/2/2022 10:28 AM        Failed - GFR  > 50     Lab Results   Component Value Date    GFRAA 48 (L) 09/14/2021                 Passed - CMP or BMP in past 12 months        Passed - Appointment in past 6 or next 3 months           CARVEDILOL 3.125 MG Oral Tab [Pharmacy Med Name: CARVEDILOL 3.125MG TABLETS] 180 tablet 0     Sig: TAKE 1 TABLET(3.125 MG) BY MOUTH TWICE DAILY WITH MEALS        Hypertensive Medications Protocol Failed - 6/2/2022 10:28 AM        Failed - GFR  > 50     Lab Results   Component Value Date    GFRAA 48 (L) 09/14/2021                 Passed - CMP or BMP in past 12 months        Passed - Appointment in past 6 or next 3 months           ATORVASTATIN 20 MG Oral Tab [Pharmacy Med Name: ATORVASTATIN 20MG TABLETS] 90 tablet 0     Sig: TAKE 1 TABLET(20 MG) BY MOUTH EVERY NIGHT        Cholesterol Medication Protocol Failed - 6/2/2022 10:28 AM        Failed - ALT in past 12 months        Failed - LDL in past 12 months        Failed - Last ALT < 80       Lab Results   Component Value Date    ALT 8 (L) 10/06/2020             Failed - Last LDL < 130     Lab Results   Component Value Date    LDL 57 03/18/2020               Passed - Appointment in past 12 or next 3 months               Recent Outpatient Visits              1 week ago Back pain with radiation    [facility] Lulu Mcghee MD    Office Visit    2 months ago     [place] Sylvester Brower    Office Visit    2 months ago Type 2 diabetes mellitus with diabetic polyneuropathy, without long-term current use of insulin [facility])    [facility], [address], [place], [place]    Office Visit    2 months ago Type 2 diabetes mellitus with hyperosmolarity without coma, with long-term current use of insulin [facility])    [facility], [facility], [facility]    Office Visit    2 months ago Pain in right buttock    [facility] Carlos A Archer MD    Office Visit             Future Appointments         Provider Department Appt Notes    In 3 weeks CANDIDA Rushing [address] follow up/ informed fo policies    In 2 months Tee Andersen MD [facility] 3 month med eval   10/22/20

## 2022-07-27 RX ORDER — LISINOPRIL 40 MG/1
TABLET ORAL
Qty: 90 TABLET | Refills: 1 | Status: SHIPPED | OUTPATIENT
Start: 2022-07-27

## 2022-07-28 RX ORDER — TIOTROPIUM BROMIDE 18 UG/1
CAPSULE ORAL; RESPIRATORY (INHALATION)
Qty: 30 CAPSULE | Refills: 2 | Status: SHIPPED | OUTPATIENT
Start: 2022-07-28

## 2022-08-09 RX ORDER — HYDROCODONE BITARTRATE AND ACETAMINOPHEN 10; 325 MG/1; MG/1
1 TABLET ORAL EVERY 6 HOURS PRN
Qty: 120 TABLET | Refills: 0 | Status: CANCELLED | OUTPATIENT
Start: 2022-10-24 | End: 2022-11-23

## 2022-08-23 ENCOUNTER — OFFICE VISIT (OUTPATIENT)
Dept: PAIN CLINIC | Facility: HOSPITAL | Age: 67
End: 2022-08-23
Attending: ANESTHESIOLOGY
Payer: MEDICAID

## 2022-08-23 VITALS — DIASTOLIC BLOOD PRESSURE: 72 MMHG | OXYGEN SATURATION: 96 % | SYSTOLIC BLOOD PRESSURE: 183 MMHG | HEART RATE: 79 BPM

## 2022-08-23 DIAGNOSIS — M79.18 PAIN IN RIGHT BUTTOCK: ICD-10-CM

## 2022-08-23 DIAGNOSIS — Z79.891 ENCOUNTER FOR MONITORING OPIOID MAINTENANCE THERAPY: ICD-10-CM

## 2022-08-23 DIAGNOSIS — M96.1 FAILED BACK SYNDROME OF LUMBAR SPINE: ICD-10-CM

## 2022-08-23 DIAGNOSIS — M54.9 BACK PAIN WITH RADIATION: Primary | ICD-10-CM

## 2022-08-23 DIAGNOSIS — Z51.81 ENCOUNTER FOR MONITORING OPIOID MAINTENANCE THERAPY: ICD-10-CM

## 2022-08-23 PROCEDURE — 99211 OFF/OP EST MAY X REQ PHY/QHP: CPT

## 2022-08-23 RX ORDER — PREGABALIN 25 MG/1
25 CAPSULE ORAL 2 TIMES DAILY
Qty: 60 CAPSULE | Refills: 1 | Status: SHIPPED | OUTPATIENT
Start: 2022-08-23

## 2022-08-23 RX ORDER — HYDROCODONE BITARTRATE AND ACETAMINOPHEN 10; 325 MG/1; MG/1
1 TABLET ORAL EVERY 6 HOURS PRN
Qty: 120 TABLET | Refills: 0 | Status: SHIPPED | OUTPATIENT
Start: 2022-08-25 | End: 2022-09-24

## 2022-08-23 RX ORDER — HYDROCODONE BITARTRATE AND ACETAMINOPHEN 10; 325 MG/1; MG/1
1 TABLET ORAL EVERY 6 HOURS PRN
Qty: 120 TABLET | Refills: 0 | Status: SHIPPED | OUTPATIENT
Start: 2022-09-24 | End: 2022-10-24

## 2022-08-23 NOTE — PROGRESS NOTES
PT presents ambulatory to the CPM.  PT states she is still having the same left sided back pain. The Chelle Pooja is still working but she thinks the Lyrica is too strong when taken with Norco.  6/10 pain that increases randomly.   ZAIRA Mike saw PT for med eval.  See notes for POC

## 2022-09-02 RX ORDER — CLOPIDOGREL BISULFATE 75 MG/1
75 TABLET ORAL DAILY
Qty: 90 TABLET | Refills: 1 | Status: SHIPPED | OUTPATIENT
Start: 2022-09-02

## 2022-09-02 RX ORDER — GLIPIZIDE 5 MG/1
TABLET ORAL
Qty: 180 TABLET | Refills: 0 | OUTPATIENT
Start: 2022-09-02

## 2022-09-02 NOTE — TELEPHONE ENCOUNTER
IT[employer] KKt message instructed patient to call her pharmacy and request to send the refill request for glipizide to Dr Trinidad Joséite office (prescriber ). Please review; protocol failed/no protocol.      Requested Prescriptions   Pending Prescriptions Disp Refills    CLOPIDOGREL 75 MG Oral Tab [Pharmacy Med Name: CLOPIDOGREL 75MG TABLETS] 90 tablet 0     Sig: TAKE 1 TABLET(75 MG) BY MOUTH DAILY        There is no refill protocol information for this order        GLIPIZIDE 5 MG Oral Tab [Pharmacy Med Name: GLIPIZIDE 5MG TABLETS] 180 tablet 0     Sig: TAKE 1 TABLET(5 MG) BY MOUTH TWICE DAILY BEFORE MEALS        Diabetes Medication Protocol Failed - 9/2/2022  2:14 PM        Failed - Last A1C < 7.5 and within past 6 months     Lab Results   Component Value Date    A1C 7.8 (A) 03/04/2022               Failed - GFR > 50     No results found for: EGFRCR              Failed - GFR in the past 12 months        Passed - In person appointment or virtual visit in the past 6 mos or appointment in next 3 mos       Recent Outpatient Visits              1 week ago Back pain with radiation    [facility] KARINE Packer    Office Visit    3 months ago Back pain with radiation    [facility] Kiara Donovan MD    Office Visit    5 months ago     [address] [place], [place]    Office Visit    5 months ago Type 2 diabetes mellitus with diabetic polyneuropathy, without long-term current use of insulin [facility])    [facility], [address], [place], [place]    Office Visit    5 months ago Type 2 diabetes mellitus with hyperosmolarity without coma, with long-term current use of insulin [facility])    [facility] [place] [facility] [place], [facility]    Office Visit     Future Appointments         Provider Department Appt Notes    In 1 month [facility] 2 month med eval   10/22/20                      Recent Outpatient Visits              1 week ago Back pain with radiation    [facility] KARINE Solomon    Office Visit    3 months ago Back pain with radiation    [facility] Richa Santos MD    Office Visit    5 months ago     [address] [place], [place]    Office Visit    5 months ago Type 2 diabetes mellitus with diabetic polyneuropathy, without long-term current use of insulin [facility])    [facility], [address], [place], [place]    Office Visit    5 months ago Type 2 diabetes mellitus with hyperosmolarity without coma, with long-term current use of insulin [facility])    [facility], [facility], Rubi Dunn MD    Office Visit             Future Appointments         Provider Department Appt Notes    In 1 month [facility] 2 month med eval   10/22/20

## 2022-09-11 NOTE — TELEPHONE ENCOUNTER
Spoke with patient, informed her of Kartik Noguera PA-C's result note/order. Patient verbalized understanding. States that shortness of breath has increased with walking short distances, productive cough, chest tightness, wheezing. Denies fever, chest pain. Has upcoming appointment with Katheryn Kinsey on 6/23/22. No

## 2022-09-20 RX ORDER — GLIPIZIDE 5 MG/1
TABLET ORAL
Qty: 180 TABLET | Refills: 0 | Status: SHIPPED | OUTPATIENT
Start: 2022-09-20

## 2022-10-05 RX ORDER — PREGABALIN 25 MG/1
25 CAPSULE ORAL 2 TIMES DAILY
Qty: 60 CAPSULE | Refills: 1 | Status: CANCELLED | OUTPATIENT
Start: 2022-11-04 | End: 2022-12-04

## 2022-10-17 ENCOUNTER — OFFICE VISIT (OUTPATIENT)
Dept: PAIN CLINIC | Facility: HOSPITAL | Age: 67
End: 2022-10-17
Attending: NURSE PRACTITIONER
Payer: MEDICARE

## 2022-10-17 ENCOUNTER — TELEPHONE (OUTPATIENT)
Dept: PAIN CLINIC | Facility: HOSPITAL | Age: 67
End: 2022-10-17

## 2022-10-17 VITALS
WEIGHT: 169 LBS | HEART RATE: 92 BPM | OXYGEN SATURATION: 96 % | SYSTOLIC BLOOD PRESSURE: 194 MMHG | HEIGHT: 64 IN | BODY MASS INDEX: 28.85 KG/M2 | DIASTOLIC BLOOD PRESSURE: 79 MMHG

## 2022-10-17 DIAGNOSIS — M54.42 LOW BACK PAIN DUE TO BILATERAL SCIATICA: ICD-10-CM

## 2022-10-17 DIAGNOSIS — Z51.81 ENCOUNTER FOR MONITORING OPIOID MAINTENANCE THERAPY: Primary | ICD-10-CM

## 2022-10-17 DIAGNOSIS — M54.41 LOW BACK PAIN DUE TO BILATERAL SCIATICA: ICD-10-CM

## 2022-10-17 DIAGNOSIS — M79.651 RIGHT THIGH PAIN: ICD-10-CM

## 2022-10-17 DIAGNOSIS — Z79.891 ENCOUNTER FOR MONITORING OPIOID MAINTENANCE THERAPY: Primary | ICD-10-CM

## 2022-10-17 DIAGNOSIS — M96.1 FAILED BACK SURGICAL SYNDROME: ICD-10-CM

## 2022-10-17 PROBLEM — E78.5 HYPERLIPIDEMIA: Status: ACTIVE | Noted: 2022-10-17

## 2022-10-17 PROCEDURE — 99211 OFF/OP EST MAY X REQ PHY/QHP: CPT

## 2022-10-17 RX ORDER — PREGABALIN 50 MG/1
50 CAPSULE ORAL 2 TIMES DAILY
Qty: 60 CAPSULE | Refills: 2 | Status: SHIPPED | OUTPATIENT
Start: 2022-10-17

## 2022-10-17 RX ORDER — HYDROCODONE BITARTRATE AND ACETAMINOPHEN 10; 325 MG/1; MG/1
1 TABLET ORAL EVERY 6 HOURS PRN
Qty: 120 TABLET | Refills: 0 | Status: SHIPPED | OUTPATIENT
Start: 2022-11-23 | End: 2022-12-23

## 2022-10-17 RX ORDER — HYDROCODONE BITARTRATE AND ACETAMINOPHEN 10; 325 MG/1; MG/1
1 TABLET ORAL EVERY 6 HOURS PRN
Qty: 120 TABLET | Refills: 0 | Status: SHIPPED | OUTPATIENT
Start: 2022-10-24 | End: 2022-11-23

## 2022-10-17 RX ORDER — PREGABALIN 100 MG/1
1 CAPSULE ORAL AS DIRECTED
COMMUNITY

## 2022-10-17 NOTE — PROGRESS NOTES
Pain Navigator    Pt presents ambulatory to the CPM.  Seen by George Monge  Here for med eval / refills

## 2022-10-17 NOTE — TELEPHONE ENCOUNTER
Pt presented w/[employer] insurance card. She's been notified that HMO requires a referral from PCP [facility]. Offered to r/s to 10/24, 10/21 or another date before refill due date of 10/24. Pt declined to r/s due to other appointments on those days. Pt aware that insurance mostly will not reimburse for this visit.

## 2022-10-20 RX ORDER — AMLODIPINE BESYLATE 10 MG/1
10 TABLET ORAL DAILY
Qty: 90 TABLET | Refills: 0 | Status: SHIPPED | OUTPATIENT
Start: 2022-10-20

## 2022-10-20 NOTE — TELEPHONE ENCOUNTER
Please review. Protocol failed / No protocol. Requested Prescriptions   Pending Prescriptions Disp Refills    AMLODIPINE 10 MG Oral Tab [Pharmacy Med Name: AMLODIPINE BESYLATE 10MG TABLETS] 90 tablet 0     Sig: TAKE 1 TABLET(10 MG) BY MOUTH DAILY        Hypertensive Medications Protocol Failed - 10/19/2022 12:26 PM        Failed - Last BP reading less than 140/90     BP Readings from Last 1 Encounters:  10/17/22 : (!) 194/79                Failed - CMP or BMP in past 6 months     No results found for this or any previous visit (from the past 4392 hour(s)).               Failed - In person appointment or virtual visit in the past 6 months       Recent Outpatient Visits              3 days ago Encounter for monitoring opioid maintenance therapy    [facility] KARINE Guillaume    Office Visit    1 month ago Back pain with radiation    [facility] KARINE Guillaume    Office Visit    5 months ago Back pain with radiation    [facility] Sheryl Walter MD    Office Visit    6 months ago     [address] [place], [place]    Office Visit    7 months ago Type 2 diabetes mellitus with diabetic polyneuropathy, without long-term current use of insulin [facility])    [facility], [address], [place], [place]    Office Visit                 Failed - EGFRCR or GFRAA > 50     GFR Evaluation              Passed - In person appointment in the past 12 or next 3 months       Recent Outpatient Visits              3 days ago Encounter for monitoring opioid maintenance therapy    [facility] KARINE Guillaume    Office Visit    1 month ago Back pain with radiation    [facility] KARINE Guillaume    Office Visit    5 months ago Back pain with radiation    [facility] Javier Estevez MD    Office Visit    6 months ago     [address] [place], [place]    Office Visit    7 months ago Type 2 diabetes mellitus with diabetic polyneuropathy, without long-term current use of insulin [facility])    [facility], [address], [facility] [place], [place]    Office Visit                           Recent Outpatient Visits              3 days ago Encounter for monitoring opioid maintenance therapy    [facility] KARINE Ruiz    Office Visit    1 month ago Back pain with radiation    [facility] KARINE Ruiz    Office Visit    5 months ago Back pain with radiation    [facility] Javier Estevez MD    Office Visit    6 months ago     [address] [place], [place]    Office Visit    7 months ago Type 2 diabetes mellitus with diabetic polyneuropathy, without long-term current use of insulin [facility])    [facility], [address], [address] [place] [address], [place], [place]    Office Visit

## 2022-11-17 ENCOUNTER — TELEPHONE (OUTPATIENT)
Dept: PAIN CLINIC | Facility: HOSPITAL | Age: 67
End: 2022-11-17

## 2022-12-05 ENCOUNTER — TELEPHONE (OUTPATIENT)
Dept: PAIN CLINIC | Facility: HOSPITAL | Age: 67
End: 2022-12-05

## 2022-12-05 NOTE — TELEPHONE ENCOUNTER
Returned patient's voicemail left on nurse line today at 10:40am regarding referral for upcoming appt with provider. Patient was given a referral from PCP for Dr. Clinton Sosa but patient is seeing Dolores MENESES. Advised patient to contact PCP and request they update to Dolores Bai. Patient was appreciative of the call and information.

## 2022-12-06 ENCOUNTER — TELEPHONE (OUTPATIENT)
Dept: PAIN CLINIC | Facility: HOSPITAL | Age: 67
End: 2022-12-06

## 2022-12-06 NOTE — TELEPHONE ENCOUNTER
Returned patient's voicemail left today at 9:18am on Main line regarding referral for 12/9/22 appt with APN. Patient stated she called PCP yesterday per our conversation and request rendering provider name be updated to reflect Dolores MENESES as it is currently showing Dr. Clinton Sosa. Per PCP, insurance will allow for referral to remain as Dr. Clinton Sosa and she can see Dolores MENESES as he is FRANN's attending provider. Advised patient that referral will be accepted as this is not uncommon but we are unable to guarantee benefits in behalf of any insurance. Patient appreciated the call back and information. She confirmed appt.

## 2022-12-09 ENCOUNTER — OFFICE VISIT (OUTPATIENT)
Dept: PAIN CLINIC | Facility: HOSPITAL | Age: 67
End: 2022-12-09
Attending: NURSE PRACTITIONER
Payer: MEDICARE

## 2022-12-09 VITALS — SYSTOLIC BLOOD PRESSURE: 170 MMHG | DIASTOLIC BLOOD PRESSURE: 73 MMHG | HEART RATE: 74 BPM | OXYGEN SATURATION: 96 %

## 2022-12-09 DIAGNOSIS — Z51.81 ENCOUNTER FOR MONITORING OPIOID MAINTENANCE THERAPY: Primary | ICD-10-CM

## 2022-12-09 DIAGNOSIS — M96.1 FAILED BACK SURGICAL SYNDROME: ICD-10-CM

## 2022-12-09 DIAGNOSIS — M54.9 BACK PAIN WITH RADIATION: ICD-10-CM

## 2022-12-09 DIAGNOSIS — Z79.891 ENCOUNTER FOR MONITORING OPIOID MAINTENANCE THERAPY: Primary | ICD-10-CM

## 2022-12-09 PROCEDURE — 99211 OFF/OP EST MAY X REQ PHY/QHP: CPT

## 2022-12-09 RX ORDER — PREGABALIN 50 MG/1
50 CAPSULE ORAL 2 TIMES DAILY
Qty: 60 CAPSULE | Refills: 2 | Status: SHIPPED | OUTPATIENT
Start: 2022-12-16 | End: 2023-01-15

## 2022-12-09 RX ORDER — METHYLPREDNISOLONE 4 MG/1
TABLET ORAL
Qty: 21 TABLET | Refills: 0 | Status: SHIPPED | OUTPATIENT
Start: 2022-12-09

## 2022-12-09 RX ORDER — HYDROCODONE BITARTRATE AND ACETAMINOPHEN 10; 325 MG/1; MG/1
1 TABLET ORAL EVERY 4 HOURS PRN
Qty: 150 TABLET | Refills: 0 | Status: SHIPPED | OUTPATIENT
Start: 2023-01-12 | End: 2023-02-11

## 2022-12-09 RX ORDER — HYDROCODONE BITARTRATE AND ACETAMINOPHEN 10; 325 MG/1; MG/1
1 TABLET ORAL EVERY 4 HOURS PRN
Qty: 150 TABLET | Refills: 0 | Status: SHIPPED | OUTPATIENT
Start: 2022-12-13 | End: 2023-01-12

## 2022-12-09 NOTE — PROGRESS NOTES
PT presents to the CPM with use of a straight cane. PT states over the last m/onth her pain has been more intense and a little more frequent. 8/10 pain that can increase randomly and in the morning when she wakes up. ZAIRA Edward saw PT for med eval.  See notes for POC.

## 2022-12-23 RX ORDER — GLIPIZIDE 5 MG/1
5 TABLET ORAL
Qty: 180 TABLET | Refills: 1 | Status: SHIPPED | OUTPATIENT
Start: 2022-12-23

## 2022-12-23 RX ORDER — ALBUTEROL SULFATE 90 UG/1
AEROSOL, METERED RESPIRATORY (INHALATION)
Qty: 8.5 G | Refills: 3 | Status: SHIPPED | OUTPATIENT
Start: 2022-12-23

## 2022-12-23 NOTE — TELEPHONE ENCOUNTER
LOV: 12/03/2021  Last refill: 6/1/22    Katie Walter PA-C-please review and sign pended prescription if agreeable.

## 2022-12-23 NOTE — TELEPHONE ENCOUNTER
Please review refill failed/no protocol     Requested Prescriptions     Pending Prescriptions Disp Refills    GLIPIZIDE 5 MG Oral Tab [Pharmacy Med Name: GLIPIZIDE 5MG TABLETS] 180 tablet 0     Sig: TAKE 1 TABLET(5 MG) BY MOUTH TWICE DAILY BEFORE MEALS         Recent Visits  Date Type Provider Dept   03/22/22 Office Visit Katie Oseguera MD [facility]   02/22/22 Office Visit Katie Oseguera MD [facility]   10/07/21 Office Visit MD [facility]   08/13/21 Office Visit Katie Oseguera MD [facility]   Showing recent visits within past 540 days with a meds authorizing provider and meeting all other requirements  Future Appointments  No visits were found meeting these conditions.   Showing future appointments within next 150 days with a meds authorizing provider and meeting all other requirements    Requested Prescriptions   Pending Prescriptions Disp Refills    GLIPIZIDE 5 MG Oral Tab [Pharmacy Med Name: GLIPIZIDE 5MG TABLETS] 180 tablet 0     Sig: TAKE 1 TABLET(5 MG) BY MOUTH TWICE DAILY BEFORE MEALS       Diabetes Medication Protocol Failed - 12/23/2022 11:16 AM        Failed - Last A1C < 7.5 and within past 6 months     Lab Results   Component Value Date    A1C 7.8 (A) 03/04/2022             Failed - In person appointment or virtual visit in the past 6 mos or appointment in next 3 mos     Recent Outpatient Visits              2 weeks ago Encounter for monitoring opioid maintenance therapy    [facility] KARINE Hoyt    Office Visit    2 months ago Encounter for monitoring opioid maintenance therapy    [facility] KARINE Hoyt    Office Visit    4 months ago Back pain with radiation    [facility]AR[facility]oyt    Office Visit    7 months ago Back pain with radiation    [facility] Yovanny Whitmore MD    Office Visit    9 months ago     [address] [place], [place]    Office Visit          Future Appointments         Provider Department Appt Notes    In 1 month Taryn Anthony, [address] [facility] 2 month med eval   10/22/20               Failed - EGFRCR or GFRAA > 50     GFR Evaluation            Failed - GFR in the past 12 months

## 2023-01-17 NOTE — TELEPHONE ENCOUNTER
Pls fax the clearance note , if we dont have cardio note yet call their office now and get the note , she has surgery tomorrow keep head dressing dry  do not get wet , may wash face in 48 hours

## 2023-02-10 ENCOUNTER — OFFICE VISIT (OUTPATIENT)
Dept: PAIN CLINIC | Facility: HOSPITAL | Age: 68
End: 2023-02-10
Attending: NURSE PRACTITIONER
Payer: MEDICARE

## 2023-02-10 VITALS
SYSTOLIC BLOOD PRESSURE: 181 MMHG | BODY MASS INDEX: 31.36 KG/M2 | HEART RATE: 73 BPM | HEIGHT: 63 IN | OXYGEN SATURATION: 98 % | DIASTOLIC BLOOD PRESSURE: 80 MMHG | WEIGHT: 177 LBS

## 2023-02-10 DIAGNOSIS — M54.9 BACK PAIN WITH RADIATION: ICD-10-CM

## 2023-02-10 DIAGNOSIS — Z51.81 ENCOUNTER FOR MONITORING OPIOID MAINTENANCE THERAPY: Primary | ICD-10-CM

## 2023-02-10 DIAGNOSIS — M96.1 FAILED BACK SURGICAL SYNDROME: ICD-10-CM

## 2023-02-10 DIAGNOSIS — Z79.891 ENCOUNTER FOR MONITORING OPIOID MAINTENANCE THERAPY: Primary | ICD-10-CM

## 2023-02-10 LAB
AMPHET UR QL SCN: NEGATIVE
BARBITURATES UR QL SCN: NEGATIVE
BENZODIAZ UR QL SCN: NEGATIVE
COCAINE UR QL: NEGATIVE
CREAT UR-SCNC: 32 MG/DL
MDMA UR QL SCN: NEGATIVE
METHADONE UR QL SCN: NEGATIVE
OXYCODONE UR QL SCN: NEGATIVE
PCP UR QL SCN: NEGATIVE

## 2023-02-10 PROCEDURE — 80349 CANNABINOIDS NATURAL: CPT | Performed by: NURSE PRACTITIONER

## 2023-02-10 PROCEDURE — 80307 DRUG TEST PRSMV CHEM ANLYZR: CPT | Performed by: NURSE PRACTITIONER

## 2023-02-10 PROCEDURE — 80361 OPIATES 1 OR MORE: CPT | Performed by: NURSE PRACTITIONER

## 2023-02-10 RX ORDER — HYDROCODONE BITARTRATE AND ACETAMINOPHEN 10; 325 MG/1; MG/1
1 TABLET ORAL EVERY 4 HOURS PRN
Qty: 150 TABLET | Refills: 0 | Status: SHIPPED | OUTPATIENT
Start: 2023-02-10 | End: 2023-03-12

## 2023-02-10 RX ORDER — HYDROCODONE BITARTRATE AND ACETAMINOPHEN 10; 325 MG/1; MG/1
1 TABLET ORAL EVERY 4 HOURS PRN
Qty: 150 TABLET | Refills: 0 | Status: SHIPPED | OUTPATIENT
Start: 2023-03-12 | End: 2023-04-11

## 2023-02-10 RX ORDER — PREGABALIN 50 MG/1
50 CAPSULE ORAL 2 TIMES DAILY
Qty: 60 CAPSULE | Refills: 2 | Status: CANCELLED | OUTPATIENT
Start: 2023-02-14 | End: 2023-03-16

## 2023-02-10 NOTE — PROGRESS NOTES
Pain Navigator    Pt presents ambulatory to the CPM.  Seen by Rj Fung  Here for a med eval / refills - no new issues or concerns    NA & UDS done today

## 2023-02-13 LAB — DRUG CONFIRMATION,CANNABINOIDS: <15 NG/ML

## 2023-02-15 LAB
OPIATES, UR, 6-ACETYLMORPHINE: <10 NG/ML
OPIATES, URINE, CODEINE: 2765 NG/ML
OPIATES, URINE, HYDROCODONE: 46 NG/ML
OPIATES, URINE, HYDROMORPHONE: <20 NG/ML
OPIATES, URINE, MORPHINE: 62 NG/ML
OPIATES, URINE, NORHYDROCODONE: 37 NG/ML
OPIATES, URINE, NOROXYCODONE: <20 NG/ML
OPIATES, URINE, NOROXYMORPHONE: <20 NG/ML
OPIATES, URINE, OXYCODONE: <20 NG/ML
OPIATES, URINE, OXYMORPHONE: <20 NG/ML

## 2023-03-13 RX ORDER — CLOPIDOGREL BISULFATE 75 MG/1
TABLET ORAL
Qty: 90 TABLET | Refills: 1 | Status: SHIPPED | OUTPATIENT
Start: 2023-03-13

## 2023-03-23 ENCOUNTER — APPOINTMENT (OUTPATIENT)
Dept: CT IMAGING | Facility: HOSPITAL | Age: 68
End: 2023-03-23
Attending: EMERGENCY MEDICINE
Payer: MEDICARE

## 2023-03-23 ENCOUNTER — HOSPITAL ENCOUNTER (INPATIENT)
Facility: HOSPITAL | Age: 68
LOS: 3 days | Discharge: HOME | End: 2023-03-26
Attending: EMERGENCY MEDICINE | Admitting: HOSPITALIST
Payer: MEDICARE

## 2023-03-23 ENCOUNTER — HOSPITAL ENCOUNTER (OUTPATIENT)
Facility: HOSPITAL | Age: 68
Setting detail: OBSERVATION
Discharge: HOME | End: 2023-03-26
Attending: EMERGENCY MEDICINE | Admitting: HOSPITALIST
Payer: MEDICARE

## 2023-03-23 DIAGNOSIS — U07.1 COVID-19: ICD-10-CM

## 2023-03-23 DIAGNOSIS — N30.00 ACUTE CYSTITIS WITHOUT HEMATURIA: Primary | ICD-10-CM

## 2023-03-23 DIAGNOSIS — K52.9 ILEITIS: ICD-10-CM

## 2023-03-23 PROBLEM — N39.0 UTI (URINARY TRACT INFECTION): Status: ACTIVE | Noted: 2023-03-23

## 2023-03-23 PROBLEM — N39.0 URINARY TRACT INFECTION: Status: ACTIVE | Noted: 2023-03-23

## 2023-03-23 PROBLEM — N39.0 URINARY TRACT INFECTION: Status: ACTIVE | Noted: 2023-01-01

## 2023-03-23 PROBLEM — N39.0 UTI (URINARY TRACT INFECTION): Status: ACTIVE | Noted: 2023-01-01

## 2023-03-23 LAB
ALBUMIN SERPL-MCNC: 3.2 G/DL (ref 3.4–5)
ALP LIVER SERPL-CCNC: 91 U/L
ALT SERPL-CCNC: 15 U/L
ANION GAP SERPL CALC-SCNC: 10 MMOL/L (ref 0–18)
AST SERPL-CCNC: 19 U/L (ref 15–37)
BASOPHILS # BLD AUTO: 0.1 X10(3) UL (ref 0–0.2)
BASOPHILS NFR BLD AUTO: 0.8 %
BILIRUB DIRECT SERPL-MCNC: <0.1 MG/DL (ref 0–0.2)
BILIRUB SERPL-MCNC: 0.3 MG/DL (ref 0.1–2)
BILIRUB UR QL: NEGATIVE
BUN BLD-MCNC: 29 MG/DL (ref 7–18)
BUN/CREAT SERPL: 16.3 (ref 10–20)
CALCIUM BLD-MCNC: 9.3 MG/DL (ref 8.5–10.1)
CHLORIDE SERPL-SCNC: 112 MMOL/L (ref 98–112)
CO2 SERPL-SCNC: 20 MMOL/L (ref 21–32)
COLOR UR: YELLOW
CREAT BLD-MCNC: 1.78 MG/DL
DEPRECATED RDW RBC AUTO: 43.2 FL (ref 35.1–46.3)
EOSINOPHIL # BLD AUTO: 0.07 X10(3) UL (ref 0–0.7)
EOSINOPHIL NFR BLD AUTO: 0.6 %
ERYTHROCYTE [DISTWIDTH] IN BLOOD BY AUTOMATED COUNT: 13.9 % (ref 11–15)
EST. AVERAGE GLUCOSE BLD GHB EST-MCNC: 128 MG/DL (ref 68–126)
GFR SERPLBLD BASED ON 1.73 SQ M-ARVRAT: 31 ML/MIN/1.73M2 (ref 60–?)
GLUCOSE BLD-MCNC: 265 MG/DL (ref 70–99)
GLUCOSE BLDC GLUCOMTR-MCNC: 181 MG/DL (ref 70–99)
GLUCOSE BLDC GLUCOMTR-MCNC: 184 MG/DL (ref 70–99)
GLUCOSE BLDC GLUCOMTR-MCNC: 229 MG/DL (ref 70–99)
GLUCOSE UR-MCNC: NEGATIVE MG/DL
HBA1C MFR BLD: 6.1 % (ref ?–5.7)
HCT VFR BLD AUTO: 36.5 %
HGB BLD-MCNC: 12.1 G/DL
IMM GRANULOCYTES # BLD AUTO: 0.05 X10(3) UL (ref 0–1)
IMM GRANULOCYTES NFR BLD: 0.4 %
KETONES UR-MCNC: NEGATIVE MG/DL
LACTATE SERPL-SCNC: 0.7 MMOL/L (ref 0.4–2)
LYMPHOCYTES # BLD AUTO: 1.88 X10(3) UL (ref 1–4)
LYMPHOCYTES NFR BLD AUTO: 15.1 %
MCH RBC QN AUTO: 28 PG (ref 26–34)
MCHC RBC AUTO-ENTMCNC: 33.2 G/DL (ref 31–37)
MCV RBC AUTO: 84.5 FL
MONOCYTES # BLD AUTO: 0.79 X10(3) UL (ref 0.1–1)
MONOCYTES NFR BLD AUTO: 6.4 %
NEUTROPHILS # BLD AUTO: 9.55 X10 (3) UL (ref 1.5–7.7)
NEUTROPHILS # BLD AUTO: 9.55 X10(3) UL (ref 1.5–7.7)
NEUTROPHILS NFR BLD AUTO: 76.7 %
NITRITE UR QL STRIP.AUTO: NEGATIVE
OSMOLALITY SERPL CALC.SUM OF ELEC: 309 MOSM/KG (ref 275–295)
PH UR: 6 [PH] (ref 5–8)
PLATELET # BLD AUTO: 317 10(3)UL (ref 150–450)
POTASSIUM SERPL-SCNC: 3.5 MMOL/L (ref 3.5–5.1)
PROT SERPL-MCNC: 7.6 G/DL (ref 6.4–8.2)
PROT UR-MCNC: 100 MG/DL
RBC # BLD AUTO: 4.32 X10(6)UL
RBC #/AREA URNS AUTO: >10 /HPF
SARS-COV-2 RNA RESP QL NAA+PROBE: DETECTED
SODIUM SERPL-SCNC: 142 MMOL/L (ref 136–145)
SP GR UR STRIP: >1.03 (ref 1–1.03)
UROBILINOGEN UR STRIP-ACNC: <2
VIT C UR-MCNC: NEGATIVE MG/DL
WBC # BLD AUTO: 12.4 X10(3) UL (ref 4–11)
WBC #/AREA URNS AUTO: >50 /HPF
WBC CLUMPS UR QL AUTO: PRESENT /HPF

## 2023-03-23 PROCEDURE — 99223 1ST HOSP IP/OBS HIGH 75: CPT | Performed by: INTERNAL MEDICINE

## 2023-03-23 PROCEDURE — 74177 CT ABD & PELVIS W/CONTRAST: CPT | Performed by: EMERGENCY MEDICINE

## 2023-03-23 PROCEDURE — 99223 1ST HOSP IP/OBS HIGH 75: CPT | Performed by: HOSPITALIST

## 2023-03-23 RX ORDER — MORPHINE SULFATE 2 MG/ML
2 INJECTION, SOLUTION INTRAMUSCULAR; INTRAVENOUS EVERY 2 HOUR PRN
Status: DISCONTINUED | OUTPATIENT
Start: 2023-03-23 | End: 2023-03-26

## 2023-03-23 RX ORDER — TEMAZEPAM 15 MG/1
15 CAPSULE ORAL NIGHTLY PRN
Status: DISCONTINUED | OUTPATIENT
Start: 2023-03-23 | End: 2023-03-26

## 2023-03-23 RX ORDER — IPRATROPIUM BROMIDE AND ALBUTEROL SULFATE 2.5; .5 MG/3ML; MG/3ML
3 SOLUTION RESPIRATORY (INHALATION) EVERY 6 HOURS PRN
Status: DISCONTINUED | OUTPATIENT
Start: 2023-03-23 | End: 2023-03-26

## 2023-03-23 RX ORDER — METOCLOPRAMIDE HYDROCHLORIDE 5 MG/ML
5 INJECTION INTRAMUSCULAR; INTRAVENOUS EVERY 8 HOURS PRN
Status: DISCONTINUED | OUTPATIENT
Start: 2023-03-23 | End: 2023-03-26

## 2023-03-23 RX ORDER — HYDRALAZINE HYDROCHLORIDE 100 MG/1
100 TABLET, FILM COATED ORAL EVERY 8 HOURS
Status: DISCONTINUED | OUTPATIENT
Start: 2023-03-23 | End: 2023-03-26

## 2023-03-23 RX ORDER — HYDROCODONE BITARTRATE AND ACETAMINOPHEN 10; 325 MG/1; MG/1
1 TABLET ORAL EVERY 4 HOURS PRN
Status: DISCONTINUED | OUTPATIENT
Start: 2023-03-23 | End: 2023-03-24

## 2023-03-23 RX ORDER — NICOTINE POLACRILEX 4 MG
30 LOZENGE BUCCAL
Status: DISCONTINUED | OUTPATIENT
Start: 2023-03-23 | End: 2023-03-26

## 2023-03-23 RX ORDER — MORPHINE SULFATE 4 MG/ML
4 INJECTION, SOLUTION INTRAMUSCULAR; INTRAVENOUS ONCE
Status: COMPLETED | OUTPATIENT
Start: 2023-03-23 | End: 2023-03-23

## 2023-03-23 RX ORDER — ONDANSETRON 2 MG/ML
4 INJECTION INTRAMUSCULAR; INTRAVENOUS ONCE
Status: COMPLETED | OUTPATIENT
Start: 2023-03-23 | End: 2023-03-23

## 2023-03-23 RX ORDER — CIPROFLOXACIN 2 MG/ML
400 INJECTION, SOLUTION INTRAVENOUS ONCE
Status: DISCONTINUED | OUTPATIENT
Start: 2023-03-23 | End: 2023-03-24

## 2023-03-23 RX ORDER — METRONIDAZOLE 500 MG/100ML
500 INJECTION, SOLUTION INTRAVENOUS ONCE
Status: COMPLETED | OUTPATIENT
Start: 2023-03-23 | End: 2023-03-23

## 2023-03-23 RX ORDER — NICOTINE POLACRILEX 4 MG
15 LOZENGE BUCCAL
Status: DISCONTINUED | OUTPATIENT
Start: 2023-03-23 | End: 2023-03-26

## 2023-03-23 RX ORDER — HEPARIN SODIUM 5000 [USP'U]/ML
5000 INJECTION, SOLUTION INTRAVENOUS; SUBCUTANEOUS EVERY 12 HOURS SCHEDULED
Status: DISCONTINUED | OUTPATIENT
Start: 2023-03-23 | End: 2023-03-26

## 2023-03-23 RX ORDER — METRONIDAZOLE 500 MG/100ML
500 INJECTION, SOLUTION INTRAVENOUS EVERY 8 HOURS
Status: DISCONTINUED | OUTPATIENT
Start: 2023-03-23 | End: 2023-03-24

## 2023-03-23 RX ORDER — AMLODIPINE BESYLATE 10 MG/1
10 TABLET ORAL DAILY
Status: DISCONTINUED | OUTPATIENT
Start: 2023-03-23 | End: 2023-03-26

## 2023-03-23 RX ORDER — ONDANSETRON 2 MG/ML
4 INJECTION INTRAMUSCULAR; INTRAVENOUS EVERY 6 HOURS PRN
Status: DISCONTINUED | OUTPATIENT
Start: 2023-03-23 | End: 2023-03-26

## 2023-03-23 RX ORDER — ALBUTEROL SULFATE 90 UG/1
1 AEROSOL, METERED RESPIRATORY (INHALATION) EVERY 6 HOURS PRN
Status: DISCONTINUED | OUTPATIENT
Start: 2023-03-23 | End: 2023-03-26

## 2023-03-23 RX ORDER — SODIUM CHLORIDE 9 MG/ML
INJECTION, SOLUTION INTRAVENOUS CONTINUOUS
Status: DISCONTINUED | OUTPATIENT
Start: 2023-03-23 | End: 2023-03-26

## 2023-03-23 RX ORDER — MORPHINE SULFATE 4 MG/ML
4 INJECTION, SOLUTION INTRAMUSCULAR; INTRAVENOUS EVERY 2 HOUR PRN
Status: DISCONTINUED | OUTPATIENT
Start: 2023-03-23 | End: 2023-03-26

## 2023-03-23 RX ORDER — CARVEDILOL 3.12 MG/1
3.12 TABLET ORAL 2 TIMES DAILY WITH MEALS
Status: DISCONTINUED | OUTPATIENT
Start: 2023-03-23 | End: 2023-03-26

## 2023-03-23 RX ORDER — FLUTICASONE FUROATE AND VILANTEROL 200; 25 UG/1; UG/1
1 POWDER RESPIRATORY (INHALATION) DAILY
Status: DISCONTINUED | OUTPATIENT
Start: 2023-03-23 | End: 2023-03-26

## 2023-03-23 RX ORDER — MORPHINE SULFATE 2 MG/ML
1 INJECTION, SOLUTION INTRAMUSCULAR; INTRAVENOUS EVERY 2 HOUR PRN
Status: DISCONTINUED | OUTPATIENT
Start: 2023-03-23 | End: 2023-03-26

## 2023-03-23 RX ORDER — INSULIN GLARGINE 100 [IU]/ML
34 INJECTION, SOLUTION SUBCUTANEOUS NIGHTLY
COMMUNITY

## 2023-03-23 RX ORDER — DEXTROSE MONOHYDRATE 25 G/50ML
50 INJECTION, SOLUTION INTRAVENOUS
Status: DISCONTINUED | OUTPATIENT
Start: 2023-03-23 | End: 2023-03-26

## 2023-03-23 RX ORDER — ATORVASTATIN CALCIUM 20 MG/1
20 TABLET, FILM COATED ORAL NIGHTLY
Status: DISCONTINUED | OUTPATIENT
Start: 2023-03-23 | End: 2023-03-26

## 2023-03-23 NOTE — PLAN OF CARE
Patient admitted from ER. From home with son. Med rec is completed. [facility] is at patient bedside. Vital signs are stable at this time, continuing to monitor. Safety precautions are in place. Call light is within reach with return demonstration performed. Problem: Patient Centered Care  Goal: Patient preferences are identified and integrated in the patient's plan of care  Description: Interventions:  - What would you like us to know as we care for you?  From home with son  - Provide timely, complete, and accurate information to patient/family  - Incorporate patient and family knowledge, values, beliefs, and cultural backgrounds into the planning and delivery of care  - Encourage patient/family to participate in care and decision-making at the level they choose  - Honor patient and family perspectives and choices  Outcome: Progressing     Problem: Diabetes/Glucose Control  Goal: Glucose maintained within prescribed range  Description: INTERVENTIONS:  - Monitor Blood Glucose as ordered  - Assess for signs and symptoms of hyperglycemia and hypoglycemia  - Administer ordered medications to maintain glucose within target range  - Assess barriers to adequate nutritional intake and initiate nutrition consult as needed  - Instruct patient on self management of diabetes  Outcome: Progressing     Problem: Patient/Family Goals  Goal: Patient/Family Long Term Goal  Description: Patient's Long Term Goal: discharge    Interventions:  - no pain, adhere to medication regimen  - See additional Care Plan goals for specific interventions  Outcome: Progressing  Goal: Patient/Family Short Term Goal  Description: Patient's Short Term Goal: feel better, no pain, go home    Interventions:   - advance diet as tolerated, adhere to medication regimen   - See additional Care Plan goals for specific interventions  Outcome: Progressing

## 2023-03-23 NOTE — ED QUICK NOTES
Orders for admission, patient is aware of plan and ready to go upstairs. Any questions, please call ED RN [address] at extension [phone].      Patient Covid vaccination status: Fully vaccinated     COVID Test Ordered in ED: Rapid SARS-CoV-2 by PCR    COVID Suspicion at Admission: N/A    Running Infusions:  None    Mental Status/LOC at time of transport: aox4    Other pertinent information:   CIWA score: N/A   NIH score:  N/A

## 2023-03-23 NOTE — H&P
[facility]    PATIENT'S NAME: Jose Mosqueda   ATTENDING PHYSICIAN: Johan Sargent MD   PATIENT ACCOUNT#:   [id number]    LOCATION:  [address] [place] [address] [address] RECORD #:   [id number]       YOB: 1955  ADMISSION DATE:       03/23/2023    HISTORY AND PHYSICAL EXAMINATION    CHIEF COMPLAINT:  Abdominal pain, urinary tract infection, and possible ileitis, possible peptic ulcer disease. HISTORY OF PRESENT ILLNESS:  The patient is a 79-year-old [facility] American female who came into the emergency department for evaluation of bilateral lower quadrant abdominal pain and mid abdominal cramps since Monday, 4 days ago. No loose stool or diarrhea. Denies any dysuria. CBC today shows white blood cell count of 12.4 with a left shift. Chemistry showed GFR of 31 and bicarb 20, numbers at baseline; glucose 265. Urinalysis suggestive of urinary tract infection. Liver function tests still pending. COVID testing was positive. Lactic acid was negative. Blood cultures were obtained. CT scan of the abdomen showed a 10 cm segment of ileitis involving the mid to distal ileum. Abnormal urothelial thickening involving visualized pelvic and caliceal systems and ureter, could be ascending urinary tract infection. The patient was started on IV Rocephin and Flagyl. Also was noted on CT scan to be post-cholecystectomy with slightly more pronounced dilation of extrahepatic biliary tree. PAST MEDICAL HISTORY:  Diabetes mellitus type 2, chronic kidney disease stage 3 and possible underlying renal tubular acidosis type 4 with chronic metabolic acidosis, hypertension, hyperlipidemia, peripheral arterial disease, status post bilateral superficial femoral artery stents, chronic obstructive pulmonary disease, sickle cell trait, generalized osteoarthritis, and degenerative joint disease of lumbar spine.     PAST SURGICAL HISTORY:  Lumbar laminectomy and fusion twice, cholecystectomy, right buttock abscess I and D procedure, remote . MEDICATIONS:  Please see medication reconciliation list.     ALLERGIES:  Four years ago, she reported that she developed rash around both eyes after she received penicillin, but she is not able to give me any further details. FAMILY HISTORY:  Positive for diabetes mellitus, hypertension, and chronic kidney disease. SOCIAL HISTORY:  Ex-tobacco user. No current tobacco, alcohol, or drug use. Lives with her family. Independent for basic activities of daily living. REVIEW OF SYSTEMS:  The patient has had bilateral lower quadrant abdominal pain, more crampy, associated with poor appetite. Anything she tried to eat, she has been having nausea and vomiting, and she will throw up almost immediately. No fever or chills. No dysuria. She does have chronic back pain. Other 12-point review of systems is negative. The patient reported that she takes ibuprofen at least twice daily for arthritic pain. PHYSICAL EXAMINATION:    GENERAL:  Alert and oriented to time, place, and person. Moderate distress. VITAL SIGNS:  Temperature 97.0, pulse 58, respiratory rate 16, blood pressure 152/71, pulse ox 96% on room air. HEENT:  Atraumatic. Oropharynx clear. Moist mucous membranes. Ears and nose normal.  Eyes:  Anicteric sclerae. NECK:  Supple. No lymphadenopathy. Trachea midline. Full range of motion. LUNGS:  Clear to auscultation bilaterally. Normal respiratory effort. HEART:  Regular rate and rhythm. S1 and S2 auscultated. No murmur. ABDOMEN:  Soft, nondistended. Discomfort to palpation in mid abdomen and generalized abdominal exam.  No guarding or rebound tenderness. EXTREMITIES:  No peripheral edema, clubbing, or cyanosis. NEUROLOGIC:  Motor and sensory intact. ASSESSMENT:    1. Abdominal pain, nausea and vomiting. CT scan showing distal ileitis. Symptoms could be suggestive of NSAID-induced peptic ulcer disease or even terminal ileum ulcerations. Rule out ischemic changes. 2.   Urinary tract infection. 3.   Diabetes mellitus type 2.  4.   Chronic kidney disease stage 3. PLAN:  The patient will be admitted to general medical floor. Clear liquid diet. IV fluids. IV Rocephin and Flagyl, plus Protonix. Gastroenterology consult. Follow up on urine and blood cultures. Monitor hemodynamic status. Follow up on liver function tests. Further recommendations to follow.       Dictated By Darylene Putt, MD  d: 03/23/2023 11:08:58  t: 03/23/2023 12:33:09  [facility] [id number]  FB/

## 2023-03-24 LAB
ANION GAP SERPL CALC-SCNC: 10 MMOL/L (ref 0–18)
BASOPHILS # BLD AUTO: 0.07 X10(3) UL (ref 0–0.2)
BASOPHILS NFR BLD AUTO: 0.8 %
BUN BLD-MCNC: 26 MG/DL (ref 7–18)
BUN/CREAT SERPL: 17.1 (ref 10–20)
CALCIUM BLD-MCNC: 8.3 MG/DL (ref 8.5–10.1)
CHLORIDE SERPL-SCNC: 115 MMOL/L (ref 98–112)
CO2 SERPL-SCNC: 19 MMOL/L (ref 21–32)
CREAT BLD-MCNC: 1.52 MG/DL
DEPRECATED RDW RBC AUTO: 43.5 FL (ref 35.1–46.3)
EOSINOPHIL # BLD AUTO: 0.2 X10(3) UL (ref 0–0.7)
EOSINOPHIL NFR BLD AUTO: 2.2 %
ERYTHROCYTE [DISTWIDTH] IN BLOOD BY AUTOMATED COUNT: 13.9 % (ref 11–15)
GFR SERPLBLD BASED ON 1.73 SQ M-ARVRAT: 38 ML/MIN/1.73M2 (ref 60–?)
GLUCOSE BLD-MCNC: 259 MG/DL (ref 70–99)
GLUCOSE BLDC GLUCOMTR-MCNC: 144 MG/DL (ref 70–99)
GLUCOSE BLDC GLUCOMTR-MCNC: 198 MG/DL (ref 70–99)
GLUCOSE BLDC GLUCOMTR-MCNC: 235 MG/DL (ref 70–99)
GLUCOSE BLDC GLUCOMTR-MCNC: 296 MG/DL (ref 70–99)
HCT VFR BLD AUTO: 28.7 %
HGB BLD-MCNC: 9.4 G/DL
IMM GRANULOCYTES # BLD AUTO: 0.03 X10(3) UL (ref 0–1)
IMM GRANULOCYTES NFR BLD: 0.3 %
LYMPHOCYTES # BLD AUTO: 1.88 X10(3) UL (ref 1–4)
LYMPHOCYTES NFR BLD AUTO: 20.9 %
MCH RBC QN AUTO: 28 PG (ref 26–34)
MCHC RBC AUTO-ENTMCNC: 32.8 G/DL (ref 31–37)
MCV RBC AUTO: 85.4 FL
MONOCYTES # BLD AUTO: 0.61 X10(3) UL (ref 0.1–1)
MONOCYTES NFR BLD AUTO: 6.8 %
NEUTROPHILS # BLD AUTO: 6.19 X10 (3) UL (ref 1.5–7.7)
NEUTROPHILS # BLD AUTO: 6.19 X10(3) UL (ref 1.5–7.7)
NEUTROPHILS NFR BLD AUTO: 69 %
OSMOLALITY SERPL CALC.SUM OF ELEC: 312 MOSM/KG (ref 275–295)
PLATELET # BLD AUTO: 228 10(3)UL (ref 150–450)
POTASSIUM SERPL-SCNC: 3.3 MMOL/L (ref 3.5–5.1)
RBC # BLD AUTO: 3.36 X10(6)UL
SODIUM SERPL-SCNC: 144 MMOL/L (ref 136–145)
WBC # BLD AUTO: 9 X10(3) UL (ref 4–11)

## 2023-03-24 PROCEDURE — 99233 SBSQ HOSP IP/OBS HIGH 50: CPT | Performed by: HOSPITALIST

## 2023-03-24 PROCEDURE — 99232 SBSQ HOSP IP/OBS MODERATE 35: CPT | Performed by: NURSE PRACTITIONER

## 2023-03-24 RX ORDER — ACETAMINOPHEN 325 MG/1
650 TABLET ORAL EVERY 4 HOURS PRN
Status: DISCONTINUED | OUTPATIENT
Start: 2023-03-24 | End: 2023-03-26

## 2023-03-24 RX ORDER — PANTOPRAZOLE SODIUM 40 MG/1
40 TABLET, DELAYED RELEASE ORAL
Status: DISCONTINUED | OUTPATIENT
Start: 2023-03-24 | End: 2023-03-24

## 2023-03-24 RX ORDER — HYDROCODONE BITARTRATE AND ACETAMINOPHEN 5; 325 MG/1; MG/1
1 TABLET ORAL EVERY 4 HOURS PRN
Status: DISCONTINUED | OUTPATIENT
Start: 2023-03-24 | End: 2023-03-26

## 2023-03-24 RX ORDER — HYDROCODONE BITARTRATE AND ACETAMINOPHEN 10; 325 MG/1; MG/1
1 TABLET ORAL EVERY 4 HOURS PRN
Status: DISCONTINUED | OUTPATIENT
Start: 2023-03-24 | End: 2023-03-26

## 2023-03-24 RX ORDER — PANTOPRAZOLE SODIUM 40 MG/1
40 TABLET, DELAYED RELEASE ORAL
Status: DISCONTINUED | OUTPATIENT
Start: 2023-03-25 | End: 2023-03-26

## 2023-03-24 RX ORDER — POTASSIUM CHLORIDE 20 MEQ/1
40 TABLET, EXTENDED RELEASE ORAL ONCE
Status: COMPLETED | OUTPATIENT
Start: 2023-03-24 | End: 2023-03-24

## 2023-03-24 NOTE — PLAN OF CARE
Problem: Patient Centered Care  Goal: Patient preferences are identified and integrated in the patient's plan of care  Description: Interventions:  - What would you like us to know as we care for you?  From home with son  - Provide timely, complete, and accurate information to patient/family  - Incorporate patient and family knowledge, values, beliefs, and cultural backgrounds into the planning and delivery of care  - Encourage patient/family to participate in care and decision-making at the level they choose  - Honor patient and family perspectives and choices  Outcome: Progressing     Problem: Diabetes/Glucose Control  Goal: Glucose maintained within prescribed range  Description: INTERVENTIONS:  - Monitor Blood Glucose as ordered  - Assess for signs and symptoms of hyperglycemia and hypoglycemia  - Administer ordered medications to maintain glucose within target range  - Assess barriers to adequate nutritional intake and initiate nutrition consult as needed  - Instruct patient on self management of diabetes  Outcome: Progressing     Problem: Patient/Family Goals  Goal: Patient/Family Long Term Goal  Description: Patient's Long Term Goal: To go home    Interventions:  - Monitor vital signs and labs  - Diagnostics per order  - Follow MD orders  - Administer medications as ordered  - Update patient and family on plan of care  - Discharge planning  - See additional Care Plan goals for specific interventions  Outcome: Progressing  Goal: Patient/Family Short Term Goal  Description: Patient's Short Term Goal: To feel better    Interventions:   - Monitor vital signs and labs  - Monitor blood glucose levels  - Pain management as needed  - Diagnostics per order  - Follow MD orders  - Administer medications as ordered  - Assists with activities of daily living   - Update patient and family on plan of care  - Discharge planning  - See additional Care Plan goals for specific interventions  Outcome: Progressing     Problem: GASTROINTESTINAL - ADULT  Goal: Minimal or absence of nausea and vomiting  Description: INTERVENTIONS:  - Maintain adequate hydration with IV or PO as ordered and tolerated  - Nasogastric tube to low intermittent suction as ordered  - Evaluate effectiveness of ordered antiemetic medications  - Provide nonpharmacologic comfort measures as appropriate  - Advance diet as tolerated, if ordered  - Obtain nutritional consult as needed  - Evaluate fluid balance  Outcome: Progressing  Goal: Maintains adequate nutritional intake (undernourished)  Description: INTERVENTIONS:  - Monitor percentage of each meal consumed  - Identify factors contributing to decreased intake, treat as appropriate  - Assist with meals as needed  - Monitor I&O, WT and lab values  - Obtain nutritional consult as needed  - Optimize oral hygiene and moisture  - Encourage food from home; allow for food preferences  - Enhance eating environment  Outcome: Progressing     Problem: METABOLIC/FLUID AND ELECTROLYTES - ADULT  Goal: Glucose maintained within prescribed range  Description: INTERVENTIONS:  - Monitor Blood Glucose as ordered  - Assess for signs and symptoms of hyperglycemia and hypoglycemia  - Administer ordered medications to maintain glucose within target range  - Assess barriers to adequate nutritional intake and initiate nutrition consult as needed  - Instruct patient on self management of diabetes  Outcome: Progressing     Vital signs stable at this time. Monitoring blood glucose levels. No acute changes noted at this moment. Fall precautions in place- bed locked in lowest position. Call light within reach. Frequent rounding by nursing staff.

## 2023-03-24 NOTE — PLAN OF CARE
Received pt AOX4 and up with SBA and cane. C/O abd and back pain 8/10. VSS on RA. Meds given as ordered. Educated pt on importance of calling for assistance and updated on POC. PRN pain meds given per order, see MAR and reassessments. COVID precautions maintained. Call light within reach. Will continue to monitor. Problem: Patient Centered Care  Goal: Patient preferences are identified and integrated in the patient's plan of care  Description: Interventions:  - What would you like us to know as we care for you?  From home with son  - Provide timely, complete, and accurate information to patient/family  - Incorporate patient and family knowledge, values, beliefs, and cultural backgrounds into the planning and delivery of care  - Encourage patient/family to participate in care and decision-making at the level they choose  - Honor patient and family perspectives and choices  Outcome: Progressing     Problem: Diabetes/Glucose Control  Goal: Glucose maintained within prescribed range  Description: INTERVENTIONS:  - Monitor Blood Glucose as ordered  - Assess for signs and symptoms of hyperglycemia and hypoglycemia  - Administer ordered medications to maintain glucose within target range  - Assess barriers to adequate nutritional intake and initiate nutrition consult as needed  - Instruct patient on self management of diabetes  Outcome: Progressing     Problem: GASTROINTESTINAL - ADULT  Goal: Minimal or absence of nausea and vomiting  Description: INTERVENTIONS:  - Maintain adequate hydration with IV or PO as ordered and tolerated  - Nasogastric tube to low intermittent suction as ordered  - Evaluate effectiveness of ordered antiemetic medications  - Provide nonpharmacologic comfort measures as appropriate  - Advance diet as tolerated, if ordered  - Obtain nutritional consult as needed  - Evaluate fluid balance  Outcome: Progressing  Goal: Maintains adequate nutritional intake (undernourished)  Description: INTERVENTIONS:  - Monitor percentage of each meal consumed  - Identify factors contributing to decreased intake, treat as appropriate  - Assist with meals as needed  - Monitor I&O, WT and lab values  - Obtain nutritional consult as needed  - Optimize oral hygiene and moisture  - Encourage food from home; allow for food preferences  - Enhance eating environment  Outcome: Progressing     Problem: METABOLIC/FLUID AND ELECTROLYTES - ADULT  Goal: Glucose maintained within prescribed range  Description: INTERVENTIONS:  - Monitor Blood Glucose as ordered  - Assess for signs and symptoms of hyperglycemia and hypoglycemia  - Administer ordered medications to maintain glucose within target range  - Assess barriers to adequate nutritional intake and initiate nutrition consult as needed  - Instruct patient on self management of diabetes  Outcome: Progressing

## 2023-03-25 LAB
GLUCOSE BLDC GLUCOMTR-MCNC: 114 MG/DL (ref 70–99)
GLUCOSE BLDC GLUCOMTR-MCNC: 178 MG/DL (ref 70–99)
GLUCOSE BLDC GLUCOMTR-MCNC: 224 MG/DL (ref 70–99)
GLUCOSE BLDC GLUCOMTR-MCNC: 379 MG/DL (ref 70–99)
POTASSIUM SERPL-SCNC: 3.6 MMOL/L (ref 3.5–5.1)

## 2023-03-25 PROCEDURE — 99233 SBSQ HOSP IP/OBS HIGH 50: CPT | Performed by: HOSPITALIST

## 2023-03-25 RX ORDER — CEFAZOLIN SODIUM/WATER 2 G/20 ML
2 SYRINGE (ML) INTRAVENOUS EVERY 8 HOURS
Status: DISCONTINUED | OUTPATIENT
Start: 2023-03-25 | End: 2023-03-25

## 2023-03-25 NOTE — PROGRESS NOTES
[facility] Pharmacy Note:  Renal Adjustment for cefazolin (ANCEF)    Negar Maria is a 74year old patient who has been prescribed cefazolin (ANCEF) 2g every 8 hrs. The estimated creatinine clearance is 30.5 mL/min (A) (based on SCr of 1.52 mg/dL (H)). The dose has been adjusted to cefazolin (ANCEF) 1g every 8 hrs per hospital renal dose adjustment protocol for treatment of pyelonephritis. Pharmacy will follow and adjust dose as warranted for additional renal function changes.     Thank you,    Rudi Dupont, PharmD  3/25/2023  3:39 PM

## 2023-03-25 NOTE — PLAN OF CARE
Problem: Diabetes/Glucose Control  Goal: Glucose maintained within prescribed range  Description: INTERVENTIONS:  - Monitor Blood Glucose as ordered  - Assess for signs and symptoms of hyperglycemia and hypoglycemia  - Administer ordered medications to maintain glucose within target range  - Assess barriers to adequate nutritional intake and initiate nutrition consult as needed  - Instruct patient on self management of diabetes  Outcome: Progressing     Problem: GASTROINTESTINAL - ADULT  Goal: Maintains adequate nutritional intake (undernourished)  Description: INTERVENTIONS:  - Monitor percentage of each meal consumed  - Identify factors contributing to decreased intake, treat as appropriate  - Assist with meals as needed  - Monitor I&O, WT and lab values  - Obtain nutritional consult as needed  - Optimize oral hygiene and moisture  - Encourage food from home; allow for food preferences  - Enhance eating environment  Outcome: Progressing     Problem: METABOLIC/FLUID AND ELECTROLYTES - ADULT  Goal: Glucose maintained within prescribed range  Description: INTERVENTIONS:  - Monitor Blood Glucose as ordered  - Assess for signs and symptoms of hyperglycemia and hypoglycemia  - Administer ordered medications to maintain glucose within target range  - Assess barriers to adequate nutritional intake and initiate nutrition consult as needed  - Instruct patient on self management of diabetes  Outcome: Progressing   Patient is alert and oriented times four. Medications given as ordered. Vitals stable on room air. Medications given as ordered. Blood sugar at dinner time noted to be at 379, MD notified. New orders given for 15 units now and hold correction dose. Patient complaint of pain to abdominal area at a 8 out 10, prn Norco given with pain improvement noted of 0 out of 10. Call light within reach. COVID precautions maintained.

## 2023-03-26 VITALS
WEIGHT: 152.31 LBS | RESPIRATION RATE: 18 BRPM | DIASTOLIC BLOOD PRESSURE: 60 MMHG | TEMPERATURE: 98 F | HEART RATE: 77 BPM | BODY MASS INDEX: 27 KG/M2 | SYSTOLIC BLOOD PRESSURE: 155 MMHG | OXYGEN SATURATION: 97 %

## 2023-03-26 LAB
ANION GAP SERPL CALC-SCNC: 7 MMOL/L (ref 0–18)
BASOPHILS # BLD AUTO: 0.08 X10(3) UL (ref 0–0.2)
BASOPHILS NFR BLD AUTO: 0.9 %
BUN BLD-MCNC: 23 MG/DL (ref 7–18)
BUN/CREAT SERPL: 16.8 (ref 10–20)
CALCIUM BLD-MCNC: 8.8 MG/DL (ref 8.5–10.1)
CHLORIDE SERPL-SCNC: 117 MMOL/L (ref 98–112)
CO2 SERPL-SCNC: 21 MMOL/L (ref 21–32)
CREAT BLD-MCNC: 1.37 MG/DL
DEPRECATED RDW RBC AUTO: 44.1 FL (ref 35.1–46.3)
EOSINOPHIL # BLD AUTO: 0.27 X10(3) UL (ref 0–0.7)
EOSINOPHIL NFR BLD AUTO: 3.1 %
ERYTHROCYTE [DISTWIDTH] IN BLOOD BY AUTOMATED COUNT: 13.9 % (ref 11–15)
GFR SERPLBLD BASED ON 1.73 SQ M-ARVRAT: 43 ML/MIN/1.73M2 (ref 60–?)
GLUCOSE BLD-MCNC: 218 MG/DL (ref 70–99)
GLUCOSE BLDC GLUCOMTR-MCNC: 175 MG/DL (ref 70–99)
GLUCOSE BLDC GLUCOMTR-MCNC: 323 MG/DL (ref 70–99)
HCT VFR BLD AUTO: 30.4 %
HGB BLD-MCNC: 10 G/DL
IMM GRANULOCYTES # BLD AUTO: 0.04 X10(3) UL (ref 0–1)
IMM GRANULOCYTES NFR BLD: 0.5 %
LYMPHOCYTES # BLD AUTO: 1.63 X10(3) UL (ref 1–4)
LYMPHOCYTES NFR BLD AUTO: 18.8 %
MCH RBC QN AUTO: 28 PG (ref 26–34)
MCHC RBC AUTO-ENTMCNC: 32.9 G/DL (ref 31–37)
MCV RBC AUTO: 85.2 FL
MONOCYTES # BLD AUTO: 0.66 X10(3) UL (ref 0.1–1)
MONOCYTES NFR BLD AUTO: 7.6 %
NEUTROPHILS # BLD AUTO: 6.01 X10 (3) UL (ref 1.5–7.7)
NEUTROPHILS # BLD AUTO: 6.01 X10(3) UL (ref 1.5–7.7)
NEUTROPHILS NFR BLD AUTO: 69.1 %
OSMOLALITY SERPL CALC.SUM OF ELEC: 310 MOSM/KG (ref 275–295)
PLATELET # BLD AUTO: 244 10(3)UL (ref 150–450)
POTASSIUM SERPL-SCNC: 4.2 MMOL/L (ref 3.5–5.1)
RBC # BLD AUTO: 3.57 X10(6)UL
SODIUM SERPL-SCNC: 145 MMOL/L (ref 136–145)
WBC # BLD AUTO: 8.7 X10(3) UL (ref 4–11)

## 2023-03-26 PROCEDURE — 99239 HOSP IP/OBS DSCHRG MGMT >30: CPT | Performed by: HOSPITALIST

## 2023-03-26 RX ORDER — CEPHALEXIN 500 MG/1
500 CAPSULE ORAL 4 TIMES DAILY
Qty: 28 CAPSULE | Refills: 0 | Status: SHIPPED | OUTPATIENT
Start: 2023-03-26 | End: 2023-04-02

## 2023-03-26 RX ORDER — HYDROCODONE BITARTRATE AND ACETAMINOPHEN 10; 325 MG/1; MG/1
1 TABLET ORAL EVERY 4 HOURS PRN
Qty: 15 TABLET | Refills: 0 | Status: SHIPPED | OUTPATIENT
Start: 2023-03-26 | End: 2023-03-29

## 2023-03-26 NOTE — PLAN OF CARE
Problem: Patient Centered Care  Goal: Patient preferences are identified and integrated in the patient's plan of care  Description: Interventions:  - What would you like us to know as we care for you?  From home with son  - Provide timely, complete, and accurate information to patient/family  - Incorporate patient and family knowledge, values, beliefs, and cultural backgrounds into the planning and delivery of care  - Encourage patient/family to participate in care and decision-making at the level they choose  - Honor patient and family perspectives and choices  Outcome: Progressing     Problem: Diabetes/Glucose Control  Goal: Glucose maintained within prescribed range  Description: INTERVENTIONS:  - Monitor Blood Glucose as ordered  - Assess for signs and symptoms of hyperglycemia and hypoglycemia  - Administer ordered medications to maintain glucose within target range  - Assess barriers to adequate nutritional intake and initiate nutrition consult as needed  - Instruct patient on self management of diabetes  Outcome: Progressing     Problem: Patient/Family Goals  Goal: Patient/Family Long Term Goal  Description: Patient's Long Term Goal: To go home    Interventions:  - Monitor vital signs and labs  - Diagnostics per order  - Follow MD orders  - Administer medications as ordered  - Update patient and family on plan of care  - Discharge planning  - See additional Care Plan goals for specific interventions  Outcome: Progressing  Goal: Patient/Family Short Term Goal  Description: Patient's Short Term Goal: To feel better    Interventions:   - Monitor vital signs and labs  - Monitor blood glucose levels  - Pain management as needed  - Diagnostics per order  - Follow MD orders  - Administer medications as ordered  - Assists with activities of daily living   - Update patient and family on plan of care  - Discharge planning  - See additional Care Plan goals for specific interventions  Outcome: Progressing     Problem: GASTROINTESTINAL - ADULT  Goal: Minimal or absence of nausea and vomiting  Description: INTERVENTIONS:  - Maintain adequate hydration with IV or PO as ordered and tolerated  - Nasogastric tube to low intermittent suction as ordered  - Evaluate effectiveness of ordered antiemetic medications  - Provide nonpharmacologic comfort measures as appropriate  - Advance diet as tolerated, if ordered  - Obtain nutritional consult as needed  - Evaluate fluid balance  Outcome: Progressing  Goal: Maintains adequate nutritional intake (undernourished)  Description: INTERVENTIONS:  - Monitor percentage of each meal consumed  - Identify factors contributing to decreased intake, treat as appropriate  - Assist with meals as needed  - Monitor I&O, WT and lab values  - Obtain nutritional consult as needed  - Optimize oral hygiene and moisture  - Encourage food from home; allow for food preferences  - Enhance eating environment  Outcome: Progressing     Problem: METABOLIC/FLUID AND ELECTROLYTES - ADULT  Goal: Glucose maintained within prescribed range  Description: INTERVENTIONS:  - Monitor Blood Glucose as ordered  - Assess for signs and symptoms of hyperglycemia and hypoglycemia  - Administer ordered medications to maintain glucose within target range  - Assess barriers to adequate nutritional intake and initiate nutrition consult as needed  - Instruct patient on self management of diabetes  Outcome: Progressing

## 2023-03-26 NOTE — PLAN OF CARE
Patient is alert and oriented times four. Medications given as ordered. Patient complaint of pain to abdomen a 8 out 10, prn pain medication given with noted pain improvement of 0. Discharged order placed. Gave patient AVS and went over medication with patient, removed all peripheral lines, and gave patient all their belongings.    Problem: Diabetes/Glucose Control  Goal: Glucose maintained within prescribed range  Description: INTERVENTIONS:  - Monitor Blood Glucose as ordered  - Assess for signs and symptoms of hyperglycemia and hypoglycemia  - Administer ordered medications to maintain glucose within target range  - Assess barriers to adequate nutritional intake and initiate nutrition consult as needed  - Instruct patient on self management of diabetes  Outcome: Progressing     Problem: METABOLIC/FLUID AND ELECTROLYTES - ADULT  Goal: Glucose maintained within prescribed range  Description: INTERVENTIONS:  - Monitor Blood Glucose as ordered  - Assess for signs and symptoms of hyperglycemia and hypoglycemia  - Administer ordered medications to maintain glucose within target range  - Assess barriers to adequate nutritional intake and initiate nutrition consult as needed  - Instruct patient on self management of diabetes  Outcome: Progressing

## 2023-03-29 ENCOUNTER — TELEPHONE (OUTPATIENT)
Dept: PAIN CLINIC | Facility: HOSPITAL | Age: 68
End: 2023-03-29

## 2023-03-29 RX ORDER — PREGABALIN 25 MG/1
25 CAPSULE ORAL 2 TIMES DAILY
Qty: 60 CAPSULE | Refills: 0 | Status: SHIPPED | OUTPATIENT
Start: 2023-03-29 | End: 2023-04-28

## 2023-03-29 RX ORDER — PREGABALIN 25 MG/1
25 CAPSULE ORAL 2 TIMES DAILY
COMMUNITY
End: 2023-03-29

## 2023-03-29 RX ORDER — HYDROCODONE BITARTRATE AND ACETAMINOPHEN 10; 325 MG/1; MG/1
1 TABLET ORAL EVERY 4 HOURS PRN
Qty: 150 TABLET | Refills: 0 | Status: SHIPPED | OUTPATIENT
Start: 2023-04-11 | End: 2023-05-11

## 2023-04-11 ENCOUNTER — OFFICE VISIT (OUTPATIENT)
Dept: PAIN CLINIC | Facility: HOSPITAL | Age: 68
End: 2023-04-11
Attending: NURSE PRACTITIONER
Payer: MEDICARE

## 2023-04-11 VITALS — HEART RATE: 82 BPM | DIASTOLIC BLOOD PRESSURE: 73 MMHG | OXYGEN SATURATION: 98 % | SYSTOLIC BLOOD PRESSURE: 187 MMHG

## 2023-04-11 DIAGNOSIS — Z79.891 ENCOUNTER FOR MONITORING OPIOID MAINTENANCE THERAPY: Primary | ICD-10-CM

## 2023-04-11 DIAGNOSIS — M96.1 FAILED BACK SURGICAL SYNDROME: ICD-10-CM

## 2023-04-11 DIAGNOSIS — Z51.81 ENCOUNTER FOR MONITORING OPIOID MAINTENANCE THERAPY: Primary | ICD-10-CM

## 2023-04-11 PROCEDURE — 99211 OFF/OP EST MAY X REQ PHY/QHP: CPT

## 2023-04-11 RX ORDER — HYDROCODONE BITARTRATE AND ACETAMINOPHEN 10; 325 MG/1; MG/1
1 TABLET ORAL EVERY 4 HOURS PRN
Qty: 150 TABLET | Refills: 0 | Status: SHIPPED | OUTPATIENT
Start: 2023-04-11

## 2023-04-11 RX ORDER — HYDROCODONE BITARTRATE AND ACETAMINOPHEN 10; 325 MG/1; MG/1
1 TABLET ORAL EVERY 4 HOURS PRN
Qty: 150 TABLET | Refills: 0 | Status: SHIPPED | OUTPATIENT
Start: 2023-05-11

## 2023-04-25 ENCOUNTER — APPOINTMENT (OUTPATIENT)
Dept: PICC SERVICES | Facility: HOSPITAL | Age: 68
End: 2023-04-25
Attending: HOSPITALIST
Payer: MEDICARE

## 2023-04-25 ENCOUNTER — APPOINTMENT (OUTPATIENT)
Dept: CT IMAGING | Facility: HOSPITAL | Age: 68
End: 2023-04-25
Attending: EMERGENCY MEDICINE
Payer: MEDICARE

## 2023-04-25 ENCOUNTER — HOSPITAL ENCOUNTER (OUTPATIENT)
Facility: HOSPITAL | Age: 68
Setting detail: OBSERVATION
Discharge: HOME | End: 2023-04-27
Attending: EMERGENCY MEDICINE | Admitting: HOSPITALIST
Payer: MEDICARE

## 2023-04-25 DIAGNOSIS — N20.1 URETERAL CALCULI: Primary | ICD-10-CM

## 2023-04-25 DIAGNOSIS — N12 PYELONEPHRITIS: ICD-10-CM

## 2023-04-25 PROBLEM — R73.9 HYPERGLYCEMIA: Status: ACTIVE | Noted: 2023-01-01

## 2023-04-25 PROBLEM — R73.9 HYPERGLYCEMIA: Status: ACTIVE | Noted: 2023-04-25

## 2023-04-25 PROBLEM — E87.0 HYPERNATREMIA: Status: ACTIVE | Noted: 2023-04-25

## 2023-04-25 PROBLEM — E87.0 HYPERNATREMIA: Status: ACTIVE | Noted: 2023-01-01

## 2023-04-25 LAB
ALBUMIN SERPL-MCNC: 3.5 G/DL (ref 3.4–5)
ALBUMIN/GLOB SERPL: 0.9 {RATIO} (ref 1–2)
ALP LIVER SERPL-CCNC: 95 U/L
ALT SERPL-CCNC: 17 U/L
ANION GAP SERPL CALC-SCNC: 9 MMOL/L (ref 0–18)
AST SERPL-CCNC: 20 U/L (ref 15–37)
BASOPHILS # BLD AUTO: 0.14 X10(3) UL (ref 0–0.2)
BASOPHILS NFR BLD AUTO: 2 %
BILIRUB SERPL-MCNC: 0.3 MG/DL (ref 0.1–2)
BILIRUB UR QL: NEGATIVE
BUN BLD-MCNC: 24 MG/DL (ref 7–18)
BUN/CREAT SERPL: 17.1 (ref 10–20)
CALCIUM BLD-MCNC: 9.3 MG/DL (ref 8.5–10.1)
CHLORIDE SERPL-SCNC: 115 MMOL/L (ref 98–112)
CO2 SERPL-SCNC: 22 MMOL/L (ref 21–32)
CREAT BLD-MCNC: 1.4 MG/DL
DEPRECATED RDW RBC AUTO: 43.4 FL (ref 35.1–46.3)
EOSINOPHIL # BLD AUTO: 0.31 X10(3) UL (ref 0–0.7)
EOSINOPHIL NFR BLD AUTO: 4.3 %
ERYTHROCYTE [DISTWIDTH] IN BLOOD BY AUTOMATED COUNT: 13.9 % (ref 11–15)
GFR SERPLBLD BASED ON 1.73 SQ M-ARVRAT: 42 ML/MIN/1.73M2 (ref 60–?)
GLOBULIN PLAS-MCNC: 4.1 G/DL (ref 2.8–4.4)
GLUCOSE BLD-MCNC: 106 MG/DL (ref 70–99)
GLUCOSE BLDC GLUCOMTR-MCNC: 118 MG/DL (ref 70–99)
GLUCOSE BLDC GLUCOMTR-MCNC: 235 MG/DL (ref 70–99)
GLUCOSE BLDC GLUCOMTR-MCNC: 64 MG/DL (ref 70–99)
GLUCOSE BLDC GLUCOMTR-MCNC: 90 MG/DL (ref 70–99)
GLUCOSE UR-MCNC: NORMAL MG/DL
HCT VFR BLD AUTO: 33.6 %
HGB BLD-MCNC: 11 G/DL
HGB UR QL STRIP.AUTO: NEGATIVE
HYALINE CASTS #/AREA URNS AUTO: PRESENT /LPF
IMM GRANULOCYTES # BLD AUTO: 0.03 X10(3) UL (ref 0–1)
IMM GRANULOCYTES NFR BLD: 0.4 %
KETONES UR-MCNC: NEGATIVE MG/DL
LEUKOCYTE ESTERASE UR QL STRIP.AUTO: 500
LIPASE SERPL-CCNC: 40 U/L (ref 13–75)
LYMPHOCYTES # BLD AUTO: 2.03 X10(3) UL (ref 1–4)
LYMPHOCYTES NFR BLD AUTO: 28.3 %
MCH RBC QN AUTO: 27.6 PG (ref 26–34)
MCHC RBC AUTO-ENTMCNC: 32.7 G/DL (ref 31–37)
MCV RBC AUTO: 84.4 FL
MONOCYTES # BLD AUTO: 0.6 X10(3) UL (ref 0.1–1)
MONOCYTES NFR BLD AUTO: 8.4 %
NEUTROPHILS # BLD AUTO: 4.06 X10 (3) UL (ref 1.5–7.7)
NEUTROPHILS # BLD AUTO: 4.06 X10(3) UL (ref 1.5–7.7)
NEUTROPHILS NFR BLD AUTO: 56.6 %
NITRITE UR QL STRIP.AUTO: NEGATIVE
OSMOLALITY SERPL CALC.SUM OF ELEC: 306 MOSM/KG (ref 275–295)
PH UR: 6.5 [PH] (ref 5–8)
PLATELET # BLD AUTO: 306 10(3)UL (ref 150–450)
POTASSIUM SERPL-SCNC: 4.3 MMOL/L (ref 3.5–5.1)
PROT SERPL-MCNC: 7.6 G/DL (ref 6.4–8.2)
PROT UR-MCNC: 200 MG/DL
RBC # BLD AUTO: 3.98 X10(6)UL
SODIUM SERPL-SCNC: 146 MMOL/L (ref 136–145)
SP GR UR STRIP: 1.01 (ref 1–1.03)
UROBILINOGEN UR STRIP-ACNC: NORMAL
WBC # BLD AUTO: 7.2 X10(3) UL (ref 4–11)
WBC #/AREA URNS AUTO: >50 /HPF

## 2023-04-25 PROCEDURE — 87088 URINE BACTERIA CULTURE: CPT | Performed by: EMERGENCY MEDICINE

## 2023-04-25 PROCEDURE — 87186 SC STD MICRODIL/AGAR DIL: CPT | Performed by: EMERGENCY MEDICINE

## 2023-04-25 PROCEDURE — 80053 COMPREHEN METABOLIC PANEL: CPT

## 2023-04-25 PROCEDURE — 85025 COMPLETE CBC W/AUTO DIFF WBC: CPT

## 2023-04-25 PROCEDURE — 85025 COMPLETE CBC W/AUTO DIFF WBC: CPT | Performed by: EMERGENCY MEDICINE

## 2023-04-25 PROCEDURE — 80053 COMPREHEN METABOLIC PANEL: CPT | Performed by: EMERGENCY MEDICINE

## 2023-04-25 PROCEDURE — 83690 ASSAY OF LIPASE: CPT

## 2023-04-25 PROCEDURE — 83690 ASSAY OF LIPASE: CPT | Performed by: EMERGENCY MEDICINE

## 2023-04-25 PROCEDURE — 82962 GLUCOSE BLOOD TEST: CPT

## 2023-04-25 PROCEDURE — 87086 URINE CULTURE/COLONY COUNT: CPT | Performed by: EMERGENCY MEDICINE

## 2023-04-25 PROCEDURE — 81001 URINALYSIS AUTO W/SCOPE: CPT | Performed by: EMERGENCY MEDICINE

## 2023-04-25 PROCEDURE — 74176 CT ABD & PELVIS W/O CONTRAST: CPT | Performed by: EMERGENCY MEDICINE

## 2023-04-25 RX ORDER — METOCLOPRAMIDE HYDROCHLORIDE 5 MG/ML
5 INJECTION INTRAMUSCULAR; INTRAVENOUS EVERY 8 HOURS PRN
Status: DISCONTINUED | OUTPATIENT
Start: 2023-04-25 | End: 2023-04-27

## 2023-04-25 RX ORDER — CEFAZOLIN SODIUM/WATER 2 G/20 ML
2 SYRINGE (ML) INTRAVENOUS EVERY 8 HOURS
Status: DISCONTINUED | OUTPATIENT
Start: 2023-04-25 | End: 2023-04-27

## 2023-04-25 RX ORDER — CLOPIDOGREL BISULFATE 75 MG/1
75 TABLET ORAL DAILY
Status: DISCONTINUED | OUTPATIENT
Start: 2023-04-26 | End: 2023-04-27

## 2023-04-25 RX ORDER — MORPHINE SULFATE 2 MG/ML
2 INJECTION, SOLUTION INTRAMUSCULAR; INTRAVENOUS ONCE
Status: COMPLETED | OUTPATIENT
Start: 2023-04-25 | End: 2023-04-25

## 2023-04-25 RX ORDER — CARVEDILOL 3.12 MG/1
3.12 TABLET ORAL 2 TIMES DAILY WITH MEALS
Status: DISCONTINUED | OUTPATIENT
Start: 2023-04-25 | End: 2023-04-27

## 2023-04-25 RX ORDER — POLYETHYLENE GLYCOL 3350 17 G/17G
17 POWDER, FOR SOLUTION ORAL DAILY PRN
Status: DISCONTINUED | OUTPATIENT
Start: 2023-04-25 | End: 2023-04-27

## 2023-04-25 RX ORDER — SODIUM CHLORIDE 9 MG/ML
INJECTION, SOLUTION INTRAVENOUS CONTINUOUS
Status: DISCONTINUED | OUTPATIENT
Start: 2023-04-25 | End: 2023-04-25

## 2023-04-25 RX ORDER — FLUTICASONE FUROATE AND VILANTEROL 200; 25 UG/1; UG/1
1 POWDER RESPIRATORY (INHALATION) DAILY
Status: DISCONTINUED | OUTPATIENT
Start: 2023-04-25 | End: 2023-04-27

## 2023-04-25 RX ORDER — HYDROCODONE BITARTRATE AND ACETAMINOPHEN 5; 325 MG/1; MG/1
2 TABLET ORAL EVERY 4 HOURS PRN
Status: DISCONTINUED | OUTPATIENT
Start: 2023-04-25 | End: 2023-04-25

## 2023-04-25 RX ORDER — ATORVASTATIN CALCIUM 20 MG/1
20 TABLET, FILM COATED ORAL NIGHTLY
Status: DISCONTINUED | OUTPATIENT
Start: 2023-04-25 | End: 2023-04-27

## 2023-04-25 RX ORDER — BISACODYL 10 MG
10 SUPPOSITORY, RECTAL RECTAL
Status: DISCONTINUED | OUTPATIENT
Start: 2023-04-25 | End: 2023-04-27

## 2023-04-25 RX ORDER — ACETAMINOPHEN 325 MG/1
650 TABLET ORAL EVERY 4 HOURS PRN
Status: DISCONTINUED | OUTPATIENT
Start: 2023-04-25 | End: 2023-04-27

## 2023-04-25 RX ORDER — HYDRALAZINE HYDROCHLORIDE 100 MG/1
100 TABLET, FILM COATED ORAL EVERY 8 HOURS
Status: DISCONTINUED | OUTPATIENT
Start: 2023-04-25 | End: 2023-04-27

## 2023-04-25 RX ORDER — LISINOPRIL 40 MG/1
40 TABLET ORAL DAILY
Status: DISCONTINUED | OUTPATIENT
Start: 2023-04-26 | End: 2023-04-27

## 2023-04-25 RX ORDER — DEXTROSE MONOHYDRATE 25 G/50ML
50 INJECTION, SOLUTION INTRAVENOUS
Status: DISCONTINUED | OUTPATIENT
Start: 2023-04-25 | End: 2023-04-27

## 2023-04-25 RX ORDER — NICOTINE POLACRILEX 4 MG
30 LOZENGE BUCCAL
Status: DISCONTINUED | OUTPATIENT
Start: 2023-04-25 | End: 2023-04-27

## 2023-04-25 RX ORDER — AMLODIPINE BESYLATE 5 MG/1
10 TABLET ORAL DAILY
Status: DISCONTINUED | OUTPATIENT
Start: 2023-04-25 | End: 2023-04-27

## 2023-04-25 RX ORDER — LEVOFLOXACIN 5 MG/ML
500 INJECTION, SOLUTION INTRAVENOUS ONCE
Status: COMPLETED | OUTPATIENT
Start: 2023-04-25 | End: 2023-04-25

## 2023-04-25 RX ORDER — MORPHINE SULFATE 2 MG/ML
2 INJECTION, SOLUTION INTRAMUSCULAR; INTRAVENOUS EVERY 2 HOUR PRN
Status: DISCONTINUED | OUTPATIENT
Start: 2023-04-25 | End: 2023-04-26

## 2023-04-25 RX ORDER — NICOTINE POLACRILEX 4 MG
15 LOZENGE BUCCAL
Status: DISCONTINUED | OUTPATIENT
Start: 2023-04-25 | End: 2023-04-27

## 2023-04-25 RX ORDER — HYDROCODONE BITARTRATE AND ACETAMINOPHEN 10; 325 MG/1; MG/1
1 TABLET ORAL EVERY 4 HOURS PRN
Status: DISCONTINUED | OUTPATIENT
Start: 2023-04-25 | End: 2023-04-26

## 2023-04-25 RX ORDER — HYDROCODONE BITARTRATE AND ACETAMINOPHEN 5; 325 MG/1; MG/1
1 TABLET ORAL EVERY 4 HOURS PRN
Status: DISCONTINUED | OUTPATIENT
Start: 2023-04-25 | End: 2023-04-25

## 2023-04-25 RX ORDER — SENNOSIDES 8.6 MG
17.2 TABLET ORAL NIGHTLY PRN
Status: DISCONTINUED | OUTPATIENT
Start: 2023-04-25 | End: 2023-04-27

## 2023-04-25 RX ORDER — ONDANSETRON 2 MG/ML
4 INJECTION INTRAMUSCULAR; INTRAVENOUS EVERY 6 HOURS PRN
Status: DISCONTINUED | OUTPATIENT
Start: 2023-04-25 | End: 2023-04-27

## 2023-04-25 RX ORDER — HEPARIN SODIUM 5000 [USP'U]/ML
5000 INJECTION, SOLUTION INTRAVENOUS; SUBCUTANEOUS EVERY 8 HOURS SCHEDULED
Status: DISCONTINUED | OUTPATIENT
Start: 2023-04-26 | End: 2023-04-27

## 2023-04-25 RX ORDER — MORPHINE SULFATE 4 MG/ML
4 INJECTION, SOLUTION INTRAMUSCULAR; INTRAVENOUS ONCE
Status: COMPLETED | OUTPATIENT
Start: 2023-04-25 | End: 2023-04-25

## 2023-04-25 RX ORDER — DEXTROSE AND SODIUM CHLORIDE 5; .9 G/100ML; G/100ML
INJECTION, SOLUTION INTRAVENOUS CONTINUOUS
Status: DISCONTINUED | OUTPATIENT
Start: 2023-04-25 | End: 2023-04-26

## 2023-04-25 RX ORDER — SODIUM PHOSPHATE, DIBASIC AND SODIUM PHOSPHATE, MONOBASIC 7; 19 G/133ML; G/133ML
1 ENEMA RECTAL ONCE AS NEEDED
Status: DISCONTINUED | OUTPATIENT
Start: 2023-04-25 | End: 2023-04-27

## 2023-04-25 RX ORDER — IPRATROPIUM BROMIDE AND ALBUTEROL SULFATE 2.5; .5 MG/3ML; MG/3ML
3 SOLUTION RESPIRATORY (INHALATION) EVERY 6 HOURS PRN
Status: DISCONTINUED | OUTPATIENT
Start: 2023-04-25 | End: 2023-04-27

## 2023-04-25 RX ORDER — ALBUTEROL SULFATE 90 UG/1
1 AEROSOL, METERED RESPIRATORY (INHALATION) EVERY 6 HOURS PRN
Status: DISCONTINUED | OUTPATIENT
Start: 2023-04-25 | End: 2023-04-27

## 2023-04-25 RX ORDER — MORPHINE SULFATE 2 MG/ML
1 INJECTION, SOLUTION INTRAMUSCULAR; INTRAVENOUS EVERY 2 HOUR PRN
Status: DISCONTINUED | OUTPATIENT
Start: 2023-04-25 | End: 2023-04-26

## 2023-04-25 RX ORDER — MORPHINE SULFATE 4 MG/ML
4 INJECTION, SOLUTION INTRAMUSCULAR; INTRAVENOUS EVERY 2 HOUR PRN
Status: DISCONTINUED | OUTPATIENT
Start: 2023-04-25 | End: 2023-04-26

## 2023-04-25 RX ORDER — PREGABALIN 25 MG/1
25 CAPSULE ORAL 2 TIMES DAILY
Status: DISCONTINUED | OUTPATIENT
Start: 2023-04-25 | End: 2023-04-27

## 2023-04-26 LAB
ANION GAP SERPL CALC-SCNC: 7 MMOL/L (ref 0–18)
BUN BLD-MCNC: 21 MG/DL (ref 7–18)
BUN/CREAT SERPL: 16.7 (ref 10–20)
CALCIUM BLD-MCNC: 8.4 MG/DL (ref 8.5–10.1)
CHLORIDE SERPL-SCNC: 118 MMOL/L (ref 98–112)
CO2 SERPL-SCNC: 22 MMOL/L (ref 21–32)
CREAT BLD-MCNC: 1.26 MG/DL
DEPRECATED RDW RBC AUTO: 43.2 FL (ref 35.1–46.3)
ERYTHROCYTE [DISTWIDTH] IN BLOOD BY AUTOMATED COUNT: 13.8 % (ref 11–15)
GFR SERPLBLD BASED ON 1.73 SQ M-ARVRAT: 47 ML/MIN/1.73M2 (ref 60–?)
GLUCOSE BLD-MCNC: 213 MG/DL (ref 70–99)
GLUCOSE BLDC GLUCOMTR-MCNC: 184 MG/DL (ref 70–99)
GLUCOSE BLDC GLUCOMTR-MCNC: 235 MG/DL (ref 70–99)
GLUCOSE BLDC GLUCOMTR-MCNC: 299 MG/DL (ref 70–99)
GLUCOSE BLDC GLUCOMTR-MCNC: 422 MG/DL (ref 70–99)
HCT VFR BLD AUTO: 29.9 %
HGB BLD-MCNC: 9.6 G/DL
MCH RBC QN AUTO: 27.7 PG (ref 26–34)
MCHC RBC AUTO-ENTMCNC: 32.1 G/DL (ref 31–37)
MCV RBC AUTO: 86.2 FL
OSMOLALITY SERPL CALC.SUM OF ELEC: 313 MOSM/KG (ref 275–295)
PLATELET # BLD AUTO: 262 10(3)UL (ref 150–450)
POTASSIUM SERPL-SCNC: 4.1 MMOL/L (ref 3.5–5.1)
RBC # BLD AUTO: 3.47 X10(6)UL
SODIUM SERPL-SCNC: 147 MMOL/L (ref 136–145)
WBC # BLD AUTO: 6.6 X10(3) UL (ref 4–11)

## 2023-04-26 PROCEDURE — 94640 AIRWAY INHALATION TREATMENT: CPT

## 2023-04-26 PROCEDURE — 85027 COMPLETE CBC AUTOMATED: CPT | Performed by: HOSPITALIST

## 2023-04-26 PROCEDURE — 82962 GLUCOSE BLOOD TEST: CPT

## 2023-04-26 PROCEDURE — 80048 BASIC METABOLIC PNL TOTAL CA: CPT | Performed by: HOSPITALIST

## 2023-04-26 RX ORDER — ESTRADIOL 0.1 MG/G
CREAM VAGINAL
Qty: 45 G | Refills: 2 | Status: SHIPPED | OUTPATIENT
Start: 2023-04-26

## 2023-04-26 RX ORDER — MORPHINE SULFATE 2 MG/ML
2 INJECTION, SOLUTION INTRAMUSCULAR; INTRAVENOUS EVERY 2 HOUR PRN
Status: DISCONTINUED | OUTPATIENT
Start: 2023-04-26 | End: 2023-04-27

## 2023-04-26 RX ORDER — ESTRADIOL 0.1 MG/G
2 CREAM VAGINAL DAILY
Status: DISCONTINUED | OUTPATIENT
Start: 2023-04-26 | End: 2023-04-27

## 2023-04-26 RX ORDER — HYDROCODONE BITARTRATE AND ACETAMINOPHEN 10; 325 MG/1; MG/1
1 TABLET ORAL EVERY 4 HOURS PRN
Status: DISCONTINUED | OUTPATIENT
Start: 2023-04-26 | End: 2023-04-27

## 2023-04-27 VITALS
HEART RATE: 73 BPM | OXYGEN SATURATION: 99 % | SYSTOLIC BLOOD PRESSURE: 186 MMHG | DIASTOLIC BLOOD PRESSURE: 78 MMHG | HEIGHT: 64 IN | BODY MASS INDEX: 27.21 KG/M2 | WEIGHT: 159.38 LBS | TEMPERATURE: 99 F | RESPIRATION RATE: 18 BRPM

## 2023-04-27 LAB
GLUCOSE BLDC GLUCOMTR-MCNC: 123 MG/DL (ref 70–99)
GLUCOSE BLDC GLUCOMTR-MCNC: 85 MG/DL (ref 70–99)

## 2023-04-27 PROCEDURE — 82962 GLUCOSE BLOOD TEST: CPT

## 2023-04-27 RX ORDER — CARVEDILOL 3.12 MG/1
6.25 TABLET ORAL 2 TIMES DAILY WITH MEALS
Status: SHIPPED | COMMUNITY
Start: 2023-04-27

## 2023-04-27 RX ORDER — PREGABALIN 25 MG/1
25 CAPSULE ORAL 2 TIMES DAILY PRN
Status: SHIPPED | COMMUNITY
Start: 2023-04-27

## 2023-04-27 RX ORDER — HYDROCODONE BITARTRATE AND ACETAMINOPHEN 10; 325 MG/1; MG/1
1-2 TABLET ORAL EVERY 4 HOURS PRN
Status: DISCONTINUED | OUTPATIENT
Start: 2023-04-27 | End: 2023-04-27

## 2023-04-27 RX ORDER — CEPHALEXIN 500 MG/1
500 CAPSULE ORAL 3 TIMES DAILY
Qty: 36 CAPSULE | Refills: 0 | Status: SHIPPED | OUTPATIENT
Start: 2023-04-27 | End: 2023-05-09

## 2023-04-27 RX ORDER — GLIPIZIDE 5 MG/1
10 TABLET ORAL
Status: SHIPPED | COMMUNITY
Start: 2023-04-27

## 2023-05-09 RX ORDER — PREGABALIN 25 MG/1
CAPSULE ORAL
Qty: 60 CAPSULE | Refills: 0 | Status: SHIPPED | OUTPATIENT
Start: 2023-05-09

## 2023-05-17 NOTE — PROGRESS NOTES
[facility]  Progress Note      Abbey Mcghee Patient Status:  Inpatient    10/6/1955 MRN [id number]   [facility] 2W/SW Attending Marielena Fuchs Day # 1 PCP Néstor Ruiz MD       Subjective:   Keily Khoury Principal Discharge DX:	Acute UTI  Secondary Diagnosis:	Nausea & vomiting   1
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
## 2023-06-06 ENCOUNTER — OFFICE VISIT (OUTPATIENT)
Dept: PAIN CLINIC | Facility: HOSPITAL | Age: 68
End: 2023-06-06
Attending: NURSE PRACTITIONER
Payer: MEDICARE

## 2023-06-06 VITALS
WEIGHT: 177 LBS | DIASTOLIC BLOOD PRESSURE: 72 MMHG | HEART RATE: 75 BPM | HEIGHT: 63 IN | SYSTOLIC BLOOD PRESSURE: 176 MMHG | BODY MASS INDEX: 31.36 KG/M2

## 2023-06-06 DIAGNOSIS — M96.1 FAILED BACK SYNDROME OF LUMBAR SPINE: Primary | Chronic | ICD-10-CM

## 2023-06-06 PROCEDURE — 99211 OFF/OP EST MAY X REQ PHY/QHP: CPT

## 2023-06-06 RX ORDER — HYDROCODONE BITARTRATE AND ACETAMINOPHEN 10; 325 MG/1; MG/1
1 TABLET ORAL EVERY 4 HOURS PRN
Qty: 150 TABLET | Refills: 0 | Status: SHIPPED | OUTPATIENT
Start: 2023-07-06 | End: 2023-08-05

## 2023-06-06 RX ORDER — HYDROCODONE BITARTRATE AND ACETAMINOPHEN 10; 325 MG/1; MG/1
1 TABLET ORAL EVERY 4 HOURS PRN
Qty: 150 TABLET | Refills: 0 | Status: SHIPPED | OUTPATIENT
Start: 2023-06-06 | End: 2023-07-06

## 2023-06-06 RX ORDER — PREGABALIN 25 MG/1
25 CAPSULE ORAL 2 TIMES DAILY
Qty: 60 CAPSULE | Refills: 2 | Status: SHIPPED | OUTPATIENT
Start: 2023-06-06 | End: 2023-07-06

## 2023-06-06 NOTE — PROGRESS NOTES
6/6/2023--Med eval. For chronic back pain; Patient is ambulatory and reports pain at 8/10. Patient is stable on medication. She was seen by Dr. Vandana Rdoriguez. Refer to dictation for continued plan of care.   SN Walter.

## 2023-06-20 ENCOUNTER — APPOINTMENT (OUTPATIENT)
Dept: CT IMAGING | Facility: HOSPITAL | Age: 68
End: 2023-06-20
Attending: EMERGENCY MEDICINE
Payer: MEDICARE

## 2023-06-20 ENCOUNTER — HOSPITAL ENCOUNTER (EMERGENCY)
Facility: HOSPITAL | Age: 68
Discharge: HOME | End: 2023-06-20
Attending: EMERGENCY MEDICINE
Payer: MEDICARE

## 2023-06-20 ENCOUNTER — PATIENT MESSAGE (OUTPATIENT)
Dept: INTERVENTIONAL RADIOLOGY/VASCULAR | Facility: HOSPITAL | Age: 68
End: 2023-06-20

## 2023-06-20 ENCOUNTER — APPOINTMENT (OUTPATIENT)
Dept: ULTRASOUND IMAGING | Facility: HOSPITAL | Age: 68
End: 2023-06-20
Attending: EMERGENCY MEDICINE
Payer: MEDICARE

## 2023-06-20 VITALS
TEMPERATURE: 97 F | RESPIRATION RATE: 21 BRPM | SYSTOLIC BLOOD PRESSURE: 159 MMHG | DIASTOLIC BLOOD PRESSURE: 70 MMHG | WEIGHT: 170 LBS | OXYGEN SATURATION: 96 % | HEIGHT: 61 IN | HEART RATE: 63 BPM | BODY MASS INDEX: 32.1 KG/M2

## 2023-06-20 DIAGNOSIS — M79.604 PAIN IN BOTH LOWER EXTREMITIES: Primary | ICD-10-CM

## 2023-06-20 DIAGNOSIS — M79.605 PAIN IN BOTH LOWER EXTREMITIES: Primary | ICD-10-CM

## 2023-06-20 DIAGNOSIS — T82.868A THROMBOSIS OF ARTERIAL STENT, INITIAL ENCOUNTER (HCC): ICD-10-CM

## 2023-06-20 DIAGNOSIS — M71.21 BAKER CYST, RIGHT: ICD-10-CM

## 2023-06-20 LAB
ANION GAP SERPL CALC-SCNC: 7 MMOL/L (ref 0–18)
BASOPHILS # BLD AUTO: 0.1 X10(3) UL (ref 0–0.2)
BASOPHILS NFR BLD AUTO: 1.3 %
BUN BLD-MCNC: 31 MG/DL (ref 7–18)
BUN/CREAT SERPL: 19.4 (ref 10–20)
CALCIUM BLD-MCNC: 10.2 MG/DL (ref 8.5–10.1)
CHLORIDE SERPL-SCNC: 113 MMOL/L (ref 98–112)
CO2 SERPL-SCNC: 22 MMOL/L (ref 21–32)
CREAT BLD-MCNC: 1.6 MG/DL
DEPRECATED RDW RBC AUTO: 42.5 FL (ref 35.1–46.3)
EOSINOPHIL # BLD AUTO: 0.24 X10(3) UL (ref 0–0.7)
EOSINOPHIL NFR BLD AUTO: 3 %
ERYTHROCYTE [DISTWIDTH] IN BLOOD BY AUTOMATED COUNT: 13.5 % (ref 11–15)
GFR SERPLBLD BASED ON 1.73 SQ M-ARVRAT: 36 ML/MIN/1.73M2 (ref 60–?)
GLUCOSE BLD-MCNC: 80 MG/DL (ref 70–99)
HCT VFR BLD AUTO: 32.7 %
HGB BLD-MCNC: 10.6 G/DL
IMM GRANULOCYTES # BLD AUTO: 0.04 X10(3) UL (ref 0–1)
IMM GRANULOCYTES NFR BLD: 0.5 %
LYMPHOCYTES # BLD AUTO: 1.74 X10(3) UL (ref 1–4)
LYMPHOCYTES NFR BLD AUTO: 22.1 %
MCH RBC QN AUTO: 28 PG (ref 26–34)
MCHC RBC AUTO-ENTMCNC: 32.4 G/DL (ref 31–37)
MCV RBC AUTO: 86.3 FL
MONOCYTES # BLD AUTO: 0.66 X10(3) UL (ref 0.1–1)
MONOCYTES NFR BLD AUTO: 8.4 %
NEUTROPHILS # BLD AUTO: 5.1 X10 (3) UL (ref 1.5–7.7)
NEUTROPHILS # BLD AUTO: 5.1 X10(3) UL (ref 1.5–7.7)
NEUTROPHILS NFR BLD AUTO: 64.7 %
OSMOLALITY SERPL CALC.SUM OF ELEC: 300 MOSM/KG (ref 275–295)
PLATELET # BLD AUTO: 232 10(3)UL (ref 150–450)
POTASSIUM SERPL-SCNC: 4.3 MMOL/L (ref 3.5–5.1)
RBC # BLD AUTO: 3.79 X10(6)UL
SODIUM SERPL-SCNC: 142 MMOL/L (ref 136–145)
WBC # BLD AUTO: 7.9 X10(3) UL (ref 4–11)

## 2023-06-20 PROCEDURE — 80048 BASIC METABOLIC PNL TOTAL CA: CPT | Performed by: EMERGENCY MEDICINE

## 2023-06-20 PROCEDURE — 99285 EMERGENCY DEPT VISIT HI MDM: CPT

## 2023-06-20 PROCEDURE — 93970 EXTREMITY STUDY: CPT | Performed by: EMERGENCY MEDICINE

## 2023-06-20 PROCEDURE — 85025 COMPLETE CBC W/AUTO DIFF WBC: CPT | Performed by: EMERGENCY MEDICINE

## 2023-06-20 PROCEDURE — 96361 HYDRATE IV INFUSION ADD-ON: CPT

## 2023-06-20 PROCEDURE — 96375 TX/PRO/DX INJ NEW DRUG ADDON: CPT

## 2023-06-20 PROCEDURE — 96374 THER/PROPH/DIAG INJ IV PUSH: CPT

## 2023-06-20 PROCEDURE — 75635 CT ANGIO ABDOMINAL ARTERIES: CPT | Performed by: EMERGENCY MEDICINE

## 2023-06-20 RX ORDER — MORPHINE SULFATE 4 MG/ML
4 INJECTION, SOLUTION INTRAMUSCULAR; INTRAVENOUS ONCE
Status: COMPLETED | OUTPATIENT
Start: 2023-06-20 | End: 2023-06-20

## 2023-06-20 RX ORDER — CYCLOBENZAPRINE HCL 10 MG
10 TABLET ORAL 3 TIMES DAILY PRN
Qty: 20 TABLET | Refills: 0 | Status: ON HOLD | OUTPATIENT
Start: 2023-06-20 | End: 2023-06-28

## 2023-06-20 RX ORDER — ORPHENADRINE CITRATE 30 MG/ML
30 INJECTION INTRAMUSCULAR; INTRAVENOUS ONCE
Status: COMPLETED | OUTPATIENT
Start: 2023-06-20 | End: 2023-06-20

## 2023-06-20 RX ORDER — ACETAMINOPHEN 500 MG
1000 TABLET ORAL ONCE
Status: COMPLETED | OUTPATIENT
Start: 2023-06-20 | End: 2023-06-20

## 2023-06-20 NOTE — PROGRESS NOTES
Patient will f/u with IR Dr Ernesto Eden in [facility] 7/7/23  Appointment offered this week, patient declines

## 2023-06-20 NOTE — DISCHARGE INSTRUCTIONS
See primary care for follow up. Return to the ER if you develop worsening symptoms or emergent concerns.

## 2023-06-20 NOTE — ED PROVIDER NOTES
She endorsed to me by Dr. Elis Mcadams. 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 Patient does have dopplerable pulses. On my evaluation patient's right foot is warm to touch. I spoke with Dr. Matt Alford with interventional radiology who will arrange for patient to be seen within the next week for recanalization of the stent.   Patient feels comfortable with plan to go home, understands to return for worsening symptoms and is given a prescription for muscle relaxer per her request.

## 2023-06-20 NOTE — ED INITIAL ASSESSMENT (HPI)
BILATERAL CALF CRAMPS X 3 DAYS, WORSE AT NIGHT. DENIES INJURY. STATES IT FEELS LIKE A \"CHARLEY HORSE\"  USING WALKER, PER USUAL.

## 2023-06-20 NOTE — ED QUICK NOTES
Pt called family member to come pick her up from ed. Pt moved to triage waiting room via wheelchair to await family member to take her home.

## 2023-06-26 ENCOUNTER — HOSPITAL ENCOUNTER (INPATIENT)
Facility: HOSPITAL | Age: 68
LOS: 2 days | Discharge: HOME | End: 2023-06-28
Attending: EMERGENCY MEDICINE | Admitting: HOSPITALIST
Payer: MEDICARE

## 2023-06-26 ENCOUNTER — HOSPITAL ENCOUNTER (INPATIENT)
Facility: HOSPITAL | Age: 68
LOS: 2 days | Discharge: HOME HEALTH | End: 2023-06-28
Attending: EMERGENCY MEDICINE | Admitting: HOSPITALIST
Payer: MEDICARE

## 2023-06-26 ENCOUNTER — APPOINTMENT (OUTPATIENT)
Dept: CT IMAGING | Facility: HOSPITAL | Age: 68
End: 2023-06-26
Attending: EMERGENCY MEDICINE
Payer: MEDICARE

## 2023-06-26 DIAGNOSIS — E16.2 HYPOGLYCEMIA: Primary | ICD-10-CM

## 2023-06-26 DIAGNOSIS — N30.00 ACUTE CYSTITIS WITHOUT HEMATURIA: ICD-10-CM

## 2023-06-26 LAB
ANION GAP SERPL CALC-SCNC: 10 MMOL/L (ref 0–18)
BASOPHILS # BLD AUTO: 0.08 X10(3) UL (ref 0–0.2)
BASOPHILS NFR BLD AUTO: 1.1 %
BILIRUB UR QL: NEGATIVE
BUN BLD-MCNC: 33 MG/DL (ref 7–18)
BUN/CREAT SERPL: 19.4 (ref 10–20)
CALCIUM BLD-MCNC: 9.6 MG/DL (ref 8.5–10.1)
CHLORIDE SERPL-SCNC: 118 MMOL/L (ref 98–112)
CLARITY UR: CLEAR
CO2 SERPL-SCNC: 19 MMOL/L (ref 21–32)
COLOR UR: COLORLESS
CREAT BLD-MCNC: 1.7 MG/DL
DEPRECATED RDW RBC AUTO: 43.6 FL (ref 35.1–46.3)
EOSINOPHIL # BLD AUTO: 0.32 X10(3) UL (ref 0–0.7)
EOSINOPHIL NFR BLD AUTO: 4.4 %
ERYTHROCYTE [DISTWIDTH] IN BLOOD BY AUTOMATED COUNT: 13.7 % (ref 11–15)
EST. AVERAGE GLUCOSE BLD GHB EST-MCNC: 140 MG/DL (ref 68–126)
GFR SERPLBLD BASED ON 1.73 SQ M-ARVRAT: 33 ML/MIN/1.73M2 (ref 60–?)
GLUCOSE BLD-MCNC: 71 MG/DL (ref 70–99)
GLUCOSE BLDC GLUCOMTR-MCNC: 102 MG/DL (ref 70–99)
GLUCOSE BLDC GLUCOMTR-MCNC: 103 MG/DL (ref 70–99)
GLUCOSE BLDC GLUCOMTR-MCNC: 124 MG/DL (ref 70–99)
GLUCOSE BLDC GLUCOMTR-MCNC: 128 MG/DL (ref 70–99)
GLUCOSE BLDC GLUCOMTR-MCNC: 136 MG/DL (ref 70–99)
GLUCOSE BLDC GLUCOMTR-MCNC: 137 MG/DL (ref 70–99)
GLUCOSE BLDC GLUCOMTR-MCNC: 145 MG/DL (ref 70–99)
GLUCOSE BLDC GLUCOMTR-MCNC: 156 MG/DL (ref 70–99)
GLUCOSE BLDC GLUCOMTR-MCNC: 159 MG/DL (ref 70–99)
GLUCOSE BLDC GLUCOMTR-MCNC: 160 MG/DL (ref 70–99)
GLUCOSE BLDC GLUCOMTR-MCNC: 177 MG/DL (ref 70–99)
GLUCOSE BLDC GLUCOMTR-MCNC: 204 MG/DL (ref 70–99)
GLUCOSE BLDC GLUCOMTR-MCNC: 205 MG/DL (ref 70–99)
GLUCOSE BLDC GLUCOMTR-MCNC: 205 MG/DL (ref 70–99)
GLUCOSE BLDC GLUCOMTR-MCNC: 206 MG/DL (ref 70–99)
GLUCOSE BLDC GLUCOMTR-MCNC: 226 MG/DL (ref 70–99)
GLUCOSE BLDC GLUCOMTR-MCNC: 233 MG/DL (ref 70–99)
GLUCOSE BLDC GLUCOMTR-MCNC: 71 MG/DL (ref 70–99)
GLUCOSE BLDC GLUCOMTR-MCNC: 76 MG/DL (ref 70–99)
GLUCOSE BLDC GLUCOMTR-MCNC: 85 MG/DL (ref 70–99)
GLUCOSE BLDC GLUCOMTR-MCNC: 87 MG/DL (ref 70–99)
GLUCOSE BLDC GLUCOMTR-MCNC: 89 MG/DL (ref 70–99)
GLUCOSE UR-MCNC: 200 MG/DL
HBA1C MFR BLD: 6.5 % (ref ?–5.7)
HCT VFR BLD AUTO: 32.5 %
HGB BLD-MCNC: 10.6 G/DL
HGB UR QL STRIP.AUTO: NEGATIVE
IMM GRANULOCYTES # BLD AUTO: 0.03 X10(3) UL (ref 0–1)
IMM GRANULOCYTES NFR BLD: 0.4 %
KETONES UR-MCNC: NEGATIVE MG/DL
LEUKOCYTE ESTERASE UR QL STRIP.AUTO: 250
LYMPHOCYTES # BLD AUTO: 1.66 X10(3) UL (ref 1–4)
LYMPHOCYTES NFR BLD AUTO: 22.6 %
MCH RBC QN AUTO: 28 PG (ref 26–34)
MCHC RBC AUTO-ENTMCNC: 32.6 G/DL (ref 31–37)
MCV RBC AUTO: 85.8 FL
MONOCYTES # BLD AUTO: 0.68 X10(3) UL (ref 0.1–1)
MONOCYTES NFR BLD AUTO: 9.3 %
NEUTROPHILS # BLD AUTO: 4.58 X10 (3) UL (ref 1.5–7.7)
NEUTROPHILS # BLD AUTO: 4.58 X10(3) UL (ref 1.5–7.7)
NEUTROPHILS NFR BLD AUTO: 62.2 %
NITRITE UR QL STRIP.AUTO: NEGATIVE
OSMOLALITY SERPL CALC.SUM OF ELEC: 310 MOSM/KG (ref 275–295)
PH UR: 6 [PH] (ref 5–8)
PLATELET # BLD AUTO: 288 10(3)UL (ref 150–450)
POTASSIUM SERPL-SCNC: 4.1 MMOL/L (ref 3.5–5.1)
PROT UR-MCNC: 50 MG/DL
RBC # BLD AUTO: 3.79 X10(6)UL
SODIUM SERPL-SCNC: 147 MMOL/L (ref 136–145)
SP GR UR STRIP: 1.01 (ref 1–1.03)
UROBILINOGEN UR STRIP-ACNC: NORMAL
WBC # BLD AUTO: 7.4 X10(3) UL (ref 4–11)

## 2023-06-26 PROCEDURE — 99223 1ST HOSP IP/OBS HIGH 75: CPT | Performed by: INTERNAL MEDICINE

## 2023-06-26 PROCEDURE — 99223 1ST HOSP IP/OBS HIGH 75: CPT | Performed by: HOSPITALIST

## 2023-06-26 PROCEDURE — 70450 CT HEAD/BRAIN W/O DYE: CPT | Performed by: EMERGENCY MEDICINE

## 2023-06-26 PROCEDURE — 3044F HG A1C LEVEL LT 7.0%: CPT | Performed by: INTERNAL MEDICINE

## 2023-06-26 RX ORDER — ACETAMINOPHEN 325 MG/1
650 TABLET ORAL EVERY 4 HOURS PRN
Status: DISCONTINUED | OUTPATIENT
Start: 2023-06-26 | End: 2023-06-28

## 2023-06-26 RX ORDER — HYDRALAZINE HYDROCHLORIDE 50 MG/1
100 TABLET, FILM COATED ORAL EVERY 8 HOURS
Status: DISCONTINUED | OUTPATIENT
Start: 2023-06-26 | End: 2023-06-28

## 2023-06-26 RX ORDER — MORPHINE SULFATE 2 MG/ML
1 INJECTION, SOLUTION INTRAMUSCULAR; INTRAVENOUS EVERY 2 HOUR PRN
Status: DISCONTINUED | OUTPATIENT
Start: 2023-06-26 | End: 2023-06-28

## 2023-06-26 RX ORDER — IPRATROPIUM BROMIDE AND ALBUTEROL SULFATE 2.5; .5 MG/3ML; MG/3ML
3 SOLUTION RESPIRATORY (INHALATION) EVERY 6 HOURS PRN
Status: DISCONTINUED | OUTPATIENT
Start: 2023-06-26 | End: 2023-06-28

## 2023-06-26 RX ORDER — CIPROFLOXACIN 2 MG/ML
400 INJECTION, SOLUTION INTRAVENOUS ONCE
Status: COMPLETED | OUTPATIENT
Start: 2023-06-26 | End: 2023-06-26

## 2023-06-26 RX ORDER — HYDROCODONE BITARTRATE AND ACETAMINOPHEN 5; 325 MG/1; MG/1
1 TABLET ORAL EVERY 4 HOURS PRN
Status: DISCONTINUED | OUTPATIENT
Start: 2023-06-26 | End: 2023-06-28

## 2023-06-26 RX ORDER — MORPHINE SULFATE 4 MG/ML
4 INJECTION, SOLUTION INTRAMUSCULAR; INTRAVENOUS EVERY 2 HOUR PRN
Status: DISCONTINUED | OUTPATIENT
Start: 2023-06-26 | End: 2023-06-26

## 2023-06-26 RX ORDER — CLOPIDOGREL BISULFATE 75 MG/1
75 TABLET ORAL DAILY
Status: DISCONTINUED | OUTPATIENT
Start: 2023-06-26 | End: 2023-06-28

## 2023-06-26 RX ORDER — CARVEDILOL 6.25 MG/1
6.25 TABLET ORAL 2 TIMES DAILY WITH MEALS
Status: DISCONTINUED | OUTPATIENT
Start: 2023-06-26 | End: 2023-06-28

## 2023-06-26 RX ORDER — FLUTICASONE FUROATE AND VILANTEROL 200; 25 UG/1; UG/1
1 POWDER RESPIRATORY (INHALATION) DAILY
Status: DISCONTINUED | OUTPATIENT
Start: 2023-06-26 | End: 2023-06-28

## 2023-06-26 RX ORDER — AMLODIPINE BESYLATE 10 MG/1
10 TABLET ORAL DAILY
Status: DISCONTINUED | OUTPATIENT
Start: 2023-06-26 | End: 2023-06-28

## 2023-06-26 RX ORDER — POLYETHYLENE GLYCOL 3350 17 G/17G
17 POWDER, FOR SOLUTION ORAL DAILY PRN
Status: DISCONTINUED | OUTPATIENT
Start: 2023-06-26 | End: 2023-06-28

## 2023-06-26 RX ORDER — HEPARIN SODIUM 5000 [USP'U]/ML
5000 INJECTION, SOLUTION INTRAVENOUS; SUBCUTANEOUS EVERY 12 HOURS SCHEDULED
Status: DISCONTINUED | OUTPATIENT
Start: 2023-06-26 | End: 2023-06-28

## 2023-06-26 RX ORDER — DEXTROSE MONOHYDRATE 25 G/50ML
INJECTION, SOLUTION INTRAVENOUS
Status: DISCONTINUED
Start: 2023-06-26 | End: 2023-06-26 | Stop reason: WASHOUT

## 2023-06-26 RX ORDER — ONDANSETRON 2 MG/ML
4 INJECTION INTRAMUSCULAR; INTRAVENOUS EVERY 6 HOURS PRN
Status: DISCONTINUED | OUTPATIENT
Start: 2023-06-26 | End: 2023-06-28

## 2023-06-26 RX ORDER — PREGABALIN 25 MG/1
25 CAPSULE ORAL 2 TIMES DAILY
Status: DISCONTINUED | OUTPATIENT
Start: 2023-06-26 | End: 2023-06-28

## 2023-06-26 RX ORDER — ASPIRIN 81 MG/1
81 TABLET ORAL EVERY OTHER DAY
Status: DISCONTINUED | OUTPATIENT
Start: 2023-06-26 | End: 2023-06-28

## 2023-06-26 RX ORDER — DEXTROSE MONOHYDRATE 100 MG/ML
1000 INJECTION, SOLUTION INTRAVENOUS ONCE
Status: COMPLETED | OUTPATIENT
Start: 2023-06-26 | End: 2023-06-26

## 2023-06-26 RX ORDER — DEXTROSE AND SODIUM CHLORIDE 5; .9 G/100ML; G/100ML
INJECTION, SOLUTION INTRAVENOUS CONTINUOUS
Status: DISCONTINUED | OUTPATIENT
Start: 2023-06-26 | End: 2023-06-27

## 2023-06-26 RX ORDER — HYDROCODONE BITARTRATE AND ACETAMINOPHEN 5; 325 MG/1; MG/1
2 TABLET ORAL EVERY 4 HOURS PRN
Status: DISCONTINUED | OUTPATIENT
Start: 2023-06-26 | End: 2023-06-28

## 2023-06-26 RX ORDER — ATORVASTATIN CALCIUM 20 MG/1
20 TABLET, FILM COATED ORAL NIGHTLY
Status: DISCONTINUED | OUTPATIENT
Start: 2023-06-26 | End: 2023-06-28

## 2023-06-26 RX ORDER — MORPHINE SULFATE 2 MG/ML
2 INJECTION, SOLUTION INTRAMUSCULAR; INTRAVENOUS EVERY 2 HOUR PRN
Status: DISCONTINUED | OUTPATIENT
Start: 2023-06-26 | End: 2023-06-28

## 2023-06-26 NOTE — PROGRESS NOTES
06/26/23 1115   Clinical Encounter Type   Visited With Patient   Routine Visit Introduction   Continue Visiting Yes   Family Spiritual Encounters   Family Participation in [place] During Treatment Consistently   Taxonomy   Intended Effects Demonstrate caring and concern   Methods Offer support   Interventions Silent prayer       Pt declined spiritual care visit, but stated that she would complete a POA when her daughter arrive.     Maris Raman, Chaplain Resident

## 2023-06-26 NOTE — ED QUICK NOTES
Orders for admission, patient is aware of plan and ready to go upstairs. Any questions, please call ED RN Claudette at [address].      Patient Covid vaccination status: Fully vaccinated     COVID Test Ordered in ED: None    COVID Suspicion at Admission: N/A    Running Infusions:  None    Mental Status/LOC at time of transport: AOx4    Other pertinent information:   CIWA score: N/A   NIH score:  N/A

## 2023-06-26 NOTE — ED INITIAL ASSESSMENT (HPI)
Pt arrives from home for c/o hypoglycemia. Per EMS oral glucose given upon entering pt's home. OG glucose was 50, dropped to 39 after oral glucose. IM glucagon given PTA by EMS, next glucose check was 139. Per son, unresponsive, and patient seems to be more alert and at her norm. Pt somewhat alert, but slurring words.

## 2023-06-26 NOTE — PLAN OF CARE
PT alert x4. Q1 accu-checks implemented. Frequent assessments/ pt education on diabetes type 2 given. PRN interventions for pt's pain given, see EMAR for more info. Pt's safety maintained  with frequent rounding as well as placing bed in lowest position. Call light education given. Problem: Diabetes/Glucose Control  Goal: Glucose maintained within prescribed range  Description: INTERVENTIONS:  - Monitor Blood Glucose as ordered  - Assess for signs and symptoms of hyperglycemia and hypoglycemia  - Administer ordered medications to maintain glucose within target range  - Assess barriers to adequate nutritional intake and initiate nutrition consult as needed  - Instruct patient on self management of diabetes  Outcome: Progressing     Problem: CARDIOVASCULAR - ADULT  Goal: Maintains optimal cardiac output and hemodynamic stability  Description: INTERVENTIONS:  - Monitor vital signs, rhythm, and trends  - Monitor for bleeding, hypotension and signs of decreased cardiac output  - Evaluate effectiveness of vasoactive medications to optimize hemodynamic stability  - Monitor arterial and/or venous puncture sites for bleeding and/or hematoma  - Assess quality of pulses, skin color and temperature  - Assess for signs of decreased coronary artery perfusion - ex.  Angina  - Evaluate fluid balance, assess for edema, trend weights  Outcome: Progressing  Goal: Absence of cardiac arrhythmias or at baseline  Description: INTERVENTIONS:  - Continuous cardiac monitoring, monitor vital signs, obtain 12 lead EKG if indicated  - Evaluate effectiveness of antiarrhythmic and heart rate control medications as ordered  - Initiate emergency measures for life threatening arrhythmias  - Monitor electrolytes and administer replacement therapy as ordered  Outcome: Progressing

## 2023-06-27 LAB
ANION GAP SERPL CALC-SCNC: 5 MMOL/L (ref 0–18)
BASOPHILS # BLD AUTO: 0.06 X10(3) UL (ref 0–0.2)
BASOPHILS NFR BLD AUTO: 1 %
BUN BLD-MCNC: 24 MG/DL (ref 7–18)
BUN/CREAT SERPL: 19.4 (ref 10–20)
CALCIUM BLD-MCNC: 8.8 MG/DL (ref 8.5–10.1)
CHLORIDE SERPL-SCNC: 119 MMOL/L (ref 98–112)
CO2 SERPL-SCNC: 19 MMOL/L (ref 21–32)
CREAT BLD-MCNC: 1.24 MG/DL
DEPRECATED RDW RBC AUTO: 43.6 FL (ref 35.1–46.3)
EOSINOPHIL # BLD AUTO: 0.3 X10(3) UL (ref 0–0.7)
EOSINOPHIL NFR BLD AUTO: 5 %
ERYTHROCYTE [DISTWIDTH] IN BLOOD BY AUTOMATED COUNT: 13.7 % (ref 11–15)
GFR SERPLBLD BASED ON 1.73 SQ M-ARVRAT: 48 ML/MIN/1.73M2 (ref 60–?)
GLUCOSE BLD-MCNC: 206 MG/DL (ref 70–99)
GLUCOSE BLDC GLUCOMTR-MCNC: 164 MG/DL (ref 70–99)
GLUCOSE BLDC GLUCOMTR-MCNC: 181 MG/DL (ref 70–99)
GLUCOSE BLDC GLUCOMTR-MCNC: 192 MG/DL (ref 70–99)
GLUCOSE BLDC GLUCOMTR-MCNC: 195 MG/DL (ref 70–99)
GLUCOSE BLDC GLUCOMTR-MCNC: 199 MG/DL (ref 70–99)
GLUCOSE BLDC GLUCOMTR-MCNC: 213 MG/DL (ref 70–99)
GLUCOSE BLDC GLUCOMTR-MCNC: 220 MG/DL (ref 70–99)
GLUCOSE BLDC GLUCOMTR-MCNC: 234 MG/DL (ref 70–99)
GLUCOSE BLDC GLUCOMTR-MCNC: 239 MG/DL (ref 70–99)
GLUCOSE BLDC GLUCOMTR-MCNC: 245 MG/DL (ref 70–99)
GLUCOSE BLDC GLUCOMTR-MCNC: 343 MG/DL (ref 70–99)
GLUCOSE BLDC GLUCOMTR-MCNC: 385 MG/DL (ref 70–99)
HCT VFR BLD AUTO: 30.5 %
HGB BLD-MCNC: 9.8 G/DL
IMM GRANULOCYTES # BLD AUTO: 0.01 X10(3) UL (ref 0–1)
IMM GRANULOCYTES NFR BLD: 0.2 %
LYMPHOCYTES # BLD AUTO: 1.57 X10(3) UL (ref 1–4)
LYMPHOCYTES NFR BLD AUTO: 26.4 %
MCH RBC QN AUTO: 27.9 PG (ref 26–34)
MCHC RBC AUTO-ENTMCNC: 32.1 G/DL (ref 31–37)
MCV RBC AUTO: 86.9 FL
MONOCYTES # BLD AUTO: 0.59 X10(3) UL (ref 0.1–1)
MONOCYTES NFR BLD AUTO: 9.9 %
NEUTROPHILS # BLD AUTO: 3.42 X10 (3) UL (ref 1.5–7.7)
NEUTROPHILS # BLD AUTO: 3.42 X10(3) UL (ref 1.5–7.7)
NEUTROPHILS NFR BLD AUTO: 57.5 %
OSMOLALITY SERPL CALC.SUM OF ELEC: 306 MOSM/KG (ref 275–295)
PLATELET # BLD AUTO: 233 10(3)UL (ref 150–450)
POTASSIUM SERPL-SCNC: 4.1 MMOL/L (ref 3.5–5.1)
RBC # BLD AUTO: 3.51 X10(6)UL
SODIUM SERPL-SCNC: 143 MMOL/L (ref 136–145)
WBC # BLD AUTO: 6 X10(3) UL (ref 4–11)

## 2023-06-27 PROCEDURE — 99233 SBSQ HOSP IP/OBS HIGH 50: CPT | Performed by: HOSPITALIST

## 2023-06-27 RX ORDER — LISINOPRIL 20 MG/1
40 TABLET ORAL DAILY
Status: DISCONTINUED | OUTPATIENT
Start: 2023-06-27 | End: 2023-06-27

## 2023-06-27 RX ORDER — DEXTROSE MONOHYDRATE 25 G/50ML
50 INJECTION, SOLUTION INTRAVENOUS
Status: DISCONTINUED | OUTPATIENT
Start: 2023-06-27 | End: 2023-06-28

## 2023-06-27 RX ORDER — LISINOPRIL 40 MG/1
40 TABLET ORAL DAILY
Status: DISCONTINUED | OUTPATIENT
Start: 2023-06-27 | End: 2023-06-28

## 2023-06-27 RX ORDER — HYDRALAZINE HYDROCHLORIDE 20 MG/ML
10 INJECTION INTRAMUSCULAR; INTRAVENOUS EVERY 6 HOURS PRN
Status: DISCONTINUED | OUTPATIENT
Start: 2023-06-27 | End: 2023-06-28

## 2023-06-27 RX ORDER — NICOTINE POLACRILEX 4 MG
30 LOZENGE BUCCAL
Status: DISCONTINUED | OUTPATIENT
Start: 2023-06-27 | End: 2023-06-28

## 2023-06-27 RX ORDER — NICOTINE POLACRILEX 4 MG
15 LOZENGE BUCCAL
Status: DISCONTINUED | OUTPATIENT
Start: 2023-06-27 | End: 2023-06-28

## 2023-06-27 NOTE — DIABETES ED
[facility]    Diabetes Education  Note    Douglas Cruz Patient Status:  Inpatient   10/6/1955 MRN [id number]  Location [facility] 2W/SW Attending Vitaliy Tobin MD  [facility] Day # 1 PCP [address]:    HEMOGLOBIN A1C (%)   Date Value   2022 7.8 (A)     HgbA1C (%)   Date Value   2023 6.5 (H)         Reason for Visit:Hypoglycemia upon admission. Patient indicates she has not experienced low blood sugar previously and is unaware of signs and symptoms. She indicates she has been administering Ozempic for 1-2 months however she utilized a new pen on Thursday and \"did not feel right\". She admits she does not monitor her blood sugar on a regular basis. Instructed on the importance of monitoring blood sugar regularly to under stand trends of blood sugar. Instructed on signs, symptoms prevention and treatment of hypoglycemia. Recent changes in Medicare guidelines indicate she would qualify for a  personal Continuous Glucose monitoring device due to occurrence of severe hypoglycemia. She would like to follow up with Dr. Hina Rao office at discharge. Education Provided:  Hypoglycemia symptoms/treatment/prevention  Importance of close follow up with PCP and medical team    Patient verbalized understanding and was receptive to information provided.       Recommendations:  Attend outpatient diabetes education          Arcadio Lion [facility]  Diabetes Educator  2023  2:10 PM

## 2023-06-27 NOTE — PHYSICAL THERAPY NOTE
Attempted to see patient for therapy evaluation this date, initially cleared by RN for eval. BP taken in sitting in chair, 217/90, rechecked with similar results. RN aware and addressing, will follow up as pt status permits tomorrow.        Josee Baeza, PT

## 2023-06-27 NOTE — PROGRESS NOTES
06/27/23 1400   VISIT TYPE   OT Inpatient Visit Type (Documentation Required) Attempted Evaluation     OT orders recieved, chart reviewed. Spoke to nurse who initially cleared pt to participate in session. Prior to initiating eval, resting BP taken from sitting in chair-  217/90. BP taken multiple times with different cuffs with similar results. Nurse present and addressing. Plan to reschedule eval for tomorrow as appropriate.

## 2023-06-28 VITALS
DIASTOLIC BLOOD PRESSURE: 83 MMHG | RESPIRATION RATE: 16 BRPM | SYSTOLIC BLOOD PRESSURE: 156 MMHG | OXYGEN SATURATION: 99 % | BODY MASS INDEX: 32 KG/M2 | HEART RATE: 75 BPM | TEMPERATURE: 99 F | WEIGHT: 167 LBS

## 2023-06-28 LAB
ANION GAP SERPL CALC-SCNC: 6 MMOL/L (ref 0–18)
BASOPHILS # BLD AUTO: 0.1 X10(3) UL (ref 0–0.2)
BASOPHILS NFR BLD AUTO: 1.4 %
BUN BLD-MCNC: 27 MG/DL (ref 7–18)
BUN/CREAT SERPL: 21.8 (ref 10–20)
CALCIUM BLD-MCNC: 9.3 MG/DL (ref 8.5–10.1)
CHLORIDE SERPL-SCNC: 117 MMOL/L (ref 98–112)
CO2 SERPL-SCNC: 20 MMOL/L (ref 21–32)
CREAT BLD-MCNC: 1.24 MG/DL
DEPRECATED RDW RBC AUTO: 41.9 FL (ref 35.1–46.3)
EOSINOPHIL # BLD AUTO: 0.34 X10(3) UL (ref 0–0.7)
EOSINOPHIL NFR BLD AUTO: 4.6 %
ERYTHROCYTE [DISTWIDTH] IN BLOOD BY AUTOMATED COUNT: 13.5 % (ref 11–15)
GFR SERPLBLD BASED ON 1.73 SQ M-ARVRAT: 48 ML/MIN/1.73M2 (ref 60–?)
GLUCOSE BLD-MCNC: 183 MG/DL (ref 70–99)
GLUCOSE BLDC GLUCOMTR-MCNC: 198 MG/DL (ref 70–99)
GLUCOSE BLDC GLUCOMTR-MCNC: 270 MG/DL (ref 70–99)
HCT VFR BLD AUTO: 29.8 %
HGB BLD-MCNC: 9.8 G/DL
IMM GRANULOCYTES # BLD AUTO: 0.03 X10(3) UL (ref 0–1)
IMM GRANULOCYTES NFR BLD: 0.4 %
LYMPHOCYTES # BLD AUTO: 1.57 X10(3) UL (ref 1–4)
LYMPHOCYTES NFR BLD AUTO: 21.3 %
MCH RBC QN AUTO: 27.9 PG (ref 26–34)
MCHC RBC AUTO-ENTMCNC: 32.9 G/DL (ref 31–37)
MCV RBC AUTO: 84.9 FL
MONOCYTES # BLD AUTO: 0.63 X10(3) UL (ref 0.1–1)
MONOCYTES NFR BLD AUTO: 8.5 %
NEUTROPHILS # BLD AUTO: 4.71 X10 (3) UL (ref 1.5–7.7)
NEUTROPHILS # BLD AUTO: 4.71 X10(3) UL (ref 1.5–7.7)
NEUTROPHILS NFR BLD AUTO: 63.8 %
OSMOLALITY SERPL CALC.SUM OF ELEC: 306 MOSM/KG (ref 275–295)
PLATELET # BLD AUTO: 259 10(3)UL (ref 150–450)
POTASSIUM SERPL-SCNC: 4.6 MMOL/L (ref 3.5–5.1)
RBC # BLD AUTO: 3.51 X10(6)UL
SODIUM SERPL-SCNC: 143 MMOL/L (ref 136–145)
WBC # BLD AUTO: 7.4 X10(3) UL (ref 4–11)

## 2023-06-28 PROCEDURE — 99239 HOSP IP/OBS DSCHRG MGMT >30: CPT | Performed by: HOSPITALIST

## 2023-06-28 RX ORDER — CYCLOBENZAPRINE HCL 10 MG
10 TABLET ORAL 3 TIMES DAILY PRN
Qty: 20 TABLET | Refills: 0 | Status: SHIPPED | COMMUNITY
Start: 2023-06-28

## 2023-06-28 RX ORDER — GLIPIZIDE 5 MG/1
5 TABLET ORAL
Refills: 0 | Status: SHIPPED | COMMUNITY
Start: 2023-06-28

## 2023-06-28 NOTE — CM/SW NOTE
06/27/23 1900   CM/SW Referral Data   Referral Source    Reason for Referral Discharge planning   Medical Hx   Does patient have an established PCP? Yes   Patient Info   Patient's Current Mental Status at Time of Assessment Alert   Patient's [address]   Number of Levels in Home 2   Number of Stair in Home 14   Patient lives with Son;Sibling   Patient Status Prior to Admission   Independent with ADLs and Mobility No   Pt. requires assistance with Housework;Driving; Ambulating; Bathing  (uses can and walker, has shower chair.)   Discharge Needs   Anticipated D/C needs Home health care     Met with Ava at bedside. Colleen Shin has support at home. Awaiting PT/OT recommendations for dc planning needs. / to remain available for support and/or discharge planning.       David HURSTN RN [address]  RN Case Manager  [phone]

## 2023-06-28 NOTE — CM/SW NOTE
06/28/23 1400   Discharge disposition   Expected discharge disposition Home or Self   Additional Home Care/Hospice Provider   (Diaz Jorgensen)   Discharge transportation Private car     Pt discussed in [address]. PT is rec HH. Pt is agreeable. SW placed f2f and initiated [facility] referrals. Pt received MDO for discharge. Pt made aware only one accepting provider available for [facility]. [facility] made aware of pt discharging today. Plan: DC home with [facility]. FAREED/MARIZA to remain available for support and/or discharge planning.      Manoj Resendiz, MSW, LSW   x [phone]

## 2023-06-28 NOTE — PLAN OF CARE
Patient transferred from [facility]. Chart, patient hospital medications and personal belongings transferred with patient. Alert and orientated x4. Vitals monitored, room air. Tele order placed, remote tele placed on patient. Purewick in place. SCDs placed. Norco given for pain. Blood pressure elevated, IV hydralazine administered at 0022 and scheduled oral hydralazine administered at 0041. Sacrum mepilex placed as preventive. Patient stated using cane and walker at home. Glasses and dentures at home. Bed low, locked, bed alarm in place. Call light in reach. Problem: Patient Centered Care  Goal: Patient preferences are identified and integrated in the patient's plan of care  Description: Interventions:  - What would you like us to know as we care for you?  Home with family  - Provide timely, complete, and accurate information to patient/family  - Incorporate patient and family knowledge, values, beliefs, and cultural backgrounds into the planning and delivery of care  - Encourage patient/family to participate in care and decision-making at the level they choose  - Honor patient and family perspectives and choices  Outcome: Progressing     Problem: Diabetes/Glucose Control  Goal: Glucose maintained within prescribed range  Description: INTERVENTIONS:  - Monitor Blood Glucose as ordered  - Assess for signs and symptoms of hyperglycemia and hypoglycemia  - Administer ordered medications to maintain glucose within target range  - Assess barriers to adequate nutritional intake and initiate nutrition consult as needed  - Instruct patient on self management of diabetes  Outcome: Progressing     Problem: CARDIOVASCULAR - ADULT  Goal: Maintains optimal cardiac output and hemodynamic stability  Description: INTERVENTIONS:  - Monitor vital signs, rhythm, and trends  - Monitor for bleeding, hypotension and signs of decreased cardiac output  - Evaluate effectiveness of vasoactive medications to optimize hemodynamic stability  - Monitor arterial and/or venous puncture sites for bleeding and/or hematoma  - Assess quality of pulses, skin color and temperature  - Assess for signs of decreased coronary artery perfusion - ex. Angina  - Evaluate fluid balance, assess for edema, trend weights  Outcome: Progressing  Goal: Absence of cardiac arrhythmias or at baseline  Description: INTERVENTIONS:  - Continuous cardiac monitoring, monitor vital signs, obtain 12 lead EKG if indicated  - Evaluate effectiveness of antiarrhythmic and heart rate control medications as ordered  - Initiate emergency measures for life threatening arrhythmias  - Monitor electrolytes and administer replacement therapy as ordered  Outcome: Progressing     Problem: SAFETY ADULT - FALL  Goal: Free from fall injury  Description: INTERVENTIONS:  - Assess pt frequently for physical needs  - Identify cognitive and physical deficits and behaviors that affect risk of falls.   - [place] fall precautions as indicated by assessment.  - Educate pt/family on patient safety including physical limitations  - Instruct pt to call for assistance with activity based on assessment  - Modify environment to reduce risk of injury  - Provide assistive devices as appropriate  - Consider OT/PT consult to assist with strengthening/mobility  - Encourage toileting schedule  Outcome: Progressing     Problem: PAIN - ADULT  Goal: Verbalizes/displays adequate comfort level or patient's stated pain goal  Description: INTERVENTIONS:  - Encourage pt to monitor pain and request assistance  - Assess pain using appropriate pain scale  - Administer analgesics based on type and severity of pain and evaluate response  - Implement non-pharmacological measures as appropriate and evaluate response  - Consider cultural and social influences on pain and pain management  - Manage/alleviate anxiety  - Utilize distraction and/or relaxation techniques  - Monitor for opioid side effects  - Notify MD/LIP if interventions unsuccessful or patient reports new pain  - Anticipate increased pain with activity and pre-medicate as appropriate  Outcome: Progressing     Problem: RISK FOR INFECTION - ADULT  Goal: Absence of fever/infection during anticipated neutropenic period  Description: INTERVENTIONS  - Monitor WBC  - Administer growth factors as ordered  - Implement neutropenic guidelines  Outcome: Progressing     Problem: DISCHARGE PLANNING  Goal: Discharge to home or other facility with appropriate resources  Description: INTERVENTIONS:  - Identify barriers to discharge w/pt and caregiver  - Include patient/family/discharge partner in discharge planning  - Arrange for needed discharge resources and transportation as appropriate  - Identify discharge learning needs (meds, wound care, etc)  - Arrange for interpreters to assist at discharge as needed  - Consider post-discharge preferences of patient/family/discharge partner  - Complete POLST form as appropriate  - Assess patient's ability to be responsible for managing their own health  - Refer to Case Management Department for coordinating discharge planning if the patient needs post-hospital services based on physician/LIP order or complex needs related to functional status, cognitive ability or social support system  Outcome: Progressing     Problem: Altered Communication/Language Barrier  Goal: Patient/Family is able to understand and participate in their care  Description: Interventions:  - Assess communication ability and preferred communication style  - Implement communication aides and strategies  - Use visual cues when possible  - Listen attentively, be patient, do not interrupt  - Minimize distractions  - Allow time for understanding and response  - Establish method for patient to ask for assistance (call light)  - Provide an  as needed  - Communicate barriers and strategies to overcome with those who interact with patient  Outcome: Progressing Problem: Patient/Family Goals  Goal: Patient/Family Long Term Goal  Description: Patient's Long Term Goal: free of pain    Interventions:  - monitor pain score  - See additional Care Plan goals for specific interventions  Outcome: Progressing  Goal: Patient/Family Short Term Goal  Description: Patient's Short Term Goal: normal blood pressure range    Interventions:   - monitor vitals  - See additional Care Plan goals for specific interventions  Outcome: Progressing

## 2023-06-28 NOTE — PROGRESS NOTES
Double RN skin check done prior to transfer off Unit. Skin check performed by this RN and Florencio RN. Wounds are as follows: N/A     Will remain available for any further questions or concerns.

## 2023-06-28 NOTE — PLAN OF CARE
No acute changes. Pt up walking with staff in hallway. Discharge education discussed. Pt verbalized understanding to only take the oral glipizide and NO insulin. Discussed follow up needed in 2 weeks with endocrinology. All safety measures in place. IV taken out. Discharged home with family support. Problem: Patient Centered Care  Goal: Patient preferences are identified and integrated in the patient's plan of care  Description: Interventions:  - What would you like us to know as we care for you? I have family to help me at home  - Provide timely, complete, and accurate information to patient/family  - Incorporate patient and family knowledge, values, beliefs, and cultural backgrounds into the planning and delivery of care  - Encourage patient/family to participate in care and decision-making at the level they choose  - Honor patient and family perspectives and choices  Outcome: Completed     Problem: Diabetes/Glucose Control  Goal: Glucose maintained within prescribed range  Description: INTERVENTIONS:  - Monitor Blood Glucose as ordered  - Assess for signs and symptoms of hyperglycemia and hypoglycemia  - Administer ordered medications to maintain glucose within target range  - Assess barriers to adequate nutritional intake and initiate nutrition consult as needed  - Instruct patient on self management of diabetes  Outcome: Completed     Problem: CARDIOVASCULAR - ADULT  Goal: Maintains optimal cardiac output and hemodynamic stability  Description: INTERVENTIONS:  - Monitor vital signs, rhythm, and trends  - Monitor for bleeding, hypotension and signs of decreased cardiac output  - Evaluate effectiveness of vasoactive medications to optimize hemodynamic stability  - Monitor arterial and/or venous puncture sites for bleeding and/or hematoma  - Assess quality of pulses, skin color and temperature  - Assess for signs of decreased coronary artery perfusion - ex.  Angina  - Evaluate fluid balance, assess for edema, trend weights  Outcome: Completed  Goal: Absence of cardiac arrhythmias or at baseline  Description: INTERVENTIONS:  - Continuous cardiac monitoring, monitor vital signs, obtain 12 lead EKG if indicated  - Evaluate effectiveness of antiarrhythmic and heart rate control medications as ordered  - Initiate emergency measures for life threatening arrhythmias  - Monitor electrolytes and administer replacement therapy as ordered  Outcome: Completed     Problem: SAFETY ADULT - FALL  Goal: Free from fall injury  Description: INTERVENTIONS:  - Assess pt frequently for physical needs  - Identify cognitive and physical deficits and behaviors that affect risk of falls.   - [place] fall precautions as indicated by assessment.  - Educate pt/family on patient safety including physical limitations  - Instruct pt to call for assistance with activity based on assessment  - Modify environment to reduce risk of injury  - Provide assistive devices as appropriate  - Consider OT/PT consult to assist with strengthening/mobility  - Encourage toileting schedule  Outcome: Completed     Problem: PAIN - ADULT  Goal: Verbalizes/displays adequate comfort level or patient's stated pain goal  Description: INTERVENTIONS:  - Encourage pt to monitor pain and request assistance  - Assess pain using appropriate pain scale  - Administer analgesics based on type and severity of pain and evaluate response  - Implement non-pharmacological measures as appropriate and evaluate response  - Consider cultural and social influences on pain and pain management  - Manage/alleviate anxiety  - Utilize distraction and/or relaxation techniques  - Monitor for opioid side effects  - Notify MD/LIP if interventions unsuccessful or patient reports new pain  - Anticipate increased pain with activity and pre-medicate as appropriate  Outcome: Completed     Problem: RISK FOR INFECTION - ADULT  Goal: Absence of fever/infection during anticipated neutropenic period  Description: INTERVENTIONS  - Monitor WBC  - Administer growth factors as ordered  - Implement neutropenic guidelines  Outcome: Completed     Problem: DISCHARGE PLANNING  Goal: Discharge to home or other facility with appropriate resources  Description: INTERVENTIONS:  - Identify barriers to discharge w/pt and caregiver  - Include patient/family/discharge partner in discharge planning  - Arrange for needed discharge resources and transportation as appropriate  - Identify discharge learning needs (meds, wound care, etc)  - Arrange for interpreters to assist at discharge as needed  - Consider post-discharge preferences of patient/family/discharge partner  - Complete POLST form as appropriate  - Assess patient's ability to be responsible for managing their own health  - Refer to Case Management Department for coordinating discharge planning if the patient needs post-hospital services based on physician/LIP order or complex needs related to functional status, cognitive ability or social support system  Outcome: Completed     Problem: Altered Communication/Language Barrier  Goal: Patient/Family is able to understand and participate in their care  Description: Interventions:  - Assess communication ability and preferred communication style  - Implement communication aides and strategies  - Use visual cues when possible  - Listen attentively, be patient, do not interrupt  - Minimize distractions  - Allow time for understanding and response  - Establish method for patient to ask for assistance (call light)  - Provide an  as needed  - Communicate barriers and strategies to overcome with those who interact with patient  Outcome: Completed

## 2023-06-28 NOTE — PHYSICAL THERAPY NOTE
PHYSICAL THERAPY EVALUATION - INPATIENT     Room Number: 564/564-A  Evaluation Date: 6/28/2023  Type of Evaluation: Initial   Physician Order: PT Eval and Treat    Presenting Problem: hypoglycemia  Co-Morbidities : copd, dm, htn, pvd, ckd, sickle cell trait  Reason for Therapy: Mobility Dysfunction and Discharge Planning    PHYSICAL THERAPY ASSESSMENT     Patient is a 74year old female admitted 6/26/2023 for AMS, diagnosed with hypoglycemia and UTI. Patient's current functional deficits include impaired bed mobility, transfers, ambulation and stair negotiation, which are below the patient's pre-admission status. Patient will benefit from continued inpatient physical therapy to address above issues so that patient may achieve highest functional mobility level.  , which are below the patient's pre-admission status. Pt ok to see per rn, pt recd in supine, educated in role of PT, goals for session, ankle pumps for DVT prevention, importance of consistent mobility. Pt is alert and oriented x 4, able to follow all commands. Pt reporting right LE pain, pain meds given prior to PT session. Pt transferred supine to sit with no assist, good sitting balance with no dizziness, /69, rn aware. Pt stood to rw with cga, gait training with rw with emphasis on safe rw use, upright posture. Pt with slightly decreased wt bearing right LE, pt reporting pain \"a little bit better\" with walking. Pt tolerated ambulation well with cga. Pt stating she will have an appointment with vascular surgeon upcoming, possible procedure for PVD with right LE occlusion. Pt encouraged to use her rw at home to improve pain/tolerance for ambulation. Pt expressed good understanding. Pt up to recliner following ambulation, chair alarm activated. The patient's Approx Degree of Impairment: 35.83% has been calculated based on documentation in the [facility] '6 clicks' Inpatient Basic Mobility Short Form.   Research supports that patients with this level of impairment may benefit from home with supportive family and home PT.    DISCHARGE RECOMMENDATIONS  PT Discharge Recommendations: Home with home health PT;24 hour care/supervision    PLAN  PT Treatment Plan: Endurance; Energy conservation;Patient education;Gait training;Strengthening;Stair training  Rehab Potential : Good  Frequency (Obs): 3-5x/week       PHYSICAL THERAPY MEDICAL/SOCIAL HISTORY        Problem List  Principal Problem:    Hypoglycemia  Active Problems:    Acute cystitis without hematuria      HOME SITUATION  Home Situation  Type of Home: House  Home Layout: Two level  Stairs to Bedroom: 13  Railing: Yes  Lives With: Son (son and sister)  Drives: No  Patient Owned Equipment: Rolling walker;Cane     Prior Level of [place]: Pt reports she lives with her son and her sister, was not using assistive device pta in her home, using cane when she goes out, but has rw and cane. SUBJECTIVE  \"I want to keep walking\"    PHYSICAL THERAPY EXAMINATION     OBJECTIVE     Fall Risk: Standard fall risk    WEIGHT BEARING RESTRICTION                PAIN ASSESSMENT  Ratin  Location: right LE  Management Techniques: Activity promotion;Repositioning    COGNITION  Overall Cognitive Status:  WFL - within functional limits    RANGE OF MOTION AND STRENGTH ASSESSMENT  Upper extremity ROM and strength are within functional limits   Lower extremity ROM is within functional limits   Lower extremity strength is within functional limits     BALANCE  Static Sitting: Good  Dynamic Sitting: Good  Static Standing: Fair -  Dynamic Standing: Poor +      O2 WALK  Oxygen Therapy  SPO2% on Room Air at Rest: 98    AM-PAC '6-Clicks' INPATIENT SHORT FORM - BASIC MOBILITY  How much difficulty does the patient currently have. ..   Patient Difficulty: Turning over in bed (including adjusting bedclothes, sheets and blankets)?: None   Patient Difficulty: Sitting down on and standing up from a chair with arms (e.g., wheelchair, bedside commode, etc.): A Little   Patient Difficulty: Moving from lying on back to sitting on the side of the bed?: None   How much help from another person does the patient currently need. .. Help from Another: Moving to and from a bed to a chair (including a wheelchair)?: A Little   Help from Another: Need to walk in hospital room?: A Little   Help from Another: Climbing 3-5 steps with a railing?: A Little     AM-PAC Score:  Raw Score: 20   Approx Degree of Impairment: 35.83%   Standardized Score (AM-PAC Scale): 47.67   CMS Modifier (G-Code): CJ    FUNCTIONAL ABILITY STATUS  Functional Mobility/Gait Assessment  Gait Assistance: Contact guard assist  Distance (ft): 80  Assistive Device: Rolling walker  Pattern: R Decreased stance time      Exercise/Education Provided:  Bed mobility  Functional activity tolerated  Gait training  Posture    Patient End of Session: Up in chair;Needs met;Call light within reach;RN aware of session/findings; All patient questions and concerns addressed; Alarm set    CURRENT GOALS    Goals to be met by: 6/30/23  Patient Goal Patient's self-stated goal is: to go home   Goal #1 Patient is able to demonstrate supine - sit EOB @ level: independent     Goal #1   Current Status    Goal #2 Patient is able to demonstrate transfers Sit to/from Stand at assistance level: supervision with walker - rolling     Goal #2  Current Status    Goal #3 Patient is able to ambulate 100 feet with assist device: walker - rolling at assistance level: supervision   Goal #3   Current Status    Goal #4 Patient will negotiate 4 stairs/one curb w/ assistive device and supervision   Goal #4   Current Status    Goal #5 Patient to demonstrate independence with home activity/exercise instructions provided to patient in preparation for discharge.    Goal #5   Current Status    Goal #6    Goal #6  Current Status    Patient Evaluation Complexity Level:  History Low - no personal factors and/or co-morbidities   Examination of body systems Low - addressing 1-2 elements   Clinical Presentation Low - Stable   Clinical Decision Making Low Complexity       Gait Training: 15 minutes  Therapeutic Activity: 10 minutes

## 2023-07-03 NOTE — TELEPHONE ENCOUNTER
Please review; protocol failed.     Requested Prescriptions   Pending Prescriptions Disp Refills    CLOPIDOGREL 75 MG Oral Tab [Pharmacy Med Name: CLOPIDOGREL 75 MG TABLET] 90 tablet 1     Sig: TAKE 1 TABLET BY MOUTH EVERY DAY       There is no refill protocol information for this order        Future Appointments         Provider Department Appt Notes    In 4 weeks Nico Huntley, [address] for Pain Management 2 mo med eval  10/22/20          Recent Outpatient Visits              3 weeks ago Failed back syndrome of lumbar spine    [facility] Yumiko Fong MD    Office Visit    2 months ago Encounter for monitoring opioid maintenance therapy    [facility] KARINE Gonsalves    Office Visit    4 months ago Encounter for monitoring opioid maintenance therapy    [facility] KARINE Gonsalves    Office Visit    6 months ago Encounter for monitoring opioid maintenance therapy    [facility] KARINE Gonsalves    Office Visit    8 months ago Encounter for monitoring opioid maintenance therapy    [facility] KARINE Gonsalves    Office Visit

## 2023-07-05 ENCOUNTER — TELEPHONE (OUTPATIENT)
Dept: PAIN CLINIC | Facility: HOSPITAL | Age: 68
End: 2023-07-05

## 2023-07-05 RX ORDER — CLOPIDOGREL BISULFATE 75 MG/1
75 TABLET ORAL DAILY
Qty: 90 TABLET | Refills: 1 | Status: SHIPPED | OUTPATIENT
Start: 2023-07-05

## 2023-07-05 RX ORDER — HYDROCODONE BITARTRATE AND ACETAMINOPHEN 10; 325 MG/1; MG/1
1 TABLET ORAL EVERY 4 HOURS PRN
Qty: 150 TABLET | Refills: 0 | Status: SHIPPED | OUTPATIENT
Start: 2023-07-06 | End: 2023-08-05

## 2023-07-05 RX ORDER — HYDROCODONE BITARTRATE AND ACETAMINOPHEN 10; 325 MG/1; MG/1
1 TABLET ORAL EVERY 4 HOURS PRN
Qty: 150 TABLET | Refills: 0 | Status: SHIPPED | OUTPATIENT
Start: 2023-08-05 | End: 2023-09-04

## 2023-07-07 RX ORDER — ALBUTEROL SULFATE 90 UG/1
2 AEROSOL, METERED RESPIRATORY (INHALATION) EVERY 6 HOURS PRN
Qty: 8.5 EACH | Refills: 1 | Status: SHIPPED | OUTPATIENT
Start: 2023-07-07

## 2023-07-07 NOTE — PLAN OF CARE
You are scheduled to have a Right leg angiogram  Do NOT eat or drink anything after Midnight. Medications you are allowed to take can be taken with a sip of water. Use Betasept/ Hibiclens soap for 3 consecutive days. ARRIVAL:  Date: 7/11/23 Tuesday  Arrival: 7:00 am  Procedure: 8:00 am  [facility] the [facility] [employer] [facility]. Enter at the Main entrance of the hospital.   Check in at [address] [place] [address] will be directed where to go after you Check In. Please bring your insurance cards and ID with you. If sedation is given, you WILL NOT be able to drive home. You will need a responsible adult  to drive you home. Pre-OP Testing   NONE     PROCEDURE     The procedure usually takes 1.50 hours. You will then go to recovery for 2 hours. A procedural nurse will be calling you 1 to 2 days prior to your procedure to review your medical history and medications. AFTER PROCEDURE:     Your nurse will give you instructions before discharge. Most people can resume normal activities in 2-3 days.   Any questions, please call [phone]

## 2023-07-07 NOTE — TELEPHONE ENCOUNTER
LOV: 12/03/2021 (pt. Informed follow-up required)  Last refill: 12/23/2022    Lilliana Whitaker PA-C-please review and sign pended prescription if agreeable.

## 2023-07-11 ENCOUNTER — HOSPITAL ENCOUNTER (OUTPATIENT)
Dept: INTERVENTIONAL RADIOLOGY/VASCULAR | Facility: HOSPITAL | Age: 68
Discharge: HOME | End: 2023-07-11
Attending: RADIOLOGY | Admitting: RADIOLOGY
Payer: MEDICARE

## 2023-07-11 VITALS
HEART RATE: 64 BPM | HEIGHT: 63 IN | DIASTOLIC BLOOD PRESSURE: 68 MMHG | WEIGHT: 157 LBS | OXYGEN SATURATION: 95 % | RESPIRATION RATE: 17 BRPM | SYSTOLIC BLOOD PRESSURE: 157 MMHG | BODY MASS INDEX: 27.82 KG/M2 | TEMPERATURE: 97 F

## 2023-07-11 DIAGNOSIS — I73.9 PAD (PERIPHERAL ARTERY DISEASE) (HCC): ICD-10-CM

## 2023-07-11 LAB
GLUCOSE BLDC GLUCOMTR-MCNC: 308 MG/DL (ref 70–99)
ISTAT ACTIVATED CLOTTING TIME: 209 SECONDS (ref 125–137)

## 2023-07-11 PROCEDURE — 82962 GLUCOSE BLOOD TEST: CPT

## 2023-07-11 PROCEDURE — 36415 COLL VENOUS BLD VENIPUNCTURE: CPT

## 2023-07-11 PROCEDURE — B41G1ZZ FLUOROSCOPY OF LEFT LOWER EXTREMITY ARTERIES USING LOW OSMOLAR CONTRAST: ICD-10-PCS | Performed by: RADIOLOGY

## 2023-07-11 PROCEDURE — 85347 COAGULATION TIME ACTIVATED: CPT

## 2023-07-11 PROCEDURE — 36247 INS CATH ABD/L-EXT ART 3RD: CPT | Performed by: RADIOLOGY

## 2023-07-11 PROCEDURE — 75716 ARTERY X-RAYS ARMS/LEGS: CPT | Performed by: RADIOLOGY

## 2023-07-11 PROCEDURE — 99152 MOD SED SAME PHYS/QHP 5/>YRS: CPT | Performed by: RADIOLOGY

## 2023-07-11 PROCEDURE — 37224 HC TRANSLUMINAL ANGIOPLASTY FEM POP UNILATERAL: CPT | Performed by: RADIOLOGY

## 2023-07-11 PROCEDURE — B41F1ZZ FLUOROSCOPY OF RIGHT LOWER EXTREMITY ARTERIES USING LOW OSMOLAR CONTRAST: ICD-10-PCS | Performed by: RADIOLOGY

## 2023-07-11 PROCEDURE — 99153 MOD SED SAME PHYS/QHP EA: CPT | Performed by: RADIOLOGY

## 2023-07-11 RX ORDER — CLOPIDOGREL BISULFATE 75 MG/1
TABLET ORAL
Status: COMPLETED
Start: 2023-07-11 | End: 2023-07-11

## 2023-07-11 RX ORDER — HEPARIN SODIUM 1000 [USP'U]/ML
INJECTION, SOLUTION INTRAVENOUS; SUBCUTANEOUS
Status: COMPLETED
Start: 2023-07-11 | End: 2023-07-11

## 2023-07-11 RX ORDER — LIDOCAINE HYDROCHLORIDE 20 MG/ML
INJECTION, SOLUTION EPIDURAL; INFILTRATION; INTRACAUDAL; PERINEURAL
Status: COMPLETED
Start: 2023-07-11 | End: 2023-07-11

## 2023-07-11 RX ORDER — SODIUM CHLORIDE 0.9 % (FLUSH) 0.9 %
10 SYRINGE (ML) INJECTION AS NEEDED
Status: DISCONTINUED | OUTPATIENT
Start: 2023-07-11 | End: 2023-07-11

## 2023-07-11 RX ORDER — CLOPIDOGREL BISULFATE 75 MG/1
75 TABLET ORAL ONCE
Status: COMPLETED | OUTPATIENT
Start: 2023-07-11 | End: 2023-07-11

## 2023-07-11 RX ORDER — SODIUM CHLORIDE 9 MG/ML
INJECTION, SOLUTION INTRAVENOUS CONTINUOUS
Status: DISCONTINUED | OUTPATIENT
Start: 2023-07-11 | End: 2023-07-11

## 2023-07-11 RX ORDER — MIDAZOLAM HYDROCHLORIDE 1 MG/ML
INJECTION INTRAMUSCULAR; INTRAVENOUS
Status: COMPLETED
Start: 2023-07-11 | End: 2023-07-11

## 2023-07-11 RX ADMIN — SODIUM CHLORIDE: 9 INJECTION, SOLUTION INTRAVENOUS at 06:30:00

## 2023-07-11 RX ADMIN — CLOPIDOGREL BISULFATE 75 MG: 75 TABLET ORAL at 07:16:00

## 2023-07-11 NOTE — INTERVAL H&P NOTE
The above referenced H&P was reviewed by Phylicia Johnson MD on 7/11/2023, the patient was examined and no significant changes have occurred in the patient's condition since the H&P was performed. Risks, benefits, alternative treatments and consequences of no treatment were discussed. We will proceed with procedure as planned.       Phylicia Johnson MD  7/11/2023  7:05 AM

## 2023-07-11 NOTE — IVS NOTE
DISCHARGE NOTE     Pt is able to sit up and ambulate without difficulty. Pt voided and tolerated fluids and food. Left groin procedural site remains dry and intact with good circulation, motion, and sensation. No signs and symptoms of bleeding/hematoma noted. Pt denies any pain or discomfort at this time. IV access removed  Instruction provided, patient/family verbalizes understanding. Dr. Lizbeth Durand spoke with patient/family post procedure.      [facility]     Follow up Appointment: n/a    New Prescription: n/a

## 2023-07-15 ENCOUNTER — OFFICE VISIT (OUTPATIENT)
Dept: ENDOCRINOLOGY CLINIC | Facility: CLINIC | Age: 68
End: 2023-07-15

## 2023-07-15 ENCOUNTER — TELEPHONE (OUTPATIENT)
Dept: ENDOCRINOLOGY CLINIC | Facility: CLINIC | Age: 68
End: 2023-07-15

## 2023-07-15 VITALS
BODY MASS INDEX: 28 KG/M2 | WEIGHT: 158 LBS | HEART RATE: 85 BPM | SYSTOLIC BLOOD PRESSURE: 106 MMHG | DIASTOLIC BLOOD PRESSURE: 76 MMHG

## 2023-07-15 DIAGNOSIS — E78.5 DYSLIPIDEMIA: ICD-10-CM

## 2023-07-15 DIAGNOSIS — E11.69 TYPE 2 DIABETES MELLITUS WITH OTHER SPECIFIED COMPLICATION, WITHOUT LONG-TERM CURRENT USE OF INSULIN (HCC): Primary | ICD-10-CM

## 2023-07-15 DIAGNOSIS — E11.649 SEVERE DIABETIC HYPOGLYCEMIA (HCC): ICD-10-CM

## 2023-07-15 LAB
GLUCOSE BLOOD: 389
TEST STRIP LOT #: NORMAL NUMERIC

## 2023-07-15 PROCEDURE — 82947 ASSAY GLUCOSE BLOOD QUANT: CPT | Performed by: INTERNAL MEDICINE

## 2023-07-15 PROCEDURE — 99214 OFFICE O/P EST MOD 30 MIN: CPT | Performed by: INTERNAL MEDICINE

## 2023-07-15 PROCEDURE — 3078F DIAST BP <80 MM HG: CPT | Performed by: INTERNAL MEDICINE

## 2023-07-15 PROCEDURE — 3074F SYST BP LT 130 MM HG: CPT | Performed by: INTERNAL MEDICINE

## 2023-07-15 NOTE — TELEPHONE ENCOUNTER
Patient will be seeing you to discuss CGM   Recent episode of severe hypoglycemia  Recent changes in Medicare guidelines indicate she would qualify for a personal Continuous Glucose monitoring device due to occurrence of severe hypoglycemia.      Also, she will bring her Bg for review    Thanks

## 2023-07-15 NOTE — PROGRESS NOTES
Return Office Visit     CHIEF COMPLAINT:    DM     HISTORY OF PRESENT ILLNESS:  Sagar Garcia is a 74year old female who presents for follow up for DM    DM HISTORY  Diagnosed: around age 38        HISTORY OF DIABETES COMPLICATIONS: :  History of Retinopathy: denies - last eye exam: due for one, will schedule, referral provided  History of Neuropathy: denies  History of Nephropathy: yes     ASSOCIATED COMPLICATIONS:   HTN: yes  Hyperlipidemia: denies, noted high TG on labs  Coronary Artery Disease:  denies  Cerebrovascular Disease: denies  + PAD        HOME GLUCOSE READINGS:     She had an ER visit for sever hypoglycemia  This happened after was started on glipizide , lantus and ozempic at an outside clinic. Now she is on glipizide 5 mg BID, and Bg have been high in the 300s and 400s         CURRENT DIABETIC MEDICATIONS INCLUDE:    Glipizide 5 mg BID    She took ozmepic two weeks ago and was tolerating it well     Has t/f trulicity  Stopped MTF due to renal function        MEALS:  Three meals a day and snacks  compliance with a low carb diet has been better and improved since last a1c    EXERCISE:  Ye walks daily       CURRENT MEDICATION:    Current Outpatient Medications   Medication Sig Dispense Refill    semaglutide 4 MG/3ML Subcutaneous Solution Pen-injector Inject 1 mg into the skin once a week. albuterol 108 (90 Base) MCG/ACT Inhalation Aero Soln Inhale 2 puffs into the lungs every 6 (six) hours as needed for Wheezing or Shortness of Breath. 8.5 each 1    clopidogrel 75 MG Oral Tab Take 1 tablet (75 mg total) by mouth daily. 90 tablet 1    [START ON 8/5/2023] HYDROcodone-acetaminophen  MG Oral Tab Take 1 tablet by mouth every 4 (four) hours as needed for Pain (Max 5/day). 150 tablet 0    HYDROcodone-acetaminophen  MG Oral Tab Take 1 tablet by mouth every 4 (four) hours as needed for Pain (Max 5/day).  150 tablet 0    cyclobenzaprine 10 MG Oral Tab Take 1 tablet (10 mg total) by mouth 3 (three) times daily as needed for Muscle spasms. 20 tablet 0    carvedilol 3.125 MG Oral Tab Take 2 tablets (6.25 mg total) by mouth 2 (two) times daily with meals. estradiol 0.1 MG/GM Vaginal Cream Apply a small amount to the perimeatal tissue every other day. 45 g 2    amLODIPine 10 MG Oral Tab Take 1 tablet (10 mg total) by mouth daily. 90 tablet 0    SPIRIVA HANDIHALER 18 MCG Inhalation Cap INHALE THE CONTENTS OF 1 CAPSULE(18 MCG) INTO THE LUNGS DAILY 30 capsule 2    LISINOPRIL 40 MG Oral Tab TAKE 1 TABLET(40 MG) BY MOUTH DAILY 90 tablet 1    hydrALAZINE 100 MG Oral Tab Take 1 tablet (100 mg total) by mouth every 8 (eight) hours. 270 tablet 1    ATORVASTATIN 20 MG Oral Tab TAKE 1 TABLET(20 MG) BY MOUTH EVERY NIGHT 90 tablet 0    Budesonide-Formoterol Fumarate 160-4.5 MCG/ACT Inhalation Aerosol Inhale 2 puffs into the lungs 2 (two) times daily. Rinse mouth with water, gargle, and spit to follow. 1 each 2    ipratropium-albuterol 0.5-2.5 (3) MG/3ML Inhalation Solution Take 3 mL by nebulization every 6 (six) hours as needed (shortness of breath refractory to [facility]). 100 each 0    Blood Glucose Monitoring Suppl (CONTOUR NEXT MONITOR) w/Device Does not apply Kit 1 each by Does not apply route as needed. 1 kit 0    Glucose Blood (CONTOUR NEXT TEST) In Vitro Strip Check sugars twice a day 200 each 0    aspirin 81 MG Oral Tab EC Take 1 tablet (81 mg total) by mouth every other day. BD PEN NEEDLE PACO U/F 32G X 4 MM Does not apply Misc 1 each by Does not apply route 4 (four) times daily. ALLERGY:    Penicillins             HIVES, ANGIOEDEMA    Comment:Hives on eyelids (occurred when pt was in her             25s). Tolerated amoxicillin per chart. Tolerates             cephalosporins. PAST MEDICAL, SOCIAL AND FAMILY HISTORY:  See past medical history marked as reviewed. See past surgical history marked as reviewed. See past family history marked as reviewed.   See past social history marked as reviewed. ASSESSMENTS:     REVIEW OF SYSTEMS:  Constitutional: Negative for: weight change, fever, fatigue, cold/heat intolerance  Eyes: Negative for:  Visual changes, proptosis, blurring  ENT: Negative for:  dysphagia, neck swelling, dysphonia  Respiratory: Negative for:  dyspnea, cough  Cardiovascular: Negative for:  chest pain, palpitations, orthopnea  GI: Negative for:  abdominal pain, nausea, vomiting, diarrhea, constipation, bleeding  Neurology: Negative for: headache, numbness, weakness  Genito-Urinary: Negative for: dysuria, frequency  Psychiatric: Negative for:  depression, anxiety  Hematology/Lymphatics: Negative for: bruising, lower extremity edema  Endocrine: Negative for: polyuria, polydypsia  Skin: Negative for: rash, blister, cellulitis,       PHYSICAL EXAM:     Vitals reviewed    General Appearance:  alert, well developed, in no acute distress  Head: Atraumatic  Eyes:  normal conjunctivae, sclera. , normal sclera and normal pupils  Throat/Neck: normal sound to voice. Normal hearing, normal speech  Respiratory:  Speaking in full sentences, non-labored. no increased work of breathing, no audible wheezing    Neuro: motor grossly intact, moving all extremities without difficulty  Psychiatric:  oriented to time, self, and place  Extremities: no obvious extremity swelling, no lesions, normal monofilament sensation b/l      DATA:     Pertinent data reviewed  a1c 6.5 % ( 6/2023)    ASSESSMENT AND PLAN:    1. Type 2 DM:  with recent episode of sever hypoglycemia     Plan:  Discussed the pathogenesis, natural course of diabetes. Patient understands the importance of glycemic control and the implications of uncontrolled diabetes including Diabetic ketoacidosis and various micro vascular and macrovascular complications. a).  Medications:    Hypoglycemia likely due to being on glipizide 10 mg BID, lantus and ozempic  Now Bg are very high , she is only 5 mg bid of glipizide    Patient will like to start ozempic, but is very concerned about being on glipizide and ozempic    HOLD glipizide    Resume Ozempic 1 mg weekly, patient states this ia ready for her at the pharmacy. She will take it today  No personal or family history of MEN syndrome  Patient counselled regarding side effects including injection site reactions, nausea, vomiting, diarrhea, pancreatitis, gastroparesis and rare side effect ramya Milton syndrome. Check BG before BF and dinner  To call if sugars do not go down in 2-3 days or if sugars are persistently under 100    Recent changes in Medicare guidelines indicate she would qualify for a personal Continuous Glucose monitoring device due to occurrence of severe hypoglycemia. She will discuss with CDe at her upcoming appointment    RTC in with CDE       b). She has CKD, reviewed importance if good BG control  c). Discussed importance of annual eye exams. d). Foot exam: Daily feet exam explained. e). BG log maintainence explained in great detail, to get log and glucometer on next visit. f). Life style changes reviewed  g). Hypoglycemia recognition and management discussed     2. Dyslipidemia  A) Discussed lifestyle modifications including reductions in dietary total and saturated fat, weight loss, aerobic exercise, and eating a diet rich in fruits and vegetables. B) c/w statin     Fasting lipid panel  3. BP has been okay  Low salt diet  Monitor at home     Patient verbalized a complete  understanding of all of the above and did not have any further questions. RTC in 4 months, she will RTC in 1 weeks with CDE  Call with sugars as discussed.    Fasting labs as below        Orders Placed This Encounter      POC HemoCue Glucose 201 (Finger stick glucose)      Lipid Panel [E]      Microalb/Creat Ratio, Random Urine [E]        Deyanira Fam MD

## 2023-07-20 ENCOUNTER — NURSE ONLY (OUTPATIENT)
Dept: ENDOCRINOLOGY CLINIC | Facility: CLINIC | Age: 68
End: 2023-07-20

## 2023-07-20 ENCOUNTER — TELEPHONE (OUTPATIENT)
Dept: ENDOCRINOLOGY CLINIC | Facility: CLINIC | Age: 68
End: 2023-07-20

## 2023-07-20 DIAGNOSIS — E11.69 TYPE 2 DIABETES MELLITUS WITH OTHER SPECIFIED COMPLICATION, WITHOUT LONG-TERM CURRENT USE OF INSULIN (HCC): Primary | ICD-10-CM

## 2023-07-20 PROCEDURE — 99211 OFF/OP EST MAY X REQ PHY/QHP: CPT | Performed by: INTERNAL MEDICINE

## 2023-07-21 ENCOUNTER — MED REC SCAN ONLY (OUTPATIENT)
Dept: ENDOCRINOLOGY CLINIC | Facility: CLINIC | Age: 68
End: 2023-07-21

## 2023-07-21 NOTE — TELEPHONE ENCOUNTER
Went to [facility] and found that [address] is part of patient's DME network. RN filled out order form and placed in provider's folder for signature. RN also printed insurance card, CDE note, and provider last office visit note.

## 2023-07-21 NOTE — PROGRESS NOTES
Continuous Glucose Monitor Download    Start Time: 11:18am  End Time: 12:12pm    Indication: Pt is T2D followed by Dr Catalino Flores. [facility] where it was noted she was now having higher blood sugars while only on Ozempic. Due to this, Glipizide was re-started and Dexcom G7 started with . Appointment today for download. A1c: 6.5% (7/15/2023)    Current Diabetes Medication:  Glipizide 5mg BID  Ozempic 1mg/weekly (Saturdays)   * Has taken 2 doses     Nutrition Intake:  Breakfast: Eggs with talley or glucerna shake  Lunch: Salads  Dinner: Meat with minimal carbs  Beverage: Water or adding in SF mix in     Sensor Type: Dexcom G7 with     Reviewed CGMS download and patient logs:  Days of sensor use: 7 days 7/14/23-7/27/23  Average glucose (mg/dL): 250 mg/dl  Estimated GMI: 9.3%  Standard deviation =/- 25 (mg/dL)  Target Ranges:  mg/dL          11% of time in range  0% of time below range  89% of time above range      Interpretation:  - Slight downward trend occurring over night which still results in fasting hyperglycemia of 150-low 200 's mg/dl  - Post prandials for all meals typically ranging in 180-250's mg/dl  - No instances of low blood sugar    Recommended medication changes/Plan:  - Reviewed report with pt which demonstrates ongoing hyperglycemia despite consuming low carb meals as noted above. Pt did notice an improvement with glucerna shakes but still remaining around 180-200 mg/dl  - Advised pt it appears pt needs more coverage either with Glipizide or increasing Ozempic dose as pt prefers not to return to insulin. Of note, pt has been on Ozempic 1mg/weekly for 2 weeks and has been tolerating with no issues. Advised this recommendation would come from Dr Catalino Flores. Refer to TE 7/27/2023 for medication recommendations. - Reviewed nutrition again although pt is eating very low carb.  Discussed the importance of always including protein with meals  - Pt states [employer] is denying CGM cover due to not being on insulin. Discussed with new medicare guidelines, it should be covered.  Refer to TE 7/20/2023.  - Pt provided another Dexcom G7     Updated Diabetes Medication:  TBD based off of Dr Hina Rao recommendations   Glipizide 5mg BID   Ozempic 1mg/weekly    Follow up:  11/5/2023 Dr Johnson Jordan

## 2023-07-27 ENCOUNTER — TELEPHONE (OUTPATIENT)
Dept: ENDOCRINOLOGY CLINIC | Facility: CLINIC | Age: 68
End: 2023-07-27

## 2023-07-27 ENCOUNTER — NURSE ONLY (OUTPATIENT)
Dept: ENDOCRINOLOGY CLINIC | Facility: CLINIC | Age: 68
End: 2023-07-27

## 2023-07-27 DIAGNOSIS — E11.69 TYPE 2 DIABETES MELLITUS WITH OTHER SPECIFIED COMPLICATION, WITHOUT LONG-TERM CURRENT USE OF INSULIN (HCC): Primary | ICD-10-CM

## 2023-07-27 PROCEDURE — 99211 OFF/OP EST MAY X REQ PHY/QHP: CPT | Performed by: INTERNAL MEDICINE

## 2023-07-27 NOTE — TELEPHONE ENCOUNTER
Dr Ventura Estimable,    Refer to [address] appt 7/27/2023. Pt seen for download which demonstrates high blood sugars despite eating lower carb.  Please advise on medication adjustments and route to pool    Current Regimen:  Glipizide 5mg BID  Ozempic 1mg/weekly (Saturdays)   * Has had 2 doses and tolerating well

## 2023-07-28 ENCOUNTER — OFFICE VISIT (OUTPATIENT)
Dept: INTERNAL MEDICINE CLINIC | Facility: CLINIC | Age: 68
End: 2023-07-28

## 2023-07-28 VITALS
OXYGEN SATURATION: 98 % | HEIGHT: 63 IN | WEIGHT: 158 LBS | BODY MASS INDEX: 28 KG/M2 | TEMPERATURE: 97 F | SYSTOLIC BLOOD PRESSURE: 155 MMHG | DIASTOLIC BLOOD PRESSURE: 90 MMHG | HEART RATE: 82 BPM

## 2023-07-28 DIAGNOSIS — J44.9 CHRONIC OBSTRUCTIVE PULMONARY DISEASE, UNSPECIFIED COPD TYPE (HCC): ICD-10-CM

## 2023-07-28 DIAGNOSIS — N18.30 STAGE 3 CHRONIC KIDNEY DISEASE, UNSPECIFIED WHETHER STAGE 3A OR 3B CKD (HCC): ICD-10-CM

## 2023-07-28 DIAGNOSIS — L97.422 DIABETIC ULCER OF LEFT MIDFOOT ASSOCIATED WITH TYPE 2 DIABETES MELLITUS, WITH FAT LAYER EXPOSED (HCC): ICD-10-CM

## 2023-07-28 DIAGNOSIS — E11.00 TYPE 2 DIABETES MELLITUS WITH HYPEROSMOLARITY WITHOUT COMA, WITH LONG-TERM CURRENT USE OF INSULIN (HCC): Primary | ICD-10-CM

## 2023-07-28 DIAGNOSIS — E11.621 DIABETIC ULCER OF LEFT MIDFOOT ASSOCIATED WITH TYPE 2 DIABETES MELLITUS, WITH FAT LAYER EXPOSED (HCC): ICD-10-CM

## 2023-07-28 DIAGNOSIS — Z79.4 TYPE 2 DIABETES MELLITUS WITH HYPEROSMOLARITY WITHOUT COMA, WITH LONG-TERM CURRENT USE OF INSULIN (HCC): Primary | ICD-10-CM

## 2023-07-28 DIAGNOSIS — F33.9 DEPRESSION, RECURRENT (HCC): ICD-10-CM

## 2023-07-28 DIAGNOSIS — F11.20 OPIOID DEPENDENCE, UNCOMPLICATED (HCC): ICD-10-CM

## 2023-07-28 DIAGNOSIS — Z12.31 ENCOUNTER FOR SCREENING MAMMOGRAM FOR MALIGNANT NEOPLASM OF BREAST: ICD-10-CM

## 2023-07-28 PROCEDURE — 3077F SYST BP >= 140 MM HG: CPT | Performed by: INTERNAL MEDICINE

## 2023-07-28 PROCEDURE — 1111F DSCHRG MED/CURRENT MED MERGE: CPT | Performed by: INTERNAL MEDICINE

## 2023-07-28 PROCEDURE — 99214 OFFICE O/P EST MOD 30 MIN: CPT | Performed by: INTERNAL MEDICINE

## 2023-07-28 PROCEDURE — 3080F DIAST BP >= 90 MM HG: CPT | Performed by: INTERNAL MEDICINE

## 2023-07-28 PROCEDURE — 3008F BODY MASS INDEX DOCD: CPT | Performed by: INTERNAL MEDICINE

## 2023-07-28 RX ORDER — RESVER/WINE/BFL/GRPSD/PC/C/POM 200MG-60MG
5000 CAPSULE ORAL DAILY
COMMUNITY
Start: 2023-05-06

## 2023-07-28 RX ORDER — GLIPIZIDE 5 MG/1
7.5 TABLET ORAL
Qty: 270 TABLET | Refills: 0 | Status: SHIPPED | OUTPATIENT
Start: 2023-07-28 | End: 2023-10-26

## 2023-07-28 NOTE — PROGRESS NOTES
Subjective:     Patient ID: Cliff Lea is a 74year old female. HPI    History/Other:   She is here to reestablish care. She was seeing different providers for last 1 year but now she wants to come back and follow-up with me. She states that she is not checking her blood pressure at home but she is taking her medications regularly. She is following with endocrinology. She had episodes when her blood sugar was low so she is currently only on Ozempic and  Glimepiride. She is following with Dr. Lakeisha Gardner for peripheral vascular disease. According to her  he referred her to see a different doctor  Review of Systems   Constitutional: Negative. HENT: Negative. Respiratory: Negative. Cardiovascular: Negative. Gastrointestinal: Negative. Genitourinary: Negative. Musculoskeletal: Negative. Skin: Negative. Neurological: Negative. Hematological: Negative. Psychiatric/Behavioral: Negative. Current Outpatient Medications   Medication Sig Dispense Refill    glipiZIDE 5 MG Oral Tab Take 1.5 tablets (7.5 mg total) by mouth 2 (two) times daily before meals. 270 tablet 0    D-5000 125 MCG (5000 UT) Oral Tab Take 1 tablet (5,000 Units total) by mouth daily. semaglutide 4 MG/3ML Subcutaneous Solution Pen-injector Inject 1 mg into the skin once a week. albuterol 108 (90 Base) MCG/ACT Inhalation Aero Soln Inhale 2 puffs into the lungs every 6 (six) hours as needed for Wheezing or Shortness of Breath. 8.5 each 1    clopidogrel 75 MG Oral Tab Take 1 tablet (75 mg total) by mouth daily. 90 tablet 1    [START ON 8/5/2023] HYDROcodone-acetaminophen  MG Oral Tab Take 1 tablet by mouth every 4 (four) hours as needed for Pain (Max 5/day). 150 tablet 0    cyclobenzaprine 10 MG Oral Tab Take 1 tablet (10 mg total) by mouth 3 (three) times daily as needed for Muscle spasms.  20 tablet 0    carvedilol 3.125 MG Oral Tab Take 2 tablets (6.25 mg total) by mouth 2 (two) times daily with meals.      estradiol 0.1 MG/GM Vaginal Cream Apply a small amount to the perimeatal tissue every other day. 45 g 2    amLODIPine 10 MG Oral Tab Take 1 tablet (10 mg total) by mouth daily. 90 tablet 0    LISINOPRIL 40 MG Oral Tab TAKE 1 TABLET(40 MG) BY MOUTH DAILY 90 tablet 1    hydrALAZINE 100 MG Oral Tab Take 1 tablet (100 mg total) by mouth every 8 (eight) hours. 270 tablet 1    ATORVASTATIN 20 MG Oral Tab TAKE 1 TABLET(20 MG) BY MOUTH EVERY NIGHT 90 tablet 0    Budesonide-Formoterol Fumarate 160-4.5 MCG/ACT Inhalation Aerosol Inhale 2 puffs into the lungs 2 (two) times daily. Rinse mouth with water, gargle, and spit to follow. 1 each 2    ipratropium-albuterol 0.5-2.5 (3) MG/3ML Inhalation Solution Take 3 mL by nebulization every 6 (six) hours as needed (shortness of breath refractory to [facility]). 100 each 0    aspirin 81 MG Oral Tab EC Take 1 tablet (81 mg total) by mouth every other day. BD PEN NEEDLE PACO U/F 32G X 4 MM Does not apply Misc 1 each by Does not apply route 4 (four) times daily. HYDROcodone-acetaminophen  MG Oral Tab Take 1 tablet by mouth every 4 (four) hours as needed for Pain (Max 5/day). (Patient not taking: Reported on 7/28/2023) 150 tablet 0    SPIRIVA HANDIHALER 18 MCG Inhalation Cap INHALE THE CONTENTS OF 1 CAPSULE(18 MCG) INTO THE LUNGS DAILY (Patient not taking: Reported on 7/28/2023) 30 capsule 2    Blood Glucose Monitoring Suppl (CONTOUR NEXT MONITOR) w/Device Does not apply Kit 1 each by Does not apply route as needed. 1 kit 0    Glucose Blood (CONTOUR NEXT TEST) In Vitro Strip Check sugars twice a day (Patient not taking: Reported on 7/28/2023) 200 each 0     Allergies:  Penicillins             HIVES, ANGIOEDEMA    Comment:Hives on eyelids (occurred when pt was in her             25s). Tolerated amoxicillin per chart. Tolerates             cephalosporins.     Past Medical History:   Diagnosis Date    Anemia     Back pain     Chronic kidney disease (CKD)     COPD (chronic obstructive pulmonary disease) (HCC)     FEV 1 1.25 50%     Diabetes ([facility] [address]     DM neuropathy    Essential hypertension     H/O angioplasty     2020    High blood pressure     High cholesterol     Peripheral vascular disease (HCC)     PNA (pneumonia) 2018    Pulmonary nodule     4 mm RUL    Renal disorder     monitoring kidney labs    Sickle cell trait (HCC)     Sickle-cell anemia (HCC)     Tobacco abuse       Past Surgical History:   Procedure Laterality Date    BACK SURGERY Right 2021    Right L3-4 extreme lateral interbody fusion, posterior decompression; LUMBAR LAMINECTOMY 1 LEVEL          x3    EXCIS LUMBAR DISK,ONE LEVEL          OTHER      bilateral leg stents  and       Family History   Problem Relation Age of Onset    Diabetes Mother         Passed from DM complications    Diabetes Sister         Had kidney transplant due to complications of DM    Kidney Disease Sister         Kidney failure secondary to diabetes; received kidney transplant in     Glaucoma Sister     Diabetes Brother     Sickle Cell Son         Cause of death    Macular degeneration Neg       Social History:   Social History     Socioeconomic History    Marital status:    Tobacco Use    Smoking status: Former     Packs/day: 1.00     Years: 30.00     Pack years: 30.00     Types: Cigarettes     Quit date: 2019     Years since quitting: 3.7    Smokeless tobacco: Never   Vaping Use    Vaping Use: Never used   Substance and Sexual Activity    Alcohol use: Not Currently     Comment: socially    Drug use: No   Social History Narrative    [employer]            Objective:   Physical Exam  Vitals and nursing note reviewed. Constitutional:       Appearance: Normal appearance. HENT:      Head: Normocephalic and atraumatic. Cardiovascular:      Rate and Rhythm: Normal rate and regular rhythm. Pulses: Normal pulses. Heart sounds: Normal heart sounds.    Pulmonary: Breath sounds: Normal breath sounds. Musculoskeletal:      Cervical back: Normal range of motion and neck supple. Skin:     General: Skin is warm. Neurological:      Mental Status: She is alert. Mental status is at baseline. Assessment & Plan:   No diagnosis found. PVD continue with aspirin and Plavix follow-up with interventional radiology    Hypertension blood pressure noted on the higher side I did advise her to monitor blood pressure at home and follow-up with blood pressure readings in 3 to 4 weeks continue with current medication  Diabetes type 2 she is following with endocrinology she needs to see ophthalmology I gave her referral    Screening for breast cancer referral for mammogram`  No orders of the defined types were placed in this encounter.       Meds This Visit:  Requested Prescriptions      No prescriptions requested or ordered in this encounter       Imaging & Referrals:  None

## 2023-07-28 NOTE — TELEPHONE ENCOUNTER
Given h/o sever hypoglycemia, let us go up slowly on the glipizide  She is on   Glipizide 5mg BID  Ozempic 1mg/weekly  Recommend that she c/w ozempic 1 mg weekly and increase glipizide to 7.5 mg ( 1.5 tablets) breakfast and dinner    Please update with before Bf and before dinner Bg in 5-7 days   Call sooner if Bg under 80 ir pre meal Bg over 250    Thanks

## 2023-07-28 NOTE — TELEPHONE ENCOUNTER
Called patient and relayed below message as outlined below. Pt voiced understanding and denied further questions at this time.

## 2023-07-31 ENCOUNTER — OFFICE VISIT (OUTPATIENT)
Dept: PAIN CLINIC | Facility: HOSPITAL | Age: 68
End: 2023-07-31
Attending: ANESTHESIOLOGY
Payer: MEDICARE

## 2023-07-31 VITALS — OXYGEN SATURATION: 93 % | DIASTOLIC BLOOD PRESSURE: 75 MMHG | HEART RATE: 91 BPM | SYSTOLIC BLOOD PRESSURE: 182 MMHG

## 2023-07-31 PROCEDURE — 99211 OFF/OP EST MAY X REQ PHY/QHP: CPT

## 2023-07-31 RX ORDER — HYDROCODONE BITARTRATE AND ACETAMINOPHEN 10; 325 MG/1; MG/1
1 TABLET ORAL EVERY 4 HOURS PRN
Qty: 150 TABLET | Refills: 0 | Status: SHIPPED | OUTPATIENT
Start: 2023-08-26 | End: 2023-09-25

## 2023-07-31 RX ORDER — HYDROCODONE BITARTRATE AND ACETAMINOPHEN 10; 325 MG/1; MG/1
1 TABLET ORAL EVERY 4 HOURS PRN
Qty: 150 TABLET | Refills: 0 | Status: SHIPPED | OUTPATIENT
Start: 2023-09-25 | End: 2023-10-25

## 2023-07-31 NOTE — PROGRESS NOTES
PT presents ambulatory to the [facility] with use of straight cane. Pt reports no changes with 6/10 pain today. Dr. Hermine Apgar saw PT for med eval. See notes for POC.

## 2023-07-31 NOTE — CHRONIC PAIN
Follow-up Note    Dylon Zacarias is a 74year old old female, originally referred to the pain clinic by Dr. Kristine Monique, with history of No diagnosis found. returns to the clinic for follow up. Patient is complaining of low back radiating to left hip area. Today pain is 8 out of 10. Range of pain is from 1 to 8 out of 10. For pain control patient presently is using Norco 10/325 every 4 hours as needed up to 5/day. Pregabalin 25 mg twice per day. Patient had left shoulder pain she had steroid injection and especially after injection she felt more pain for that reason she used more Norco.  Now she is not having any more Norco left. Pain Meds are reducing his pain by 80-90%. Tolerating well. No new symptoms. Patient has no change in bowel/bladder function. Patient is here for med evaluation. ALLERGIES:    Penicillins             HIVES, ANGIOEDEMA    Comment:Hives on eyelids (occurred when pt was in her             25s). Tolerated amoxicillin per chart. Tolerates             cephalosporins. MEDICATION LIST:  Current Outpatient Medications   Medication Sig Dispense Refill    glipiZIDE 5 MG Oral Tab Take 1.5 tablets (7.5 mg total) by mouth 2 (two) times daily before meals. 270 tablet 0    D-5000 125 MCG (5000 UT) Oral Tab Take 1 tablet (5,000 Units total) by mouth daily. semaglutide 4 MG/3ML Subcutaneous Solution Pen-injector Inject 1 mg into the skin once a week. albuterol 108 (90 Base) MCG/ACT Inhalation Aero Soln Inhale 2 puffs into the lungs every 6 (six) hours as needed for Wheezing or Shortness of Breath. 8.5 each 1    clopidogrel 75 MG Oral Tab Take 1 tablet (75 mg total) by mouth daily. 90 tablet 1    [START ON 8/5/2023] HYDROcodone-acetaminophen  MG Oral Tab Take 1 tablet by mouth every 4 (four) hours as needed for Pain (Max 5/day). 150 tablet 0    cyclobenzaprine 10 MG Oral Tab Take 1 tablet (10 mg total) by mouth 3 (three) times daily as needed for Muscle spasms.  20 tablet 0 carvedilol 3.125 MG Oral Tab Take 2 tablets (6.25 mg total) by mouth 2 (two) times daily with meals. estradiol 0.1 MG/GM Vaginal Cream Apply a small amount to the perimeatal tissue every other day. 45 g 2    amLODIPine 10 MG Oral Tab Take 1 tablet (10 mg total) by mouth daily. 90 tablet 0    LISINOPRIL 40 MG Oral Tab TAKE 1 TABLET(40 MG) BY MOUTH DAILY 90 tablet 1    hydrALAZINE 100 MG Oral Tab Take 1 tablet (100 mg total) by mouth every 8 (eight) hours. 270 tablet 1    ATORVASTATIN 20 MG Oral Tab TAKE 1 TABLET(20 MG) BY MOUTH EVERY NIGHT 90 tablet 0    Budesonide-Formoterol Fumarate 160-4.5 MCG/ACT Inhalation Aerosol Inhale 2 puffs into the lungs 2 (two) times daily. Rinse mouth with water, gargle, and spit to follow. 1 each 2    ipratropium-albuterol 0.5-2.5 (3) MG/3ML Inhalation Solution Take 3 mL by nebulization every 6 (six) hours as needed (shortness of breath refractory to [facility]). 100 each 0    Blood Glucose Monitoring Suppl (CONTOUR NEXT MONITOR) w/Device Does not apply Kit 1 each by Does not apply route as needed. 1 kit 0    aspirin 81 MG Oral Tab EC Take 1 tablet (81 mg total) by mouth every other day. BD PEN NEEDLE PACO U/F 32G X 4 MM Does not apply Misc 1 each by Does not apply route 4 (four) times daily. REVIEW OF SYSTEMS:   General: no weight change, change in appetite, thirst or fever  HEENT: no headache or blurred vision  Cardiopulmonary: no chest pain, palpitations, or shortness of breath  GI: no anorexia, nausea or vomiting, or bowel incontinence   : no dysuria, or pyuria. Endo: no goiter, lethargy, or heat/cold intolerance  Heme/Onc: no pallor, bruising, or bleeding.     Musculoskeletal:  no new problems  Neuro:  no new problems  Psych:  no new problems    MEDICAL HISTORY:  Patient Active Problem List:     Anemia     Azotemia     Hypokalemia     Back pain with radiation     Type 2 diabetes mellitus without retinopathy ([address]     Age-related nuclear cataract of both eyes Glaucoma suspect of both eyes     Centrilobular emphysema (HCC)     PVD (peripheral vascular disease) ([address]     Diabetic ulcer of left midfoot associated with type 2 diabetes mellitus, with fat layer exposed ([address]     PAD (peripheral artery disease) ([facility])     Pre-op testing     Family history of glaucoma in sister     Primary hypertension     S/P laminectomy     Post-operative pain     Type 2 diabetes mellitus with hyperglycemia, with long-term current use of insulin (HCC)     Myalgia     DDD (degenerative disc disease), lumbar     Failed back syndrome of lumbar spine     Chest pain     Hyperlipidemia     Urinary tract infection     Acute cystitis without hematuria     Ileitis     COVID-19     UTI (urinary tract infection)     Pyelonephritis     Hypernatremia     Hyperglycemia     Ureteral calculi     Hypoglycemia     Opioid dependence, uncomplicated ([facility])     Depression, recurrent ([facility])     Stage 3 chronic kidney disease, unspecified whether stage 3a or 3b CKD ([facility])    Past Medical History:   Diagnosis Date    Anemia     Back pain     Chronic kidney disease (CKD)     COPD (chronic obstructive pulmonary disease) ([facility])     FEV 1 1.25 50%     Diabetes ([address]     DM neuropathy    Essential hypertension     H/O angioplasty     2020    High blood pressure     High cholesterol     Peripheral vascular disease (HCC)     PNA (pneumonia) 2018    Pulmonary nodule     4 mm RUL    Renal disorder     monitoring kidney labs    Sickle cell trait (HCC)     Sickle-cell anemia ([facility])     Tobacco abuse        SURGICAL HISTORY:  Past Surgical History:   Procedure Laterality Date    BACK SURGERY Right 2021    Right L3-4 extreme lateral interbody fusion, posterior decompression; LUMBAR LAMINECTOMY 1 LEVEL          x3    EXCIS LUMBAR DISK,ONE LEVEL          OTHER      bilateral leg stents  and        FAMILY HISTORY:  Family History   Problem Relation Age of Onset    Diabetes Mother         Passed from DM complications    Diabetes Sister         Had kidney transplant due to complications of DM    Kidney Disease Sister         Kidney failure secondary to diabetes; received kidney transplant in 2004    Glaucoma Sister     Diabetes Brother     Sickle Cell Son         Cause of death    Macular degeneration Neg        SOCIAL HISTORY:  Social History    Socioeconomic History      Marital status:        Spouse name: Not on file      Number of children: Not on file      Years of education: Not on file      Highest education level: Not on file    Occupational History      Not on file    Tobacco Use      Smoking status: Former        Packs/day: 1.00        Years: 30.00        Pack years: 30        Types: Cigarettes        Quit date: 11/8/2019        Years since quitting: 3.7      Smokeless tobacco: Never    Vaping Use      Vaping Use: Never used    Substance and Sexual Activity      Alcohol use: Not Currently        Comment: socially      Drug use: No      Sexual activity: Not on file    Other Topics      Concerns:        Not on file    Social History Narrative      [employer]          Social Determinants of Health  Financial Resource Strain: Not on file  Food Insecurity: Not on file  Transportation Needs: Not on file  Physical Activity: Not on file  Stress: Not on file  Social Connections: Not on file  Housing Stability: Not on file  PHYSICAL EXAMINATION:   07/31/23  1153   BP: (!) 182/75   Pulse: 91      General: Alert and oriented x3, NAD, appears stated age, appropriate disposition and demeanor, answers questions appropriately   Head: normocephalic, atraumatic  Eyes: anicteric; no injection  Ears: normal;  no discharge  Nose: externally grossly within normal limits, no unusual discharge or rhinorrhea   Throat: lips grossly within normal limits by visual exam externally  Neck: supple, trachea midline, no obvious JVD  Gait: Walking using cane limping with left lower extremity  Spine: Flattened lumbar lordosis postsurgical well-healed scar  ROM:   Lumbar Spine: Limited flexion due to pain  MOTOR EXAMINATION:  Lower Extremities: Normal bilaterally  REFLEXES:  Lower Extremities: Depressed bilaterally knee reflexes  SENSATION (light touch/pinprick/temperature): Lower Extremities: Normal bilaterally  SLR: Negative bilateral  SIJ tenderness: Positive on the right  Aaron's test: Positive on the right  TPs: Positive on the right buttock area    IMAGING:  No new imaging available    ASSESSMENT AND PLAN:    Ubaldo Higgins is a 74year old  female, with   No diagnosis found. .  I offered trigger point injection therapy patient does not want it. Continue Norco and pregabalin. Pt will return to clinic 2 months. Conservative vs. Interventional pain treatment options were discussed at length including pharmacotherapy (eg. Anti- inflammatories, muscle relaxants, neuropathic medications, oral steroids, analgesics), injections, and further testing. Risks and benefits of all options were discussed to patients satisfaction. All questions were answered. Patient agreeable to treatment plan. Greater than 50% of the time was spent with counseling (nature of discussion centered around pain, therapy, and treatment options), face to face time, time spent reviewing data, obtaining patient information and discussing the care with the patients health care providers.      Total Time: 25 minutes

## 2023-08-05 ENCOUNTER — TELEPHONE (OUTPATIENT)
Dept: ENDOCRINOLOGY CLINIC | Facility: CLINIC | Age: 68
End: 2023-08-05

## 2023-08-05 NOTE — TELEPHONE ENCOUNTER
I reviewed her download    Recommend:    Glipizide 5 mg BID  Increase ozempic to 2 mg weekly   Update with BG in 2 weeks before bf and before dinner  If sugars do not get better, will go up on glipizide  Low carb diet   Daily exercise    Thanks

## 2023-08-07 RX ORDER — SEMAGLUTIDE 2.68 MG/ML
2 INJECTION, SOLUTION SUBCUTANEOUS WEEKLY
Qty: 9 ML | Refills: 0 | Status: SHIPPED | OUTPATIENT
Start: 2023-08-07

## 2023-08-07 NOTE — TELEPHONE ENCOUNTER
Dr Alexus Guardado and spoke with pt. Per TE 7/27, pt's Glipizide was increased and she is currently on this regimen:    - Glipizide 7.5mg BID  - Ozempic 1mg/weekly (Monday)    Current blood sugars have been fastings in the 100's mg/dl (115-120 mg/dl) and before dinner have been around 180-200 mg/dl. Meal time does rise very quickly to 200-400 mg/dl. Advised pt that Dr Shalom Hernandez would like to increase Ozempic to 2mg/weekly. When this occurs, to return back to Glipizide 5mg BID as that was the dose recommended with increased Ozempic. Will confirm with Dr Shalom Hernandez. Additionally, send BG in 2 weeks and sooner if there are issues. RN's to update pt on Dexcom G7 status when they hear back from TOM from [address] [place] [address] (refer to TE 7/20).

## 2023-08-16 ENCOUNTER — TELEPHONE (OUTPATIENT)
Dept: CASE MANAGEMENT | Age: 68
End: 2023-08-16

## 2023-08-16 DIAGNOSIS — N18.30 STAGE 3 CHRONIC KIDNEY DISEASE, UNSPECIFIED WHETHER STAGE 3A OR 3B CKD (HCC): Primary | ICD-10-CM

## 2023-08-16 DIAGNOSIS — E11.00 TYPE 2 DIABETES MELLITUS WITH HYPEROSMOLARITY WITHOUT COMA, WITH LONG-TERM CURRENT USE OF INSULIN (HCC): ICD-10-CM

## 2023-08-16 DIAGNOSIS — Z79.4 TYPE 2 DIABETES MELLITUS WITH HYPEROSMOLARITY WITHOUT COMA, WITH LONG-TERM CURRENT USE OF INSULIN (HCC): ICD-10-CM

## 2023-08-16 NOTE — TELEPHONE ENCOUNTER
Dr. Gosia Qureshi,     The patient is requesting a referral to Dr. Asif Boyle. Pended referral please review diagnosis and sign off if you agree. Thank you.   Joshua Bains

## 2023-08-21 ENCOUNTER — APPOINTMENT (OUTPATIENT)
Dept: CT IMAGING | Facility: HOSPITAL | Age: 68
End: 2023-08-21
Attending: EMERGENCY MEDICINE
Payer: MEDICARE

## 2023-08-21 ENCOUNTER — HOSPITAL ENCOUNTER (EMERGENCY)
Facility: HOSPITAL | Age: 68
Discharge: HOME | End: 2023-08-21
Attending: EMERGENCY MEDICINE
Payer: MEDICARE

## 2023-08-21 VITALS
OXYGEN SATURATION: 99 % | HEART RATE: 67 BPM | WEIGHT: 160 LBS | DIASTOLIC BLOOD PRESSURE: 73 MMHG | TEMPERATURE: 98 F | RESPIRATION RATE: 18 BRPM | SYSTOLIC BLOOD PRESSURE: 173 MMHG | BODY MASS INDEX: 28 KG/M2

## 2023-08-21 DIAGNOSIS — R82.81 PYURIA: ICD-10-CM

## 2023-08-21 DIAGNOSIS — M62.830 SPASM OF MUSCLE OF LOWER BACK: Primary | ICD-10-CM

## 2023-08-21 LAB
ANION GAP SERPL CALC-SCNC: 8 MMOL/L (ref 0–18)
BASOPHILS # BLD AUTO: 0.06 X10(3) UL (ref 0–0.2)
BASOPHILS NFR BLD AUTO: 0.9 %
BILIRUB UR QL: NEGATIVE
BUN BLD-MCNC: 21 MG/DL (ref 7–18)
BUN/CREAT SERPL: 15.3 (ref 10–20)
CALCIUM BLD-MCNC: 9.7 MG/DL (ref 8.5–10.1)
CHLORIDE SERPL-SCNC: 118 MMOL/L (ref 98–112)
CK SERPL-CCNC: 35 U/L
CLARITY UR: CLEAR
CO2 SERPL-SCNC: 20 MMOL/L (ref 21–32)
COLOR UR: YELLOW
CREAT BLD-MCNC: 1.37 MG/DL
DEPRECATED RDW RBC AUTO: 44.2 FL (ref 35.1–46.3)
EGFRCR SERPLBLD CKD-EPI 2021: 43 ML/MIN/1.73M2 (ref 60–?)
EOSINOPHIL # BLD AUTO: 0.18 X10(3) UL (ref 0–0.7)
EOSINOPHIL NFR BLD AUTO: 2.8 %
ERYTHROCYTE [DISTWIDTH] IN BLOOD BY AUTOMATED COUNT: 14.3 % (ref 11–15)
GLUCOSE BLD-MCNC: 176 MG/DL (ref 70–99)
GLUCOSE UR-MCNC: NEGATIVE MG/DL
HCT VFR BLD AUTO: 31.4 %
HGB BLD-MCNC: 10.4 G/DL
HGB UR QL STRIP.AUTO: NEGATIVE
IMM GRANULOCYTES # BLD AUTO: 0.04 X10(3) UL (ref 0–1)
IMM GRANULOCYTES NFR BLD: 0.6 %
KETONES UR-MCNC: NEGATIVE MG/DL
LYMPHOCYTES # BLD AUTO: 1.1 X10(3) UL (ref 1–4)
LYMPHOCYTES NFR BLD AUTO: 17.2 %
MCH RBC QN AUTO: 28.3 PG (ref 26–34)
MCHC RBC AUTO-ENTMCNC: 33.1 G/DL (ref 31–37)
MCV RBC AUTO: 85.3 FL
MONOCYTES # BLD AUTO: 0.89 X10(3) UL (ref 0.1–1)
MONOCYTES NFR BLD AUTO: 13.9 %
NEUTROPHILS # BLD AUTO: 4.13 X10 (3) UL (ref 1.5–7.7)
NEUTROPHILS # BLD AUTO: 4.13 X10(3) UL (ref 1.5–7.7)
NEUTROPHILS NFR BLD AUTO: 64.6 %
NITRITE UR QL STRIP.AUTO: NEGATIVE
OSMOLALITY SERPL CALC.SUM OF ELEC: 309 MOSM/KG (ref 275–295)
PH UR: 5.5 [PH] (ref 5–8)
PLATELET # BLD AUTO: 307 10(3)UL (ref 150–450)
POTASSIUM SERPL-SCNC: 3.8 MMOL/L (ref 3.5–5.1)
PROT UR-MCNC: >=300 MG/DL
RBC # BLD AUTO: 3.68 X10(6)UL
SODIUM SERPL-SCNC: 146 MMOL/L (ref 136–145)
SP GR UR STRIP: 1.01 (ref 1–1.03)
UROBILINOGEN UR STRIP-ACNC: 0.2
WBC # BLD AUTO: 6.4 X10(3) UL (ref 4–11)

## 2023-08-21 PROCEDURE — 82550 ASSAY OF CK (CPK): CPT | Performed by: EMERGENCY MEDICINE

## 2023-08-21 PROCEDURE — 96366 THER/PROPH/DIAG IV INF ADDON: CPT

## 2023-08-21 PROCEDURE — 96375 TX/PRO/DX INJ NEW DRUG ADDON: CPT

## 2023-08-21 PROCEDURE — 85025 COMPLETE CBC W/AUTO DIFF WBC: CPT | Performed by: EMERGENCY MEDICINE

## 2023-08-21 PROCEDURE — 87086 URINE CULTURE/COLONY COUNT: CPT | Performed by: EMERGENCY MEDICINE

## 2023-08-21 PROCEDURE — 81015 MICROSCOPIC EXAM OF URINE: CPT | Performed by: EMERGENCY MEDICINE

## 2023-08-21 PROCEDURE — 81001 URINALYSIS AUTO W/SCOPE: CPT | Performed by: EMERGENCY MEDICINE

## 2023-08-21 PROCEDURE — 74176 CT ABD & PELVIS W/O CONTRAST: CPT | Performed by: EMERGENCY MEDICINE

## 2023-08-21 PROCEDURE — 99285 EMERGENCY DEPT VISIT HI MDM: CPT

## 2023-08-21 PROCEDURE — 87106 FUNGI IDENTIFICATION YEAST: CPT | Performed by: EMERGENCY MEDICINE

## 2023-08-21 PROCEDURE — 96365 THER/PROPH/DIAG IV INF INIT: CPT

## 2023-08-21 PROCEDURE — 96361 HYDRATE IV INFUSION ADD-ON: CPT

## 2023-08-21 PROCEDURE — 96376 TX/PRO/DX INJ SAME DRUG ADON: CPT

## 2023-08-21 PROCEDURE — 80048 BASIC METABOLIC PNL TOTAL CA: CPT | Performed by: EMERGENCY MEDICINE

## 2023-08-21 RX ORDER — ORPHENADRINE CITRATE 100 MG/1
100 TABLET, EXTENDED RELEASE ORAL 2 TIMES DAILY
Qty: 20 TABLET | Refills: 0 | Status: SHIPPED | OUTPATIENT
Start: 2023-08-21 | End: 2023-08-29

## 2023-08-21 RX ORDER — LIDOCAINE 50 MG/G
1 PATCH TOPICAL EVERY 24 HOURS
Qty: 7 PATCH | Refills: 0 | Status: SHIPPED | OUTPATIENT
Start: 2023-08-21 | End: 2023-08-28

## 2023-08-21 RX ORDER — CEPHALEXIN 500 MG/1
500 CAPSULE ORAL 2 TIMES DAILY
Qty: 14 CAPSULE | Refills: 0 | Status: SHIPPED | OUTPATIENT
Start: 2023-08-21 | End: 2023-08-28

## 2023-08-21 RX ORDER — KETOROLAC TROMETHAMINE 15 MG/ML
15 INJECTION, SOLUTION INTRAMUSCULAR; INTRAVENOUS ONCE
Status: COMPLETED | OUTPATIENT
Start: 2023-08-21 | End: 2023-08-21

## 2023-08-21 RX ORDER — ORPHENADRINE CITRATE 30 MG/ML
30 INJECTION INTRAMUSCULAR; INTRAVENOUS ONCE
Status: COMPLETED | OUTPATIENT
Start: 2023-08-21 | End: 2023-08-21

## 2023-08-21 NOTE — ED INITIAL ASSESSMENT (HPI)
Patient arrives via triage. Patient experiencing right lower back pain. Patient said she has experienced this in the past and had associated kidney problems.

## 2023-08-28 ENCOUNTER — MED REC SCAN ONLY (OUTPATIENT)
Dept: INTERNAL MEDICINE CLINIC | Facility: CLINIC | Age: 68
End: 2023-08-28

## 2023-08-29 ENCOUNTER — OFFICE VISIT (OUTPATIENT)
Dept: INTERNAL MEDICINE CLINIC | Facility: CLINIC | Age: 68
End: 2023-08-29

## 2023-08-29 VITALS
OXYGEN SATURATION: 100 % | HEIGHT: 63 IN | HEART RATE: 81 BPM | DIASTOLIC BLOOD PRESSURE: 80 MMHG | BODY MASS INDEX: 27.11 KG/M2 | SYSTOLIC BLOOD PRESSURE: 165 MMHG | WEIGHT: 153 LBS | TEMPERATURE: 98 F

## 2023-08-29 DIAGNOSIS — I73.9 PVD (PERIPHERAL VASCULAR DISEASE) (HCC): Primary | ICD-10-CM

## 2023-08-29 DIAGNOSIS — I10 PRIMARY HYPERTENSION: ICD-10-CM

## 2023-08-29 DIAGNOSIS — Z01.810 PREOP CARDIOVASCULAR EXAM: ICD-10-CM

## 2023-08-29 LAB
ALBUMIN SERPL-MCNC: 3.4 G/DL (ref 3.4–5)
ALBUMIN/GLOB SERPL: 0.9 {RATIO} (ref 1–2)
ALP LIVER SERPL-CCNC: 80 U/L
ALT SERPL-CCNC: 18 U/L
ANION GAP SERPL CALC-SCNC: 8 MMOL/L (ref 0–18)
AST SERPL-CCNC: 14 U/L (ref 15–37)
BASOPHILS # BLD AUTO: 0.07 X10(3) UL (ref 0–0.2)
BASOPHILS NFR BLD AUTO: 1 %
BILIRUB SERPL-MCNC: 0.2 MG/DL (ref 0.1–2)
BUN BLD-MCNC: 27 MG/DL (ref 7–18)
BUN/CREAT SERPL: 17.6 (ref 10–20)
CALCIUM BLD-MCNC: 9.3 MG/DL (ref 8.5–10.1)
CHLORIDE SERPL-SCNC: 115 MMOL/L (ref 98–112)
CO2 SERPL-SCNC: 22 MMOL/L (ref 21–32)
CREAT BLD-MCNC: 1.53 MG/DL
DEPRECATED RDW RBC AUTO: 43.2 FL (ref 35.1–46.3)
EGFRCR SERPLBLD CKD-EPI 2021: 38 ML/MIN/1.73M2 (ref 60–?)
EOSINOPHIL # BLD AUTO: 0.18 X10(3) UL (ref 0–0.7)
EOSINOPHIL NFR BLD AUTO: 2.6 %
ERYTHROCYTE [DISTWIDTH] IN BLOOD BY AUTOMATED COUNT: 13.8 % (ref 11–15)
FASTING STATUS PATIENT QL REPORTED: NO
GLOBULIN PLAS-MCNC: 3.8 G/DL (ref 2.8–4.4)
GLUCOSE BLD-MCNC: 224 MG/DL (ref 70–99)
HCT VFR BLD AUTO: 30.2 %
HGB BLD-MCNC: 10.1 G/DL
IMM GRANULOCYTES # BLD AUTO: 0.04 X10(3) UL (ref 0–1)
IMM GRANULOCYTES NFR BLD: 0.6 %
LYMPHOCYTES # BLD AUTO: 1.64 X10(3) UL (ref 1–4)
LYMPHOCYTES NFR BLD AUTO: 23.9 %
MCH RBC QN AUTO: 28.8 PG (ref 26–34)
MCHC RBC AUTO-ENTMCNC: 33.4 G/DL (ref 31–37)
MCV RBC AUTO: 86 FL
MONOCYTES # BLD AUTO: 0.57 X10(3) UL (ref 0.1–1)
MONOCYTES NFR BLD AUTO: 8.3 %
NEUTROPHILS # BLD AUTO: 4.37 X10 (3) UL (ref 1.5–7.7)
NEUTROPHILS # BLD AUTO: 4.37 X10(3) UL (ref 1.5–7.7)
NEUTROPHILS NFR BLD AUTO: 63.6 %
OSMOLALITY SERPL CALC.SUM OF ELEC: 312 MOSM/KG (ref 275–295)
PLATELET # BLD AUTO: 284 10(3)UL (ref 150–450)
POTASSIUM SERPL-SCNC: 3.5 MMOL/L (ref 3.5–5.1)
PROT SERPL-MCNC: 7.2 G/DL (ref 6.4–8.2)
RBC # BLD AUTO: 3.51 X10(6)UL
SODIUM SERPL-SCNC: 145 MMOL/L (ref 136–145)
WBC # BLD AUTO: 6.9 X10(3) UL (ref 4–11)

## 2023-08-29 PROCEDURE — 99214 OFFICE O/P EST MOD 30 MIN: CPT | Performed by: INTERNAL MEDICINE

## 2023-08-29 PROCEDURE — 36415 COLL VENOUS BLD VENIPUNCTURE: CPT | Performed by: INTERNAL MEDICINE

## 2023-08-29 PROCEDURE — 3077F SYST BP >= 140 MM HG: CPT | Performed by: INTERNAL MEDICINE

## 2023-08-29 PROCEDURE — 3079F DIAST BP 80-89 MM HG: CPT | Performed by: INTERNAL MEDICINE

## 2023-08-29 PROCEDURE — 3008F BODY MASS INDEX DOCD: CPT | Performed by: INTERNAL MEDICINE

## 2023-08-29 RX ORDER — CARVEDILOL 12.5 MG/1
25 TABLET ORAL DAILY
Qty: 180 TABLET | Refills: 0 | Status: SHIPPED | OUTPATIENT
Start: 2023-08-29 | End: 2024-08-23

## 2023-08-30 ENCOUNTER — TELEPHONE (OUTPATIENT)
Dept: ENDOCRINOLOGY CLINIC | Facility: CLINIC | Age: 68
End: 2023-08-30

## 2023-09-07 RX ORDER — ALBUTEROL SULFATE 90 UG/1
2 AEROSOL, METERED RESPIRATORY (INHALATION) EVERY 6 HOURS PRN
Qty: 8.5 EACH | Refills: 1 | OUTPATIENT
Start: 2023-09-07

## 2023-09-07 NOTE — TELEPHONE ENCOUNTER
LOV: 12/3/21  FU: no follow up scheduled  LR: 7/7/23    Left message for patient to call back and inthinct message sent. Patient was notified 7/7/23 that appointment is needed for further refills.

## 2023-09-10 ENCOUNTER — HOSPITAL ENCOUNTER (OUTPATIENT)
Dept: MAMMOGRAPHY | Facility: HOSPITAL | Age: 68
End: 2023-09-10
Attending: INTERNAL MEDICINE
Payer: MEDICARE

## 2023-09-10 ENCOUNTER — HOSPITAL ENCOUNTER (OUTPATIENT)
Dept: MAMMOGRAPHY | Facility: HOSPITAL | Age: 68
Discharge: HOME | End: 2023-09-10
Attending: INTERNAL MEDICINE
Payer: MEDICARE

## 2023-09-10 DIAGNOSIS — Z12.31 ENCOUNTER FOR SCREENING MAMMOGRAM FOR MALIGNANT NEOPLASM OF BREAST: ICD-10-CM

## 2023-09-10 PROCEDURE — 77067 SCR MAMMO BI INCL CAD: CPT | Performed by: INTERNAL MEDICINE

## 2023-09-10 PROCEDURE — 77063 BREAST TOMOSYNTHESIS BI: CPT | Performed by: INTERNAL MEDICINE

## 2023-09-12 ENCOUNTER — TELEPHONE (OUTPATIENT)
Dept: ENDOCRINOLOGY CLINIC | Facility: CLINIC | Age: 68
End: 2023-09-12

## 2023-09-12 ENCOUNTER — LAB ENCOUNTER (OUTPATIENT)
Dept: LAB | Facility: HOSPITAL | Age: 68
End: 2023-09-12
Attending: INTERNAL MEDICINE
Payer: MEDICARE

## 2023-09-12 LAB
CHOLEST SERPL-MCNC: 140 MG/DL (ref ?–200)
CREAT UR-SCNC: 123 MG/DL
FASTING PATIENT LIPID ANSWER: NO
HDLC SERPL-MCNC: 38 MG/DL (ref 40–59)
LDLC SERPL CALC-MCNC: 65 MG/DL (ref ?–100)
MICROALBUMIN UR-MCNC: 123 MG/DL
MICROALBUMIN/CREAT 24H UR-RTO: 1000 UG/MG (ref ?–30)
NONHDLC SERPL-MCNC: 102 MG/DL (ref ?–130)
TRIGL SERPL-MCNC: 226 MG/DL (ref 30–149)
VLDLC SERPL CALC-MCNC: 34 MG/DL (ref 0–30)

## 2023-09-12 PROCEDURE — 82043 UR ALBUMIN QUANTITATIVE: CPT | Performed by: INTERNAL MEDICINE

## 2023-09-12 PROCEDURE — 82570 ASSAY OF URINE CREATININE: CPT | Performed by: INTERNAL MEDICINE

## 2023-09-12 PROCEDURE — 80061 LIPID PANEL: CPT | Performed by: INTERNAL MEDICINE

## 2023-09-12 PROCEDURE — 36415 COLL VENOUS BLD VENIPUNCTURE: CPT | Performed by: INTERNAL MEDICINE

## 2023-09-22 ENCOUNTER — TELEPHONE (OUTPATIENT)
Facility: CLINIC | Age: 68
End: 2023-09-22

## 2023-09-22 NOTE — TELEPHONE ENCOUNTER
Patient is updating Dr [employer] know that she cannot have the surgery on the scheduled date, due to the stress test that the Cardiologist recommended is schedule 10-6.  After the stress test they will discuss if the she will be cleared

## 2023-09-23 RX ORDER — CARVEDILOL 12.5 MG/1
25 TABLET ORAL DAILY
Qty: 180 TABLET | Refills: 0 | OUTPATIENT
Start: 2023-09-23

## 2023-09-25 RX ORDER — HYDROCODONE BITARTRATE AND ACETAMINOPHEN 10; 325 MG/1; MG/1
1 TABLET ORAL EVERY 4 HOURS PRN
Qty: 150 TABLET | Refills: 0 | Status: CANCELLED | OUTPATIENT
Start: 2023-09-25 | End: 2023-10-25

## 2023-09-26 RX ORDER — HYDROCODONE BITARTRATE AND ACETAMINOPHEN 10; 325 MG/1; MG/1
1 TABLET ORAL EVERY 4 HOURS PRN
Qty: 150 TABLET | Refills: 0 | Status: SHIPPED | OUTPATIENT
Start: 2023-09-26 | End: 2023-10-26

## 2023-10-03 ENCOUNTER — OFFICE VISIT (OUTPATIENT)
Dept: PAIN CLINIC | Facility: HOSPITAL | Age: 68
End: 2023-10-03
Attending: ANESTHESIOLOGY
Payer: MEDICARE

## 2023-10-03 VITALS — DIASTOLIC BLOOD PRESSURE: 72 MMHG | SYSTOLIC BLOOD PRESSURE: 181 MMHG

## 2023-10-03 DIAGNOSIS — M96.1 FAILED BACK SYNDROME OF LUMBAR SPINE: Chronic | ICD-10-CM

## 2023-10-03 DIAGNOSIS — M51.36 DDD (DEGENERATIVE DISC DISEASE), LUMBAR: Chronic | ICD-10-CM

## 2023-10-03 DIAGNOSIS — Z98.890 S/P LAMINECTOMY: Primary | ICD-10-CM

## 2023-10-03 PROCEDURE — 99211 OFF/OP EST MAY X REQ PHY/QHP: CPT

## 2023-10-03 RX ORDER — HYDROCODONE BITARTRATE AND ACETAMINOPHEN 10; 325 MG/1; MG/1
1 TABLET ORAL EVERY 4 HOURS PRN
Qty: 180 TABLET | Refills: 0 | Status: SHIPPED | OUTPATIENT
Start: 2023-11-01 | End: 2023-12-01

## 2023-10-03 RX ORDER — HYDROCODONE BITARTRATE AND ACETAMINOPHEN 10; 325 MG/1; MG/1
1 TABLET ORAL EVERY 4 HOURS PRN
Qty: 180 TABLET | Refills: 0 | Status: SHIPPED | OUTPATIENT
Start: 2023-10-03 | End: 2023-11-02

## 2023-10-03 NOTE — CHRONIC PAIN
Follow-up Note    Branden Gonzáles is a 74year old female, originally referred to the pain clinic by Dr. Vanessa Lazaro, with history of lumbar spine surgery and chronic low back pain with radiation into the left hip. There pain is managed with norco 10/325mg q4hrs prn. The pt reports adequate pain control with the medication. Also takes pregabalin 25mg BID. The medications help functioning. The pt denies side effects. Denies new painful areas. Pain Meds are reducing his pain by 80-90%. Patient has no change in bowel/bladder function. ALLERGIES:    Penicillins             HIVES, ANGIOEDEMA    Comment:Hives on eyelids (occurred when pt was in her             25s). Tolerated amoxicillin per chart. Tolerates             cephalosporins. MEDICATION LIST:  Current Outpatient Medications   Medication Sig Dispense Refill    HYDROcodone-acetaminophen  MG Oral Tab Take 1 tablet by mouth every 4 (four) hours as needed for Pain (Max 5/day). 150 tablet 0    carvedilol 12.5 MG Oral Tab Take 2 tablets (25 mg total) by mouth daily. 180 tablet 0    semaglutide (OZEMPIC, 2 MG/DOSE,) 8 MG/3ML Subcutaneous Solution Pen-injector Inject 2 mg into the skin once a week. 9 mL 0    glipiZIDE 5 MG Oral Tab Take 1.5 tablets (7.5 mg total) by mouth 2 (two) times daily before meals. 270 tablet 0    D-5000 125 MCG (5000 UT) Oral Tab Take 1 tablet (5,000 Units total) by mouth daily. albuterol 108 (90 Base) MCG/ACT Inhalation Aero Soln Inhale 2 puffs into the lungs every 6 (six) hours as needed for Wheezing or Shortness of Breath. 8.5 each 1    clopidogrel 75 MG Oral Tab Take 1 tablet (75 mg total) by mouth daily. 90 tablet 1    carvedilol 3.125 MG Oral Tab Take 2 tablets (6.25 mg total) by mouth 2 (two) times daily with meals. estradiol 0.1 MG/GM Vaginal Cream Apply a small amount to the perimeatal tissue every other day. 45 g 2    amLODIPine 10 MG Oral Tab Take 1 tablet (10 mg total) by mouth daily.  90 tablet 0 LISINOPRIL 40 MG Oral Tab TAKE 1 TABLET(40 MG) BY MOUTH DAILY 90 tablet 1    hydrALAZINE 100 MG Oral Tab Take 1 tablet (100 mg total) by mouth every 8 (eight) hours. 270 tablet 1    ATORVASTATIN 20 MG Oral Tab TAKE 1 TABLET(20 MG) BY MOUTH EVERY NIGHT 90 tablet 0    Budesonide-Formoterol Fumarate 160-4.5 MCG/ACT Inhalation Aerosol Inhale 2 puffs into the lungs 2 (two) times daily. Rinse mouth with water, gargle, and spit to follow. 1 each 2    ipratropium-albuterol 0.5-2.5 (3) MG/3ML Inhalation Solution Take 3 mL by nebulization every 6 (six) hours as needed (shortness of breath refractory to [facility]). 100 each 0    Blood Glucose Monitoring Suppl (CONTOUR NEXT MONITOR) w/Device Does not apply Kit 1 each by Does not apply route as needed. 1 kit 0    aspirin 81 MG Oral Tab EC Take 1 tablet (81 mg total) by mouth every other day. BD PEN NEEDLE PACO U/F 32G X 4 MM Does not apply Misc 1 each by Does not apply route 4 (four) times daily. REVIEW OF SYSTEMS:   General: no weight change, change in appetite, thirst or fever  HEENT: no headache or blurred vision  Cardiopulmonary: no chest pain, palpitations, or shortness of breath  GI: no anorexia, nausea or vomiting, or bowel incontinence   : no dysuria, or pyuria. Endo: no goiter, lethargy, or heat/cold intolerance  Heme/Onc: no pallor, bruising, or bleeding.     Musculoskeletal:  no new problems  Neuro:  no new problems  Psych:  no new problems    MEDICAL HISTORY:  Patient Active Problem List:     Anemia     Azotemia     Hypokalemia     Back pain with radiation     Type 2 diabetes mellitus without retinopathy ([address]     Age-related nuclear cataract of both eyes     Glaucoma suspect of both eyes     Centrilobular emphysema (HCC)     PVD (peripheral vascular disease) ([address]     Diabetic ulcer of left midfoot associated with type 2 diabetes mellitus, with fat layer exposed ([address]     PAD (peripheral artery disease) ([address]     Pre-op testing     Family history of glaucoma in sister     Primary hypertension     S/P laminectomy     Post-operative pain     Type 2 diabetes mellitus with hyperglycemia, with long-term current use of insulin ([facility])     Myalgia     DDD (degenerative disc disease), lumbar     Failed back syndrome of lumbar spine     Chest pain     Hyperlipidemia     Urinary tract infection     Acute cystitis without hematuria     Ileitis     COVID-19     UTI (urinary tract infection)     Pyelonephritis     Hypernatremia     Hyperglycemia     Ureteral calculi     Hypoglycemia     Opioid dependence, uncomplicated ([facility])     Depression, recurrent ([facility])     Stage 3 chronic kidney disease, unspecified whether stage 3a or 3b CKD ([facility])    Past Medical History:   Diagnosis Date    Anemia     Back pain     Chronic kidney disease (CKD)     COPD (chronic obstructive pulmonary disease) ([facility])     FEV 1 1.25 50%     Diabetes ([facility]     DM neuropathy    Essential hypertension     H/O angioplasty     2020    High blood pressure     High cholesterol     Peripheral vascular disease ([facility])     PNA (pneumonia) 2018    Pulmonary nodule     4 mm RUL    Renal disorder     monitoring kidney labs    Sickle cell trait ([facility])     Sickle-cell anemia ([facility])     Tobacco abuse        SURGICAL HISTORY:  Past Surgical History:   Procedure Laterality Date    BACK SURGERY Right 2021    Right L3-4 extreme lateral interbody fusion, posterior decompression; LUMBAR LAMINECTOMY 1 LEVEL          x3    EXCIS LUMBAR DISK,ONE LEVEL          OTHER      bilateral leg stents  and        FAMILY HISTORY:  Family History   Problem Relation Age of Onset    Diabetes Mother         Passed from DM complications    Diabetes Sister         Had kidney transplant due to complications of DM    Kidney Disease Sister         Kidney failure secondary to diabetes; received kidney transplant in     Glaucoma Sister     Diabetes Brother     Sickle Cell Son         Cause of death    Macular degeneration Neg        SOCIAL HISTORY:  Social History    Socioeconomic History      Marital status:       Spouse name: Not on file      Number of children: Not on file      Years of education: Not on file      Highest education level: Not on file    Occupational History      Not on file    Tobacco Use      Smoking status: Former        Packs/day: 1.00        Years: 30.00        Additional pack years: 0.00        Total pack years: 30.00        Types: Cigarettes        Quit date: 11/8/2019        Years since quitting: 3.9      Smokeless tobacco: Never    Vaping Use      Vaping Use: Never used    Substance and Sexual Activity      Alcohol use: Not Currently        Comment: socially      Drug use: No      Sexual activity: Not on file    Other Topics      Concerns:        Not on file    Social History Narrative      [employer]          Social Determinants of Health  Financial Resource Strain: Not on file  Food Insecurity: Not on file  Transportation Needs: Not on file  Physical Activity: Not on file  Stress: Not on file  Social Connections: Not on file  Housing Stability: Not on file  PHYSICAL EXAMINATION:   10/03/23  0944   BP: (!) 181/72      General: Alert and oriented x3, NAD, appears stated age, appropriate disposition and demeanor, answers questions appropriately   Head: normocephalic, atraumatic  Eyes: anicteric; no injection  Ears: normal;  no discharge  Neck: supple, trachea midline, no obvious JVD  Gait: Walking using cane limping with left lower extremity  Spine: Flattened lumbar lordosis postsurgical well-healed scar  ROM:   Lumbar Spine: Limited flexion due to pain  MOTOR EXAMINATION:  Lower Extremities: Normal bilaterally  REFLEXES:  Lower Extremities: Depressed bilaterally knee reflexes  SENSATION (light touch/pinprick/temperature):    Lower Extremities: Normal bilaterally  SLR: Negative bilateral  SIJ tenderness: Positive b/l   Aaron's test: Positive on the left IMAGING:  PROCEDURE: XR LUMBAR SPINE (MIN 2 VIEWS) (CPT=72100)     COMPARISON: [facility], XR LUMBAR SPINE (MIN 2 VIEWS) (CPT=72100), 8/08/2020, 11:53 AM.     INDICATIONS: Low back pain radiating distally post lumbar surgery 09/08/2021. TECHNIQUE: Lumbar spine radiographs (2-3 views)       FINDINGS:     ALIGNMENT: Mild chronic wedge compression involving T11, T12 and L1. VERTEBRAL BODIES:   Mild S-shaped scoliosis and multilevel spondylosis. Chronic anterior spondylolisthesis L4 relative to L5. Lumbar discectomy interbody fusion L4-5 with posterior instrumentation and decompressive laminectomy changes. Interval  discectomy and right lateral vertebral fixation at the L3-4. Multilevel lumbar spondylosis. Minimal anterior listhesis L3 relative to L4 has been partially reduced  SACROILIAC JOINTS: Normal.    OTHER: Vascular calcifications noted. Surgical clips right upper quadrant               Impression   CONCLUSION:  1. Mild chronic wedge compression deformity T11-T12 and L1.  2. S shaped scoliosis and multilevel spondylosis.   Chronic anterior spondylolisthesis L4 relative to L5.  3. Lumbar discectomies interbody fusion L3-4 and L4-5 with chronic posterior instrumentation at L4-5 and interval right lateral fixation L3-4.    4. Minimal anterior subluxation L3 relative to L4 has been partially reduced post interbody fusion           Dictated by (CST): Tammi Bolden MD on 2/01/2022 at 10:52 AM      Finalized by (CST): Tammi Bolden MD on 2/01/2022 at 10:56 AM           PROCEDURE: MRI SPINE LUMBAR (CPT=72148)     COMPARISON: [facility], MRI SPINE LUMBAR (W+WO) (CPT=72158), 9/30/2018, 12:28 PM.     INDICATIONS: Preoperative evaluation to rule out surgical contraindication, Spinal stenosis of lumbar region with neurogenic claudication M96.1 Failed back s*     TECHNIQUE: A variety of imaging planes and parameters were utilized for visualization of suspected pathology. FINDINGS:  NUMERATION: For the purposes of this examination, the lowest fully formed disc space is designated as L5-S1.  ALIGNMENT: There is preservation of the expected lumbar lordosis. Trace degenerative anterolisthesis of L3 relative to L4. BONES: Postsurgical changes of L4-L5 posterior lumbar fusion. Resultant susceptibility artifacts limit evaluation. CORD/CAUDA EQUINA: The distal cord and nerve roots have normal caliber, contour, and signal intensity. PARASPINAL AREA: No visible mass. OTHER: Small bilateral renal cysts. Partially imaged 1.2 cm prominence of the uterine endometrium. LUMBAR DISC LEVELS:  L1-L2: Diffuse disc bulge with minor bilateral facet arthropathy. No significant neural compromise. L2-L3: Small diffuse disc bulge with dorsal epidural lipomatosis and mild bilateral facet arthropathy. Mild multifactorial spinal canal stenosis with no significant neural foraminal compromise. L3-L4: Large diffuse disc bulge with dorsal epidural lipomatosis and bilateral facet arthropathy. Severe multifactorial spinal canal with severe bilateral neural foraminal stenosis. L4-L5: Evidence of prior posterior decompressive laminectomy at L4-L5. Moderate left neural foraminal stenosis with no high-grade spinal canal or right neural foraminal compromise. L5-S1: Disc and facet related degeneration, which results in mild left greater than right neural foraminal stenosis with no significant spinal canal or lateral recess compromise. Impression   CONCLUSION:     1. Postsurgical changes of L4-L5 posterior lumbar fusion with associated decompressive laminectomy. Resultant susceptibility artifacts limit evaluation. 2. Multilevel degenerative changes of the lumbar spine as detailed. Notable levels as follows:     3. L3-L4:  Severe multifactorial spinal canal with severe bilateral neural foraminal stenosis.   Significant interval progression of degenerative change at this level as compared with prior lumbar spine MR from September, 2018. 4. L4-L5:  Moderate left neural foraminal stenosis. 5. L5-S1:  Mild left greater than right neural foraminal stenosis. 6. L2-L3:  Mild spinal canal stenosis. 7. Trace degenerative anterolisthesis of L3 relative to L4. This is new since prior lumbar spine MR.     8. Incidentally noted 1.2 cm prominence of the uterine endometrium, which is considered abnormal in this presumed postmenopausal patient. Baseline pelvic ultrasound evaluation of this finding is advised. 9. Lesser incidental findings as above. Remote - PS     Dictated by (CST): Raelyn Habermann, MD on 7/12/2021 at 2:42 PM      Finalized by (CST): Raelyn Habermann, MD on 7/12/2021 at 2:49 PM           ASSESSMENT AND PLAN:    Victor Hugo Brown is a 74year old  female, with  hx of lumbar spine surgery with c/o chronic low back pain with radiation into the left hip due to failed back surgery syndrome. Pain stable and well controlled on norco and pregabalin. Recommendation:  Continue the current regimen without changes in dose/frequency. Refilled today for 2 months after IL  verification. Pt will return to clinic 2 months for follow up   Conservative vs. Interventional pain treatment options were discussed at length including pharmacotherapy (eg. Anti- inflammatories, muscle relaxants, neuropathic medications, oral steroids, analgesics), injections, and further testing. Risks and benefits of all options were discussed to patients satisfaction. All questions were answered. Patient agreeable to treatment plan. Greater than 50% of the time was spent with counseling (nature of discussion centered around pain, therapy, and treatment options), face to face time, time spent reviewing data, obtaining patient information and discussing the care with the patients health care providers.      Total Time: [address] [facility], DO  Anesthesiology  Pain Medicine

## 2023-10-03 NOTE — PROGRESS NOTES
PT presents ambulatory to the CPM with use of straight cane. Pt reports no changes with 8/10 pain today. Dr. Anisa Ha saw PT for med eval. See notes for POC.

## 2023-10-04 ENCOUNTER — TELEPHONE (OUTPATIENT)
Dept: INTERNAL MEDICINE CLINIC | Facility: CLINIC | Age: 68
End: 2023-10-04

## 2023-10-04 NOTE — TELEPHONE ENCOUNTER
Patient, would like inform the doctor that the surgery she had scheduled on 10-18-23 will not be done due to her insurance. Per the patient she was not aware that the insurance was changed to something that we are not contracted with. Pt states that she will get the insurance she had previously ( we are contracted with) until November. Per the patient this is a doctor FYI.

## 2023-10-17 RX ORDER — CARVEDILOL 12.5 MG/1
25 TABLET ORAL DAILY
Qty: 180 TABLET | Refills: 1 | Status: SHIPPED | OUTPATIENT
Start: 2023-10-17

## 2023-10-17 NOTE — TELEPHONE ENCOUNTER
OFFICE VISIT 8/9/23;  Primary hypertension  (primary encounter diagnosis)-not well controlled I will increase her carvedilol to 12.5 continue the rest of medication            Signed Prescriptions Disp Refills    carvedilol 12.5 MG Oral Tab 180 tablet 0       Sig: Take 2 tablets (25 mg total) by mouth daily. Please review; protocol failed/no protocol. Requested Prescriptions   Pending Prescriptions Disp Refills    CARVEDILOL 12.5 MG Oral Tab [Pharmacy Med Name: CARVEDILOL 12.5 MG TABLET] 180 tablet 0     Sig: TAKE 2 TABLETS BY MOUTH DAILY.        Hypertensive Medications Protocol Failed - 10/15/2023  5:19 PM        Failed - Last BP reading less than 140/90     BP Readings from Last 1 Encounters:  10/03/23 : (!) 181/72              Failed - EGFRCR or GFRAA > 50     GFR Evaluation  EGFRCR: 38 , resulted on 8/29/2023          Passed - In person appointment in the past 12 or next 3 months     Recent Outpatient Visits              1 week ago     [facility] Paco Mccoy DO    Office Visit    1 month ago PVD (peripheral vascular disease) [facility])    [address],[place] [address], [place], [place] Alis Aguirre MD    Office Visit    2 months ago Failed back syndrome of lumbar spine    [facility] Paco Mccoy DO    Office Visit    2 months ago Type 2 diabetes mellitus with hyperosmolarity without coma, with long-term current use of insulin [facility])    [address],[place] [address]    Office Visit    2 months ago Type 2 diabetes mellitus with other specified complication, without long-term current use of insulin ([facility]    [address], [address] [facility], [facility] [address]    Nurse Only          Future Appointments         Provider Department Appt Notes    In 1 month Liborio Walsh MD [address], [address] [facility], [place] 4 months    In 1 month [facility] MD [facility], [place] [address] [facility], [place] follow up to labs (Policy Informed)    In 2 months Nelson Martínez MD [facility], [address], [place] ep dm ee, policy informed                      Passed - CMP or BMP in past 6 months     Recent Results (from the past 4392 hour(s))   Comp Metabolic Panel (14)    Collection Time: 08/29/23  2:26 PM   Result Value Ref Range    Glucose 224 (H) 70 - 99 mg/dL    Sodium 145 136 - 145 mmol/L    Potassium 3.5 3.5 - 5.1 mmol/L    Chloride 115 (H) 98 - 112 mmol/L    CO2 22.0 21.0 - 32.0 mmol/L    Anion Gap 8 0 - 18 mmol/L    BUN 27 (H) 7 - 18 mg/dL    Creatinine 1.53 (H) 0.55 - 1.02 mg/dL    BUN/CREA Ratio 17.6 10.0 - 20.0    Calcium, Total 9.3 8.5 - 10.1 mg/dL    Calculated Osmolality 312 (H) 275 - 295 mOsm/kg    eGFR-Cr 38 (L) >=60 mL/min/1.73m2    ALT 18 13 - 56 U/L    AST 14 (L) 15 - 37 U/L    Alkaline Phosphatase 80 50 - 130 U/L    Bilirubin, Total 0.2 0.1 - 2.0 mg/dL    Total Protein 7.2 6.4 - 8.2 g/dL    Albumin 3.4 3.4 - 5.0 g/dL    Globulin  3.8 2.8 - 4.4 g/dL    A/G Ratio 0.9 (L) 1.0 - 2.0    Patient Fasting for CMP? No      *Note: Due to a large number of results and/or encounters for the requested time period, some results have not been displayed. A complete set of results can be found in Results Review.                Passed - In person appointment or virtual visit in the past 6 months     Recent Outpatient Visits              1 week ago     [facility] Jason Castillo DO    Office Visit    1 month ago PVD (peripheral vascular disease) [facility])    [address], [address] [place] , [place] Florentino Alanis MD    Office Visit    2 months ago Failed back syndrome of lumbar spine    [facility] Jason Castillo DO    Office Visit    2 months ago Type 2 diabetes mellitus with hyperosmolarity without coma, with long-term current use of insulin [facility])    [facility], [place] [address] Annalise Bailey MD    Office Visit    2 months ago Type 2 diabetes mellitus with other specified complication, without long-term current use of insulin ([facility]    [address], [address] [facility]    Nurse Only          Future Appointments         Provider Department Appt Notes    In 1 month Christiano Strickland MD [address], [place] [address] 4 months    In 1 month Lisa Hansen MD [address], [place] [address] follow up to labs (Policy Informed)    In 2 months Nelson Martínez MD [address] [facility] dm ee, policy informed                            Recent Outpatient Visits              1 week ago     [facility]    Office Visit    1 month ago PVD (peripheral vascular disease) [facility])    [address], [address] [place] Florentino Alanis MD    Office Visit    2 months ago Failed back syndrome of lumbar spine    [facility] Estanislado Mar, DO    Office Visit    2 months ago Type 2 diabetes mellitus with hyperosmolarity without coma, with long-term current use of insulin [facility])    [address], [address], MD    Office Visit    2 months ago Type 2 diabetes mellitus with other specified complication, without long-term current use of insulin [facility])    [address], [address] [facility]    Nurse Only             Future Appointments         Provider Department Appt Notes    In 1 month Christiano Strickland MD [address], [place] [address] 4 months    In 1 month Lisa Hansen MD [address], [place] [address] follow up to labs (Policy Informed)    In 2 months Nelson Martínez MD [facility], [address] [facility],[address], [place] ee, policy informed

## 2023-10-18 ENCOUNTER — MED REC SCAN ONLY (OUTPATIENT)
Dept: INTERNAL MEDICINE CLINIC | Facility: CLINIC | Age: 68
End: 2023-10-18

## 2023-10-18 RX ORDER — ALBUTEROL SULFATE 90 UG/1
2 AEROSOL, METERED RESPIRATORY (INHALATION) EVERY 6 HOURS PRN
Qty: 8.5 EACH | Refills: 1 | OUTPATIENT
Start: 2023-10-18

## 2023-11-07 RX ORDER — ALBUTEROL SULFATE 90 UG/1
2 AEROSOL, METERED RESPIRATORY (INHALATION) EVERY 6 HOURS PRN
Qty: 1 EACH | Refills: 0 | Status: SHIPPED | OUTPATIENT
Start: 2023-11-07

## 2023-11-07 NOTE — TELEPHONE ENCOUNTER
Last office visit: 12/3/21  Last refill: 7/7/23    Spoke with patient. Informed patient follow-up appointment needed for refills. Appointment scheduled for 2/8/24 at [address] [place] [address]. Verified date, time, place, and where to park. Patient verbalized understanding.      Jordan TIRADO: Please review/sign pended refill request.

## 2023-12-05 RX ORDER — ALBUTEROL SULFATE 90 UG/1
2 AEROSOL, METERED RESPIRATORY (INHALATION) EVERY 6 HOURS PRN
Qty: 8.5 EACH | Refills: 2 | Status: SHIPPED | OUTPATIENT
Start: 2023-12-05

## 2023-12-06 ENCOUNTER — MED REC SCAN ONLY (OUTPATIENT)
Dept: INTERNAL MEDICINE CLINIC | Facility: CLINIC | Age: 68
End: 2023-12-06

## 2023-12-18 RX ORDER — HYDROCODONE BITARTRATE AND ACETAMINOPHEN 5; 325 MG/1; MG/1
1 TABLET ORAL EVERY 4 HOURS PRN
Qty: 180 TABLET | Refills: 0 | Status: SHIPPED | OUTPATIENT
Start: 2023-12-18 | End: 2024-01-17

## 2023-12-18 NOTE — TELEPHONE ENCOUNTER
Patient called and stated her pharmacy is out of stock of  - needs new script, suggested 5-325    Rx pending for approval..

## 2023-12-27 ENCOUNTER — TELEPHONE (OUTPATIENT)
Dept: OPHTHALMOLOGY | Facility: CLINIC | Age: 68
End: 2023-12-27

## 2023-12-27 NOTE — TELEPHONE ENCOUNTER
Pt needed approximate time her exam would finish because she has a  picking her up. I told her she should be done between 5 and 5:30 PAquiles Piña

## 2023-12-28 ENCOUNTER — OFFICE VISIT (OUTPATIENT)
Dept: OPHTHALMOLOGY | Facility: CLINIC | Age: 68
End: 2023-12-28

## 2023-12-28 DIAGNOSIS — Z83.511 FAMILY HISTORY OF GLAUCOMA IN SISTER: ICD-10-CM

## 2023-12-28 DIAGNOSIS — H40.003 GLAUCOMA SUSPECT OF BOTH EYES: ICD-10-CM

## 2023-12-28 DIAGNOSIS — H25.13 AGE-RELATED NUCLEAR CATARACT OF BOTH EYES: ICD-10-CM

## 2023-12-28 DIAGNOSIS — E11.9 TYPE 2 DIABETES MELLITUS WITHOUT RETINOPATHY (HCC): Primary | ICD-10-CM

## 2023-12-28 PROCEDURE — 2023F DILAT RTA XM W/O RTNOPTHY: CPT | Performed by: OPHTHALMOLOGY

## 2023-12-28 PROCEDURE — 92014 COMPRE OPH EXAM EST PT 1/>: CPT | Performed by: OPHTHALMOLOGY

## 2023-12-28 PROCEDURE — 92015 DETERMINE REFRACTIVE STATE: CPT | Performed by: OPHTHALMOLOGY

## 2023-12-28 PROCEDURE — 92250 FUNDUS PHOTOGRAPHY W/I&R: CPT | Performed by: OPHTHALMOLOGY

## 2023-12-28 PROCEDURE — 1160F RVW MEDS BY RX/DR IN RCRD: CPT | Performed by: OPHTHALMOLOGY

## 2023-12-28 PROCEDURE — 1159F MED LIST DOCD IN RCRD: CPT | Performed by: OPHTHALMOLOGY

## 2023-12-28 RX ORDER — GLIPIZIDE 10 MG/1
10 TABLET ORAL 2 TIMES DAILY
COMMUNITY
Start: 2023-12-08

## 2023-12-28 NOTE — ASSESSMENT & PLAN NOTE
Discussed with patient that she is a glaucoma suspect based on increased cupping of the optic nerves in both eyes. Retinal photos taken today to document optic nerves. Glaucoma diagnostic testing ordered. Will not start medication, but will continue to observe. Patient verbalized understanding.     Will see patient for next available visual field and OCT with no MD, if glaucoma diagnostics are normal will see patient in 1 year for a diabetic exam

## 2023-12-28 NOTE — PATIENT INSTRUCTIONS
Type 2 diabetes mellitus without retinopathy ([facility]  Diabetes type II: no background of retinopathy, no signs of neovascularization noted. Discussed ocular and systemic benefits of blood sugar control. Diagnosis and treatment discussed in detail with patient. Glaucoma suspect of both eyes  Discussed with patient that she is a glaucoma suspect based on increased cupping of the optic nerves in both eyes. Retinal photos taken today to document optic nerves. Glaucoma diagnostic testing ordered. Will not start medication, but will continue to observe. Patient verbalized understanding. Will see patient for next available visual field and OCT with no MD, if glaucoma diagnostics are normal will see patient in 1 year for a diabetic exam    Age-related nuclear cataract of both eyes  Discussed moderate cataracts in both eyes that are affecting vision but are not surgical at this time. Family history of glaucoma in sister  Patient has 2 sisters with glaucoma.

## 2023-12-28 NOTE — PROGRESS NOTES
Kristen Pickett is a 79year old female. HPI:     HPI    Patient is here for a diabetic exam and photos. She complains of blurry vision at distance and near (usually only when her BS is high). Pt has been a diabetic for 24 years       Pt's diabetes is currently controlled by pills  Pt checks BS once a week  Pt's last blood sugar was  186 this morning  Last HA1C was 6.5 on 23  Endocrinologist: Dr. Reggie Sharma  Last edited by Mukund Hicks OT on 2023  3:42 PM.        Patient History:  Past Medical History:   Diagnosis Date    Anemia     Back pain     Chronic kidney disease (CKD)     COPD (chronic obstructive pulmonary disease) (HCC)     FEV 1 1.25 50% 2    Diabetes ([address]     DM neuropathy    Essential hypertension     H/O angioplasty     2020    High blood pressure     High cholesterol     Peripheral vascular disease (HCC)     PNA (pneumonia) 2018    Pulmonary nodule     4 mm RUL    Renal disorder     monitoring kidney labs    Sickle cell trait ([facility]     Sickle-cell anemia ([facility] [address] [facility]     Tobacco abuse        Surgical History: Kristen Pickett has a past surgical history that includes excis lumbar disk,one level ();  (x3); other (bilateral leg stents  and ); and back surgery (Right, 2021) (Right L3-4 extreme lateral interbody fusion, posterior decompression; LUMBAR LAMINECTOMY 1 LEVEL). Family History   Problem Relation Age of Onset    Diabetes Mother         Passed from DM complications    Diabetes Sister         Had kidney transplant due to complications of DM    Kidney Disease Sister         Kidney failure secondary to diabetes; received kidney transplant in     Glaucoma Sister     Diabetes Brother     Sickle Cell Son         Cause of death    Macular degeneration Neg        Social History:   Social History     Socioeconomic History    Marital status:     Tobacco Use    Smoking status: Former     Packs/day: 1.00     Years: 30.00     Additional pack years: 0.00     Total pack years: 30.00     Types: Cigarettes     Quit date: 2019     Years since quittin.1    Smokeless tobacco: Never   Vaping Use    Vaping Use: Never used   Substance and Sexual Activity    Alcohol use: Not Currently     Comment: socially    Drug use: No   Social History Narrative    [facility]           Medications:  Current Outpatient Medications   Medication Sig Dispense Refill    glipiZIDE 10 MG Oral Tab Take 1 tablet (10 mg total) by mouth 2 (two) times daily. HYDROcodone-acetaminophen 5-325 MG Oral Tab Take 1 tablet by mouth every 4 (four) hours as needed for Pain (MAX 6/DAY). 180 tablet 0    HYDROcodone-acetaminophen  MG Oral Tab Take 1 tablet by mouth every 4 (four) hours as needed for Pain (max 6/day). 180 tablet 0    [START ON 2024] HYDROcodone-acetaminophen  MG Oral Tab Take 1 tablet by mouth every 4 (four) hours as needed for Pain (max 6/day). 180 tablet 0    albuterol 108 (90 Base) MCG/ACT Inhalation Aero Soln Inhale 2 puffs into the lungs every 6 (six) hours as needed for Wheezing or Shortness of Breath. 8.5 each 2    carvedilol 12.5 MG Oral Tab Take 2 tablets (25 mg total) by mouth daily. 180 tablet 1    D-5000 125 MCG (5000 UT) Oral Tab Take 1 tablet (5,000 Units total) by mouth daily. clopidogrel 75 MG Oral Tab Take 1 tablet (75 mg total) by mouth daily. 90 tablet 1    carvedilol 3.125 MG Oral Tab Take 2 tablets (6.25 mg total) by mouth 2 (two) times daily with meals. estradiol 0.1 MG/GM Vaginal Cream Apply a small amount to the perimeatal tissue every other day. 45 g 2    amLODIPine 10 MG Oral Tab Take 1 tablet (10 mg total) by mouth daily. 90 tablet 0    LISINOPRIL 40 MG Oral Tab TAKE 1 TABLET(40 MG) BY MOUTH DAILY 90 tablet 1    hydrALAZINE 100 MG Oral Tab Take 1 tablet (100 mg total) by mouth every 8 (eight) hours.  270 tablet 1    ATORVASTATIN 20 MG Oral Tab TAKE 1 TABLET(20 MG) BY MOUTH EVERY NIGHT 90 tablet 0 Budesonide-Formoterol Fumarate 160-4.5 MCG/ACT Inhalation Aerosol Inhale 2 puffs into the lungs 2 (two) times daily. Rinse mouth with water, gargle, and spit to follow. 1 each 2    ipratropium-albuterol 0.5-2.5 (3) MG/3ML Inhalation Solution Take 3 mL by nebulization every 6 (six) hours as needed (shortness of breath refractory to [facility]). 100 each 0    Blood Glucose Monitoring Suppl (CONTOUR NEXT MONITOR) w/Device Does not apply Kit 1 each by Does not apply route as needed. 1 kit 0    aspirin 81 MG Oral Tab EC Take 1 tablet (81 mg total) by mouth every other day. BD PEN NEEDLE PACO U/F 32G X 4 MM Does not apply Misc 1 each by Does not apply route 4 (four) times daily. Allergies: Allergies   Allergen Reactions    Penicillins HIVES and ANGIOEDEMA     Hives on eyelids (occurred when pt was in her 25s). Tolerated amoxicillin per chart. Tolerates cephalosporins.        ROS:       PHYSICAL EXAM:     Base Eye Exam       Visual Acuity (Snellen - Linear)         Right Left    Dist sc 20/50 20/150    Dist ph sc 20/40 20/50    Near cc 20/30 20/70   NVA checked with +2.75 in phoropter             Tonometry (Applanation, 3:53 PM)         Right Left    Pressure 16 16              Pachymetry (10/30/2019)         Right Left    Thickness 545/+0 550/+0              Pupils         Pupils    Right PERRL    Left PERRL              Visual Fields         Left Right     Full Full              Extraocular Movement         Right Left     Full, Ortho Full, Ortho              Dilation       Both eyes: 1.0% Mydriacyl and 2.5% Yaya Synephrine @ 3:54 PM              Dilation #2       Both eyes: 1.0% Mydriacyl and 2.5% Yaya Synephrine @ 3:54 PM                  Slit Lamp and Fundus Exam       Slit Lamp Exam         Right Left    Lids/Lashes Dermatochalasis, Meibomian gland dysfunction Dermatochalasis, Meibomian gland dysfunction    Conjunctiva/Sclera ocular melanosis  ocular melanosis     Cornea Clear Clear    Anterior Chamber Deep and quiet Deep and quiet    Iris Normal Normal    Lens 2+ Nuclear sclerosis, 1+ Cortical cataract inferiorly 1+ anterior cortical nasallty 2+ Nuclear sclerosis, 1+ anterior Cortical cataract inferiorly    Vitreous Vitreous floaters Clear              Fundus Exam         Right Left    Disc Sloping margin, sloping inferior margin and inferior crescent Sloping margin, sloping inferior margin, inferior crescent, tilted disc    C/D Ratio 0.6 0.5    Macula Normal- no BDR Normal- no BDR    Vessels Normal Normal    Periphery Normal Normal                  Refraction       Wearing Rx         Sphere Cylinder    Right +2.75 Sphere    Left +2.75 Sphere      Type: forgot-OTC reading only              Manifest Refraction (Auto)         Sphere Cylinder [place] [facility] [place] Near [place]    Right -1.75 +0.50 080       Left -2.25 +0.50 155                 Manifest Refraction #2         Sphere Cylinder [place] [facility] Add [place]    Right -1.50 +0.75 010 20/30 +2.75 20/20    Left -2.25 Sphere  20/50 +2.75 20/30              Final Rx         Sphere Cylinder [place] [facility] Add [place]    Right -1.50 +0.75 010 20/30 +2.75 20/20    Left -2.25 Sphere  20/50 +2.75 20/30      Type: Progressive bifocal                     ASSESSMENT/PLAN:     Diagnoses and Plan:     Type 2 diabetes mellitus without retinopathy ([address]  Diabetes type II: no background of retinopathy, no signs of neovascularization noted. Discussed ocular and systemic benefits of blood sugar control. Diagnosis and treatment discussed in detail with patient. Glaucoma suspect of both eyes  Discussed with patient that she is a glaucoma suspect based on increased cupping of the optic nerves in both eyes. Retinal photos taken today to document optic nerves. Glaucoma diagnostic testing ordered. Will not start medication, but will continue to observe. Patient verbalized understanding.     Will see patient for next available visual field and OCT with no MD, if glaucoma diagnostics are normal will see patient in 1 year for a diabetic exam    Age-related nuclear cataract of both eyes  Discussed moderate cataracts in both eyes that are affecting vision but are not surgical at this time. Family history of glaucoma in sister  Patient has 2 sisters with glaucoma. Orders Placed This Encounter   Procedures    Fundus Photos - OU - Both Eyes    OCT, Optic Nerve - OU - Both Eyes    Sharp Visual Field - OU - Both Eyes       Meds This Visit:  Requested Prescriptions      No prescriptions requested or ordered in this encounter        Follow up instructions:  No follow-ups on file.     12/28/2023  Scribed by: Nia Pastor MD

## 2023-12-30 ENCOUNTER — NURSE ONLY (OUTPATIENT)
Dept: OPHTHALMOLOGY | Facility: CLINIC | Age: 68
End: 2023-12-30

## 2023-12-30 DIAGNOSIS — H40.003 GLAUCOMA SUSPECT OF BOTH EYES: ICD-10-CM

## 2023-12-30 PROCEDURE — 92133 CPTRZD OPH DX IMG PST SGM ON: CPT | Performed by: OPHTHALMOLOGY

## 2023-12-30 PROCEDURE — 1160F RVW MEDS BY RX/DR IN RCRD: CPT | Performed by: OPHTHALMOLOGY

## 2023-12-30 PROCEDURE — 92083 EXTENDED VISUAL FIELD XM: CPT | Performed by: OPHTHALMOLOGY

## 2023-12-30 PROCEDURE — 1159F MED LIST DOCD IN RCRD: CPT | Performed by: OPHTHALMOLOGY

## 2023-12-30 RX ORDER — SODIUM CHLORIDE, SODIUM LACTATE, POTASSIUM CHLORIDE, CALCIUM CHLORIDE 600; 310; 30; 20 MG/100ML; MG/100ML; MG/100ML; MG/100ML
INJECTION, SOLUTION INTRAVENOUS CONTINUOUS
Status: CANCELLED | OUTPATIENT
Start: 2023-12-30

## 2023-12-30 RX ORDER — ACETAMINOPHEN 500 MG
1000 TABLET ORAL ONCE
Status: CANCELLED | OUTPATIENT
Start: 2023-12-30 | End: 2023-12-30

## 2023-12-30 RX ORDER — MIRABEGRON 50 MG/1
50 TABLET, FILM COATED, EXTENDED RELEASE ORAL EVERY MORNING
COMMUNITY
Start: 2023-12-08

## 2023-12-30 RX ORDER — SITAGLIPTIN 25 MG/1
25 TABLET, FILM COATED ORAL EVERY MORNING
COMMUNITY
Start: 2023-12-29

## 2023-12-30 RX ORDER — ROSUVASTATIN CALCIUM 40 MG/1
40 TABLET, COATED ORAL NIGHTLY
COMMUNITY
Start: 2023-12-21

## 2023-12-30 NOTE — PROGRESS NOTES
Nina Moura is a 79year old female. HPI:     HPI    Patient is here for a glaucoma work up with HVAUSTIN and OCT, no M.D. Last edited by Ernestine Raza on 2023 10:54 AM.        Patient History:  Past Medical History:   Diagnosis Date    Anemia     Back pain     Cataract     Chronic kidney disease (CKD)     COPD (chronic obstructive pulmonary disease) (HCC)     FEV 1 1.25 50% 2/20    Diabetes ([address]     DM neuropathy    Essential hypertension     H/O angioplasty     2020    High blood pressure     High cholesterol     Neuropathy     Peripheral vascular disease (HCC)     PNA (pneumonia) 2018    Pulmonary nodule     4 mm RUL    Renal disorder     monitoring kidney labs    Sickle cell trait (HCC)     Sickle-cell anemia (HCC)     Tobacco abuse     Visual impairment        Surgical History: Nina Moura has a past surgical history that includes excis lumbar disk,one level ();  (x3); other (bilateral leg stents  and ); back surgery (Right, 2021) (Right L3-4 extreme lateral interbody fusion, posterior decompression; LUMBAR LAMINECTOMY 1 LEVEL); cholecystectomy; and appendectomy. Family History   Problem Relation Age of Onset    Diabetes Mother         Passed from DM complications    Diabetes Sister         Had kidney transplant due to complications of DM    Kidney Disease Sister         Kidney failure secondary to diabetes; received kidney transplant in     Glaucoma Sister     Diabetes Brother     Sickle Cell Son         Cause of death    Macular degeneration Neg        Social History:   Social History     Socioeconomic History    Marital status:     Tobacco Use    Smoking status: Former     Packs/day: 1.00     Years: 30.00     Additional pack years: 0.00     Total pack years: 30.00     Types: Cigarettes     Quit date: 2019     Years since quittin.1    Smokeless tobacco: Never   Vaping Use    Vaping Use: Never used   Substance and Sexual Activity Alcohol use: Not Currently     Comment: socially    Drug use: No   Social History Narrative    [facility]           Medications:  Current Outpatient Medications   Medication Sig Dispense Refill    MYRBETRIQ 50 MG Oral Tablet 24 Hr Take 1 tablet (50 mg total) by mouth every morning. JANUVIA 25 MG Oral Tab Take 1 tablet (25 mg total) by mouth every morning. rosuvastatin 40 MG Oral Tab Take 1 tablet (40 mg total) by mouth nightly. glipiZIDE 10 MG Oral Tab Take 1 tablet (10 mg total) by mouth 2 (two) times daily. HYDROcodone-acetaminophen 5-325 MG Oral Tab Take 1 tablet by mouth every 4 (four) hours as needed for Pain (MAX 6/DAY). 180 tablet 0    HYDROcodone-acetaminophen  MG Oral Tab Take 1 tablet by mouth every 4 (four) hours as needed for Pain (max 6/day). 180 tablet 0    [START ON 1/22/2024] HYDROcodone-acetaminophen  MG Oral Tab Take 1 tablet by mouth every 4 (four) hours as needed for Pain (max 6/day). 180 tablet 0    albuterol 108 (90 Base) MCG/ACT Inhalation Aero Soln Inhale 2 puffs into the lungs every 6 (six) hours as needed for Wheezing or Shortness of Breath. 8.5 each 2    carvedilol 12.5 MG Oral Tab Take 2 tablets (25 mg total) by mouth daily. 180 tablet 1    D-5000 125 MCG (5000 UT) Oral Tab Take 1 tablet (5,000 Units total) by mouth daily. clopidogrel 75 MG Oral Tab Take 1 tablet (75 mg total) by mouth daily. (Patient taking differently: Take 1 tablet (75 mg total) by mouth every morning.) 90 tablet 1    carvedilol 3.125 MG Oral Tab Take 2 tablets (6.25 mg total) by mouth 2 (two) times daily with meals. estradiol 0.1 MG/GM Vaginal Cream Apply a small amount to the perimeatal tissue every other day. 45 g 2    amLODIPine 10 MG Oral Tab Take 1 tablet (10 mg total) by mouth daily.  (Patient taking differently: Take 1 tablet (10 mg total) by mouth every morning.) 90 tablet 0    LISINOPRIL 40 MG Oral Tab TAKE 1 TABLET(40 MG) BY MOUTH DAILY (Patient taking differently: Take 1 tablet (40 mg total) by mouth every morning.) 90 tablet 1    hydrALAZINE 100 MG Oral Tab Take 1 tablet (100 mg total) by mouth every 8 (eight) hours. 270 tablet 1    ATORVASTATIN 20 MG Oral Tab TAKE 1 TABLET(20 MG) BY MOUTH EVERY NIGHT 90 tablet 0    Budesonide-Formoterol Fumarate 160-4.5 MCG/ACT Inhalation Aerosol Inhale 2 puffs into the lungs 2 (two) times daily. Rinse mouth with water, gargle, and spit to follow. 1 each 2    ipratropium-albuterol 0.5-2.5 (3) MG/3ML Inhalation Solution Take 3 mL by nebulization every 6 (six) hours as needed (shortness of breath refractory to [facility]). 100 each 0    Blood Glucose Monitoring Suppl (CONTOUR NEXT MONITOR) w/Device Does not apply Kit 1 each by Does not apply route as needed. 1 kit 0    aspirin 81 MG Oral Tab EC Take 1 tablet (81 mg total) by mouth every other day. BD PEN NEEDLE PACO U/F 32G X 4 MM Does not apply Misc 1 each by Does not apply route 4 (four) times daily. Allergies: Allergies   Allergen Reactions    Penicillins HIVES and ANGIOEDEMA     Hives on eyelids (occurred when pt was in her 25s). Tolerated amoxicillin per chart. Tolerates cephalosporins. ROS:       PHYSICAL EXAM:   Not recorded          ASSESSMENT/PLAN:     Diagnoses and Plan:     Glaucoma suspect of both eyes  Normal visual field, both eyes. Normal OCT, both eyes. No orders of the defined types were placed in this encounter.       Meds This Visit:  Requested Prescriptions      No prescriptions requested or ordered in this encounter        Follow up instructions:  Return 1/2/2025 at 9:45 AM for Diabetic dilated eye exam.    12/30/2023  Scribed by: Nia Pastor MD

## 2024-01-01 ENCOUNTER — TELEPHONE (OUTPATIENT)
Dept: INTERNAL MEDICINE CLINIC | Facility: CLINIC | Age: 69
End: 2024-01-01

## 2024-01-01 ENCOUNTER — APPOINTMENT (OUTPATIENT)
Dept: GENERAL RADIOLOGY | Facility: HOSPITAL | Age: 69
DRG: 901 | End: 2024-01-01
Attending: NURSE PRACTITIONER
Payer: MEDICARE

## 2024-01-01 ENCOUNTER — APPOINTMENT (OUTPATIENT)
Dept: CT IMAGING | Facility: HOSPITAL | Age: 69
DRG: 901 | End: 2024-01-01
Attending: INTERNAL MEDICINE
Payer: MEDICARE

## 2024-01-01 ENCOUNTER — APPOINTMENT (OUTPATIENT)
Dept: PICC SERVICES | Facility: HOSPITAL | Age: 69
DRG: 901 | End: 2024-01-01
Attending: SURGERY
Payer: MEDICARE

## 2024-01-01 ENCOUNTER — APPOINTMENT (OUTPATIENT)
Dept: GENERAL RADIOLOGY | Facility: HOSPITAL | Age: 69
DRG: 901 | End: 2024-01-01
Attending: INTERNAL MEDICINE
Payer: MEDICARE

## 2024-01-01 ENCOUNTER — HOSPITAL ENCOUNTER (INPATIENT)
Facility: HOSPITAL | Age: 69
LOS: 23 days | DRG: 901 | End: 2024-01-01
Attending: EMERGENCY MEDICINE | Admitting: INTERNAL MEDICINE
Payer: MEDICARE

## 2024-01-01 ENCOUNTER — LAB ENCOUNTER (OUTPATIENT)
Dept: LAB | Facility: HOSPITAL | Age: 69
DRG: 901 | End: 2024-01-01
Attending: STUDENT IN AN ORGANIZED HEALTH CARE EDUCATION/TRAINING PROGRAM
Payer: MEDICARE

## 2024-01-01 ENCOUNTER — APPOINTMENT (OUTPATIENT)
Dept: GENERAL RADIOLOGY | Facility: HOSPITAL | Age: 69
DRG: 901 | End: 2024-01-01
Attending: EMERGENCY MEDICINE
Payer: MEDICARE

## 2024-01-01 ENCOUNTER — APPOINTMENT (OUTPATIENT)
Dept: PICC SERVICES | Facility: HOSPITAL | Age: 69
DRG: 901 | End: 2024-01-01
Attending: INTERNAL MEDICINE
Payer: MEDICARE

## 2024-01-01 ENCOUNTER — APPOINTMENT (OUTPATIENT)
Dept: CT IMAGING | Facility: HOSPITAL | Age: 69
DRG: 901 | End: 2024-01-01
Attending: STUDENT IN AN ORGANIZED HEALTH CARE EDUCATION/TRAINING PROGRAM
Payer: MEDICARE

## 2024-01-01 ENCOUNTER — APPOINTMENT (OUTPATIENT)
Dept: ULTRASOUND IMAGING | Facility: HOSPITAL | Age: 69
DRG: 901 | End: 2024-01-01
Attending: STUDENT IN AN ORGANIZED HEALTH CARE EDUCATION/TRAINING PROGRAM
Payer: MEDICARE

## 2024-01-01 VITALS
HEIGHT: 64.02 IN | TEMPERATURE: 98 F | OXYGEN SATURATION: 100 % | DIASTOLIC BLOOD PRESSURE: 68 MMHG | WEIGHT: 178.13 LBS | SYSTOLIC BLOOD PRESSURE: 92 MMHG | BODY MASS INDEX: 30.41 KG/M2

## 2024-01-01 DIAGNOSIS — D64.9 ANEMIA, UNSPECIFIED TYPE: ICD-10-CM

## 2024-01-01 DIAGNOSIS — N17.9 ACUTE KIDNEY INJURY SUPERIMPOSED ON CKD (HCC): ICD-10-CM

## 2024-01-01 DIAGNOSIS — T82.398A: Primary | ICD-10-CM

## 2024-01-01 DIAGNOSIS — N18.9 ACUTE KIDNEY INJURY SUPERIMPOSED ON CKD (HCC): ICD-10-CM

## 2024-01-01 DIAGNOSIS — E11.10 DIABETIC KETOACIDOSIS WITHOUT COMA ASSOCIATED WITH TYPE 2 DIABETES MELLITUS (HCC): ICD-10-CM

## 2024-01-01 DIAGNOSIS — R58 HEMORRHAGE: ICD-10-CM

## 2024-01-01 DIAGNOSIS — R73.9 HYPERGLYCEMIA: ICD-10-CM

## 2024-01-01 LAB
ACETONE SERPL QL: NEGATIVE
ALBUMIN SERPL-MCNC: 1.5 G/DL (ref 3.2–4.8)
ALBUMIN SERPL-MCNC: 1.5 G/DL (ref 3.2–4.8)
ALBUMIN SERPL-MCNC: 1.7 G/DL (ref 3.2–4.8)
ALBUMIN SERPL-MCNC: 1.8 G/DL (ref 3.2–4.8)
ALBUMIN SERPL-MCNC: 1.8 G/DL (ref 3.2–4.8)
ALBUMIN SERPL-MCNC: 2.2 G/DL (ref 3.2–4.8)
ALBUMIN SERPL-MCNC: 2.3 G/DL (ref 3.2–4.8)
ALBUMIN/GLOB SERPL: 0.6 {RATIO} (ref 1–2)
ALBUMIN/GLOB SERPL: 1 {RATIO} (ref 1–2)
ALP LIVER SERPL-CCNC: 1532 U/L
ALP LIVER SERPL-CCNC: 62 U/L
ALT SERPL-CCNC: 38 U/L
ALT SERPL-CCNC: 76 U/L
ANION GAP SERPL CALC-SCNC: 10 MMOL/L (ref 0–18)
ANION GAP SERPL CALC-SCNC: 11 MMOL/L (ref 0–18)
ANION GAP SERPL CALC-SCNC: 17 MMOL/L (ref 0–18)
ANION GAP SERPL CALC-SCNC: 3 MMOL/L (ref 0–18)
ANION GAP SERPL CALC-SCNC: 4 MMOL/L (ref 0–18)
ANION GAP SERPL CALC-SCNC: 5 MMOL/L (ref 0–18)
ANION GAP SERPL CALC-SCNC: 6 MMOL/L (ref 0–18)
ANION GAP SERPL CALC-SCNC: 7 MMOL/L (ref 0–18)
ANION GAP SERPL CALC-SCNC: 8 MMOL/L (ref 0–18)
ANION GAP SERPL CALC-SCNC: 9 MMOL/L (ref 0–18)
ANION GAP SERPL CALC-SCNC: 9 MMOL/L (ref 0–18)
ANTIBODY SCREEN: NEGATIVE
APTT PPP: 30.3 SECONDS (ref 23.3–35.6)
AST SERPL-CCNC: 580 U/L (ref ?–34)
AST SERPL-CCNC: 91 U/L (ref ?–34)
ATRIAL RATE: 73 BPM
BASE EXCESS BLD CALC-SCNC: -11.2 MMOL/L (ref ?–2)
BASE EXCESS BLD CALC-SCNC: -12.2 MMOL/L (ref ?–2)
BASE EXCESS BLD CALC-SCNC: -12.4 MMOL/L (ref ?–2)
BASE EXCESS BLD CALC-SCNC: -12.5 MMOL/L (ref ?–2)
BASE EXCESS BLD CALC-SCNC: -14.3 MMOL/L (ref ?–2)
BASE EXCESS BLD CALC-SCNC: -14.9 MMOL/L (ref ?–2)
BASE EXCESS BLD CALC-SCNC: -7 MMOL/L (ref ?–2)
BASE EXCESS BLD CALC-SCNC: 0.7 MMOL/L (ref ?–2)
BASE EXCESS BLDA CALC-SCNC: -15 MMOL/L (ref ?–30)
BASE EXCESS BLDA CALC-SCNC: -16 MMOL/L (ref ?–30)
BASOPHILS # BLD AUTO: 0.02 X10(3) UL (ref 0–0.2)
BASOPHILS # BLD AUTO: 0.03 X10(3) UL (ref 0–0.2)
BASOPHILS # BLD AUTO: 0.04 X10(3) UL (ref 0–0.2)
BASOPHILS # BLD AUTO: 0.05 X10(3) UL (ref 0–0.2)
BASOPHILS # BLD AUTO: 0.05 X10(3) UL (ref 0–0.2)
BASOPHILS # BLD AUTO: 0.06 X10(3) UL (ref 0–0.2)
BASOPHILS # BLD AUTO: 0.06 X10(3) UL (ref 0–0.2)
BASOPHILS # BLD AUTO: 0.07 X10(3) UL (ref 0–0.2)
BASOPHILS # BLD AUTO: 0.08 X10(3) UL (ref 0–0.2)
BASOPHILS # BLD: 0 X10(3) UL (ref 0–0.2)
BASOPHILS NFR BLD AUTO: 0.1 %
BASOPHILS NFR BLD AUTO: 0.2 %
BASOPHILS NFR BLD AUTO: 0.3 %
BASOPHILS NFR BLD AUTO: 0.4 %
BASOPHILS NFR BLD: 0 %
BETA OHBUTYRATE: 1.7 MG/DL
BILIRUB SERPL-MCNC: 0.3 MG/DL (ref 0.2–1.1)
BILIRUB SERPL-MCNC: 0.4 MG/DL (ref 0.2–1.1)
BILIRUB UR QL: NEGATIVE
BLACTX-M ISLT/SPM QL: NOT DETECTED
BLOOD TYPE BARCODE: 6200
BNP SERPL-MCNC: 736 PG/ML
BUN BLD-MCNC: 16 MG/DL (ref 9–23)
BUN BLD-MCNC: 16 MG/DL (ref 9–23)
BUN BLD-MCNC: 17 MG/DL (ref 9–23)
BUN BLD-MCNC: 18 MG/DL (ref 9–23)
BUN BLD-MCNC: 18 MG/DL (ref 9–23)
BUN BLD-MCNC: 19 MG/DL (ref 9–23)
BUN BLD-MCNC: 20 MG/DL (ref 9–23)
BUN BLD-MCNC: 21 MG/DL (ref 9–23)
BUN BLD-MCNC: 22 MG/DL (ref 9–23)
BUN BLD-MCNC: 23 MG/DL (ref 9–23)
BUN BLD-MCNC: 24 MG/DL (ref 9–23)
BUN BLD-MCNC: 25 MG/DL (ref 9–23)
BUN BLD-MCNC: 26 MG/DL (ref 9–23)
BUN BLD-MCNC: 26 MG/DL (ref 9–23)
BUN BLD-MCNC: 27 MG/DL (ref 9–23)
BUN BLD-MCNC: 29 MG/DL (ref 9–23)
BUN BLD-MCNC: 30 MG/DL (ref 9–23)
BUN BLD-MCNC: 36 MG/DL (ref 9–23)
BUN BLD-MCNC: 41 MG/DL (ref 9–23)
BUN BLD-MCNC: 45 MG/DL (ref 9–23)
BUN BLD-MCNC: 45 MG/DL (ref 9–23)
BUN BLD-MCNC: 46 MG/DL (ref 9–23)
BUN/CREAT SERPL: 13.5 (ref 10–20)
BUN/CREAT SERPL: 14.3 (ref 10–20)
BUN/CREAT SERPL: 14.3 (ref 10–20)
BUN/CREAT SERPL: 14.6 (ref 10–20)
BUN/CREAT SERPL: 14.8 (ref 10–20)
BUN/CREAT SERPL: 15 (ref 10–20)
BUN/CREAT SERPL: 15 (ref 10–20)
BUN/CREAT SERPL: 15.1 (ref 10–20)
BUN/CREAT SERPL: 15.2 (ref 10–20)
BUN/CREAT SERPL: 15.3 (ref 10–20)
BUN/CREAT SERPL: 15.3 (ref 10–20)
BUN/CREAT SERPL: 16.2 (ref 10–20)
BUN/CREAT SERPL: 16.5 (ref 10–20)
BUN/CREAT SERPL: 16.9 (ref 10–20)
BUN/CREAT SERPL: 17 (ref 10–20)
BUN/CREAT SERPL: 18.1 (ref 10–20)
BUN/CREAT SERPL: 18.3 (ref 10–20)
BUN/CREAT SERPL: 18.6 (ref 10–20)
BUN/CREAT SERPL: 18.7 (ref 10–20)
BUN/CREAT SERPL: 18.8 (ref 10–20)
BUN/CREAT SERPL: 18.8 (ref 10–20)
BUN/CREAT SERPL: 19.4 (ref 10–20)
BUN/CREAT SERPL: 19.8 (ref 10–20)
BUN/CREAT SERPL: 20 (ref 10–20)
BUN/CREAT SERPL: 21.3 (ref 10–20)
BUN/CREAT SERPL: 21.8 (ref 10–20)
BUN/CREAT SERPL: 22.8 (ref 10–20)
BUN/CREAT SERPL: 23.4 (ref 10–20)
BUN/CREAT SERPL: 23.9 (ref 10–20)
BUN/CREAT SERPL: 24.4 (ref 10–20)
BUN/CREAT SERPL: 25.5 (ref 10–20)
BUN/CREAT SERPL: 27.3 (ref 10–20)
BUN/CREAT SERPL: 34.2 (ref 10–20)
BUN/CREAT SERPL: 37.1 (ref 10–20)
BUN/CREAT SERPL: 39.3 (ref 10–20)
BUN/CREAT SERPL: 41.7 (ref 10–20)
BUN/CREAT SERPL: 43.7 (ref 10–20)
C DIFF TOX B STL QL: NEGATIVE
C DIFF TOX B STL QL: NEGATIVE
CA-I BLD-SCNC: 1.07 MMOL/L (ref 0.95–1.32)
CA-I BLD-SCNC: 1.12 MMOL/L (ref 0.95–1.32)
CA-I BLD-SCNC: 1.45 MMOL/L (ref 0.95–1.32)
CA-I BLDA-SCNC: 1.23 MMOL/L (ref 1.12–1.32)
CA-I BLDA-SCNC: 1.37 MMOL/L (ref 1.12–1.32)
CALCIUM BLD-MCNC: 5.8 MG/DL (ref 8.7–10.4)
CALCIUM BLD-MCNC: 5.8 MG/DL (ref 8.7–10.4)
CALCIUM BLD-MCNC: 5.9 MG/DL (ref 8.7–10.4)
CALCIUM BLD-MCNC: 5.9 MG/DL (ref 8.7–10.4)
CALCIUM BLD-MCNC: 6.4 MG/DL (ref 8.7–10.4)
CALCIUM BLD-MCNC: 6.4 MG/DL (ref 8.7–10.4)
CALCIUM BLD-MCNC: 6.5 MG/DL (ref 8.7–10.4)
CALCIUM BLD-MCNC: 6.9 MG/DL (ref 8.7–10.4)
CALCIUM BLD-MCNC: 6.9 MG/DL (ref 8.7–10.4)
CALCIUM BLD-MCNC: 7 MG/DL (ref 8.7–10.4)
CALCIUM BLD-MCNC: 7 MG/DL (ref 8.7–10.4)
CALCIUM BLD-MCNC: 7.1 MG/DL (ref 8.7–10.4)
CALCIUM BLD-MCNC: 7.2 MG/DL (ref 8.7–10.4)
CALCIUM BLD-MCNC: 7.3 MG/DL (ref 8.7–10.4)
CALCIUM BLD-MCNC: 7.4 MG/DL (ref 8.7–10.4)
CALCIUM BLD-MCNC: 7.5 MG/DL (ref 8.7–10.4)
CALCIUM BLD-MCNC: 7.5 MG/DL (ref 8.7–10.4)
CALCIUM BLD-MCNC: 7.6 MG/DL (ref 8.7–10.4)
CALCIUM BLD-MCNC: 7.7 MG/DL (ref 8.7–10.4)
CALCIUM BLD-MCNC: 7.7 MG/DL (ref 8.7–10.4)
CALCIUM BLD-MCNC: 7.8 MG/DL (ref 8.7–10.4)
CALCIUM BLD-MCNC: 8 MG/DL (ref 8.7–10.4)
CALCIUM BLD-MCNC: 8 MG/DL (ref 8.7–10.4)
CALCIUM BLD-MCNC: 8.1 MG/DL (ref 8.7–10.4)
CALCIUM BLD-MCNC: 8.1 MG/DL (ref 8.7–10.4)
CALCIUM BLD-MCNC: 8.4 MG/DL (ref 8.7–10.4)
CALCIUM BLD-MCNC: 8.5 MG/DL (ref 8.7–10.4)
CHLORIDE SERPL-SCNC: 109 MMOL/L (ref 98–112)
CHLORIDE SERPL-SCNC: 109 MMOL/L (ref 98–112)
CHLORIDE SERPL-SCNC: 110 MMOL/L (ref 98–112)
CHLORIDE SERPL-SCNC: 111 MMOL/L (ref 98–112)
CHLORIDE SERPL-SCNC: 111 MMOL/L (ref 98–112)
CHLORIDE SERPL-SCNC: 112 MMOL/L (ref 98–112)
CHLORIDE SERPL-SCNC: 113 MMOL/L (ref 98–112)
CHLORIDE SERPL-SCNC: 114 MMOL/L (ref 98–112)
CHLORIDE SERPL-SCNC: 115 MMOL/L (ref 98–112)
CHLORIDE SERPL-SCNC: 115 MMOL/L (ref 98–112)
CHLORIDE SERPL-SCNC: 116 MMOL/L (ref 98–112)
CHLORIDE SERPL-SCNC: 117 MMOL/L (ref 98–112)
CHLORIDE SERPL-SCNC: 118 MMOL/L (ref 98–112)
CHLORIDE SERPL-SCNC: 118 MMOL/L (ref 98–112)
CHLORIDE SERPL-SCNC: 119 MMOL/L (ref 98–112)
CHLORIDE SERPL-SCNC: 119 MMOL/L (ref 98–112)
CHLORIDE SERPL-SCNC: 120 MMOL/L (ref 98–112)
CHLORIDE SERPL-SCNC: 122 MMOL/L (ref 98–112)
CHLORIDE SERPL-SCNC: 123 MMOL/L (ref 98–112)
CHLORIDE SERPL-SCNC: 125 MMOL/L (ref 98–112)
CHLORIDE UR-SCNC: 50 MMOL/L
CK SERPL-CCNC: 3736 U/L
CK SERPL-CCNC: 6440 U/L
CK SERPL-CCNC: ABNORMAL U/L
CK SERPL-CCNC: ABNORMAL U/L
CO2 BLDA-SCNC: 15 MMOL/L (ref 22–32)
CO2 BLDA-SCNC: 15 MMOL/L (ref 22–32)
CO2 SERPL-SCNC: 12 MMOL/L (ref 21–32)
CO2 SERPL-SCNC: 12 MMOL/L (ref 21–32)
CO2 SERPL-SCNC: 13 MMOL/L (ref 21–32)
CO2 SERPL-SCNC: 13 MMOL/L (ref 21–32)
CO2 SERPL-SCNC: 14 MMOL/L (ref 21–32)
CO2 SERPL-SCNC: 15 MMOL/L (ref 21–32)
CO2 SERPL-SCNC: 16 MMOL/L (ref 21–32)
CO2 SERPL-SCNC: 17 MMOL/L (ref 21–32)
CO2 SERPL-SCNC: 17 MMOL/L (ref 21–32)
CO2 SERPL-SCNC: 19 MMOL/L (ref 21–32)
CO2 SERPL-SCNC: 20 MMOL/L (ref 21–32)
CO2 SERPL-SCNC: 21 MMOL/L (ref 21–32)
CO2 SERPL-SCNC: 22 MMOL/L (ref 21–32)
CO2 SERPL-SCNC: 24 MMOL/L (ref 21–32)
CO2 SERPL-SCNC: 25 MMOL/L (ref 21–32)
CO2 SERPL-SCNC: 26 MMOL/L (ref 21–32)
CO2 SERPL-SCNC: 26 MMOL/L (ref 21–32)
COHGB MFR BLD: 2 % (ref 0–3)
COHGB MFR BLD: 2.1 % (ref 0–3)
COLOR UR: YELLOW
COLOR UR: YELLOW
CREAT BLD-MCNC: 0.93 MG/DL
CREAT BLD-MCNC: 0.97 MG/DL
CREAT BLD-MCNC: 0.99 MG/DL
CREAT BLD-MCNC: 1 MG/DL
CREAT BLD-MCNC: 1.01 MG/DL
CREAT BLD-MCNC: 1.03 MG/DL
CREAT BLD-MCNC: 1.03 MG/DL
CREAT BLD-MCNC: 1.05 MG/DL
CREAT BLD-MCNC: 1.06 MG/DL
CREAT BLD-MCNC: 1.06 MG/DL
CREAT BLD-MCNC: 1.07 MG/DL
CREAT BLD-MCNC: 1.08 MG/DL
CREAT BLD-MCNC: 1.08 MG/DL
CREAT BLD-MCNC: 1.1 MG/DL
CREAT BLD-MCNC: 1.11 MG/DL
CREAT BLD-MCNC: 1.13 MG/DL
CREAT BLD-MCNC: 1.17 MG/DL
CREAT BLD-MCNC: 1.18 MG/DL
CREAT BLD-MCNC: 1.19 MG/DL
CREAT BLD-MCNC: 1.19 MG/DL
CREAT BLD-MCNC: 1.2 MG/DL
CREAT BLD-MCNC: 1.23 MG/DL
CREAT BLD-MCNC: 1.25 MG/DL
CREAT BLD-MCNC: 1.25 MG/DL
CREAT BLD-MCNC: 1.26 MG/DL
CREAT BLD-MCNC: 1.27 MG/DL
CREAT BLD-MCNC: 1.28 MG/DL
CREAT BLD-MCNC: 1.34 MG/DL
CREAT BLD-MCNC: 1.37 MG/DL
CREAT BLD-MCNC: 1.37 MG/DL
CREAT BLD-MCNC: 1.61 MG/DL
CREAT BLD-MCNC: 1.61 MG/DL
CREAT BLD-MCNC: 1.77 MG/DL
DEPRECATED RDW RBC AUTO: 41.7 FL (ref 35.1–46.3)
DEPRECATED RDW RBC AUTO: 43.5 FL (ref 35.1–46.3)
DEPRECATED RDW RBC AUTO: 43.6 FL (ref 35.1–46.3)
DEPRECATED RDW RBC AUTO: 43.9 FL (ref 35.1–46.3)
DEPRECATED RDW RBC AUTO: 44.3 FL (ref 35.1–46.3)
DEPRECATED RDW RBC AUTO: 45.5 FL (ref 35.1–46.3)
DEPRECATED RDW RBC AUTO: 45.5 FL (ref 35.1–46.3)
DEPRECATED RDW RBC AUTO: 45.6 FL (ref 35.1–46.3)
DEPRECATED RDW RBC AUTO: 46.4 FL (ref 35.1–46.3)
DEPRECATED RDW RBC AUTO: 47.2 FL (ref 35.1–46.3)
DEPRECATED RDW RBC AUTO: 47.4 FL (ref 35.1–46.3)
DEPRECATED RDW RBC AUTO: 48.7 FL (ref 35.1–46.3)
DEPRECATED RDW RBC AUTO: 48.9 FL (ref 35.1–46.3)
DEPRECATED RDW RBC AUTO: 49.3 FL (ref 35.1–46.3)
DEPRECATED RDW RBC AUTO: 49.8 FL (ref 35.1–46.3)
DEPRECATED RDW RBC AUTO: 50.4 FL (ref 35.1–46.3)
DEPRECATED RDW RBC AUTO: 50.8 FL (ref 35.1–46.3)
DEPRECATED RDW RBC AUTO: 50.8 FL (ref 35.1–46.3)
DEPRECATED RDW RBC AUTO: 51.8 FL (ref 35.1–46.3)
DEPRECATED RDW RBC AUTO: 51.9 FL (ref 35.1–46.3)
DEPRECATED RDW RBC AUTO: 53.1 FL (ref 35.1–46.3)
DEPRECATED RDW RBC AUTO: 53.8 FL (ref 35.1–46.3)
DEPRECATED RDW RBC AUTO: 54.3 FL (ref 35.1–46.3)
DEPRECATED RDW RBC AUTO: 54.3 FL (ref 35.1–46.3)
DEPRECATED RDW RBC AUTO: 55.8 FL (ref 35.1–46.3)
DEPRECATED RDW RBC AUTO: 58.6 FL (ref 35.1–46.3)
DEPRECATED RDW RBC AUTO: 59.4 FL (ref 35.1–46.3)
DEPRECATED RDW RBC AUTO: 61.4 FL (ref 35.1–46.3)
DEPRECATED RDW RBC AUTO: 61.4 FL (ref 35.1–46.3)
DEPRECATED RDW RBC AUTO: 62 FL (ref 35.1–46.3)
DEPRECATED RDW RBC AUTO: 64.5 FL (ref 35.1–46.3)
E FAECIUM DNA BLD POS QL NAA+NON-PROBE: DETECTED
EGFRCR SERPLBLD CKD-EPI 2021: 31 ML/MIN/1.73M2 (ref 60–?)
EGFRCR SERPLBLD CKD-EPI 2021: 35 ML/MIN/1.73M2 (ref 60–?)
EGFRCR SERPLBLD CKD-EPI 2021: 35 ML/MIN/1.73M2 (ref 60–?)
EGFRCR SERPLBLD CKD-EPI 2021: 43 ML/MIN/1.73M2 (ref 60–?)
EGFRCR SERPLBLD CKD-EPI 2021: 43 ML/MIN/1.73M2 (ref 60–?)
EGFRCR SERPLBLD CKD-EPI 2021: 44 ML/MIN/1.73M2 (ref 60–?)
EGFRCR SERPLBLD CKD-EPI 2021: 46 ML/MIN/1.73M2 (ref 60–?)
EGFRCR SERPLBLD CKD-EPI 2021: 47 ML/MIN/1.73M2 (ref 60–?)
EGFRCR SERPLBLD CKD-EPI 2021: 48 ML/MIN/1.73M2 (ref 60–?)
EGFRCR SERPLBLD CKD-EPI 2021: 50 ML/MIN/1.73M2 (ref 60–?)
EGFRCR SERPLBLD CKD-EPI 2021: 51 ML/MIN/1.73M2 (ref 60–?)
EGFRCR SERPLBLD CKD-EPI 2021: 54 ML/MIN/1.73M2 (ref 60–?)
EGFRCR SERPLBLD CKD-EPI 2021: 55 ML/MIN/1.73M2 (ref 60–?)
EGFRCR SERPLBLD CKD-EPI 2021: 55 ML/MIN/1.73M2 (ref 60–?)
EGFRCR SERPLBLD CKD-EPI 2021: 57 ML/MIN/1.73M2 (ref 60–?)
EGFRCR SERPLBLD CKD-EPI 2021: 58 ML/MIN/1.73M2 (ref 60–?)
EGFRCR SERPLBLD CKD-EPI 2021: 58 ML/MIN/1.73M2 (ref 60–?)
EGFRCR SERPLBLD CKD-EPI 2021: 59 ML/MIN/1.73M2 (ref 60–?)
EGFRCR SERPLBLD CKD-EPI 2021: 60 ML/MIN/1.73M2 (ref 60–?)
EGFRCR SERPLBLD CKD-EPI 2021: 60 ML/MIN/1.73M2 (ref 60–?)
EGFRCR SERPLBLD CKD-EPI 2021: 61 ML/MIN/1.73M2 (ref 60–?)
EGFRCR SERPLBLD CKD-EPI 2021: 62 ML/MIN/1.73M2 (ref 60–?)
EGFRCR SERPLBLD CKD-EPI 2021: 63 ML/MIN/1.73M2 (ref 60–?)
EGFRCR SERPLBLD CKD-EPI 2021: 64 ML/MIN/1.73M2 (ref 60–?)
EGFRCR SERPLBLD CKD-EPI 2021: 68 ML/MIN/1.73M2 (ref 60–?)
EOSINOPHIL # BLD AUTO: 0 X10(3) UL (ref 0–0.7)
EOSINOPHIL # BLD AUTO: 0.01 X10(3) UL (ref 0–0.7)
EOSINOPHIL # BLD AUTO: 0.02 X10(3) UL (ref 0–0.7)
EOSINOPHIL # BLD AUTO: 0.03 X10(3) UL (ref 0–0.7)
EOSINOPHIL # BLD AUTO: 0.06 X10(3) UL (ref 0–0.7)
EOSINOPHIL # BLD AUTO: 0.08 X10(3) UL (ref 0–0.7)
EOSINOPHIL # BLD AUTO: 0.09 X10(3) UL (ref 0–0.7)
EOSINOPHIL # BLD AUTO: 0.09 X10(3) UL (ref 0–0.7)
EOSINOPHIL # BLD AUTO: 0.1 X10(3) UL (ref 0–0.7)
EOSINOPHIL # BLD AUTO: 0.1 X10(3) UL (ref 0–0.7)
EOSINOPHIL # BLD AUTO: 0.11 X10(3) UL (ref 0–0.7)
EOSINOPHIL # BLD AUTO: 0.13 X10(3) UL (ref 0–0.7)
EOSINOPHIL # BLD AUTO: 0.16 X10(3) UL (ref 0–0.7)
EOSINOPHIL # BLD AUTO: 0.24 X10(3) UL (ref 0–0.7)
EOSINOPHIL # BLD AUTO: 0.3 X10(3) UL (ref 0–0.7)
EOSINOPHIL # BLD AUTO: 0.3 X10(3) UL (ref 0–0.7)
EOSINOPHIL # BLD: 0 X10(3) UL (ref 0–0.7)
EOSINOPHIL # BLD: 0.49 X10(3) UL (ref 0–0.7)
EOSINOPHIL # BLD: 0.85 X10(3) UL (ref 0–0.7)
EOSINOPHIL NFR BLD AUTO: 0 %
EOSINOPHIL NFR BLD AUTO: 0.1 %
EOSINOPHIL NFR BLD AUTO: 0.3 %
EOSINOPHIL NFR BLD AUTO: 0.4 %
EOSINOPHIL NFR BLD AUTO: 0.5 %
EOSINOPHIL NFR BLD AUTO: 0.6 %
EOSINOPHIL NFR BLD AUTO: 0.7 %
EOSINOPHIL NFR BLD AUTO: 0.7 %
EOSINOPHIL NFR BLD AUTO: 0.8 %
EOSINOPHIL NFR BLD AUTO: 1 %
EOSINOPHIL NFR BLD AUTO: 1.1 %
EOSINOPHIL NFR BLD: 0 %
EOSINOPHIL NFR BLD: 3 %
EOSINOPHIL NFR BLD: 3 %
ERYTHROCYTE [DISTWIDTH] IN BLOOD BY AUTOMATED COUNT: 13.9 % (ref 11–15)
ERYTHROCYTE [DISTWIDTH] IN BLOOD BY AUTOMATED COUNT: 13.9 % (ref 11–15)
ERYTHROCYTE [DISTWIDTH] IN BLOOD BY AUTOMATED COUNT: 14.5 % (ref 11–15)
ERYTHROCYTE [DISTWIDTH] IN BLOOD BY AUTOMATED COUNT: 14.5 % (ref 11–15)
ERYTHROCYTE [DISTWIDTH] IN BLOOD BY AUTOMATED COUNT: 14.7 % (ref 11–15)
ERYTHROCYTE [DISTWIDTH] IN BLOOD BY AUTOMATED COUNT: 14.8 % (ref 11–15)
ERYTHROCYTE [DISTWIDTH] IN BLOOD BY AUTOMATED COUNT: 15 % (ref 11–15)
ERYTHROCYTE [DISTWIDTH] IN BLOOD BY AUTOMATED COUNT: 15.1 % (ref 11–15)
ERYTHROCYTE [DISTWIDTH] IN BLOOD BY AUTOMATED COUNT: 15.3 % (ref 11–15)
ERYTHROCYTE [DISTWIDTH] IN BLOOD BY AUTOMATED COUNT: 15.5 % (ref 11–15)
ERYTHROCYTE [DISTWIDTH] IN BLOOD BY AUTOMATED COUNT: 15.6 % (ref 11–15)
ERYTHROCYTE [DISTWIDTH] IN BLOOD BY AUTOMATED COUNT: 15.7 % (ref 11–15)
ERYTHROCYTE [DISTWIDTH] IN BLOOD BY AUTOMATED COUNT: 16 % (ref 11–15)
ERYTHROCYTE [DISTWIDTH] IN BLOOD BY AUTOMATED COUNT: 16.1 % (ref 11–15)
ERYTHROCYTE [DISTWIDTH] IN BLOOD BY AUTOMATED COUNT: 16.2 % (ref 11–15)
ERYTHROCYTE [DISTWIDTH] IN BLOOD BY AUTOMATED COUNT: 16.2 % (ref 11–15)
ERYTHROCYTE [DISTWIDTH] IN BLOOD BY AUTOMATED COUNT: 16.3 % (ref 11–15)
ERYTHROCYTE [DISTWIDTH] IN BLOOD BY AUTOMATED COUNT: 16.5 % (ref 11–15)
ERYTHROCYTE [DISTWIDTH] IN BLOOD BY AUTOMATED COUNT: 16.6 % (ref 11–15)
ERYTHROCYTE [DISTWIDTH] IN BLOOD BY AUTOMATED COUNT: 16.9 % (ref 11–15)
ERYTHROCYTE [DISTWIDTH] IN BLOOD BY AUTOMATED COUNT: 17.2 % (ref 11–15)
ERYTHROCYTE [DISTWIDTH] IN BLOOD BY AUTOMATED COUNT: 17.4 % (ref 11–15)
ERYTHROCYTE [DISTWIDTH] IN BLOOD BY AUTOMATED COUNT: 17.5 % (ref 11–15)
ERYTHROCYTE [DISTWIDTH] IN BLOOD BY AUTOMATED COUNT: 17.5 % (ref 11–15)
ERYTHROCYTE [DISTWIDTH] IN BLOOD BY AUTOMATED COUNT: 17.7 % (ref 11–15)
ERYTHROCYTE [DISTWIDTH] IN BLOOD BY AUTOMATED COUNT: 18.1 % (ref 11–15)
EST. AVERAGE GLUCOSE BLD GHB EST-MCNC: 180 MG/DL (ref 68–126)
FLUAV + FLUBV RNA SPEC NAA+PROBE: NEGATIVE
FLUAV + FLUBV RNA SPEC NAA+PROBE: NEGATIVE
GLOBULIN PLAS-MCNC: 2.3 G/DL (ref 2.8–4.4)
GLOBULIN PLAS-MCNC: 2.8 G/DL (ref 2.8–4.4)
GLUCOSE BLD-MCNC: 113 MG/DL (ref 70–99)
GLUCOSE BLD-MCNC: 114 MG/DL (ref 70–99)
GLUCOSE BLD-MCNC: 115 MG/DL (ref 70–99)
GLUCOSE BLD-MCNC: 119 MG/DL (ref 70–99)
GLUCOSE BLD-MCNC: 119 MG/DL (ref 70–99)
GLUCOSE BLD-MCNC: 124 MG/DL (ref 70–99)
GLUCOSE BLD-MCNC: 125 MG/DL (ref 70–99)
GLUCOSE BLD-MCNC: 127 MG/DL (ref 70–99)
GLUCOSE BLD-MCNC: 128 MG/DL (ref 70–99)
GLUCOSE BLD-MCNC: 134 MG/DL (ref 70–99)
GLUCOSE BLD-MCNC: 137 MG/DL (ref 70–99)
GLUCOSE BLD-MCNC: 146 MG/DL (ref 70–99)
GLUCOSE BLD-MCNC: 150 MG/DL (ref 70–99)
GLUCOSE BLD-MCNC: 155 MG/DL (ref 70–99)
GLUCOSE BLD-MCNC: 162 MG/DL (ref 70–99)
GLUCOSE BLD-MCNC: 166 MG/DL (ref 70–99)
GLUCOSE BLD-MCNC: 180 MG/DL (ref 70–99)
GLUCOSE BLD-MCNC: 180 MG/DL (ref 70–99)
GLUCOSE BLD-MCNC: 187 MG/DL (ref 70–99)
GLUCOSE BLD-MCNC: 192 MG/DL (ref 70–99)
GLUCOSE BLD-MCNC: 196 MG/DL (ref 70–99)
GLUCOSE BLD-MCNC: 201 MG/DL (ref 70–99)
GLUCOSE BLD-MCNC: 208 MG/DL (ref 70–99)
GLUCOSE BLD-MCNC: 208 MG/DL (ref 70–99)
GLUCOSE BLD-MCNC: 213 MG/DL (ref 70–99)
GLUCOSE BLD-MCNC: 229 MG/DL (ref 70–99)
GLUCOSE BLD-MCNC: 239 MG/DL (ref 70–99)
GLUCOSE BLD-MCNC: 264 MG/DL (ref 70–99)
GLUCOSE BLD-MCNC: 264 MG/DL (ref 70–99)
GLUCOSE BLD-MCNC: 271 MG/DL (ref 70–99)
GLUCOSE BLD-MCNC: 354 MG/DL (ref 70–99)
GLUCOSE BLD-MCNC: 354 MG/DL (ref 70–99)
GLUCOSE BLD-MCNC: 552 MG/DL (ref 70–99)
GLUCOSE BLD-MCNC: 63 MG/DL (ref 70–99)
GLUCOSE BLD-MCNC: 77 MG/DL (ref 70–99)
GLUCOSE BLD-MCNC: 82 MG/DL (ref 70–99)
GLUCOSE BLD-MCNC: 86 MG/DL (ref 70–99)
GLUCOSE BLD-MCNC: 93 MG/DL (ref 70–99)
GLUCOSE BLD-MCNC: 94 MG/DL (ref 70–99)
GLUCOSE BLDA-MCNC: 419 MG/DL (ref 70–99)
GLUCOSE BLDA-MCNC: 541 MG/DL (ref 70–99)
GLUCOSE BLDC GLUCOMTR-MCNC: 100 MG/DL (ref 70–99)
GLUCOSE BLDC GLUCOMTR-MCNC: 102 MG/DL (ref 70–99)
GLUCOSE BLDC GLUCOMTR-MCNC: 105 MG/DL (ref 70–99)
GLUCOSE BLDC GLUCOMTR-MCNC: 106 MG/DL (ref 70–99)
GLUCOSE BLDC GLUCOMTR-MCNC: 107 MG/DL (ref 70–99)
GLUCOSE BLDC GLUCOMTR-MCNC: 111 MG/DL (ref 70–99)
GLUCOSE BLDC GLUCOMTR-MCNC: 113 MG/DL (ref 70–99)
GLUCOSE BLDC GLUCOMTR-MCNC: 114 MG/DL (ref 70–99)
GLUCOSE BLDC GLUCOMTR-MCNC: 119 MG/DL (ref 70–99)
GLUCOSE BLDC GLUCOMTR-MCNC: 120 MG/DL (ref 70–99)
GLUCOSE BLDC GLUCOMTR-MCNC: 121 MG/DL (ref 70–99)
GLUCOSE BLDC GLUCOMTR-MCNC: 123 MG/DL (ref 70–99)
GLUCOSE BLDC GLUCOMTR-MCNC: 126 MG/DL (ref 70–99)
GLUCOSE BLDC GLUCOMTR-MCNC: 126 MG/DL (ref 70–99)
GLUCOSE BLDC GLUCOMTR-MCNC: 127 MG/DL (ref 70–99)
GLUCOSE BLDC GLUCOMTR-MCNC: 128 MG/DL (ref 70–99)
GLUCOSE BLDC GLUCOMTR-MCNC: 129 MG/DL (ref 70–99)
GLUCOSE BLDC GLUCOMTR-MCNC: 130 MG/DL (ref 70–99)
GLUCOSE BLDC GLUCOMTR-MCNC: 131 MG/DL (ref 70–99)
GLUCOSE BLDC GLUCOMTR-MCNC: 136 MG/DL (ref 70–99)
GLUCOSE BLDC GLUCOMTR-MCNC: 136 MG/DL (ref 70–99)
GLUCOSE BLDC GLUCOMTR-MCNC: 138 MG/DL (ref 70–99)
GLUCOSE BLDC GLUCOMTR-MCNC: 138 MG/DL (ref 70–99)
GLUCOSE BLDC GLUCOMTR-MCNC: 139 MG/DL (ref 70–99)
GLUCOSE BLDC GLUCOMTR-MCNC: 140 MG/DL (ref 70–99)
GLUCOSE BLDC GLUCOMTR-MCNC: 140 MG/DL (ref 70–99)
GLUCOSE BLDC GLUCOMTR-MCNC: 141 MG/DL (ref 70–99)
GLUCOSE BLDC GLUCOMTR-MCNC: 142 MG/DL (ref 70–99)
GLUCOSE BLDC GLUCOMTR-MCNC: 142 MG/DL (ref 70–99)
GLUCOSE BLDC GLUCOMTR-MCNC: 145 MG/DL (ref 70–99)
GLUCOSE BLDC GLUCOMTR-MCNC: 146 MG/DL (ref 70–99)
GLUCOSE BLDC GLUCOMTR-MCNC: 149 MG/DL (ref 70–99)
GLUCOSE BLDC GLUCOMTR-MCNC: 149 MG/DL (ref 70–99)
GLUCOSE BLDC GLUCOMTR-MCNC: 150 MG/DL (ref 70–99)
GLUCOSE BLDC GLUCOMTR-MCNC: 152 MG/DL (ref 70–99)
GLUCOSE BLDC GLUCOMTR-MCNC: 153 MG/DL (ref 70–99)
GLUCOSE BLDC GLUCOMTR-MCNC: 154 MG/DL (ref 70–99)
GLUCOSE BLDC GLUCOMTR-MCNC: 154 MG/DL (ref 70–99)
GLUCOSE BLDC GLUCOMTR-MCNC: 158 MG/DL (ref 70–99)
GLUCOSE BLDC GLUCOMTR-MCNC: 159 MG/DL (ref 70–99)
GLUCOSE BLDC GLUCOMTR-MCNC: 159 MG/DL (ref 70–99)
GLUCOSE BLDC GLUCOMTR-MCNC: 161 MG/DL (ref 70–99)
GLUCOSE BLDC GLUCOMTR-MCNC: 161 MG/DL (ref 70–99)
GLUCOSE BLDC GLUCOMTR-MCNC: 162 MG/DL (ref 70–99)
GLUCOSE BLDC GLUCOMTR-MCNC: 163 MG/DL (ref 70–99)
GLUCOSE BLDC GLUCOMTR-MCNC: 167 MG/DL (ref 70–99)
GLUCOSE BLDC GLUCOMTR-MCNC: 167 MG/DL (ref 70–99)
GLUCOSE BLDC GLUCOMTR-MCNC: 168 MG/DL (ref 70–99)
GLUCOSE BLDC GLUCOMTR-MCNC: 169 MG/DL (ref 70–99)
GLUCOSE BLDC GLUCOMTR-MCNC: 170 MG/DL (ref 70–99)
GLUCOSE BLDC GLUCOMTR-MCNC: 170 MG/DL (ref 70–99)
GLUCOSE BLDC GLUCOMTR-MCNC: 171 MG/DL (ref 70–99)
GLUCOSE BLDC GLUCOMTR-MCNC: 171 MG/DL (ref 70–99)
GLUCOSE BLDC GLUCOMTR-MCNC: 172 MG/DL (ref 70–99)
GLUCOSE BLDC GLUCOMTR-MCNC: 173 MG/DL (ref 70–99)
GLUCOSE BLDC GLUCOMTR-MCNC: 173 MG/DL (ref 70–99)
GLUCOSE BLDC GLUCOMTR-MCNC: 176 MG/DL (ref 70–99)
GLUCOSE BLDC GLUCOMTR-MCNC: 176 MG/DL (ref 70–99)
GLUCOSE BLDC GLUCOMTR-MCNC: 177 MG/DL (ref 70–99)
GLUCOSE BLDC GLUCOMTR-MCNC: 178 MG/DL (ref 70–99)
GLUCOSE BLDC GLUCOMTR-MCNC: 182 MG/DL (ref 70–99)
GLUCOSE BLDC GLUCOMTR-MCNC: 186 MG/DL (ref 70–99)
GLUCOSE BLDC GLUCOMTR-MCNC: 190 MG/DL (ref 70–99)
GLUCOSE BLDC GLUCOMTR-MCNC: 191 MG/DL (ref 70–99)
GLUCOSE BLDC GLUCOMTR-MCNC: 200 MG/DL (ref 70–99)
GLUCOSE BLDC GLUCOMTR-MCNC: 200 MG/DL (ref 70–99)
GLUCOSE BLDC GLUCOMTR-MCNC: 203 MG/DL (ref 70–99)
GLUCOSE BLDC GLUCOMTR-MCNC: 203 MG/DL (ref 70–99)
GLUCOSE BLDC GLUCOMTR-MCNC: 204 MG/DL (ref 70–99)
GLUCOSE BLDC GLUCOMTR-MCNC: 205 MG/DL (ref 70–99)
GLUCOSE BLDC GLUCOMTR-MCNC: 206 MG/DL (ref 70–99)
GLUCOSE BLDC GLUCOMTR-MCNC: 208 MG/DL (ref 70–99)
GLUCOSE BLDC GLUCOMTR-MCNC: 212 MG/DL (ref 70–99)
GLUCOSE BLDC GLUCOMTR-MCNC: 213 MG/DL (ref 70–99)
GLUCOSE BLDC GLUCOMTR-MCNC: 214 MG/DL (ref 70–99)
GLUCOSE BLDC GLUCOMTR-MCNC: 216 MG/DL (ref 70–99)
GLUCOSE BLDC GLUCOMTR-MCNC: 220 MG/DL (ref 70–99)
GLUCOSE BLDC GLUCOMTR-MCNC: 221 MG/DL (ref 70–99)
GLUCOSE BLDC GLUCOMTR-MCNC: 224 MG/DL (ref 70–99)
GLUCOSE BLDC GLUCOMTR-MCNC: 226 MG/DL (ref 70–99)
GLUCOSE BLDC GLUCOMTR-MCNC: 228 MG/DL (ref 70–99)
GLUCOSE BLDC GLUCOMTR-MCNC: 232 MG/DL (ref 70–99)
GLUCOSE BLDC GLUCOMTR-MCNC: 232 MG/DL (ref 70–99)
GLUCOSE BLDC GLUCOMTR-MCNC: 237 MG/DL (ref 70–99)
GLUCOSE BLDC GLUCOMTR-MCNC: 238 MG/DL (ref 70–99)
GLUCOSE BLDC GLUCOMTR-MCNC: 239 MG/DL (ref 70–99)
GLUCOSE BLDC GLUCOMTR-MCNC: 241 MG/DL (ref 70–99)
GLUCOSE BLDC GLUCOMTR-MCNC: 244 MG/DL (ref 70–99)
GLUCOSE BLDC GLUCOMTR-MCNC: 248 MG/DL (ref 70–99)
GLUCOSE BLDC GLUCOMTR-MCNC: 262 MG/DL (ref 70–99)
GLUCOSE BLDC GLUCOMTR-MCNC: 267 MG/DL (ref 70–99)
GLUCOSE BLDC GLUCOMTR-MCNC: 269 MG/DL (ref 70–99)
GLUCOSE BLDC GLUCOMTR-MCNC: 287 MG/DL (ref 70–99)
GLUCOSE BLDC GLUCOMTR-MCNC: 293 MG/DL (ref 70–99)
GLUCOSE BLDC GLUCOMTR-MCNC: 343 MG/DL (ref 70–99)
GLUCOSE BLDC GLUCOMTR-MCNC: 376 MG/DL (ref 70–99)
GLUCOSE BLDC GLUCOMTR-MCNC: 377 MG/DL (ref 70–99)
GLUCOSE BLDC GLUCOMTR-MCNC: 556 MG/DL (ref 70–99)
GLUCOSE BLDC GLUCOMTR-MCNC: 575 MG/DL (ref 70–99)
GLUCOSE BLDC GLUCOMTR-MCNC: 58 MG/DL (ref 70–99)
GLUCOSE BLDC GLUCOMTR-MCNC: 63 MG/DL (ref 70–99)
GLUCOSE BLDC GLUCOMTR-MCNC: 65 MG/DL (ref 70–99)
GLUCOSE BLDC GLUCOMTR-MCNC: 66 MG/DL (ref 70–99)
GLUCOSE BLDC GLUCOMTR-MCNC: 67 MG/DL (ref 70–99)
GLUCOSE BLDC GLUCOMTR-MCNC: 72 MG/DL (ref 70–99)
GLUCOSE BLDC GLUCOMTR-MCNC: 72 MG/DL (ref 70–99)
GLUCOSE BLDC GLUCOMTR-MCNC: 74 MG/DL (ref 70–99)
GLUCOSE BLDC GLUCOMTR-MCNC: 78 MG/DL (ref 70–99)
GLUCOSE BLDC GLUCOMTR-MCNC: 85 MG/DL (ref 70–99)
GLUCOSE BLDC GLUCOMTR-MCNC: 86 MG/DL (ref 70–99)
GLUCOSE BLDC GLUCOMTR-MCNC: 90 MG/DL (ref 70–99)
GLUCOSE BLDC GLUCOMTR-MCNC: 91 MG/DL (ref 70–99)
GLUCOSE BLDC GLUCOMTR-MCNC: 91 MG/DL (ref 70–99)
GLUCOSE BLDC GLUCOMTR-MCNC: 93 MG/DL (ref 70–99)
GLUCOSE BLDC GLUCOMTR-MCNC: 97 MG/DL (ref 70–99)
GLUCOSE BLDC GLUCOMTR-MCNC: 99 MG/DL (ref 70–99)
GLUCOSE UR-MCNC: 30 MG/DL
GLUCOSE UR-MCNC: 50 MG/DL
GLUCOSE UR-MCNC: NORMAL MG/DL
HBA1C MFR BLD: 7.9 % (ref ?–5.7)
HCO3 BLDA-SCNC: 13.6 MEQ/L (ref 22–26)
HCO3 BLDA-SCNC: 13.8 MEQ/L (ref 21–27)
HCO3 BLDA-SCNC: 13.8 MEQ/L (ref 22–26)
HCO3 BLDA-SCNC: 15.2 MEQ/L (ref 21–27)
HCO3 BLDA-SCNC: 15.2 MEQ/L (ref 21–27)
HCO3 BLDA-SCNC: 15.4 MEQ/L (ref 21–27)
HCO3 BLDA-SCNC: 16.2 MEQ/L (ref 21–27)
HCO3 BLDA-SCNC: 19.5 MEQ/L (ref 21–27)
HCO3 BLDA-SCNC: 25.4 MEQ/L (ref 21–27)
HCO3 BLDV-SCNC: 12.9 MEQ/L (ref 22–26)
HCT VFR BLD AUTO: 18.9 %
HCT VFR BLD AUTO: 21.1 %
HCT VFR BLD AUTO: 21.9 %
HCT VFR BLD AUTO: 22 %
HCT VFR BLD AUTO: 22.6 %
HCT VFR BLD AUTO: 22.9 %
HCT VFR BLD AUTO: 22.9 %
HCT VFR BLD AUTO: 23.1 %
HCT VFR BLD AUTO: 23.4 %
HCT VFR BLD AUTO: 23.5 %
HCT VFR BLD AUTO: 24 %
HCT VFR BLD AUTO: 24.3 %
HCT VFR BLD AUTO: 24.8 %
HCT VFR BLD AUTO: 25 %
HCT VFR BLD AUTO: 25.1 %
HCT VFR BLD AUTO: 25.2 %
HCT VFR BLD AUTO: 25.2 %
HCT VFR BLD AUTO: 25.7 %
HCT VFR BLD AUTO: 25.7 %
HCT VFR BLD AUTO: 25.8 %
HCT VFR BLD AUTO: 25.9 %
HCT VFR BLD AUTO: 26 %
HCT VFR BLD AUTO: 26 %
HCT VFR BLD AUTO: 26.1 %
HCT VFR BLD AUTO: 26.4 %
HCT VFR BLD AUTO: 26.6 %
HCT VFR BLD AUTO: 27.4 %
HCT VFR BLD AUTO: 27.6 %
HCT VFR BLD AUTO: 29.5 %
HCT VFR BLDA CALC: 17 %
HCT VFR BLDA CALC: 23 %
HGB BLD-MCNC: 10 G/DL
HGB BLD-MCNC: 10 G/DL
HGB BLD-MCNC: 6 G/DL
HGB BLD-MCNC: 7.1 G/DL
HGB BLD-MCNC: 7.2 G/DL
HGB BLD-MCNC: 7.2 G/DL
HGB BLD-MCNC: 7.4 G/DL
HGB BLD-MCNC: 7.5 G/DL
HGB BLD-MCNC: 7.7 G/DL
HGB BLD-MCNC: 7.8 G/DL
HGB BLD-MCNC: 7.8 G/DL
HGB BLD-MCNC: 7.9 G/DL
HGB BLD-MCNC: 7.9 G/DL
HGB BLD-MCNC: 8 G/DL
HGB BLD-MCNC: 8 G/DL
HGB BLD-MCNC: 8.1 G/DL
HGB BLD-MCNC: 8.2 G/DL
HGB BLD-MCNC: 8.3 G/DL
HGB BLD-MCNC: 8.4 G/DL
HGB BLD-MCNC: 8.6 G/DL
HGB BLD-MCNC: 8.7 G/DL
HGB BLD-MCNC: 8.7 G/DL
HGB BLD-MCNC: 9 G/DL
HGB BLD-MCNC: 9.3 G/DL
HGB BLD-MCNC: 9.4 G/DL
HGB BLD-MCNC: 9.5 G/DL
HGB BLD-MCNC: 9.6 G/DL
HGB BLD-MCNC: 9.9 G/DL
HGBA1C: 11 %
HYALINE CASTS #/AREA URNS AUTO: PRESENT /LPF
IMM GRANULOCYTES # BLD AUTO: 0.11 X10(3) UL (ref 0–1)
IMM GRANULOCYTES # BLD AUTO: 0.12 X10(3) UL (ref 0–1)
IMM GRANULOCYTES # BLD AUTO: 0.13 X10(3) UL (ref 0–1)
IMM GRANULOCYTES # BLD AUTO: 0.15 X10(3) UL (ref 0–1)
IMM GRANULOCYTES # BLD AUTO: 0.17 X10(3) UL (ref 0–1)
IMM GRANULOCYTES # BLD AUTO: 0.17 X10(3) UL (ref 0–1)
IMM GRANULOCYTES # BLD AUTO: 0.18 X10(3) UL (ref 0–1)
IMM GRANULOCYTES # BLD AUTO: 0.18 X10(3) UL (ref 0–1)
IMM GRANULOCYTES # BLD AUTO: 0.2 X10(3) UL (ref 0–1)
IMM GRANULOCYTES # BLD AUTO: 0.23 X10(3) UL (ref 0–1)
IMM GRANULOCYTES # BLD AUTO: 0.24 X10(3) UL (ref 0–1)
IMM GRANULOCYTES # BLD AUTO: 0.25 X10(3) UL (ref 0–1)
IMM GRANULOCYTES # BLD AUTO: 0.28 X10(3) UL (ref 0–1)
IMM GRANULOCYTES # BLD AUTO: 0.33 X10(3) UL (ref 0–1)
IMM GRANULOCYTES # BLD AUTO: 0.35 X10(3) UL (ref 0–1)
IMM GRANULOCYTES # BLD AUTO: 0.37 X10(3) UL (ref 0–1)
IMM GRANULOCYTES # BLD AUTO: 0.49 X10(3) UL (ref 0–1)
IMM GRANULOCYTES # BLD AUTO: 0.49 X10(3) UL (ref 0–1)
IMM GRANULOCYTES NFR BLD: 0.7 %
IMM GRANULOCYTES NFR BLD: 0.8 %
IMM GRANULOCYTES NFR BLD: 0.8 %
IMM GRANULOCYTES NFR BLD: 0.9 %
IMM GRANULOCYTES NFR BLD: 1 %
IMM GRANULOCYTES NFR BLD: 1 %
IMM GRANULOCYTES NFR BLD: 1.1 %
IMM GRANULOCYTES NFR BLD: 1.2 %
IMM GRANULOCYTES NFR BLD: 1.2 %
IMM GRANULOCYTES NFR BLD: 1.3 %
IMM GRANULOCYTES NFR BLD: 1.5 %
IMM GRANULOCYTES NFR BLD: 1.6 %
IMM GRANULOCYTES NFR BLD: 1.7 %
IMM GRANULOCYTES NFR BLD: 1.8 %
IMM GRANULOCYTES NFR BLD: 1.9 %
INR BLD: 1.51 (ref 0.8–1.2)
K PNEUMON DNA BLD POS QL NAA+NON-PROBE: DETECTED
KETONES UR-MCNC: NEGATIVE MG/DL
LACTATE BLD-SCNC: 0.6 MMOL/L (ref 0.5–2)
LACTATE BLD-SCNC: 3 MMOL/L (ref 0.5–2)
LACTATE SERPL-SCNC: 1.6 MMOL/L (ref 0.5–2)
LACTATE SERPL-SCNC: 1.6 MMOL/L (ref 0.5–2)
LACTATE SERPL-SCNC: 2.2 MMOL/L (ref 0.5–2)
LACTATE SERPL-SCNC: 3.4 MMOL/L (ref 0.5–2)
LACTATE SERPL-SCNC: 7.6 MMOL/L (ref 0.5–2)
LEUKOCYTE ESTERASE UR QL STRIP.AUTO: 250
LEUKOCYTE ESTERASE UR QL STRIP.AUTO: 500
LEUKOCYTE ESTERASE UR QL STRIP.AUTO: 500
LYMPHOCYTES # BLD AUTO: 0.57 X10(3) UL (ref 1–4)
LYMPHOCYTES # BLD AUTO: 0.93 X10(3) UL (ref 1–4)
LYMPHOCYTES # BLD AUTO: 1.01 X10(3) UL (ref 1–4)
LYMPHOCYTES # BLD AUTO: 1.02 X10(3) UL (ref 1–4)
LYMPHOCYTES # BLD AUTO: 1.02 X10(3) UL (ref 1–4)
LYMPHOCYTES # BLD AUTO: 1.05 X10(3) UL (ref 1–4)
LYMPHOCYTES # BLD AUTO: 1.08 X10(3) UL (ref 1–4)
LYMPHOCYTES # BLD AUTO: 1.14 X10(3) UL (ref 1–4)
LYMPHOCYTES # BLD AUTO: 1.15 X10(3) UL (ref 1–4)
LYMPHOCYTES # BLD AUTO: 1.16 X10(3) UL (ref 1–4)
LYMPHOCYTES # BLD AUTO: 1.2 X10(3) UL (ref 1–4)
LYMPHOCYTES # BLD AUTO: 1.21 X10(3) UL (ref 1–4)
LYMPHOCYTES # BLD AUTO: 1.28 X10(3) UL (ref 1–4)
LYMPHOCYTES # BLD AUTO: 1.35 X10(3) UL (ref 1–4)
LYMPHOCYTES # BLD AUTO: 1.41 X10(3) UL (ref 1–4)
LYMPHOCYTES # BLD AUTO: 1.41 X10(3) UL (ref 1–4)
LYMPHOCYTES # BLD AUTO: 1.56 X10(3) UL (ref 1–4)
LYMPHOCYTES NFR BLD AUTO: 3.1 %
LYMPHOCYTES NFR BLD AUTO: 3.6 %
LYMPHOCYTES NFR BLD AUTO: 3.8 %
LYMPHOCYTES NFR BLD AUTO: 3.8 %
LYMPHOCYTES NFR BLD AUTO: 4.9 %
LYMPHOCYTES NFR BLD AUTO: 4.9 %
LYMPHOCYTES NFR BLD AUTO: 5.2 %
LYMPHOCYTES NFR BLD AUTO: 5.3 %
LYMPHOCYTES NFR BLD AUTO: 5.3 %
LYMPHOCYTES NFR BLD AUTO: 5.6 %
LYMPHOCYTES NFR BLD AUTO: 5.7 %
LYMPHOCYTES NFR BLD AUTO: 5.7 %
LYMPHOCYTES NFR BLD AUTO: 6.3 %
LYMPHOCYTES NFR BLD AUTO: 6.6 %
LYMPHOCYTES NFR BLD AUTO: 6.7 %
LYMPHOCYTES NFR BLD AUTO: 7.1 %
LYMPHOCYTES NFR BLD AUTO: 7.1 %
LYMPHOCYTES NFR BLD AUTO: 7.2 %
LYMPHOCYTES NFR BLD AUTO: 7.4 %
LYMPHOCYTES NFR BLD AUTO: 8.1 %
LYMPHOCYTES NFR BLD: 0.85 X10(3) UL (ref 1–4)
LYMPHOCYTES NFR BLD: 0.91 X10(3) UL (ref 1–4)
LYMPHOCYTES NFR BLD: 1.12 X10(3) UL (ref 1–4)
LYMPHOCYTES NFR BLD: 1.13 X10(3) UL (ref 1–4)
LYMPHOCYTES NFR BLD: 18 %
LYMPHOCYTES NFR BLD: 2.95 X10(3) UL (ref 1–4)
LYMPHOCYTES NFR BLD: 3 %
LYMPHOCYTES NFR BLD: 3 %
LYMPHOCYTES NFR BLD: 4 %
LYMPHOCYTES NFR BLD: 4 %
MAGNESIUM SERPL-MCNC: 1.2 MG/DL (ref 1.6–2.6)
MAGNESIUM SERPL-MCNC: 1.5 MG/DL (ref 1.6–2.6)
MAGNESIUM SERPL-MCNC: 1.7 MG/DL (ref 1.6–2.6)
MAGNESIUM SERPL-MCNC: 1.8 MG/DL (ref 1.6–2.6)
MAGNESIUM SERPL-MCNC: 1.9 MG/DL (ref 1.6–2.6)
MAGNESIUM SERPL-MCNC: 2.1 MG/DL (ref 1.6–2.6)
MAGNESIUM SERPL-MCNC: 2.2 MG/DL (ref 1.6–2.6)
MAGNESIUM SERPL-MCNC: 2.5 MG/DL (ref 1.6–2.6)
MCH RBC QN AUTO: 27.5 PG (ref 26–34)
MCH RBC QN AUTO: 27.6 PG (ref 26–34)
MCH RBC QN AUTO: 27.7 PG (ref 26–34)
MCH RBC QN AUTO: 27.8 PG (ref 26–34)
MCH RBC QN AUTO: 27.9 PG (ref 26–34)
MCH RBC QN AUTO: 28 PG (ref 26–34)
MCH RBC QN AUTO: 28.2 PG (ref 26–34)
MCH RBC QN AUTO: 28.3 PG (ref 26–34)
MCH RBC QN AUTO: 28.3 PG (ref 26–34)
MCH RBC QN AUTO: 28.5 PG (ref 26–34)
MCH RBC QN AUTO: 28.5 PG (ref 26–34)
MCH RBC QN AUTO: 28.6 PG (ref 26–34)
MCH RBC QN AUTO: 28.6 PG (ref 26–34)
MCH RBC QN AUTO: 28.7 PG (ref 26–34)
MCH RBC QN AUTO: 28.7 PG (ref 26–34)
MCH RBC QN AUTO: 28.8 PG (ref 26–34)
MCH RBC QN AUTO: 29.2 PG (ref 26–34)
MCH RBC QN AUTO: 29.2 PG (ref 26–34)
MCH RBC QN AUTO: 29.3 PG (ref 26–34)
MCH RBC QN AUTO: 29.3 PG (ref 26–34)
MCH RBC QN AUTO: 29.4 PG (ref 26–34)
MCH RBC QN AUTO: 29.6 PG (ref 26–34)
MCH RBC QN AUTO: 29.7 PG (ref 26–34)
MCH RBC QN AUTO: 29.7 PG (ref 26–34)
MCH RBC QN AUTO: 29.9 PG (ref 26–34)
MCH RBC QN AUTO: 30.2 PG (ref 26–34)
MCH RBC QN AUTO: 30.9 PG (ref 26–34)
MCHC RBC AUTO-ENTMCNC: 28.7 G/DL (ref 31–37)
MCHC RBC AUTO-ENTMCNC: 30 G/DL (ref 31–37)
MCHC RBC AUTO-ENTMCNC: 30.3 G/DL (ref 31–37)
MCHC RBC AUTO-ENTMCNC: 30.7 G/DL (ref 31–37)
MCHC RBC AUTO-ENTMCNC: 30.7 G/DL (ref 31–37)
MCHC RBC AUTO-ENTMCNC: 31.1 G/DL (ref 31–37)
MCHC RBC AUTO-ENTMCNC: 31.4 G/DL (ref 31–37)
MCHC RBC AUTO-ENTMCNC: 31.5 G/DL (ref 31–37)
MCHC RBC AUTO-ENTMCNC: 31.7 G/DL (ref 31–37)
MCHC RBC AUTO-ENTMCNC: 31.7 G/DL (ref 31–37)
MCHC RBC AUTO-ENTMCNC: 32.8 G/DL (ref 31–37)
MCHC RBC AUTO-ENTMCNC: 32.9 G/DL (ref 31–37)
MCHC RBC AUTO-ENTMCNC: 32.9 G/DL (ref 31–37)
MCHC RBC AUTO-ENTMCNC: 33.3 G/DL (ref 31–37)
MCHC RBC AUTO-ENTMCNC: 33.6 G/DL (ref 31–37)
MCHC RBC AUTO-ENTMCNC: 33.8 G/DL (ref 31–37)
MCHC RBC AUTO-ENTMCNC: 34.1 G/DL (ref 31–37)
MCHC RBC AUTO-ENTMCNC: 34.1 G/DL (ref 31–37)
MCHC RBC AUTO-ENTMCNC: 34.3 G/DL (ref 31–37)
MCHC RBC AUTO-ENTMCNC: 34.6 G/DL (ref 31–37)
MCHC RBC AUTO-ENTMCNC: 34.7 G/DL (ref 31–37)
MCHC RBC AUTO-ENTMCNC: 35.1 G/DL (ref 31–37)
MCHC RBC AUTO-ENTMCNC: 35.4 G/DL (ref 31–37)
MCHC RBC AUTO-ENTMCNC: 35.8 G/DL (ref 31–37)
MCHC RBC AUTO-ENTMCNC: 36 G/DL (ref 31–37)
MCHC RBC AUTO-ENTMCNC: 36.1 G/DL (ref 31–37)
MCHC RBC AUTO-ENTMCNC: 36.5 G/DL (ref 31–37)
MCHC RBC AUTO-ENTMCNC: 36.5 G/DL (ref 31–37)
MCHC RBC AUTO-ENTMCNC: 37 G/DL (ref 31–37)
MCV RBC AUTO: 80.2 FL
MCV RBC AUTO: 81 FL
MCV RBC AUTO: 82.3 FL
MCV RBC AUTO: 82.3 FL
MCV RBC AUTO: 82.5 FL
MCV RBC AUTO: 82.5 FL
MCV RBC AUTO: 82.7 FL
MCV RBC AUTO: 82.8 FL
MCV RBC AUTO: 82.8 FL
MCV RBC AUTO: 83.4 FL
MCV RBC AUTO: 84.3 FL
MCV RBC AUTO: 84.5 FL
MCV RBC AUTO: 85.1 FL
MCV RBC AUTO: 85.4 FL
MCV RBC AUTO: 85.5 FL
MCV RBC AUTO: 85.6 FL
MCV RBC AUTO: 85.7 FL
MCV RBC AUTO: 87.1 FL
MCV RBC AUTO: 87.2 FL
MCV RBC AUTO: 87.3 FL
MCV RBC AUTO: 87.4 FL
MCV RBC AUTO: 89 FL
MCV RBC AUTO: 89.3 FL
MCV RBC AUTO: 89.4 FL
MCV RBC AUTO: 89.9 FL
MCV RBC AUTO: 92.3 FL
MCV RBC AUTO: 92.4 FL
MCV RBC AUTO: 94 FL
MCV RBC AUTO: 94 FL
MCV RBC AUTO: 94.6 FL
MCV RBC AUTO: 97 FL
METAMYELOCYTES # BLD: 0.28 X10(3) UL
METAMYELOCYTES NFR BLD: 1 %
METHGB MFR BLD: 0.9 % SAT (ref 0.4–1.5)
METHGB MFR BLD: 1.2 % SAT (ref 0.4–1.5)
MODIFIED ALLEN TEST: POSITIVE
MONOCYTES # BLD AUTO: 0.43 X10(3) UL (ref 0.1–1)
MONOCYTES # BLD AUTO: 0.5 X10(3) UL (ref 0.1–1)
MONOCYTES # BLD AUTO: 0.63 X10(3) UL (ref 0.1–1)
MONOCYTES # BLD AUTO: 0.7 X10(3) UL (ref 0.1–1)
MONOCYTES # BLD AUTO: 0.76 X10(3) UL (ref 0.1–1)
MONOCYTES # BLD AUTO: 0.77 X10(3) UL (ref 0.1–1)
MONOCYTES # BLD AUTO: 0.81 X10(3) UL (ref 0.1–1)
MONOCYTES # BLD AUTO: 0.89 X10(3) UL (ref 0.1–1)
MONOCYTES # BLD AUTO: 0.91 X10(3) UL (ref 0.1–1)
MONOCYTES # BLD AUTO: 1 X10(3) UL (ref 0.1–1)
MONOCYTES # BLD AUTO: 1.04 X10(3) UL (ref 0.1–1)
MONOCYTES # BLD AUTO: 1.09 X10(3) UL (ref 0.1–1)
MONOCYTES # BLD AUTO: 1.12 X10(3) UL (ref 0.1–1)
MONOCYTES # BLD AUTO: 1.13 X10(3) UL (ref 0.1–1)
MONOCYTES # BLD AUTO: 1.19 X10(3) UL (ref 0.1–1)
MONOCYTES # BLD AUTO: 1.31 X10(3) UL (ref 0.1–1)
MONOCYTES # BLD AUTO: 1.48 X10(3) UL (ref 0.1–1)
MONOCYTES # BLD AUTO: 1.5 X10(3) UL (ref 0.1–1)
MONOCYTES # BLD AUTO: 1.54 X10(3) UL (ref 0.1–1)
MONOCYTES # BLD AUTO: 1.61 X10(3) UL (ref 0.1–1)
MONOCYTES # BLD: 0 X10(3) UL (ref 0.1–1)
MONOCYTES # BLD: 0 X10(3) UL (ref 0.1–1)
MONOCYTES # BLD: 0.28 X10(3) UL (ref 0.1–1)
MONOCYTES # BLD: 0.28 X10(3) UL (ref 0.1–1)
MONOCYTES # BLD: 0.98 X10(3) UL (ref 0.1–1)
MONOCYTES NFR BLD AUTO: 2.4 %
MONOCYTES NFR BLD AUTO: 3.3 %
MONOCYTES NFR BLD AUTO: 3.5 %
MONOCYTES NFR BLD AUTO: 3.5 %
MONOCYTES NFR BLD AUTO: 4.1 %
MONOCYTES NFR BLD AUTO: 4.3 %
MONOCYTES NFR BLD AUTO: 4.6 %
MONOCYTES NFR BLD AUTO: 4.7 %
MONOCYTES NFR BLD AUTO: 5 %
MONOCYTES NFR BLD AUTO: 5 %
MONOCYTES NFR BLD AUTO: 5.1 %
MONOCYTES NFR BLD AUTO: 5.2 %
MONOCYTES NFR BLD AUTO: 5.4 %
MONOCYTES NFR BLD AUTO: 5.6 %
MONOCYTES NFR BLD AUTO: 5.6 %
MONOCYTES NFR BLD AUTO: 5.8 %
MONOCYTES NFR BLD AUTO: 6.7 %
MONOCYTES NFR BLD AUTO: 8.6 %
MONOCYTES NFR BLD: 0 %
MONOCYTES NFR BLD: 0 %
MONOCYTES NFR BLD: 1 %
MONOCYTES NFR BLD: 1 %
MONOCYTES NFR BLD: 6 %
NEUTROPHILS # BLD AUTO: 11.87 X10 (3) UL (ref 1.5–7.7)
NEUTROPHILS # BLD AUTO: 12.7 X10 (3) UL (ref 1.5–7.7)
NEUTROPHILS # BLD AUTO: 12.7 X10(3) UL (ref 1.5–7.7)
NEUTROPHILS # BLD AUTO: 13.52 X10 (3) UL (ref 1.5–7.7)
NEUTROPHILS # BLD AUTO: 13.52 X10(3) UL (ref 1.5–7.7)
NEUTROPHILS # BLD AUTO: 13.79 X10 (3) UL (ref 1.5–7.7)
NEUTROPHILS # BLD AUTO: 13.79 X10(3) UL (ref 1.5–7.7)
NEUTROPHILS # BLD AUTO: 14.03 X10 (3) UL (ref 1.5–7.7)
NEUTROPHILS # BLD AUTO: 14.03 X10(3) UL (ref 1.5–7.7)
NEUTROPHILS # BLD AUTO: 14.12 X10 (3) UL (ref 1.5–7.7)
NEUTROPHILS # BLD AUTO: 14.12 X10(3) UL (ref 1.5–7.7)
NEUTROPHILS # BLD AUTO: 15.7 X10 (3) UL (ref 1.5–7.7)
NEUTROPHILS # BLD AUTO: 15.7 X10(3) UL (ref 1.5–7.7)
NEUTROPHILS # BLD AUTO: 16.39 X10 (3) UL (ref 1.5–7.7)
NEUTROPHILS # BLD AUTO: 16.39 X10(3) UL (ref 1.5–7.7)
NEUTROPHILS # BLD AUTO: 16.87 X10 (3) UL (ref 1.5–7.7)
NEUTROPHILS # BLD AUTO: 16.87 X10(3) UL (ref 1.5–7.7)
NEUTROPHILS # BLD AUTO: 16.96 X10 (3) UL (ref 1.5–7.7)
NEUTROPHILS # BLD AUTO: 16.96 X10(3) UL (ref 1.5–7.7)
NEUTROPHILS # BLD AUTO: 17.81 X10 (3) UL (ref 1.5–7.7)
NEUTROPHILS # BLD AUTO: 17.81 X10(3) UL (ref 1.5–7.7)
NEUTROPHILS # BLD AUTO: 17.83 X10 (3) UL (ref 1.5–7.7)
NEUTROPHILS # BLD AUTO: 17.83 X10(3) UL (ref 1.5–7.7)
NEUTROPHILS # BLD AUTO: 18.55 X10 (3) UL (ref 1.5–7.7)
NEUTROPHILS # BLD AUTO: 18.55 X10(3) UL (ref 1.5–7.7)
NEUTROPHILS # BLD AUTO: 18.64 X10 (3) UL (ref 1.5–7.7)
NEUTROPHILS # BLD AUTO: 18.64 X10(3) UL (ref 1.5–7.7)
NEUTROPHILS # BLD AUTO: 19.63 X10 (3) UL (ref 1.5–7.7)
NEUTROPHILS # BLD AUTO: 19.63 X10(3) UL (ref 1.5–7.7)
NEUTROPHILS # BLD AUTO: 19.95 X10 (3) UL (ref 1.5–7.7)
NEUTROPHILS # BLD AUTO: 19.95 X10(3) UL (ref 1.5–7.7)
NEUTROPHILS # BLD AUTO: 22.98 X10 (3) UL (ref 1.5–7.7)
NEUTROPHILS # BLD AUTO: 22.98 X10(3) UL (ref 1.5–7.7)
NEUTROPHILS # BLD AUTO: 23.28 X10 (3) UL (ref 1.5–7.7)
NEUTROPHILS # BLD AUTO: 23.28 X10(3) UL (ref 1.5–7.7)
NEUTROPHILS # BLD AUTO: 23.83 X10 (3) UL (ref 1.5–7.7)
NEUTROPHILS # BLD AUTO: 23.83 X10(3) UL (ref 1.5–7.7)
NEUTROPHILS # BLD AUTO: 24.79 X10 (3) UL (ref 1.5–7.7)
NEUTROPHILS # BLD AUTO: 24.94 X10 (3) UL (ref 1.5–7.7)
NEUTROPHILS # BLD AUTO: 25.24 X10 (3) UL (ref 1.5–7.7)
NEUTROPHILS # BLD AUTO: 25.3 X10 (3) UL (ref 1.5–7.7)
NEUTROPHILS # BLD AUTO: 25.3 X10(3) UL (ref 1.5–7.7)
NEUTROPHILS # BLD AUTO: 25.95 X10 (3) UL (ref 1.5–7.7)
NEUTROPHILS # BLD AUTO: 26.3 X10 (3) UL (ref 1.5–7.7)
NEUTROPHILS # BLD AUTO: 26.3 X10(3) UL (ref 1.5–7.7)
NEUTROPHILS NFR BLD AUTO: 81 %
NEUTROPHILS NFR BLD AUTO: 84.3 %
NEUTROPHILS NFR BLD AUTO: 86.1 %
NEUTROPHILS NFR BLD AUTO: 86.4 %
NEUTROPHILS NFR BLD AUTO: 86.5 %
NEUTROPHILS NFR BLD AUTO: 86.5 %
NEUTROPHILS NFR BLD AUTO: 86.8 %
NEUTROPHILS NFR BLD AUTO: 86.8 %
NEUTROPHILS NFR BLD AUTO: 87.1 %
NEUTROPHILS NFR BLD AUTO: 87.9 %
NEUTROPHILS NFR BLD AUTO: 87.9 %
NEUTROPHILS NFR BLD AUTO: 88.3 %
NEUTROPHILS NFR BLD AUTO: 88.4 %
NEUTROPHILS NFR BLD AUTO: 88.5 %
NEUTROPHILS NFR BLD AUTO: 89.1 %
NEUTROPHILS NFR BLD AUTO: 89.2 %
NEUTROPHILS NFR BLD AUTO: 89.3 %
NEUTROPHILS NFR BLD AUTO: 89.6 %
NEUTROPHILS NFR BLD AUTO: 89.7 %
NEUTROPHILS NFR BLD AUTO: 93.4 %
NEUTROPHILS NFR BLD: 73 %
NEUTROPHILS NFR BLD: 92 %
NEUTROPHILS NFR BLD: 93 %
NEUTROPHILS NFR BLD: 94 %
NEUTROPHILS NFR BLD: 96 %
NEUTS BAND NFR BLD: 1 %
NEUTS BAND NFR BLD: 2 %
NEUTS HYPERSEG # BLD: 11.97 X10(3) UL (ref 1.5–7.7)
NEUTS HYPERSEG # BLD: 26.32 X10(3) UL (ref 1.5–7.7)
NEUTS HYPERSEG # BLD: 26.51 X10(3) UL (ref 1.5–7.7)
NEUTS HYPERSEG # BLD: 26.79 X10(3) UL (ref 1.5–7.7)
NEUTS HYPERSEG # BLD: 29.29 X10(3) UL (ref 1.5–7.7)
NITRITE UR QL STRIP.AUTO: NEGATIVE
NRBC BLD MANUAL-RTO: 3 %
O2 CT BLD-SCNC: 10 VOL% (ref 15–23)
O2 CT BLD-SCNC: 10.5 VOL% (ref 15–23)
O2 CT BLD-SCNC: 11.8 VOL% (ref 15–23)
O2 CT BLD-SCNC: 12.4 VOL% (ref 15–23)
O2 CT BLD-SCNC: 13.3 VOL% (ref 15–23)
O2 CT BLD-SCNC: 14 VOL% (ref 15–23)
O2 CT BLD-SCNC: 15.7 VOL% (ref 15–23)
O2/TOTAL GAS SETTING VFR VENT: 100 %
O2/TOTAL GAS SETTING VFR VENT: 30 %
O2/TOTAL GAS SETTING VFR VENT: 40 %
OSMOLALITY SERPL CALC.SUM OF ELEC: 288 MOSM/KG (ref 275–295)
OSMOLALITY SERPL CALC.SUM OF ELEC: 288 MOSM/KG (ref 275–295)
OSMOLALITY SERPL CALC.SUM OF ELEC: 292 MOSM/KG (ref 275–295)
OSMOLALITY SERPL CALC.SUM OF ELEC: 293 MOSM/KG (ref 275–295)
OSMOLALITY SERPL CALC.SUM OF ELEC: 295 MOSM/KG (ref 275–295)
OSMOLALITY SERPL CALC.SUM OF ELEC: 296 MOSM/KG (ref 275–295)
OSMOLALITY SERPL CALC.SUM OF ELEC: 298 MOSM/KG (ref 275–295)
OSMOLALITY SERPL CALC.SUM OF ELEC: 299 MOSM/KG (ref 275–295)
OSMOLALITY SERPL CALC.SUM OF ELEC: 300 MOSM/KG (ref 275–295)
OSMOLALITY SERPL CALC.SUM OF ELEC: 300 MOSM/KG (ref 275–295)
OSMOLALITY SERPL CALC.SUM OF ELEC: 301 MOSM/KG (ref 275–295)
OSMOLALITY SERPL CALC.SUM OF ELEC: 301 MOSM/KG (ref 275–295)
OSMOLALITY SERPL CALC.SUM OF ELEC: 302 MOSM/KG (ref 275–295)
OSMOLALITY SERPL CALC.SUM OF ELEC: 304 MOSM/KG (ref 275–295)
OSMOLALITY SERPL CALC.SUM OF ELEC: 304 MOSM/KG (ref 275–295)
OSMOLALITY SERPL CALC.SUM OF ELEC: 305 MOSM/KG (ref 275–295)
OSMOLALITY SERPL CALC.SUM OF ELEC: 305 MOSM/KG (ref 275–295)
OSMOLALITY SERPL CALC.SUM OF ELEC: 306 MOSM/KG (ref 275–295)
OSMOLALITY SERPL CALC.SUM OF ELEC: 307 MOSM/KG (ref 275–295)
OSMOLALITY SERPL CALC.SUM OF ELEC: 307 MOSM/KG (ref 275–295)
OSMOLALITY SERPL CALC.SUM OF ELEC: 309 MOSM/KG (ref 275–295)
OSMOLALITY SERPL CALC.SUM OF ELEC: 310 MOSM/KG (ref 275–295)
OSMOLALITY SERPL CALC.SUM OF ELEC: 311 MOSM/KG (ref 275–295)
OSMOLALITY SERPL CALC.SUM OF ELEC: 311 MOSM/KG (ref 275–295)
OSMOLALITY SERPL CALC.SUM OF ELEC: 312 MOSM/KG (ref 275–295)
OSMOLALITY SERPL CALC.SUM OF ELEC: 313 MOSM/KG (ref 275–295)
OSMOLALITY SERPL CALC.SUM OF ELEC: 315 MOSM/KG (ref 275–295)
OSMOLALITY SERPL CALC.SUM OF ELEC: 318 MOSM/KG (ref 275–295)
OSMOLALITY SERPL CALC.SUM OF ELEC: 323 MOSM/KG (ref 275–295)
P AXIS: 75 DEGREES
P-R INTERVAL: 154 MS
PCO2 BLDA: 23 MM HG (ref 35–45)
PCO2 BLDA: 23 MM HG (ref 35–45)
PCO2 BLDA: 24 MM HG (ref 35–45)
PCO2 BLDA: 26 MM HG (ref 35–45)
PCO2 BLDA: 29 MM HG (ref 35–45)
PCO2 BLDA: 38.7 MMHG (ref 35–45)
PCO2 BLDA: 39 MM HG (ref 35–45)
PCO2 BLDA: 40 MM HG (ref 35–45)
PCO2 BLDA: 44.2 MMHG (ref 35–45)
PCO2 BLDV: 25 MM HG (ref 38–50)
PEEP SETTING VENT: 5 CM H2O
PH BLDA: 7.1 [PH] (ref 7.35–7.45)
PH BLDA: 7.15 [PH] (ref 7.35–7.45)
PH BLDA: 7.15 [PH] (ref 7.35–7.45)
PH BLDA: 7.32 [PH] (ref 7.35–7.45)
PH BLDA: 7.32 [PH] (ref 7.35–7.45)
PH BLDA: 7.33 [PH] (ref 7.35–7.45)
PH BLDA: 7.33 [PH] (ref 7.35–7.45)
PH BLDA: 7.38 [PH] (ref 7.35–7.45)
PH BLDA: 7.41 [PH] (ref 7.35–7.45)
PH BLDV: 7.24 [PH] (ref 7.32–7.43)
PH UR: 5.5 [PH] (ref 5–8)
PH UR: 5.5 [PH] (ref 5–8)
PH UR: 6 [PH] (ref 5–8)
PHOSPHATE SERPL-MCNC: 2.6 MG/DL (ref 2.4–5.1)
PHOSPHATE SERPL-MCNC: 2.9 MG/DL (ref 2.4–5.1)
PHOSPHATE SERPL-MCNC: 3 MG/DL (ref 2.4–5.1)
PHOSPHATE SERPL-MCNC: 3.4 MG/DL (ref 2.4–5.1)
PHOSPHATE SERPL-MCNC: 3.6 MG/DL (ref 2.4–5.1)
PHOSPHATE SERPL-MCNC: 3.7 MG/DL (ref 2.4–5.1)
PHOSPHATE SERPL-MCNC: 4.1 MG/DL (ref 2.4–5.1)
PHOSPHATE SERPL-MCNC: 4.1 MG/DL (ref 2.4–5.1)
PHOSPHATE SERPL-MCNC: 4.2 MG/DL (ref 2.4–5.1)
PHOSPHATE SERPL-MCNC: 4.2 MG/DL (ref 2.4–5.1)
PHOSPHATE SERPL-MCNC: 4.3 MG/DL (ref 2.4–5.1)
PHOSPHATE SERPL-MCNC: 4.4 MG/DL (ref 2.4–5.1)
PHOSPHATE SERPL-MCNC: 4.5 MG/DL (ref 2.4–5.1)
PHOSPHATE SERPL-MCNC: 4.8 MG/DL (ref 2.4–5.1)
PHOSPHATE SERPL-MCNC: 5 MG/DL (ref 2.4–5.1)
PHOSPHATE SERPL-MCNC: 5.1 MG/DL (ref 2.4–5.1)
PHOSPHATE SERPL-MCNC: 5.4 MG/DL (ref 2.4–5.1)
PLATELET # BLD AUTO: 191 10(3)UL (ref 150–450)
PLATELET # BLD AUTO: 191 10(3)UL (ref 150–450)
PLATELET # BLD AUTO: 194 10(3)UL (ref 150–450)
PLATELET # BLD AUTO: 211 10(3)UL (ref 150–450)
PLATELET # BLD AUTO: 212 10(3)UL (ref 150–450)
PLATELET # BLD AUTO: 218 10(3)UL (ref 150–450)
PLATELET # BLD AUTO: 243 10(3)UL (ref 150–450)
PLATELET # BLD AUTO: 249 10(3)UL (ref 150–450)
PLATELET # BLD AUTO: 260 10(3)UL (ref 150–450)
PLATELET # BLD AUTO: 262 10(3)UL (ref 150–450)
PLATELET # BLD AUTO: 262 10(3)UL (ref 150–450)
PLATELET # BLD AUTO: 266 10(3)UL (ref 150–450)
PLATELET # BLD AUTO: 270 10(3)UL (ref 150–450)
PLATELET # BLD AUTO: 271 10(3)UL (ref 150–450)
PLATELET # BLD AUTO: 274 10(3)UL (ref 150–450)
PLATELET # BLD AUTO: 281 10(3)UL (ref 150–450)
PLATELET # BLD AUTO: 283 10(3)UL (ref 150–450)
PLATELET # BLD AUTO: 286 10(3)UL (ref 150–450)
PLATELET # BLD AUTO: 291 10(3)UL (ref 150–450)
PLATELET # BLD AUTO: 296 10(3)UL (ref 150–450)
PLATELET # BLD AUTO: 306 10(3)UL (ref 150–450)
PLATELET # BLD AUTO: 306 10(3)UL (ref 150–450)
PLATELET # BLD AUTO: 319 10(3)UL (ref 150–450)
PLATELET # BLD AUTO: 344 10(3)UL (ref 150–450)
PLATELET # BLD AUTO: 346 10(3)UL (ref 150–450)
PLATELET # BLD AUTO: 359 10(3)UL (ref 150–450)
PLATELET # BLD AUTO: 379 10(3)UL (ref 150–450)
PLATELET # BLD AUTO: 417 10(3)UL (ref 150–450)
PLATELET # BLD AUTO: 422 10(3)UL (ref 150–450)
PLATELET # BLD AUTO: 466 10(3)UL (ref 150–450)
PLATELET # BLD AUTO: 487 10(3)UL (ref 150–450)
PLATELET MORPHOLOGY: NORMAL
PO2 BLDA: 104 MM HG (ref 80–100)
PO2 BLDA: 204 MM HG (ref 80–100)
PO2 BLDA: 399 MMHG (ref 80–105)
PO2 BLDA: 414 MM HG (ref 80–100)
PO2 BLDA: 54 MM HG (ref 80–100)
PO2 BLDA: 78 MM HG (ref 80–100)
PO2 BLDA: 82 MM HG (ref 80–100)
PO2 BLDA: 84 MM HG (ref 80–100)
PO2 BLDA: >400 MMHG (ref 80–105)
PO2 BLDV: 57 MM HG (ref 35–40)
POTASSIUM BLD-SCNC: 3.1 MMOL/L (ref 3.6–5.1)
POTASSIUM BLD-SCNC: 3.8 MMOL/L (ref 3.6–5.1)
POTASSIUM SERPL-SCNC: 3 MMOL/L (ref 3.5–5.1)
POTASSIUM SERPL-SCNC: 3.3 MMOL/L (ref 3.5–5.1)
POTASSIUM SERPL-SCNC: 3.4 MMOL/L (ref 3.5–5.1)
POTASSIUM SERPL-SCNC: 3.4 MMOL/L (ref 3.5–5.1)
POTASSIUM SERPL-SCNC: 3.5 MMOL/L (ref 3.5–5.1)
POTASSIUM SERPL-SCNC: 3.7 MMOL/L (ref 3.5–5.1)
POTASSIUM SERPL-SCNC: 3.7 MMOL/L (ref 3.5–5.1)
POTASSIUM SERPL-SCNC: 3.8 MMOL/L (ref 3.5–5.1)
POTASSIUM SERPL-SCNC: 4 MMOL/L (ref 3.5–5.1)
POTASSIUM SERPL-SCNC: 4 MMOL/L (ref 3.5–5.1)
POTASSIUM SERPL-SCNC: 4.1 MMOL/L (ref 3.5–5.1)
POTASSIUM SERPL-SCNC: 4.2 MMOL/L (ref 3.5–5.1)
POTASSIUM SERPL-SCNC: 4.3 MMOL/L (ref 3.5–5.1)
POTASSIUM SERPL-SCNC: 4.4 MMOL/L (ref 3.5–5.1)
POTASSIUM SERPL-SCNC: 4.6 MMOL/L (ref 3.5–5.1)
POTASSIUM SERPL-SCNC: 4.6 MMOL/L (ref 3.5–5.1)
POTASSIUM SERPL-SCNC: 5 MMOL/L (ref 3.5–5.1)
POTASSIUM SERPL-SCNC: 5 MMOL/L (ref 3.5–5.1)
POTASSIUM SERPL-SCNC: 5.2 MMOL/L (ref 3.5–5.1)
POTASSIUM SERPL-SCNC: 5.3 MMOL/L (ref 3.5–5.1)
POTASSIUM SERPL-SCNC: 5.4 MMOL/L (ref 3.5–5.1)
POTASSIUM SERPL-SCNC: 5.4 MMOL/L (ref 3.5–5.1)
POTASSIUM SERPL-SCNC: 5.5 MMOL/L (ref 3.5–5.1)
POTASSIUM SERPL-SCNC: 5.6 MMOL/L (ref 3.5–5.1)
POTASSIUM SERPL-SCNC: 5.6 MMOL/L (ref 3.5–5.1)
POTASSIUM SERPL-SCNC: 5.8 MMOL/L (ref 3.5–5.1)
POTASSIUM SERPL-SCNC: 5.9 MMOL/L (ref 3.5–5.1)
POTASSIUM UR-SCNC: 37.9 MMOL/L
PROT SERPL-MCNC: 4.5 G/DL (ref 5.7–8.2)
PROT SERPL-MCNC: 4.6 G/DL (ref 5.7–8.2)
PROT UR-MCNC: 70 MG/DL
PROTHROMBIN TIME: 19.2 SECONDS (ref 11.6–14.8)
PTH-INTACT SERPL-MCNC: 370.3 PG/ML (ref 18.5–88)
PUNCTURE CHARGE: NO
PUNCTURE CHARGE: YES
Q-T INTERVAL: 406 MS
QRS DURATION: 68 MS
QTC CALCULATION (BEZET): 447 MS
R AXIS: 36 DEGREES
RBC # BLD AUTO: 2.17 X10(6)UL
RBC # BLD AUTO: 2.47 X10(6)UL
RBC # BLD AUTO: 2.51 X10(6)UL
RBC # BLD AUTO: 2.57 X10(6)UL
RBC # BLD AUTO: 2.62 X10(6)UL
RBC # BLD AUTO: 2.66 X10(6)UL
RBC # BLD AUTO: 2.67 X10(6)UL
RBC # BLD AUTO: 2.67 X10(6)UL
RBC # BLD AUTO: 2.71 X10(6)UL
RBC # BLD AUTO: 2.73 X10(6)UL
RBC # BLD AUTO: 2.76 X10(6)UL
RBC # BLD AUTO: 2.76 X10(6)UL
RBC # BLD AUTO: 2.77 X10(6)UL
RBC # BLD AUTO: 2.78 X10(6)UL
RBC # BLD AUTO: 2.79 X10(6)UL
RBC # BLD AUTO: 2.8 X10(6)UL
RBC # BLD AUTO: 2.82 X10(6)UL
RBC # BLD AUTO: 2.83 X10(6)UL
RBC # BLD AUTO: 2.86 X10(6)UL
RBC # BLD AUTO: 2.88 X10(6)UL
RBC # BLD AUTO: 2.91 X10(6)UL
RBC # BLD AUTO: 2.91 X10(6)UL
RBC # BLD AUTO: 2.94 X10(6)UL
RBC # BLD AUTO: 3.03 X10(6)UL
RBC # BLD AUTO: 3.05 X10(6)UL
RBC # BLD AUTO: 3.11 X10(6)UL
RBC # BLD AUTO: 3.16 X10(6)UL
RBC # BLD AUTO: 3.2 X10(6)UL
RBC # BLD AUTO: 3.21 X10(6)UL
RBC # BLD AUTO: 3.31 X10(6)UL
RBC # BLD AUTO: 3.5 X10(6)UL
RBC #/AREA URNS AUTO: >10 /HPF
RBC #/AREA URNS AUTO: >10 /HPF
RESP RATE: 20 BPM
RH BLOOD TYPE: POSITIVE
RSV RNA SPEC NAA+PROBE: NEGATIVE
SAO2 % BLDA: 100 % (ref 92–100)
SAO2 % BLDA: 100 % (ref 92–100)
SAO2 % BLDA: 92.2 % (ref 94–100)
SAO2 % BLDA: 98.2 % (ref 94–100)
SAO2 % BLDA: 98.6 % (ref 94–100)
SAO2 % BLDA: 98.7 % (ref 94–100)
SAO2 % BLDA: 98.7 % (ref 94–100)
SAO2 % BLDA: >99 % (ref 94–100)
SAO2 % BLDA: >99 % (ref 94–100)
SAO2 % BLDV: 90.1 % (ref 60–85)
SARS-COV-2 RNA RESP QL NAA+PROBE: NOT DETECTED
SODIUM BLD-SCNC: 139 MMOL/L (ref 135–145)
SODIUM BLD-SCNC: 139 MMOL/L (ref 135–145)
SODIUM BLDA-SCNC: 141 MMOL/L (ref 136–145)
SODIUM BLDA-SCNC: 142 MMOL/L (ref 136–145)
SODIUM BLDA-SCNC: 3.1 MMOL/L (ref 3.6–5.1)
SODIUM BLDA-SCNC: 3.5 MMOL/L (ref 3.6–5.1)
SODIUM SERPL-SCNC: 134 MMOL/L (ref 136–145)
SODIUM SERPL-SCNC: 140 MMOL/L (ref 136–145)
SODIUM SERPL-SCNC: 141 MMOL/L (ref 136–145)
SODIUM SERPL-SCNC: 142 MMOL/L (ref 136–145)
SODIUM SERPL-SCNC: 143 MMOL/L (ref 136–145)
SODIUM SERPL-SCNC: 144 MMOL/L (ref 136–145)
SODIUM SERPL-SCNC: 145 MMOL/L (ref 136–145)
SODIUM SERPL-SCNC: 146 MMOL/L (ref 136–145)
SODIUM SERPL-SCNC: 147 MMOL/L (ref 136–145)
SODIUM SERPL-SCNC: 148 MMOL/L (ref 136–145)
SODIUM SERPL-SCNC: 149 MMOL/L (ref 136–145)
SODIUM SERPL-SCNC: 34 MMOL/L
SP GR UR STRIP: 1.02 (ref 1–1.03)
SPECIMEN VOL 24H UR: 500 ML
T AXIS: 49 DEGREES
TOTAL CELLS COUNTED BLD: 100
TRIGL SERPL-MCNC: 105 MG/DL (ref 30–149)
TRIGL SERPL-MCNC: 107 MG/DL (ref 30–149)
UNIT VOLUME: 350 ML
UROBILINOGEN UR STRIP-ACNC: 4
UROBILINOGEN UR STRIP-ACNC: NORMAL
UROBILINOGEN UR STRIP-ACNC: NORMAL
VANA+VANB BLD POS QL NAA+NON-PROBE: DETECTED
VANCOMYCIN SERPL-MCNC: 11.1 UG/ML (ref ?–40)
VANCOMYCIN TROUGH SERPL-MCNC: 13.2 UG/ML (ref 10–20)
VANCOMYCIN TROUGH SERPL-MCNC: 15.5 UG/ML (ref 10–20)
VANCOMYCIN TROUGH SERPL-MCNC: 15.6 UG/ML (ref 10–20)
VANCOMYCIN TROUGH SERPL-MCNC: 16.2 UG/ML (ref 10–20)
VANCOMYCIN TROUGH SERPL-MCNC: 16.8 UG/ML (ref 10–20)
VENTRICULAR RATE: 73 BPM
VIT D+METAB SERPL-MCNC: 20.5 NG/ML (ref 30–100)
WBC # BLD AUTO: 12.8 X10(3) UL (ref 4–11)
WBC # BLD AUTO: 13.5 X10(3) UL (ref 4–11)
WBC # BLD AUTO: 14.6 X10(3) UL (ref 4–11)
WBC # BLD AUTO: 15.3 X10(3) UL (ref 4–11)
WBC # BLD AUTO: 15.9 X10(3) UL (ref 4–11)
WBC # BLD AUTO: 16.2 X10(3) UL (ref 4–11)
WBC # BLD AUTO: 16.4 X10(3) UL (ref 4–11)
WBC # BLD AUTO: 17.4 X10(3) UL (ref 4–11)
WBC # BLD AUTO: 18 X10(3) UL (ref 4–11)
WBC # BLD AUTO: 18.2 X10(3) UL (ref 4–11)
WBC # BLD AUTO: 18.2 X10(3) UL (ref 4–11)
WBC # BLD AUTO: 18.9 X10(3) UL (ref 4–11)
WBC # BLD AUTO: 19.1 X10(3) UL (ref 4–11)
WBC # BLD AUTO: 19.5 X10(3) UL (ref 4–11)
WBC # BLD AUTO: 20 X10(3) UL (ref 4–11)
WBC # BLD AUTO: 20.3 X10(3) UL (ref 4–11)
WBC # BLD AUTO: 20.6 X10(3) UL (ref 4–11)
WBC # BLD AUTO: 21.4 X10(3) UL (ref 4–11)
WBC # BLD AUTO: 22 X10(3) UL (ref 4–11)
WBC # BLD AUTO: 22.1 X10(3) UL (ref 4–11)
WBC # BLD AUTO: 22.3 X10(3) UL (ref 4–11)
WBC # BLD AUTO: 22.7 X10(3) UL (ref 4–11)
WBC # BLD AUTO: 26.3 X10(3) UL (ref 4–11)
WBC # BLD AUTO: 26.6 X10(3) UL (ref 4–11)
WBC # BLD AUTO: 26.6 X10(3) UL (ref 4–11)
WBC # BLD AUTO: 27.9 X10(3) UL (ref 4–11)
WBC # BLD AUTO: 28.2 X10(3) UL (ref 4–11)
WBC # BLD AUTO: 28.3 X10(3) UL (ref 4–11)
WBC # BLD AUTO: 28.3 X10(3) UL (ref 4–11)
WBC # BLD AUTO: 29.8 X10(3) UL (ref 4–11)
WBC # BLD AUTO: 30.2 X10(3) UL (ref 4–11)
WBC #/AREA URNS AUTO: >50 /HPF
WBC #/AREA URNS AUTO: >50 /HPF
WBC CLUMPS UR QL AUTO: PRESENT /HPF
YEAST UR QL: PRESENT /HPF
YEAST UR QL: PRESENT /HPF

## 2024-01-01 PROCEDURE — 71045 X-RAY EXAM CHEST 1 VIEW: CPT | Performed by: NURSE PRACTITIONER

## 2024-01-01 PROCEDURE — 80307 DRUG TEST PRSMV CHEM ANLYZR: CPT | Performed by: STUDENT IN AN ORGANIZED HEALTH CARE EDUCATION/TRAINING PROGRAM

## 2024-01-01 PROCEDURE — 99232 SBSQ HOSP IP/OBS MODERATE 35: CPT | Performed by: INTERNAL MEDICINE

## 2024-01-01 PROCEDURE — 30233N1 TRANSFUSION OF NONAUTOLOGOUS RED BLOOD CELLS INTO PERIPHERAL VEIN, PERCUTANEOUS APPROACH: ICD-10-PCS | Performed by: INTERNAL MEDICINE

## 2024-01-01 PROCEDURE — 70450 CT HEAD/BRAIN W/O DYE: CPT | Performed by: INTERNAL MEDICINE

## 2024-01-01 PROCEDURE — 99233 SBSQ HOSP IP/OBS HIGH 50: CPT | Performed by: EMERGENCY MEDICINE

## 2024-01-01 PROCEDURE — 5A1945Z RESPIRATORY VENTILATION, 24-96 CONSECUTIVE HOURS: ICD-10-PCS | Performed by: INTERNAL MEDICINE

## 2024-01-01 PROCEDURE — 04CH0ZZ EXTIRPATION OF MATTER FROM RIGHT EXTERNAL ILIAC ARTERY, OPEN APPROACH: ICD-10-PCS | Performed by: SURGERY

## 2024-01-01 PROCEDURE — 0JBL0ZZ EXCISION OF RIGHT UPPER LEG SUBCUTANEOUS TISSUE AND FASCIA, OPEN APPROACH: ICD-10-PCS | Performed by: SURGERY

## 2024-01-01 PROCEDURE — 04CK0ZZ EXTIRPATION OF MATTER FROM RIGHT FEMORAL ARTERY, OPEN APPROACH: ICD-10-PCS | Performed by: SURGERY

## 2024-01-01 PROCEDURE — 0HRHXK3 REPLACEMENT OF RIGHT UPPER LEG SKIN WITH NONAUTOLOGOUS TISSUE SUBSTITUTE, FULL THICKNESS, EXTERNAL APPROACH: ICD-10-PCS | Performed by: SURGERY

## 2024-01-01 PROCEDURE — 0JB80ZZ EXCISION OF ABDOMEN SUBCUTANEOUS TISSUE AND FASCIA, OPEN APPROACH: ICD-10-PCS | Performed by: SURGERY

## 2024-01-01 PROCEDURE — 0KXQ0ZZ TRANSFER RIGHT UPPER LEG MUSCLE, OPEN APPROACH: ICD-10-PCS | Performed by: SURGERY

## 2024-01-01 PROCEDURE — 04PY0JZ REMOVAL OF SYNTHETIC SUBSTITUTE FROM LOWER ARTERY, OPEN APPROACH: ICD-10-PCS | Performed by: SURGERY

## 2024-01-01 PROCEDURE — 04CY0ZZ EXTIRPATION OF MATTER FROM LOWER ARTERY, OPEN APPROACH: ICD-10-PCS | Performed by: SURGERY

## 2024-01-01 PROCEDURE — 0Y6C0Z3 DETACHMENT AT RIGHT UPPER LEG, LOW, OPEN APPROACH: ICD-10-PCS | Performed by: SURGERY

## 2024-01-01 PROCEDURE — 99233 SBSQ HOSP IP/OBS HIGH 50: CPT | Performed by: INTERNAL MEDICINE

## 2024-01-01 PROCEDURE — 047K0ZZ DILATION OF RIGHT FEMORAL ARTERY, OPEN APPROACH: ICD-10-PCS | Performed by: SURGERY

## 2024-01-01 PROCEDURE — 93971 EXTREMITY STUDY: CPT | Performed by: STUDENT IN AN ORGANIZED HEALTH CARE EDUCATION/TRAINING PROGRAM

## 2024-01-01 PROCEDURE — 0KQQ0ZZ REPAIR RIGHT UPPER LEG MUSCLE, OPEN APPROACH: ICD-10-PCS | Performed by: SURGERY

## 2024-01-01 PROCEDURE — 71045 X-RAY EXAM CHEST 1 VIEW: CPT | Performed by: INTERNAL MEDICINE

## 2024-01-01 PROCEDURE — 80361 OPIATES 1 OR MORE: CPT | Performed by: STUDENT IN AN ORGANIZED HEALTH CARE EDUCATION/TRAINING PROGRAM

## 2024-01-01 PROCEDURE — 02HV33Z INSERTION OF INFUSION DEVICE INTO SUPERIOR VENA CAVA, PERCUTANEOUS APPROACH: ICD-10-PCS | Performed by: INTERNAL MEDICINE

## 2024-01-01 PROCEDURE — 30233H1 TRANSFUSION OF NONAUTOLOGOUS WHOLE BLOOD INTO PERIPHERAL VEIN, PERCUTANEOUS APPROACH: ICD-10-PCS | Performed by: INTERNAL MEDICINE

## 2024-01-01 PROCEDURE — 99223 1ST HOSP IP/OBS HIGH 75: CPT | Performed by: INTERNAL MEDICINE

## 2024-01-01 PROCEDURE — 75635 CT ANGIO ABDOMINAL ARTERIES: CPT | Performed by: STUDENT IN AN ORGANIZED HEALTH CARE EDUCATION/TRAINING PROGRAM

## 2024-01-01 PROCEDURE — 99291 CRITICAL CARE FIRST HOUR: CPT | Performed by: HOSPITALIST

## 2024-01-01 PROCEDURE — 71045 X-RAY EXAM CHEST 1 VIEW: CPT | Performed by: EMERGENCY MEDICINE

## 2024-01-01 DEVICE — IMPLANTABLE DEVICE: Type: IMPLANTABLE DEVICE | Site: GROIN | Status: FUNCTIONAL

## 2024-01-01 DEVICE — XENOSURE BIOLOGIC PATCH, 2CM X 9CM, EIFU
Type: IMPLANTABLE DEVICE | Site: GROIN | Status: FUNCTIONAL
Brand: XENOSURE BIOLOGIC PATCH

## 2024-01-01 DEVICE — IMPLANTABLE DEVICE: Type: IMPLANTABLE DEVICE | Status: FUNCTIONAL

## 2024-01-01 RX ORDER — HYDROCODONE BITARTRATE AND ACETAMINOPHEN 10; 325 MG/1; MG/1
1 TABLET ORAL EVERY 4 HOURS PRN
Status: DISCONTINUED | OUTPATIENT
Start: 2024-01-01 | End: 2024-01-01

## 2024-01-01 RX ORDER — HYDROCODONE BITARTRATE AND ACETAMINOPHEN 10; 325 MG/1; MG/1
2 TABLET ORAL EVERY 4 HOURS PRN
Status: DISCONTINUED | OUTPATIENT
Start: 2024-01-01 | End: 2024-01-01

## 2024-01-01 RX ORDER — HYDROCODONE BITARTRATE AND ACETAMINOPHEN 5; 325 MG/1; MG/1
2 TABLET ORAL EVERY 12 HOURS
Status: DISCONTINUED | OUTPATIENT
Start: 2024-01-01 | End: 2024-01-01

## 2024-01-01 RX ORDER — MORPHINE SULFATE 2 MG/ML
1 INJECTION, SOLUTION INTRAMUSCULAR; INTRAVENOUS EVERY 2 HOUR PRN
Status: DISCONTINUED | OUTPATIENT
Start: 2024-01-01 | End: 2024-01-01

## 2024-01-01 RX ORDER — HEPARIN SODIUM 1000 [USP'U]/ML
INJECTION, SOLUTION INTRAVENOUS; SUBCUTANEOUS AS NEEDED
Status: DISCONTINUED | OUTPATIENT
Start: 2024-01-01 | End: 2024-01-01 | Stop reason: HOSPADM

## 2024-01-01 RX ORDER — HYDRALAZINE HYDROCHLORIDE 20 MG/ML
5 INJECTION INTRAMUSCULAR; INTRAVENOUS
Status: DISCONTINUED | OUTPATIENT
Start: 2024-01-01 | End: 2024-01-01 | Stop reason: HOSPADM

## 2024-01-01 RX ORDER — NICOTINE POLACRILEX 4 MG
30 LOZENGE BUCCAL
Status: DISCONTINUED | OUTPATIENT
Start: 2024-01-01 | End: 2024-01-01

## 2024-01-01 RX ORDER — SODIUM BICARBONATE 650 MG/1
325 TABLET ORAL AS NEEDED
Status: DISCONTINUED | OUTPATIENT
Start: 2024-01-01 | End: 2024-01-01

## 2024-01-01 RX ORDER — ALBUTEROL SULFATE 90 UG/1
2 AEROSOL, METERED RESPIRATORY (INHALATION) EVERY 6 HOURS PRN
Status: DISCONTINUED | OUTPATIENT
Start: 2024-01-01 | End: 2024-01-01

## 2024-01-01 RX ORDER — HYDROCODONE BITARTRATE AND ACETAMINOPHEN 5; 325 MG/1; MG/1
2 TABLET ORAL EVERY 4 HOURS PRN
Status: DISCONTINUED | OUTPATIENT
Start: 2024-01-01 | End: 2024-01-01

## 2024-01-01 RX ORDER — DEXTROSE AND SODIUM CHLORIDE 5; .45 G/100ML; G/100ML
INJECTION, SOLUTION INTRAVENOUS CONTINUOUS
Status: DISCONTINUED | OUTPATIENT
Start: 2024-01-01 | End: 2024-01-01

## 2024-01-01 RX ORDER — ALBUTEROL SULFATE 2.5 MG/3ML
2.5 SOLUTION RESPIRATORY (INHALATION) AS NEEDED
Status: DISCONTINUED | OUTPATIENT
Start: 2024-01-01 | End: 2024-01-01 | Stop reason: HOSPADM

## 2024-01-01 RX ORDER — ACETAMINOPHEN 160 MG/5ML
650 SOLUTION ORAL EVERY 4 HOURS PRN
Status: DISCONTINUED | OUTPATIENT
Start: 2024-01-01 | End: 2024-01-01

## 2024-01-01 RX ORDER — CALCITRIOL 0.25 UG/1
0.25 CAPSULE, LIQUID FILLED ORAL
Status: DISCONTINUED | OUTPATIENT
Start: 2024-01-01 | End: 2024-01-01

## 2024-01-01 RX ORDER — ROSUVASTATIN CALCIUM 20 MG/1
40 TABLET, COATED ORAL NIGHTLY
Status: DISCONTINUED | OUTPATIENT
Start: 2024-01-01 | End: 2024-01-01

## 2024-01-01 RX ORDER — CARVEDILOL 12.5 MG/1
12.5 TABLET ORAL 2 TIMES DAILY
Status: DISCONTINUED | OUTPATIENT
Start: 2024-01-01 | End: 2024-01-01

## 2024-01-01 RX ORDER — MORPHINE SULFATE 10 MG/ML
6 INJECTION, SOLUTION INTRAMUSCULAR; INTRAVENOUS EVERY 10 MIN PRN
Status: DISCONTINUED | OUTPATIENT
Start: 2024-01-01 | End: 2024-01-01 | Stop reason: HOSPADM

## 2024-01-01 RX ORDER — HYDROCODONE BITARTRATE AND ACETAMINOPHEN 5; 325 MG/1; MG/1
2 TABLET ORAL 3 TIMES DAILY
Status: DISCONTINUED | OUTPATIENT
Start: 2024-01-01 | End: 2024-01-01

## 2024-01-01 RX ORDER — HYDROMORPHONE HYDROCHLORIDE 1 MG/ML
0.4 INJECTION, SOLUTION INTRAMUSCULAR; INTRAVENOUS; SUBCUTANEOUS EVERY 5 MIN PRN
Status: DISCONTINUED | OUTPATIENT
Start: 2024-01-01 | End: 2024-01-01 | Stop reason: HOSPADM

## 2024-01-01 RX ORDER — INSULIN DEGLUDEC 100 U/ML
12 INJECTION, SOLUTION SUBCUTANEOUS NIGHTLY
Status: DISCONTINUED | OUTPATIENT
Start: 2024-01-01 | End: 2024-01-01

## 2024-01-01 RX ORDER — ONDANSETRON 2 MG/ML
4 INJECTION INTRAMUSCULAR; INTRAVENOUS EVERY 6 HOURS PRN
Status: DISCONTINUED | OUTPATIENT
Start: 2024-01-01 | End: 2024-01-01

## 2024-01-01 RX ORDER — ACETAMINOPHEN 325 MG/1
650 TABLET ORAL EVERY 4 HOURS PRN
Status: DISCONTINUED | OUTPATIENT
Start: 2024-01-01 | End: 2024-01-01

## 2024-01-01 RX ORDER — METOCLOPRAMIDE HYDROCHLORIDE 5 MG/ML
5 INJECTION INTRAMUSCULAR; INTRAVENOUS EVERY 8 HOURS PRN
Status: DISCONTINUED | OUTPATIENT
Start: 2024-01-01 | End: 2024-01-01

## 2024-01-01 RX ORDER — MORPHINE SULFATE 4 MG/ML
INJECTION, SOLUTION INTRAMUSCULAR; INTRAVENOUS
Status: COMPLETED
Start: 2024-01-01 | End: 2024-01-01

## 2024-01-01 RX ORDER — VANCOMYCIN HYDROCHLORIDE
15 ONCE
Status: COMPLETED | OUTPATIENT
Start: 2024-01-01 | End: 2024-01-01

## 2024-01-01 RX ORDER — NALOXONE HYDROCHLORIDE 0.4 MG/ML
0.4 INJECTION, SOLUTION INTRAMUSCULAR; INTRAVENOUS; SUBCUTANEOUS ONCE
Status: COMPLETED | OUTPATIENT
Start: 2024-01-01 | End: 2024-01-01

## 2024-01-01 RX ORDER — AMLODIPINE BESYLATE 10 MG/1
10 TABLET ORAL EVERY MORNING
Status: DISCONTINUED | OUTPATIENT
Start: 2024-01-01 | End: 2024-01-01

## 2024-01-01 RX ORDER — VANCOMYCIN HYDROCHLORIDE 125 MG/1
125 CAPSULE ORAL DAILY
Status: DISCONTINUED | OUTPATIENT
Start: 2024-01-01 | End: 2024-01-01

## 2024-01-01 RX ORDER — BISACODYL 10 MG
10 SUPPOSITORY, RECTAL RECTAL
Status: DISCONTINUED | OUTPATIENT
Start: 2024-01-01 | End: 2024-01-01

## 2024-01-01 RX ORDER — FUROSEMIDE 10 MG/ML
20 INJECTION INTRAMUSCULAR; INTRAVENOUS ONCE
Status: COMPLETED | OUTPATIENT
Start: 2024-01-01 | End: 2024-01-01

## 2024-01-01 RX ORDER — ENOXAPARIN SODIUM 100 MG/ML
40 INJECTION SUBCUTANEOUS DAILY
Status: DISCONTINUED | OUTPATIENT
Start: 2024-01-01 | End: 2024-01-01

## 2024-01-01 RX ORDER — MELATONIN
2000 DAILY
Status: DISCONTINUED | OUTPATIENT
Start: 2024-01-01 | End: 2024-01-01

## 2024-01-01 RX ORDER — HYDROMORPHONE HYDROCHLORIDE 1 MG/ML
0.2 INJECTION, SOLUTION INTRAMUSCULAR; INTRAVENOUS; SUBCUTANEOUS EVERY 5 MIN PRN
Status: DISCONTINUED | OUTPATIENT
Start: 2024-01-01 | End: 2024-01-01 | Stop reason: HOSPADM

## 2024-01-01 RX ORDER — MORPHINE SULFATE 2 MG/ML
1 INJECTION, SOLUTION INTRAMUSCULAR; INTRAVENOUS
Status: DISCONTINUED | OUTPATIENT
Start: 2024-01-01 | End: 2024-01-01

## 2024-01-01 RX ORDER — INSULIN DEGLUDEC 100 U/ML
10 INJECTION, SOLUTION SUBCUTANEOUS NIGHTLY
Status: DISCONTINUED | OUTPATIENT
Start: 2024-01-01 | End: 2024-01-01

## 2024-01-01 RX ORDER — MORPHINE SULFATE 2 MG/ML
2 INJECTION, SOLUTION INTRAMUSCULAR; INTRAVENOUS EVERY 2 HOUR PRN
Status: DISCONTINUED | OUTPATIENT
Start: 2024-01-01 | End: 2024-01-01

## 2024-01-01 RX ORDER — HYDROCODONE BITARTRATE AND ACETAMINOPHEN 5; 325 MG/1; MG/1
1 TABLET ORAL EVERY 12 HOURS
Status: DISCONTINUED | OUTPATIENT
Start: 2024-01-01 | End: 2024-01-01

## 2024-01-01 RX ORDER — SODIUM HYPOCHLORITE 1.25 MG/ML
SOLUTION TOPICAL AS NEEDED
Status: DISCONTINUED | OUTPATIENT
Start: 2024-01-01 | End: 2024-01-01

## 2024-01-01 RX ORDER — DEXTROSE MONOHYDRATE 25 G/50ML
50 INJECTION, SOLUTION INTRAVENOUS
Status: DISCONTINUED | OUTPATIENT
Start: 2024-01-01 | End: 2024-01-01

## 2024-01-01 RX ORDER — INSULIN DEGLUDEC 100 U/ML
30 INJECTION, SOLUTION SUBCUTANEOUS NIGHTLY
Status: DISCONTINUED | OUTPATIENT
Start: 2024-01-01 | End: 2024-01-01

## 2024-01-01 RX ORDER — NICOTINE POLACRILEX 4 MG
15 LOZENGE BUCCAL
Status: DISCONTINUED | OUTPATIENT
Start: 2024-01-01 | End: 2024-01-01

## 2024-01-01 RX ORDER — FAMOTIDINE 10 MG/ML
20 INJECTION, SOLUTION INTRAVENOUS DAILY
Status: DISCONTINUED | OUTPATIENT
Start: 2024-01-01 | End: 2024-01-01

## 2024-01-01 RX ORDER — CHLORHEXIDINE GLUCONATE ORAL RINSE 1.2 MG/ML
15 SOLUTION DENTAL
Status: DISCONTINUED | OUTPATIENT
Start: 2024-01-01 | End: 2024-01-01

## 2024-01-01 RX ORDER — MAGNESIUM SULFATE HEPTAHYDRATE 40 MG/ML
2 INJECTION, SOLUTION INTRAVENOUS ONCE
Status: COMPLETED | OUTPATIENT
Start: 2024-01-01 | End: 2024-01-01

## 2024-01-01 RX ORDER — IPRATROPIUM BROMIDE AND ALBUTEROL SULFATE 2.5; .5 MG/3ML; MG/3ML
3 SOLUTION RESPIRATORY (INHALATION) EVERY 6 HOURS PRN
Status: DISCONTINUED | OUTPATIENT
Start: 2024-01-01 | End: 2024-01-01

## 2024-01-01 RX ORDER — SENNOSIDES 8.6 MG
17.2 TABLET ORAL NIGHTLY PRN
Status: DISCONTINUED | OUTPATIENT
Start: 2024-01-01 | End: 2024-01-01

## 2024-01-01 RX ORDER — ENEMA 19; 7 G/133ML; G/133ML
1 ENEMA RECTAL ONCE AS NEEDED
Status: DISCONTINUED | OUTPATIENT
Start: 2024-01-01 | End: 2024-01-01

## 2024-01-01 RX ORDER — GABAPENTIN 100 MG/1
100 CAPSULE ORAL 3 TIMES DAILY
Status: DISCONTINUED | OUTPATIENT
Start: 2024-01-01 | End: 2024-01-01

## 2024-01-01 RX ORDER — POTASSIUM CHLORIDE 14.9 MG/ML
20 INJECTION INTRAVENOUS ONCE
Status: COMPLETED | OUTPATIENT
Start: 2024-01-01 | End: 2024-01-01

## 2024-01-01 RX ORDER — SODIUM CHLORIDE 9 MG/ML
INJECTION, SOLUTION INTRAVENOUS CONTINUOUS
Status: DISCONTINUED | OUTPATIENT
Start: 2024-01-01 | End: 2024-01-01

## 2024-01-01 RX ORDER — POLYETHYLENE GLYCOL 3350 17 G/17G
17 POWDER, FOR SOLUTION ORAL DAILY PRN
Status: DISCONTINUED | OUTPATIENT
Start: 2024-01-01 | End: 2024-01-01

## 2024-01-01 RX ORDER — ACETAMINOPHEN 650 MG/1
650 SUPPOSITORY RECTAL EVERY 4 HOURS PRN
Status: DISCONTINUED | OUTPATIENT
Start: 2024-01-01 | End: 2024-01-01

## 2024-01-01 RX ORDER — SODIUM CHLORIDE, SODIUM LACTATE, POTASSIUM CHLORIDE, CALCIUM CHLORIDE 600; 310; 30; 20 MG/100ML; MG/100ML; MG/100ML; MG/100ML
INJECTION, SOLUTION INTRAVENOUS CONTINUOUS
Status: DISCONTINUED | OUTPATIENT
Start: 2024-01-01 | End: 2024-01-01 | Stop reason: HOSPADM

## 2024-01-01 RX ORDER — HYDROMORPHONE HYDROCHLORIDE 1 MG/ML
0.5 INJECTION, SOLUTION INTRAMUSCULAR; INTRAVENOUS; SUBCUTANEOUS
Status: DISCONTINUED | OUTPATIENT
Start: 2024-01-01 | End: 2024-01-01

## 2024-01-01 RX ORDER — INSULIN DEGLUDEC 100 U/ML
20 INJECTION, SOLUTION SUBCUTANEOUS NIGHTLY
Status: DISCONTINUED | OUTPATIENT
Start: 2024-01-01 | End: 2024-01-01

## 2024-01-01 RX ORDER — HYDROCODONE BITARTRATE AND ACETAMINOPHEN 5; 325 MG/1; MG/1
1 TABLET ORAL EVERY 4 HOURS PRN
Status: DISCONTINUED | OUTPATIENT
Start: 2024-01-01 | End: 2024-01-01

## 2024-01-01 RX ORDER — GABAPENTIN 300 MG/1
300 CAPSULE ORAL 3 TIMES DAILY
Status: DISCONTINUED | OUTPATIENT
Start: 2024-01-01 | End: 2024-01-01

## 2024-01-01 RX ORDER — DEXTROSE MONOHYDRATE 50 MG/ML
INJECTION, SOLUTION INTRAVENOUS CONTINUOUS
Status: DISCONTINUED | OUTPATIENT
Start: 2024-01-01 | End: 2024-01-01

## 2024-01-01 RX ORDER — FUROSEMIDE 10 MG/ML
40 INJECTION INTRAMUSCULAR; INTRAVENOUS ONCE
Status: COMPLETED | OUTPATIENT
Start: 2024-01-01 | End: 2024-01-01

## 2024-01-01 RX ORDER — SODIUM CHLORIDE 9 MG/ML
INJECTION, SOLUTION INTRAVENOUS ONCE
Status: COMPLETED | OUTPATIENT
Start: 2024-01-01 | End: 2024-01-01

## 2024-01-01 RX ORDER — MAGNESIUM OXIDE 400 MG/1
400 TABLET ORAL ONCE
Status: COMPLETED | OUTPATIENT
Start: 2024-01-01 | End: 2024-01-01

## 2024-01-01 RX ORDER — SENNOSIDES 8.8 MG/5ML
10 LIQUID ORAL NIGHTLY PRN
Status: DISCONTINUED | OUTPATIENT
Start: 2024-01-01 | End: 2024-01-01

## 2024-01-01 RX ORDER — POTASSIUM CHLORIDE 1.5 G/1.58G
40 POWDER, FOR SOLUTION ORAL ONCE
Status: COMPLETED | OUTPATIENT
Start: 2024-01-01 | End: 2024-01-01

## 2024-01-01 RX ORDER — FLUCONAZOLE 200 MG/1
200 TABLET ORAL DAILY
Status: DISCONTINUED | OUTPATIENT
Start: 2024-01-01 | End: 2024-01-01

## 2024-01-01 RX ORDER — PANTOPRAZOLE SODIUM 40 MG/1
40 TABLET, DELAYED RELEASE ORAL
Status: DISCONTINUED | OUTPATIENT
Start: 2024-01-01 | End: 2024-01-01

## 2024-01-01 RX ORDER — INSULIN DEGLUDEC 100 U/ML
8 INJECTION, SOLUTION SUBCUTANEOUS DAILY
Status: DISCONTINUED | OUTPATIENT
Start: 2024-01-01 | End: 2024-01-01

## 2024-01-01 RX ORDER — INSULIN DEGLUDEC 100 U/ML
15 INJECTION, SOLUTION SUBCUTANEOUS NIGHTLY
Status: DISCONTINUED | OUTPATIENT
Start: 2024-01-01 | End: 2024-01-01

## 2024-01-01 RX ORDER — MORPHINE SULFATE 4 MG/ML
2 INJECTION, SOLUTION INTRAMUSCULAR; INTRAVENOUS EVERY 10 MIN PRN
Status: DISCONTINUED | OUTPATIENT
Start: 2024-01-01 | End: 2024-01-01 | Stop reason: HOSPADM

## 2024-01-01 RX ORDER — METOPROLOL TARTRATE 1 MG/ML
5 INJECTION, SOLUTION INTRAVENOUS EVERY 6 HOURS
Status: DISCONTINUED | OUTPATIENT
Start: 2024-01-01 | End: 2024-01-01

## 2024-01-01 RX ORDER — HALOPERIDOL 5 MG/ML
2 INJECTION INTRAMUSCULAR
Status: DISCONTINUED | OUTPATIENT
Start: 2024-01-01 | End: 2024-01-01

## 2024-01-01 RX ORDER — FLUTICASONE FUROATE AND VILANTEROL 200; 25 UG/1; UG/1
1 POWDER RESPIRATORY (INHALATION) DAILY
Status: DISCONTINUED | OUTPATIENT
Start: 2024-01-01 | End: 2024-01-01

## 2024-01-01 RX ORDER — HYDROMORPHONE HYDROCHLORIDE 1 MG/ML
0.6 INJECTION, SOLUTION INTRAMUSCULAR; INTRAVENOUS; SUBCUTANEOUS EVERY 5 MIN PRN
Status: DISCONTINUED | OUTPATIENT
Start: 2024-01-01 | End: 2024-01-01 | Stop reason: HOSPADM

## 2024-01-01 RX ORDER — CALCIUM GLUCONATE 10 MG/ML
1 INJECTION, SOLUTION INTRAVENOUS ONCE
Status: DISCONTINUED | OUTPATIENT
Start: 2024-01-01 | End: 2024-01-01

## 2024-01-01 RX ORDER — METOCLOPRAMIDE HYDROCHLORIDE 5 MG/ML
10 INJECTION INTRAMUSCULAR; INTRAVENOUS EVERY 8 HOURS PRN
Status: DISCONTINUED | OUTPATIENT
Start: 2024-01-01 | End: 2024-01-01 | Stop reason: HOSPADM

## 2024-01-01 RX ORDER — MORPHINE SULFATE 4 MG/ML
4 INJECTION, SOLUTION INTRAMUSCULAR; INTRAVENOUS EVERY 2 HOUR PRN
Status: DISCONTINUED | OUTPATIENT
Start: 2024-01-01 | End: 2024-01-01

## 2024-01-01 RX ORDER — MORPHINE SULFATE 4 MG/ML
4 INJECTION, SOLUTION INTRAMUSCULAR; INTRAVENOUS ONCE
Status: COMPLETED | OUTPATIENT
Start: 2024-01-01 | End: 2024-01-01

## 2024-01-01 RX ORDER — LABETALOL HYDROCHLORIDE 5 MG/ML
5 INJECTION, SOLUTION INTRAVENOUS EVERY 5 MIN PRN
Status: DISCONTINUED | OUTPATIENT
Start: 2024-01-01 | End: 2024-01-01 | Stop reason: HOSPADM

## 2024-01-01 RX ORDER — CARVEDILOL 25 MG/1
25 TABLET ORAL 2 TIMES DAILY
Status: DISCONTINUED | OUTPATIENT
Start: 2024-01-01 | End: 2024-01-01

## 2024-01-01 RX ORDER — INSULIN DEGLUDEC 100 U/ML
15 INJECTION, SOLUTION SUBCUTANEOUS ONCE
Status: COMPLETED | OUTPATIENT
Start: 2024-01-01 | End: 2024-01-01

## 2024-01-01 RX ORDER — MORPHINE SULFATE 2 MG/ML
2 INJECTION, SOLUTION INTRAMUSCULAR; INTRAVENOUS ONCE
Status: COMPLETED | OUTPATIENT
Start: 2024-01-01 | End: 2024-01-01

## 2024-01-01 RX ORDER — NALOXONE HYDROCHLORIDE 0.4 MG/ML
0.08 INJECTION, SOLUTION INTRAMUSCULAR; INTRAVENOUS; SUBCUTANEOUS AS NEEDED
Status: DISCONTINUED | OUTPATIENT
Start: 2024-01-01 | End: 2024-01-01 | Stop reason: HOSPADM

## 2024-01-01 RX ORDER — HYDROCODONE BITARTRATE AND ACETAMINOPHEN 5; 325 MG/1; MG/1
1 TABLET ORAL EVERY 6 HOURS PRN
Status: DISCONTINUED | OUTPATIENT
Start: 2024-01-01 | End: 2024-01-01

## 2024-01-01 RX ORDER — METOCLOPRAMIDE HYDROCHLORIDE 5 MG/ML
10 INJECTION INTRAMUSCULAR; INTRAVENOUS EVERY 8 HOURS PRN
Status: DISCONTINUED | OUTPATIENT
Start: 2024-01-01 | End: 2024-01-01

## 2024-01-01 RX ORDER — METOCLOPRAMIDE HYDROCHLORIDE 5 MG/ML
10 INJECTION INTRAMUSCULAR; INTRAVENOUS EVERY 8 HOURS PRN
Status: DISCONTINUED | OUTPATIENT
Start: 2024-01-01 | End: 2024-01-01 | Stop reason: DRUGHIGH

## 2024-01-01 RX ORDER — LIDOCAINE HYDROCHLORIDE 10 MG/ML
5 INJECTION, SOLUTION EPIDURAL; INFILTRATION; INTRACAUDAL; PERINEURAL
Status: COMPLETED | OUTPATIENT
Start: 2024-01-01 | End: 2024-01-01

## 2024-01-01 RX ORDER — POTASSIUM CHLORIDE 29.8 MG/ML
40 INJECTION INTRAVENOUS ONCE
Status: COMPLETED | OUTPATIENT
Start: 2024-01-01 | End: 2024-01-01

## 2024-01-01 RX ORDER — DEXMEDETOMIDINE HYDROCHLORIDE 4 UG/ML
INJECTION, SOLUTION INTRAVENOUS CONTINUOUS
Status: DISCONTINUED | OUTPATIENT
Start: 2024-01-01 | End: 2024-01-01

## 2024-01-01 RX ORDER — DEXTROSE AND SODIUM CHLORIDE 5; .9 G/100ML; G/100ML
INJECTION, SOLUTION INTRAVENOUS CONTINUOUS
Status: DISCONTINUED | OUTPATIENT
Start: 2024-01-01 | End: 2024-01-01

## 2024-01-01 RX ORDER — GLYCOPYRROLATE 0.2 MG/ML
0.2 INJECTION, SOLUTION INTRAMUSCULAR; INTRAVENOUS
Status: DISCONTINUED | OUTPATIENT
Start: 2024-01-01 | End: 2024-01-01

## 2024-01-01 RX ORDER — ONDANSETRON 2 MG/ML
4 INJECTION INTRAMUSCULAR; INTRAVENOUS EVERY 6 HOURS PRN
Status: DISCONTINUED | OUTPATIENT
Start: 2024-01-01 | End: 2024-01-01 | Stop reason: HOSPADM

## 2024-01-01 RX ORDER — VANCOMYCIN HYDROCHLORIDE 50 MG/ML
125 KIT ORAL DAILY
Status: DISCONTINUED | OUTPATIENT
Start: 2024-01-01 | End: 2024-01-01 | Stop reason: ALTCHOICE

## 2024-01-01 RX ORDER — SODIUM HYPOCHLORITE 1.25 MG/ML
SOLUTION TOPICAL AS NEEDED
Status: DISCONTINUED | OUTPATIENT
Start: 2024-01-01 | End: 2024-01-01 | Stop reason: HOSPADM

## 2024-01-01 RX ORDER — HEPARIN SODIUM 5000 [USP'U]/ML
INJECTION, SOLUTION INTRAVENOUS; SUBCUTANEOUS AS NEEDED
Status: DISCONTINUED | OUTPATIENT
Start: 2024-01-01 | End: 2024-01-01 | Stop reason: HOSPADM

## 2024-01-01 RX ORDER — MIDAZOLAM HYDROCHLORIDE 1 MG/ML
2 INJECTION INTRAMUSCULAR; INTRAVENOUS EVERY 5 MIN PRN
Status: DISCONTINUED | OUTPATIENT
Start: 2024-01-01 | End: 2024-01-01

## 2024-01-01 RX ORDER — FUROSEMIDE 10 MG/ML
20 INJECTION INTRAMUSCULAR; INTRAVENOUS ONCE
Status: DISCONTINUED | OUTPATIENT
Start: 2024-01-01 | End: 2024-01-01

## 2024-01-01 RX ORDER — MORPHINE SULFATE 4 MG/ML
4 INJECTION, SOLUTION INTRAMUSCULAR; INTRAVENOUS EVERY 10 MIN PRN
Status: DISCONTINUED | OUTPATIENT
Start: 2024-01-01 | End: 2024-01-01 | Stop reason: HOSPADM

## 2024-01-01 RX ORDER — MORPHINE SULFATE 2 MG/ML
0.5 INJECTION, SOLUTION INTRAMUSCULAR; INTRAVENOUS EVERY 2 HOUR PRN
Status: DISCONTINUED | OUTPATIENT
Start: 2024-01-01 | End: 2024-01-01

## 2024-01-01 RX ORDER — DEXTROSE, SODIUM CHLORIDE, SODIUM LACTATE, POTASSIUM CHLORIDE, AND CALCIUM CHLORIDE 5; .6; .31; .03; .02 G/100ML; G/100ML; G/100ML; G/100ML; G/100ML
INJECTION, SOLUTION INTRAVENOUS CONTINUOUS
Status: DISCONTINUED | OUTPATIENT
Start: 2024-01-01 | End: 2024-01-01

## 2024-01-01 RX ORDER — ACETAMINOPHEN 10 MG/ML
1000 INJECTION, SOLUTION INTRAVENOUS EVERY 6 HOURS PRN
Status: DISCONTINUED | OUTPATIENT
Start: 2024-01-01 | End: 2024-01-01

## 2024-01-01 RX ORDER — INSULIN DEGLUDEC 100 U/ML
5 INJECTION, SOLUTION SUBCUTANEOUS DAILY
Status: DISCONTINUED | OUTPATIENT
Start: 2024-01-01 | End: 2024-01-01

## 2024-01-01 RX ORDER — HYDROMORPHONE HYDROCHLORIDE 1 MG/ML
1 INJECTION, SOLUTION INTRAMUSCULAR; INTRAVENOUS; SUBCUTANEOUS
Status: DISCONTINUED | OUTPATIENT
Start: 2024-01-01 | End: 2024-01-01

## 2024-01-01 RX ORDER — FUROSEMIDE 10 MG/ML
20 INJECTION INTRAMUSCULAR; INTRAVENOUS
Status: COMPLETED | OUTPATIENT
Start: 2024-01-01 | End: 2024-01-01

## 2024-01-01 RX ORDER — HYDRALAZINE HYDROCHLORIDE 100 MG/1
100 TABLET, FILM COATED ORAL EVERY 8 HOURS
Status: DISCONTINUED | OUTPATIENT
Start: 2024-01-01 | End: 2024-01-01

## 2024-01-01 RX ORDER — HYDRALAZINE HYDROCHLORIDE 20 MG/ML
10 INJECTION INTRAMUSCULAR; INTRAVENOUS EVERY 4 HOURS PRN
Status: DISCONTINUED | OUTPATIENT
Start: 2024-01-01 | End: 2024-01-01

## 2024-01-01 RX ORDER — ATROPINE SULFATE 10 MG/ML
2 SOLUTION/ DROPS OPHTHALMIC EVERY 2 HOUR PRN
Status: DISCONTINUED | OUTPATIENT
Start: 2024-01-01 | End: 2024-01-01

## 2024-01-01 RX ORDER — MORPHINE SULFATE 20 MG/ML
5 SOLUTION ORAL EVERY 4 HOURS PRN
Status: DISCONTINUED | OUTPATIENT
Start: 2024-01-01 | End: 2024-01-01

## 2024-01-01 RX ORDER — HALOPERIDOL 5 MG/ML
1 INJECTION INTRAMUSCULAR
Status: DISCONTINUED | OUTPATIENT
Start: 2024-01-01 | End: 2024-01-01

## 2024-01-01 RX ORDER — SODIUM CHLORIDE 0.9 % (FLUSH) 0.9 %
10 SYRINGE (ML) INJECTION AS NEEDED
Status: DISCONTINUED | OUTPATIENT
Start: 2024-01-01 | End: 2024-01-01

## 2024-01-01 RX ORDER — CEFAZOLIN SODIUM/WATER 2 G/20 ML
2 SYRINGE (ML) INTRAVENOUS EVERY 8 HOURS
Status: COMPLETED | OUTPATIENT
Start: 2024-01-01 | End: 2024-01-01

## 2024-01-01 RX ORDER — VANCOMYCIN HYDROCHLORIDE
1250
Status: DISCONTINUED | OUTPATIENT
Start: 2024-01-01 | End: 2024-01-01

## 2024-01-01 RX ORDER — INSULIN DEGLUDEC 100 U/ML
17 INJECTION, SOLUTION SUBCUTANEOUS NIGHTLY
Status: DISCONTINUED | OUTPATIENT
Start: 2024-01-01 | End: 2024-01-01

## 2024-01-01 RX ORDER — SODIUM HYPOCHLORITE 1.25 MG/ML
SOLUTION TOPICAL EVERY 12 HOURS
Status: DISCONTINUED | OUTPATIENT
Start: 2024-01-01 | End: 2024-01-01

## 2024-01-01 RX ORDER — VANCOMYCIN HYDROCHLORIDE 50 MG/ML
125 KIT ORAL DAILY
Status: DISCONTINUED | OUTPATIENT
Start: 2024-01-01 | End: 2024-01-01

## 2024-01-01 RX ORDER — CALCIUM GLUCONATE 10 MG/ML
1 INJECTION, SOLUTION INTRAVENOUS ONCE
Status: COMPLETED | OUTPATIENT
Start: 2024-01-01 | End: 2024-01-01

## 2024-01-02 ENCOUNTER — LAB ENCOUNTER (OUTPATIENT)
Dept: LAB | Facility: HOSPITAL | Age: 69
End: 2024-01-02
Attending: SURGERY
Payer: MEDICARE

## 2024-01-02 DIAGNOSIS — Z01.818 PREOP TESTING: ICD-10-CM

## 2024-01-02 LAB
ANTIBODY SCREEN: NEGATIVE
MRSA DNA SPEC QL NAA+PROBE: NEGATIVE
RH BLOOD TYPE: POSITIVE
SARS-COV-2 RNA RESP QL NAA+PROBE: NOT DETECTED

## 2024-01-02 PROCEDURE — 87641 MR-STAPH DNA AMP PROBE: CPT

## 2024-01-02 PROCEDURE — 86901 BLOOD TYPING SEROLOGIC RH(D): CPT

## 2024-01-02 PROCEDURE — 86850 RBC ANTIBODY SCREEN: CPT

## 2024-01-02 PROCEDURE — 86900 BLOOD TYPING SEROLOGIC ABO: CPT

## 2024-01-02 PROCEDURE — 36415 COLL VENOUS BLD VENIPUNCTURE: CPT

## 2024-01-23 ENCOUNTER — ANESTHESIA EVENT (OUTPATIENT)
Dept: SURGERY | Facility: HOSPITAL | Age: 69
End: 2024-01-23
Payer: MEDICARE

## 2024-01-23 ENCOUNTER — LAB ENCOUNTER (OUTPATIENT)
Dept: LAB | Facility: HOSPITAL | Age: 69
End: 2024-01-23
Attending: SURGERY
Payer: MEDICARE

## 2024-01-23 DIAGNOSIS — Z01.818 PREOP TESTING: ICD-10-CM

## 2024-01-23 LAB — SARS-COV-2 RNA RESP QL NAA+PROBE: NOT DETECTED

## 2024-01-24 ENCOUNTER — ANESTHESIA (OUTPATIENT)
Dept: SURGERY | Facility: HOSPITAL | Age: 69
End: 2024-01-24
Payer: MEDICARE

## 2024-01-24 ENCOUNTER — HOSPITAL ENCOUNTER (INPATIENT)
Facility: HOSPITAL | Age: 69
LOS: 3 days | Discharge: HOME | End: 2024-01-27
Attending: SURGERY | Admitting: SURGERY
Payer: MEDICARE

## 2024-01-24 DIAGNOSIS — Z01.818 PREOP TESTING: Primary | ICD-10-CM

## 2024-01-24 LAB
DEPRECATED RDW RBC AUTO: 41.3 FL (ref 35.1–46.3)
ERYTHROCYTE [DISTWIDTH] IN BLOOD BY AUTOMATED COUNT: 13.4 % (ref 11–15)
GLUCOSE BLDC GLUCOMTR-MCNC: 208 MG/DL (ref 70–99)
GLUCOSE BLDC GLUCOMTR-MCNC: 212 MG/DL (ref 70–99)
GLUCOSE BLDC GLUCOMTR-MCNC: 258 MG/DL (ref 70–99)
GLUCOSE BLDC GLUCOMTR-MCNC: 272 MG/DL (ref 70–99)
GLUCOSE BLDC GLUCOMTR-MCNC: 291 MG/DL (ref 70–99)
HCT VFR BLD AUTO: 27.9 %
HGB BLD-MCNC: 9.5 G/DL
MCH RBC QN AUTO: 28.6 PG (ref 26–34)
MCHC RBC AUTO-ENTMCNC: 34.1 G/DL (ref 31–37)
MCV RBC AUTO: 84 FL
PLATELET # BLD AUTO: 243 10(3)UL (ref 150–450)
RBC # BLD AUTO: 3.32 X10(6)UL
WBC # BLD AUTO: 13.5 X10(3) UL (ref 4–11)

## 2024-01-24 PROCEDURE — 82962 GLUCOSE BLOOD TEST: CPT

## 2024-01-24 PROCEDURE — 94640 AIRWAY INHALATION TREATMENT: CPT

## 2024-01-24 PROCEDURE — 041K0JL BYPASS RIGHT FEMORAL ARTERY TO POPLITEAL ARTERY WITH SYNTHETIC SUBSTITUTE, OPEN APPROACH: ICD-10-PCS | Performed by: SURGERY

## 2024-01-24 PROCEDURE — 85027 COMPLETE CBC AUTOMATED: CPT | Performed by: SURGERY

## 2024-01-24 DEVICE — PROPATEN VASCULAR GRAFT TW RR 6MMX80CM 60CM RINGS HEPARIN
Type: IMPLANTABLE DEVICE | Site: GROIN | Status: FUNCTIONAL
Brand: GORE PROPATEN VASCULAR GRAFT

## 2024-01-24 RX ORDER — MORPHINE SULFATE 4 MG/ML
4 INJECTION, SOLUTION INTRAMUSCULAR; INTRAVENOUS EVERY 10 MIN PRN
Status: DISCONTINUED | OUTPATIENT
Start: 2024-01-24 | End: 2024-01-24 | Stop reason: HOSPADM

## 2024-01-24 RX ORDER — HEPARIN SODIUM 5000 [USP'U]/ML
5000 INJECTION, SOLUTION INTRAVENOUS; SUBCUTANEOUS EVERY 8 HOURS SCHEDULED
Status: DISCONTINUED | OUTPATIENT
Start: 2024-01-24 | End: 2024-01-27

## 2024-01-24 RX ORDER — HYDRALAZINE HYDROCHLORIDE 20 MG/ML
INJECTION INTRAMUSCULAR; INTRAVENOUS AS NEEDED
Status: DISCONTINUED | OUTPATIENT
Start: 2024-01-24 | End: 2024-01-24 | Stop reason: SURG

## 2024-01-24 RX ORDER — HEPARIN SODIUM 1000 [USP'U]/ML
INJECTION, SOLUTION INTRAVENOUS; SUBCUTANEOUS AS NEEDED
Status: DISCONTINUED | OUTPATIENT
Start: 2024-01-24 | End: 2024-01-24 | Stop reason: SURG

## 2024-01-24 RX ORDER — ETOMIDATE 2 MG/ML
INJECTION INTRAVENOUS AS NEEDED
Status: DISCONTINUED | OUTPATIENT
Start: 2024-01-24 | End: 2024-01-24 | Stop reason: SURG

## 2024-01-24 RX ORDER — CARVEDILOL 25 MG/1
25 TABLET ORAL 2 TIMES DAILY WITH MEALS
Status: DISCONTINUED | OUTPATIENT
Start: 2024-01-24 | End: 2024-01-27

## 2024-01-24 RX ORDER — HYDRALAZINE HYDROCHLORIDE 100 MG/1
100 TABLET, FILM COATED ORAL EVERY 8 HOURS
Status: DISCONTINUED | OUTPATIENT
Start: 2024-01-24 | End: 2024-01-27

## 2024-01-24 RX ORDER — HYDROCODONE BITARTRATE AND ACETAMINOPHEN 10; 325 MG/1; MG/1
1 TABLET ORAL EVERY 4 HOURS PRN
Status: DISCONTINUED | OUTPATIENT
Start: 2024-01-24 | End: 2024-01-25

## 2024-01-24 RX ORDER — SODIUM CHLORIDE, SODIUM LACTATE, POTASSIUM CHLORIDE, CALCIUM CHLORIDE 600; 310; 30; 20 MG/100ML; MG/100ML; MG/100ML; MG/100ML
INJECTION, SOLUTION INTRAVENOUS CONTINUOUS
Status: DISCONTINUED | OUTPATIENT
Start: 2024-01-24 | End: 2024-01-25

## 2024-01-24 RX ORDER — ACETAMINOPHEN 500 MG
1000 TABLET ORAL ONCE
Status: COMPLETED | OUTPATIENT
Start: 2024-01-24 | End: 2024-01-24

## 2024-01-24 RX ORDER — ONDANSETRON 2 MG/ML
INJECTION INTRAMUSCULAR; INTRAVENOUS AS NEEDED
Status: DISCONTINUED | OUTPATIENT
Start: 2024-01-24 | End: 2024-01-24 | Stop reason: SURG

## 2024-01-24 RX ORDER — FLUTICASONE FUROATE AND VILANTEROL 200; 25 UG/1; UG/1
1 POWDER RESPIRATORY (INHALATION) DAILY
Status: DISCONTINUED | OUTPATIENT
Start: 2024-01-24 | End: 2024-01-27

## 2024-01-24 RX ORDER — NALOXONE HYDROCHLORIDE 0.4 MG/ML
0.08 INJECTION, SOLUTION INTRAMUSCULAR; INTRAVENOUS; SUBCUTANEOUS AS NEEDED
Status: DISCONTINUED | OUTPATIENT
Start: 2024-01-24 | End: 2024-01-24 | Stop reason: HOSPADM

## 2024-01-24 RX ORDER — SODIUM CHLORIDE, SODIUM LACTATE, POTASSIUM CHLORIDE, CALCIUM CHLORIDE 600; 310; 30; 20 MG/100ML; MG/100ML; MG/100ML; MG/100ML
INJECTION, SOLUTION INTRAVENOUS CONTINUOUS
Status: DISCONTINUED | OUTPATIENT
Start: 2024-01-24 | End: 2024-01-24 | Stop reason: HOSPADM

## 2024-01-24 RX ORDER — LISINOPRIL 40 MG/1
40 TABLET ORAL EVERY MORNING
Status: DISCONTINUED | OUTPATIENT
Start: 2024-01-24 | End: 2024-01-27

## 2024-01-24 RX ORDER — ONDANSETRON 2 MG/ML
4 INJECTION INTRAMUSCULAR; INTRAVENOUS ONCE AS NEEDED
Status: DISCONTINUED | OUTPATIENT
Start: 2024-01-24 | End: 2024-01-24 | Stop reason: HOSPADM

## 2024-01-24 RX ORDER — HYDROMORPHONE HYDROCHLORIDE 1 MG/ML
0.4 INJECTION, SOLUTION INTRAMUSCULAR; INTRAVENOUS; SUBCUTANEOUS EVERY 5 MIN PRN
Status: DISCONTINUED | OUTPATIENT
Start: 2024-01-24 | End: 2024-01-24 | Stop reason: HOSPADM

## 2024-01-24 RX ORDER — PROTAMINE SULFATE 10 MG/ML
INJECTION, SOLUTION INTRAVENOUS AS NEEDED
Status: DISCONTINUED | OUTPATIENT
Start: 2024-01-24 | End: 2024-01-24 | Stop reason: SURG

## 2024-01-24 RX ORDER — NALOXONE HYDROCHLORIDE 0.4 MG/ML
0.08 INJECTION, SOLUTION INTRAMUSCULAR; INTRAVENOUS; SUBCUTANEOUS ONCE AS NEEDED
Status: DISCONTINUED | OUTPATIENT
Start: 2024-01-24 | End: 2024-01-24 | Stop reason: HOSPADM

## 2024-01-24 RX ORDER — IPRATROPIUM BROMIDE AND ALBUTEROL SULFATE 2.5; .5 MG/3ML; MG/3ML
3 SOLUTION RESPIRATORY (INHALATION) ONCE
Status: COMPLETED | OUTPATIENT
Start: 2024-01-24 | End: 2024-01-24

## 2024-01-24 RX ORDER — MORPHINE SULFATE 4 MG/ML
2 INJECTION, SOLUTION INTRAMUSCULAR; INTRAVENOUS EVERY 10 MIN PRN
Status: DISCONTINUED | OUTPATIENT
Start: 2024-01-24 | End: 2024-01-24 | Stop reason: HOSPADM

## 2024-01-24 RX ORDER — HYDRALAZINE HYDROCHLORIDE 20 MG/ML
10 INJECTION INTRAMUSCULAR; INTRAVENOUS ONCE
Status: COMPLETED | OUTPATIENT
Start: 2024-01-24 | End: 2024-01-24

## 2024-01-24 RX ORDER — DEXTROSE MONOHYDRATE 25 G/50ML
50 INJECTION, SOLUTION INTRAVENOUS
Status: DISCONTINUED | OUTPATIENT
Start: 2024-01-24 | End: 2024-01-24 | Stop reason: HOSPADM

## 2024-01-24 RX ORDER — INSULIN ASPART 100 [IU]/ML
INJECTION, SOLUTION INTRAVENOUS; SUBCUTANEOUS ONCE
Status: COMPLETED | OUTPATIENT
Start: 2024-01-24 | End: 2024-01-24

## 2024-01-24 RX ORDER — ACETAMINOPHEN 325 MG/1
650 TABLET ORAL EVERY 4 HOURS PRN
Status: DISCONTINUED | OUTPATIENT
Start: 2024-01-24 | End: 2024-01-27

## 2024-01-24 RX ORDER — INSULIN GLARGINE 100 [IU]/ML
6 INJECTION, SOLUTION SUBCUTANEOUS SEE ADMIN INSTRUCTIONS
COMMUNITY
Start: 2023-12-30

## 2024-01-24 RX ORDER — ONDANSETRON 2 MG/ML
4 INJECTION INTRAMUSCULAR; INTRAVENOUS EVERY 6 HOURS PRN
Status: DISCONTINUED | OUTPATIENT
Start: 2024-01-24 | End: 2024-01-27

## 2024-01-24 RX ORDER — NICOTINE POLACRILEX 4 MG
30 LOZENGE BUCCAL
Status: DISCONTINUED | OUTPATIENT
Start: 2024-01-24 | End: 2024-01-27

## 2024-01-24 RX ORDER — CEFAZOLIN SODIUM/WATER 2 G/20 ML
2 SYRINGE (ML) INTRAVENOUS ONCE
Status: COMPLETED | OUTPATIENT
Start: 2024-01-24 | End: 2024-01-24

## 2024-01-24 RX ORDER — HYDROMORPHONE HYDROCHLORIDE 1 MG/ML
0.6 INJECTION, SOLUTION INTRAMUSCULAR; INTRAVENOUS; SUBCUTANEOUS EVERY 5 MIN PRN
Status: DISCONTINUED | OUTPATIENT
Start: 2024-01-24 | End: 2024-01-24 | Stop reason: HOSPADM

## 2024-01-24 RX ORDER — DEXTROSE MONOHYDRATE 50 MG/ML
INJECTION, SOLUTION INTRAVENOUS CONTINUOUS
Status: DISCONTINUED | OUTPATIENT
Start: 2024-01-24 | End: 2024-01-24

## 2024-01-24 RX ORDER — HYDROMORPHONE HYDROCHLORIDE 1 MG/ML
INJECTION, SOLUTION INTRAMUSCULAR; INTRAVENOUS; SUBCUTANEOUS AS NEEDED
Status: DISCONTINUED | OUTPATIENT
Start: 2024-01-24 | End: 2024-01-24 | Stop reason: SURG

## 2024-01-24 RX ORDER — NICOTINE POLACRILEX 4 MG
15 LOZENGE BUCCAL
Status: DISCONTINUED | OUTPATIENT
Start: 2024-01-24 | End: 2024-01-27

## 2024-01-24 RX ORDER — ASPIRIN 81 MG/1
81 TABLET ORAL EVERY OTHER DAY
Status: DISCONTINUED | OUTPATIENT
Start: 2024-01-25 | End: 2024-01-27

## 2024-01-24 RX ORDER — HYDROCODONE BITARTRATE AND ACETAMINOPHEN 10; 325 MG/1; MG/1
2 TABLET ORAL EVERY 4 HOURS PRN
Status: DISCONTINUED | OUTPATIENT
Start: 2024-01-24 | End: 2024-01-27

## 2024-01-24 RX ORDER — SODIUM CHLORIDE, SODIUM LACTATE, POTASSIUM CHLORIDE, CALCIUM CHLORIDE 600; 310; 30; 20 MG/100ML; MG/100ML; MG/100ML; MG/100ML
INJECTION, SOLUTION INTRAVENOUS CONTINUOUS
Status: DISCONTINUED | OUTPATIENT
Start: 2024-01-24 | End: 2024-01-24

## 2024-01-24 RX ORDER — MORPHINE SULFATE 2 MG/ML
1 INJECTION, SOLUTION INTRAMUSCULAR; INTRAVENOUS EVERY 2 HOUR PRN
Status: DISCONTINUED | OUTPATIENT
Start: 2024-01-24 | End: 2024-01-25

## 2024-01-24 RX ORDER — CEFAZOLIN SODIUM/WATER 2 G/20 ML
2 SYRINGE (ML) INTRAVENOUS EVERY 8 HOURS
Status: COMPLETED | OUTPATIENT
Start: 2024-01-24 | End: 2024-01-25

## 2024-01-24 RX ORDER — DEXTROSE MONOHYDRATE 25 G/50ML
50 INJECTION, SOLUTION INTRAVENOUS
Status: DISCONTINUED | OUTPATIENT
Start: 2024-01-24 | End: 2024-01-27

## 2024-01-24 RX ORDER — LIDOCAINE HYDROCHLORIDE 10 MG/ML
INJECTION, SOLUTION EPIDURAL; INFILTRATION; INTRACAUDAL; PERINEURAL AS NEEDED
Status: DISCONTINUED | OUTPATIENT
Start: 2024-01-24 | End: 2024-01-24 | Stop reason: SURG

## 2024-01-24 RX ORDER — ROCURONIUM BROMIDE 10 MG/ML
INJECTION, SOLUTION INTRAVENOUS AS NEEDED
Status: DISCONTINUED | OUTPATIENT
Start: 2024-01-24 | End: 2024-01-24 | Stop reason: SURG

## 2024-01-24 RX ORDER — NICOTINE POLACRILEX 4 MG
15 LOZENGE BUCCAL
Status: DISCONTINUED | OUTPATIENT
Start: 2024-01-24 | End: 2024-01-24 | Stop reason: HOSPADM

## 2024-01-24 RX ORDER — ROSUVASTATIN CALCIUM 20 MG/1
40 TABLET, COATED ORAL NIGHTLY
Status: DISCONTINUED | OUTPATIENT
Start: 2024-01-24 | End: 2024-01-27

## 2024-01-24 RX ORDER — MORPHINE SULFATE 2 MG/ML
2 INJECTION, SOLUTION INTRAMUSCULAR; INTRAVENOUS EVERY 2 HOUR PRN
Status: DISCONTINUED | OUTPATIENT
Start: 2024-01-24 | End: 2024-01-25

## 2024-01-24 RX ORDER — NICOTINE POLACRILEX 4 MG
30 LOZENGE BUCCAL
Status: DISCONTINUED | OUTPATIENT
Start: 2024-01-24 | End: 2024-01-24 | Stop reason: HOSPADM

## 2024-01-24 RX ORDER — MORPHINE SULFATE 10 MG/ML
6 INJECTION, SOLUTION INTRAMUSCULAR; INTRAVENOUS EVERY 10 MIN PRN
Status: DISCONTINUED | OUTPATIENT
Start: 2024-01-24 | End: 2024-01-24 | Stop reason: HOSPADM

## 2024-01-24 RX ORDER — CLOPIDOGREL BISULFATE 75 MG/1
75 TABLET ORAL EVERY MORNING
Status: DISCONTINUED | OUTPATIENT
Start: 2024-01-25 | End: 2024-01-27

## 2024-01-24 RX ORDER — HYDROMORPHONE HYDROCHLORIDE 1 MG/ML
0.2 INJECTION, SOLUTION INTRAMUSCULAR; INTRAVENOUS; SUBCUTANEOUS EVERY 5 MIN PRN
Status: DISCONTINUED | OUTPATIENT
Start: 2024-01-24 | End: 2024-01-24 | Stop reason: HOSPADM

## 2024-01-24 RX ORDER — LABETALOL HYDROCHLORIDE 5 MG/ML
INJECTION, SOLUTION INTRAVENOUS AS NEEDED
Status: DISCONTINUED | OUTPATIENT
Start: 2024-01-24 | End: 2024-01-24 | Stop reason: SURG

## 2024-01-24 RX ORDER — AMLODIPINE BESYLATE 10 MG/1
10 TABLET ORAL EVERY MORNING
Status: DISCONTINUED | OUTPATIENT
Start: 2024-01-24 | End: 2024-01-27

## 2024-01-24 RX ORDER — HYDRALAZINE HYDROCHLORIDE 20 MG/ML
10 INJECTION INTRAMUSCULAR; INTRAVENOUS EVERY 4 HOURS PRN
Status: DISCONTINUED | OUTPATIENT
Start: 2024-01-24 | End: 2024-01-27

## 2024-01-24 RX ORDER — SODIUM CHLORIDE 9 MG/ML
INJECTION, SOLUTION INTRAVENOUS CONTINUOUS PRN
Status: DISCONTINUED | OUTPATIENT
Start: 2024-01-24 | End: 2024-01-24 | Stop reason: SURG

## 2024-01-24 RX ADMIN — LABETALOL HYDROCHLORIDE 10 MG: 5 INJECTION, SOLUTION INTRAVENOUS at 11:09:00

## 2024-01-24 RX ADMIN — HEPARIN SODIUM 1000 UNITS: 1000 INJECTION, SOLUTION INTRAVENOUS; SUBCUTANEOUS at 10:00:00

## 2024-01-24 RX ADMIN — SODIUM CHLORIDE, SODIUM LACTATE, POTASSIUM CHLORIDE, CALCIUM CHLORIDE: 600; 310; 30; 20 INJECTION, SOLUTION INTRAVENOUS at 07:40:00

## 2024-01-24 RX ADMIN — ETOMIDATE 8 MG: 2 INJECTION INTRAVENOUS at 07:47:00

## 2024-01-24 RX ADMIN — ONDANSETRON 4 MG: 2 INJECTION INTRAMUSCULAR; INTRAVENOUS at 10:54:00

## 2024-01-24 RX ADMIN — HEPARIN SODIUM 5000 UNITS: 1000 INJECTION, SOLUTION INTRAVENOUS; SUBCUTANEOUS at 09:26:00

## 2024-01-24 RX ADMIN — SODIUM CHLORIDE, SODIUM LACTATE, POTASSIUM CHLORIDE, CALCIUM CHLORIDE: 600; 310; 30; 20 INJECTION, SOLUTION INTRAVENOUS at 10:52:00

## 2024-01-24 RX ADMIN — LIDOCAINE HYDROCHLORIDE 25 MG: 10 INJECTION, SOLUTION EPIDURAL; INFILTRATION; INTRACAUDAL; PERINEURAL at 07:47:00

## 2024-01-24 RX ADMIN — HYDROMORPHONE HYDROCHLORIDE 0.5 MG: 1 INJECTION, SOLUTION INTRAMUSCULAR; INTRAVENOUS; SUBCUTANEOUS at 09:21:00

## 2024-01-24 RX ADMIN — HYDRALAZINE HYDROCHLORIDE 5 MG: 20 INJECTION INTRAMUSCULAR; INTRAVENOUS at 11:11:00

## 2024-01-24 RX ADMIN — CEFAZOLIN SODIUM/WATER 2 G: 2 G/20 ML SYRINGE (ML) INTRAVENOUS at 08:17:00

## 2024-01-24 RX ADMIN — HYDRALAZINE HYDROCHLORIDE 5 MG: 20 INJECTION INTRAMUSCULAR; INTRAVENOUS at 10:51:00

## 2024-01-24 RX ADMIN — SODIUM CHLORIDE: 9 INJECTION, SOLUTION INTRAVENOUS at 08:05:00

## 2024-01-24 RX ADMIN — HYDROMORPHONE HYDROCHLORIDE 0.5 MG: 1 INJECTION, SOLUTION INTRAMUSCULAR; INTRAVENOUS; SUBCUTANEOUS at 08:23:00

## 2024-01-24 RX ADMIN — ROCURONIUM BROMIDE 20 MG: 10 INJECTION, SOLUTION INTRAVENOUS at 08:31:00

## 2024-01-24 RX ADMIN — PROTAMINE SULFATE 50 MG: 10 INJECTION, SOLUTION INTRAVENOUS at 10:46:00

## 2024-01-24 RX ADMIN — SODIUM CHLORIDE: 9 INJECTION, SOLUTION INTRAVENOUS at 10:45:00

## 2024-01-24 RX ADMIN — ROCURONIUM BROMIDE 30 MG: 10 INJECTION, SOLUTION INTRAVENOUS at 07:49:00

## 2024-01-24 NOTE — ANESTHESIA POSTPROCEDURE EVALUATION
Patient: Ava Bowen    Procedure Summary       Date: 01/24/24 Room / Location: [facility] MAIN OR  / [facility] MAIN OR    Anesthesia Start: 0740 Anesthesia Stop: 1134    Procedure: Right lower extremity femoral to popliteal bypass (Right) Diagnosis: (Peripheral artery disease)    Surgeons: Madiha Murrieta MD Anesthesiologist: Vinh Middleton MD    Anesthesia Type: general ASA Status: 3            Anesthesia Type: general    Vitals Value Taken Time   /81 01/24/24 1133   Temp 97.5 01/24/24 1134   Pulse 68 01/24/24 1134   Resp 15 01/24/24 1134   SpO2 97 % 01/24/24 1134   Vitals shown include unfiled device data.    [facility] AN Post Evaluation:   Patient Evaluated in PACU  Patient Participation: complete - patient participated  Level of Consciousness: awake and alert and awake  Pain Score: 5  Pain Management: adequate  Airway Patency:patent  Dental exam unchanged from preop  Yes    Cardiovascular Status: acceptable, blood pressure returned to baseline and hemodynamically stable  Respiratory Status: acceptable  Postoperative Hydration acceptable      Vinh Middleton MD  1/24/2024 11:34 AM

## 2024-01-24 NOTE — OPERATIVE REPORT
[facility]     PATIENTS NAME: Ava Bowen  ATTENDING PHYSICIAN: Madiha Murrieta MD  OPERATING PHYSICIAN: Madiha Murrieta MD  CSN: [id number]     LOCATION:  OR  MRN: [id number]    YOB: 1955  ADMISSION DATE: 1/24/2024  OPERATION DATE: 1/24/2024                OPERATIVE REPORT     PRE-OPERATIVE DIAGNOSIS:  Arterial occlusive disease with right lower extremity disabling claudication.     POST-OPERATIVE DIAGNOSIS: Same    PROCEDURE PERFORMED: right femoral-above knee popliteal artery bypass using  6mm Goretex Propaten PTFE graft.     ANESTHESIA: General    SURGEON: Madiha Murrieta MD    ASSISTANT: Marko Hand SA    EBL: 300 ml    SPECIMENS: * No specimens in log *    COMPLICATIONS: None    SUMMARY OF CASE: Strong doppler PT signal at conclusion     INDICATIONS: The patient is a 68 year old female with right lower extremity disabling claudication. Preoperative evaluation revealed infrainguinal occlusive disease with reconstitution of the infrageniculate popliteal artery with good tibial runoff. Endovascular revascularization was not deemed appropriate. The risks and benefits of revascularization were explained to the patient and those included the risks of MI, need for transfusion, bleeding, wound complications, nerve injury, thrombosis, restenosis, infection, and need for more open surgery among other complications including an amputation. Patient understood and all questions were answered. The patient has expressed a wish to proceed.    DESCRIPTION OF PROCEDURE:  The patient was placed supine on the operating table. The arms were placed at 80°. Normal bony prominences were padded. The anesthesia team placed appropriate lines and general anesthesia was induced. A Pascual catheter was placed under sterile technique. The patient's lower abdomen and both lower extremities were circumferentially prepped and draped in the usual sterile fashion. Preoperative antibiotics were administered prior to skin  Rounding on pt complete, no needs at this time. Call light within reach. Awaiting US results, pt agreeable.   incision.    A 10- to 12-cm longitudinal skin incision was performed 1-2 cm posteromedial and parallel to the tibia. The incision was deepened through the subcutaneous tissue, exposing the fascia. Attention was made to avoid injuring the great saphenous vein. The fascia was incised and the popliteal space was entered. A self-retaining retractor was applied, retracting the gastrocnemius muscle posteriorly and laterally. The tendons of the semimembranosus and semitendinosus muscles were divided to further facilitate the exposure. The popliteal vessels were identified. A 2-cm segment of the popliteal artery was sharply dissected. Attention was then directed to the groin.    A vertical skin incision overlying the right common femoral artery pulse was made. The incision was deepened through the subcutaneous tissues with electrocautery. The encountered lymphatics were ligated and divided. The common femoral artery was then exposed and sharply dissected circumferentially. The dissection was extended proximally to the inguinal ligament and distally to include the superficial femoral and profunda femoris arteries. The common femoral, superficial femoral, and profunda femoris arteries were encircled with silastic vessel loops. Minor branches of the common femoral artery were identified and spared.    A subsartorial tunnel was then created between the popliteal space and the inguinal incision using a Gortex tunneler. The patient was given 5,000 U of heparin intravenously and after 5 min elapsed, the common femoral artery, profunda, and superficial femoral artery were clamped. A longitudinal arteriotomy in the common femoral artery was performed and extended with Pott's scissors for 1 cm. A PTFE Propaten GORTEX graft 6 mm with rings was used and its end was fashioned to match the femoral arteriotomy. The proximal anastomosis was constructed between the PTFE graft and the femoral arteriotomy with a 5-0 running prolene suture.  Prior to completing the suture line, back bleeding of the femoral artery, forward flushing of the graft, and irrigation of the anastomosis with heparinized solution were performed. The anastomosis was then completed and checked for hemostasis, which was revealed needle hole bleeding. This was controlled with the application of Gelfoam soaked with thrombin. The PTFE graft was then passed distended through the tunnel, avoiding any twists. Atraumatic vascular clamps were placed proximally and distally on the dissected below-knee popliteal artery. A 1-cm arteriotomy was then created in the anterior wall of the popliteal artery. The PTFE graft was transected at the appropriate length. The transected end was incised along its posterior aspect, spatulating the graft. The distal anastomosis to the popliteal artery was constructed with a running 5-0-prolene suture. Prior to completing the suture line, back bleeding, forward-flushing, and irrigation of the anastomosis with heparinized solution was performed. The anastomosis was then completed and checked for hemostasis, which was adequate.    The suture lines and the wounds were then rechecked for hemostasis. There were good Doppler signals in the foot and a good augmentation of the signal with compressing and releasing the graft. The wounds were all irrigated with antibiotic solution. The subcutaneous tissue in the groin wound was closed in two layers of 2-0 and 3-0 Vicryl sutures. The fascia overlying the sartorius muscle was approximated with 3-0 Vicryl sutures. The skin was closed with staples followed by dry dressings and Tegaderm.     The patient tolerated the procedure well and was transferred to the postanesthesia care unit in stable condition.     Madiha Murrieta MD  01/24/24  11:04 AM

## 2024-01-24 NOTE — ANESTHESIA PROCEDURE NOTES
Arterial Line    Date/Time: 1/24/2024 7:53 AM    Performed by: Vinh Middleton MD  Authorized by: Vinh Middleton MD    General Information and Staff    Procedure Start:  1/24/2024 7:53 AM  Procedure End:  1/24/2024 7:58 AM  Anesthesiologist:  Vinh Middleton MD  Performed By:  Anesthesiologist  Patient Location:  OR  Indication: continuous blood pressure monitoring and blood sampling needed    Site Identification: real time ultrasound guided and surface landmarks    Preanesthetic Checklist: 2 patient identifiers, IV checked, risks and benefits discussed, monitors and equipment checked, pre-op evaluation, timeout performed, anesthesia consent and sterile technique used    Procedure Details    Catheter Size:  20 G  Catheter Length:  1 and 3/4 inch  Catheter Type:  Arrow  Seldinger Technique?: Yes    Laterality:  Left  Site:  Radial artery  Site Prep: alcohol swabs    Line Secured:  Wrist Brace, tape and Tegaderm    Assessment    Events: patient tolerated procedure well with no complications      Medications  1/24/2024 7:53 AM      Additional Comments    Aseptic technique, pt tolerated procedure well

## 2024-01-24 NOTE — ADDENDUM NOTE
Addendum  created 01/24/24 1342 by Vinh Middleton MD    Intraprocedure Meds edited, Review and Sign - Ready for Procedure

## 2024-01-24 NOTE — H&P
Madiha Murrieta MD.  [facility]   Vascular and Endovascular Surgery     VASCULAR SURGERY   CONSULT NOTE      Name: Ava Bowen   :   10/6/1955  [id number]     2024       REFERRING PHYSICIAN: Madiha Murrieta MD  PRIMARY CARE PHYSICIAN:  Ernesto De Dios    HISTORY OF PRESENT ILLNESS: Ava is a 68 year old female whom I have been asked to see regarding right lower extremity severe claudication. She has a history of stent placement in both lower extremities which are occluded on the right. She presents today for a right lower extremity bypass. She denies any changes in her health since she last saw me in the office.     PAST MEDICAL HISTORY:    Past Medical History:   Diagnosis Date    Anemia     Back pain     Back problem     Cataract     Chronic kidney disease (CKD)     COPD (chronic obstructive pulmonary disease) (HCC)     FEV 1 1.25 50%     Diabetes (HCC)     DM neuropathy    Essential hypertension     H/O angioplasty     2020    High blood pressure     High cholesterol     Neuropathy     Peripheral vascular disease (HCC)     PNA (pneumonia) 2018    Pulmonary emphysema (HCC)     Pulmonary nodule     4 mm RUL    Renal disorder     monitoring kidney labs    Sickle cell trait (HCC)     Sickle-cell anemia (HCC)     Tobacco abuse     Visual impairment      PAST SURGICAL HISTORY:   Past Surgical History:   Procedure Laterality Date    APPENDECTOMY      BACK SURGERY Right 2021    Right L3-4 extreme lateral interbody fusion, posterior decompression; LUMBAR LAMINECTOMY 1 LEVEL          x3    CHOLECYSTECTOMY      EXCIS LUMBAR DISK,ONE LEVEL      2015    OTHER      bilateral leg stents  and      MEDICATIONS:     Current Facility-Administered Medications:     ceFAZolin (Ancef) 2 g in 20mL IV syringe premix, 2 g, Intravenous, Once    lactated ringers infusion, , Intravenous, Continuous    metoprolol tartrate (Lopressor) tab 25 mg, 25 mg, Oral, Once  PRN    ALLERGIES:    She is allergic to penicillins.    SOCIAL HISTORY:    Patient  reports that she quit smoking about 4 years ago. Her smoking use included cigarettes. She has a 30 pack-year smoking history. She has never used smokeless tobacco. She reports that she does not currently use alcohol. She reports that she does not use drugs.    FAMILY HISTORY:    Patient's family history includes Diabetes in her brother, mother, and sister; Glaucoma in her sister; Kidney Disease in her sister; Sickle Cell in her son.    ROS:    Comprehensive ROS reviewed and negative except for what's stated above.  Including negative for chest pain, shortness of breath, syncope.     EXAM:  BP (!) 168/58 (BP Location: Right arm)   Pulse 67   Temp 98.7 °F (37.1 °C) (Oral)   Resp 20   Ht 5' 4\" (1.626 m)   Wt 158 lb (71.7 kg)   LMP  (LMP Unknown)   SpO2 99%   BMI 27.12 kg/m²   GENERAL: alert and oriented x3, in no apparent distress  PSYCH: Normal mood and affect  NEURO: Cranial nerves grossly intact. No sensory or motor deficits  HEENT: Ears and throat are clear  NECK: Supple  RESPIRATORY: Normal work of breathing  CARDIO: RRR   ABDOMEN: Soft, non-tender, non-distended  BACK: Normal, no tenderness  SKIN: No rashes, warm and dry  MUSCULOSKELETAL: Strength 5/5 throughout, sensation intact  EXTREMITIES: No edema         Diagnostic Data:      LABS:  No results for input(s): \"WBC\", \"HGB\", \"MCV\", \"PLT\", \"BAND\", \"INR\" in the last 168 hours.    Invalid input(s): \"LYM#\", \"MONO#\", \"BASOS#\", \"EOSIN#\"    No results for input(s): \"NA\", \"K\", \"CL\", \"CO2\", \"BUN\", \"CREATSERUM\", \"GLU\", \"CA\", \"CAION\", \"MG\", \"PHOS\" in the last 168 hours.  No results for input(s): \"PTP\", \"INR\", \"PTT\" in the last 168 hours.  No results for input(s): \"ALT\", \"AST\", \"ALB\", \"AMYLASE\", \"LIPASE\", \"LDH\" in the last 168 hours.    Invalid input(s): \"ALPHOS\", \"TBIL\", \"DBIL\", \"TPROT\"  No results for input(s): \"TROP\" in the last 168 hours.  No results found for: \"ANAS\", \"BRADEN\",  \"ANASCRN\"  No results for input(s): \"PCACT\", \"PSACT\", \"AT3ACT\", \"HIPAB\", \"PATHI\", \"STALA\", \"DRVVTRATIO\", \"DRVVT\", \"STACLOT\", \"CARIGG\", \"N3QM5RVOSR\", \"R7IH2YXSSK\", \"RA\", \"HAVIGM\", \"HBCIGM\", \"HCVAB\", \"HBSAG\", \"HBCAB\", \"HBVDNAINTERP\", \"ANAS\", \"C3\", \"C4\" in the last 168 hours.    Radiology: No results found.       ASSESSMENT AND PLAN:     The patient is a 68 year old female who presents with right lower extremity PAD. Will plan for right lower extremity femoral to popliteal bypass. We discussed the risks of MI, need for transfusion, bleeding, wound complications, nerve injury, thrombosis, restenosis, infection, and need for more open surgery among other complications including an amputation. Patient understood and all questions were answered. The patient has expressed a wish to proceed.     The patient indicated an understanding of these issues and agreed to the plan and all questions were answered.     Thank you for allowing me to participate in the patient's care.     Sincerely,  Madiha Murrieta MD  01/24/24   7:26 AM

## 2024-01-24 NOTE — ANESTHESIA PROCEDURE NOTES
Airway  Date/Time: 1/24/2024 7:50 AM  Urgency: elective    Airway not difficult    General Information and Staff    Patient location during procedure: OR  Anesthesiologist: Vinh Middleton MD  Performed: anesthesiologist   Performed by: Vinh Middleton MD  Authorized by: Vinh Middleton MD      Indications and Patient Condition  Indications for airway management: anesthesia  Spontaneous Ventilation: absent  Sedation level: deep  Preoxygenated: yes  Patient position: sniffing  Mask difficulty assessment: 1 - vent by mask    Final Airway Details  Final airway type: endotracheal airway      Successful airway: ETT  Cuffed: yes   Successful intubation technique: direct laryngoscopy  Endotracheal tube insertion site: oral  Blade: Suze  Blade size: #3  ETT size (mm): 7.0    Cormack-Lehane Classification: grade IIA - partial view of glottis  Placement verified by: capnometry   Measured from: lips  Number of attempts at approach: 1

## 2024-01-24 NOTE — ANESTHESIA PREPROCEDURE EVALUATION
Anesthesia PreOp Note    HPI:     Ava Bowen is a 68 year old female who presents for preoperative consultation requested by: Madiha Murrieta MD    Date of Surgery: 1/24/2024    Procedure(s):  Right lower extremity femoral to popliteal bypass  Indication: Peripheral artery disease    Relevant Problems   No relevant active problems       NPO:  Last Liquid Consumption Date: 01/23/24  Last Liquid Consumption Time: 2300  Last Solid Consumption Date: 01/23/24  Last Solid Consumption Time: 2300  Last Liquid Consumption Date: 01/23/24          History Review:  Patient Active Problem List    Diagnosis Date Noted    Opioid dependence, uncomplicated (Formerly [facility]) 07/28/2023    Depression, recurrent (Formerly [facility]) 07/28/2023    Stage 3 chronic kidney disease, unspecified whether stage 3a or 3b CKD (Formerly [facility]) 07/28/2023    Hypoglycemia 06/26/2023    Pyelonephritis 04/25/2023    Hypernatremia 04/25/2023    Hyperglycemia 04/25/2023    Ureteral calculi 04/25/2023    Urinary tract infection 03/23/2023    Acute cystitis without hematuria 03/23/2023    Ileitis 03/23/2023    COVID-19 03/23/2023    UTI (urinary tract infection) 03/23/2023    Hyperlipidemia 10/17/2022    DDD (degenerative disc disease), lumbar 05/20/2022    Failed back syndrome of lumbar spine 05/20/2022    Myalgia 03/17/2022    Post-operative pain     Type 2 diabetes mellitus with hyperglycemia, with long-term current use of insulin (Formerly [facility])     Primary hypertension     S/P laminectomy     Family history of glaucoma in sister 05/04/2021    Pre-op testing     PAD (peripheral artery disease) (Formerly [facility]) 07/08/2020    Diabetic ulcer of left midfoot associated with type 2 diabetes mellitus, with fat layer exposed ([facility]) 05/08/2020    PVD (peripheral vascular disease) ([facility]ly [facility]) 05/03/2020    Centrilobular emphysema ([facility]ly [facility]) 11/08/2019    Type 2 diabetes mellitus without retinopathy ([facility]) 10/15/2019    Age-related nuclear cataract of both eyes 10/15/2019    Glaucoma suspect of both eyes 10/15/2019    Back  pain with radiation 2018    Anemia 2018    Azotemia 2018    Hypokalemia 2018    Chest pain 2007       Past Medical History:   Diagnosis Date    Anemia     Back pain     Back problem     Cataract     Chronic kidney disease (CKD)     COPD (chronic obstructive pulmonary disease) (HCC)     FEV 1 1.25 50%     Diabetes (HCC)     DM neuropathy    Essential hypertension     H/O angioplasty     2020    High blood pressure     High cholesterol     Neuropathy     Peripheral vascular disease (HCC)     PNA (pneumonia) 2018    Pulmonary emphysema ([facility])     Pulmonary nodule     4 mm RUL    Renal disorder     monitoring kidney labs    Sickle cell trait (HCC)     Sickle-cell anemia (HCC)     Tobacco abuse     Visual impairment        Past Surgical History:   Procedure Laterality Date    APPENDECTOMY      BACK SURGERY Right 2021    Right L3-4 extreme lateral interbody fusion, posterior decompression; LUMBAR LAMINECTOMY 1 LEVEL          x3    CHOLECYSTECTOMY      EXCIS LUMBAR DISK,ONE LEVEL          OTHER      bilateral leg stents  and        Medications Prior to Admission   Medication Sig Dispense Refill Last Dose    LANTUS SOLOSTAR 100 UNIT/ML Subcutaneous Solution Pen-injector Inject 6 Units into the skin See Admin Instructions. Every other day   2024    MYRBETRIQ 50 MG Oral Tablet 24 Hr Take 1 tablet (50 mg total) by mouth every morning.   2024    JANUVIA 25 MG Oral Tab Take 1 tablet (25 mg total) by mouth every morning.   2024    rosuvastatin 40 MG Oral Tab Take 1 tablet (40 mg total) by mouth nightly.   2024    glipiZIDE 10 MG Oral Tab Take 1 tablet (10 mg total) by mouth 2 (two) times daily.   2024    HYDROcodone-acetaminophen  MG Oral Tab Take 1 tablet by mouth every 4 (four) hours as needed for Pain (max 6/day). 180 tablet 0 2024    carvedilol 12.5 MG Oral Tab Take 2 tablets (25 mg total) by mouth daily. (Patient taking  differently: Take 2 tablets (25 mg total) by mouth 2 (two) times daily with meals.) 180 tablet 1 1/24/2024 at 0500    D-5000 125 MCG (5000 UT) Oral Tab Take 1 tablet (5,000 Units total) by mouth daily.   Past Week    clopidogrel 75 MG Oral Tab Take 1 tablet (75 mg total) by mouth daily. (Patient taking differently: Take 1 tablet (75 mg total) by mouth every morning.) 90 tablet 1 1/24/2024 at 0500    estradiol 0.1 MG/GM Vaginal Cream Apply a small amount to the perimeatal tissue every other day. 45 g 2 1/23/2024    amLODIPine 10 MG Oral Tab Take 1 tablet (10 mg total) by mouth daily. (Patient taking differently: Take 1 tablet (10 mg total) by mouth every morning.) 90 tablet 0     LISINOPRIL 40 MG Oral Tab TAKE 1 TABLET(40 MG) BY MOUTH DAILY (Patient taking differently: Take 1 tablet (40 mg total) by mouth every morning.) 90 tablet 1 1/23/2024    hydrALAZINE 100 MG Oral Tab Take 1 tablet (100 mg total) by mouth every 8 (eight) hours. 270 tablet 1     Budesonide-Formoterol Fumarate 160-4.5 MCG/ACT Inhalation Aerosol Inhale 2 puffs into the lungs 2 (two) times daily. Rinse mouth with water, gargle, and spit to follow. 1 each 2 1/24/2024    aspirin 81 MG Oral Tab EC Take 1 tablet (81 mg total) by mouth every other day.   1/21/2024    albuterol 108 (90 Base) MCG/ACT Inhalation Aero Soln Inhale 2 puffs into the lungs every 6 (six) hours as needed for Wheezing or Shortness of Breath. 8.5 each 2 More than a month    ipratropium-albuterol 0.5-2.5 (3) MG/3ML Inhalation Solution Take 3 mL by nebulization every 6 (six) hours as needed (shortness of breath refractory to HFA). 100 each 0 More than a month     Current Facility-Administered Medications Ordered in Epic   Medication Dose Route Frequency Provider Last Rate Last Admin    ceFAZolin (Ancef) 2 g in 20mL IV syringe premix  2 g Intravenous Once Madiha Murrieta MD        lactated ringers infusion   Intravenous Continuous Madiha Murrieta MD 20 mL/hr at 01/24/24 0611 New Bag at  24 0611    metoprolol tartrate (Lopressor) tab 25 mg  25 mg Oral Once PRN Madiha Murrieta MD         No current Epic-ordered outpatient medications on file.       Allergies   Allergen Reactions    Penicillins HIVES and ANGIOEDEMA     Hives on eyelids (occurred when pt was in her 20s). Tolerated amoxicillin per chart. Tolerates cephalosporins.       Family History   Problem Relation Age of Onset    Diabetes Mother         Passed from DM complications    Diabetes Sister         Had kidney transplant due to complications of DM    Kidney Disease Sister         Kidney failure secondary to diabetes; received kidney transplant in     Glaucoma Sister     Diabetes Brother     Sickle Cell Son         Cause of death    Macular degeneration Neg      Social History     Socioeconomic History    Marital status:    Tobacco Use    Smoking status: Former     Packs/day: 1.00     Years: 30.00     Additional pack years: 0.00     Total pack years: 30.00     Types: Cigarettes     Quit date: 2019     Years since quittin.2    Smokeless tobacco: Never   Vaping Use    Vaping Use: Never used   Substance and Sexual Activity    Alcohol use: Not Currently     Comment: socially    Drug use: No       Available pre-op labs reviewed.     Lab Results   Component Value Date    PGLU 291 (H) 2024          Vital Signs:  Body mass index is 27.12 kg/m².   height is 1.626 m (5' 4\") and weight is 71.7 kg (158 lb). Her oral temperature is 98.7 °F (37.1 °C). Her blood pressure is 168/58 (abnormal) and her pulse is 67. Her respiration is 20 and oxygen saturation is 99%.   Vitals:    23 1216 24 1112 24 0549   BP:   (!) 168/58   Pulse:   67   Resp:   20   Temp:   98.7 °F (37.1 °C)   TempSrc:   Oral   SpO2:   99%   Weight: 70.3 kg (155 lb) 72.6 kg (160 lb) 71.7 kg (158 lb)   Height: 1.626 m (5' 4\") 1.626 m (5' 4\") 1.626 m (5' 4\")        Anesthesia Evaluation     Patient summary reviewed and Nursing notes reviewed     Airway   Mallampati: II  TM distance: >3 FB  Neck ROM: full  Dental    (+) lower dentures and upper dentures    Pulmonary - normal exam   (+) COPD  Cardiovascular - normal exam  (+) hypertension    Neuro/Psych - negative ROS     GI/Hepatic/Renal - negative ROS     Endo/Other    (+) diabetes mellitus type 2  Abdominal  - normal exam                 Anesthesia Plan:   ASA:  3  Plan:   General  Monitors and Lines:   A-line  Informed Consent Plan and Risks Discussed With:  Patient  Use of Blood Products Discussed With:  Patient  Consent Comment: Cleared for surgery by Dr. Piña on 1/2024. Negative stress ECG. Echo 6/2021 indicated EF 70%      I have informed Ava Bowen and/or legal guardian or family member of the nature of the anesthetic plan, benefits, risks including possible dental damage if relevant, major complications, and any alternative forms of anesthetic management.   All of the patient's questions were answered to the best of my ability. The patient desires the anesthetic management as planned.  Vinh Middleton MD  1/24/2024 7:17 AM  Present on Admission:  **None**

## 2024-01-24 NOTE — H&P
[facility] Hospitalist H&P       CC: No chief complaint on file.       PCP: Ernesto De Dios    History of Present Illness: Patient is a 68 year old female with PMH sig for CKD, COPD, DM, HTN, HLD, PVD, and sickle cell anemia who presented to the hospital for right lower extremity femoral to popliteal bypass. Patient was seen and examined post operatively. She was sleepy from anesthesia but could answer in short bursts. Was complaining of pain. BP in PACU elevated despite PRNs, added nicardipine gtt. Plan to admit to CCU    PMH  Past Medical History:   Diagnosis Date    Anemia     Back pain     Back problem     Cataract     Chronic kidney disease (CKD)     COPD (chronic obstructive pulmonary disease) (HCC)     FEV 1 1.25 50%     Diabetes (HCC)     DM neuropathy    Essential hypertension     H/O angioplasty     2020    High blood pressure     High cholesterol     Neuropathy     Peripheral vascular disease (HCC)     PNA (pneumonia) 2018    Pulmonary emphysema (HCC)     Pulmonary nodule     4 mm RUL    Renal disorder     monitoring kidney labs    Sickle cell trait (HCC)     Sickle-cell anemia (HCC)     Tobacco abuse     Visual impairment         PSH  Past Surgical History:   Procedure Laterality Date    APPENDECTOMY      BACK SURGERY Right 2021    Right L3-4 extreme lateral interbody fusion, posterior decompression; LUMBAR LAMINECTOMY 1 LEVEL          x3    CHOLECYSTECTOMY      EXCIS LUMBAR DISK,ONE LEVEL          OTHER      bilateral leg stents  and         ALL:  Allergies   Allergen Reactions    Penicillins HIVES and ANGIOEDEMA     Hives on eyelids (occurred when pt was in her 20s). Tolerated amoxicillin per chart. Tolerates cephalosporins.        Home Medications:  Outpatient Medications Marked as Taking for the 24 encounter (Hospital Encounter)   Medication Sig Dispense Refill    LANTUS SOLOSTAR 100 UNIT/ML Subcutaneous Solution Pen-injector Inject 6 Units  into the skin See Admin Instructions. Every other day      MYRBETRIQ 50 MG Oral Tablet 24 Hr Take 1 tablet (50 mg total) by mouth every morning.      JANUVIA 25 MG Oral Tab Take 1 tablet (25 mg total) by mouth every morning.      rosuvastatin 40 MG Oral Tab Take 1 tablet (40 mg total) by mouth nightly.      glipiZIDE 10 MG Oral Tab Take 1 tablet (10 mg total) by mouth 2 (two) times daily.      HYDROcodone-acetaminophen  MG Oral Tab Take 1 tablet by mouth every 4 (four) hours as needed for Pain (max 6/day). 180 tablet 0    carvedilol 12.5 MG Oral Tab Take 2 tablets (25 mg total) by mouth daily. (Patient taking differently: Take 2 tablets (25 mg total) by mouth 2 (two) times daily with meals.) 180 tablet 1    D-5000 125 MCG (5000 UT) Oral Tab Take 1 tablet (5,000 Units total) by mouth daily.      clopidogrel 75 MG Oral Tab Take 1 tablet (75 mg total) by mouth daily. (Patient taking differently: Take 1 tablet (75 mg total) by mouth every morning.) 90 tablet 1    estradiol 0.1 MG/GM Vaginal Cream Apply a small amount to the perimeatal tissue every other day. 45 g 2    amLODIPine 10 MG Oral Tab Take 1 tablet (10 mg total) by mouth daily. (Patient taking differently: Take 1 tablet (10 mg total) by mouth every morning.) 90 tablet 0    LISINOPRIL 40 MG Oral Tab TAKE 1 TABLET(40 MG) BY MOUTH DAILY (Patient taking differently: Take 1 tablet (40 mg total) by mouth every morning.) 90 tablet 1    hydrALAZINE 100 MG Oral Tab Take 1 tablet (100 mg total) by mouth every 8 (eight) hours. 270 tablet 1    Budesonide-Formoterol Fumarate 160-4.5 MCG/ACT Inhalation Aerosol Inhale 2 puffs into the lungs 2 (two) times daily. Rinse mouth with water, gargle, and spit to follow. 1 each 2    aspirin 81 MG Oral Tab EC Take 1 tablet (81 mg total) by mouth every other day.           Soc Hx  Social History     Tobacco Use    Smoking status: Former     Packs/day: 1.00     Years: 30.00     Additional pack years: 0.00     Total pack years:  30.00     Types: Cigarettes     Quit date: 2019     Years since quittin.2    Smokeless tobacco: Never   Substance Use Topics    Alcohol use: Not Currently     Comment: socially        Fam Hx  Family History   Problem Relation Age of Onset    Diabetes Mother         Passed from DM complications    Diabetes Sister         Had kidney transplant due to complications of DM    Kidney Disease Sister         Kidney failure secondary to diabetes; received kidney transplant in     Glaucoma Sister     Diabetes Brother     Sickle Cell Son         Cause of death    Macular degeneration Neg        Review of Systems  Comprehensive ROS reviewed and negative except for what's stated above.     OBJECTIVE:  /55 (BP Location: Right arm)   Pulse 68   Temp 97.8 °F (36.6 °C) (Temporal)   Resp 12   Ht 5' 4\" (1.626 m)   Wt 158 lb (71.7 kg)   LMP  (LMP Unknown)   SpO2 98%   BMI 27.12 kg/m²   General:  groggy   Head:  Normocephalic,               Neck: Supple   Lungs:   Clear to auscultation bilaterally       Heart:  Regular rate and rhythm, S1, S2 normal,    Abdomen:   Soft, non-tender.    Extremities: Post op RLE, doppler with pedal pulse             Diagnostic Data:    CBC/Chem  No results for input(s): \"WBC\", \"HGB\", \"MCV\", \"PLT\", \"BAND\", \"INR\" in the last 168 hours.    Invalid input(s): \"LYM#\", \"MONO#\", \"BASOS#\", \"EOSIN#\"    No results for input(s): \"NA\", \"K\", \"CL\", \"CO2\", \"BUN\", \"CREATSERUM\", \"GLU\", \"CA\", \"CAION\", \"MG\", \"PHOS\" in the last 168 hours.    No results for input(s): \"ALT\", \"AST\", \"ALB\", \"AMYLASE\", \"LIPASE\", \"LDH\" in the last 168 hours.    Invalid input(s): \"ALPHOS\", \"TBIL\", \"DBIL\", \"TPROT\"    No results for input(s): \"TROP\" in the last 168 hours.    Radiology: No results found.      ASSESSMENT / PLAN:   Patient is a 68 year old female with PMH sig for CKD, COPD, DM, HTN, HLD, PVD, and sickle cell anemia who presented to the hospital for right lower extremity femoral to popliteal bypass.     PAD  -  s/p RLE femoral to popliteal bypass POD # 0   - prn pain medication   - monitor respiratory status  - encouraged IS use  - prn anti emetics  - CBC in the morning  - PT/SW  - dvt ppx: SQH  - rest of management per vascular surgery  - goal SBP < 160  - resume ASA/plavix/statin    Uncontrolled BP  - SBP in the 190s post operatively despite multiple PRNS  - discrepancy between BP cuff and arterial line, given that patient is a vasculopath the BP cuff is not always reliable and therefore the arterial line readings should be used when there is a descrepancy  - she is NPO still for now, will start nicardipine gtt  - resume PO medications: lisinopril, amlodipine, hydralazine, coreg    DM2  - hold PO medications, resume on dc  - ISS, acuchek    COPD  - chronic, stable  -resume inhaler    Sickle cell anemia  - outpatient f/u    FN:  - IVF: in pacu  - Diet: npo    DVT Prophy: SQH  Lines: PIV    Dispo: pending clinical course    Outpatient records or previous hospital records reviewed.     Further recommendations pending patient's clinical course.  [facility] hospitalist to continue to follow patient while in house    Patient and/or patient's family given opportunity to ask questions and note understanding and agreeing with therapeutic plan as outlined    Thank You,  Francine Lovett DO    Hospitalist with [facility]  Answering Service number: [phone]

## 2024-01-25 LAB
ANION GAP SERPL CALC-SCNC: 10 MMOL/L (ref 0–18)
BUN BLD-MCNC: 20 MG/DL (ref 9–23)
BUN/CREAT SERPL: 14.9 (ref 10–20)
CALCIUM BLD-MCNC: 8.3 MG/DL (ref 8.7–10.4)
CHLORIDE SERPL-SCNC: 117 MMOL/L (ref 98–112)
CO2 SERPL-SCNC: 17 MMOL/L (ref 21–32)
CREAT BLD-MCNC: 1.34 MG/DL
DEPRECATED RDW RBC AUTO: 41.7 FL (ref 35.1–46.3)
EGFRCR SERPLBLD CKD-EPI 2021: 44 ML/MIN/1.73M2 (ref 60–?)
ERYTHROCYTE [DISTWIDTH] IN BLOOD BY AUTOMATED COUNT: 13.5 % (ref 11–15)
GLUCOSE BLD-MCNC: 244 MG/DL (ref 70–99)
GLUCOSE BLDC GLUCOMTR-MCNC: 251 MG/DL (ref 70–99)
GLUCOSE BLDC GLUCOMTR-MCNC: 274 MG/DL (ref 70–99)
GLUCOSE BLDC GLUCOMTR-MCNC: 280 MG/DL (ref 70–99)
GLUCOSE BLDC GLUCOMTR-MCNC: 340 MG/DL (ref 70–99)
HCT VFR BLD AUTO: 26.6 %
HGB BLD-MCNC: 8.9 G/DL
MCH RBC QN AUTO: 28.7 PG (ref 26–34)
MCHC RBC AUTO-ENTMCNC: 33.5 G/DL (ref 31–37)
MCV RBC AUTO: 85.8 FL
OSMOLALITY SERPL CALC.SUM OF ELEC: 309 MOSM/KG (ref 275–295)
PLATELET # BLD AUTO: 222 10(3)UL (ref 150–450)
POTASSIUM SERPL-SCNC: 4.3 MMOL/L (ref 3.5–5.1)
RBC # BLD AUTO: 3.1 X10(6)UL
SODIUM SERPL-SCNC: 144 MMOL/L (ref 136–145)
WBC # BLD AUTO: 10.3 X10(3) UL (ref 4–11)

## 2024-01-25 PROCEDURE — 97535 SELF CARE MNGMENT TRAINING: CPT

## 2024-01-25 PROCEDURE — 97161 PT EVAL LOW COMPLEX 20 MIN: CPT

## 2024-01-25 PROCEDURE — 80048 BASIC METABOLIC PNL TOTAL CA: CPT | Performed by: SURGERY

## 2024-01-25 PROCEDURE — 82962 GLUCOSE BLOOD TEST: CPT

## 2024-01-25 PROCEDURE — 85027 COMPLETE CBC AUTOMATED: CPT | Performed by: SURGERY

## 2024-01-25 PROCEDURE — 94640 AIRWAY INHALATION TREATMENT: CPT

## 2024-01-25 PROCEDURE — 97166 OT EVAL MOD COMPLEX 45 MIN: CPT

## 2024-01-25 PROCEDURE — 97530 THERAPEUTIC ACTIVITIES: CPT

## 2024-01-25 RX ORDER — KETOROLAC TROMETHAMINE 15 MG/ML
15 INJECTION, SOLUTION INTRAMUSCULAR; INTRAVENOUS EVERY 6 HOURS
Status: COMPLETED | OUTPATIENT
Start: 2024-01-25 | End: 2024-01-26

## 2024-01-25 RX ORDER — HYDROCODONE BITARTRATE AND ACETAMINOPHEN 10; 325 MG/1; MG/1
1 TABLET ORAL EVERY 4 HOURS PRN
Status: DISCONTINUED | OUTPATIENT
Start: 2024-01-25 | End: 2024-01-27

## 2024-01-25 NOTE — PLAN OF CARE
Patient AO x4 on room air. RLE pedal/post tib pulses present by doppler. Patient able to dangle and take a few steps with PT/OT. Arterial line and joseph removed per order. PRN morphine & Norco given for pain management - Scheduled Toradol added by Vascular surgery for better pain control. Cardene gtt stopped this AM, SBP remaining under 160 since scheduled PO home antihypertensives resumed. IV fluids discontinued - patient diet upgraded to cardiac/diabetic and tolerating well with good appetite. Transfer orders received for remote tele - cleared by vascular surgery. Patient able to ambulate approximately 100' and up to bathroom and chair with RN and walker. Safety measures maintained, no signs of injury.    Problem: PAIN - ADULT  Goal: Verbalizes/displays adequate comfort level or patient's stated pain goal  Description: INTERVENTIONS:  - Encourage pt to monitor pain and request assistance  - Assess pain using appropriate pain scale  - Administer analgesics based on type and severity of pain and evaluate response  - Implement non-pharmacological measures as appropriate and evaluate response  - Consider cultural and social influences on pain and pain management  - Manage/alleviate anxiety  - Utilize distraction and/or relaxation techniques  - Monitor for opioid side effects  - Notify MD/LIP if interventions unsuccessful or patient reports new pain  - Anticipate increased pain with activity and pre-medicate as appropriate  Outcome: Progressing     Problem: SAFETY ADULT - FALL  Goal: Free from fall injury  Description: INTERVENTIONS:  - Assess pt frequently for physical needs  - Identify cognitive and physical deficits and behaviors that affect risk of falls.  - [place] fall precautions as indicated by assessment.  - Educate pt/family on patient safety including physical limitations  - Instruct pt to call for assistance with activity based on assessment  - Modify environment to reduce risk of injury  - Provide  assistive devices as appropriate  - Consider OT/PT consult to assist with strengthening/mobility  - Encourage toileting schedule  Outcome: Progressing     Problem: DISCHARGE PLANNING  Goal: Discharge to home or other facility with appropriate resources  Description: INTERVENTIONS:  - Identify barriers to discharge w/pt and caregiver  - Include patient/family/discharge partner in discharge planning  - Arrange for needed discharge resources and transportation as appropriate  - Identify discharge learning needs (meds, wound care, etc)  - Arrange for interpreters to assist at discharge as needed  - Consider post-discharge preferences of patient/family/discharge partner  - Complete POLST form as appropriate  - Assess patient's ability to be responsible for managing their own health  - Refer to Case Management Department for coordinating discharge planning if the patient needs post-hospital services based on physician/LIP order or complex needs related to functional status, cognitive ability or social support system  Outcome: Progressing     Problem: CARDIOVASCULAR - ADULT  Goal: Maintains optimal cardiac output and hemodynamic stability  Description: INTERVENTIONS:  - Monitor vital signs, rhythm, and trends  - Monitor for bleeding, hypotension and signs of decreased cardiac output  - Evaluate effectiveness of vasoactive medications to optimize hemodynamic stability  - Monitor arterial and/or venous puncture sites for bleeding and/or hematoma  - Assess quality of pulses, skin color and temperature  - Assess for signs of decreased coronary artery perfusion - ex. Angina  - Evaluate fluid balance, assess for edema, trend weights  Outcome: Progressing     Problem: RESPIRATORY - ADULT  Goal: Achieves optimal ventilation and oxygenation  Description: INTERVENTIONS:  - Assess for changes in respiratory status  - Assess for changes in mentation and behavior  - Position to facilitate oxygenation and minimize respiratory effort  -  Oxygen supplementation based on oxygen saturation or ABGs  - Provide Smoking Cessation handout, if applicable  - Encourage broncho-pulmonary hygiene including cough, deep breathe, Incentive Spirometry  - Assess the need for suctioning and perform as needed  - Assess and instruct to report SOB or any respiratory difficulty  - Respiratory Therapy support as indicated  - Manage/alleviate anxiety  - Monitor for signs/symptoms of CO2 retention  Outcome: Progressing     Problem: GASTROINTESTINAL - ADULT  Goal: Minimal or absence of nausea and vomiting  Description: INTERVENTIONS:  - Maintain adequate hydration with IV or PO as ordered and tolerated  - Nasogastric tube to low intermittent suction as ordered  - Evaluate effectiveness of ordered antiemetic medications  - Provide nonpharmacologic comfort measures as appropriate  - Advance diet as tolerated, if ordered  - Obtain nutritional consult as needed  - Evaluate fluid balance  Outcome: Progressing     Problem: METABOLIC/FLUID AND ELECTROLYTES - ADULT  Goal: Glucose maintained within prescribed range  Description: INTERVENTIONS:  - Monitor Blood Glucose as ordered  - Assess for signs and symptoms of hyperglycemia and hypoglycemia  - Administer ordered medications to maintain glucose within target range  - Assess barriers to adequate nutritional intake and initiate nutrition consult as needed  - Instruct patient on self management of diabetes  Outcome: Progressing     Problem: SKIN/TISSUE INTEGRITY - ADULT  Goal: Incision(s), wounds(s) or drain site(s) healing without S/S of infection  Description: INTERVENTIONS:  - Assess and document risk factors for pressure ulcer development  - Assess and document skin integrity  - Assess and document dressing/incision, wound bed, drain sites and surrounding tissue  - Implement wound care per orders  - Initiate isolation precautions as appropriate  - Initiate Pressure Ulcer prevention bundle as indicated  Outcome: Progressing

## 2024-01-25 NOTE — PLAN OF CARE
Pt remains resting in bed overnight, AxO x4, remains on 3L O2. Patient requiring PRN pain medication throughout the night (see mar), patient remains on cardene gtt, IVF and joseph in place. A-line in place, call light within reach. Frequent rounds made.      Problem: Patient Centered Care  Goal: Patient preferences are identified and integrated in the patient's plan of care  Description: Interventions:  - What would you like us to know as we care for you?   - Provide timely, complete, and accurate information to patient/family  - Incorporate patient and family knowledge, values, beliefs, and cultural backgrounds into the planning and delivery of care  - Encourage patient/family to participate in care and decision-making at the level they choose  - Honor patient and family perspectives and choices  Outcome: Progressing     Problem: Patient/Family Goals  Goal: Patient/Family Long Term Goal  Description: Patient's Long Term Goal:     Interventions:  -   - See additional Care Plan goals for specific interventions  Outcome: Progressing  Goal: Patient/Family Short Term Goal  Description: Patient's Short Term Goal:     Interventions:     - See additional Care Plan goals for specific interventions  Outcome: Progressing     Problem: PAIN - ADULT  Goal: Verbalizes/displays adequate comfort level or patient's stated pain goal  Description: INTERVENTIONS:  - Encourage pt to monitor pain and request assistance  - Assess pain using appropriate pain scale  - Administer analgesics based on type and severity of pain and evaluate response  - Implement non-pharmacological measures as appropriate and evaluate response  - Consider cultural and social influences on pain and pain management  - Manage/alleviate anxiety  - Utilize distraction and/or relaxation techniques  - Monitor for opioid side effects  - Notify MD/LIP if interventions unsuccessful or patient reports new pain  - Anticipate increased pain with activity and pre-medicate as  appropriate  Outcome: Progressing     Problem: SAFETY ADULT - FALL  Goal: Free from fall injury  Description: INTERVENTIONS:  - Assess pt frequently for physical needs  - Identify cognitive and physical deficits and behaviors that affect risk of falls.  - [place] fall precautions as indicated by assessment.  - Educate pt/family on patient safety including physical limitations  - Instruct pt to call for assistance with activity based on assessment  - Modify environment to reduce risk of injury  - Provide assistive devices as appropriate  - Consider OT/PT consult to assist with strengthening/mobility  - Encourage toileting schedule  Outcome: Progressing     Problem: DISCHARGE PLANNING  Goal: Discharge to home or other facility with appropriate resources  Description: INTERVENTIONS:  - Identify barriers to discharge w/pt and caregiver  - Include patient/family/discharge partner in discharge planning  - Arrange for needed discharge resources and transportation as appropriate  - Identify discharge learning needs (meds, wound care, etc)  - Arrange for interpreters to assist at discharge as needed  - Consider post-discharge preferences of patient/family/discharge partner  - Complete POLST form as appropriate  - Assess patient's ability to be responsible for managing their own health  - Refer to Case Management Department for coordinating discharge planning if the patient needs post-hospital services based on physician/LIP order or complex needs related to functional status, cognitive ability or social support system  Outcome: Progressing     Problem: CARDIOVASCULAR - ADULT  Goal: Maintains optimal cardiac output and hemodynamic stability  Description: INTERVENTIONS:  - Monitor vital signs, rhythm, and trends  - Monitor for bleeding, hypotension and signs of decreased cardiac output  - Evaluate effectiveness of vasoactive medications to optimize hemodynamic stability  - Monitor arterial and/or venous puncture sites for  bleeding and/or hematoma  - Assess quality of pulses, skin color and temperature  - Assess for signs of decreased coronary artery perfusion - ex. Angina  - Evaluate fluid balance, assess for edema, trend weights  Outcome: Progressing     Problem: RESPIRATORY - ADULT  Goal: Achieves optimal ventilation and oxygenation  Description: INTERVENTIONS:  - Assess for changes in respiratory status  - Assess for changes in mentation and behavior  - Position to facilitate oxygenation and minimize respiratory effort  - Oxygen supplementation based on oxygen saturation or ABGs  - Provide Smoking Cessation handout, if applicable  - Encourage broncho-pulmonary hygiene including cough, deep breathe, Incentive Spirometry  - Assess the need for suctioning and perform as needed  - Assess and instruct to report SOB or any respiratory difficulty  - Respiratory Therapy support as indicated  - Manage/alleviate anxiety  - Monitor for signs/symptoms of CO2 retention  Outcome: Progressing     Problem: GASTROINTESTINAL - ADULT  Goal: Minimal or absence of nausea and vomiting  Description: INTERVENTIONS:  - Maintain adequate hydration with IV or PO as ordered and tolerated  - Nasogastric tube to low intermittent suction as ordered  - Evaluate effectiveness of ordered antiemetic medications  - Provide nonpharmacologic comfort measures as appropriate  - Advance diet as tolerated, if ordered  - Obtain nutritional consult as needed  - Evaluate fluid balance  Outcome: Progressing     Problem: METABOLIC/FLUID AND ELECTROLYTES - ADULT  Goal: Glucose maintained within prescribed range  Description: INTERVENTIONS:  - Monitor Blood Glucose as ordered  - Assess for signs and symptoms of hyperglycemia and hypoglycemia  - Administer ordered medications to maintain glucose within target range  - Assess barriers to adequate nutritional intake and initiate nutrition consult as needed  - Instruct patient on self management of diabetes  Outcome: Progressing      Problem: SKIN/TISSUE INTEGRITY - ADULT  Goal: Incision(s), wounds(s) or drain site(s) healing without S/S of infection  Description: INTERVENTIONS:  - Assess and document risk factors for pressure ulcer development  - Assess and document skin integrity  - Assess and document dressing/incision, wound bed, drain sites and surrounding tissue  - Implement wound care per orders  - Initiate isolation precautions as appropriate  - Initiate Pressure Ulcer prevention bundle as indicated  Outcome: Progressing

## 2024-01-25 NOTE — PROGRESS NOTES
Vascular Surgery Progress Note    No acute events overnight. Patient reports difficulty with pain control due to opioid tolerance. She states she hasn't been able to bear weight due to the pain. She does feel improved sensation in her right foot. She is tolerating a diet. On exam, groin and calf incisions are c/d/I with dressings in place. Foot is warm with mild swelling. Strong PT doppler signal, monophasic DP signal.   67 y/o female s/p right femoral to popliteal bypass   - ok for out of bed, PT/OT   - diet as tolerated   - ok to transfer out of ICU   - will add 24 hours of toradol for pain control - if not adequate may need to get pain management involved       Madiha Murrieta MD  01/25/24  12:51 PM

## 2024-01-25 NOTE — CM/SW NOTE
01/25/24 1500   CM/SW Referral Data   Referral Source Physician;Social Work (self-referral)   Reason for Referral Discharge planning   Informant Patient   Medical Hx   Does patient have an established PCP? Yes   Patient Info   Patient's Current Mental Status at Time of Assessment Alert;Oriented   Patient's Home Environment House   Number of Levels in Home 2   Number of Stair in Home 2/13   Patient lives with   (Family)   Patient Status Prior to Admission   Independent with ADLs and Mobility Yes   Discharge Needs   Anticipated D/C needs Home health care   Choice of Post-Acute Provider   Informed patient of right to choose their preferred provider Yes   List of appropriate post-acute services provided to patient/family with quality data Yes     SW spoke with pt at bedside to discuss DC planning. Pt is aware therapy is recommending HHC upon DC however pt declines as she states she has hx of HHC recently.     [facility] and states they never came on time and was not very helpful. Pt declined needing HHC upon DC as she states her family member would be able to assist upon DC.     PLAN: Home, with family pending med clear    Nila Zarate, ÁNGELW, MSW ext. [phone]

## 2024-01-25 NOTE — PHYSICAL THERAPY NOTE
PHYSICAL THERAPY EVALUATION - INPATIENT     Room Number: 239/239-A  Evaluation Date: 1/25/2024  Type of Evaluation: Initial   Physician Order: PT Eval and Treat    Presenting Problem: PAD, s/p right femoral-above knee popliteal artery bypass     Reason for Therapy: Mobility Dysfunction and Discharge Planning    PHYSICAL THERAPY ASSESSMENT     Patient is a 68 year old female admitted 1/24/2024 for PAD, s/p right femoral-above knee popliteal artery bypass. Patient's current functional deficits include impaired bed mobility, transfers, gait, stair negotiation, pain management, and tolerance for activity, which are below the patient's pre-admission status. Patient in bed upon arrival. RN approved activity. Educated patient on POC and benefits of mobilization. Agreeable to participate. Patient reporting RLE pain, rated 8 out of 10 per the pain scale.    Bed Mobility: Min A supine>EOB, assist with RLE and trunk. Patient tolerated sitting EOB approx 8 minutes, requiring BUE support and CGA>SBA in order to maintain static sitting balance.  Transfers: Min A sit<>stand with RW; cues provided for appropriate UE placement during functional transfers. Instruction on activity pacing upon standing to allow body time to acclimate to change in position. Tolerated static standing with BUE support on RW and CGA for approx 1 minute prior to mobilization.  Ambulation: CGA with RW for 10 ft; shuffled steps, R decreased stance time, decreased andrea speed, decreased WB through RLE due to pain, decreased step length. Cues for safe management of RW with turns.    Patient in bed at end of session. Needs in reach and RLE elevated on pillow. RN aware.      The patient's Approx Degree of Impairment: 46.58% has been calculated based on documentation in the [facility] '6 clicks' Inpatient Basic Mobility Short Form.  Research supports that patients with this level of impairment may benefit from home-health PT with increased supervision/care in order  to optimize functional independence.    Patient will benefit from continued IP PT services to address these deficits in preparation for discharge.    DISCHARGE RECOMMENDATIONS  PT Discharge Recommendations: Home with home health PT (increased supervision/assist)    PLAN  PT Treatment Plan: Body mechanics;Bed mobility;Coordination;Endurance;Energy conservation;Patient education;Gait training;Stair training;Transfer training;Balance training  Rehab Potential : Good  Frequency (Obs): 5x/week       PHYSICAL THERAPY MEDICAL/SOCIAL HISTORY     Problem List  Active Problems:    PAD (peripheral artery disease) ([facility])      HOME SITUATION  Home Situation  Type of Home: House  Home Layout: Two level  Stairs to Enter : 3  Railing: Yes  Stairs to Bedroom: 12  Railing: Yes  Lives With: Family (sister and son)  Patient Owned Equipment: Cane;Rolling walker     Prior Level of [place]: Independent with ADLs/iADLs, Mod I > Independent for functional mobility with use of cane for community ambulation     SUBJECTIVE  \"My sister and son will help me a lot.\"    PHYSICAL THERAPY EXAMINATION     OBJECTIVE  Precautions: Limb alert - right  Fall Risk:  (Moderate fall risk)    WEIGHT BEARING RESTRICTION  Weight Bearing Restriction: None    PAIN ASSESSMENT  Ratin  Location: RLE  Management Techniques: Body mechanics;Repositioning    COGNITION  Overall Cognitive Status:  WFL - within functional limits    RANGE OF MOTION AND STRENGTH ASSESSMENT  Upper extremity ROM and strength are within functional limits   Lower extremity ROM is within functional limits   Lower extremity strength is within functional limits; RLE slightly limited due to pain     BALANCE  Static Sitting: Good  Dynamic Sitting: Fair +  Static Standing: Fair  Dynamic Standing: Fair -    ACTIVITY TOLERANCE  Pulse: 80  Heart Rate Source: Monitor     BP: 119/59  BP Location: Right arm  BP Method: Automatic  Patient Position: Lying    O2 WALK  Oxygen Therapy  SPO2% on Room Air  at Rest: 93    AM-PAC '6-Clicks' INPATIENT SHORT FORM - BASIC MOBILITY  How much difficulty does the patient currently have...  Patient Difficulty: Turning over in bed (including adjusting bedclothes, sheets and blankets)?: A Little   Patient Difficulty: Sitting down on and standing up from a chair with arms (e.g., wheelchair, bedside commode, etc.): A Little   Patient Difficulty: Moving from lying on back to sitting on the side of the bed?: A Little   How much help from another person does the patient currently need...   Help from Another: Moving to and from a bed to a chair (including a wheelchair)?: A Little   Help from Another: Need to walk in hospital room?: A Little   Help from Another: Climbing 3-5 steps with a railing?: A Little     AM-PAC Score:  Raw Score: 18   Approx Degree of Impairment: 46.58%   Standardized Score (AM-PAC Scale): 43.63   CMS Modifier (G-Code): CK    FUNCTIONAL ABILITY STATUS  Functional Mobility/Gait Assessment  Gait Assistance: Contact guard assist  Distance (ft): 10 ft  Assistive Device: Rolling walker  Pattern: Shuffle;R Decreased stance time (decreased andrea speed, decreased WB through RLE due to pain, decreased step length)    Exercise/Education Provided:  Bed mobility  Body mechanics  Energy conservation  Functional activity tolerated  Gait training  Posture  Transfer training    Patient End of Session: In bed;Needs met;Call light within reach;RN aware of session/findings;All patient questions and concerns addressed    CURRENT GOALS    Goals to be met by: 2/1/24  Patient Goal Patient's self-stated goal is: go home   Goal #1 Patient is able to demonstrate supine - sit EOB @ level: supervision     Goal #1   Current Status    Goal #2 Patient is able to demonstrate transfers Sit to/from Stand at assistance level: supervision with walker - rolling     Goal #2  Current Status    Goal #3 Patient is able to ambulate 200 feet with assist device: walker - rolling at assistance level:  supervision   Goal #3   Current Status    Goal #4 Patient will negotiate 12 stairs/one curb w/ assistive device and supervision   Goal #4   Current Status    Goal #5 Patient to demonstrate independence with home activity/exercise instructions provided to patient in preparation for discharge.   Goal #5   Current Status      Patient Evaluation Complexity Level:  History Moderate - 1 or 2 personal factors and/or co-morbidities   Examination of body systems Moderate - addressing a total of 3 or more elements   Clinical Presentation Moderate - Evolving   Clinical Decision Making Low Complexity       Therapeutic Activity: 20 minutes

## 2024-01-25 NOTE — PROGRESS NOTES
[facility] Hospitalist Progress Note     CC: Hospital Follow up    PCP: Ernesto De Dios       Assessment/Plan:     Active Problems:    PAD (peripheral artery disease) (HCC)  Patient is a 68 year old female with PMH sig for CKD, COPD, DM, HTN, HLD, PVD, and sickle cell anemia who presented to the hospital for right lower extremity femoral to popliteal bypass.      PAD  - s/p RLE femoral to popliteal bypass POD # 1   - prn pain medication   - monitor respiratory status  - encouraged IS use  - prn anti emetics  - hgb 8.9 this morning, 9.5 pre operatively  - PT/SW  - dvt ppx: SQH  - rest of management per vascular surgery  - goal SBP < 160  - resume ASA/plavix/statin     Uncontrolled BP  - SBP in the 190s post operatively despite multiple PRNS  - discrepancy between BP cuff and arterial line, given that patient is a vasculopath the BP cuff is not always reliable and therefore the arterial line readings should be used when there is a descrepancy  - still on nicardipine gtt, at minimum dose, hopeful she will come off today  - home medications resumed    DM2, hyperglycemia  - hold PO medications, resume on dc  - ISS, acuchek  - ISS increased to high dose    NAGMA  - likely from Ns as hyperchloremic as well  - stop ns     COPD  - chronic, stable  -resume inhaler     Sickle cell anemia  - outpatient f/u    CKD  - cr stable     FN:  - IVF: none  - Diet:regular     DVT Prophy: SQH  Lines: PIV    Thank You,  Francine Lovett, DO    Hospitalist with [facility]     Subjective:     Patient complaining of RLE pain. No other complaints    OBJECTIVE:    Blood pressure 119/59, pulse 82, temperature 98.3 °F (36.8 °C), temperature source Temporal, resp. rate 21, height 5' 4\" (1.626 m), weight 158 lb (71.7 kg), SpO2 96%, not currently breastfeeding.    Temp:  [97.3 °F (36.3 °C)-98.5 °F (36.9 °C)] 98.3 °F (36.8 °C)  Pulse:  [64-88] 82  Resp:  [10-21] 21  BP: (115-163)/(46-92) 119/59  SpO2:  [89 %-99 %] 96 %  AO:  (122-200)/(41-66) 130/41      Intake/Output:    Intake/Output Summary (Last 24 hours) at 1/25/2024 0949  Last data filed at 1/25/2024 0915  Gross per 24 hour   Intake 6376 ml   Output 2195 ml   Net 4181 ml       Last 3 Weights   01/24/24 0549 158 lb (71.7 kg)   01/19/24 1112 160 lb (72.6 kg)   12/30/23 1216 155 lb (70.3 kg)   08/29/23 1357 153 lb (69.4 kg)   08/21/23 0904 160 lb (72.6 kg)       Exam   Gen: No acute distress  Heent: NC AT, neck supple  Pulm: Lungs clear  CV: Heart with regular rate and rhythm  Abd: Abdomen soft  MSK: RLE warm,   Skin: no rashes or lesions        Data Review:       Labs:     Recent Labs   Lab 01/24/24  1511 01/25/24  0454   RBC 3.32* 3.10*   HGB 9.5* 8.9*   HCT 27.9* 26.6*   MCV 84.0 85.8   MCH 28.6 28.7   MCHC 34.1 33.5   RDW 13.4 13.5   WBC 13.5* 10.3   .0 222.0         Recent Labs   Lab 01/25/24  0454   *   BUN 20   CREATSERUM 1.34*   EGFRCR 44*   CA 8.3*      K 4.3   *   CO2 17.0*       No results for input(s): \"ALT\", \"AST\", \"ALB\", \"AMYLASE\", \"LIPASE\", \"LDH\" in the last 168 hours.    Invalid input(s): \"ALPHOS\", \"TBIL\", \"DBIL\", \"TPROT\"      Imaging:  No results found.      Meds:      insulin aspart  1-11 Units Subcutaneous TID CC    aspirin  81 mg Oral QOD    clopidogrel  75 mg Oral QAM    rosuvastatin  40 mg Oral Nightly    heparin  5,000 Units Subcutaneous Q8H MALA    amLODIPine  10 mg Oral QAM    carvedilol  25 mg Oral BID with meals    hydrALAZINE  100 mg Oral Q8H    lisinopril  40 mg Oral QAM    fluticasone furoate-vilanterol  1 puff Inhalation Daily      niCARdipine Stopped (01/25/24 0915)     HYDROcodone-acetaminophen, ondansetron, acetaminophen **OR** [DISCONTINUED] HYDROcodone-acetaminophen **OR** HYDROcodone-acetaminophen, hydrALAzine, glucose **OR** glucose **OR** glucose-vitamin C **OR** dextrose **OR** glucose **OR** glucose **OR** glucose-vitamin C

## 2024-01-26 LAB
GLUCOSE BLDC GLUCOMTR-MCNC: 171 MG/DL (ref 70–99)
GLUCOSE BLDC GLUCOMTR-MCNC: 240 MG/DL (ref 70–99)
GLUCOSE BLDC GLUCOMTR-MCNC: 329 MG/DL (ref 70–99)
GLUCOSE BLDC GLUCOMTR-MCNC: 355 MG/DL (ref 70–99)

## 2024-01-26 PROCEDURE — 82962 GLUCOSE BLOOD TEST: CPT

## 2024-01-26 RX ORDER — KETOROLAC TROMETHAMINE 15 MG/ML
15 INJECTION, SOLUTION INTRAMUSCULAR; INTRAVENOUS EVERY 6 HOURS
Status: COMPLETED | OUTPATIENT
Start: 2024-01-26 | End: 2024-01-27

## 2024-01-26 NOTE — PROGRESS NOTES
Double RN skin check done prior to transfer off Unit. Skin check performed by this RN and Nila RN       Wounds are as follows: surgical sites on R. Groin and RLE     Will remain available for any further questions or concerns.

## 2024-01-26 NOTE — DISCHARGE INSTRUCTIONS
You may shower with mild soap and water starting today. Wash the incision gently and pat dry; do not scrub.   No soaking in bath or pool for 2 weeks.    Please place dry dressing and keep groin clean and dry as much as possible.    You should resume all home medications as previously taking unless otherwise instructed.    Take Norco and Ibuprofen as needed for pain.    No heavy lifting or straining for 2 weeks. You may then resume to normal activity.    Drink plenty of fluids to stay well hydrated.     You will need to followup with Dr. Murrieta in the vascular clinic in 2 weeks    If you have any fevers, chills, chest pain, shortness of breath, drainage, swelling or bleeding, please call the office in order to return to clinic sooner for evaluation.

## 2024-01-26 NOTE — PLAN OF CARE
Bed locked in low position, belongings in reach, bed alarm on, call light in reach. Frequent rounding done, all patient needs met. Non-slip socks on/at bedside. Dressings intact, per CCU RN surgeon will change them today. PT recommending HH w/PT, patient declining and just wants to go home with family. Patient able to walk with PT in the read last night before transfer to . Pain meds given, sees MAR.    Problem: PAIN - ADULT  Goal: Verbalizes/displays adequate comfort level or patient's stated pain goal  Description: INTERVENTIONS:  - Encourage pt to monitor pain and request assistance  - Assess pain using appropriate pain scale  - Administer analgesics based on type and severity of pain and evaluate response  - Implement non-pharmacological measures as appropriate and evaluate response  - Consider cultural and social influences on pain and pain management  - Manage/alleviate anxiety  - Utilize distraction and/or relaxation techniques  - Monitor for opioid side effects  - Notify MD/LIP if interventions unsuccessful or patient reports new pain  - Anticipate increased pain with activity and pre-medicate as appropriate  Outcome: Progressing     Problem: SAFETY ADULT - FALL  Goal: Free from fall injury  Description: INTERVENTIONS:  - Assess pt frequently for physical needs  - Identify cognitive and physical deficits and behaviors that affect risk of falls.  - [place] fall precautions as indicated by assessment.  - Educate pt/family on patient safety including physical limitations  - Instruct pt to call for assistance with activity based on assessment  - Modify environment to reduce risk of injury  - Provide assistive devices as appropriate  - Consider OT/PT consult to assist with strengthening/mobility  - Encourage toileting schedule  Outcome: Progressing     Problem: CARDIOVASCULAR - ADULT  Goal: Maintains optimal cardiac output and hemodynamic stability  Description: INTERVENTIONS:  - Monitor vital signs, rhythm,  and trends  - Monitor for bleeding, hypotension and signs of decreased cardiac output  - Evaluate effectiveness of vasoactive medications to optimize hemodynamic stability  - Monitor arterial and/or venous puncture sites for bleeding and/or hematoma  - Assess quality of pulses, skin color and temperature  - Assess for signs of decreased coronary artery perfusion - ex. Angina  - Evaluate fluid balance, assess for edema, trend weights  Outcome: Progressing     Problem: RESPIRATORY - ADULT  Goal: Achieves optimal ventilation and oxygenation  Description: INTERVENTIONS:  - Assess for changes in respiratory status  - Assess for changes in mentation and behavior  - Position to facilitate oxygenation and minimize respiratory effort  - Oxygen supplementation based on oxygen saturation or ABGs  - Provide Smoking Cessation handout, if applicable  - Encourage broncho-pulmonary hygiene including cough, deep breathe, Incentive Spirometry  - Assess the need for suctioning and perform as needed  - Assess and instruct to report SOB or any respiratory difficulty  - Respiratory Therapy support as indicated  - Manage/alleviate anxiety  - Monitor for signs/symptoms of CO2 retention  Outcome: Progressing     Problem: GASTROINTESTINAL - ADULT  Goal: Minimal or absence of nausea and vomiting  Description: INTERVENTIONS:  - Maintain adequate hydration with IV or PO as ordered and tolerated  - Nasogastric tube to low intermittent suction as ordered  - Evaluate effectiveness of ordered antiemetic medications  - Provide nonpharmacologic comfort measures as appropriate  - Advance diet as tolerated, if ordered  - Obtain nutritional consult as needed  - Evaluate fluid balance  Outcome: Progressing     Problem: METABOLIC/FLUID AND ELECTROLYTES - ADULT  Goal: Glucose maintained within prescribed range  Description: INTERVENTIONS:  - Monitor Blood Glucose as ordered  - Assess for signs and symptoms of hyperglycemia and hypoglycemia  - Administer  ordered medications to maintain glucose within target range  - Assess barriers to adequate nutritional intake and initiate nutrition consult as needed  - Instruct patient on self management of diabetes  Outcome: Progressing     Problem: SKIN/TISSUE INTEGRITY - ADULT  Goal: Incision(s), wounds(s) or drain site(s) healing without S/S of infection  Description: INTERVENTIONS:  - Assess and document risk factors for pressure ulcer development  - Assess and document skin integrity  - Assess and document dressing/incision, wound bed, drain sites and surrounding tissue  - Implement wound care per orders  - Initiate isolation precautions as appropriate  - Initiate Pressure Ulcer prevention bundle as indicated  Outcome: Progressing

## 2024-01-26 NOTE — OCCUPATIONAL THERAPY NOTE
OCCUPATIONAL THERAPY EVALUATION - INPATIENT     Room Number: 339/339-A  Evaluation Date: 1/26/2024  Type of Evaluation: Initial       Physician Order: IP Consult to Occupational Therapy  Reason for Therapy: ADL/IADL Dysfunction and Discharge Planning    OCCUPATIONAL THERAPY ASSESSMENT     Patient is a 68 year old female admitted 1/24/2024 for PAD; R femoral above knee popliteal artery bypass; WBAT.       RN cleared pt for participation in occupational therapy session, which was completed in collaboration with PT. Upon arrival, pt was supine in bed and agreeable to activity. No visitors present during session. Introduced self and role of OT to pt. Pt verbalized understanding. Gait belt used.    Education provided  Educated pt about proper safety techniques and techniques to decrease pain; deep breathing. Pt verbalized/demonstrated good carryover.    Analysis of occupational performance  Pt required up to mod A for ADLs overall along with min A for supine to sit, CGA for sitting at EOB, min A for STS, and min A for functional transfer at RW level. Pt tolerated about <1 minutes of standing in supported standing. Pt was limited by pain. Pt was left in bed and alarm was activated. Call light and all needs left in reach. Handoff given to RN.    Discharge recommendation  The patient is below baseline and would benefit from continued skilled inpatient OT to address the above deficits, maximizing patient's ability to return to prior level of function. Recommend home     The patient's Approx Degree of Impairment: 50.11% has been calculated based on documentation in the [facility] '6 clicks' Inpatient Daily Activity Short Form.  Research supports that patients with this level of impairment may benefit from home health.      PLAN OT Treatment Plan: Balance activities;Energy conservation/work simplification techniques;Continued evaluation;Compensatory technique education;Fine motor coordination activities;Neuromuscluar  reeducation;Equipment eval/education;Patient/Family training;Patient/Family education;Cognitive reorientation;Endurance training;UE strengthening/ROM;Functional transfer training;Visual perceptual training;IADL training;ADL training    OCCUPATIONAL THERAPY MEDICAL/SOCIAL HISTORY     Problem List  Active Problems:    PAD (peripheral artery disease) ([facility])      Past Medical History  Past Medical History:   Diagnosis Date    Anemia     Back pain     Back problem     Cataract     Chronic kidney disease (CKD)     COPD (chronic obstructive pulmonary disease) (HCC)     FEV 1 1.25 50%     Diabetes (HCC)     DM neuropathy    Essential hypertension     H/O angioplasty     2020    High blood pressure     High cholesterol     Neuropathy     Peripheral vascular disease (HCC)     PNA (pneumonia) 2018    Pulmonary emphysema (HCC)     Pulmonary nodule     4 mm RUL    Renal disorder     monitoring kidney labs    Sickle cell trait (HCC)     Sickle-cell anemia (HCC)     Tobacco abuse     Visual impairment        Past Surgical History  Past Surgical History:   Procedure Laterality Date    APPENDECTOMY      BACK SURGERY Right 2021    Right L3-4 extreme lateral interbody fusion, posterior decompression; LUMBAR LAMINECTOMY 1 LEVEL          x3    CHOLECYSTECTOMY      EXCIS LUMBAR DISK,ONE LEVEL          OTHER      bilateral leg stents  and        HOME SITUATION  Type of Home: House  Home Layout: Two level  Lives With: Son;Other (Comment) (sister)                              Use of Assistive Device(s): RW    Prior Level of [place]: independent    SUBJECTIVE  \"My son sets me up before he goes to work.\"    OCCUPATIONAL THERAPY EXAMINATION      OBJECTIVE  Precautions: Limb alert - right  Fall Risk:  (Moderate fall risk)    PAIN ASSESSMENT  Ratin  Location: RLE          RANGE OF MOTION   Upper extremity ROM is within functional limits     STRENGTH ASSESSMENT  Upper extremity strength is within  functional limits     COORDINATION  Gross Motor: WFL   Fine Motor: WFL              ACTIVITIES OF DAILY LIVING ASSESSMENT  AM-PAC ‘6-Clicks’ Inpatient Daily Activity Short Form  How much help from another person does the patient currently need…  -   Putting on and taking off regular lower body clothing?: A Lot  -   Bathing (including washing, rinsing, drying)?: A Little  -   Toileting, which includes using toilet, bedpan or urinal? : A Little  -   Putting on and taking off regular upper body clothing?: A Little  -   Taking care of personal grooming such as brushing teeth?: A Little  -   Eating meals?: A Little    AM-PAC Score:  Score: 17  Approx Degree of Impairment: 50.11%  Standardized Score (AM-PAC Scale): 37.26  CMS Modifier (G-Code): CK    FUNCTIONAL TRANSFER ASSESSMENT  Supine to Sit : Minimal Assist     Sit to stand: min A       FUNCTIONAL ADL ASSESSMENT  Up to max A    OT Goals  Patients self stated goal is: to go home     Patient will complete functional transfer with mod I  Comment:     Patient will complete toileting with mod I  Comment:     Patient will tolerate standing for 3 minutes in prep for adls with mod I   Comment:    Patient will complete item retrieval with mod I  Comment:          Goals  on:   Frequency: 3-5x/wk    Patient Evaluation Complexity Level:   Occupational Profile/Medical History MODERATE - Expanded review of history including review of medical or therapy record   Specific performance deficits impacting engagement in ADL/IADL MODERATE  3 - 5 performance deficits   Client Assessment/Performance Deficits MODERATE - Comorbidities and min to mod modifications of tasks    Clinical Decision Making MODERATE - Analysis of occupational profile, detailed assessments, several treatment options    Overall Complexity MODERATE     OT Session Time: 30 minutes  Self-Care Home Management: 15 minutes         Brenda Robins OT  [facility]  Inpatient Rehabilitation  Occupational  Therapy  [phone]

## 2024-01-26 NOTE — PLAN OF CARE
Problem: Patient Centered Care  Goal: Patient preferences are identified and integrated in the patient's plan of care  Description: Interventions:  - What would you like us to know as we care for you?   - Provide timely, complete, and accurate information to patient/family  - Incorporate patient and family knowledge, values, beliefs, and cultural backgrounds into the planning and delivery of care  - Encourage patient/family to participate in care and decision-making at the level they choose  - Honor patient and family perspectives and choices  Outcome: Progressing     Problem: Patient/Family Goals  Goal: Patient/Family Long Term Goal  Description: Patient's Long Term Goal:     Interventions:  -   - See additional Care Plan goals for specific interventions  Outcome: Progressing  Goal: Patient/Family Short Term Goal  Description: Patient's Short Term Goal:    Interventions:   -   - See additional Care Plan goals for specific interventions  Outcome: Progressing     Problem: PAIN - ADULT  Goal: Verbalizes/displays adequate comfort level or patient's stated pain goal  Description: INTERVENTIONS:  - Encourage pt to monitor pain and request assistance  - Assess pain using appropriate pain scale  - Administer analgesics based on type and severity of pain and evaluate response  - Implement non-pharmacological measures as appropriate and evaluate response  - Consider cultural and social influences on pain and pain management  - Manage/alleviate anxiety  - Utilize distraction and/or relaxation techniques  - Monitor for opioid side effects  - Notify MD/LIP if interventions unsuccessful or patient reports new pain  - Anticipate increased pain with activity and pre-medicate as appropriate  Outcome: Progressing     Problem: SAFETY ADULT - FALL  Goal: Free from fall injury  Description: INTERVENTIONS:  - Assess pt frequently for physical needs  - Identify cognitive and physical deficits and behaviors that affect risk of  falls.  - [place] fall precautions as indicated by assessment.  - Educate pt/family on patient safety including physical limitations  - Instruct pt to call for assistance with activity based on assessment  - Modify environment to reduce risk of injury  - Provide assistive devices as appropriate  - Consider OT/PT consult to assist with strengthening/mobility  - Encourage toileting schedule  Outcome: Progressing     Problem: CARDIOVASCULAR - ADULT  Goal: Maintains optimal cardiac output and hemodynamic stability  Description: INTERVENTIONS:  - Monitor vital signs, rhythm, and trends  - Monitor for bleeding, hypotension and signs of decreased cardiac output  - Evaluate effectiveness of vasoactive medications to optimize hemodynamic stability  - Monitor arterial and/or venous puncture sites for bleeding and/or hematoma  - Assess quality of pulses, skin color and temperature  - Assess for signs of decreased coronary artery perfusion - ex. Angina  - Evaluate fluid balance, assess for edema, trend weights  Outcome: Progressing     Problem: RESPIRATORY - ADULT  Goal: Achieves optimal ventilation and oxygenation  Description: INTERVENTIONS:  - Assess for changes in respiratory status  - Assess for changes in mentation and behavior  - Position to facilitate oxygenation and minimize respiratory effort  - Oxygen supplementation based on oxygen saturation or ABGs  - Provide Smoking Cessation handout, if applicable  - Encourage broncho-pulmonary hygiene including cough, deep breathe, Incentive Spirometry  - Assess the need for suctioning and perform as needed  - Assess and instruct to report SOB or any respiratory difficulty  - Respiratory Therapy support as indicated  - Manage/alleviate anxiety  - Monitor for signs/symptoms of CO2 retention  Outcome: Progressing     Problem: GASTROINTESTINAL - ADULT  Goal: Minimal or absence of nausea and vomiting  Description: INTERVENTIONS:  - Maintain adequate hydration with IV or PO as  ordered and tolerated  - Nasogastric tube to low intermittent suction as ordered  - Evaluate effectiveness of ordered antiemetic medications  - Provide nonpharmacologic comfort measures as appropriate  - Advance diet as tolerated, if ordered  - Obtain nutritional consult as needed  - Evaluate fluid balance  Outcome: Progressing     Problem: METABOLIC/FLUID AND ELECTROLYTES - ADULT  Goal: Glucose maintained within prescribed range  Description: INTERVENTIONS:  - Monitor Blood Glucose as ordered  - Assess for signs and symptoms of hyperglycemia and hypoglycemia  - Administer ordered medications to maintain glucose within target range  - Assess barriers to adequate nutritional intake and initiate nutrition consult as needed  - Instruct patient on self management of diabetes  Outcome: Progressing     Problem: SKIN/TISSUE INTEGRITY - ADULT  Goal: Incision(s), wounds(s) or drain site(s) healing without S/S of infection  Description: INTERVENTIONS:  - Assess and document risk factors for pressure ulcer development  - Assess and document skin integrity  - Assess and document dressing/incision, wound bed, drain sites and surrounding tissue  - Implement wound care per orders  - Initiate isolation precautions as appropriate  - Initiate Pressure Ulcer prevention bundle as indicated  Outcome: Progressing

## 2024-01-26 NOTE — PAYOR COMM NOTE
--------------  CONTINUED STAY REVIEW    Payor: [employer]  Subscriber #:  [id number]  Authorization Number: [id number]    Admit date: 1/24/24  Admit time:  5:35 AM    Admitting Physician: Madiha Murrieta MD  Attending Physician:  Madiha Murrieta MD  Primary Care Physician: Ernesto De Dios    REVIEW DOCUMENTATION:   1-25-24     Assessment/Plan:      Active Problems:    PAD (peripheral artery disease) (HCC)  Patient is a 68 year old female with PMH sig for CKD, COPD, DM, HTN, HLD, PVD, and sickle cell anemia who presented to the hospital for right lower extremity femoral to popliteal bypass.      PAD  - s/p RLE femoral to popliteal bypass POD # 1   - prn pain medication   - monitor respiratory status  - encouraged IS use  - prn anti emetics  - hgb 8.9 this morning, 9.5 pre operatively  - PT/SW  - dvt ppx: SQH  - rest of management per vascular surgery  - goal SBP < 160  - resume ASA/plavix/statin     Uncontrolled BP  - SBP in the 190s post operatively despite multiple PRNS  - discrepancy between BP cuff and arterial line, given that patient is a vasculopath the BP cuff is not always reliable and therefore the arterial line readings should be used when there is a descrepancy  - still on nicardipine gtt, at minimum dose, hopeful she will come off today  - home medications resumed     DM2, hyperglycemia  - hold PO medications, resume on dc  - ISS, acuchek  - ISS increased to high dose     NAGMA  - likely from Ns as hyperchloremic as well  - stop ns     COPD  - chronic, stable  -resume inhaler     Sickle cell anemia  - outpatient f/u     CKD  - cr stable     FN:  - IVF: none  - Diet:regular     DVT Prophy: SQH  Lines: PIV    Exam   Gen: No acute distress  Heent: NC AT, neck supple  Pulm: Lungs clear  CV: Heart with regular rate and rhythm  Abd: Abdomen soft  MSK: RLE warm,   Skin: no rashes or lesions           Data Review:       Labs:          Recent Labs   Lab 01/25/24  0454   RBC 3.10*   HGB 8.9*   HCT 26.6*    MCV 85.8   MCH 28.7   MCHC 33.5   RDW 13.5   WBC 10.3   .0                Recent Labs   Lab 01/25/24  0454   *   BUN 20   CREATSERUM 1.34*   EGFRCR 44*   CA 8.3*      K 4.3   *   CO2 17.0*       Vascular Surgery Progress Note     No acute events overnight. Patient reports difficulty with pain control due to opioid tolerance. She states she hasn't been able to bear weight due to the pain. She does feel improved sensation in her right foot. She is tolerating a diet. On exam, groin and calf incisions are c/d/I with dressings in place. Foot is warm with mild swelling. Strong PT doppler signal, monophasic DP signal.   69 y/o female s/p right femoral to popliteal bypass   - ok for out of bed, PT/OT   - diet as tolerated   - ok to transfer out of ICU   - will add 24 hours of toradol for pain control - if not adequate may need to get pain management involved                  MEDICATIONS ADMINISTERED IN LAST 1 DAY:  amLODIPine (Norvasc) tab 10 mg       Date Action Dose Route User    1/26/2024 0924 Given 10 mg Oral Meggan Walker RN          carvedilol (Coreg) tab 25 mg       Date Action Dose Route User    1/26/2024 0924 Given 25 mg Oral Meggan Walker RN    1/25/2024 1748 Given 25 mg Oral Ailyn Brink RN          clopidogrel (Plavix) tab 75 mg       Date Action Dose Route User    1/26/2024 0924 Given 75 mg Oral Meggan Walker RN          fluticasone furoate-vilanterol (Breo Ellipta) 200-25 MCG/ACT inhaler 1 puff       Date Action Dose Route User    1/26/2024 0924 Given 1 puff Inhalation Meggan Walker RN          heparin (Porcine) 5000 UNIT/ML injection 5,000 Units       Date Action Dose Route User    1/26/2024 0538 Given 5,000 Units Subcutaneous (Left Lower Abdomen) Nani Richmond RN    1/25/2024 2023 Given 5,000 Units Subcutaneous (Left Lower Abdomen) Sue Medina RN    1/25/2024 1500 Given 5,000 Units Subcutaneous (Right Lower Abdomen) Ailyn Brink RN          hydrALAZINE  (Apresoline) tab 100 mg       Date Action Dose Route User    1/26/2024 0538 Given 100 mg Oral Nani Richmond RN    1/25/2024 2023 Given 100 mg Oral Sue Medina RN    1/25/2024 1500 Given 100 mg Oral Ailyn Brink RN          HYDROcodone-acetaminophen (Norco)  MG per tab 2 tablet       Date Action Dose Route User    1/26/2024 0927 Given 2 tablet Oral Meggan Walker RN    1/25/2024 2023 Given 2 tablet Oral Sue Medina RN          HYDROcodone-acetaminophen (Norco)  MG per tab 1 tablet       Date Action Dose Route User    1/26/2024 0538 Given 1 tablet Oral Nani Richmond RN          insulin aspart (NovoLOG) 100 Units/mL FlexPen 1-11 Units       Date Action Dose Route User    1/26/2024 0923 Given 10 Units Subcutaneous (Right Upper Arm) Meggan Walker RN    1/25/2024 1653 Given 10 Units Subcutaneous (Left Upper Arm) Ailyn Brink RN    1/25/2024 1133 Given 7 Units Subcutaneous (Right Upper Arm) Ailyn Brink RN          ketorolac (Toradol) 15 MG/ML injection 15 mg       Date Action Dose Route User    1/26/2024 0538 Given 15 mg Intravenous Nani Richmond RN    1/26/2024 0017 Given 15 mg Intravenous Sue Medina RN    1/25/2024 1741 Given 15 mg Intravenous Ailyn Brink RN    1/25/2024 1251 Given 15 mg Intravenous Ailyn Brink RN          lisinopril (Prinivil; Zestril) tab 40 mg       Date Action Dose Route User    1/26/2024 0924 Given 40 mg Oral Meggan Walker RN          rosuvastatin (Crestor) tab 40 mg       Date Action Dose Route User    1/25/2024 2023 Given 40 mg Oral Sue Medina RN            Vitals (last day)       Date/Time Temp Pulse Resp BP SpO2 Weight O2 Device O2 Flow Rate (L/min) [facility]    01/26/24 0920 99.6 °F (37.6 °C) 99 18 173/73 93 % -- None (Room air) -- PV    01/26/24 0538 98 °F (36.7 °C) 91 16 163/55 92 % -- None (Room air) -- LT    01/26/24 0057 98.7 °F (37.1 °C) 68 18 151/62 93 % -- None (Room air) -- LT    01/26/24 0000 98 °F (36.7 °C) 70 17 140/58 94 % -- None  (Room air) -- EB    01/25/24 0800 98.3 °F (36.8 °C) 88 16 135/56 91 % -- None (Room air) -- KK    01/25/24 0100 -- 69 15 117/92 94 % -- Nasal cannula 3 L/min EB    01/25/24 0000 97.9 °F (36.6 °C) 68 13 124/49 96 % -- Nasal cannula 3 L/min EB

## 2024-01-26 NOTE — PROGRESS NOTES
Vascular Surgery Progress Note    No acute events overnight. Pain better managed with toradol yesterday. Patient states norco is not enough and would like an additional day. She is tolerating a diet. Has walked with PT in the hallway. Right foot is warm, well perfused. Incisions are c/d/I with staples in place. No hematoma or erythema. Strong doppler signal at the PT.   67 y/o female s/p right femoral to popliteal bypass   - will plan for an additional day of toradol   - will get labs in the AM  - daily dry dressing to incisions   - continue working with PT   - likely discharge tomorrow     Madiha Murrieta MD  01/26/24  9:39 AM

## 2024-01-26 NOTE — PROGRESS NOTES
[facility] Hospitalist Progress Note     CC: Hospital Follow up    PCP: Ernesto De Dios       Assessment/Plan:     Active Problems:    PAD (peripheral artery disease) (HCC)  Patient is a 68 year old female with PMH sig for CKD, COPD, DM, HTN, HLD, PVD, and sickle cell anemia who presented to the hospital for right lower extremity femoral to popliteal bypass. Underwent surgery and tolerated well. Post op course with some hypertension needing nicardipine gtt now off. Plan for one more day of toradol with tentative dc tomorrow     PAD  - s/p RLE femoral to popliteal bypass POD # 2   - prn pain medication   - monitor respiratory status  - encouraged IS use  - prn anti emetics  - hgb 8.9 this morning, 9.5 pre operatively  - PT/SW  - dvt ppx: SQH  - rest of management per vascular surgery  - goal SBP < 160  - resume ASA/plavix/statin  - pain is improved today however still present, plan for 1 more day of Toradol      Uncontrolled BP- improved  - SBP in the 190s post operatively despite multiple PRNS  - discrepancy between BP cuff and arterial line, given that patient is a vasculopath the BP cuff is not always reliable and therefore the arterial line readings should be used when there is a descrepancy  - off nicardipine gtt  - home medications resumed    DM2, hyperglycemia  - hold PO medications, resume on dc  - ISS, acuchek  - ISS increased to high dose  - add lantus 6 units BID    NAGMA  - likely from Ns as hyperchloremic as well  - stop ns     COPD  - chronic, stable  -resume inhaler     Sickle cell anemia  - outpatient f/u    CKD  - cr stable     FN:  - IVF: none  - Diet:regular     DVT Prophy: SQH  Lines: PIV    Dispo: plan for dc tomorrow    Thank You,  Francine Lovett, DO    Hospitalist with [facility]     Subjective:     RLE pain improving. Feels like toradol helps a lot    OBJECTIVE:    Blood pressure (!) 173/73, pulse 99, temperature 99.6 °F (37.6 °C), temperature source Oral, resp. rate  18, height 5' 4\" (1.626 m), weight 158 lb (71.7 kg), SpO2 93%, not currently breastfeeding.    Temp:  [97.9 °F (36.6 °C)-99.6 °F (37.6 °C)] 99.6 °F (37.6 °C)  Pulse:  [66-99] 99  Resp:  [12-18] 18  BP: (112-173)/(42-73) 173/73  SpO2:  [91 %-96 %] 93 %      Intake/Output:    Intake/Output Summary (Last 24 hours) at 1/26/2024 1134  Last data filed at 1/26/2024 0957  Gross per 24 hour   Intake 1045 ml   Output 1200 ml   Net -155 ml       Last 3 Weights   01/24/24 0549 158 lb (71.7 kg)   01/19/24 1112 160 lb (72.6 kg)   12/30/23 1216 155 lb (70.3 kg)   08/29/23 1357 153 lb (69.4 kg)   08/21/23 0904 160 lb (72.6 kg)       Exam   Gen: No acute distress  Heent: NC AT, neck supple  Pulm: Lungs clear  CV: Heart with regular rate and rhythm  Abd: Abdomen soft  MSK: RLE warm, incision site with staples c/d/I  Skin: no rashes or lesions        Data Review:       Labs:     Recent Labs   Lab 01/24/24  1511 01/25/24  0454   RBC 3.32* 3.10*   HGB 9.5* 8.9*   HCT 27.9* 26.6*   MCV 84.0 85.8   MCH 28.6 28.7   MCHC 34.1 33.5   RDW 13.4 13.5   WBC 13.5* 10.3   .0 222.0         Recent Labs   Lab 01/25/24  0454   *   BUN 20   CREATSERUM 1.34*   EGFRCR 44*   CA 8.3*      K 4.3   *   CO2 17.0*       No results for input(s): \"ALT\", \"AST\", \"ALB\", \"AMYLASE\", \"LIPASE\", \"LDH\" in the last 168 hours.    Invalid input(s): \"ALPHOS\", \"TBIL\", \"DBIL\", \"TPROT\"      Imaging:  No results found.      Meds:      ketorolac  15 mg Intravenous Q6H    insulin aspart  1-11 Units Subcutaneous TID CC    aspirin  81 mg Oral QOD    clopidogrel  75 mg Oral QAM    rosuvastatin  40 mg Oral Nightly    heparin  5,000 Units Subcutaneous Q8H MALA    amLODIPine  10 mg Oral QAM    carvedilol  25 mg Oral BID with meals    hydrALAZINE  100 mg Oral Q8H    lisinopril  40 mg Oral QAM    fluticasone furoate-vilanterol  1 puff Inhalation Daily         HYDROcodone-acetaminophen, ondansetron, acetaminophen **OR** [DISCONTINUED] HYDROcodone-acetaminophen  **OR** HYDROcodone-acetaminophen, hydrALAzine, glucose **OR** glucose **OR** glucose-vitamin C **OR** dextrose **OR** glucose **OR** glucose **OR** glucose-vitamin C

## 2024-01-26 NOTE — PLAN OF CARE
Ms. Ava Bowen is A&O x 4.  Lives at home with family.    One assist with walker.  Continent of bowel and bladder.  VSS:  stable.    Plan:  Pain management, monitor and treat surgical site, RLE compression stocking.  Will continue to monitor and treat.    Problem: Patient Centered Care  Goal: Patient preferences are identified and integrated in the patient's plan of care  Description: Interventions:  - What would you like us to know as we care for you? I have a high tolerance for pain medication  - Provide timely, complete, and accurate information to patient/family  - Incorporate patient and family knowledge, values, beliefs, and cultural backgrounds into the planning and delivery of care  - Encourage patient/family to participate in care and decision-making at the level they choose  - Honor patient and family perspectives and choices  Outcome: Progressing     Problem: Patient/Family Goals  Goal: Patient/Family Long Term Goal  Description: Patient's Long Term Goal: Discharge Home    Interventions:  - medication management, education, and compliance  - See additional Care Plan goals for specific interventions  Outcome: Progressing  Goal: Patient/Family Short Term Goal  Description: Patient's Short Term Goal: Remain free from pain    Interventions:   - medication management, education, and compliance  - See additional Care Plan goals for specific interventions  Outcome: Progressing     Problem: PAIN - ADULT  Goal: Verbalizes/displays adequate comfort level or patient's stated pain goal  Description: INTERVENTIONS:  - Encourage pt to monitor pain and request assistance  - Assess pain using appropriate pain scale  - Administer analgesics based on type and severity of pain and evaluate response  - Implement non-pharmacological measures as appropriate and evaluate response  - Consider cultural and social influences on pain and pain management  - Manage/alleviate anxiety  - Utilize distraction and/or relaxation  techniques  - Monitor for opioid side effects  - Notify MD/LIP if interventions unsuccessful or patient reports new pain  - Anticipate increased pain with activity and pre-medicate as appropriate  Outcome: Progressing     Problem: SAFETY ADULT - FALL  Goal: Free from fall injury  Description: INTERVENTIONS:  - Assess pt frequently for physical needs  - Identify cognitive and physical deficits and behaviors that affect risk of falls.  - [place] fall precautions as indicated by assessment.  - Educate pt/family on patient safety including physical limitations  - Instruct pt to call for assistance with activity based on assessment  - Modify environment to reduce risk of injury  - Provide assistive devices as appropriate  - Consider OT/PT consult to assist with strengthening/mobility  - Encourage toileting schedule  Outcome: Progressing     Problem: DISCHARGE PLANNING  Goal: Discharge to home or other facility with appropriate resources  Description: INTERVENTIONS:  - Identify barriers to discharge w/pt and caregiver  - Include patient/family/discharge partner in discharge planning  - Arrange for needed discharge resources and transportation as appropriate  - Identify discharge learning needs (meds, wound care, etc)  - Arrange for interpreters to assist at discharge as needed  - Consider post-discharge preferences of patient/family/discharge partner  - Complete POLST form as appropriate  - Assess patient's ability to be responsible for managing their own health  - Refer to Case Management Department for coordinating discharge planning if the patient needs post-hospital services based on physician/LIP order or complex needs related to functional status, cognitive ability or social support system  Outcome: Progressing     Problem: CARDIOVASCULAR - ADULT  Goal: Maintains optimal cardiac output and hemodynamic stability  Description: INTERVENTIONS:  - Monitor vital signs, rhythm, and trends  - Monitor for bleeding,  hypotension and signs of decreased cardiac output  - Evaluate effectiveness of vasoactive medications to optimize hemodynamic stability  - Monitor arterial and/or venous puncture sites for bleeding and/or hematoma  - Assess quality of pulses, skin color and temperature  - Assess for signs of decreased coronary artery perfusion - ex. Angina  - Evaluate fluid balance, assess for edema, trend weights  Outcome: Progressing     Problem: RESPIRATORY - ADULT  Goal: Achieves optimal ventilation and oxygenation  Description: INTERVENTIONS:  - Assess for changes in respiratory status  - Assess for changes in mentation and behavior  - Position to facilitate oxygenation and minimize respiratory effort  - Oxygen supplementation based on oxygen saturation or ABGs  - Provide Smoking Cessation handout, if applicable  - Encourage broncho-pulmonary hygiene including cough, deep breathe, Incentive Spirometry  - Assess the need for suctioning and perform as needed  - Assess and instruct to report SOB or any respiratory difficulty  - Respiratory Therapy support as indicated  - Manage/alleviate anxiety  - Monitor for signs/symptoms of CO2 retention  Outcome: Progressing     Problem: GASTROINTESTINAL - ADULT  Goal: Minimal or absence of nausea and vomiting  Description: INTERVENTIONS:  - Maintain adequate hydration with IV or PO as ordered and tolerated  - Nasogastric tube to low intermittent suction as ordered  - Evaluate effectiveness of ordered antiemetic medications  - Provide nonpharmacologic comfort measures as appropriate  - Advance diet as tolerated, if ordered  - Obtain nutritional consult as needed  - Evaluate fluid balance  Outcome: Progressing     Problem: METABOLIC/FLUID AND ELECTROLYTES - ADULT  Goal: Glucose maintained within prescribed range  Description: INTERVENTIONS:  - Monitor Blood Glucose as ordered  - Assess for signs and symptoms of hyperglycemia and hypoglycemia  - Administer ordered medications to maintain  glucose within target range  - Assess barriers to adequate nutritional intake and initiate nutrition consult as needed  - Instruct patient on self management of diabetes  Outcome: Progressing     Problem: SKIN/TISSUE INTEGRITY - ADULT  Goal: Incision(s), wounds(s) or drain site(s) healing without S/S of infection  Description: INTERVENTIONS:  - Assess and document risk factors for pressure ulcer development  - Assess and document skin integrity  - Assess and document dressing/incision, wound bed, drain sites and surrounding tissue  - Implement wound care per orders  - Initiate isolation precautions as appropriate  - Initiate Pressure Ulcer prevention bundle as indicated  Outcome: Progressing

## 2024-01-27 VITALS
TEMPERATURE: 99 F | HEART RATE: 75 BPM | WEIGHT: 164.19 LBS | BODY MASS INDEX: 28.03 KG/M2 | DIASTOLIC BLOOD PRESSURE: 53 MMHG | OXYGEN SATURATION: 93 % | HEIGHT: 64 IN | SYSTOLIC BLOOD PRESSURE: 162 MMHG | RESPIRATION RATE: 19 BRPM

## 2024-01-27 LAB
ANION GAP SERPL CALC-SCNC: 12 MMOL/L (ref 0–18)
BUN BLD-MCNC: 32 MG/DL (ref 9–23)
BUN/CREAT SERPL: 25 (ref 10–20)
CALCIUM BLD-MCNC: 9.3 MG/DL (ref 8.7–10.4)
CHLORIDE SERPL-SCNC: 115 MMOL/L (ref 98–112)
CO2 SERPL-SCNC: 19 MMOL/L (ref 21–32)
CREAT BLD-MCNC: 1.28 MG/DL
DEPRECATED RDW RBC AUTO: 42.1 FL (ref 35.1–46.3)
EGFRCR SERPLBLD CKD-EPI 2021: 46 ML/MIN/1.73M2 (ref 60–?)
ERYTHROCYTE [DISTWIDTH] IN BLOOD BY AUTOMATED COUNT: 13.4 % (ref 11–15)
GLUCOSE BLD-MCNC: 220 MG/DL (ref 70–99)
GLUCOSE BLDC GLUCOMTR-MCNC: 219 MG/DL (ref 70–99)
GLUCOSE BLDC GLUCOMTR-MCNC: 318 MG/DL (ref 70–99)
HCT VFR BLD AUTO: 27.2 %
HGB BLD-MCNC: 8.9 G/DL
MCH RBC QN AUTO: 28.3 PG (ref 26–34)
MCHC RBC AUTO-ENTMCNC: 32.7 G/DL (ref 31–37)
MCV RBC AUTO: 86.3 FL
OSMOLALITY SERPL CALC.SUM OF ELEC: 316 MOSM/KG (ref 275–295)
PLATELET # BLD AUTO: 222 10(3)UL (ref 150–450)
POTASSIUM SERPL-SCNC: 3.9 MMOL/L (ref 3.5–5.1)
RBC # BLD AUTO: 3.15 X10(6)UL
SODIUM SERPL-SCNC: 146 MMOL/L (ref 136–145)
WBC # BLD AUTO: 10.3 X10(3) UL (ref 4–11)

## 2024-01-27 PROCEDURE — 80048 BASIC METABOLIC PNL TOTAL CA: CPT | Performed by: SURGERY

## 2024-01-27 PROCEDURE — 85027 COMPLETE CBC AUTOMATED: CPT | Performed by: SURGERY

## 2024-01-27 PROCEDURE — 82962 GLUCOSE BLOOD TEST: CPT

## 2024-01-27 RX ORDER — HYDROCODONE BITARTRATE AND ACETAMINOPHEN 10; 325 MG/1; MG/1
1 TABLET ORAL EVERY 4 HOURS PRN
COMMUNITY

## 2024-01-27 NOTE — PLAN OF CARE
Toradol and Norco administered for pain overnight. Plan discharge today pending medical clearance.    Problem: Patient Centered Care  Goal: Patient preferences are identified and integrated in the patient's plan of care  Description: Interventions:  - What would you like us to know as we care for you? I'm from home with children.  - Provide timely, complete, and accurate information to patient/family  - Incorporate patient and family knowledge, values, beliefs, and cultural backgrounds into the planning and delivery of care  - Encourage patient/family to participate in care and decision-making at the level they choose  - Honor patient and family perspectives and choices  Outcome: Progressing     Problem: PAIN - ADULT  Goal: Verbalizes/displays adequate comfort level or patient's stated pain goal  Description: INTERVENTIONS:  - Encourage pt to monitor pain and request assistance  - Assess pain using appropriate pain scale  - Administer analgesics based on type and severity of pain and evaluate response  - Implement non-pharmacological measures as appropriate and evaluate response  - Consider cultural and social influences on pain and pain management  - Manage/alleviate anxiety  - Utilize distraction and/or relaxation techniques  - Monitor for opioid side effects  - Notify MD/LIP if interventions unsuccessful or patient reports new pain  - Anticipate increased pain with activity and pre-medicate as appropriate  Outcome: Progressing     Problem: SAFETY ADULT - FALL  Goal: Free from fall injury  Description: INTERVENTIONS:  - Assess pt frequently for physical needs  - Identify cognitive and physical deficits and behaviors that affect risk of falls.  - [place] fall precautions as indicated by assessment.  - Educate pt/family on patient safety including physical limitations  - Instruct pt to call for assistance with activity based on assessment  - Modify environment to reduce risk of injury  - Provide assistive devices  as appropriate  - Consider OT/PT consult to assist with strengthening/mobility  - Encourage toileting schedule  Outcome: Progressing     Problem: DISCHARGE PLANNING  Goal: Discharge to home or other facility with appropriate resources  Description: INTERVENTIONS:  - Identify barriers to discharge w/pt and caregiver  - Include patient/family/discharge partner in discharge planning  - Arrange for needed discharge resources and transportation as appropriate  - Identify discharge learning needs (meds, wound care, etc)  - Arrange for interpreters to assist at discharge as needed  - Consider post-discharge preferences of patient/family/discharge partner  - Complete POLST form as appropriate  - Assess patient's ability to be responsible for managing their own health  - Refer to Case Management Department for coordinating discharge planning if the patient needs post-hospital services based on physician/LIP order or complex needs related to functional status, cognitive ability or social support system  Outcome: Progressing     Problem: CARDIOVASCULAR - ADULT  Goal: Maintains optimal cardiac output and hemodynamic stability  Description: INTERVENTIONS:  - Monitor vital signs, rhythm, and trends  - Monitor for bleeding, hypotension and signs of decreased cardiac output  - Evaluate effectiveness of vasoactive medications to optimize hemodynamic stability  - Monitor arterial and/or venous puncture sites for bleeding and/or hematoma  - Assess quality of pulses, skin color and temperature  - Assess for signs of decreased coronary artery perfusion - ex. Angina  - Evaluate fluid balance, assess for edema, trend weights  Outcome: Progressing     Problem: RESPIRATORY - ADULT  Goal: Achieves optimal ventilation and oxygenation  Description: INTERVENTIONS:  - Assess for changes in respiratory status  - Assess for changes in mentation and behavior  - Position to facilitate oxygenation and minimize respiratory effort  - Oxygen  supplementation based on oxygen saturation or ABGs  - Provide Smoking Cessation handout, if applicable  - Encourage broncho-pulmonary hygiene including cough, deep breathe, Incentive Spirometry  - Assess the need for suctioning and perform as needed  - Assess and instruct to report SOB or any respiratory difficulty  - Respiratory Therapy support as indicated  - Manage/alleviate anxiety  - Monitor for signs/symptoms of CO2 retention  Outcome: Progressing     Problem: GASTROINTESTINAL - ADULT  Goal: Minimal or absence of nausea and vomiting  Description: INTERVENTIONS:  - Maintain adequate hydration with IV or PO as ordered and tolerated  - Nasogastric tube to low intermittent suction as ordered  - Evaluate effectiveness of ordered antiemetic medications  - Provide nonpharmacologic comfort measures as appropriate  - Advance diet as tolerated, if ordered  - Obtain nutritional consult as needed  - Evaluate fluid balance  Outcome: Progressing     Problem: METABOLIC/FLUID AND ELECTROLYTES - ADULT  Goal: Glucose maintained within prescribed range  Description: INTERVENTIONS:  - Monitor Blood Glucose as ordered  - Assess for signs and symptoms of hyperglycemia and hypoglycemia  - Administer ordered medications to maintain glucose within target range  - Assess barriers to adequate nutritional intake and initiate nutrition consult as needed  - Instruct patient on self management of diabetes  Outcome: Progressing     Problem: SKIN/TISSUE INTEGRITY - ADULT  Goal: Incision(s), wounds(s) or drain site(s) healing without S/S of infection  Description: INTERVENTIONS:  - Assess and document risk factors for pressure ulcer development  - Assess and document skin integrity  - Assess and document dressing/incision, wound bed, drain sites and surrounding tissue  - Implement wound care per orders  - Initiate isolation precautions as appropriate  - Initiate Pressure Ulcer prevention bundle as indicated  Outcome: Progressing

## 2024-01-27 NOTE — PLAN OF CARE
Patient was provided with discharge instructions, education, and follow up information. Patient's son present for discharge instructions with patient's consent.  Patient verbalizes understanding of follow up information, specifically Vascular Surgery. Patient has no questions after reviewing all instructions and will be going home.     Brooke ROMAN, Discharge Leader [id number]

## 2024-01-27 NOTE — DISCHARGE SUMMARY
General Medicine Discharge Summary     Patient ID:  Ava Bowen  68 year old  10/6/1955    Admit date: 1/24/2024    Discharge date and time: 01/27/24    Attending Physician: Madiha Murrieta MD     Primary Care Physician: Ernesto De Dios     Reason for admission: PAD    Discharge Diagnoses: Peripheral artery disease  PAD (peripheral artery disease) (HCC)    Discharged Condition: stable    Disposition: home    Consults:   Consultants         Provider   Role Specialty     Judie Marie MD  Consulting Physician HOSPITALIST     Peter Lyn MD  Consulting Physician HOSPITALIST              HPI: Per Dr. Lovett    Patient is a 68 year old female with PMH sig for CKD, COPD, DM, HTN, HLD, PVD, and sickle cell anemia who presented to the hospital for right lower extremity femoral to popliteal bypass. Patient was seen and examined post operatively. She was sleepy from anesthesia but could answer in short bursts. Was complaining of pain. BP in PACU elevated despite PRNs, added nicardipine gtt. Plan to admit to CCU     Hospital Course:     Patient is a 68 year old female with PMH sig for CKD, COPD, DM, HTN, HLD, PVD, and sickle cell anemia who presented to the hospital for right lower extremity femoral to popliteal bypass. Underwent surgery and tolerated well. Post op course with some hypertension needing nicardipine gtt now off. Stable for discharge home on POD#3. F/u vascular surgery next week     PAD  - s/p RLE femoral to popliteal bypass POD # 3  - prn pain medication   - monitor respiratory status  - encouraged IS use  - prn anti emetics  - hgb 8.9 this morning, 9.5 pre operatively  - PT/SW  - dvt ppx: SQH  - rest of management per vascular surgery  - goal SBP < 160  - resume ASA/plavix/statin  - pain is improved today however still present, plan for 1 more day of Toradol      Uncontrolled BP- improved  - SBP in the 190s post operatively despite multiple PRNS  - discrepancy between BP cuff and arterial line,  given that patient is a vasculopath the BP cuff is not always reliable and therefore the arterial line readings should be used when there is a descrepancy  - off nicardipine gtt  - home medications resumed     DM2, hyperglycemia  - hold PO medications, resume on dc  - ISS, acuchek  - ISS increased to high dose  - add lantus 6 units BID     NAGMA  - likely from Ns as hyperchloremic as well  - stop ns     COPD  - chronic, stable  -resume inhaler     Sickle cell anemia  - outpatient f/u     CKD  - cr stable      Exam   GEN: female in NAD  HEENT: EOMI  Pulm: CTAB, no crackles or wheezes  CV: RRR, no murmurs  ABD: Soft, non-tender, non-distended, +BS  SKIN: warm, dry  EXT: no edema    Operative Procedures: Procedure(s) (LRB):  Right lower extremity femoral to popliteal bypass (Right)     Imaging: No results found.        Home Medication Changes:     I reconciled current and discharge medications on day of discharge. These medication changes have been made as below         Medication List        CHANGE how you take these medications      amLODIPine 10 MG Tabs  Commonly known as: Norvasc  Take 1 tablet (10 mg total) by mouth daily.  What changed: when to take this     clopidogrel 75 MG Tabs  Commonly known as: Plavix  Take 1 tablet (75 mg total) by mouth daily.  What changed: when to take this     HYDROcodone-acetaminophen  MG Tabs  Commonly known as: [place]  What changed: Another medication with the same name was removed. Continue taking this medication, and follow the directions you see here.     lisinopril 40 MG Tabs  Commonly known as: Prinivil; Zestril  TAKE 1 TABLET(40 MG) BY MOUTH DAILY  What changed: when to take this            CONTINUE taking these medications      albuterol 108 (90 Base) MCG/ACT Aers  Commonly known as: Ventolin HFA  Inhale 2 puffs into the lungs every 6 (six) hours as needed for Wheezing or Shortness of Breath.     aspirin 81 MG Tbec     Budesonide-Formoterol Fumarate 160-4.5 MCG/ACT  Aero  Commonly known as: SYMBICORT  Inhale 2 puffs into the lungs 2 (two) times daily. Rinse mouth with water, gargle, and spit to follow.     carvedilol 12.5 MG Tabs  Commonly known as: Coreg  Take 2 tablets (25 mg total) by mouth daily.     D-5000 125 MCG (5000 UT) Tabs  Generic drug: cholecalciferol     estradiol 0.1 MG/GM Crea  Commonly known as: Estrace  Apply a small amount to the perimeatal tissue every other day.     glipiZIDE 10 MG Tabs  Commonly known as: Glucotrol     hydrALAZINE 100 MG Tabs  Commonly known as: Apresoline  Take 1 tablet (100 mg total) by mouth every 8 (eight) hours.     ipratropium-albuterol 0.5-2.5 (3) MG/3ML Soln  Commonly known as: Duoneb  Take 3 mL by nebulization every 6 (six) hours as needed (shortness of breath refractory to HFA).     Januvia 25 MG Tabs  Generic drug: SITagliptin Phosphate     Lantus SoloStar 100 UNIT/ML Sopn  Generic drug: insulin glargine     Myrbetriq 50 MG Tb24  Generic drug: Mirabegron ER     rosuvastatin 40 MG Tabs  Commonly known as: Crestor              Activity:  as instructed  Diet: cardiac diet and diabetic diet  Wound Care: keep wound clean and dry  Code Status: Full Code  O2: n/a    Follow-up with:    PCP   Specialist vascular surgery       FU   Follow-up Information       Merry Hess APN Follow up in 2 week(s).    Specialty: Registered Nurse  Contact information:  [address]  [address]  [place] [address]  [phone]               Madiha Murrieta MD Follow up in 4 day(s).    Specialty: SURGERY, VASCULAR  Contact information:  [address]  [place] [address]  [phone]                             DC instructions:      Other Discharge Instructions:         You may shower with mild soap and water starting today. Wash the incision gently and pat dry; do not scrub.   No soaking in bath or pool for 2 weeks.    You should resume all home medications as previously taking unless otherwise instructed.    Take Norco and Ibuprofen  as needed for pain.    No heavy lifting or straining for 2 weeks. You may then resume to normal activity.    Drink plenty of fluids to stay well hydrated.     You will need to followup with Dr. Murrieta in the vascular clinic in 2 weeks    If you have any fevers, chills, chest pain, shortness of breath, drainage, swelling or bleeding, please call the office in order to return to clinic sooner for evaluation.             Follow-up with labs: none    Total Time Coordinating Care: 31 minutes    Patient had opportunity to ask questions and state understand and agree with therapeutic plan as outlined      Lissa Lee MD  [facility] Hospitalist

## 2024-01-27 NOTE — PLAN OF CARE
Ms. Ava Bowen is A&O x 4.  Lives at home w/ family.    Stand-by assist with walker.  Continent of bowel and bladder.  VSS:  stable.    Plan:  pain management, possible discharge  Will continue to monitor and treat.    Problem: Patient Centered Care  Goal: Patient preferences are identified and integrated in the patient's plan of care  Description: Interventions:  - What would you like us to know as we care for you?   - Provide timely, complete, and accurate information to patient/family  - Incorporate patient and family knowledge, values, beliefs, and cultural backgrounds into the planning and delivery of care  - Encourage patient/family to participate in care and decision-making at the level they choose  - Honor patient and family perspectives and choices  Outcome: Progressing     Problem: Patient/Family Goals  Goal: Patient/Family Long Term Goal  Description: Patient's Long Term Goal: discharge home    Interventions:  - Plan of care compliance; medication management, care, and education     - See additional Care Plan goals for specific interventions  Outcome: Progressing  Goal: Patient/Family Short Term Goal  Description: Patient's Short Term Goal: remain free from pain    Interventions:   - Plan of care compliance; medication management, care, and education     - See additional Care Plan goals for specific interventions  Outcome: Progressing     Problem: PAIN - ADULT  Goal: Verbalizes/displays adequate comfort level or patient's stated pain goal  Description: INTERVENTIONS:  - Encourage pt to monitor pain and request assistance  - Assess pain using appropriate pain scale  - Administer analgesics based on type and severity of pain and evaluate response  - Implement non-pharmacological measures as appropriate and evaluate response  - Consider cultural and social influences on pain and pain management  - Manage/alleviate anxiety  - Utilize distraction and/or relaxation techniques  - Monitor for opioid side  effects  - Notify MD/LIP if interventions unsuccessful or patient reports new pain  - Anticipate increased pain with activity and pre-medicate as appropriate  Outcome: Progressing     Problem: SAFETY ADULT - FALL  Goal: Free from fall injury  Description: INTERVENTIONS:  - Assess pt frequently for physical needs  - Identify cognitive and physical deficits and behaviors that affect risk of falls.  - [place] fall precautions as indicated by assessment.  - Educate pt/family on patient safety including physical limitations  - Instruct pt to call for assistance with activity based on assessment  - Modify environment to reduce risk of injury  - Provide assistive devices as appropriate  - Consider OT/PT consult to assist with strengthening/mobility  - Encourage toileting schedule  Outcome: Progressing     Problem: DISCHARGE PLANNING  Goal: Discharge to home or other facility with appropriate resources  Description: INTERVENTIONS:  - Identify barriers to discharge w/pt and caregiver  - Include patient/family/discharge partner in discharge planning  - Arrange for needed discharge resources and transportation as appropriate  - Identify discharge learning needs (meds, wound care, etc)  - Arrange for interpreters to assist at discharge as needed  - Consider post-discharge preferences of patient/family/discharge partner  - Complete POLST form as appropriate  - Assess patient's ability to be responsible for managing their own health  - Refer to Case Management Department for coordinating discharge planning if the patient needs post-hospital services based on physician/LIP order or complex needs related to functional status, cognitive ability or social support system  Outcome: Progressing     Problem: CARDIOVASCULAR - ADULT  Goal: Maintains optimal cardiac output and hemodynamic stability  Description: INTERVENTIONS:  - Monitor vital signs, rhythm, and trends  - Monitor for bleeding, hypotension and signs of decreased cardiac  output  - Evaluate effectiveness of vasoactive medications to optimize hemodynamic stability  - Monitor arterial and/or venous puncture sites for bleeding and/or hematoma  - Assess quality of pulses, skin color and temperature  - Assess for signs of decreased coronary artery perfusion - ex. Angina  - Evaluate fluid balance, assess for edema, trend weights  Outcome: Progressing     Problem: RESPIRATORY - ADULT  Goal: Achieves optimal ventilation and oxygenation  Description: INTERVENTIONS:  - Assess for changes in respiratory status  - Assess for changes in mentation and behavior  - Position to facilitate oxygenation and minimize respiratory effort  - Oxygen supplementation based on oxygen saturation or ABGs  - Provide Smoking Cessation handout, if applicable  - Encourage broncho-pulmonary hygiene including cough, deep breathe, Incentive Spirometry  - Assess the need for suctioning and perform as needed  - Assess and instruct to report SOB or any respiratory difficulty  - Respiratory Therapy support as indicated  - Manage/alleviate anxiety  - Monitor for signs/symptoms of CO2 retention  Outcome: Progressing     Problem: GASTROINTESTINAL - ADULT  Goal: Minimal or absence of nausea and vomiting  Description: INTERVENTIONS:  - Maintain adequate hydration with IV or PO as ordered and tolerated  - Nasogastric tube to low intermittent suction as ordered  - Evaluate effectiveness of ordered antiemetic medications  - Provide nonpharmacologic comfort measures as appropriate  - Advance diet as tolerated, if ordered  - Obtain nutritional consult as needed  - Evaluate fluid balance  Outcome: Progressing     Problem: METABOLIC/FLUID AND ELECTROLYTES - ADULT  Goal: Glucose maintained within prescribed range  Description: INTERVENTIONS:  - Monitor Blood Glucose as ordered  - Assess for signs and symptoms of hyperglycemia and hypoglycemia  - Administer ordered medications to maintain glucose within target range  - Assess barriers to  adequate nutritional intake and initiate nutrition consult as needed  - Instruct patient on self management of diabetes  Outcome: Progressing     Problem: SKIN/TISSUE INTEGRITY - ADULT  Goal: Incision(s), wounds(s) or drain site(s) healing without S/S of infection  Description: INTERVENTIONS:  - Assess and document risk factors for pressure ulcer development  - Assess and document skin integrity  - Assess and document dressing/incision, wound bed, drain sites and surrounding tissue  - Implement wound care per orders  - Initiate isolation precautions as appropriate  - Initiate Pressure Ulcer prevention bundle as indicated  Outcome: Progressing

## 2024-01-27 NOTE — PROGRESS NOTES
Vascular Surgery Progress Note    BP (!) 162/53 (BP Location: Right arm)   Pulse 75   Temp 98.9 °F (37.2 °C) (Oral)   Resp 19   Ht 5' 4\" (1.626 m)   Wt 164 lb 3.2 oz (74.5 kg)   LMP  (LMP Unknown)   SpO2 93%   BMI 28.18 kg/m²     Recent Labs   Lab 01/24/24  1511 01/25/24  0454 01/27/24  0652   RBC 3.32* 3.10* 3.15*   HGB 9.5* 8.9* 8.9*   HCT 27.9* 26.6* 27.2*   MCV 84.0 85.8 86.3   MCH 28.6 28.7 28.3   MCHC 34.1 33.5 32.7   RDW 13.4 13.5 13.4   WBC 13.5* 10.3 10.3   .0 222.0 222.0       Recent Labs   Lab 01/25/24  0454 01/27/24  0653   * 220*   BUN 20 32*   CREATSERUM 1.34* 1.28*   CA 8.3* 9.3    146*   K 4.3 3.9   * 115*   CO2 17.0* 19.0*       Pt seen and examined. Pain control improving now during the day. Tolerating PO, ambulating and voiding. Incision intact with staples in place. Ok for dc home. RTC in 2 weeks.    Flavio Taveras MD  [facility]  Vascular Surgery

## 2024-01-29 NOTE — PAYOR COMM NOTE
--------------  DISCHARGE REVIEW    Payor: [employer]  Subscriber #:  [id number]  Authorization Number: [id number]    Admit date: 1/24/24  Admit time:   5:35 AM  Discharge Date: 1/27/2024  4:21 PM     Admitting Physician: Madiha Murrieta MD  Attending Physician:  Tia att. providers found  Primary Care Physician: Ernesto De Dios          Discharge Summary Notes        Discharge Summary signed by Lissa Lee MD at 1/27/2024  1:26 PM       Author: Lissa Lee MD Specialty: HOSPITALIST Author Type: Physician    Filed: 1/27/2024  1:26 PM Date of Service: 1/27/2024  1:19 PM Status: Signed    : Lissa Lee MD (Physician)           General Medicine Discharge Summary     Patient ID:  Ava Bowen  68 year old  10/6/1955    Admit date: 1/24/2024    Discharge date and time: 01/27/24    Attending Physician: Madiha Murrieta MD     Primary Care Physician: Ernesto De Dios     Reason for admission: PAD    Discharge Diagnoses: Peripheral artery disease  PAD (peripheral artery disease) (HCC)    Discharged Condition: stable    Disposition: home    Consults:   Consultants         Provider   Role Specialty     Judie Marie MD  Consulting Physician HOSPITALIST     Peter Lyn MD  Consulting Physician HOSPITALIST              HPI: Per Dr. Lovett    Patient is a 68 year old female with PMH sig for CKD, COPD, DM, HTN, HLD, PVD, and sickle cell anemia who presented to the hospital for right lower extremity femoral to popliteal bypass. Patient was seen and examined post operatively. She was sleepy from anesthesia but could answer in short bursts. Was complaining of pain. BP in PACU elevated despite PRNs, added nicardipine gtt. Plan to admit to CCU     Hospital Course:     Patient is a 68 year old female with PMH sig for CKD, COPD, DM, HTN, HLD, PVD, and sickle cell anemia who presented to the hospital for right lower extremity femoral to popliteal bypass. Underwent surgery and tolerated well.  Post op course with some hypertension needing nicardipine gtt now off. Stable for discharge home on POD#3. F/u vascular surgery next week     PAD  - s/p RLE femoral to popliteal bypass POD # 3  - prn pain medication   - monitor respiratory status  - encouraged IS use  - prn anti emetics  - hgb 8.9 this morning, 9.5 pre operatively  - PT/SW  - dvt ppx: SQH  - rest of management per vascular surgery  - goal SBP < 160  - resume ASA/plavix/statin  - pain is improved today however still present, plan for 1 more day of Toradol      Uncontrolled BP- improved  - SBP in the 190s post operatively despite multiple PRNS  - discrepancy between BP cuff and arterial line, given that patient is a vasculopath the BP cuff is not always reliable and therefore the arterial line readings should be used when there is a descrepancy  - off nicardipine gtt  - home medications resumed     DM2, hyperglycemia  - hold PO medications, resume on dc  - ISS, acuchek  - ISS increased to high dose  - add lantus 6 units BID     NAGMA  - likely from Ns as hyperchloremic as well  - stop ns     COPD  - chronic, stable  -resume inhaler     Sickle cell anemia  - outpatient f/u     CKD  - cr stable      Exam   GEN: female in NAD  HEENT: EOMI  Pulm: CTAB, no crackles or wheezes  CV: RRR, no murmurs  ABD: Soft, non-tender, non-distended, +BS  SKIN: warm, dry  EXT: no edema    Operative Procedures: Procedure(s) (LRB):  Right lower extremity femoral to popliteal bypass (Right)     Imaging: No results found.        Home Medication Changes:     I reconciled current and discharge medications on day of discharge. These medication changes have been made as below         Medication List        CHANGE how you take these medications      amLODIPine 10 MG Tabs  Commonly known as: Norvasc  Take 1 tablet (10 mg total) by mouth daily.  What changed: when to take this     clopidogrel 75 MG Tabs  Commonly known as: Plavix  Take 1 tablet (75 mg total) by mouth daily.  What  changed: when to take this     HYDROcodone-acetaminophen  MG Tabs  Commonly known as: [place]  What changed: Another medication with the same name was removed. Continue taking this medication, and follow the directions you see here.     lisinopril 40 MG Tabs  Commonly known as: Prinivil; Zestril  TAKE 1 TABLET(40 MG) BY MOUTH DAILY  What changed: when to take this            CONTINUE taking these medications      albuterol 108 (90 Base) MCG/ACT Aers  Commonly known as: Ventolin HFA  Inhale 2 puffs into the lungs every 6 (six) hours as needed for Wheezing or Shortness of Breath.     aspirin 81 MG Tbec     Budesonide-Formoterol Fumarate 160-4.5 MCG/ACT Aero  Commonly known as: SYMBICORT  Inhale 2 puffs into the lungs 2 (two) times daily. Rinse mouth with water, gargle, and spit to follow.     carvedilol 12.5 MG Tabs  Commonly known as: Coreg  Take 2 tablets (25 mg total) by mouth daily.     D-5000 125 MCG (5000 UT) Tabs  Generic drug: cholecalciferol     estradiol 0.1 MG/GM Crea  Commonly known as: Estrace  Apply a small amount to the perimeatal tissue every other day.     glipiZIDE 10 MG Tabs  Commonly known as: Glucotrol     hydrALAZINE 100 MG Tabs  Commonly known as: Apresoline  Take 1 tablet (100 mg total) by mouth every 8 (eight) hours.     ipratropium-albuterol 0.5-2.5 (3) MG/3ML Soln  Commonly known as: Duoneb  Take 3 mL by nebulization every 6 (six) hours as needed (shortness of breath refractory to HFA).     Januvia 25 MG Tabs  Generic drug: SITagliptin Phosphate     Lantus SoloStar 100 UNIT/ML Sopn  Generic drug: insulin glargine     Myrbetriq 50 MG Tb24  Generic drug: Mirabegron ER     rosuvastatin 40 MG Tabs  Commonly known as: Crestor              Activity:  as instructed  Diet: cardiac diet and diabetic diet  Wound Care: keep wound clean and dry  Code Status: Full Code  O2: n/a    Follow-up with:    PCP   Specialist vascular surgery       FU   Follow-up Information       Merry Hess APN Follow  up in 2 week(s).    Specialty: Registered Nurse  Contact information:  [address]  [address]  [place] [address]  [phone]               Madiha Murrieta MD Follow up in 4 day(s).    Specialty: SURGERY, VASCULAR  Contact information:  [address]  [facility] [address]  [phone]                             DC instructions:      Other Discharge Instructions:         You may shower with mild soap and water starting today. Wash the incision gently and pat dry; do not scrub.   No soaking in bath or pool for 2 weeks.    You should resume all home medications as previously taking unless otherwise instructed.    Take Norco and Ibuprofen as needed for pain.    No heavy lifting or straining for 2 weeks. You may then resume to normal activity.    Drink plenty of fluids to stay well hydrated.     You will need to followup with Dr. Murrieta in the vascular clinic in 2 weeks    If you have any fevers, chills, chest pain, shortness of breath, drainage, swelling or bleeding, please call the office in order to return to clinic sooner for evaluation.             Follow-up with labs: none    Total Time Coordinating Care: 31 minutes    Patient had opportunity to ask questions and state understand and agree with therapeutic plan as outlined      Lissa Lee MD  [facility] Hospitalist            Electronically signed by Lissa Lee MD on 1/27/2024  1:26 PM         REVIEWER COMMENTS

## 2024-02-06 ENCOUNTER — OFFICE VISIT (OUTPATIENT)
Dept: PAIN CLINIC | Facility: HOSPITAL | Age: 69
End: 2024-02-06
Attending: STUDENT IN AN ORGANIZED HEALTH CARE EDUCATION/TRAINING PROGRAM
Payer: MEDICARE

## 2024-02-06 VITALS — SYSTOLIC BLOOD PRESSURE: 169 MMHG | HEART RATE: 86 BPM | OXYGEN SATURATION: 95 % | DIASTOLIC BLOOD PRESSURE: 61 MMHG

## 2024-02-06 DIAGNOSIS — M54.41 LOW BACK PAIN DUE TO BILATERAL SCIATICA: ICD-10-CM

## 2024-02-06 DIAGNOSIS — M51.36 DDD (DEGENERATIVE DISC DISEASE), LUMBAR: ICD-10-CM

## 2024-02-06 DIAGNOSIS — M54.42 LOW BACK PAIN DUE TO BILATERAL SCIATICA: ICD-10-CM

## 2024-02-06 DIAGNOSIS — Z79.891 ENCOUNTER FOR MONITORING OPIOID MAINTENANCE THERAPY: Primary | ICD-10-CM

## 2024-02-06 DIAGNOSIS — Z51.81 ENCOUNTER FOR MONITORING OPIOID MAINTENANCE THERAPY: Primary | ICD-10-CM

## 2024-02-06 DIAGNOSIS — M96.1 FAILED BACK SYNDROME OF LUMBAR SPINE: ICD-10-CM

## 2024-02-06 PROCEDURE — 99211 OFF/OP EST MAY X REQ PHY/QHP: CPT

## 2024-02-06 RX ORDER — HYDROCODONE BITARTRATE AND ACETAMINOPHEN 10; 325 MG/1; MG/1
1 TABLET ORAL EVERY 4 HOURS PRN
COMMUNITY
End: 2024-02-06

## 2024-02-06 RX ORDER — HYDROCODONE BITARTRATE AND ACETAMINOPHEN 10; 325 MG/1; MG/1
1 TABLET ORAL EVERY 4 HOURS PRN
Qty: 180 TABLET | Refills: 0 | Status: ON HOLD | OUTPATIENT
Start: 2024-02-14 | End: 2024-03-15

## 2024-02-06 RX ORDER — HYDROCODONE BITARTRATE AND ACETAMINOPHEN 10; 325 MG/1; MG/1
1 TABLET ORAL EVERY 4 HOURS PRN
Qty: 180 TABLET | Refills: 0 | Status: ON HOLD | OUTPATIENT
Start: 2024-03-15 | End: 2024-04-14

## 2024-02-06 NOTE — CHRONIC PAIN
Follow-up Note    Interval History:  Patient is seen today for follow up and medication evaluation. She has been maintained on chronic opioid regimen for years and has been stable. She continues to derive benefits from her regimen in terms of pain relief and improved functionality. She denies any side effects.   HPI:  Ava Bowen is a 68 year old female, originally referred to the pain clinic by Ernesto Mcelroy, with history of lumbar spine surgery and chronic low back pain with radiation into the left hip. There pain is managed with norco 10/325mg q4hrs prn.   The pt reports adequate pain control with the medication. Also takes pregabalin 25mg BID. The medications help functioning. The pt denies side effects. Denies new painful areas. Pain Meds are reducing his pain by 80-90%.  Patient has no change in bowel/bladder function.    ALLERGIES:  Allergies   Allergen Reactions    Penicillins HIVES and ANGIOEDEMA     Hives on eyelids (occurred when pt was in her 20s). Tolerated amoxicillin per chart. Tolerates cephalosporins.       MEDICATION LIST:  Current Outpatient Medications   Medication Sig Dispense Refill    HYDROcodone-acetaminophen  MG Oral Tab Take 1 tablet by mouth every 4 (four) hours as needed for Pain (max 6/day).      HYDROcodone-acetaminophen  MG Oral Tab Take 1 tablet by mouth every 4 (four) hours as needed for Pain (max 6/day).      LANTUS SOLOSTAR 100 UNIT/ML Subcutaneous Solution Pen-injector Inject 6 Units into the skin See Admin Instructions. Every other day      MYRBETRIQ 50 MG Oral Tablet 24 Hr Take 1 tablet (50 mg total) by mouth every morning.      JANUVIA 25 MG Oral Tab Take 1 tablet (25 mg total) by mouth every morning.      rosuvastatin 40 MG Oral Tab Take 1 tablet (40 mg total) by mouth nightly.      glipiZIDE 10 MG Oral Tab Take 1 tablet (10 mg total) by mouth 2 (two) times daily.      albuterol 108 (90 Base) MCG/ACT Inhalation Aero Soln Inhale 2 puffs into the lungs  every 6 (six) hours as needed for Wheezing or Shortness of Breath. 8.5 each 2    carvedilol 12.5 MG Oral Tab Take 2 tablets (25 mg total) by mouth daily. (Patient taking differently: Take 2 tablets (25 mg total) by mouth 2 (two) times daily with meals.) 180 tablet 1    D-5000 125 MCG (5000 UT) Oral Tab Take 1 tablet (5,000 Units total) by mouth daily.      clopidogrel 75 MG Oral Tab Take 1 tablet (75 mg total) by mouth daily. (Patient taking differently: Take 1 tablet (75 mg total) by mouth every morning.) 90 tablet 1    estradiol 0.1 MG/GM Vaginal Cream Apply a small amount to the perimeatal tissue every other day. 45 g 2    amLODIPine 10 MG Oral Tab Take 1 tablet (10 mg total) by mouth daily. (Patient taking differently: Take 1 tablet (10 mg total) by mouth every morning.) 90 tablet 0    LISINOPRIL 40 MG Oral Tab TAKE 1 TABLET(40 MG) BY MOUTH DAILY (Patient taking differently: Take 1 tablet (40 mg total) by mouth every morning.) 90 tablet 1    hydrALAZINE 100 MG Oral Tab Take 1 tablet (100 mg total) by mouth every 8 (eight) hours. 270 tablet 1    Budesonide-Formoterol Fumarate 160-4.5 MCG/ACT Inhalation Aerosol Inhale 2 puffs into the lungs 2 (two) times daily. Rinse mouth with water, gargle, and spit to follow. 1 each 2    ipratropium-albuterol 0.5-2.5 (3) MG/3ML Inhalation Solution Take 3 mL by nebulization every 6 (six) hours as needed (shortness of breath refractory to HFA). 100 each 0    aspirin 81 MG Oral Tab EC Take 1 tablet (81 mg total) by mouth every other day.          REVIEW OF SYSTEMS:   General: no weight change, change in appetite, thirst or fever  HEENT: no headache or blurred vision  Cardiopulmonary: no chest pain, palpitations, or shortness of breath  GI: no anorexia, nausea or vomiting, or bowel incontinence   : no dysuria, or pyuria.  Endo: no goiter, lethargy, or heat/cold intolerance  Heme/Onc: no pallor, bruising, or bleeding.    Musculoskeletal:  no new problems  Neuro:  no new  problems  Psych:  no new problems    MEDICAL HISTORY:  Patient Active Problem List   Diagnosis    Anemia    Azotemia    Hypokalemia    Back pain with radiation    Type 2 diabetes mellitus without retinopathy (HCC)    Age-related nuclear cataract of both eyes    Glaucoma suspect of both eyes    Centrilobular emphysema (HCC)    PVD (peripheral vascular disease) (HCC)    Diabetic ulcer of left midfoot associated with type 2 diabetes mellitus, with fat layer exposed (HCC)    PAD (peripheral artery disease) ([facility])    Pre-op testing    Family history of glaucoma in sister    Primary hypertension    S/P laminectomy    Post-operative pain    Type 2 diabetes mellitus with hyperglycemia, with long-term current use of insulin (HCC)    Myalgia    DDD (degenerative disc disease), lumbar    Failed back syndrome of lumbar spine    Chest pain    Hyperlipidemia    Urinary tract infection    Acute cystitis without hematuria    Ileitis    COVID-19    UTI (urinary tract infection)    Pyelonephritis    Hypernatremia    Hyperglycemia    Ureteral calculi    Hypoglycemia    Opioid dependence, uncomplicated (HCC)    Depression, recurrent (HCC)    Stage 3 chronic kidney disease, unspecified whether stage 3a or 3b CKD ([facility])     Past Medical History:   Diagnosis Date    Anemia     Back pain     Back problem     Cataract     Chronic kidney disease (CKD)     COPD (chronic obstructive pulmonary disease) (HCC)     FEV 1 1.25 50% 2/20    Diabetes (HCC)     DM neuropathy    Essential hypertension     H/O angioplasty     07/08/2020    High blood pressure     High cholesterol     Neuropathy     Peripheral vascular disease (HCC)     PNA (pneumonia) 05/2018    Pulmonary emphysema (HCC)     Pulmonary nodule     4 mm RUL    Renal disorder     monitoring kidney labs    Sickle cell trait (HCC)     Sickle-cell anemia (HCC)     Tobacco abuse     Visual impairment        SURGICAL HISTORY:  Past Surgical History:   Procedure Laterality Date    APPENDECTOMY       BACK SURGERY Right 2021    Right L3-4 extreme lateral interbody fusion, posterior decompression; LUMBAR LAMINECTOMY 1 LEVEL          x3    CHOLECYSTECTOMY      EXCIS LUMBAR DISK,ONE LEVEL          OTHER      bilateral leg stents  and        FAMILY HISTORY:  Family History   Problem Relation Age of Onset    Diabetes Mother         Passed from DM complications    Diabetes Sister         Had kidney transplant due to complications of DM    Kidney Disease Sister         Kidney failure secondary to diabetes; received kidney transplant in     Glaucoma Sister     Diabetes Brother     Sickle Cell Son         Cause of death    Macular degeneration Neg        SOCIAL HISTORY:  Social History     Socioeconomic History    Marital status:      Spouse name: Not on file    Number of children: Not on file    Years of education: Not on file    Highest education level: Not on file   Occupational History    Not on file   Tobacco Use    Smoking status: Former     Packs/day: 1.00     Years: 30.00     Additional pack years: 0.00     Total pack years: 30.00     Types: Cigarettes     Quit date: 2019     Years since quittin.2    Smokeless tobacco: Never   Vaping Use    Vaping Use: Never used   Substance and Sexual Activity    Alcohol use: Not Currently     Comment: socially    Drug use: No    Sexual activity: Not on file   Other Topics Concern    Not on file   Social History Narrative    [employer] [facility]          Social Determinants of Health     Financial Resource Strain: Not on file   Food Insecurity: No Food Insecurity (2024)    Food Insecurity     Food Insecurity: Never true   Transportation Needs: No Transportation Needs (2024)    Transportation Needs     Lack of Transportation: No   Physical Activity: Not on file   Stress: Not on file   Social Connections: Not on file   Housing Stability: Low Risk  (2024)    Housing Stability     Housing Instability: No     Housing  Instability Emergency: Not on file     PHYSICAL EXAMINATION:  Vitals:    02/06/24 1024   BP: (!) 169/61   Pulse: 86      General: Alert and oriented x3, NAD, appears stated age, appropriate disposition and demeanor, answers questions appropriately   Head: normocephalic, atraumatic  Eyes: anicteric; no injection  Ears: normal;  no discharge  Neck: supple, trachea midline, no obvious JVD  Gait: Walking using cane limping with left lower extremity  Spine: Flattened lumbar lordosis postsurgical well-healed scar  ROM:   Lumbar Spine: Limited flexion due to pain  MOTOR EXAMINATION:  Lower Extremities: Normal bilaterally  REFLEXES:  Lower Extremities: Depressed bilaterally knee reflexes  SENSATION (light touch/pinprick/temperature):   Lower Extremities: Normal bilaterally  SLR: Negative bilateral  SIJ tenderness: Positive b/l   Aaron's test: Positive on the left     IMAGING:  PROCEDURE: XR LUMBAR SPINE (MIN 2 VIEWS) (CPT=72100)     COMPARISON: [facility], XR LUMBAR SPINE (MIN 2 VIEWS) (CPT=72100), 8/08/2020, 11:53 AM.     INDICATIONS: Low back pain radiating distally post lumbar surgery 09/08/2021.     TECHNIQUE: Lumbar spine radiographs (2-3 views)       FINDINGS:     ALIGNMENT: Mild chronic wedge compression involving T11, T12 and L1.  VERTEBRAL BODIES:   Mild S-shaped scoliosis and multilevel spondylosis.  Chronic anterior spondylolisthesis L4 relative to L5.  Lumbar discectomy interbody fusion L4-5 with posterior instrumentation and decompressive laminectomy changes.  Interval  discectomy and right lateral vertebral fixation at the L3-4.  Multilevel lumbar spondylosis.  Minimal anterior listhesis L3 relative to L4 has been partially reduced  SACROILIAC JOINTS: Normal.    OTHER: Vascular calcifications noted.  Surgical clips right upper quadrant               Impression   CONCLUSION:  1. Mild chronic wedge compression deformity T11-T12 and L1.  2. S shaped scoliosis and multilevel spondylosis.  Chronic  anterior spondylolisthesis L4 relative to L5.  3. Lumbar discectomies interbody fusion L3-4 and L4-5 with chronic posterior instrumentation at L4-5 and interval right lateral fixation L3-4.    4. Minimal anterior subluxation L3 relative to L4 has been partially reduced post interbody fusion           Dictated by (CST): Jeff Andrews MD on 2/01/2022 at 10:52 AM      Finalized by (CST): Jeff Andrews MD on 2/01/2022 at 10:56 AM           PROCEDURE: MRI SPINE LUMBAR (CPT=72148)     COMPARISON: [facility], MRI SPINE LUMBAR (W+WO) (CPT=72158), 9/30/2018, 12:28 PM.     INDICATIONS: Preoperative evaluation to rule out surgical contraindication, Spinal stenosis of lumbar region with neurogenic claudication M96.1 Failed back s*     TECHNIQUE: A variety of imaging planes and parameters were utilized for visualization of suspected pathology.     FINDINGS:  NUMERATION: For the purposes of this examination, the lowest fully formed disc space is designated as L5-S1.  ALIGNMENT: There is preservation of the expected lumbar lordosis.  Trace degenerative anterolisthesis of L3 relative to L4.  BONES: Postsurgical changes of L4-L5 posterior lumbar fusion.  Resultant susceptibility artifacts limit evaluation.  CORD/CAUDA EQUINA: The distal cord and nerve roots have normal caliber, contour, and signal intensity.  PARASPINAL AREA: No visible mass.    OTHER: Small bilateral renal cysts.  Partially imaged 1.2 cm prominence of the uterine endometrium.     LUMBAR DISC LEVELS:  L1-L2: Diffuse disc bulge with minor bilateral facet arthropathy.  No significant neural compromise.  L2-L3: Small diffuse disc bulge with dorsal epidural lipomatosis and mild bilateral facet arthropathy.  Mild multifactorial spinal canal stenosis with no significant neural foraminal compromise.  L3-L4: Large diffuse disc bulge with dorsal epidural lipomatosis and bilateral facet arthropathy.  Severe multifactorial spinal canal with severe  bilateral neural foraminal stenosis.  L4-L5: Evidence of prior posterior decompressive laminectomy at L4-L5.  Moderate left neural foraminal stenosis with no high-grade spinal canal or right neural foraminal compromise.  L5-S1: Disc and facet related degeneration, which results in mild left greater than right neural foraminal stenosis with no significant spinal canal or lateral recess compromise.               Impression   CONCLUSION:     1. Postsurgical changes of L4-L5 posterior lumbar fusion with associated decompressive laminectomy.  Resultant susceptibility artifacts limit evaluation.     2. Multilevel degenerative changes of the lumbar spine as detailed. Notable levels as follows:     3. L3-L4:  Severe multifactorial spinal canal with severe bilateral neural foraminal stenosis.  Significant interval progression of degenerative change at this level as compared with prior lumbar spine MR from September, 2018.    4. L4-L5:  Moderate left neural foraminal stenosis.  5. L5-S1:  Mild left greater than right neural foraminal stenosis.  6. L2-L3:  Mild spinal canal stenosis.     7. Trace degenerative anterolisthesis of L3 relative to L4.  This is new since prior lumbar spine MR.     8. Incidentally noted 1.2 cm prominence of the uterine endometrium, which is considered abnormal in this presumed postmenopausal patient.  Baseline pelvic ultrasound evaluation of this finding is advised.     9. Lesser incidental findings as above.        Remote - PS     Dictated by (CST): Maverick Zavaleta MD on 7/12/2021 at 2:42 PM      Finalized by (CST): Maverick Zavaleta MD on 7/12/2021 at 2:49 PM           ASSESSMENT AND PLAN:    Ava Bowen is a 68 year old  female, with  hx of lumbar spine surgery with c/o chronic low back pain with radiation into the left hip due to failed back surgery syndrome.  Pain stable and well controlled on norco and pregabalin.    Recommendation:  Continue the current regimen without changes in  dose/frequency.  Refilled today for 2 months after [place]  verification.    #Chronic Pain  #Chronic Prescription Opioid Use  -Drug therapy requiring intensive monitoring for toxicity  A.  Opioid agreement:  yes  B.  UDS results reviewed: obtained today   C.  [place]- report obtained today and reviewed: yes  D.  As documented in the signed opioid agreement, the patient is aware of the risks associated with the administration of chronic opioids, including the risk of overdose and death,  the risk of developing misuse, abuse, and addiction to the prescribed medications, as well as the other risks associated with chronic opioid administration.  Routine assessment for mental health conditions have been assessed during this visit, and the patient has been properly screened for substance use disorders (risk of developing and presence of these conditions).    Pt will return to clinic 2 months for follow up   Comprehensive analgesic plan was formulated. Conservative vs. Aggressive measures were discussed at length including pharmacotherapy (eg. Anti- inflammatories, muscle relaxants, neuropathic medications, oral steroids, analgesics), injections, and further testing. Risks and benefits of all options were discussed at length to patients satisfaction during a comprehensive interactive discussion. All questions were answered during extended questions and answer session. Patient agreeable to discussion plan. Greater than 50% of the time was spent with counseling (nature of discussion centered around pain, therapy, and treatment options), face to face time, time spent reviewing data, obtaining patient information and discussing the care with the patients health care providers.     Time spent: 30    Delmar Manley MD, 02/06/24, 3:30 PM

## 2024-02-06 NOTE — PROGRESS NOTES
PT presents to the C[facility] using a front wheel walker.  Pt reports 8/10 pain today with no new concerns.   Dr. Manley saw PT for med eval. Refer to dictation  for POC.

## 2024-02-07 ENCOUNTER — LAB ENCOUNTER (OUTPATIENT)
Dept: LAB | Facility: HOSPITAL | Age: 69
End: 2024-02-07
Attending: STUDENT IN AN ORGANIZED HEALTH CARE EDUCATION/TRAINING PROGRAM
Payer: MEDICARE

## 2024-02-07 LAB
AMPHET UR QL SCN: NEGATIVE
BARBITURATES UR QL SCN: NEGATIVE
BENZODIAZ UR QL SCN: NEGATIVE
CANNABINOIDS UR QL SCN: NEGATIVE
COCAINE UR QL: NEGATIVE
CREAT UR-SCNC: 60.1 MG/DL
MDMA UR QL SCN: NEGATIVE
METHADONE UR QL SCN: NEGATIVE
OXYCODONE UR QL SCN: NEGATIVE
PCP UR QL SCN: NEGATIVE

## 2024-02-10 ENCOUNTER — ANESTHESIA EVENT (OUTPATIENT)
Dept: SURGERY | Facility: HOSPITAL | Age: 69
End: 2024-02-10
Payer: MEDICARE

## 2024-02-10 ENCOUNTER — ANESTHESIA (OUTPATIENT)
Dept: SURGERY | Facility: HOSPITAL | Age: 69
End: 2024-02-10
Payer: MEDICARE

## 2024-02-10 ENCOUNTER — HOSPITAL ENCOUNTER (INPATIENT)
Facility: HOSPITAL | Age: 69
End: 2024-02-10
Attending: EMERGENCY MEDICINE | Admitting: INTERNAL MEDICINE
Payer: MEDICARE

## 2024-02-10 ENCOUNTER — APPOINTMENT (OUTPATIENT)
Dept: GENERAL RADIOLOGY | Facility: HOSPITAL | Age: 69
End: 2024-02-10
Attending: EMERGENCY MEDICINE
Payer: MEDICARE

## 2024-02-10 PROBLEM — T82.398A: Status: ACTIVE | Noted: 2024-01-01

## 2024-02-10 PROBLEM — N17.9 ACUTE KIDNEY INJURY SUPERIMPOSED ON CKD  (HCC): Status: ACTIVE | Noted: 2024-02-10

## 2024-02-10 PROBLEM — T82.398A BYPASS GRAFT MECHANICAL COMPLICATION (HCC): Status: ACTIVE | Noted: 2024-01-01

## 2024-02-10 PROBLEM — N18.9 ACUTE KIDNEY INJURY SUPERIMPOSED ON CKD (HCC): Status: ACTIVE | Noted: 2024-02-10

## 2024-02-10 PROBLEM — N18.9 ACUTE KIDNEY INJURY SUPERIMPOSED ON CKD (HCC): Status: ACTIVE | Noted: 2024-01-01

## 2024-02-10 PROBLEM — N17.9 ACUTE KIDNEY INJURY SUPERIMPOSED ON CKD (HCC): Status: ACTIVE | Noted: 2024-01-01

## 2024-02-10 PROBLEM — D64.9 ANEMIA, UNSPECIFIED TYPE: Status: ACTIVE | Noted: 2024-02-10

## 2024-02-10 PROBLEM — D64.9 ANEMIA, UNSPECIFIED TYPE: Status: ACTIVE | Noted: 2024-01-01

## 2024-02-10 PROBLEM — N17.9 ACUTE KIDNEY INJURY SUPERIMPOSED ON CKD (HCC): Status: ACTIVE | Noted: 2024-02-10

## 2024-02-10 PROBLEM — N18.9 ACUTE KIDNEY INJURY SUPERIMPOSED ON CKD  (HCC): Status: ACTIVE | Noted: 2024-02-10

## 2024-02-10 PROBLEM — T82.398A BYPASS GRAFT MECHANICAL COMPLICATION (HCC): Status: ACTIVE | Noted: 2024-02-10

## 2024-02-10 PROBLEM — T82.398A: Status: ACTIVE | Noted: 2024-02-10

## 2024-02-10 PROCEDURE — 36430 TRANSFUSION BLD/BLD COMPNT: CPT | Performed by: STUDENT IN AN ORGANIZED HEALTH CARE EDUCATION/TRAINING PROGRAM

## 2024-02-10 RX ORDER — ETOMIDATE 2 MG/ML
INJECTION INTRAVENOUS AS NEEDED
Status: DISCONTINUED | OUTPATIENT
Start: 2024-02-10 | End: 2024-02-10 | Stop reason: SURG

## 2024-02-10 RX ORDER — SODIUM CHLORIDE 9 MG/ML
INJECTION, SOLUTION INTRAVENOUS CONTINUOUS PRN
Status: DISCONTINUED | OUTPATIENT
Start: 2024-02-10 | End: 2024-02-10 | Stop reason: SURG

## 2024-02-10 RX ORDER — PROTAMINE SULFATE 10 MG/ML
INJECTION, SOLUTION INTRAVENOUS AS NEEDED
Status: DISCONTINUED | OUTPATIENT
Start: 2024-02-10 | End: 2024-02-10 | Stop reason: SURG

## 2024-02-10 RX ORDER — HEPARIN SODIUM 1000 [USP'U]/ML
INJECTION, SOLUTION INTRAVENOUS; SUBCUTANEOUS AS NEEDED
Status: DISCONTINUED | OUTPATIENT
Start: 2024-02-10 | End: 2024-02-10 | Stop reason: SURG

## 2024-02-10 RX ORDER — LIDOCAINE HYDROCHLORIDE 10 MG/ML
INJECTION, SOLUTION EPIDURAL; INFILTRATION; INTRACAUDAL; PERINEURAL AS NEEDED
Status: DISCONTINUED | OUTPATIENT
Start: 2024-02-10 | End: 2024-02-10 | Stop reason: SURG

## 2024-02-10 RX ORDER — CALCIUM CHLORIDE 100 MG/ML
INJECTION INTRAVENOUS; INTRAVENTRICULAR AS NEEDED
Status: DISCONTINUED | OUTPATIENT
Start: 2024-02-10 | End: 2024-02-10 | Stop reason: SURG

## 2024-02-10 RX ORDER — ROCURONIUM BROMIDE 10 MG/ML
INJECTION, SOLUTION INTRAVENOUS AS NEEDED
Status: DISCONTINUED | OUTPATIENT
Start: 2024-02-10 | End: 2024-02-10 | Stop reason: SURG

## 2024-02-10 RX ORDER — SODIUM CHLORIDE, SODIUM LACTATE, POTASSIUM CHLORIDE, CALCIUM CHLORIDE 600; 310; 30; 20 MG/100ML; MG/100ML; MG/100ML; MG/100ML
INJECTION, SOLUTION INTRAVENOUS CONTINUOUS PRN
Status: DISCONTINUED | OUTPATIENT
Start: 2024-02-10 | End: 2024-02-10 | Stop reason: SURG

## 2024-02-10 RX ADMIN — HEPARIN SODIUM 2000 UNITS: 1000 INJECTION, SOLUTION INTRAVENOUS; SUBCUTANEOUS at 19:02:00

## 2024-02-10 RX ADMIN — LIDOCAINE HYDROCHLORIDE 25 MG: 10 INJECTION, SOLUTION EPIDURAL; INFILTRATION; INTRACAUDAL; PERINEURAL at 17:36:00

## 2024-02-10 RX ADMIN — ETOMIDATE 14 MG: 2 INJECTION INTRAVENOUS at 17:36:00

## 2024-02-10 RX ADMIN — SODIUM CHLORIDE: 9 INJECTION, SOLUTION INTRAVENOUS at 18:30:00

## 2024-02-10 RX ADMIN — SODIUM CHLORIDE, SODIUM LACTATE, POTASSIUM CHLORIDE, CALCIUM CHLORIDE: 600; 310; 30; 20 INJECTION, SOLUTION INTRAVENOUS at 17:32:00

## 2024-02-10 RX ADMIN — SODIUM CHLORIDE: 9 INJECTION, SOLUTION INTRAVENOUS at 17:32:00

## 2024-02-10 RX ADMIN — CALCIUM CHLORIDE 0.5 G: 100 INJECTION INTRAVENOUS; INTRAVENTRICULAR at 18:58:00

## 2024-02-10 RX ADMIN — ROCURONIUM BROMIDE 100 MG: 10 INJECTION, SOLUTION INTRAVENOUS at 17:36:00

## 2024-02-10 RX ADMIN — PROTAMINE SULFATE 50 MG: 10 INJECTION, SOLUTION INTRAVENOUS at 19:50:00

## 2024-02-10 RX ADMIN — HEPARIN SODIUM 5000 UNITS: 1000 INJECTION, SOLUTION INTRAVENOUS; SUBCUTANEOUS at 18:25:00

## 2024-02-10 RX ADMIN — SODIUM CHLORIDE: 9 INJECTION, SOLUTION INTRAVENOUS at 21:00:00

## 2024-02-10 NOTE — ANESTHESIA PREPROCEDURE EVALUATION
Anesthesia PreOp Note    HPI:     Ava Bowen is a 68 year old female who presents for preoperative consultation requested by: Kp Justice MD    Date of Surgery: 2/10/2024    Procedure(s):  EXPLORATION AND CONTROL OF RIGHT FEMORAL BLEEDING  Indication: Hemorrhage [R58]    Relevant Problems   No relevant active problems       NPO:                         History Review:  Patient Active Problem List    Diagnosis Date Noted    Bypass graft mechanical complication ([facility]) 02/10/2024    Bypass graft mechanical complication, initial encounter ([facility]) 02/10/2024    Anemia, unspecified type 02/10/2024    Acute kidney injury superimposed on CKD  ([facility]) 02/10/2024    Opioid dependence, uncomplicated ([facility]) 07/28/2023    Depression, recurrent ([facility]) 07/28/2023    Stage 3 chronic kidney disease, unspecified whether stage 3a or 3b CKD ([facility]) 07/28/2023    Hypoglycemia 06/26/2023    Pyelonephritis 04/25/2023    Hypernatremia 04/25/2023    Hyperglycemia 04/25/2023    Ureteral calculi 04/25/2023    Urinary tract infection 03/23/2023    Acute cystitis without hematuria 03/23/2023    Ileitis 03/23/2023    COVID-19 03/23/2023    UTI (urinary tract infection) 03/23/2023    Hyperlipidemia 10/17/2022    DDD (degenerative disc disease), lumbar 05/20/2022    Failed back syndrome of lumbar spine 05/20/2022    Myalgia 03/17/2022    Post-operative pain     Type 2 diabetes mellitus with hyperglycemia, with long-term current use of insulin ([facility])     Primary hypertension     S/P laminectomy     Family history of glaucoma in sister 05/04/2021    Pre-op testing     PAD (peripheral artery disease) ([facility]) 07/08/2020    Diabetic ulcer of left midfoot associated with type 2 diabetes mellitus, with fat layer exposed ([facility]) 05/08/2020    PVD (peripheral vascular disease) ([facility]) 05/03/2020    Centrilobular emphysema ([facility]) 11/08/2019    Type 2 diabetes mellitus without retinopathy ([facility]) 10/15/2019    Age-related nuclear cataract of both eyes 10/15/2019     Glaucoma suspect of both eyes 10/15/2019    Back pain with radiation 2018    Anemia 2018    Azotemia 2018    Hypokalemia 2018    Chest pain 2007       Past Medical History:   Diagnosis Date    Anemia     Back pain     Back problem     Cataract     Chronic kidney disease (CKD)     COPD (chronic obstructive pulmonary disease) ([facility])     FEV 1 1.25 50%     Diabetes (HCC)     DM neuropathy    Essential hypertension     H/O angioplasty     2020    High blood pressure     High cholesterol     Neuropathy     Peripheral vascular disease (HCC)     PNA (pneumonia) 2018    Pulmonary emphysema ([facility])     Pulmonary nodule     4 mm RUL    Renal disorder     monitoring kidney labs    Sickle cell trait (HCC)     Sickle-cell anemia (HCC)     Tobacco abuse     Visual impairment        Past Surgical History:   Procedure Laterality Date    APPENDECTOMY      BACK SURGERY Right 2021    Right L3-4 extreme lateral interbody fusion, posterior decompression; LUMBAR LAMINECTOMY 1 LEVEL          x3    CHOLECYSTECTOMY      EXCIS LUMBAR DISK,ONE LEVEL          OTHER      bilateral leg stents  and        Medications Prior to Admission   Medication Sig Dispense Refill Last Dose    [START ON 2024] HYDROcodone-acetaminophen  MG Oral Tab Take 1 tablet by mouth every 4 (four) hours as needed for Pain (max 6/day). 180 tablet 0     [START ON 3/15/2024] HYDROcodone-acetaminophen  MG Oral Tab Take 1 tablet by mouth every 4 (four) hours as needed for Pain (max 6/day). 180 tablet 0     LANTUS SOLOSTAR 100 UNIT/ML Subcutaneous Solution Pen-injector Inject 6 Units into the skin See Admin Instructions. Every other day       MYRBETRIQ 50 MG Oral Tablet 24 Hr Take 1 tablet (50 mg total) by mouth every morning.       JANUVIA 25 MG Oral Tab Take 1 tablet (25 mg total) by mouth every morning.       rosuvastatin 40 MG Oral Tab Take 1 tablet (40 mg total) by mouth nightly.        glipiZIDE 10 MG Oral Tab Take 1 tablet (10 mg total) by mouth 2 (two) times daily.       albuterol 108 (90 Base) MCG/ACT Inhalation Aero Soln Inhale 2 puffs into the lungs every 6 (six) hours as needed for Wheezing or Shortness of Breath. 8.5 each 2     carvedilol 12.5 MG Oral Tab Take 2 tablets (25 mg total) by mouth daily. (Patient taking differently: Take 2 tablets (25 mg total) by mouth 2 (two) times daily with meals.) 180 tablet 1     D-5000 125 MCG (5000 UT) Oral Tab Take 1 tablet (5,000 Units total) by mouth daily.       clopidogrel 75 MG Oral Tab Take 1 tablet (75 mg total) by mouth daily. (Patient taking differently: Take 1 tablet (75 mg total) by mouth every morning.) 90 tablet 1     estradiol 0.1 MG/GM Vaginal Cream Apply a small amount to the perimeatal tissue every other day. 45 g 2     amLODIPine 10 MG Oral Tab Take 1 tablet (10 mg total) by mouth daily. (Patient taking differently: Take 1 tablet (10 mg total) by mouth every morning.) 90 tablet 0     LISINOPRIL 40 MG Oral Tab TAKE 1 TABLET(40 MG) BY MOUTH DAILY (Patient taking differently: Take 1 tablet (40 mg total) by mouth every morning.) 90 tablet 1     hydrALAZINE 100 MG Oral Tab Take 1 tablet (100 mg total) by mouth every 8 (eight) hours. 270 tablet 1     Budesonide-Formoterol Fumarate 160-4.5 MCG/ACT Inhalation Aerosol Inhale 2 puffs into the lungs 2 (two) times daily. Rinse mouth with water, gargle, and spit to follow. 1 each 2     ipratropium-albuterol 0.5-2.5 (3) MG/3ML Inhalation Solution Take 3 mL by nebulization every 6 (six) hours as needed (shortness of breath refractory to HFA). 100 each 0     aspirin 81 MG Oral Tab EC Take 1 tablet (81 mg total) by mouth every other day.        Current Facility-Administered Medications Ordered in Epic   Medication Dose Route Frequency Provider Last Rate Last Admin    insulin regular human (Novolin R, Humulin R) 100 Units in sodium chloride 0.9% 100 mL standard infusion (100 mL)  0.5-9 Units/hr  Intravenous Continuous Sapadin, Laura, DO 9 mL/hr at 02/10/24 1649 9 Units/hr at 02/10/24 1649    potassium chloride 20 mEq/100mL IVPB premix 20 mEq  20 mEq Intravenous Once Sapadin, Laura, DO 50 mL/hr at 02/10/24 1650 20 mEq at 02/10/24 1650     No current Epic-ordered outpatient medications on file.       Allergies   Allergen Reactions    Penicillins HIVES and ANGIOEDEMA     Hives on eyelids (occurred when pt was in her 20s). Tolerated amoxicillin per chart. Tolerates cephalosporins.       Family History   Problem Relation Age of Onset    Diabetes Mother         Passed from DM complications    Diabetes Sister         Had kidney transplant due to complications of DM    Kidney Disease Sister         Kidney failure secondary to diabetes; received kidney transplant in     Glaucoma Sister     Diabetes Brother     Sickle Cell Son         Cause of death    Macular degeneration Neg      Social History     Socioeconomic History    Marital status:    Tobacco Use    Smoking status: Former     Packs/day: 1.00     Years: 30.00     Additional pack years: 0.00     Total pack years: 30.00     Types: Cigarettes     Quit date: 2019     Years since quittin.2    Smokeless tobacco: Never   Vaping Use    Vaping Use: Never used   Substance and Sexual Activity    Alcohol use: Not Currently     Comment: socially    Drug use: No       Available pre-op labs reviewed.  Lab Results   Component Value Date    WBC 16.4 (H) 02/10/2024    RBC 2.17 (L) 02/10/2024    HGB 6.0 (LL) 02/10/2024    HCT 18.9 (L) 02/10/2024    MCV 87.1 02/10/2024    MCH 27.6 02/10/2024    MCHC 31.7 02/10/2024    RDW 14.5 02/10/2024    .0 02/10/2024     Lab Results   Component Value Date     02/10/2024    K 4.3 02/10/2024     02/10/2024    CO2 12.0 (L) 02/10/2024    BUN 30 (H) 02/10/2024    CREATSERUM 1.77 (H) 02/10/2024     (HH) 02/10/2024    PGLU 575 (HH) 02/10/2024    CA 8.5 (L) 02/10/2024     Lab Results   Component Value Date     INR 1.51 (H) 02/10/2024       Vital Signs:  There is no height or weight on file to calculate BMI.   temporal temperature is 98.9 °F (37.2 °C). Her blood pressure is 161/97 (abnormal) and her pulse is 79. Her respiration is 15 and oxygen saturation is 100%.   Vitals:    02/10/24 1622 02/10/24 1632 02/10/24 1642 02/10/24 1700   BP: (!) 116/91 (!) 88/68 (!) 185/67 (!) 161/97   Pulse: 74 102 80 79   Resp: 20 20 16 15   Temp:       TempSrc:       SpO2: 94% 98% 99% 100%        Anesthesia Evaluation      Airway   Mallampati: II  TM distance: >3 FB  Neck ROM: full  Dental    (+) upper dentures and lower dentures    Pulmonary    (+) COPD, decreased breath sounds, wheezes  Cardiovascular   (+) hypertension, CAD    Rhythm: regular  Rate: abnormal    Neuro/Psych      GI/Hepatic/Renal      Endo/Other    (+) diabetes mellitus  Abdominal                  Anesthesia Plan:   ASA:  4  Emergent    Plan:   General  Monitors and Lines:   A-line and Central line  Plan Comments: Anesthesia plan was discussed with the patient, going through the rationale, expectations, benefits and risks namely injury to lips, teeth, gyms or corneas, risks of an allergic reaction, risk of awareness or recall of intra-operative events, risk of prolonged intubation and ICU admission, risks of kidney failure, risk of coronary events or dysrhythmias, risk of cerebrovascular events or stroke and on rare occasions death.         I have informed Ava Bowen and/or legal guardian or family member of the nature of the anesthetic plan, benefits, risks including possible dental damage if relevant, major complications, and any alternative forms of anesthetic management.   All of the patient's questions were answered to the best of my ability. The patient desires the anesthetic management as planned.  Delmar Manley MD  2/10/2024 5:16 PM  Present on Admission:  **None**

## 2024-02-10 NOTE — ED INITIAL ASSESSMENT (HPI)
Pt to ED via EMS from home for bleeding from right femoral stent placed on Wednesday. Pt arrives A&Ox4, pt and ems report that the patient was cleaning the site when it began to bleeding, pt then sat on the toilet. EMS reports there was significant blood loss, approx 1 L of blood. Site has some clots to the right groin.

## 2024-02-10 NOTE — H&P
[facility] Hospitalist H&P       CC:   Chief Complaint   Patient presents with    Bleeding        PCP: Ernesto De Dios    Date of Admission: 2/10/2024  3:39 PM    ASSESSMENT / PLAN:     Ms. Bowen is a 68 year old female with PMH sig for CKD, COPD, DM, HTN, HLD, PVD, and sickle cell anemia who was recently admitted last week for right lower extremity femoral to popliteal bypass who presented with bleeding from surgical site. Initially hypotensive in EMS, labs pending, BG elevated.       Hemorrhage from recent surgical site  PAD  - s/p recent fem pop bypass 1/24/24 with Dr. Murrieta  - has been on ASA/plavix- hold today  - profuse bleeding today  - Hg 6.0, pRBC ordered in ED  - vascular surgery consulted, plan for emergent OR today    Acute blood loss anemia  Chronic anemia  Sickle cell anemia  - outpatient f/u  - Hg 8.9 on discharge last week  - Hg pending, likely will need transfusion    DM2, hyperglycemia  - hold PO medications  - BG 500s in ED, pH 7.2  - insulin gtt ordered in ED  - was started on lantus 6 units BID last admission, will reorder when weaned from insulin gtt    Hypotension  Hx HTN  - previously with uncontrolled HTN on prior admit  - BP low likely 2/2 hemorrhage  - BP labile, will likely need A-line  - hold home meds    Metabolic acidosis  - likely 2/2 LA from blood loss  - pH 7.24 on admit, LA 7.6    COPD  - chronic, stable  -resume inhaler     OMEGA on CKD3  - cr 1.7 on admit, previously 1.2 on discharge last week  - monitor Cr and UOP     FN:  - IVF: NS, blood transfusion  - Diet: NPO for OR    DVT Prophy:   Lines: PIV    Dispo: pending clinical course    Outpatient records or previous hospital records reviewed.     Further recommendations pending patient's clinical course.  [facility] hospitalist to continue to follow patient while in house    Patient and/or patient's family given opportunity to ask questions and note understanding and agreeing with therapeutic plan as outlined    MD LETTY Perez  Hospitalist  Answering Service number: [phone]    Critical care > 35 mins      HPI       History of Present Illness:     Ms. Bowen is a 68 year old female with PMH sig for CKD, COPD, DM, HTN, HLD, PVD, and sickle cell anemia who was recently admitted last week for right lower extremity femoral to popliteal bypass who presented with bleeding from surgical site. History obtained from chart review, patient unable to give history due to discomfort from pain. Patient had been cleaning the surgical site when it began to bleed. She sat on the toilet and had significant blood loss, approximately 1L per reports. Reportedly hypotensive in EMS, given IVF.    In the ED, BP low, labs significant for pH 7.24, , Hg 6.0, Cr 1.7. Vascular surgery evaluated, plan for emergent OR today. pRBC ordered, insulin gtt ordered. Patient uncomfortable, complains of significant pain, more lethargic compared to last admission.     PMH  Past Medical History:   Diagnosis Date    Anemia     Back pain     Back problem     Cataract     Chronic kidney disease (CKD)     COPD (chronic obstructive pulmonary disease) (HCC)     FEV 1 1.25 50%     Diabetes (HCC)     DM neuropathy    Essential hypertension     H/O angioplasty     2020    High blood pressure     High cholesterol     Neuropathy     Peripheral vascular disease (HCC)     PNA (pneumonia) 2018    Pulmonary emphysema (HCC)     Pulmonary nodule     4 mm RUL    Renal disorder     monitoring kidney labs    Sickle cell trait (HCC)     Sickle-cell anemia (HCC)     Tobacco abuse     Visual impairment         PSH  Past Surgical History:   Procedure Laterality Date    APPENDECTOMY      BACK SURGERY Right 2021    Right L3-4 extreme lateral interbody fusion, posterior decompression; LUMBAR LAMINECTOMY 1 LEVEL          x3    CHOLECYSTECTOMY      EXCIS LUMBAR DISK,ONE LEVEL      2015    OTHER      bilateral leg stents  and         ALL:  Allergies   Allergen  Reactions    Penicillins HIVES and ANGIOEDEMA     Hives on eyelids (occurred when pt was in her 20s). Tolerated amoxicillin per chart. Tolerates cephalosporins.        Home Medications:  No outpatient medications have been marked as taking for the 2/10/24 encounter (Hospital Encounter).         Soc Hx  Social History     Tobacco Use    Smoking status: Former     Packs/day: 1.00     Years: 30.00     Additional pack years: 0.00     Total pack years: 30.00     Types: Cigarettes     Quit date: 2019     Years since quittin.2    Smokeless tobacco: Never   Substance Use Topics    Alcohol use: Not Currently     Comment: socially        Fam Hx  Family History   Problem Relation Age of Onset    Diabetes Mother         Passed from DM complications    Diabetes Sister         Had kidney transplant due to complications of DM    Kidney Disease Sister         Kidney failure secondary to diabetes; received kidney transplant in     Glaucoma Sister     Diabetes Brother     Sickle Cell Son         Cause of death    Macular degeneration Neg        Review of Systems  Comprehensive ROS reviewed and negative except for what's stated above.     OBJECTIVE:  /53   Pulse 75   Temp 98.9 °F (37.2 °C) (Temporal)   Resp 19   LMP  (LMP Unknown)   SpO2 100%        Exam   GEN: female in NAD, awake, uncomfortable, somewhat lethargic  HEENT: EOMI  Pulm: CTAB, no crackles or wheezes  CV: RRR, no murmurs  ABD: Soft, non-tender, non-distended, +BS  SKIN: warm, dry, groin incision without active bleeding but staples no longer present  EXT: no edema    Diagnostic Data:    CBC/Chem    Recent Labs   Lab 02/10/24  1550   RBC 2.17*   HGB 6.0*   HCT 18.9*   MCV 87.1   MCH 27.6   MCHC 31.7   RDW 14.5   NEPRELIM 11.87*   WBC 16.4*   .0         Recent Labs   Lab 02/10/24  1551   *   BUN 30*   CREATSERUM 1.77*   EGFRCR 31*   CA 8.5*      K 4.3      CO2 12.0*         Latest Reference Range & Units 02/10/24 15:50    Blood Gas Base Excess -2.0 - 2.0 mmol/L -14.9 (L)   VENOUS pH 7.32 - 7.43  7.24 (L)   VENOUS PCO2 38 - 50 mm Hg 25 (L)   VENOUS PO2 35 - 40 mm Hg 57 (H)   VENOUS O2 SATURATION 60.0 - 85.0 % 90.1 (H)   VENOUS BICARBONATE 22.0 - 26.0 mEq/L 12.9 (L)   VENOUS SAMPLE SITE  Venous   (L): Data is abnormally low  (H): Data is abnormally high     Latest Reference Range & Units 02/10/24 16:13   LACTIC ACID 0.5 - 2.0 mmol/L 7.6 (HH)   (HH): Data is critically high    No results for input(s): \"TROP\" in the last 168 hours.    Additional Diagnostics: ECG:      Radiology: No results found.

## 2024-02-10 NOTE — ED QUICK NOTES
Unable to obtain 2nd set of blood cultures, patient has had several attempts for complete set of blood work. Phlebotomy called.

## 2024-02-10 NOTE — CONSULTS
Vascular Surgery Consultation    Ava Bowen Patient Status:  Emergency    10/6/1955 MRN [id number]   Location [facility] EMERGENCY DEPARTMENT Attending Laura Meng,    Hosp Day # 0 PCP Ernesto De Dios     Reason for Consultation:  [facility] postoperative hemorrhage    History of Present Illness:  Ava Bowen is a a(n) 68 year old female who presents to the emergency room via ambulance after hemorrhage from the right groin incision.  She has significant history of a right femoral artery to below-knee popliteal artery bypass with PTFE performed on .  She was discharged on .  She has not had postoperative appointment in the office yet and was scheduled for next week.   Currently patient is in significant distress and only complaining of significant pain.  Based on report from the ambulance and her son who was not at home but got the information from her before she was complaining of significant pain that she was in the bathroom on the toilet and then had significant hemorrhage and the ambulance was called.   There is no active hemorrhage from the incision right now but the staples have been blown out and there is likely a contained hematoma.  There is a significant amount of seroma fluid draining from the incision and there is a significant of blood on her clothing.   Patient with hemoglobin of 6.0 on admission       History:  Past Medical History:   Diagnosis Date    Anemia     Back pain     Back problem     Cataract     Chronic kidney disease (CKD)     COPD (chronic obstructive pulmonary disease) (HCC)     FEV 1 1.25 50%     Diabetes (HCC)     DM neuropathy    Essential hypertension     H/O angioplasty     2020    High blood pressure     High cholesterol     Neuropathy     Peripheral vascular disease (HCC)     PNA (pneumonia) 2018    Pulmonary emphysema (HCC)     Pulmonary nodule     4 mm RUL    Renal disorder     monitoring kidney labs    Sickle cell trait (HCC)      Sickle-cell anemia (HCC)     Tobacco abuse     Visual impairment      Past Surgical History:   Procedure Laterality Date    APPENDECTOMY      BACK SURGERY Right 2021    Right L3-4 extreme lateral interbody fusion, posterior decompression; LUMBAR LAMINECTOMY 1 LEVEL          x3    CHOLECYSTECTOMY      EXCIS LUMBAR DISK,ONE LEVEL      2015    OTHER      bilateral leg stents  and      Family History   Problem Relation Age of Onset    Diabetes Mother         Passed from DM complications    Diabetes Sister         Had kidney transplant due to complications of DM    Kidney Disease Sister         Kidney failure secondary to diabetes; received kidney transplant in     Glaucoma Sister     Diabetes Brother     Sickle Cell Son         Cause of death    Macular degeneration Neg       reports that she quit smoking about 4 years ago. Her smoking use included cigarettes. She has a 30 pack-year smoking history. She has never used smokeless tobacco. She reports that she does not currently use alcohol. She reports that she does not use drugs.    Allergies:  Allergies   Allergen Reactions    Penicillins HIVES and ANGIOEDEMA     Hives on eyelids (occurred when pt was in her 20s). Tolerated amoxicillin per chart. Tolerates cephalosporins.       Medications:    Current Facility-Administered Medications:     sodium chloride 0.9 % IV bolus 1,000 mL, 1,000 mL, Intravenous, Once    cefTRIAXone (Rocephin) 2 g in D5W 100 mL IVPB-ADD, 2 g, Intravenous, Once    insulin regular human (Novolin R, Humulin R) 100 Units in sodium chloride 0.9% 100 mL standard infusion (100 mL), 0.5-9 Units/hr, Intravenous, Continuous    Review of Systems:    CONSTITUTIONAL: denies fever, chills  ENT: denies sore throat, nasal drainage/congestion  CHEST/CVS: denies cough, sputum, trouble breathing/SOB, chest pain, SELBY  GI: denies abdominal pain, heartburn, diarrhea, blood in bm, tarry bm, constipation,    : denies difficulty urinating,  pain, blood in urine, or frequency  SKIN: denies any unusual skin lesions or rashes  MUSCULOSKELETAL: denies back pain, joint pain, leg swelling  NEURO/EYES: denies headaches, passing out, motor dysfunction, difficulty walking, difficulty with speech, temporary blindness, double vision, confusion    Physical Exam:  /53   Pulse 75   Temp 98.9 °F (37.2 °C) (Temporal)   Resp 19   LMP  (LMP Unknown)   SpO2 100%   GENERAL: alert and orientated X 3, well developed, well nourished, in no apparent distress  HEENT: ears and throat are clear  NECK: supple, no lymphadenopathy, thyroid wnl  CAROTID: No bruits  RESPIRATORY: no rales, rhonchi, or wheezes B  CARDIO: RRR without murmur, no murmur, no gallop   ABDOMEN: soft, non-tender with no palpable aneurysm or masses  BACK: normal, no tenderness  SKIN: no rashes, no suspicious lesions, warm and dry  EXTREMITIES: no edema, full range of motion, no tenderness  NEURO/PSYCH: orientated x3, normal mood and affect, no sensory or motor deficit    ARTERIAL VASCULAR EXAM    LOWER EXTREMITY     FEMORAL POPLITEAL POST TIB ANT TIB PERONEAL   RIGHT 4                           LEFT 3                             Staples from incision gone likely from blowout    Significant blood on her clothing consistent with hemorrhage    Laboratory Data:  Lab Results   Component Value Date    PGLU 556 02/10/2024       Impression and Plan:    Bleeding from surgical incision-likely from surgical bypass: I discussed the findings based on the information provided and her physical exam with the son.  I discussed with him that the goal is to save her life at this time and not preserve the bypass.  I discussed the high likelihood that I may need to ligate the bypass which may put her limb at risk for amputation but the goal is to control all active bleeding at this time.  I discussed the possibility of a myoplasty.  I discussed the possibility that she may need prolonged wound care.  Consent obtained  from the son and we will proceed with surgery as soon as the operating room is available.     Thank you for allowing me to participate in the care of your patient.    Kp Justice MD  2/10/2024  4:25 PM

## 2024-02-10 NOTE — ED PROVIDER NOTES
[facility] Emergency Department Note  Patient: Ava Bowen Age: 68 year old Sex: female      MRN: [id number]  : 10/6/1955    Patient Seen in: [facility] Emergency Department    History     Chief Complaint   Patient presents with    Bleeding     Stated Complaint: femoral bleed    HPI:    This is a 68-year-old history of CKD, COPD, diabetes, hypertension, hyperlipidemia, sickle cell anemia, peripheral vascular disease, status post right lower extremity femoropopliteal bypass on 2024 with Dr. Murrieta, presented to the ER with profuse bleeding from her right lower extremity groin and wound dehiscence and pain.  Patient states that she had been doing okay after her surgery but then just prior to arrival her wound in her right groin popped open and she was having profuse bleeding onto the floor at home.  She is complaining of severe pain in this area.  Denies fevers, vomiting, diarrhea, chest pain or abdominal pain.        Review of Systems:  Review of Systems  Positive for stated complaint: femoral bleed. Constitutional and vital signs reviewed. All other systems reviewed and negative except as noted above.    Patient History:  Past Medical History:   Diagnosis Date    Anemia     Back pain     Back problem     Cataract     Chronic kidney disease (CKD)     COPD (chronic obstructive pulmonary disease) (HCC)     FEV 1 1.25 50%     Diabetes (HCC)     DM neuropathy    Essential hypertension     H/O angioplasty     2020    High blood pressure     High cholesterol     Neuropathy     Peripheral vascular disease (HCC)     PNA (pneumonia) 2018    Pulmonary emphysema (HCC)     Pulmonary nodule     4 mm RUL    Renal disorder     monitoring kidney labs    Sickle cell trait (HCC)     Sickle-cell anemia (HCC)     Tobacco abuse     Visual impairment        Past Surgical History:   Procedure Laterality Date    APPENDECTOMY      BACK SURGERY Right 2021    Right L3-4 extreme lateral interbody fusion,  posterior decompression; LUMBAR LAMINECTOMY 1 LEVEL          x3    CHOLECYSTECTOMY      EXCIS LUMBAR DISK,ONE LEVEL      2015    OTHER      bilateral leg stents  and         Family History   Problem Relation Age of Onset    Diabetes Mother         Passed from DM complications    Diabetes Sister         Had kidney transplant due to complications of DM    Kidney Disease Sister         Kidney failure secondary to diabetes; received kidney transplant in     Glaucoma Sister     Diabetes Brother     Sickle Cell Son         Cause of death    Macular degeneration Neg        Specific Social Determinants of Health:   Social History     Socioeconomic History    Marital status:    Tobacco Use    Smoking status: Former     Packs/day: 1.00     Years: 30.00     Additional pack years: 0.00     Total pack years: 30.00     Types: Cigarettes     Quit date: 2019     Years since quittin.2    Smokeless tobacco: Never   Vaping Use    Vaping Use: Never used   Substance and Sexual Activity    Alcohol use: Not Currently     Comment: socially    Drug use: No   Social History Narrative    [facility]          Social Determinants of Health     Food Insecurity: No Food Insecurity (2024)    Food Insecurity     Food Insecurity: Never true   Transportation Needs: No Transportation Needs (2024)    Transportation Needs     Lack of Transportation: No   Housing Stability: Low Risk  (2024)    Housing Stability     Housing Instability: No           PSFH elements reviewed from today and agreed except as otherwise stated in HPI.    Physical Exam     ED Triage Vitals [02/10/24 1542]   BP 94/49   Pulse 71   Resp 22   Temp 98.9 °F (37.2 °C)   Temp src Temporal   SpO2 100 %   O2 Device None (Room air)       Current:/59 (BP Location: Right arm)   Pulse 73   Temp 97.5 °F (36.4 °C)   Resp 20   Wt 82.7 kg   LMP  (LMP Unknown)   SpO2 96%   BMI 31.30 kg/m²         Physical Exam  Vitals  reviewed.   Constitutional:       General: She is in acute distress.      Appearance: She is ill-appearing.   HENT:      Head: Normocephalic and atraumatic.      Mouth/Throat:      Mouth: Mucous membranes are dry.   Eyes:      Conjunctiva/sclera: Conjunctivae normal.   Cardiovascular:      Rate and Rhythm: Normal rate and regular rhythm.      Heart sounds: No murmur heard.  Pulmonary:      Effort: Pulmonary effort is normal. No respiratory distress.      Breath sounds: No wheezing or rales.   Abdominal:      General: There is no distension.      Palpations: Abdomen is soft.      Tenderness: There is no abdominal tenderness.   Musculoskeletal:         General: Swelling present.      Right lower leg: Edema present.      Comments: Bilateral lower extremity edema noted, right greater than left, her distal feet are warm on touch with faint right distal pulse palpable.  Her wound does not have any pulsatile bleeding but does have some serous drainage and slight venous ooze to the right groin.   Skin:     General: Skin is warm and dry.      Coloration: Skin is pale.   Neurological:      General: No focal deficit present.      Mental Status: She is alert.   Psychiatric:      Comments: Anxious appearing          ED Course   Labs:   Labs Reviewed   BASIC METABOLIC PANEL (8) - Abnormal; Notable for the following components:       Result Value    Glucose 552 (*)     CO2 12.0 (*)     BUN 30 (*)     Creatinine 1.77 (*)     Calcium, Total 8.5 (*)     Calculated Osmolality 323 (*)     eGFR-Cr 31 (*)     All other components within normal limits   VENOUS BLOOD GAS - Abnormal; Notable for the following components:    Venous pH 7.24 (*)     Venous pCO2 25 (*)     Venous pO2 57 (*)     Venous Bicarbonate 12.9 (*)     Blood Gas Base Excess -14.9 (*)     Venous O2 Saturation 90.1 (*)     All other components within normal limits   LACTIC ACID, PLASMA - Abnormal; Notable for the following components:    Lactic Acid 7.6 (*)     All other  components within normal limits   PROTHROMBIN TIME (PT) - Abnormal; Notable for the following components:    PT 19.2 (*)     INR 1.51 (*)     All other components within normal limits   MANUAL DIFFERENTIAL - Abnormal; Notable for the following components:    Neutrophil Absolute Manual 11.97 (*)     NRBC 3 (*)     RBC Morphology See morphology below (*)     All other components within normal limits   LACTIC ACID REFLEX POST POSTIVE - Abnormal; Notable for the following components:    Lactic Acid 3.4 (*)     All other components within normal limits   EXPANDED BLOOD GAS, ARTERIAL - Abnormal; Notable for the following components:    ABG pH 7.15 (*)     ABG PO2 414 (*)     ABG HCO3 13.8 (*)     Blood Gas Base Excess -14.3 (*)     Potassium Blood Gas 3.1 (*)     Lactic Acid (Blood Gas) 3.0 (*)     Ionized Calcium 1.45 (*)     Total Hemoglobin 9.4 (*)     Oxygen Content 14.0 (*)     All other components within normal limits   COMP METABOLIC PANEL (14) - Abnormal; Notable for the following components:    Glucose 213 (*)     Potassium 3.3 (*)     Chloride 119 (*)     CO2 14.0 (*)     BUN 26 (*)     Creatinine 1.34 (*)     Calcium, Total 8.4 (*)     Calculated Osmolality 309 (*)     eGFR-Cr 44 (*)     AST 91 (*)     Total Protein 4.6 (*)     Albumin 2.3 (*)     Globulin  2.3 (*)     All other components within normal limits   CBC, PLATELET; NO DIFFERENTIAL - Abnormal; Notable for the following components:    WBC 22.3 (*)     RBC 3.16 (*)     HGB 9.3 (*)     HCT 26.0 (*)     All other components within normal limits   LACTIC ACID REFLEX POST POSITIVE [id number] - Abnormal; Notable for the following components:    Lactic Acid 2.2 (*)     All other components within normal limits   HEMOGLOBIN A1C - Abnormal; Notable for the following components:    HgbA1C 7.9 (*)     Estimated Average Glucose 180 (*)     All other components within normal limits   BASIC METABOLIC PANEL (8) - Abnormal; Notable for the following components:     Glucose 150 (*)     Chloride 120 (*)     CO2 15.0 (*)     BUN 25 (*)     Creatinine 1.25 (*)     Calcium, Total 8.1 (*)     Calculated Osmolality 307 (*)     eGFR-Cr 48 (*)     All other components within normal limits   BASIC METABOLIC PANEL (8) - Abnormal; Notable for the following components:    Glucose 134 (*)     Sodium 147 (*)     Potassium 3.4 (*)     Chloride 122 (*)     CO2 16.0 (*)     Creatinine 1.27 (*)     Calcium, Total 7.5 (*)     Calculated Osmolality 310 (*)     eGFR-Cr 47 (*)     All other components within normal limits   ARTERIAL BLOOD GAS - Abnormal; Notable for the following components:    ABG pH 7.32 (*)     ABG pCO2 26 (*)     ABG PO2 104 (*)     ABG HCO3 16.2 (*)     Blood Gas Base Excess -11.2 (*)     All other components within normal limits   POCT GLUCOSE - Abnormal; Notable for the following components:    POC Glucose  556 (*)     All other components within normal limits   POCT GLUCOSE - Abnormal; Notable for the following components:    POC Glucose  575 (*)     All other components within normal limits   POCT ISTAT CG8 CARTRIDGE - Abnormal; Notable for the following components:    ISTAT Potassium 3.5 (*)     ISTAT Hematocrit 17 (*)     ISTAT Glucose 541 (*)     ISTAT Blood Gas pH 7.15 (*)     ISTAT Blood Gas PO2 >400 (*)     ISTAT Blood Gas TCO2 15 (*)     ISTAT Blood Gas HCO3 13.6 (*)     All other components within normal limits   POCT ISTAT CG8 CARTRIDGE - Abnormal; Notable for the following components:    ISTAT Potassium 3.1 (*)     ISTAT Ionized Calcium 1.37 (*)     ISTAT Hematocrit 23 (*)     ISTAT Glucose 419 (*)     ISTAT Blood Gas pH 7.10 (*)     ISTAT Blood Gas PO2 399 (*)     ISTAT Blood Gas TCO2 15 (*)     ISTAT Blood Gas HCO3 13.8 (*)     All other components within normal limits   POCT GLUCOSE - Abnormal; Notable for the following components:    POC Glucose  262 (*)     All other components within normal limits   POCT GLUCOSE - Abnormal; Notable for the following  components:    POC Glucose  203 (*)     All other components within normal limits   POCT GLUCOSE - Abnormal; Notable for the following components:    POC Glucose  176 (*)     All other components within normal limits   POCT GLUCOSE - Abnormal; Notable for the following components:    POC Glucose  150 (*)     All other components within normal limits   POCT GLUCOSE - Abnormal; Notable for the following components:    POC Glucose  138 (*)     All other components within normal limits   POCT GLUCOSE - Abnormal; Notable for the following components:    POC Glucose  162 (*)     All other components within normal limits   POCT GLUCOSE - Abnormal; Notable for the following components:    POC Glucose  177 (*)     All other components within normal limits   POCT GLUCOSE - Abnormal; Notable for the following components:    POC Glucose  161 (*)     All other components within normal limits   POCT GLUCOSE - Abnormal; Notable for the following components:    POC Glucose  163 (*)     All other components within normal limits   POCT GLUCOSE - Abnormal; Notable for the following components:    POC Glucose  152 (*)     All other components within normal limits   POCT GLUCOSE - Abnormal; Notable for the following components:    POC Glucose  150 (*)     All other components within normal limits   POCT GLUCOSE - Abnormal; Notable for the following components:    POC Glucose  140 (*)     All other components within normal limits   POCT GLUCOSE - Abnormal; Notable for the following components:    POC Glucose  136 (*)     All other components within normal limits   POCT GLUCOSE - Abnormal; Notable for the following components:    POC Glucose  127 (*)     All other components within normal limits   POCT GLUCOSE - Abnormal; Notable for the following components:    POC Glucose  126 (*)     All other components within normal limits   POCT GLUCOSE - Abnormal; Notable for the following components:    POC Glucose  150 (*)     All other  components within normal limits   BLOOD CULTURE - Abnormal; Notable for the following components:    Blood Smear Gram Negative Rods (*)     All other components within normal limits    Narrative:       Anaerobic Bottle Time to Detection - 10 hr     Aerobic Bottle Time to Detection - 16 hr  6 min    TISSUE AEROBIC CULTURE - Abnormal; Notable for the following components:    Tissue Culture Result 4+ growth Klebsiella pneumoniae pneumoniae (*)     Tissue Culture Result 4+ growth Enterococcus faecalis (*)     Tissue Smear 1+ WBCs seen (*)     Tissue Smear 4+ Gram positive cocci in pairs (*)     Tissue Smear 3+ Gram Negative Rods (*)     Tissue Smear 2+ Gram Positive Rods (*)     All other components within normal limits   TISSUE AEROBIC CULTURE - Abnormal; Notable for the following components:    Tissue Culture Result 4+ growth Klebsiella pneumoniae pneumoniae (*)     Tissue Culture Result 2+ growth Enterococcus faecalis (*)     Tissue Smear 1+ WBCs seen (*)     Tissue Smear 1+ Gram positive cocci in pairs (*)     Tissue Smear 1+ Gram Negative Rods (*)     All other components within normal limits   TISSUE AEROBIC CULTURE - Abnormal; Notable for the following components:    Tissue Culture Result 4+ growth Klebsiella pneumoniae pneumoniae (*)     Tissue Smear 3+ WBCs seen (*)     Tissue Smear 1+ Gram positive cocci in pairs (*)     All other components within normal limits   TISSUE AEROBIC CULTURE - Abnormal; Notable for the following components:    Tissue Culture Result 4+ growth Klebsiella pneumoniae pneumoniae (*)     Tissue Culture Result 2+ growth Enterococcus faecalis (*)     Tissue Smear 1+ WBCs seen (*)     Tissue Smear 2+ Gram positive cocci in pairs (*)     Tissue Smear 2+ Gram Negative Rods (*)     All other components within normal limits   TISSUE AEROBIC CULTURE - Abnormal; Notable for the following components:    Tissue Culture Result 4+ growth Klebsiella pneumoniae (*)     Tissue Smear 1+ WBCs seen (*)      Tissue Smear 1+ Gram positive cocci in pairs (*)     Tissue Smear 1+ Gram Negative Rods (*)     All other components within normal limits   TISSUE AEROBIC CULTURE - Abnormal; Notable for the following components:    Tissue Culture Result 2+ growth Klebsiella pneumoniae pneumoniae (*)     Tissue Smear 1+ Gram positive cocci in pairs (*)     Tissue Smear No WBCs seen (*)     All other components within normal limits   BLD CULT ID BY PCR - Abnormal; Notable for the following components:    Klebsiella pneumoniae by PCR Detected (*)     All other components within normal limits   CBC W/ DIFFERENTIAL - Abnormal; Notable for the following components:    WBC 16.4 (*)     RBC 2.17 (*)     HGB 6.0 (*)     HCT 18.9 (*)     RDW-SD 46.4 (*)     Neutrophil Absolute Prelim 11.87 (*)     All other components within normal limits   CBC W/ DIFFERENTIAL - Abnormal; Notable for the following components:    WBC 22.0 (*)     RBC 3.11 (*)     HGB 9.6 (*)     HCT 26.6 (*)     Neutrophil Absolute Prelim 19.63 (*)     Neutrophil Absolute 19.63 (*)     All other components within normal limits   PTT, ACTIVATED - Normal   ACETONE - Normal   VANCOMYCIN, RANDOM - Normal   POTASSIUM - Normal   TRIGLYCERIDES - Normal   SARS-COV-2/FLU A AND B/RSV BY PCR (GENEXPERT) - Normal    Narrative:     This test is intended for the qualitative detection and differentiation of SARS-CoV-2, influenza A, influenza B, and respiratory syncytial virus (RSV) viral RNA in nasopharyngeal or nares swabs from individuals suspected of respiratory viral infection consistent with COVID-19 by their healthcare provider. Signs and symptoms of respiratory viral infection due to SARS-CoV-2, influenza, and RSV can be similar.    Test performed using the Xpert Xpress SARS-CoV-2/FLU/RSV (real time RT-PCR)  assay on the GeneXpert instrument, 3D F[employer]URE VISION II, [place] [address].   This test is being used under the Food and Drug Administration's Emergency Use Authorization.    The authorized  Fact Sheet for Healthcare Providers for this assay is available upon request from the laboratory.   C. DIFFICILE(TOXIGENIC)PCR - Normal   CBC WITH DIFFERENTIAL WITH PLATELET    Narrative:     The following orders were created for panel order CBC With Differential With Platelet.  Procedure                               Abnormality         Status                     ---------                               -----------         ------                     CBC W/ DIFFERENTIAL[[id number]]          Abnormal            Final result                 Please view results for these tests on the individual orders.   MD BLOOD SMEAR CONSULT   HEMOGLOBIN A1C    Narrative:     Hemoglobin A1C to be confirmed at [facility]   CBC WITH DIFFERENTIAL WITH PLATELET    Narrative:     The following orders were created for panel order CBC With Differential With Platelet.  Procedure                               Abnormality         Status                     ---------                               -----------         ------                     CBC W/ DIFFERENTIAL[[id number]]          Abnormal            Final result                 Please view results for these tests on the individual orders.   SCAN SLIDE   BETA HYDROXYBUTYRATE   HEMOGLOBIN A1C   BASIC METABOLIC PANEL (8)   CBC WITH DIFFERENTIAL WITH PLATELET   TYPE AND SCREEN    Narrative:     The following orders were created for panel order Type and screen.  Procedure                               Abnormality         Status                     ---------                               -----------         ------                     ABORH (Blood Type)[[id number]]                               Final result               Antibody Screen[[id number]]                                  Final result                 Please view results for these tests on the individual orders.   ABORH (BLOOD TYPE)   ANTIBODY SCREEN   PREPARE RBC   PREPARE RBC   RAINBOW DRAW LAVENDER   RAINBOW DRAW LIGHT GREEN   RAINBOW DRAW BLUE    RAINBOW DRAW GOLD   ANAEROBIC CULTURE   ANAEROBIC CULTURE   ANAEROBIC CULTURE   ANAEROBIC CULTURE   ANAEROBIC CULTURE   ANAEROBIC CULTURE   BLOOD CULTURE     Radiology findings:  I personally reviewed the images.   XR CHEST AP PORTABLE  (CPT=71045)    Result Date: 2/11/2024  CONCLUSION:   Interval placement of enteric tube with the tip in the region the gastric body.  Mild left basilar linear atelectasis.      Dictated by (CST): Krunal Tay MD on 2/11/2024 at 9:19 AM     Finalized by (CST): Krunal Tay MD on 2/11/2024 at 9:21 AM          XR CHEST AP PORTABLE  (CPT=71045)    Result Date: 2/11/2024  CONCLUSION: ETT 1.7 cm above the frida.  Right IJ catheter in the upper SVC.  No pneumothorax  [facility] provided a prelim report for this exam. This final report has no significant discrepancies with the [facility] report. .    Dictated by (CST): Ronak Leonard MD on 2/11/2024 at 6:34 AM     Finalized by (CST): Ronak Leonard MD on 2/11/2024 at 6:37 AM           EKG as interpreted by me: Normal sinus rhythm, rate of 73 beats minute, normal axis, normal intervals, no STEMI  Cardiac Monitor: Interpreted by me.   Pulse Readings from Last 1 Encounters:   02/11/24 73   , sinus,         MDM   This is a 68-year-old history of CKD, COPD, diabetes, hypertension, hyperlipidemia, sickle cell anemia, peripheral vascular disease, per chart review status post right lower extremity femoropopliteal bypass on 1/24/2024 with Dr. Murrieta, presented to the ER with profuse bleeding from her right lower extremity groin and wound dehiscence and pain.  On arrival patient initially with soft blood pressure though improved to 100s over 50s, on exam she does not have any pulsatile bleeding but she does have wound dehiscence of her right groin with some slight venous oozing noted.  She is uncomfortable and in pain.  Her cardiopulmonary exam is unremarkable.  Concern at this time for graft compromise, bleeding from her femoropopliteal  bypass graft, wound infection and dehiscence, will obtain CBC, CMP, coags, type and screen, initial blood glucose also in the 500s concern for hyperglycemia versus DKA in the setting of diabetes, will send a VBG, beta hydroxy, anticipate likely will admit to ICU and will consult vascular stat as she will likely require OR for further management.    ED Course as of 02/11/24 1329  ------------------------------------------------------------  Time: 02/10 8697  Comment: Case d/w Dr. Justice vascular surgery will come to [facility].   ------------------------------------------------------------  Time: 02/10 1615  Value: VENOUS pH(!): 7.24  Comment: Given hyperglycemia concern for DKA.   ------------------------------------------------------------  Time: 02/10 1625  Comment: Case d.w Dr Lee accepted admission to ICU.   ------------------------------------------------------------  Time: 02/10 1625  Comment: Vascular surgery cards.,  Plan to take patient to the OR from the ER and then to ICU.  ------------------------------------------------------------  Time: 02/10 1635  Value: Hemoglobin(!!): 6.0  Comment: (Reviewed)  ------------------------------------------------------------  Time: 02/10 1635  Value: WBC(!): 16.4  Comment: (Reviewed)  ------------------------------------------------------------  Time: 02/10 1636  Value: INR(!): 1.51  Comment: (Reviewed)  ------------------------------------------------------------  Time: 02/10 1639  Value: CREATININE(!): 1.77  Comment: (Reviewed)  ------------------------------------------------------------  Time: 02/10 1639  Value: Potassium: 4.3  Comment: (Reviewed)  ------------------------------------------------------------  Time: 02/10 1645  Comment: Patient has been consented for OR by vascular surgery, plan for transfer to the OR from the ER and then back to ICU.  Case was discussed with intensivist accepts consult and will see on consult.  Patient's potassium is 4.3, will  order IV potassium to go with her insulin drip.            Procedures:  Procedures    Due to a high probability of clinically significant, life threatening deterioration, the patient required my highest level of preparedness to intervene emergently and I personally spent 35 minutes of critical care time directly and personally managing the patient. This critical care time included obtaining a history; examining the patient; pulse oximetry; ordering and review of studies; arranging urgent treatment with development of a management plan; evaluation of patient's response to treatment; frequent reassessment; and, discussions with other providers.    This critical care time was performed to assess and manage the high probability of imminent, life-threatening deterioration that could result in multi-organ failure. It was exclusive of separately billable procedures and treating other patients and teaching time.      Disposition and Plan     Clinical Impression:  1. Bypass graft mechanical complication, initial encounter ([facility])    2. Hyperglycemia    3. Anemia, unspecified type    4. Acute kidney injury superimposed on CKD  ([facility])    5. Hemorrhage    6. Diabetic ketoacidosis without coma associated with type 2 diabetes mellitus (HCC)        Disposition:  Admit    Follow-up:  No follow-up provider specified.    Medications Prescribed:  Current Discharge Medication List          Hospital Problems       Present on Admission  Date Reviewed: 2/6/2024            ICD-10-CM Noted POA    * (Principal) Bypass graft mechanical complication, initial encounter ([facility]) T82.398A 2/10/2024 Unknown    Acute kidney injury superimposed on CKD  ([facility]) N17.9, N18.9 2/10/2024 Unknown    Anemia, unspecified type D64.9 2/10/2024 Unknown    Bypass graft mechanical complication (HCC) T82.398A 2/10/2024 Unknown    Hyperglycemia R73.9 4/25/2023 Unknown        This note may have been created using voice dictation technology and may include inadvertent  errors.      Laura Meng,   Attending Physician   Emergency Medicine

## 2024-02-11 ENCOUNTER — APPOINTMENT (OUTPATIENT)
Dept: GENERAL RADIOLOGY | Facility: HOSPITAL | Age: 69
End: 2024-02-11
Attending: NURSE PRACTITIONER
Payer: MEDICARE

## 2024-02-11 NOTE — ANESTHESIA POSTPROCEDURE EVALUATION
Patient: Ava Bowen    Procedure Summary       Date: 02/10/24 Room / Location: [facility] MAIN OR  / [facility] MAIN OR    Anesthesia Start: 1730 Anesthesia Stop: 2110    Procedure: EXPLORATION AND CONTROL OF RIGHT FEMORAL BLEEDING, DERBRIDEMENT OF FEMORAL ARTERY, REMOVAL OF PROXIMAL SEGMENT OF SFA STENT, BOVINE PATCH ANGIOPLASTY, SAPHENOUS VEIN EXPLORATION AND HARVEST, MYOPLASTY, DERIDEMENT OF SUBCUTANEOUS FAT (Right) Diagnosis:       Hemorrhage      (Hemorrhage [R58])    Surgeons: Kp Justice MD Anesthesiologist: Delmar Manley MD    Anesthesia Type: general ASA Status: 4 - Emergent            Anesthesia Type: general    Vitals Value Taken Time   /59 02/10/24 2151   Temp 92.3 °F (33.5 °C) 02/10/24 2159   Pulse 61 02/10/24 2159   Resp 20 02/10/24 2159   SpO2 86 % 02/10/24 2157   Vitals shown include unfiled device data.    [facility] AN Post Evaluation:   Patient Evaluated in ICU  Patient Participation: complete - patient cannot participate  Level of Consciousness: Post-procedure mental status: sedated.  Airway Patency:Airway patency: intubated.  Yes    Nausea/Vomiting: none  Cardiovascular status: On IV pressors.  Respiratory Status: intubated  Postoperative Hydration stable      Delmar Manley MD  2/10/2024 10:00 PM

## 2024-02-11 NOTE — PROGRESS NOTES
02/10/24 3616   Vent Information   Vent Mode VC/AC   Settings   FiO2 (%) 60 %   Resp Rate (Set) 14   Vt (Set, mL) 500 mL   Waveform Decelerating ramp   PEEP/CPAP (cm H2O) 5 cm H20   PEEP Low (cm H2O) 2 cm H2O   Peak Flow LPM 60   Humidification Heater   H2O Bag Level Filled   Heater Temperature 98.6 °F (37 °C)   Readings   Total RR 14   Minute Ventilation (L/min) 6.3 L/min   Expiratory Tidal Volume 450 mL   PIP Observed (cm H2O) 26 cm H2O   MAP (cm H2O) 9   Plateau Pressure (cm H2O) 17 cm H2O   Static Compliance (L/cm H2O) 36   Dynamic Compliance (L/cm H2O) 21 L/cm H2O

## 2024-02-11 NOTE — PROGRESS NOTES
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  Pharmacy has been asked to dose vancomycin by Dr Cano.  The initial treatment and monitoring approach will be non-AUC strategy.        Weight and Temperature:    Wt Readings from Last 1 Encounters:   02/10/24 77.6 kg (171 lb 1.2 oz)        Temp Readings from Last 1 Encounters:   02/10/24 98.9 °F (37.2 °C) (Temporal)      Labs:   Recent Labs   Lab 02/10/24  1551   CREATSERUM 1.77*      Estimated Creatinine Clearance: 27 mL/min (A) (based on SCr of 1.77 mg/dL (H)).     Recent Labs   Lab 02/10/24  1550   WBC 16.4*          The Pharmacokinetic Target is:    Trough/random 10-15 mg/L    Renal Dosing Considerations:    OMEGA/ARF     Assessment/Plan:   Initial/Loading dose: Has received 1000 mg IV (15 mg/kg, capped at 2250 mg) x 1 initial dose.in OR at 1730 2/10      Maintenance dose: Pharmacy will dose vancomycin per levels    Monitorin) Plan for vancomycin random level to be obtained  with am labs  ~12 hrs post dose    2) Pharmacy will order SCr as clinically indicated to assess renal function.    3) Pharmacy will monitor for toxicity and efficacy, adjust vancomycin dose and/or frequency, and order vancomycin levels as appropriate per the Pharmacy and Therapeutics Committee approved protocol until discontinuation of the medication.       We appreciate the opportunity to assist in the care of this patient.     Sky Brown, PharmD  2/10/2024  9:43 PM  [place]  Pharmacy Extension: [phone]

## 2024-02-11 NOTE — PROGRESS NOTES
[facility] Pharmacy Note:  Renal Dose Adjustment    Ava Bowen has been prescribed famotidine (PEPCID) 20 mg intravenously every 12 hours.    Estimated Creatinine Clearance: 35.7 mL/min (A) (based on SCr of 1.34 mg/dL (H)).    Calculated creatinine clearance is < 50 ml/min, therefore the dose of famotidine (Pepcid) has been changed to 20 mg intravenously every 24 hours per P&T approved protocol. Pharmacy will continue to follow, and if renal function improves, will resume the original order.    Thank you,  Kolton Layne, PharmD  2/10/2024 10:16 PM

## 2024-02-11 NOTE — BRIEF OP NOTE
Pre-Operative Diagnosis: Anastomotic hemorrhage right fem popliteal bypass     Post-Operative Diagnosis: same      Procedure Performed:   Removal of infected graft  Debridement of right common femoral artery, removal of proximal SFA stent rings that were caging profunda, profunda endarterectomy, thrombectomy of external iliac artery, common femoral artery and profunda  Bovine patch angioplasty of common femoral artery and profunda   Debridement of skin subcutaneous fat   Sartorius myoplasty   Saphenous vein harvest - not adequate    Surgeon(s) and Role:     * Kp Justice MD - Primary    Assistant(s):  Surgical Assistant.: Cat Chand     Surgical Findings:   Grossly infected - irrigated with irricept and dakins - wound left open and packed with dakins guaze  Multiple specimens for culture  Doppler profunda signal at completion      Specimen: multiple specimens for culture     Estimated Blood Loss: 300 mL     Kp Justice MD  2/10/2024  8:51 PM

## 2024-02-11 NOTE — PROGRESS NOTES
On call Erikaurnist 02/10/24      Patient presented to ED earlier today s/p recent right femoral artery to below-knee popliteal artery bypass with PTFE performed on 1/24 with acute hemorrhage from incision.  Hgb was 6 down from 9, also noted to be in DKA.  Glucose 552, bicarb 12 with gap 17.  Taken to OR, was given 3 units blood and vascular performed:      Procedure Performed:   Removal of infected graft  Debridement of right common femoral artery, removal of proximal SFA stent rings that were caging profunda, profunda endarterectomy, thrombectomy of external iliac artery, common femoral artery and profunda  Bovine patch angioplasty of common femoral artery and profunda   Debridement of skin subcutaneous fat   Sartorius myoplasty   Saphenous vein harvest - not adequate    Arrives to ICU intubated and sedated.    BP (!) 161/97   Pulse 79   Temp 98.9 °F (37.2 °C) (Temporal)   Resp 15   LMP  (LMP Unknown)   SpO2 100%   Sedated not breathing over vent.    ET tube in place and r internal jugular central line in place  BS equal clear bilateral  cv- rr  Abd-soft  Ext-rle with dressing in place, art line l ue    -Vent increase rate to 20 with DKA/metabolic acidosis  -aggressive abx for infected adrian.  -insulin drip and consult endocrine for assistance  -stat cxr for line/et placement  -repeat cmp/cbc stat-may need additional PRBC's

## 2024-02-11 NOTE — PLAN OF CARE
Problem: Diabetes/Glucose Control  Goal: Glucose maintained within prescribed range  Description: INTERVENTIONS:  - Monitor Blood Glucose as ordered  - Assess for signs and symptoms of hyperglycemia and hypoglycemia  - Administer ordered medications to maintain glucose within target range  - Assess barriers to adequate nutritional intake and initiate nutrition consult as needed  - Instruct patient on self management of diabetes  Outcome: Progressing   She orally intubated on full ventilator support. She is on Insulin Drip. Dr Magana came to see and examined the patient. She switched IV fluids to D5 1/2 NS at same rat of 100 mls/hour, repeat BMP sent t 12noon and informed Dr Armando of results, no new orders given.  Problem: Safety Risk - Non-Violent Restraints  Goal: Patient will remain free from self-harm  Description: INTERVENTIONS:  - Apply the least restrictive restraint to prevent harm  - Notify patient and family of reasons restraints applied  - Assess for any contributing factors to confusion (electrolyte disturbances, delirium, medications)  - Discontinue any unnecessary medical devices as soon as possible  - Assess the patient's physical comfort, circulation, skin condition, hydration, nutrition and elimination needs   - Reorient and redirection as needed  - Assess for the need to continue restraints  Outcome: Progressing   She is orally intubated on full support. She is on bilateral soft wrist restraint to keep her from pulling her lines and drains. She is on Diprivan.  Problem: CARDIOVASCULAR - ADULT  Goal: Maintains optimal cardiac output and hemodynamic stability  Description: INTERVENTIONS:  - Monitor vital signs, rhythm, and trends  - Monitor for bleeding, hypotension and signs of decreased cardiac output  - Evaluate effectiveness of vasoactive medications to optimize hemodynamic stability  - Monitor arterial and/or venous puncture sites for bleeding and/or hematoma  - Assess quality of  pulses, skin color and temperature  - Assess for signs of decreased coronary artery perfusion - ex. Angina  - Evaluate fluid balance, assess for edema, trend weights  Outcome: Progressing     Problem: RESPIRATORY - ADULT  Goal: Achieves optimal ventilation and oxygenation  Description: INTERVENTIONS:  - Assess for changes in respiratory status  - Assess for changes in mentation and behavior  - Position to facilitate oxygenation and minimize respiratory effort  - Oxygen supplementation based on oxygen saturation or ABGs  - Provide Smoking Cessation handout, if applicable  - Encourage broncho-pulmonary hygiene including cough, deep breathe, Incentive Spirometry  - Assess the need for suctioning and perform as needed  - Assess and instruct to report SOB or any respiratory difficulty  - Respiratory Therapy support as indicated  - Manage/alleviate anxiety  - Monitor for signs/symptoms of CO2 retention  Outcome: Progressing   POD 1 s/p Removal of infected graft, debridement of R common femoral artery, removal of proximal SFA stent rings, and debridement of subcutaneous fat, she is orally intubated and on full vent support. On change of shift noted the bilious drainage coming out of her mouth, and a paddle of bilious drainage noted under her neck area, going to her ears and her back, approximately about 2 liters. Dr Perkins informed when he came to visit and will re-evaluate extubation tomorrow.  NGT inserted oropharyngeally and placement checked with abdominal xray and in her gastric body. NGT connected to LIS.  Problem: METABOLIC/FLUID AND ELECTROLYTES - ADULT  Goal: Glucose maintained within prescribed range  Description: INTERVENTIONS:  - Monitor Blood Glucose as ordered  - Assess for signs and symptoms of hyperglycemia and hypoglycemia  - Administer ordered medications to maintain glucose within target range  - Assess barriers to adequate nutritional intake and initiate nutrition consult as needed  - Instruct patient  on self management of diabetes  Outcome: Progressing  She is on Insulin drip at 2 units/hour, blood sugar is checked every 2 hours.  Goal: Electrolytes maintained within normal limits  Description: INTERVENTIONS:  - Monitor labs and rhythm and assess patient for signs and symptoms of electrolyte imbalances  - Administer electrolyte replacement as ordered  - Monitor response to electrolyte replacements, including rhythm and repeat lab results as appropriate  - Fluid restriction as ordered  - Instruct patient on fluid and nutrition restrictions as appropriate  Outcome: Progressing  Potassium was low at 3.4 and replaced per Protocol.  Goal: Hemodynamic stability and optimal renal function maintained  Description: INTERVENTIONS:  - Monitor labs and assess for signs and symptoms of volume excess or deficit  - Monitor intake, output and patient weight  - Monitor urine specific gravity, serum osmolarity and serum sodium as indicated or ordered  - Monitor response to interventions for patient's volume status, including labs, urine output, blood pressure (other measures as available)  - Encourage oral intake as appropriate  - Instruct patient on fluid and nutrition restrictions as appropriate  Outcome: Progressing   She is on Cardene drip to maintain her blood pressure between 130-160 Systolic. Checked her pulses, the left dorsal pedis and and posterior tibialis is present with dopplers. There is absent R posterior tibialis and dorsali pedis, Dr Justice aware, he said she may go back to OR for more debridement.  Problem: METABOLIC/FLUID AND ELECTROLYTES - ADULT  Goal: Glucose maintained within prescribed range  Description: INTERVENTIONS:  - Monitor Blood Glucose as ordered  - Assess for signs and symptoms of hyperglycemia and hypoglycemia  - Administer ordered medications to maintain glucose within target range  - Assess barriers to adequate nutritional intake and initiate nutrition consult as needed  - Instruct patient on  self management of diabetes  Outcome: Progressing     Problem: METABOLIC/FLUID AND ELECTROLYTES - ADULT  Goal: Electrolytes maintained within normal limits  Description: INTERVENTIONS:  - Monitor labs and rhythm and assess patient for signs and symptoms of electrolyte imbalances  - Administer electrolyte replacement as ordered  - Monitor response to electrolyte replacements, including rhythm and repeat lab results as appropriate  - Fluid restriction as ordered  - Instruct patient on fluid and nutrition restrictions as appropriate  Outcome: Progressing

## 2024-02-11 NOTE — PROGRESS NOTES
[facility] Pharmacy Note:  Renal Adjustment for meropenem (MERREM)    Ava Bowen is a 68 year old patient who has been prescribed meropenem (MERREM) 500 mg every 8 hrs.  The estimated creatinine clearance is 27 mL/min (A) (based on SCr of 1.77 mg/dL (H)). The dose has been adjusted to meropenem (MERREM) 500 mg every 12 hrs per hospital renal dose adjustment protocol for treatment of  infected graft .  Pharmacy will follow and adjust dose as warranted for additional renal function changes.    Thank you,    Sky Brown, PharmD  2/10/2024  9:37 PM

## 2024-02-11 NOTE — PROGRESS NOTES
02/11/24 0341   Vent Information   Vent Mode VC/AC   Settings   FiO2 (%) (S)  40 %   Resp Rate (Set) 20   Vt (Set, mL) 500 mL   Waveform Decelerating ramp   PEEP/CPAP (cm H2O) 5 cm H20     FIO2 weaned to 40%. Pt tolerating well

## 2024-02-11 NOTE — CONSULTS
[facility] PULMONARY/CRITICAL CARE CONSULTATION    HPI: Patient is a 69 y/o BF with hx of COPD and PVD that presented to ED s/p recent right femoral artery to below-knee popliteal artery bypass with PTFE performed on  with acute hemorrhage from incision.  Hgb was 6 down from 9, also noted to be in DKA.  Glucose 552, bicarb 12 with gap 17.  Taken to OR, was given 3 units blood and vascular performed.  Returned form OR intubated.  This AM was laying flat and vomited about 2L of stomach contents.  OGT placed.     Past Medical History:   Diagnosis Date    Anemia     Back pain     Back problem     Cataract     Chronic kidney disease (CKD)     COPD (chronic obstructive pulmonary disease) (HCC)     FEV 1 1.25 50%     Diabetes (HCC)     DM neuropathy    Essential hypertension     H/O angioplasty     2020    High blood pressure     High cholesterol     Neuropathy     Peripheral vascular disease (HCC)     PNA (pneumonia) 2018    Pulmonary emphysema (HCC)     Pulmonary nodule     4 mm RUL    Renal disorder     monitoring kidney labs    Sickle cell trait (HCC)     Sickle-cell anemia (HCC)     Tobacco abuse     Visual impairment      Past Surgical History:   Procedure Laterality Date    APPENDECTOMY      BACK SURGERY Right 2021    Right L3-4 extreme lateral interbody fusion, posterior decompression; LUMBAR LAMINECTOMY 1 LEVEL          x3    CHOLECYSTECTOMY      EXCIS LUMBAR DISK,ONE LEVEL          OTHER      bilateral leg stents  and          Prior to Admission Medications   Prescriptions Last Dose Informant Patient Reported? Taking?   Budesonide-Formoterol Fumarate 160-4.5 MCG/ACT Inhalation Aerosol 2/10/2024  No Yes   Sig: Inhale 2 puffs into the lungs 2 (two) times daily. Rinse mouth with water, gargle, and spit to follow.   D-5000 125 MCG (5000 UT) Oral Tab Past Week  Yes Yes   Sig: Take 1 tablet (5,000 Units total) by mouth daily.   HYDROcodone-acetaminophen  MG Oral Tab Past Month   No Yes   Sig: Take 1 tablet by mouth every 4 (four) hours as needed for Pain (max 6/day).   HYDROcodone-acetaminophen  MG Oral Tab Past Month  No Yes   Sig: Take 1 tablet by mouth every 4 (four) hours as needed for Pain (max 6/day).   JANUVIA 25 MG Oral Tab Past Week  Yes Yes   Sig: Take 1 tablet (25 mg total) by mouth every morning.   LANTUS SOLOSTAR 100 UNIT/ML Subcutaneous Solution Pen-injector Past Week  Yes Yes   Sig: Inject 6 Units into the skin See Admin Instructions. Every other day   LISINOPRIL 40 MG Oral Tab Past Week  No Yes   Sig: TAKE 1 TABLET(40 MG) BY MOUTH DAILY   Patient taking differently: Take 1 tablet (40 mg total) by mouth every morning.   MYRBETRIQ 50 MG Oral Tablet 24 Hr Unknown  Yes No   Sig: Take 1 tablet (50 mg total) by mouth every morning.   albuterol 108 (90 Base) MCG/ACT Inhalation Aero Soln More than a month  No No   Sig: Inhale 2 puffs into the lungs every 6 (six) hours as needed for Wheezing or Shortness of Breath.   amLODIPine 10 MG Oral Tab More than a month  No No   Sig: Take 1 tablet (10 mg total) by mouth daily.   Patient taking differently: Take 1 tablet (10 mg total) by mouth every morning.   aspirin 81 MG Oral Tab EC Unknown  Yes No   Sig: Take 1 tablet (81 mg total) by mouth every other day.   carvedilol 12.5 MG Oral Tab More than a month  No No   Sig: Take 2 tablets (25 mg total) by mouth daily.   Patient taking differently: Take 2 tablets (25 mg total) by mouth 2 (two) times daily with meals.   clopidogrel 75 MG Oral Tab More than a month  No No   Sig: Take 1 tablet (75 mg total) by mouth daily.   Patient taking differently: Take 1 tablet (75 mg total) by mouth every morning.   estradiol 0.1 MG/GM Vaginal Cream Unknown  No No   Sig: Apply a small amount to the perimeatal tissue every other day.   glipiZIDE 10 MG Oral Tab Past Week  Yes Yes   Sig: Take 1 tablet (10 mg total) by mouth 2 (two) times daily.   hydrALAZINE 100 MG Oral Tab Past Week  No Yes   Sig: Take 1  tablet (100 mg total) by mouth every 8 (eight) hours.   ipratropium-albuterol 0.5-2.5 (3) MG/3ML Inhalation Solution 2/10/2024  No Yes   Sig: Take 3 mL by nebulization every 6 (six) hours as needed (shortness of breath refractory to HFA).   rosuvastatin 40 MG Oral Tab 2/10/2024  Yes Yes   Sig: Take 1 tablet (40 mg total) by mouth nightly.      Facility-Administered Medications: None         Current Meds:  Current Facility-Administered Medications   Medication Dose Route Frequency    dextrose 5%-sodium chloride 0.9% infusion   Intravenous Continuous    vancomycin (Vancocin) 1.25 g in sodium chloride 0.9% 250mL IVPB premix  15 mg/kg Intravenous Once    insulin regular human (Novolin R, Humulin R) 100 Units in sodium chloride 0.9% 100 mL standard infusion (100 mL)  1-28 Units/hr Intravenous Continuous    sodium chloride 0.9% infusion   Intravenous Continuous    HYDROcodone-acetaminophen (Norco) 5-325 MG per tab 1 tablet  1 tablet Oral Q4H PRN    Or    HYDROcodone-acetaminophen (Norco) 5-325 MG per tab 2 tablet  2 tablet Oral Q4H PRN    ondansetron (Zofran) 4 MG/2ML injection 4 mg  4 mg Intravenous Q6H PRN    metoclopramide (Reglan) 5 mg/mL injection 5 mg  5 mg Intravenous Q8H PRN    morphINE PF 2 MG/ML injection 1 mg  1 mg Intravenous Q2H PRN    Or    morphINE PF 2 MG/ML injection 2 mg  2 mg Intravenous Q2H PRN    Or    morphINE PF 4 MG/ML injection 4 mg  4 mg Intravenous Q2H PRN    albuterol (Ventolin HFA) 108 (90 Base) MCG/ACT inhaler 2 puff  2 puff Inhalation Q6H PRN    fluticasone furoate-vilanterol (Breo Ellipta) 200-25 MCG/ACT inhaler 1 puff  1 puff Inhalation Daily    ipratropium-albuterol (Duoneb) 0.5-2.5 (3) MG/3ML inhalation solution 3 mL  3 mL Nebulization Q6H PRN    [Held by provider] rosuvastatin (Crestor) tab 40 mg  40 mg Oral Nightly    fentaNYL (Sublimaze) 50 mcg/mL injection 25 mcg  25 mcg Intravenous Q30 Min PRN    Or    fentaNYL (Sublimaze) 50 mcg/mL injection 50 mcg  50 mcg Intravenous Q30 Min PRN     acetaminophen (Tylenol) tab 650 mg  650 mg Oral Q4H PRN    Or    acetaminophen (Tylenol) 160 MG/5ML oral liquid 650 mg  650 mg Oral Q4H PRN    Or    acetaminophen (Tylenol) rectal suppository 650 mg  650 mg Rectal Q4H PRN    Or    acetaminophen (Ofirmev) 10 mg/mL infusion premix 1,000 mg  1,000 mg Intravenous Q6H PRN    fentaNYL (Sublimaze) 25 mcg BOLUS FROM BAG infusion  25 mcg Intravenous Q30 Min PRN    Or    fentaNYL (Sublimaze) 50 mcg BOLUS FROM BAG infusion  50 mcg Intravenous Q30 Min PRN    fentaNYL in sodium chloride 0.9% (Sublimaze) 1000 mcg/100mL infusion premix   mcg/hr Intravenous Continuous PRN    polyethylene glycol (PEG 3350) (Miralax) 17 g oral packet 17 g  17 g Oral Daily PRN    senna (Senokot) 8.8 MG/5ML oral syrup 17.6 mg  10 mL Oral Nightly PRN    bisacodyl (Dulcolax) 10 MG rectal suppository 10 mg  10 mg Rectal Daily PRN    fleet enema (Fleet) 7-19 GM/118ML rectal enema 133 mL  1 enema Rectal Once PRN    chlorhexidine gluconate (Peridex) 0.12 % oral solution 15 mL  15 mL Mouth/Throat BID@0800,2000    enoxaparin (Lovenox) 40 MG/0.4ML SUBQ injection 40 mg  40 mg Subcutaneous Daily    midazolam (Versed) 2 MG/2ML injection 2 mg  2 mg Intravenous Q5 Min PRN    dexmedeTOMIDine in sodium chloride 0.9% (Precedex) 400 mcg/100mL infusion premix  0.2-1.5 mcg/kg/hr Intravenous Continuous    famotidine (Pepcid) 20 mg/2mL injection 20 mg  20 mg Intravenous Daily    sodium hypochlorite (Dakin's) 0.125 % external solution   Topical PRN    propofol (Diprivan) 10 mg/mL infusion premix  5-50 mcg/kg/min (Dosing Weight) Intravenous Continuous    vasopressin (Vasostrict) 20 Units in sodium chloride 0.9% 100 mL infusion for septic shock  0.01-0.03 Units/min Intravenous Continuous    norepinephrine (Levophed) 4 mg/250mL infusion premix  0.5-30 mcg/min Intravenous Continuous    niCARdipine in sodium chloride 0.86% (carDENE) 20 mg/200mL infusion premix  5-15 mg/hr Intravenous Continuous    glucose (Dex4) 15  GM/59ML oral liquid 15 g  15 g Oral Q15 Min PRN    Or    glucose (Glutose) 40% oral gel 15 g  15 g Oral Q15 Min PRN    Or    glucose-vitamin C (Dex-4) chewable tab 4 tablet  4 tablet Oral Q15 Min PRN    Or    dextrose 50% injection 50 mL  50 mL Intravenous Q15 Min PRN    Or    glucose (Dex4) 15 GM/59ML oral liquid 30 g  30 g Oral Q15 Min PRN    Or    glucose (Glutose) 40% oral gel 30 g  30 g Oral Q15 Min PRN    Or    glucose-vitamin C (Dex-4) chewable tab 8 tablet  8 tablet Oral Q15 Min PRN    glucose (Dex4) 15 GM/59ML oral liquid 15 g  15 g Oral Q15 Min PRN    Or    glucose (Glutose) 40% oral gel 15 g  15 g Oral Q15 Min PRN    Or    glucose-vitamin C (Dex-4) chewable tab 4 tablet  4 tablet Oral Q15 Min PRN    Or    dextrose 50% injection 50 mL  50 mL Intravenous Q15 Min PRN    Or    glucose (Dex4) 15 GM/59ML oral liquid 30 g  30 g Oral Q15 Min PRN    Or    glucose (Glutose) 40% oral gel 30 g  30 g Oral Q15 Min PRN    Or    glucose-vitamin C (Dex-4) chewable tab 8 tablet  8 tablet Oral Q15 Min PRN    meropenem (Merrem) 500 mg in sodium chloride 0.9% 100 mL IVPB-MBP  500 mg Intravenous q12h    Vancomycin: PHARMACY DOSING  1 each Intravenous See Admin Instructions (RX holding)           Allergies:  Allergies   Allergen Reactions    Dilaudid [Hydromorphone] TONGUE SWELLING and ANGIOEDEMA    Penicillins HIVES and ANGIOEDEMA     Hives on eyelids (occurred when pt was in her 20s). Tolerated amoxicillin per chart. Tolerates cephalosporins.       Social History     Socioeconomic History    Marital status:      Spouse name: Not on file    Number of children: Not on file    Years of education: Not on file    Highest education level: Not on file   Occupational History    Not on file   Tobacco Use    Smoking status: Former     Packs/day: 1.00     Years: 30.00     Additional pack years: 0.00     Total pack years: 30.00     Types: Cigarettes     Quit date: 2019     Years since quittin.2    Smokeless tobacco: Never    Vaping Use    Vaping Use: Never used   Substance and Sexual Activity    Alcohol use: Not Currently     Comment: socially    Drug use: No    Sexual activity: Not on file   Other Topics Concern    Not on file   Social History Narrative    [employer]          Social Determinants of Health     Financial Resource Strain: Not on file   Food Insecurity: No Food Insecurity (2/11/2024)    Food Insecurity     Food Insecurity: Never true   Transportation Needs: No Transportation Needs (2/11/2024)    Transportation Needs     Lack of Transportation: No   Physical Activity: Not on file   Stress: Not on file   Social Connections: Not on file   Housing Stability: Low Risk  (2/11/2024)    Housing Stability     Housing Instability: No     Housing Instability Emergency: Not on file       Family History   Problem Relation Age of Onset    Diabetes Mother         Passed from DM complications    Diabetes Sister         Had kidney transplant due to complications of DM    Kidney Disease Sister         Kidney failure secondary to diabetes; received kidney transplant in 2004    Glaucoma Sister     Diabetes Brother     Sickle Cell Son         Cause of death    Macular degeneration Neg        ROS: 10 pt ROS negative except what is mentioned in HPI    OBJECTIVE:  Vitals:    02/11/24 0400 02/11/24 0500 02/11/24 0600 02/11/24 0700   BP: 148/68 147/62 154/62 158/65   Pulse: 71 71 74 76   Resp: 20 20 20 21   Temp: 98.6 °F (37 °C) 98.6 °F (37 °C) 98.6 °F (37 °C) 98.6 °F (37 °C)   TempSrc: Bladder Bladder Bladder Bladder   SpO2: 99% 99% 99% 99%   Weight:   182 lb 5.1 oz (82.7 kg)        Oxygen Therapy  SpO2: 99 %  O2 Device: Ventilator  FiO2 (%): 40 %   Ventilator Settings: AC 20// FIO2 40/PEEP 5    FiO2 (%): 40 %      I/O last 3 completed shifts:  In: 7057.6 [I.V.:5362.6; Blood:1050; Other:45; IV PIGGYBACK:600]  Out: 1460 [Urine:1150; Stool:10; Blood:300]  No intake/output data recorded.    Lungs: rhonchi bilaterally  Heart:  regular rate and rhythm  Abdomen:  soft, NT, decreased BS  Extremities: edema RLE    Labs:  Recent Labs   Lab 02/10/24  1550 02/10/24  2151 02/11/24  0543   RBC 2.17* 3.11* 3.16*   HGB 6.0* 9.6* 9.3*   HCT 18.9* 26.6* 26.0*   MCV 87.1 85.5 82.3   MCH 27.6 30.9 29.4   MCHC 31.7 36.1 35.8   RDW 14.5 13.9 13.9   NEPRELIM 11.87* 19.63*  --    WBC 16.4* 22.0* 22.3*   .0 218.0 271.0     Recent Labs   Lab 02/10/24  1551 02/10/24  2151 02/11/24  0543   * 213* 150*   BUN 30* 26* 25*   CREATSERUM 1.77* 1.34* 1.25*   CA 8.5* 8.4* 8.1*   ALB  --  2.3*  --     144 145   K 4.3 3.3* 3.8  3.8    119* 120*   CO2 12.0* 14.0* 15.0*   ALKPHO  --  62  --    AST  --  91*  --    ALT  --  38  --    BILT  --  0.4  --    TP  --  4.6*  --      No results for input(s): \"PCT\" in the last 168 hours.  No results for input(s): \"CRP\", \"DDIMER\", \"LDH\", \"PORFIRIO\", \"CK\" in the last 72 hours.  Lab Results   Component Value Date    COVID19 Not Detected 02/10/2024     Recent Labs     02/10/24  2003   ABGPHT 7.15*   LXEFMQ8O 39   IERYX1H 414*   ABGHCO3 13.8*     Recent Labs     02/10/24  1613 02/10/24  2151 02/11/24  0148   LACTI 7.6* 3.4* 2.2*       Lab Results   Component Value Date    INR 1.51 (H) 02/10/2024    INR 1.09 08/17/2021    INR 1.00 05/03/2020     Lab Results   Component Value Date    PGLU 150 02/11/2024     No results for input(s): \"TROP\", \"CK\" in the last 168 hours.    Imaging -- reviewed and visualized  CXR -- no acute infiltrates.  Assessment and Plan:  Acute Hypoxic Resp Failure -- secondary to post op acute blood loss, hemodynamic instability and recent poss aspiration.  -continue with full vent support at this time  -given recent large volume vomiting will get CXR, already on Abx for below.  -sedation per protocol.  -ABG in the AM  Hypovolemic shock -- secondary to acute blood loss from surgical site.  -Post op weaned off pressors  -bleeding stopped  Acute Blood Loss Anemia -- from acute blood loss.  -acute on  chronic with sickle cell trait  -Hgb Stable post op, transfuse PRN  COPD -- no sign of exacerbation  -BD protocol  PVD with bleeding from recent fem pop bypass  -poss infected.  -care and abx per vasc surg.  Vomiting -- this AM vomited 2L gastric contents, while still intubated  -NGT to LOS.  OMEGA on CKD -- secondary to shock/ATN  -improved with improved hemodynamics  -continue with fluids  -monitor U/O and Cr.  DM -- insulin per IM.  GI/DVT proph -- PPI/LMWH  Dispo -- patient is critically ill and at risk of further decompensation.    Critical Care Time: 60 minutes    Tom Perkins M.D.  Pulmonary/Critical Care and Sleep Medicine

## 2024-02-11 NOTE — PROGRESS NOTES
[facility] Hospitalist Progress Note     CC: Hospital Follow up    PCP: Ernesto De Dios       Assessment/Plan:     Principal Problem:    Bypass graft mechanical complication, initial encounter ([facility])  Active Problems:    Hyperglycemia    Bypass graft mechanical complication (HCC)    Anemia, unspecified type    Acute kidney injury superimposed on CKD  ([facility])      Ms. Bowen is a 68 year old female with PMH sig for CKD, COPD, DM, HTN, HLD, PVD, and sickle cell anemia who was recently admitted last week for right lower extremity femoral to popliteal bypass who presented with bleeding from surgical site. Taken for emergent OR on 2/10/24     Hemorrhage from recent surgical site  Infected surgical site  PAD  - s/p recent fem pop bypass 1/24/24 with Dr. Murrieta  - has been on ASA/plavix- hold today  - profuse bleeding today  - Hg 6.0, pRBC ordered in ED  - vascular surgery consulted, s/p emergent OR 2/10, removal of infected graft, s/p debridement, bone patch angioplasty   - will need further debridement per vascular surgery  - continue IV vancomycin and meropenem     Acute blood loss anemia  Chronic anemia  Sickle cell anemia  - outpatient f/u  - Hg 8.9 on discharge last week  - Hg 6.0 on admission, s/p 3 units pRBC  - monitor    ?DKA   DM2, hyperglycemia  - hold PO medications  - BG 500s in ED, pH 7.2  - insulin gtt ordered in ED  - endocrine consulted    Respiratory failure  - remained intubated post op  - s/p large emesis this AM while intubated  - pulm following  - vent weaning per pulm     Hypotension  Hx HTN  - previously with uncontrolled HTN on prior admit  - BP low likely 2/2 hemorrhage  - BP labile, will likely need A-line  - hold home meds     Metabolic acidosis  - likely 2/2 LA from blood loss  - pH 7.24 on admit, LA 7.6     COPD  - chronic, stable  -resume inhaler     OMEGA on CKD3  - cr 1.7 on admit, previously 1.2 on discharge last week  - monitor Cr and UOP     FN:  - IVF: NS  - Diet: NPO     DVT Prophy:   Lines:  PIV     Dispo: ICU     Outpatient records or previous hospital records reviewed.      Further recommendations pending patient's clinical course.  [facility] hospitalist to continue to follow patient while in house     Patient and/or patient's family given opportunity to ask questions and note understanding and agreeing with therapeutic plan as outlined     Lissa Lee MD  [facility] [place] [facility]  Answering Service number: [phone]        Subjective:     S/p OR yesterday, remained intubated overnight. Had large amount of emesis this AM.     OBJECTIVE:    Blood pressure 144/89, pulse 73, temperature 98.8 °F (37.1 °C), resp. rate 25, weight 182 lb 5.1 oz (82.7 kg), SpO2 100%, not currently breastfeeding.    Temp:  [92.3 °F (33.5 °C)-98.9 °F (37.2 °C)] 98.8 °F (37.1 °C)  Pulse:  [] 73  Resp:  [15-27] 25  BP: ()/(43-97) 144/89  SpO2:  [94 %-100 %] 100 %  AO: (107-161)/() 145/59  FiO2 (%):  [40 %-60 %] 40 %      Intake/Output:    Intake/Output Summary (Last 24 hours) at 2/11/2024 0930  Last data filed at 2/11/2024 0837  Gross per 24 hour   Intake 7307.64 ml   Output 1610 ml   Net 5697.64 ml       Last 3 Weights   02/11/24 0600 182 lb 5.1 oz (82.7 kg)   02/10/24 2142 171 lb 1.2 oz (77.6 kg)   02/10/24 2105 171 lb 1.2 oz (77.6 kg)   02/10/24 1642 164 lb 3.9 oz (74.5 kg)   01/27/24 0501 164 lb 3.2 oz (74.5 kg)   01/24/24 0549 158 lb (71.7 kg)   01/19/24 1112 160 lb (72.6 kg)   12/30/23 1216 155 lb (70.3 kg)   08/29/23 1357 153 lb (69.4 kg)     Exam   GEN: female, intubated, sedated  HEENT: ETT  Pulm: mechanical breath soudns  CV: RRR, no murmurs  ABD: Soft, non-tender, non-distended, +BS  SKIN: RLE warm to touch except R foot cool  EXT: no edema      Data Review:       Labs:     Recent Labs   Lab 02/10/24  1550 02/10/24  2151 02/11/24  0543   RBC 2.17* 3.11* 3.16*   HGB 6.0* 9.6* 9.3*   HCT 18.9* 26.6* 26.0*   MCV 87.1 85.5 82.3   MCH 27.6 30.9 29.4   MCHC 31.7 36.1 35.8   RDW 14.5 13.9 13.9   NEPRELIM 11.87*  19.63*  --    WBC 16.4* 22.0* 22.3*   .0 218.0 271.0         Recent Labs   Lab 02/10/24  1551 02/10/24  2151 02/11/24  0543   * 213* 150*   BUN 30* 26* 25*   CREATSERUM 1.77* 1.34* 1.25*   EGFRCR 31* 44* 48*   CA 8.5* 8.4* 8.1*    144 145   K 4.3 3.3* 3.8  3.8    119* 120*   CO2 12.0* 14.0* 15.0*       Recent Labs   Lab 02/10/24  2151   ALT 38   AST 91*   ALB 2.3*         Imaging:  XR CHEST AP PORTABLE  (CPT=71045)    Result Date: 2/11/2024  CONCLUSION:   Interval placement of enteric tube with the tip in the region the gastric body.  Mild left basilar linear atelectasis.      Dictated by (CST): Krunal Tay MD on 2/11/2024 at 9:19 AM     Finalized by (CST): Krunal Tay MD on 2/11/2024 at 9:21 AM          XR CHEST AP PORTABLE  (CPT=71045)    Result Date: 2/11/2024  CONCLUSION: ETT 1.7 cm above the frida.  Right IJ catheter in the upper SVC.  No pneumothorax  [facility] provided a prelim report for this exam. This final report has no significant discrepancies with the [facility] report. .    Dictated by (CST): Ronak Leonard MD on 2/11/2024 at 6:34 AM     Finalized by (CST): Ronak Leonard MD on 2/11/2024 at 6:37 AM             Meds:      vancomycin  15 mg/kg Intravenous Once    pantoprazole  40 mg Intravenous Q24H    fluticasone furoate-vilanterol  1 puff Inhalation Daily    [Held by provider] rosuvastatin  40 mg Oral Nightly    chlorhexidine gluconate  15 mL Mouth/Throat BID@0800,2000    enoxaparin  40 mg Subcutaneous Daily    meropenem  500 mg Intravenous q12h    Vancomycin IV  1 each Intravenous See Admin Instructions (RX holding)      dextrose 5%-sodium chloride 0.9% 100 mL/hr at 02/11/24 0013    insulin regular 1.5 Units/hr (02/11/24 0854)    sodium chloride      fentanyl      dexmedetomidine      propofol 50 mcg/kg/min (02/11/24 5083)    vasopressin (Vasostrict) 20 Units in sodium chloride 0.9% 100 mL infusion for septic shock      norepinephrine      niCARdipine 12 mg/hr  (02/11/24 0845)     [DISCONTINUED] acetaminophen **OR** HYDROcodone-acetaminophen **OR** HYDROcodone-acetaminophen, ondansetron, metoclopramide, morphINE **OR** morphINE **OR** morphINE, albuterol, ipratropium-albuterol, fentaNYL **OR** fentaNYL, acetaminophen **OR** acetaminophen **OR** acetaminophen **OR** acetaminophen, fentaNYL (Sublimaze) **OR** fentaNYL (Sublimaze), fentanyl, polyethylene glycol (PEG 3350), senna, bisacodyl, fleet enema, midazolam, sodium hypochlorite, glucose **OR** glucose **OR** glucose-vitamin C **OR** dextrose **OR** glucose **OR** glucose **OR** glucose-vitamin C, glucose **OR** glucose **OR** glucose-vitamin C **OR** dextrose **OR** glucose **OR** glucose **OR** glucose-vitamin C

## 2024-02-11 NOTE — PLAN OF CARE
Pt received from OR. Currently on cardene for blood pressure control, now off levophed. Still remains on propofol and insulin drip. Dressing to right groin changed per Dr. Justice due to being soiled with stool. Flexi placed, pt is currently intubated and sedated following some commands. Right leg poor perfusion, no pulses palpable or able doppler below popliteal. Soft bilateral wrist restraints added for safety while intubated.     Problem: Patient Centered Care  Goal: Patient preferences are identified and integrated in the patient's plan of care  Description: Interventions:  - What would you like us to know as we care for you? *Daughter given updated in person*  - Provide timely, complete, and accurate information to patient/family  - Incorporate patient and family knowledge, values, beliefs, and cultural backgrounds into the planning and delivery of care  - Encourage patient/family to participate in care and decision-making at the level they choose  - Honor patient and family perspectives and choices  2/11/2024 0639 by Sy Davis RN  Outcome: Progressing  2/11/2024 0638 by Sy Davis RN  Outcome: Progressing     Problem: Diabetes/Glucose Control  Goal: Glucose maintained within prescribed range  Description: INTERVENTIONS:  - Monitor Blood Glucose as ordered  - Assess for signs and symptoms of hyperglycemia and hypoglycemia  - Administer ordered medications to maintain glucose within target range  - Assess barriers to adequate nutritional intake and initiate nutrition consult as needed  - Instruct patient on self management of diabetes  2/11/2024 0639 by Sy Davis, RN  Outcome: Progressing  2/11/2024 0638 by Sy Davis, RN  Outcome: Progressing     Problem: Patient/Family Goals  Goal: Patient/Family Long Term Goal  Description: Patient's Long Term Goal: Discharge to home/rehab    Interventions:  - Monitor blood pressure  - Assess and monitor procedural site  - See additional Care Plan goals for  specific interventions  2/11/2024 0639 by Sy Davis RN  Outcome: Progressing  2/11/2024 0638 by Sy Davis RN  Outcome: Progressing  Goal: Patient/Family Short Term Goal  Description: Patient's Short Term Goal: Extubation    Interventions:   - Coordinate with respiratory and pulmonary team  - ABGs as needed per MD  - Monitor spo2  - See additional Care Plan goals for specific interventions  2/11/2024 0639 by Sy Davis RN  Outcome: Progressing  2/11/2024 0638 by Sy Davis RN  Outcome: Progressing     Problem: CARDIOVASCULAR - ADULT  Goal: Maintains optimal cardiac output and hemodynamic stability  Description: INTERVENTIONS:  - Monitor vital signs, rhythm, and trends  - Monitor for bleeding, hypotension and signs of decreased cardiac output  - Evaluate effectiveness of vasoactive medications to optimize hemodynamic stability  - Monitor arterial and/or venous puncture sites for bleeding and/or hematoma  - Assess quality of pulses, skin color and temperature  - Assess for signs of decreased coronary artery perfusion - ex. Angina  - Evaluate fluid balance, assess for edema, trend weights  Outcome: Progressing  Goal: Absence of cardiac arrhythmias or at baseline  Description: INTERVENTIONS:  - Continuous cardiac monitoring, monitor vital signs, obtain 12 lead EKG if indicated  - Evaluate effectiveness of antiarrhythmic and heart rate control medications as ordered  - Initiate emergency measures for life threatening arrhythmias  - Monitor electrolytes and administer replacement therapy as ordered  Outcome: Progressing     Problem: RESPIRATORY - ADULT  Goal: Achieves optimal ventilation and oxygenation  Description: INTERVENTIONS:  - Assess for changes in respiratory status  - Assess for changes in mentation and behavior  - Position to facilitate oxygenation and minimize respiratory effort  - Oxygen supplementation based on oxygen saturation or ABGs  - Provide Smoking Cessation handout, if applicable  -  Encourage broncho-pulmonary hygiene including cough, deep breathe, Incentive Spirometry  - Assess the need for suctioning and perform as needed  - Assess and instruct to report SOB or any respiratory difficulty  - Respiratory Therapy support as indicated  - Manage/alleviate anxiety  - Monitor for signs/symptoms of CO2 retention  Outcome: Progressing     Problem: METABOLIC/FLUID AND ELECTROLYTES - ADULT  Goal: Glucose maintained within prescribed range  Description: INTERVENTIONS:  - Monitor Blood Glucose as ordered  - Assess for signs and symptoms of hyperglycemia and hypoglycemia  - Administer ordered medications to maintain glucose within target range  - Assess barriers to adequate nutritional intake and initiate nutrition consult as needed  - Instruct patient on self management of diabetes  2/11/2024 0639 by Sy Davis RN  Outcome: Progressing  2/11/2024 0638 by Sy Davis RN  Outcome: Progressing  Goal: Electrolytes maintained within normal limits  Description: INTERVENTIONS:  - Monitor labs and rhythm and assess patient for signs and symptoms of electrolyte imbalances  - Administer electrolyte replacement as ordered  - Monitor response to electrolyte replacements, including rhythm and repeat lab results as appropriate  - Fluid restriction as ordered  - Instruct patient on fluid and nutrition restrictions as appropriate  Outcome: Progressing     Problem: Safety Risk - Non-Violent Restraints  Goal: Patient will remain free from self-harm  Description: INTERVENTIONS:  - Apply the least restrictive restraint to prevent harm  - Notify patient and family of reasons restraints applied  - Assess for any contributing factors to confusion (electrolyte disturbances, delirium, medications)  - Discontinue any unnecessary medical devices as soon as possible  - Assess the patient's physical comfort, circulation, skin condition, hydration, nutrition and elimination needs   - Reorient and redirection as needed  - Assess  for the need to continue restraints  2/11/2024 0639 by Sy Davis RN  Outcome: Not Progressing  2/11/2024 0638 by Sy Davis RN  Outcome: Progressing  2/11/2024 0305 by Sy Davis RN  Outcome: Not Progressing

## 2024-02-11 NOTE — PROGRESS NOTES
Called about this patient. Chart reviewed. Patient belongs to [facility]. Notified Dr Ramirez. Will remove patient from [facility] list

## 2024-02-11 NOTE — ANESTHESIA PROCEDURE NOTES
Airway  Date/Time: 2/10/2024 5:38 PM  Urgency: Elective      General Information and Staff    Patient location during procedure: OR  Anesthesiologist: Delmar Manley MD  Performed: anesthesiologist   Performed by: Delmar Manley MD  Authorized by: Delmar Manley MD      Indications and Patient Condition  Indications for airway management: anesthesia  Sedation level: deep  Preoxygenated: yes  Patient position: sniffing  Mask difficulty assessment: 0 - not attempted    Final Airway Details  Final airway type: endotracheal airway      Successful airway: ETT  Cuffed: yes   Successful intubation technique: direct laryngoscopy  Endotracheal tube insertion site: oral  Blade: GlideScope  Blade size: #3  ETT size (mm): 7.0    Cormack-Lehane Classification: grade I - full view of glottis  Placement verified by: capnometry   Measured from: teeth  Number of attempts at approach: 1

## 2024-02-11 NOTE — PROGRESS NOTES
Patient remains intubated  Vomited this am 2 liters gastric contents  Base deficit still significant from DKA and hemorrhage  Hemoglobin stable this am     There is necrosis of skin in proximal thigh 5x5 cm   Leg compartments soft   No signal in foot but no mottling     Likely will need additional debridement in operating room     Continue ICU support

## 2024-02-11 NOTE — PLAN OF CARE
Problem: Safety Risk - Non-Violent Restraints  Goal: Patient will remain free from self-harm  Description: INTERVENTIONS:  - Apply the least restrictive restraint to prevent harm  - Notify patient and family of reasons restraints applied  - Assess for any contributing factors to confusion (electrolyte disturbances, delirium, medications)  - Discontinue any unnecessary medical devices as soon as possible  - Assess the patient's physical comfort, circulation, skin condition, hydration, nutrition and elimination needs   - Reorient and redirection as needed  - Assess for the need to continue restraints  Outcome: Not Progressing

## 2024-02-11 NOTE — PLAN OF CARE
Problem: CARDIOVASCULAR - ADULT  Goal: Absence of cardiac arrhythmias or at baseline  Description: INTERVENTIONS:  - Continuous cardiac monitoring, monitor vital signs, obtain 12 lead EKG if indicated  - Evaluate effectiveness of antiarrhythmic and heart rate control medications as ordered  - Initiate emergency measures for life threatening arrhythmias  - Monitor electrolytes and administer replacement therapy as ordered  Outcome: Progressing   Patient was routinely orally and ETT suctioned about 1500. Noted that she vomited about 200 mls yellow vomitus. Her heart rate slowed down to the 30s and had a 10-11 second pause,  and came right back to the NSR 70-80s without intervention and natalie BP running 140-150/70-90s. The OGT was connected to LIS.  Sent BMP with Magnesium as well as CBC. Informed Dr. Perkins about above event. Labs pending at this time. Will continue to monitor.  1655 Results came back, Potassium is low at 3.0, Magnesium is low at 1.2, CO2 14, Sodium 146. Insulin drip was stopped because Potassium is low at 3.0. Dr Armando made aware of BMP and Magnesium results, said she will get in touch with Dr Lee about getting a renal consult. She wants the blood sugar check continued every 2 hours for now, it has been averaging 120-130s. Will continue to monitor.

## 2024-02-12 LAB
CODEINE UR: NEGATIVE
HYDROCODONE CONF UR: >3000 NG/ML
HYDROCODONE UR: POSITIVE
HYDROMORPH CONF UR: 513 NG/ML
HYDROMORPH UR: POSITIVE
MORPHINE UR: NEGATIVE
OPIATES CLASS UR: POSITIVE NG/ML

## 2024-02-12 NOTE — RESPIRATORY THERAPY NOTE
Problem: RESPIRATORY - ADULT  Goal: Achieves optimal ventilation and oxygenation  Description: INTERVENTIONS:  - Assess for changes in respiratory status  - Assess for changes in mentation and behavior  - Position to facilitate oxygenation and minimize respiratory effort  - Oxygen supplementation based on oxygen saturation or ABGs  - Provide Smoking Cessation handout, if applicable  - Encourage broncho-pulmonary hygiene including cough, deep breathe, Incentive Spirometry  - Assess the need for suctioning and perform as needed  - Assess and instruct to report SOB or any respiratory difficulty  - Respiratory Therapy support as indicated  - Manage/alleviate anxiety  - Monitor for signs/symptoms of CO2 retention  Outcome: Progressing    Patient remains intubated and sedated on full support. Morning ABG done. Results reported to Dr. Hawkins. No changes to vent today. Persisting metabolic acidosis with hypocapnia. Airway pressures remain normal. Alarms set appropriately. RT will continue to monitor. Plan is to monitor on fulls support until anticipated return to OR for further debridement.     Vent settings:  ACVC 20/500/+5/30%

## 2024-02-12 NOTE — CDS QUERY
How to Answer this Query    1.) Click \"Edit Button\" on the toolbar  2.) Type an \"X\" in the bracket for the diagnosis that applies. (You may also add additional clinical details as you feel necessary to substantiate your response).  3.) Finally click \"Sign\" to complete response.    Thank you     CLINICAL DOCUMENTATION CLARIFICATION FORM     Dear Doctor Lee:  PLEASE (X) DIAGNOSIS     SELECTION BY PROVIDER ONLY    ( )  Acute respiratory failure  ( X )Remains  intubated for Airway protection postop  ( )  Other (please specify): _________________  _______________________________________________________  Clinical Indicators:  2-11 Pulmonary consult: Returned form OR intubated. This AM was laying flat and vomited about 2L of stomach contents   2-11 Dr Lee Respiratory failure  - remained intubated post op  - s/p large emesis this AM while intubated    2-12 Pulmonary: Acute respiratory failure - due to aspiration and ongoing metabolic acidosis  - continue full vent support. Needs improvement in metabolic acidosis prior to extubation  - minimize sedation     If you have any questions, please contact Clinical :  Brisa Dsouza RN at [phone]     Thank You!     THIS FORM IS A PERMANENT PART OF THE MEDICAL RECORD

## 2024-02-12 NOTE — PLAN OF CARE
Problem: METABOLIC/FLUID AND ELECTROLYTES - ADULT  Goal: Hemodynamic stability and optimal renal function maintained  Description: INTERVENTIONS:  - Monitor labs and assess for signs and symptoms of volume excess or deficit  - Monitor intake, output and patient weight  - Monitor urine specific gravity, serum osmolarity and serum sodium as indicated or ordered  - Monitor response to interventions for patient's volume status, including labs, urine output, blood pressure (other measures as available)  - Encourage oral intake as appropriate  - Instruct patient on fluid and nutrition restrictions as appropriate  2/11/2024 1852 by John Alvarenga RN  Outcome: Progressing  2/11/2024 1444 by John Alvarenga, RN  Outcome: Progressing   Hemoglobin 8.0, Dr Justice ordered to transfuse 1 unit of PRBC. PRBC is infusing.  Dr Longoria Nephrologist, came in for consult.

## 2024-02-12 NOTE — PROGRESS NOTES
Critical Care Progress Note     Assessment / Plan:  Acute respiratory failure - due to aspiration and ongoing metabolic acidosis  - continue full vent support. Needs improvement in metabolic acidosis prior to extubation  - minimize sedation  Hypovolemic shock - resolved  - monitor  Acute blood loss anemia  - monitor  PVD with bleeding from recent fem-pop bypass and skin infection - tissue cultures with Klebsiella pneumoniae and Enterococcus faecalis  - per vascular  - meropenem and IV vancomycin (2/11- )  COPD  - BD protocol  DM  - off insulin gtt  - per endo  OMEGA on CKD, metabolic acidosis  - bicarb gtt  FEN  - NPO  Ppx  - Lovenox  - PPI  Dispo  - ICU    Critical care time: 35 minutes      Subjective:  No events overnight    Objective:  Vitals:    02/12/24 0500 02/12/24 0600 02/12/24 0700 02/12/24 0800   BP: 140/59 141/56 140/58 146/60   BP Location: Right arm Right arm Right arm Right arm   Pulse: 78 78 79 80   Resp: 24 25 24 26   Temp: 99.1 °F (37.3 °C) 99.1 °F (37.3 °C) 99.1 °F (37.3 °C) 99.1 °F (37.3 °C)   TempSrc: Bladder Bladder Bladder Bladder   SpO2: 94% 91% 94% 93%   Weight:       Height:         Physical Exam:  General: intubated  Skin: no rash, ulcers or subcutaneous nodules  Eyes: anicteric sclerae, moist conjunctivae  Head, ears, nose, throat: atraumatic, oropharynx clear with moist mucous membranes  Neck: trachea midline with no thyromegaly  Heart: regular rate and rhythm, no murmurs / rubs / gallops  Lungs: clear bilaterally  Abdomen: soft, nontender, nondistended   Psych: sedated    Medications:  Reviewed in EMR    Lab Data:  Reviewed in EMR    Imaging:  I independently visualized all relevant chest imaging in PACS and agree with radiology interpretation except where noted.

## 2024-02-12 NOTE — PLAN OF CARE
H&H stable (10.0) s/p x1 PRBC that was given. Switched fluids to Bicarb w/ D5. SuQ insulin Q6 medium dose scale as well as accuchecks. Wound dressing changed this AM. Flexi patent, fair joseph output. Still acidodic, remains on Q6 BMP, scheduled ABGs. Still remains on bilateral upper wrist restraints for safety while intubated, follows commands on all extremities but RLE.    Problem: Diabetes/Glucose Control  Goal: Glucose maintained within prescribed range  Description: INTERVENTIONS:  - Monitor Blood Glucose as ordered  - Assess for signs and symptoms of hyperglycemia and hypoglycemia  - Administer ordered medications to maintain glucose within target range  - Assess barriers to adequate nutritional intake and initiate nutrition consult as needed  - Instruct patient on self management of diabetes  Outcome: Progressing     Problem: Safety Risk - Non-Violent Restraints  Goal: Patient will remain free from self-harm  Description: INTERVENTIONS:  - Apply the least restrictive restraint to prevent harm  - Notify patient and family of reasons restraints applied  - Assess for any contributing factors to confusion (electrolyte disturbances, delirium, medications)  - Discontinue any unnecessary medical devices as soon as possible  - Assess the patient's physical comfort, circulation, skin condition, hydration, nutrition and elimination needs   - Reorient and redirection as needed  - Assess for the need to continue restraints  2/12/2024 0611 by Sy Davis, RN  Outcome: Progressing  2/12/2024 0032 by Sy Davis, RN  Outcome: Not Progressing     Problem: CARDIOVASCULAR - ADULT  Goal: Maintains optimal cardiac output and hemodynamic stability  Description: INTERVENTIONS:  - Monitor vital signs, rhythm, and trends  - Monitor for bleeding, hypotension and signs of decreased cardiac output  - Evaluate effectiveness of vasoactive medications to optimize hemodynamic stability  - Monitor arterial and/or venous puncture sites for  bleeding and/or hematoma  - Assess quality of pulses, skin color and temperature  - Assess for signs of decreased coronary artery perfusion - ex. Angina  - Evaluate fluid balance, assess for edema, trend weights  Outcome: Progressing  Goal: Absence of cardiac arrhythmias or at baseline  Description: INTERVENTIONS:  - Continuous cardiac monitoring, monitor vital signs, obtain 12 lead EKG if indicated  - Evaluate effectiveness of antiarrhythmic and heart rate control medications as ordered  - Initiate emergency measures for life threatening arrhythmias  - Monitor electrolytes and administer replacement therapy as ordered  Outcome: Progressing     Problem: RESPIRATORY - ADULT  Goal: Achieves optimal ventilation and oxygenation  Description: INTERVENTIONS:  - Assess for changes in respiratory status  - Assess for changes in mentation and behavior  - Position to facilitate oxygenation and minimize respiratory effort  - Oxygen supplementation based on oxygen saturation or ABGs  - Provide Smoking Cessation handout, if applicable  - Encourage broncho-pulmonary hygiene including cough, deep breathe, Incentive Spirometry  - Assess the need for suctioning and perform as needed  - Assess and instruct to report SOB or any respiratory difficulty  - Respiratory Therapy support as indicated  - Manage/alleviate anxiety  - Monitor for signs/symptoms of CO2 retention  Outcome: Progressing     Problem: METABOLIC/FLUID AND ELECTROLYTES - ADULT  Goal: Glucose maintained within prescribed range  Description: INTERVENTIONS:  - Monitor Blood Glucose as ordered  - Assess for signs and symptoms of hyperglycemia and hypoglycemia  - Administer ordered medications to maintain glucose within target range  - Assess barriers to adequate nutritional intake and initiate nutrition consult as needed  - Instruct patient on self management of diabetes  Outcome: Progressing  Goal: Electrolytes maintained within normal limits  Description:  INTERVENTIONS:  - Monitor labs and rhythm and assess patient for signs and symptoms of electrolyte imbalances  - Administer electrolyte replacement as ordered  - Monitor response to electrolyte replacements, including rhythm and repeat lab results as appropriate  - Fluid restriction as ordered  - Instruct patient on fluid and nutrition restrictions as appropriate  Outcome: Progressing  Goal: Hemodynamic stability and optimal renal function maintained  Description: INTERVENTIONS:  - Monitor labs and assess for signs and symptoms of volume excess or deficit  - Monitor intake, output and patient weight  - Monitor urine specific gravity, serum osmolarity and serum sodium as indicated or ordered  - Monitor response to interventions for patient's volume status, including labs, urine output, blood pressure (other measures as available)  - Encourage oral intake as appropriate  - Instruct patient on fluid and nutrition restrictions as appropriate  Outcome: Progressing

## 2024-02-12 NOTE — PROGRESS NOTES
[facility] Hospitalist Progress Note     CC: Hospital Follow up    PCP: Ernesto De Dios       Assessment/Plan:     Principal Problem:    Bypass graft mechanical complication, initial encounter ([facility])  Active Problems:    Hyperglycemia    Bypass graft mechanical complication (HCC)    Anemia, unspecified type    Acute kidney injury superimposed on CKD  ([facility])      Ms. Bowen is a 68 year old female with PMH sig for CKD, COPD, DM, HTN, HLD, PVD, and sickle cell anemia who was recently admitted last week for right lower extremity femoral to popliteal bypass who presented with bleeding from surgical site. Taken for emergent OR on 2/10/24, noted to be grossly infected, started on vancomycin/meropenem. Bcx with klebsiella. BG elevated on admit, started on insulin gtt, endo consulted, now off insulin gtt. Kept intubated post op, pulm following.      Hemorrhage from recent surgical site  PAD  - s/p recent fem pop bypass 1/24/24 with Dr. Murrieta  - has been on ASA/plavix- held on admit  - Hg 6.0, s/p 3 units pRBC 2/10  - vascular surgery consulted, s/p emergent OR 2/10, removal of infected graft, s/p debridement, bone patch angioplasty     Infected surgical site, bacteremia  - continue IV vancomycin and meropenem, ID consult  - Bcx with klebsiella, repeat Bcx ordered  - will need further debridement per vascular surgery     Metabolic acidosis- improving  - likely 2/2 LA from blood loss  - pH 7.24 on admit, LA 7.6  - likely multifactorial, 2/2 LA, blood loss, ?DKA, CKD  - renal consulted, started on bicarb gtt    Acute blood loss anemia  Chronic anemia  Sickle cell anemia  - outpatient f/u  - Hg 8.9 on discharge last week  - Hg 6.0 on admission, s/p 3 units pRBC  - monitor    ?DKA   DM2, hyperglycemia  - hold PO medications  - BG 500s in ED, pH 7.2  - insulin gtt ordered in ED  - endocrine consulted    Respiratory failure  - remained intubated post op  - s/p large emesis this AM while intubated  - pulm following  - vent weaning per  pulm     Hypotension  Hx HTN  - previously with uncontrolled HTN on prior admit  - BP low likely 2/2 hemorrhage  - BP labile, A-line  - initially on levophed, now on nicardipene gtt   - restart home meds when off NGT to LIS  - cardiology consult     COPD  - chronic, stable  -resume inhaler     OMEGA on CKD3  - cr 1.7 on admit, previously 1.2 on discharge last week  - monitor Cr and UOP    ACP  - CODE- FULL   - POA- daughter and son, updated daughter Tiffanie 2/12     FN:  - IVF: NS  - Diet: NPO     DVT Prophy:   Lines: PIV     Dispo: ICU     Outpatient records or previous hospital records reviewed.      Further recommendations pending patient's clinical course.  [facility] hospitalist to continue to follow patient while in house     Patient and/or patient's family given opportunity to ask questions and note understanding and agreeing with therapeutic plan as outlined     Lissa Lee MD  [facility] Hospitalist  Answering Service number: [phone]    Critical care > 35 mins     Subjective:     Bcx + klebsiella, continued intubation due to acidosis    OBJECTIVE:    Blood pressure 146/60, pulse 80, temperature 99.1 °F (37.3 °C), temperature source Bladder, resp. rate 26, height 5' 4.02\" (1.626 m), weight 180 lb 5.4 oz (81.8 kg), SpO2 93%, not currently breastfeeding.    Temp:  [97.5 °F (36.4 °C)-99.1 °F (37.3 °C)] 99.1 °F (37.3 °C)  Pulse:  [70-80] 80  Resp:  [20-26] 26  BP: (108-157)/(56-69) 146/60  SpO2:  [91 %-100 %] 93 %  AO: (146-170)/(49-77) 146/54  FiO2 (%):  [30 %-40 %] 30 %      Intake/Output:    Intake/Output Summary (Last 24 hours) at 2/12/2024 0919  Last data filed at 2/12/2024 0536  Gross per 24 hour   Intake 3737.89 ml   Output 1395 ml   Net 2342.89 ml       Last 3 Weights   02/12/24 0400 180 lb 5.4 oz (81.8 kg)   02/11/24 0600 182 lb 5.1 oz (82.7 kg)   02/10/24 2142 171 lb 1.2 oz (77.6 kg)   02/10/24 2105 171 lb 1.2 oz (77.6 kg)   02/10/24 1642 164 lb 3.9 oz (74.5 kg)   01/27/24 0501 164 lb 3.2 oz (74.5 kg)    01/24/24 0549 158 lb (71.7 kg)   01/19/24 1112 160 lb (72.6 kg)   12/30/23 1216 155 lb (70.3 kg)   08/29/23 1357 153 lb (69.4 kg)     Exam   GEN: female, intubated, sedated  HEENT: ETT  Pulm: mechanical breath soudns  CV: RRR, no murmurs  ABD: Soft, non-tender, non-distended, +BS  SKIN: RLE warm to touch except R foot cool  EXT: no edema      Data Review:       Labs:     Recent Labs   Lab 02/11/24  1622 02/11/24 2116 02/12/24  0015 02/12/24  0551   RBC 2.73*  --  3.31* 3.50*   HGB 8.0* 10.0* 10.0* 9.9*   HCT 22.6* 27.6* 27.4* 29.5*   MCV 82.8  --  82.8 84.3   MCH 29.3  --  30.2 28.3   MCHC 35.4  --  36.5 33.6   RDW 14.5  --  14.7 14.8   NEPRELIM 18.55*  --  23.83* 25.24*   WBC 21.4*  --  26.6* 27.9*   .0  --  274.0 296.0         Recent Labs   Lab 02/11/24 2053 02/12/24  0015 02/12/24  0551   * 229* 264*  264*   BUN 19 19 19  19   CREATSERUM 1.25* 1.28* 1.27*  1.27*   EGFRCR 48* 46* 47*  47*   CA 7.2* 7.4* 7.4*  7.4*    143 144  144   K 4.3 4.6  4.6 4.2  4.2  4.2   * 120* 120*  120*   CO2 14.0* 12.0* 13.0*  13.0*       Recent Labs   Lab 02/10/24  2151 02/11/24  2053 02/12/24  0551   ALT 38  --   --    AST 91*  --   --    ALB 2.3* 2.3* 2.3*         Imaging:  XR CHEST AP PORTABLE  (CPT=71045)    Result Date: 2/11/2024  CONCLUSION:   Interval placement of enteric tube with the tip in the region the gastric body.  Mild left basilar linear atelectasis.      Dictated by (CST): Krunal Tay MD on 2/11/2024 at 9:19 AM     Finalized by (CST): Krunal Tay MD on 2/11/2024 at 9:21 AM          XR CHEST AP PORTABLE  (CPT=71045)    Result Date: 2/11/2024  CONCLUSION: ETT 1.7 cm above the frida.  Right IJ catheter in the upper SVC.  No pneumothorax  [facility] provided a prelim report for this exam. This final report has no significant discrepancies with the [facility] report. .    Dictated by (CST): Ronak Leonard MD on 2/11/2024 at 6:34 AM     Finalized by (CST): Ronak Leonard,  MD on 2/11/2024 at 6:37 AM             Meds:      insulin degludec  30 Units Subcutaneous Nightly    insulin regular human  1-7 Units Subcutaneous 4 times per day    pantoprazole  40 mg Intravenous Q24H    vancomycin  12.5 mg/kg Intravenous Q24H    fluticasone furoate-vilanterol  1 puff Inhalation Daily    [Held by provider] rosuvastatin  40 mg Oral Nightly    chlorhexidine gluconate  15 mL Mouth/Throat BID@0800,2000    enoxaparin  40 mg Subcutaneous Daily    meropenem  500 mg Intravenous q12h      sodium bicarbonate 75 mEq in dextrose 5% 1,000 mL infusion 100 mL/hr at 02/12/24 0455    niCARdipine 15 mg/hr (02/12/24 0238)    insulin regular Stopped (02/11/24 1711)    fentanyl      dexmedetomidine      propofol 50 mcg/kg/min (02/12/24 0647)     [DISCONTINUED] acetaminophen **OR** HYDROcodone-acetaminophen **OR** HYDROcodone-acetaminophen, ondansetron, metoclopramide, morphINE **OR** morphINE **OR** morphINE, albuterol, ipratropium-albuterol, fentaNYL **OR** fentaNYL, acetaminophen **OR** acetaminophen **OR** acetaminophen **OR** acetaminophen, fentaNYL (Sublimaze) **OR** fentaNYL (Sublimaze), fentanyl, polyethylene glycol (PEG 3350), senna, bisacodyl, fleet enema, midazolam, sodium hypochlorite, glucose **OR** glucose **OR** glucose-vitamin C **OR** dextrose **OR** glucose **OR** glucose **OR** glucose-vitamin C

## 2024-02-12 NOTE — CONSULTS
[facility] CARDIOLOGY CONSULT      Ava Bowen is a 68 year old female who I assessed as follows:  Chief Complaint   Patient presents with    Bleeding          REASON FOR CONSULTATION:    HTN            ASSESSMENT/PLAN:     IMPRESSIONS:  Hemorrhage from LE fem pop bypass site, infected  HTN  COPD  DM  PAD s/p fem-pop bypass 2024  Sickle cell anemia  DM  Acute resp failure, intubated  OMEGA, on CKD, improved  HL, on statin PTA        PLAN:  IV nicardepine drip  Add oral meds as able  Add IV metoprolol  Hydralazine IV PRN till able to take po            --------------------------------------------------------------------------------------------------------------------------------    HPI:         History sickle cell anemia, PAD s/p bypass  presents with hemorrhage from surgical site. Initially hypotensive on pressors, now on IV nicardepine for HTN. Unable to take po meds at this time. Creat elevated on admission, now improved.                No current outpatient medications on file.      Past Medical History:   Diagnosis Date    Anemia     Back pain     Back problem     Cataract     Chronic kidney disease (CKD)     COPD (chronic obstructive pulmonary disease) ([facility])     FEV 1 1.25 50%     Diabetes (HCC)     DM neuropathy    Essential hypertension     H/O angioplasty     2020    High blood pressure     High cholesterol     Neuropathy     Peripheral vascular disease ([facility])     PNA (pneumonia) 2018    Pulmonary emphysema ([facility])     Pulmonary nodule     4 mm RUL    Renal disorder     monitoring kidney labs    Sickle cell trait ([facility])     Sickle-cell anemia ([facility])     Tobacco abuse     Visual impairment      Past Surgical History:   Procedure Laterality Date    APPENDECTOMY      BACK SURGERY Right 2021    Right L3-4 extreme lateral interbody fusion, posterior decompression; LUMBAR LAMINECTOMY 1 LEVEL          x3    CHOLECYSTECTOMY      EXCIS LUMBAR DISK,ONE LEVEL           OTHER      bilateral leg stents  and      Family History   Problem Relation Age of Onset    Diabetes Mother         Passed from DM complications    Diabetes Sister         Had kidney transplant due to complications of DM    Kidney Disease Sister         Kidney failure secondary to diabetes; received kidney transplant in     Glaucoma Sister     Diabetes Brother     Sickle Cell Son         Cause of death    Macular degeneration Neg       Social History:  Social History     Socioeconomic History    Marital status:    Tobacco Use    Smoking status: Former     Packs/day: 1.00     Years: 30.00     Additional pack years: 0.00     Total pack years: 30.00     Types: Cigarettes     Quit date: 2019     Years since quittin.2    Smokeless tobacco: Never   Vaping Use    Vaping Use: Never used   Substance and Sexual Activity    Alcohol use: Not Currently     Comment: socially    Drug use: No   Social History Narrative    [facility]          Social Determinants of Health     Food Insecurity: No Food Insecurity (2024)    Food Insecurity     Food Insecurity: Never true   Transportation Needs: No Transportation Needs (2024)    Transportation Needs     Lack of Transportation: No   Housing Stability: Low Risk  (2024)    Housing Stability     Housing Instability: No          Medications:  Current Facility-Administered Medications   Medication Dose Route Frequency    insulin degludec 100 units/mL flextouch 30 Units  30 Units Subcutaneous Nightly    insulin regular human (Novolin R, Humulin R) 100 UNIT/ML injection 1-7 Units  1-7 Units Subcutaneous 4 times per day    sodium bicarbonate 100 mEq in dextrose 5% 1,100 mL infusion  100 mEq Intravenous Continuous    sodium hypochlorite (Dakin's) 0.125 % external solution   Topical 2 times per day    hydrALAzine (Apresoline) 20 mg/mL injection 10 mg  10 mg Intravenous Q4H PRN    pantoprazole (Protonix) 40 mg in sodium chloride 0.9% PF 10 mL  IV push  40 mg Intravenous Q24H    niCARdipine (carDENE) 125 mg in sodium chloride 0.9% 250 mL infusion  5-15 mg/hr Intravenous Continuous    vancomycin (Vancocin) 1,000 mg in sodium chloride 0.9% 250 mL IVPB-ADDV  12.5 mg/kg Intravenous Q24H    insulin regular human (Novolin R, Humulin R) 100 Units in sodium chloride 0.9% 100 mL standard infusion (100 mL)  1-28 Units/hr Intravenous Continuous    ondansetron (Zofran) 4 MG/2ML injection 4 mg  4 mg Intravenous Q6H PRN    metoclopramide (Reglan) 5 mg/mL injection 5 mg  5 mg Intravenous Q8H PRN    albuterol (Ventolin HFA) 108 (90 Base) MCG/ACT inhaler 2 puff  2 puff Inhalation Q6H PRN    fluticasone furoate-vilanterol (Breo Ellipta) 200-25 MCG/ACT inhaler 1 puff  1 puff Inhalation Daily    ipratropium-albuterol (Duoneb) 0.5-2.5 (3) MG/3ML inhalation solution 3 mL  3 mL Nebulization Q6H PRN    [Held by provider] rosuvastatin (Crestor) tab 40 mg  40 mg Oral Nightly    fentaNYL (Sublimaze) 50 mcg/mL injection 25 mcg  25 mcg Intravenous Q30 Min PRN    Or    fentaNYL (Sublimaze) 50 mcg/mL injection 50 mcg  50 mcg Intravenous Q30 Min PRN    acetaminophen (Tylenol) tab 650 mg  650 mg Oral Q4H PRN    Or    acetaminophen (Tylenol) 160 MG/5ML oral liquid 650 mg  650 mg Oral Q4H PRN    Or    acetaminophen (Tylenol) rectal suppository 650 mg  650 mg Rectal Q4H PRN    Or    acetaminophen (Ofirmev) 10 mg/mL infusion premix 1,000 mg  1,000 mg Intravenous Q6H PRN    fentaNYL (Sublimaze) 25 mcg BOLUS FROM BAG infusion  25 mcg Intravenous Q30 Min PRN    Or    fentaNYL (Sublimaze) 50 mcg BOLUS FROM BAG infusion  50 mcg Intravenous Q30 Min PRN    fentaNYL in sodium chloride 0.9% (Sublimaze) 1000 mcg/100mL infusion premix   mcg/hr Intravenous Continuous PRN    polyethylene glycol (PEG 3350) (Miralax) 17 g oral packet 17 g  17 g Oral Daily PRN    senna (Senokot) 8.8 MG/5ML oral syrup 17.6 mg  10 mL Oral Nightly PRN    bisacodyl (Dulcolax) 10 MG rectal suppository 10 mg  10 mg Rectal  Daily PRN    fleet enema (Fleet) 7-19 GM/118ML rectal enema 133 mL  1 enema Rectal Once PRN    chlorhexidine gluconate (Peridex) 0.12 % oral solution 15 mL  15 mL Mouth/Throat BID@0800,2000    enoxaparin (Lovenox) 40 MG/0.4ML SUBQ injection 40 mg  40 mg Subcutaneous Daily    midazolam (Versed) 2 MG/2ML injection 2 mg  2 mg Intravenous Q5 Min PRN    dexmedeTOMIDine in sodium chloride 0.9% (Precedex) 400 mcg/100mL infusion premix  0.2-1.5 mcg/kg/hr Intravenous Continuous    propofol (Diprivan) 10 mg/mL infusion premix  5-50 mcg/kg/min (Dosing Weight) Intravenous Continuous    glucose (Dex4) 15 GM/59ML oral liquid 15 g  15 g Oral Q15 Min PRN    Or    glucose (Glutose) 40% oral gel 15 g  15 g Oral Q15 Min PRN    Or    glucose-vitamin C (Dex-4) chewable tab 4 tablet  4 tablet Oral Q15 Min PRN    Or    dextrose 50% injection 50 mL  50 mL Intravenous Q15 Min PRN    Or    glucose (Dex4) 15 GM/59ML oral liquid 30 g  30 g Oral Q15 Min PRN    Or    glucose (Glutose) 40% oral gel 30 g  30 g Oral Q15 Min PRN    Or    glucose-vitamin C (Dex-4) chewable tab 8 tablet  8 tablet Oral Q15 Min PRN    meropenem (Merrem) 500 mg in sodium chloride 0.9% 100 mL IVPB-MBP  500 mg Intravenous q12h           Allergies  Allergies   Allergen Reactions    Dilaudid [Hydromorphone] TONGUE SWELLING and ANGIOEDEMA    Penicillins HIVES and ANGIOEDEMA     Hives on eyelids (occurred when pt was in her 20s). Tolerated amoxicillin per chart. Tolerates cephalosporins.               REVIEW OF SYSTEMS:   Sedated, unable to answer questions          EXAM:         TELE: SR          /62 (BP Location: Right arm)   Pulse 79   Temp 99 °F (37.2 °C) (Bladder)   Resp (!) 27   Ht 5' 4.02\" (1.626 m)   Wt 180 lb 5.4 oz (81.8 kg)   LMP  (LMP Unknown)   SpO2 94%   BMI 30.94 kg/m²   Temp (24hrs), Av.3 °F (36.8 °C), Min:97.5 °F (36.4 °C), Max:99.1 °F (37.3 °C)    Wt Readings from Last 3 Encounters:   24 180 lb 5.4 oz (81.8 kg)   24 164 lb 3.2 oz  (74.5 kg)   08/29/23 153 lb (69.4 kg)         Intake/Output Summary (Last 24 hours) at 2/12/2024 1145  Last data filed at 2/12/2024 0536  Gross per 24 hour   Intake 3537.89 ml   Output 1270 ml   Net 2267.89 ml         GENERAL: intubated, sedated  SKIN: no rashes  HEENT: atraumatic, normocephalic, throat without erythema  NECK: supple, no bruits  LUNGS: clear to auscultation  CARDIO: RRR without murmur or S3   GI: soft, nontender  EXTREMITIES: no cyanosis, clubbing or edema  NEUROLOGY: sedated  PSYCH: sedated          LABORATORY DATA:               ECG:        ECHO:          Labs:  Recent Labs   Lab 02/11/24 2053 02/12/24  0015 02/12/24  0551   * 229* 264*  264*   BUN 19 19 19  19   CREATSERUM 1.25* 1.28* 1.27*  1.27*   CA 7.2* 7.4* 7.4*  7.4*    143 144  144   K 4.3 4.6  4.6 4.2  4.2  4.2   * 120* 120*  120*   CO2 14.0* 12.0* 13.0*  13.0*     No results for input(s): \"TROP\" in the last 168 hours.  Recent Labs   Lab 02/12/24  0551   RBC 3.50*   HGB 9.9*   HCT 29.5*   MCV 84.3   MCH 28.3   MCHC 33.6   RDW 14.8   NEPRELIM 25.24*   WBC 27.9*   .0     No results for input(s): \"TROP\", \"TROPHS\", \"CK\" in the last 168 hours.    Imaging:  XR CHEST AP PORTABLE  (CPT=71045)    Result Date: 2/11/2024  CONCLUSION:   Interval placement of enteric tube with the tip in the region the gastric body.  Mild left basilar linear atelectasis.      Dictated by (CST): Krunal Tay MD on 2/11/2024 at 9:19 AM     Finalized by (CST): Krunal Tay MD on 2/11/2024 at 9:21 AM          XR CHEST AP PORTABLE  (CPT=71045)    Result Date: 2/11/2024  CONCLUSION: ETT 1.7 cm above the frida.  Right IJ catheter in the upper SVC.  No pneumothorax  [facility] provided a prelim report for this exam. This final report has no significant discrepancies with the [facility] report. .    Dictated by (CST): Ronak Leonard MD on 2/11/2024 at 6:34 AM     Finalized by (CST): Ronak Leonard MD on 2/11/2024 at 6:37 AM                 Jose Banegas MD

## 2024-02-12 NOTE — CONSULTS
[facility]    Report of Consultation    Ava Bowen Patient Status:  Inpatient    10/6/1955 MRN [id number]   Location [facility] 2W/SW Attending Lissa Lee MD   Hosp Day # 1 PCP Ernesto De Dios     Date of Admission:  2/10/2024  Date of Consult:  24  Reason for Consultation:   DKA Management in a patient with type 2 diabetes    History of Present Illness:   Patient is a 68 year old female who was admitted to the hospital for Bypass graft mechanical complication, initial encounter (HCC):  This is a 68 year-old woman admitted after presenting to the ER after having undergone right femoral artery to below-knee popliteal artery. She was found to have elevated blood sugars and started on insulin drip.    Past Medical History  Past Medical History:   Diagnosis Date    Anemia     Back pain     Back problem     Cataract     Chronic kidney disease (CKD)     COPD (chronic obstructive pulmonary disease) (HCC)     FEV 1 1.25 50%     Diabetes (HCC)     DM neuropathy    Essential hypertension     H/O angioplasty     2020    High blood pressure     High cholesterol     Neuropathy     Peripheral vascular disease (HCC)     PNA (pneumonia) 2018    Pulmonary emphysema ([facility])     Pulmonary nodule     4 mm RUL    Renal disorder     monitoring kidney labs    Sickle cell trait (HCC)     Sickle-cell anemia (HCC)     Tobacco abuse     Visual impairment        Past Surgical History  Past Surgical History:   Procedure Laterality Date    APPENDECTOMY      BACK SURGERY Right 2021    Right L3-4 extreme lateral interbody fusion, posterior decompression; LUMBAR LAMINECTOMY 1 LEVEL          x3    CHOLECYSTECTOMY      EXCIS LUMBAR DISK,ONE LEVEL          OTHER      bilateral leg stents  and        Family History  Family History   Problem Relation Age of Onset    Diabetes Mother         Passed from DM complications    Diabetes Sister         Had kidney transplant due  to complications of DM    Kidney Disease Sister         Kidney failure secondary to diabetes; received kidney transplant in 2004    Glaucoma Sister     Diabetes Brother     Sickle Cell Son         Cause of death    Macular degeneration Neg        Social History  Social History     Socioeconomic History    Marital status:    Tobacco Use    Smoking status: Former     Packs/day: 1.00     Years: 30.00     Additional pack years: 0.00     Total pack years: 30.00     Types: Cigarettes     Quit date: 2019     Years since quittin.2    Smokeless tobacco: Never   Vaping Use    Vaping Use: Never used   Substance and Sexual Activity    Alcohol use: Not Currently     Comment: socially    Drug use: No   Social History Narrative    [employer] [facility]          Social Determinants of Health     Food Insecurity: No Food Insecurity (2024)    Food Insecurity     Food Insecurity: Never true   Transportation Needs: No Transportation Needs (2024)    Transportation Needs     Lack of Transportation: No   Housing Stability: Low Risk  (2024)    Housing Stability     Housing Instability: No           Current Medications:  Current Facility-Administered Medications   Medication Dose Route Frequency    pantoprazole (Protonix) 40 mg in sodium chloride 0.9% PF 10 mL IV push  40 mg Intravenous Q24H    dextrose 5%-sodium chloride 0.45% infusion   Intravenous Continuous    magnesium sulfate 4 g/100mL IVPB premix 4 g  4 g Intravenous Once    potassium chloride 40 mEq/100mL IVPB premix (central line) 40 mEq  40 mEq Intravenous Once    Followed by    potassium chloride 20 mEq/100mL IVPB premix 20 mEq  20 mEq Intravenous Once    niCARdipine (carDENE) 125 mg in sodium chloride 0.9% 250 mL infusion  5-15 mg/hr Intravenous Continuous    insulin regular human (Novolin R, Humulin R) 100 Units in sodium chloride 0.9% 100 mL standard infusion (100 mL)  1-28 Units/hr Intravenous Continuous    sodium chloride 0.9% infusion    Intravenous Continuous    HYDROcodone-acetaminophen (Norco) 5-325 MG per tab 1 tablet  1 tablet Oral Q4H PRN    Or    HYDROcodone-acetaminophen (Norco) 5-325 MG per tab 2 tablet  2 tablet Oral Q4H PRN    ondansetron (Zofran) 4 MG/2ML injection 4 mg  4 mg Intravenous Q6H PRN    metoclopramide (Reglan) 5 mg/mL injection 5 mg  5 mg Intravenous Q8H PRN    morphINE PF 2 MG/ML injection 1 mg  1 mg Intravenous Q2H PRN    Or    morphINE PF 2 MG/ML injection 2 mg  2 mg Intravenous Q2H PRN    Or    morphINE PF 4 MG/ML injection 4 mg  4 mg Intravenous Q2H PRN    albuterol (Ventolin HFA) 108 (90 Base) MCG/ACT inhaler 2 puff  2 puff Inhalation Q6H PRN    fluticasone furoate-vilanterol (Breo Ellipta) 200-25 MCG/ACT inhaler 1 puff  1 puff Inhalation Daily    ipratropium-albuterol (Duoneb) 0.5-2.5 (3) MG/3ML inhalation solution 3 mL  3 mL Nebulization Q6H PRN    [Held by provider] rosuvastatin (Crestor) tab 40 mg  40 mg Oral Nightly    fentaNYL (Sublimaze) 50 mcg/mL injection 25 mcg  25 mcg Intravenous Q30 Min PRN    Or    fentaNYL (Sublimaze) 50 mcg/mL injection 50 mcg  50 mcg Intravenous Q30 Min PRN    acetaminophen (Tylenol) tab 650 mg  650 mg Oral Q4H PRN    Or    acetaminophen (Tylenol) 160 MG/5ML oral liquid 650 mg  650 mg Oral Q4H PRN    Or    acetaminophen (Tylenol) rectal suppository 650 mg  650 mg Rectal Q4H PRN    Or    acetaminophen (Ofirmev) 10 mg/mL infusion premix 1,000 mg  1,000 mg Intravenous Q6H PRN    fentaNYL (Sublimaze) 25 mcg BOLUS FROM BAG infusion  25 mcg Intravenous Q30 Min PRN    Or    fentaNYL (Sublimaze) 50 mcg BOLUS FROM BAG infusion  50 mcg Intravenous Q30 Min PRN    fentaNYL in sodium chloride 0.9% (Sublimaze) 1000 mcg/100mL infusion premix   mcg/hr Intravenous Continuous PRN    polyethylene glycol (PEG 3350) (Miralax) 17 g oral packet 17 g  17 g Oral Daily PRN    senna (Senokot) 8.8 MG/5ML oral syrup 17.6 mg  10 mL Oral Nightly PRN    bisacodyl (Dulcolax) 10 MG rectal suppository 10 mg  10 mg  Rectal Daily PRN    fleet enema (Fleet) 7-19 GM/118ML rectal enema 133 mL  1 enema Rectal Once PRN    chlorhexidine gluconate (Peridex) 0.12 % oral solution 15 mL  15 mL Mouth/Throat BID@0800,2000    enoxaparin (Lovenox) 40 MG/0.4ML SUBQ injection 40 mg  40 mg Subcutaneous Daily    midazolam (Versed) 2 MG/2ML injection 2 mg  2 mg Intravenous Q5 Min PRN    dexmedeTOMIDine in sodium chloride 0.9% (Precedex) 400 mcg/100mL infusion premix  0.2-1.5 mcg/kg/hr Intravenous Continuous    sodium hypochlorite (Dakin's) 0.125 % external solution   Topical PRN    propofol (Diprivan) 10 mg/mL infusion premix  5-50 mcg/kg/min (Dosing Weight) Intravenous Continuous    glucose (Dex4) 15 GM/59ML oral liquid 15 g  15 g Oral Q15 Min PRN    Or    glucose (Glutose) 40% oral gel 15 g  15 g Oral Q15 Min PRN    Or    glucose-vitamin C (Dex-4) chewable tab 4 tablet  4 tablet Oral Q15 Min PRN    Or    dextrose 50% injection 50 mL  50 mL Intravenous Q15 Min PRN    Or    glucose (Dex4) 15 GM/59ML oral liquid 30 g  30 g Oral Q15 Min PRN    Or    glucose (Glutose) 40% oral gel 30 g  30 g Oral Q15 Min PRN    Or    glucose-vitamin C (Dex-4) chewable tab 8 tablet  8 tablet Oral Q15 Min PRN    glucose (Dex4) 15 GM/59ML oral liquid 15 g  15 g Oral Q15 Min PRN    Or    glucose (Glutose) 40% oral gel 15 g  15 g Oral Q15 Min PRN    Or    glucose-vitamin C (Dex-4) chewable tab 4 tablet  4 tablet Oral Q15 Min PRN    Or    dextrose 50% injection 50 mL  50 mL Intravenous Q15 Min PRN    Or    glucose (Dex4) 15 GM/59ML oral liquid 30 g  30 g Oral Q15 Min PRN    Or    glucose (Glutose) 40% oral gel 30 g  30 g Oral Q15 Min PRN    Or    glucose-vitamin C (Dex-4) chewable tab 8 tablet  8 tablet Oral Q15 Min PRN    meropenem (Merrem) 500 mg in sodium chloride 0.9% 100 mL IVPB-MBP  500 mg Intravenous q12h    Vancomycin: PHARMACY DOSING  1 each Intravenous See Admin Instructions (RX holding)     Medications Prior to Admission   Medication Sig    [START ON 2/14/2024]  HYDROcodone-acetaminophen  MG Oral Tab Take 1 tablet by mouth every 4 (four) hours as needed for Pain (max 6/day).    [START ON 3/15/2024] HYDROcodone-acetaminophen  MG Oral Tab Take 1 tablet by mouth every 4 (four) hours as needed for Pain (max 6/day).    LANTUS SOLOSTAR 100 UNIT/ML Subcutaneous Solution Pen-injector Inject 6 Units into the skin See Admin Instructions. Every other day    JANUVIA 25 MG Oral Tab Take 1 tablet (25 mg total) by mouth every morning.    rosuvastatin 40 MG Oral Tab Take 1 tablet (40 mg total) by mouth nightly.    glipiZIDE 10 MG Oral Tab Take 1 tablet (10 mg total) by mouth 2 (two) times daily.    D-5000 125 MCG (5000 UT) Oral Tab Take 1 tablet (5,000 Units total) by mouth daily.    LISINOPRIL 40 MG Oral Tab TAKE 1 TABLET(40 MG) BY MOUTH DAILY (Patient taking differently: Take 1 tablet (40 mg total) by mouth every morning.)    hydrALAZINE 100 MG Oral Tab Take 1 tablet (100 mg total) by mouth every 8 (eight) hours.    Budesonide-Formoterol Fumarate 160-4.5 MCG/ACT Inhalation Aerosol Inhale 2 puffs into the lungs 2 (two) times daily. Rinse mouth with water, gargle, and spit to follow.    ipratropium-albuterol 0.5-2.5 (3) MG/3ML Inhalation Solution Take 3 mL by nebulization every 6 (six) hours as needed (shortness of breath refractory to HFA).    MYRBETRIQ 50 MG Oral Tablet 24 Hr Take 1 tablet (50 mg total) by mouth every morning.    albuterol 108 (90 Base) MCG/ACT Inhalation Aero Soln Inhale 2 puffs into the lungs every 6 (six) hours as needed for Wheezing or Shortness of Breath.    carvedilol 12.5 MG Oral Tab Take 2 tablets (25 mg total) by mouth daily. (Patient taking differently: Take 2 tablets (25 mg total) by mouth 2 (two) times daily with meals.)    clopidogrel 75 MG Oral Tab Take 1 tablet (75 mg total) by mouth daily. (Patient taking differently: Take 1 tablet (75 mg total) by mouth every morning.)    estradiol 0.1 MG/GM Vaginal Cream Apply a small amount to the  perimeatal tissue every other day.    amLODIPine 10 MG Oral Tab Take 1 tablet (10 mg total) by mouth daily. (Patient taking differently: Take 1 tablet (10 mg total) by mouth every morning.)    aspirin 81 MG Oral Tab EC Take 1 tablet (81 mg total) by mouth every other day.       Allergies  Allergies   Allergen Reactions    Dilaudid [Hydromorphone] TONGUE SWELLING and ANGIOEDEMA    Penicillins HIVES and ANGIOEDEMA     Hives on eyelids (occurred when pt was in her 20s). Tolerated amoxicillin per chart. Tolerates cephalosporins.       Review of Systems:   Review of systems not obtained due to patient factors.    Physical Exam:   Vital Signs:  Blood pressure 143/60, pulse 72, temperature 97.9 °F (36.6 °C), resp. rate 20, weight 182 lb 5.1 oz (82.7 kg), SpO2 96%, not currently breastfeeding.     General: No acute distress. Alert and oriented x 3.  HEENT: Moist mucous membranes. EOM-I. PERRL  Neck: No lymphadenopathy.  No JVD. No carotid bruits.  Respiratory: Clear to auscultation bilaterally.  No wheezes. No rhonchi.  Cardiovascular: S1, S2.  Regular rate and rhythm.  No murmurs. Equal pulses   Abdomen: Soft, nontender, nondistended.  Positive bowel sounds. No rebound tenderness  Neurologic: No focal neurological deficits.  Musculoskeletal: Full range of motion of all extremities.  No swelling noted.  Integument: No lesions. No erythema.  Psychiatric: Appropriate mood and affect.    Results:     Laboratory Data:  Lab Results   Component Value Date    WBC 21.4 (H) 02/11/2024    HGB 8.0 (L) 02/11/2024    HCT 22.6 (L) 02/11/2024    .0 02/11/2024    CREATSERUM 1.07 (H) 02/11/2024    BUN 20 02/11/2024     (H) 02/11/2024    K 3.0 (L) 02/11/2024     (H) 02/11/2024    CO2 14.0 (L) 02/11/2024     (H) 02/11/2024    CA 6.5 (L) 02/11/2024    ALB 2.3 (L) 02/10/2024    ALKPHO 62 02/10/2024    TP 4.6 (L) 02/10/2024    AST 91 (H) 02/10/2024    ALT 38 02/10/2024    PTT 30.3 02/10/2024    INR 1.51 (H) 02/10/2024     PTP 19.2 (H) 02/10/2024    T4F 1.3 06/18/2019    TSH 1.060 06/18/2019    LIP 40 04/25/2023    DDIMER 1.45 (H) 11/08/2019    ESRML 27 05/08/2020    CRP 3.11 (H) 05/08/2020    MG 1.2 (L) 02/11/2024    PHOS 2.4 (L) 10/06/2020    TROP <0.045 11/08/2019    CK 35 08/21/2023    B12 963 09/03/2019         Imaging:  XR CHEST AP PORTABLE  (CPT=71045)    Result Date: 2/11/2024  CONCLUSION:   Interval placement of enteric tube with the tip in the region the gastric body.  Mild left basilar linear atelectasis.      Dictated by (CST): Krunal Tay MD on 2/11/2024 at 9:19 AM     Finalized by (CST): Krunal Tay MD on 2/11/2024 at 9:21 AM          XR CHEST AP PORTABLE  (CPT=71045)    Result Date: 2/11/2024  CONCLUSION: ETT 1.7 cm above the frida.  Right IJ catheter in the upper SVC.  No pneumothorax  [facility] provided a prelim report for this exam. This final report has no significant discrepancies with the [facility] report. .    Dictated by (CST): Ronak Leonard MD on 2/11/2024 at 6:34 AM     Finalized by (CST): Ronak Leonard MD on 2/11/2024 at 6:37 AM              Impression:   Uncontrolled Type 2 Diabetes  - Patient was placed on an insulin drip, but blood sugars are now stable.  - Patient is still acidotic, Nephrology now consulted  - We will follow blood sugars and then start on subcutaneous insulin      Thank you for allowing me to participate in the care of your patient.    Grecia Armando MD  2/11/2024

## 2024-02-12 NOTE — RESPIRATORY THERAPY NOTE
Latest Reference Range & Units 02/12/24 05:50   ABG PH 7.35 - 7.45  7.33 (L)   ABG PCO2 35 - 45 mm Hg 23 (L)   ABG PO2 80 - 100 mm Hg 82   ABG HCO3 21.0 - 27.0 mEq/L 15.2 (L)   ABG O2 SATURATION 94.0 - 100.0 % 98.2   Blood Gas Base Excess -2.0 - 2.0 mmol/L -12.4 (L)   TOTAL HEMOGLOBIN 12.0 - 16.0 g/dL 8.7 (L)   METHEMOGLOBIN 0.4 - 1.5 % SAT 1.2   POTASSIUM BLOOD GAS 3.6 - 5.1 mmol/L 3.8   SODIUM BLOOD  - 145 mmol/L 139   Lactic Acid (Blood Gas) 0.5 - 2.0 mmol/L 0.6   Oxygen Delivery Device  Vent   Blood Gas Vent Mode  A/C   Blood Gas Respiration Rate BPM 20   Oxygen Content 15.0 - 23.0 Vol% 11.8 (L)   CARBOXYHEMOGLOBIN 0.0 - 3.0 % 2.0   FiO2  30.00   IONIZED CALCIUM 0.95 - 1.32 mmol/L 1.12   SAMPLE SITE  Arterial Line   TIDAL VOLUME mL 500.0   PEEP cm H2O 5.0         RT recommendation- decrease pt Vt

## 2024-02-12 NOTE — CONSULTS
[facility] Infectious Disease  Report of Consultation    Ava Bowen Patient Status:  Inpatient    10/6/1955 MRN [id number]   Location [facility] 2W/SW Attending Lissa Lee MD   Hosp Day # 2 PCP Ernesto De Dios     Date of Admission:  2/10/2024  Date of Consult:  2024    Reason for Consultation:  Post-op infection    History of Present Illness:  Ava Bowen is a a(n) 68 year old female being seen at your request regarding a post-op infection.  Patient recently was admitted and underwent a RLE fem-pop bypass 24.  Patient returned to the hospital with bleeding from her surgical wound and went emergently to the OR on 2/10/24 for repair.  Unfortunately, intraoperatively, patient was found to have infected vascular graft material intraoperatively.  She had debridement of the R common femoral artery, removal of the proximal SFA stent rings, endarterectomy, thrombectomy, and bovine patch angioplasty.  Surgical tissues were sent for culture and additional surgery is anticipated.    Patient's blood cultures 1/2 sets on admission were positive for klebsiella.  All repeat blood cultures are negative.  Tissue cultures with klebsiella, enterococcus, strep anginosus, e.coli. Patient remains intubated post-op and is currently on IV meropenem and vancomycin.  We are asked to see and assist.    History:  Past Medical History:   Diagnosis Date    Anemia     Back pain     Back problem     Cataract     Chronic kidney disease (CKD)     COPD (chronic obstructive pulmonary disease) (HCC)     FEV 1 1.25 50%     Diabetes (HCC)     DM neuropathy    Essential hypertension     H/O angioplasty     2020    High blood pressure     High cholesterol     Neuropathy     Peripheral vascular disease (HCC)     PNA (pneumonia) 2018    Pulmonary emphysema (HCC)     Pulmonary nodule     4 mm RUL    Renal disorder     monitoring kidney labs    Sickle cell trait  (HCC)     Sickle-cell anemia (HCC)     Tobacco abuse     Visual impairment      Past Surgical History:   Procedure Laterality Date    APPENDECTOMY      BACK SURGERY Right 2021    Right L3-4 extreme lateral interbody fusion, posterior decompression; LUMBAR LAMINECTOMY 1 LEVEL          x3    CHOLECYSTECTOMY      EXCIS LUMBAR DISK,ONE LEVEL          OTHER      bilateral leg stents  and      Family History   Problem Relation Age of Onset    Diabetes Mother         Passed from DM complications    Diabetes Sister         Had kidney transplant due to complications of DM    Kidney Disease Sister         Kidney failure secondary to diabetes; received kidney transplant in     Glaucoma Sister     Diabetes Brother     Sickle Cell Son         Cause of death    Macular degeneration Neg       reports that she quit smoking about 4 years ago. Her smoking use included cigarettes. She has a 30 pack-year smoking history. She has never used smokeless tobacco. She reports that she does not currently use alcohol. She reports that she does not use drugs.    Allergies:  Allergies   Allergen Reactions    Dilaudid [Hydromorphone] TONGUE SWELLING and ANGIOEDEMA    Penicillins HIVES and ANGIOEDEMA     Hives on eyelids (occurred when pt was in her 20s). Tolerated amoxicillin per chart. Tolerates cephalosporins.       Medications:    Current Facility-Administered Medications:     insulin degludec 100 units/mL flextouch 30 Units, 30 Units, Subcutaneous, Nightly    insulin regular human (Novolin R, Humulin R) 100 UNIT/ML injection 1-7 Units, 1-7 Units, Subcutaneous, 4 times per day    sodium bicarbonate 100 mEq in dextrose 5% 1,100 mL infusion, 100 mEq, Intravenous, Continuous    sodium hypochlorite (Dakin's) 0.125 % external solution, , Topical, 2 times per day    hydrALAzine (Apresoline) 20 mg/mL injection 10 mg, 10 mg, Intravenous, Q4H PRN    metoprolol (Lopressor) 5 mg/5mL injection 5 mg, 5 mg, Intravenous, Q6H     amLODIPine (Norvasc) tab 10 mg, 10 mg, Per NG Tube, QAM    hydrALAZINE (Apresoline) tab 100 mg, 100 mg, Per NG Tube, Q8H    pantoprazole (Protonix) 40 mg in sodium chloride 0.9% PF 10 mL IV push, 40 mg, Intravenous, Q24H    niCARdipine (carDENE) 125 mg in sodium chloride 0.9% 250 mL infusion, 5-15 mg/hr, Intravenous, Continuous    vancomycin (Vancocin) 1,000 mg in sodium chloride 0.9% 250 mL IVPB-ADDV, 12.5 mg/kg, Intravenous, Q24H    insulin regular human (Novolin R, Humulin R) 100 Units in sodium chloride 0.9% 100 mL standard infusion (100 mL), 1-28 Units/hr, Intravenous, Continuous    ondansetron (Zofran) 4 MG/2ML injection 4 mg, 4 mg, Intravenous, Q6H PRN    metoclopramide (Reglan) 5 mg/mL injection 5 mg, 5 mg, Intravenous, Q8H PRN    albuterol (Ventolin HFA) 108 (90 Base) MCG/ACT inhaler 2 puff, 2 puff, Inhalation, Q6H PRN    fluticasone furoate-vilanterol (Breo Ellipta) 200-25 MCG/ACT inhaler 1 puff, 1 puff, Inhalation, Daily    ipratropium-albuterol (Duoneb) 0.5-2.5 (3) MG/3ML inhalation solution 3 mL, 3 mL, Nebulization, Q6H PRN    [Held by provider] rosuvastatin (Crestor) tab 40 mg, 40 mg, Oral, Nightly    fentaNYL (Sublimaze) 50 mcg/mL injection 25 mcg, 25 mcg, Intravenous, Q30 Min PRN **OR** fentaNYL (Sublimaze) 50 mcg/mL injection 50 mcg, 50 mcg, Intravenous, Q30 Min PRN    acetaminophen (Tylenol) tab 650 mg, 650 mg, Oral, Q4H PRN **OR** acetaminophen (Tylenol) 160 MG/5ML oral liquid 650 mg, 650 mg, Oral, Q4H PRN **OR** acetaminophen (Tylenol) rectal suppository 650 mg, 650 mg, Rectal, Q4H PRN **OR** acetaminophen (Ofirmev) 10 mg/mL infusion premix 1,000 mg, 1,000 mg, Intravenous, Q6H PRN    fentaNYL (Sublimaze) 25 mcg BOLUS FROM BAG infusion, 25 mcg, Intravenous, Q30 Min PRN **OR** fentaNYL (Sublimaze) 50 mcg BOLUS FROM BAG infusion, 50 mcg, Intravenous, Q30 Min PRN    fentaNYL in sodium chloride 0.9% (Sublimaze) 1000 mcg/100mL infusion premix,  mcg/hr, Intravenous, Continuous PRN    polyethylene  glycol (PEG 3350) (Miralax) 17 g oral packet 17 g, 17 g, Oral, Daily PRN    senna (Senokot) 8.8 MG/5ML oral syrup 17.6 mg, 10 mL, Oral, Nightly PRN    bisacodyl (Dulcolax) 10 MG rectal suppository 10 mg, 10 mg, Rectal, Daily PRN    fleet enema (Fleet) 7-19 GM/118ML rectal enema 133 mL, 1 enema, Rectal, Once PRN    chlorhexidine gluconate (Peridex) 0.12 % oral solution 15 mL, 15 mL, Mouth/Throat, BID@0800,2000    enoxaparin (Lovenox) 40 MG/0.4ML SUBQ injection 40 mg, 40 mg, Subcutaneous, Daily    midazolam (Versed) 2 MG/2ML injection 2 mg, 2 mg, Intravenous, Q5 Min PRN    dexmedeTOMIDine in sodium chloride 0.9% (Precedex) 400 mcg/100mL infusion premix, 0.2-1.5 mcg/kg/hr, Intravenous, Continuous    propofol (Diprivan) 10 mg/mL infusion premix, 5-50 mcg/kg/min (Dosing Weight), Intravenous, Continuous    glucose (Dex4) 15 GM/59ML oral liquid 15 g, 15 g, Oral, Q15 Min PRN **OR** glucose (Glutose) 40% oral gel 15 g, 15 g, Oral, Q15 Min PRN **OR** glucose-vitamin C (Dex-4) chewable tab 4 tablet, 4 tablet, Oral, Q15 Min PRN **OR** dextrose 50% injection 50 mL, 50 mL, Intravenous, Q15 Min PRN **OR** glucose (Dex4) 15 GM/59ML oral liquid 30 g, 30 g, Oral, Q15 Min PRN **OR** glucose (Glutose) 40% oral gel 30 g, 30 g, Oral, Q15 Min PRN **OR** glucose-vitamin C (Dex-4) chewable tab 8 tablet, 8 tablet, Oral, Q15 Min PRN    meropenem (Merrem) 500 mg in sodium chloride 0.9% 100 mL IVPB-MBP, 500 mg, Intravenous, q12h    Review of Systems:    Constitutional:  No fevers, chills, diaphoresis, weight changes.   HEENT:  No visual changes, oral ulcers, sore throat, difficulty swallowing.   Respiratory: Negative for cough, sputum, hemoptysis, chest pain, wheezing, dyspnea on exertion, or stridor.   Cardiovascular: Negative for chest pain, palpitations, irregular heart beats.   Gastrointestinal:  No abdominal pain, nausea, vomiting, diarrhea, or constipation.   Genitourinary:  No dysuria, hematuria, urine urgency or  frequency.   Integument/breast: Negative for rash, skin lesions, and pruritus.   Hematologic/lymphatic: Bleeding on admission from recent fem-pop.   Musculoskeletal: Negative for myalgias, arthralgias, muscle weakness.   Neurological: No focal neurologic deficits, seizures, tremors.   Psych:  No h/o anxiety, depression, other psych d/o.   Endocrine: No history of of diabetes, thyroid disorder.    Remainder of 12 point review of systems otherwise negative.    Vital signs in last 24 hours:  Patient Vitals for the past 24 hrs:   BP Temp Temp src Pulse Resp SpO2 Height Weight   02/12/24 1500 138/59 99.3 °F (37.4 °C) Bladder 72 25 92 % -- --   02/12/24 1400 141/59 99.1 °F (37.3 °C) Bladder 71 (!) 28 93 % -- --   02/12/24 1300 139/54 99.1 °F (37.3 °C) Bladder 69 (!) 29 94 % -- --   02/12/24 1200 145/53 99.1 °F (37.3 °C) Bladder 75 (!) 27 92 % -- --   02/12/24 1100 145/58 99 °F (37.2 °C) Bladder 77 26 91 % -- --   02/12/24 1000 152/62 99 °F (37.2 °C) Bladder 79 (!) 27 94 % -- --   02/12/24 0900 139/56 99.1 °F (37.3 °C) Bladder 75 (!) 27 94 % -- --   02/12/24 0800 146/60 99.1 °F (37.3 °C) Bladder 80 26 93 % -- --   02/12/24 0700 140/58 99.1 °F (37.3 °C) Bladder 79 24 94 % -- --   02/12/24 0600 141/56 99.1 °F (37.3 °C) Bladder 78 25 91 % -- --   02/12/24 0500 140/59 99.1 °F (37.3 °C) Bladder 78 24 94 % -- --   02/12/24 0400 145/57 99 °F (37.2 °C) Bladder 79 25 95 % -- 180 lb 5.4 oz (81.8 kg)   02/12/24 0300 148/58 98.8 °F (37.1 °C) Bladder 80 25 95 % -- --   02/12/24 0200 108/69 99 °F (37.2 °C) Bladder 79 22 100 % -- --   02/12/24 0100 152/60 98.6 °F (37 °C) Bladder 79 25 95 % -- --   02/12/24 0000 156/58 98.4 °F (36.9 °C) Bladder 77 23 93 % -- --   02/11/24 2300 155/60 98.4 °F (36.9 °C) Bladder 80 24 92 % -- --   02/11/24 2200 150/62 98.2 °F (36.8 °C) Bladder 77 23 95 % -- --   02/11/24 2100 157/63 98.1 °F (36.7 °C) Bladder 75 23 96 % -- --   02/11/24 2055 -- -- -- -- -- -- 5' 4.02\" (1.626 m) --   02/11/24 2030 150/60 97.9  °F (36.6 °C) Bladder 74 21 96 % -- --   02/11/24 2000 152/60 97.9 °F (36.6 °C) Bladder 75 22 97 % -- --   02/11/24 1900 146/59 97.9 °F (36.6 °C) Bladder 71 21 96 % -- --   02/11/24 1842 143/60 97.9 °F (36.6 °C) -- 72 20 96 % -- --   02/11/24 1830 -- 97.7 °F (36.5 °C) -- 70 20 95 % -- --   02/11/24 1800 146/60 97.7 °F (36.5 °C) -- 71 20 95 % -- --   02/11/24 1700 148/62 97.5 °F (36.4 °C) -- 71 22 96 % -- --   02/11/24 1600 139/62 97.7 °F (36.5 °C) -- 72 21 96 % -- --       Intake/Output:  I/O this shift:  In: 1331.4 [I.V.:1231.4; IV PIGGYBACK:100]  Out: 450 [Urine:450]    Physical Exam:   General: Intubated, sedated.   Head: Normocephalic, without obvious abnormality, atraumatic.   Eyes: Conjunctivae/corneas clear.  No scleral icterus.  No conjunctival     hemorrhage.   Nose: Nares normal.   Throat:  Oropharynx clear, MMs moist.  ETT in place.   Neck: Trachea ML, no masses.   Lungs: CTA b/l no rhonchi, rales, wheezes.   Chest wall: No tenderness or deformity.   Heart: Regular rate and rhythm, normal S1S2, no murmurs.   Abdomen: Soft, NT/ND.  Bowel sounds present.  No organomegaly.   Extremity: R groin wound as below:         Skin: No rashes or lesions.   Neurological: No focal neurologic deficits.    Lab Data Review:  Lab Results   Component Value Date    WBC 27.9 02/12/2024    HGB 9.9 02/12/2024    HCT 29.5 02/12/2024    .0 02/12/2024    CREATSERUM 1.23 02/12/2024    BUN 18 02/12/2024     02/12/2024    K 4.0 02/12/2024     02/12/2024    CO2 16.0 02/12/2024     02/12/2024    CA 7.1 02/12/2024    ALB 2.3 02/12/2024    MG 2.2 02/12/2024    PHOS 2.6 02/12/2024      Cultures:   Single positive blood culture for klebsiella 2/10/24, repeats negative    Surgical cultures - klebsiella, enterococcus, streptococcus, e.coli    Radiology:  CONCLUSION:      Interval placement of enteric tube with the tip in the region the gastric body.      Mild left basilar linear atelectasis.     Assessment and  Plan:    Acute gram negative sepsis presenting with a bleeding complication after recent fem-pop bypass 1/24/24  - s/p emergent OR where extensive debridement and patch was performed - noting infected graft material  - Blood culture positive for klebsiella on admission, repeats negative  - OR cultures from debridement with klebsiella, enterococcus, strep anginosus, e.coli  - IV meropenem and vancomycin ongoing day #2 post-op  - Additional OR anticipated    2.  Acute blood loss anemia, hemorrhage as a complication from recent bypass procedure  - ICU management ongoing, transfusions as needed    3.  DM II  - Possible DKA component with acidosis  - Management in the ICU    4.  Leukocytosis  - Multifactorial due to the above  - At 27K, will trend  - C.diff is negative    5.  Disposition - inpatient.  Continue broad spectrum antibiotics.  F/u MICs of each isolate for antibiotic adjustments as needed.  Trending temp and WBCs.  Ventilator wean as able.  Expect PICC and long course IV antibiotics given graft infection.  Further vascular surgery anticipated.  Trending temps and WBCs.  Will follow with further recommendations.      [facility]  [phone]    2/12/2024  3:46 PM

## 2024-02-12 NOTE — CONSULTS
[facility]    Report of Consultation    Ava Bowen Patient Status:  Inpatient    10/6/1955 MRN [id number]   Location [facility] 2W/SW Attending Lissa Lee MD   Hosp Day # 1 PCP Ernesto De Dios     Date of Admission:  2/10/2024  Date of Consult:  2024  Reason for Consultation:   Metabolic acidosis  Hypokalemia  Hypomagnesemia    History of Present Illness:   Patient is a 68 year old female with PMH of COPD, DM2, HTN, HL, PVD, Sickle cell anemia, CKD stage 3, recent fem-pop artery bypass admitted with acute hemorrhage from incision site. Hospital course significant for acute OR repair, was intubated, had DKA on insulin gtt, were consulted for metabolic acidosis and OMEGA.    Patient is intubated sedated, family at bedside helped provide history.    Past Medical History  Past Medical History:   Diagnosis Date    Anemia     Back pain     Back problem     Cataract     Chronic kidney disease (CKD)     COPD (chronic obstructive pulmonary disease) (HCC)     FEV 1 1.25 50%     Diabetes (HCC)     DM neuropathy    Essential hypertension     H/O angioplasty     2020    High blood pressure     High cholesterol     Neuropathy     Peripheral vascular disease (HCC)     PNA (pneumonia) 2018    Pulmonary emphysema (HCC)     Pulmonary nodule     4 mm RUL    Renal disorder     monitoring kidney labs    Sickle cell trait (HCC)     Sickle-cell anemia (HCC)     Tobacco abuse     Visual impairment        Past Surgical History  Past Surgical History:   Procedure Laterality Date    APPENDECTOMY      BACK SURGERY Right 2021    Right L3-4 extreme lateral interbody fusion, posterior decompression; LUMBAR LAMINECTOMY 1 LEVEL          x3    CHOLECYSTECTOMY      EXCIS LUMBAR DISK,ONE LEVEL          OTHER      bilateral leg stents  and        Family History  Family History   Problem Relation Age of Onset    Diabetes Mother         Passed from DM complications     Diabetes Sister         Had kidney transplant due to complications of DM    Kidney Disease Sister         Kidney failure secondary to diabetes; received kidney transplant in 2004    Glaucoma Sister     Diabetes Brother     Sickle Cell Son         Cause of death    Macular degeneration Neg        Social History  Social History     Socioeconomic History    Marital status:    Tobacco Use    Smoking status: Former     Packs/day: 1.00     Years: 30.00     Additional pack years: 0.00     Total pack years: 30.00     Types: Cigarettes     Quit date: 2019     Years since quittin.2    Smokeless tobacco: Never   Vaping Use    Vaping Use: Never used   Substance and Sexual Activity    Alcohol use: Not Currently     Comment: socially    Drug use: No   Social History Narrative    [facility]          Social Determinants of Health     Food Insecurity: No Food Insecurity (2024)    Food Insecurity     Food Insecurity: Never true   Transportation Needs: No Transportation Needs (2024)    Transportation Needs     Lack of Transportation: No   Housing Stability: Low Risk  (2024)    Housing Stability     Housing Instability: No           Current Medications:  Current Facility-Administered Medications   Medication Dose Route Frequency    pantoprazole (Protonix) 40 mg in sodium chloride 0.9% PF 10 mL IV push  40 mg Intravenous Q24H    dextrose 5%-sodium chloride 0.45% infusion   Intravenous Continuous    magnesium sulfate 4 g/100mL IVPB premix 4 g  4 g Intravenous Once    potassium chloride 40 mEq/100mL IVPB premix (central line) 40 mEq  40 mEq Intravenous Once    Followed by    potassium chloride 20 mEq/100mL IVPB premix 20 mEq  20 mEq Intravenous Once    niCARdipine (carDENE) 125 mg in sodium chloride 0.9% 250 mL infusion  5-15 mg/hr Intravenous Continuous    insulin regular human (Novolin R, Humulin R) 100 Units in sodium chloride 0.9% 100 mL standard infusion (100 mL)  1-28 Units/hr Intravenous  Continuous    sodium chloride 0.9% infusion   Intravenous Continuous    HYDROcodone-acetaminophen (Norco) 5-325 MG per tab 1 tablet  1 tablet Oral Q4H PRN    Or    HYDROcodone-acetaminophen (Norco) 5-325 MG per tab 2 tablet  2 tablet Oral Q4H PRN    ondansetron (Zofran) 4 MG/2ML injection 4 mg  4 mg Intravenous Q6H PRN    metoclopramide (Reglan) 5 mg/mL injection 5 mg  5 mg Intravenous Q8H PRN    morphINE PF 2 MG/ML injection 1 mg  1 mg Intravenous Q2H PRN    Or    morphINE PF 2 MG/ML injection 2 mg  2 mg Intravenous Q2H PRN    Or    morphINE PF 4 MG/ML injection 4 mg  4 mg Intravenous Q2H PRN    albuterol (Ventolin HFA) 108 (90 Base) MCG/ACT inhaler 2 puff  2 puff Inhalation Q6H PRN    fluticasone furoate-vilanterol (Breo Ellipta) 200-25 MCG/ACT inhaler 1 puff  1 puff Inhalation Daily    ipratropium-albuterol (Duoneb) 0.5-2.5 (3) MG/3ML inhalation solution 3 mL  3 mL Nebulization Q6H PRN    [Held by provider] rosuvastatin (Crestor) tab 40 mg  40 mg Oral Nightly    fentaNYL (Sublimaze) 50 mcg/mL injection 25 mcg  25 mcg Intravenous Q30 Min PRN    Or    fentaNYL (Sublimaze) 50 mcg/mL injection 50 mcg  50 mcg Intravenous Q30 Min PRN    acetaminophen (Tylenol) tab 650 mg  650 mg Oral Q4H PRN    Or    acetaminophen (Tylenol) 160 MG/5ML oral liquid 650 mg  650 mg Oral Q4H PRN    Or    acetaminophen (Tylenol) rectal suppository 650 mg  650 mg Rectal Q4H PRN    Or    acetaminophen (Ofirmev) 10 mg/mL infusion premix 1,000 mg  1,000 mg Intravenous Q6H PRN    fentaNYL (Sublimaze) 25 mcg BOLUS FROM BAG infusion  25 mcg Intravenous Q30 Min PRN    Or    fentaNYL (Sublimaze) 50 mcg BOLUS FROM BAG infusion  50 mcg Intravenous Q30 Min PRN    fentaNYL in sodium chloride 0.9% (Sublimaze) 1000 mcg/100mL infusion premix   mcg/hr Intravenous Continuous PRN    polyethylene glycol (PEG 3350) (Miralax) 17 g oral packet 17 g  17 g Oral Daily PRN    senna (Senokot) 8.8 MG/5ML oral syrup 17.6 mg  10 mL Oral Nightly PRN    bisacodyl  (Dulcolax) 10 MG rectal suppository 10 mg  10 mg Rectal Daily PRN    fleet enema (Fleet) 7-19 GM/118ML rectal enema 133 mL  1 enema Rectal Once PRN    chlorhexidine gluconate (Peridex) 0.12 % oral solution 15 mL  15 mL Mouth/Throat BID@0800,2000    enoxaparin (Lovenox) 40 MG/0.4ML SUBQ injection 40 mg  40 mg Subcutaneous Daily    midazolam (Versed) 2 MG/2ML injection 2 mg  2 mg Intravenous Q5 Min PRN    dexmedeTOMIDine in sodium chloride 0.9% (Precedex) 400 mcg/100mL infusion premix  0.2-1.5 mcg/kg/hr Intravenous Continuous    sodium hypochlorite (Dakin's) 0.125 % external solution   Topical PRN    propofol (Diprivan) 10 mg/mL infusion premix  5-50 mcg/kg/min (Dosing Weight) Intravenous Continuous    glucose (Dex4) 15 GM/59ML oral liquid 15 g  15 g Oral Q15 Min PRN    Or    glucose (Glutose) 40% oral gel 15 g  15 g Oral Q15 Min PRN    Or    glucose-vitamin C (Dex-4) chewable tab 4 tablet  4 tablet Oral Q15 Min PRN    Or    dextrose 50% injection 50 mL  50 mL Intravenous Q15 Min PRN    Or    glucose (Dex4) 15 GM/59ML oral liquid 30 g  30 g Oral Q15 Min PRN    Or    glucose (Glutose) 40% oral gel 30 g  30 g Oral Q15 Min PRN    Or    glucose-vitamin C (Dex-4) chewable tab 8 tablet  8 tablet Oral Q15 Min PRN    glucose (Dex4) 15 GM/59ML oral liquid 15 g  15 g Oral Q15 Min PRN    Or    glucose (Glutose) 40% oral gel 15 g  15 g Oral Q15 Min PRN    Or    glucose-vitamin C (Dex-4) chewable tab 4 tablet  4 tablet Oral Q15 Min PRN    Or    dextrose 50% injection 50 mL  50 mL Intravenous Q15 Min PRN    Or    glucose (Dex4) 15 GM/59ML oral liquid 30 g  30 g Oral Q15 Min PRN    Or    glucose (Glutose) 40% oral gel 30 g  30 g Oral Q15 Min PRN    Or    glucose-vitamin C (Dex-4) chewable tab 8 tablet  8 tablet Oral Q15 Min PRN    meropenem (Merrem) 500 mg in sodium chloride 0.9% 100 mL IVPB-MBP  500 mg Intravenous q12h    Vancomycin: PHARMACY DOSING  1 each Intravenous See Admin Instructions (RX holding)     Medications Prior to  Admission   Medication Sig    [START ON 2/14/2024] HYDROcodone-acetaminophen  MG Oral Tab Take 1 tablet by mouth every 4 (four) hours as needed for Pain (max 6/day).    [START ON 3/15/2024] HYDROcodone-acetaminophen  MG Oral Tab Take 1 tablet by mouth every 4 (four) hours as needed for Pain (max 6/day).    LANTUS SOLOSTAR 100 UNIT/ML Subcutaneous Solution Pen-injector Inject 6 Units into the skin See Admin Instructions. Every other day    JANUVIA 25 MG Oral Tab Take 1 tablet (25 mg total) by mouth every morning.    rosuvastatin 40 MG Oral Tab Take 1 tablet (40 mg total) by mouth nightly.    glipiZIDE 10 MG Oral Tab Take 1 tablet (10 mg total) by mouth 2 (two) times daily.    D-5000 125 MCG (5000 UT) Oral Tab Take 1 tablet (5,000 Units total) by mouth daily.    LISINOPRIL 40 MG Oral Tab TAKE 1 TABLET(40 MG) BY MOUTH DAILY (Patient taking differently: Take 1 tablet (40 mg total) by mouth every morning.)    hydrALAZINE 100 MG Oral Tab Take 1 tablet (100 mg total) by mouth every 8 (eight) hours.    Budesonide-Formoterol Fumarate 160-4.5 MCG/ACT Inhalation Aerosol Inhale 2 puffs into the lungs 2 (two) times daily. Rinse mouth with water, gargle, and spit to follow.    ipratropium-albuterol 0.5-2.5 (3) MG/3ML Inhalation Solution Take 3 mL by nebulization every 6 (six) hours as needed (shortness of breath refractory to HFA).    MYRBETRIQ 50 MG Oral Tablet 24 Hr Take 1 tablet (50 mg total) by mouth every morning.    albuterol 108 (90 Base) MCG/ACT Inhalation Aero Soln Inhale 2 puffs into the lungs every 6 (six) hours as needed for Wheezing or Shortness of Breath.    carvedilol 12.5 MG Oral Tab Take 2 tablets (25 mg total) by mouth daily. (Patient taking differently: Take 2 tablets (25 mg total) by mouth 2 (two) times daily with meals.)    clopidogrel 75 MG Oral Tab Take 1 tablet (75 mg total) by mouth daily. (Patient taking differently: Take 1 tablet (75 mg total) by mouth every morning.)    estradiol 0.1 MG/GM  Vaginal Cream Apply a small amount to the perimeatal tissue every other day.    amLODIPine 10 MG Oral Tab Take 1 tablet (10 mg total) by mouth daily. (Patient taking differently: Take 1 tablet (10 mg total) by mouth every morning.)    aspirin 81 MG Oral Tab EC Take 1 tablet (81 mg total) by mouth every other day.       Allergies  Allergies   Allergen Reactions    Dilaudid [Hydromorphone] TONGUE SWELLING and ANGIOEDEMA    Penicillins HIVES and ANGIOEDEMA     Hives on eyelids (occurred when pt was in her 20s). Tolerated amoxicillin per chart. Tolerates cephalosporins.       Review of Systems:   Review of systems not obtained due to patient factors.    Physical Exam:   Vital Signs:  Blood pressure 143/60, pulse 72, temperature 97.9 °F (36.6 °C), resp. rate 20, weight 182 lb 5.1 oz (82.7 kg), SpO2 96%, not currently breastfeeding.     General: Intubated and sedated  HEENT: ETT in place  Respiratory: Ventilatory breath sounds.  Cardiovascular: S1, S2.    Abdomen: Soft, +BS  Ext: 1+ thigh LE edema  Neuro: intubated and sedated.      Results:     Laboratory Data:  Lab Results   Component Value Date    WBC 21.4 (H) 02/11/2024    HGB 8.0 (L) 02/11/2024    HCT 22.6 (L) 02/11/2024    .0 02/11/2024    CREATSERUM 1.07 (H) 02/11/2024    BUN 20 02/11/2024     (H) 02/11/2024    K 3.0 (L) 02/11/2024     (H) 02/11/2024    CO2 14.0 (L) 02/11/2024     (H) 02/11/2024    CA 6.5 (L) 02/11/2024    ALB 2.3 (L) 02/10/2024    ALKPHO 62 02/10/2024    TP 4.6 (L) 02/10/2024    AST 91 (H) 02/10/2024    ALT 38 02/10/2024    PTT 30.3 02/10/2024    INR 1.51 (H) 02/10/2024    PTP 19.2 (H) 02/10/2024    T4F 1.3 06/18/2019    TSH 1.060 06/18/2019    LIP 40 04/25/2023    DDIMER 1.45 (H) 11/08/2019    ESRML 27 05/08/2020    CRP 3.11 (H) 05/08/2020    MG 1.2 (L) 02/11/2024    PHOS 2.4 (L) 10/06/2020    TROP <0.045 11/08/2019    CK 35 08/21/2023    B12 963 09/03/2019         Imaging:  XR CHEST AP PORTABLE  (CPT=71045)    Result  Date: 2/11/2024  CONCLUSION:   Interval placement of enteric tube with the tip in the region the gastric body.  Mild left basilar linear atelectasis.      Dictated by (CST): Krunal Tay MD on 2/11/2024 at 9:19 AM     Finalized by (CST): Krunal Tay MD on 2/11/2024 at 9:21 AM          XR CHEST AP PORTABLE  (CPT=71045)    Result Date: 2/11/2024  CONCLUSION: ETT 1.7 cm above the frida.  Right IJ catheter in the upper SVC.  No pneumothorax  [facility] provided a prelim report for this exam. This final report has no significant discrepancies with the [facility] report. .    Dictated by (CST): Ronak Leonard MD on 2/11/2024 at 6:34 AM     Finalized by (CST): Ronak Leonard MD on 2/11/2024 at 6:37 AM              Impression:   68 year old female with PMH of COPD, DM2, HTN, HL, PVD, Sickle cell anemia, CKD stage 3, recent fem-pop artery bypass admitted with acute hemorrhage from incision site. Hospital course significant for acute OR repair, was intubated, had DKA on insulin gtt, were consulted for metabolic acidosis and OMEGA:    Metabolic acidosis:  - non anion GAP metabolic acidosis with respiratory alkalosis.  - patient had an AGMA which was likely secondary to lactic acidosis and DKA  - no active diarrhea.  - Will check for RTA? Check urine studies.  - currently good compensation while intubated  - serially monitor abg consider starting bicarb gtt if continues.  - if refractory to above discussed dialysis if above fails currently no emergent indication for RRT.    Hypokalemia/ hypomagnesemia:  - continue repletion as per protocol at this time.  - repeat renal panel and magnesium levels at 9pm    Hypocalcemia:  - will check albumin levels and replete as needed.    Hypernatremia:  - on D5 0.45NS@100cc/hr  - Will switch to D5W if sodium not improving on repeat labs.    OMEGA on CKD:  - baseline around 1.2  - likely related to acute blood loss anemia.  - improved now to baseline.  - avoid nephrotoxins   - renally  adjust medications.    Thank you for allowing me to participate in the care of your patient.    Adalberto Longoria MD  [facility]  Nephrology

## 2024-02-12 NOTE — PROGRESS NOTES
NEPHROLOGY DAILY PROGRESS NOTE     SUBJECTIVE:  Remains intubated on nicardipine gtt     OBJECTIVE:    Total Intake/Output:    Intake/Output Summary (Last 24 hours) at 2/12/2024 0933  Last data filed at 2/12/2024 0536  Gross per 24 hour   Intake 3737.89 ml   Output 1395 ml   Net 2342.89 ml       PHYSICAL EXAM:  /60 (BP Location: Right arm)   Pulse 80   Temp 99.1 °F (37.3 °C) (Bladder)   Resp 26   Ht 5' 4.02\" (1.626 m)   Wt 180 lb 5.4 oz (81.8 kg)   LMP  (LMP Unknown)   SpO2 93%   BMI 30.94 kg/m²   GEN: intubated, sedated   HEENT: NCAT    CHEST: clear, mechanical breath sounds   CARDIAC: S1S2 normal  ABD: soft, NT/ND  EXT: R lower ext edema noted   NEURO: sedated       CURRENT MEDICATIONS:  Current Facility-Administered Medications   Medication Dose Route Frequency    insulin degludec 100 units/mL flextouch 30 Units  30 Units Subcutaneous Nightly    insulin regular human (Novolin R, Humulin R) 100 UNIT/ML injection 1-7 Units  1-7 Units Subcutaneous 4 times per day    sodium bicarbonate 100 mEq in dextrose 5% 1,100 mL infusion  100 mEq Intravenous Continuous    pantoprazole (Protonix) 40 mg in sodium chloride 0.9% PF 10 mL IV push  40 mg Intravenous Q24H    niCARdipine (carDENE) 125 mg in sodium chloride 0.9% 250 mL infusion  5-15 mg/hr Intravenous Continuous    vancomycin (Vancocin) 1,000 mg in sodium chloride 0.9% 250 mL IVPB-ADDV  12.5 mg/kg Intravenous Q24H    insulin regular human (Novolin R, Humulin R) 100 Units in sodium chloride 0.9% 100 mL standard infusion (100 mL)  1-28 Units/hr Intravenous Continuous    HYDROcodone-acetaminophen (Norco) 5-325 MG per tab 1 tablet  1 tablet Oral Q4H PRN    Or    HYDROcodone-acetaminophen (Norco) 5-325 MG per tab 2 tablet  2 tablet Oral Q4H PRN    ondansetron (Zofran) 4 MG/2ML injection 4 mg  4 mg Intravenous Q6H PRN    metoclopramide (Reglan) 5 mg/mL injection 5 mg  5 mg Intravenous Q8H PRN    morphINE PF 2 MG/ML injection 1 mg  1 mg Intravenous Q2H PRN    Or     morphINE PF 2 MG/ML injection 2 mg  2 mg Intravenous Q2H PRN    Or    morphINE PF 4 MG/ML injection 4 mg  4 mg Intravenous Q2H PRN    albuterol (Ventolin HFA) 108 (90 Base) MCG/ACT inhaler 2 puff  2 puff Inhalation Q6H PRN    fluticasone furoate-vilanterol (Breo Ellipta) 200-25 MCG/ACT inhaler 1 puff  1 puff Inhalation Daily    ipratropium-albuterol (Duoneb) 0.5-2.5 (3) MG/3ML inhalation solution 3 mL  3 mL Nebulization Q6H PRN    [Held by provider] rosuvastatin (Crestor) tab 40 mg  40 mg Oral Nightly    fentaNYL (Sublimaze) 50 mcg/mL injection 25 mcg  25 mcg Intravenous Q30 Min PRN    Or    fentaNYL (Sublimaze) 50 mcg/mL injection 50 mcg  50 mcg Intravenous Q30 Min PRN    acetaminophen (Tylenol) tab 650 mg  650 mg Oral Q4H PRN    Or    acetaminophen (Tylenol) 160 MG/5ML oral liquid 650 mg  650 mg Oral Q4H PRN    Or    acetaminophen (Tylenol) rectal suppository 650 mg  650 mg Rectal Q4H PRN    Or    acetaminophen (Ofirmev) 10 mg/mL infusion premix 1,000 mg  1,000 mg Intravenous Q6H PRN    fentaNYL (Sublimaze) 25 mcg BOLUS FROM BAG infusion  25 mcg Intravenous Q30 Min PRN    Or    fentaNYL (Sublimaze) 50 mcg BOLUS FROM BAG infusion  50 mcg Intravenous Q30 Min PRN    fentaNYL in sodium chloride 0.9% (Sublimaze) 1000 mcg/100mL infusion premix   mcg/hr Intravenous Continuous PRN    polyethylene glycol (PEG 3350) (Miralax) 17 g oral packet 17 g  17 g Oral Daily PRN    senna (Senokot) 8.8 MG/5ML oral syrup 17.6 mg  10 mL Oral Nightly PRN    bisacodyl (Dulcolax) 10 MG rectal suppository 10 mg  10 mg Rectal Daily PRN    fleet enema (Fleet) 7-19 GM/118ML rectal enema 133 mL  1 enema Rectal Once PRN    chlorhexidine gluconate (Peridex) 0.12 % oral solution 15 mL  15 mL Mouth/Throat BID@0800,2000    enoxaparin (Lovenox) 40 MG/0.4ML SUBQ injection 40 mg  40 mg Subcutaneous Daily    midazolam (Versed) 2 MG/2ML injection 2 mg  2 mg Intravenous Q5 Min PRN    dexmedeTOMIDine in sodium chloride 0.9% (Precedex) 400 mcg/100mL  infusion premix  0.2-1.5 mcg/kg/hr Intravenous Continuous    sodium hypochlorite (Dakin's) 0.125 % external solution   Topical PRN    propofol (Diprivan) 10 mg/mL infusion premix  5-50 mcg/kg/min (Dosing Weight) Intravenous Continuous    glucose (Dex4) 15 GM/59ML oral liquid 15 g  15 g Oral Q15 Min PRN    Or    glucose (Glutose) 40% oral gel 15 g  15 g Oral Q15 Min PRN    Or    glucose-vitamin C (Dex-4) chewable tab 4 tablet  4 tablet Oral Q15 Min PRN    Or    dextrose 50% injection 50 mL  50 mL Intravenous Q15 Min PRN    Or    glucose (Dex4) 15 GM/59ML oral liquid 30 g  30 g Oral Q15 Min PRN    Or    glucose (Glutose) 40% oral gel 30 g  30 g Oral Q15 Min PRN    Or    glucose-vitamin C (Dex-4) chewable tab 8 tablet  8 tablet Oral Q15 Min PRN    meropenem (Merrem) 500 mg in sodium chloride 0.9% 100 mL IVPB-MBP  500 mg Intravenous q12h       LABS:  Patient Labs Reviewed in Detail. Pertinent Labs as follows:  Recent Labs   Lab 02/11/24 2053 02/12/24  0015 02/12/24  0551   * 229* 264*  264*   BUN 19 19 19  19   CREATSERUM 1.25* 1.28* 1.27*  1.27*   EGFRCR 48* 46* 47*  47*   CA 7.2* 7.4* 7.4*  7.4*    143 144  144   K 4.3 4.6  4.6 4.2  4.2  4.2   * 120* 120*  120*   CO2 14.0* 12.0* 13.0*  13.0*     Recent Labs   Lab 02/11/24  1622 02/11/24 2116 02/12/24  0015 02/12/24  0551   RBC 2.73*  --  3.31* 3.50*   HGB 8.0* 10.0* 10.0* 9.9*   HCT 22.6* 27.6* 27.4* 29.5*   MCV 82.8  --  82.8 84.3   MCH 29.3  --  30.2 28.3   MCHC 35.4  --  36.5 33.6   RDW 14.5  --  14.7 14.8   NEPRELIM 18.55*  --  23.83* 25.24*   WBC 21.4*  --  26.6* 27.9*   .0  --  274.0 296.0           IMAGING:  XR CHEST AP PORTABLE  (CPT=71045)    Result Date: 2/11/2024  CONCLUSION:   Interval placement of enteric tube with the tip in the region the gastric body.  Mild left basilar linear atelectasis.      Dictated by (CST): Krunal Tay MD on 2/11/2024 at 9:19 AM     Finalized by (CST): Krunal Tay MD on 2/11/2024  at 9:21 AM          XR CHEST AP PORTABLE  (CPT=71045)    Result Date: 2/11/2024  CONCLUSION: ETT 1.7 cm above the frida.  Right IJ catheter in the upper SVC.  No pneumothorax  [facility] provided a prelim report for this exam. This final report has no significant discrepancies with the [facility] report. .    Dictated by (CST): Ronak Leonard MD on 2/11/2024 at 6:34 AM     Finalized by (CST): Ronak Leonard MD on 2/11/2024 at 6:37 AM            ASSESSMENT AND PLAN:   68 year old female with PMH of COPD, DM2, HTN, HL, PVD, Sickle cell anemia, CKD stage 3, recent fem-pop artery bypass admitted with acute hemorrhage from incision site.  Patient is s/p OR for repair.  Nephrology is consulted for OMEGA and metabolic acidosis.       Metabolic acidosis:  - lactic acid initially 7.6, now down trending   - arterial pH 7.33   - obtain UA to assess for ketones   - continue bicarb gtt, increase to 100 meq in D5W  - serial labs     OMEGA on CKD stage   - baseline around 1.2  - creatinine around baseline   - follow renal fxn and I/Os     Hypertension   - currently on nicardipine gtt. Restart home BP meds, if able to take PO and wean nicardipine gtt as tolerated.    - lisinopril to be held given recent OMEGA.     Hypernatremia:  - improving, corrected Na 148 given gluc of 264  - follow Na level      Hypokalemia/ hypomagnesemia:  - repeat per protocol     Hypocalcemia:  - corrects to normal given albumin of 2.3   - monitor Ca level while on bicarb gtt       Dw Dr. Lee and RN     We will continue to follow Ava Gaston MD  [facility] - Nephrology

## 2024-02-12 NOTE — RESPIRATORY THERAPY NOTE
Problem: RESPIRATORY - ADULT  Goal: Achieves optimal ventilation and oxygenation  Description: INTERVENTIONS:  - Assess for changes in respiratory status  - Assess for changes in mentation and behavior  - Position to facilitate oxygenation and minimize respiratory effort  - Oxygen supplementation based on oxygen saturation or ABGs  - Provide Smoking Cessation handout, if applicable  - Encourage broncho-pulmonary hygiene including cough, deep breathe, Incentive Spirometry  - Assess the need for suctioning and perform as needed  - Assess and instruct to report SOB or any respiratory difficulty  - Respiratory Therapy support as indicated  - Manage/alleviate anxiety  - Monitor for signs/symptoms of CO2 retention  Outcome: Progressing      Patient remains intubated and sedated on full vent support with settings listed below. No weaning performed today. Episode of emesis this morning. Scant secretions obtained from ETT post emesis and throughout shift. Tube chong changed.     ABG performed this morning. No changes to vent settings per Dr. Perkins. Patient breathing set rate of 20/min with normal airway pressures. RT will continue to monitor.     Vent settings:  AC/VC 20/500/+5/30%    Airway:  7.5 ETT/23cm lip

## 2024-02-12 NOTE — PROGRESS NOTES
02/12/24 0819   Wound 02/10/24 Groin Right   Date First Assessed/Time First Assessed: 02/10/24 1853   Present on Original Admission: No  Primary Wound Type: Incision  Location: Groin  Wound Location Orientation: Right  Wound Description (Comments): S/P i AND d W Richar 2/10   Wound Image    Site Assessment Fragile;Granulation tissue;Moist;Pink;Red;Tan   Closure Not approximated   Drainage Amount Small   Drainage Description Serosanguineous   Treatments Dakins;Packings   Dressing 4x4s;Aquacel Foam;Kerlix roll   Dressing Changed Changed   Dressing Status Dressing Changed;Removed;Old drainage   Wound Length (cm) 11 cm   Wound Width (cm) 5.5 cm   Wound Surface Area (cm^2) 60.5 cm^2   Wound Depth (cm) 3.3 cm   Wound Volume (cm^3) 199.65 cm^3   Non-staged Wound Description Full thickness   Lisbet-wound Assessment Clean;Moist   Wound Granulation Tissue Pink   Wound Odor None   Tunneling? Yes   Number of Tunnels 3   Tunnel 1 Location 10   Tunnel 1 Depth 4   Tunnel 2 Location 1   Tunnel 2 Depth 4   Tunnel 3 Location 6   Tunnel 3 Depth 5   Undermining? Yes   Number of Undermines 1  (1.5 CM)   Undermine 1 Start Position 11   Undermine 1 End Position 12   Wound Follow Up   Follow up needed Yes       WOUND CARE NOTE    History:  Past Medical History:   Diagnosis Date    Anemia     Back pain     Back problem     Cataract     Chronic kidney disease (CKD)     COPD (chronic obstructive pulmonary disease) (HCC)     FEV 1 1.25 50% 2/20    Diabetes (HCC)     DM neuropathy    Essential hypertension     H/O angioplasty     07/08/2020    High blood pressure     High cholesterol     Neuropathy     Peripheral vascular disease (HCC)     PNA (pneumonia) 05/2018    Pulmonary emphysema (HCC)     Pulmonary nodule     4 mm RUL    Renal disorder     monitoring kidney labs    Sickle cell trait (HCC)     Sickle-cell anemia (HCC)     Tobacco abuse     Visual impairment      Past Surgical History:   Procedure Laterality Date    APPENDECTOMY      BACK  SURGERY Right 2021    Right L3-4 extreme lateral interbody fusion, posterior decompression; LUMBAR LAMINECTOMY 1 LEVEL          x3    CHOLECYSTECTOMY      EXCIS LUMBAR DISK,ONE LEVEL      2015    OTHER      bilateral leg stents  and       Social History     Socioeconomic History    Marital status:    Tobacco Use    Smoking status: Former     Packs/day: 1.00     Years: 30.00     Additional pack years: 0.00     Total pack years: 30.00     Types: Cigarettes     Quit date: 2019     Years since quittin.2    Smokeless tobacco: Never   Vaping Use    Vaping Use: Never used   Substance and Sexual Activity    Alcohol use: Not Currently     Comment: socially    Drug use: No   Social History Narrative    [facility]          Social Determinants of Health     Food Insecurity: No Food Insecurity (2024)    Food Insecurity     Food Insecurity: Never true   Transportation Needs: No Transportation Needs (2024)    Transportation Needs     Lack of Transportation: No   Housing Stability: Low Risk  (2024)    Housing Stability     Housing Instability: No       PLAN   Recommendations:  Dr. KESSLER currently on consult  Dietary consult for recommendations for nutrition to optimize wound healing  Turn schedules  Heels elevated using pillows, heel wedge or heel boots to offload heels  Prompt incontinence care  Moisture barrier for incontinence. May consider ZINC    Wound(s)  Location: RIGHT GROIN  Cleansing  Saline   Topical ZINC TO GEORGETTE WOUND  Dressings DAKINS MOIST KERLIX ROLL  Secure with DRY GAUZE AND BORDERED FOAM OR ABD  Frequency Q 12 H     OBJECTIVE   MD Consult new  Reason for Consult RIGHT GROIN       ASSESSMENT   Angelo Score:  Angelo Scale Score: 10    Chart Reviewed: yes    Wound(s):  Pt seen with bedside Rn. Pt was given pain meds prior to wound assessment. See above for surgical wound, s/p I and d w Dr. Kessler on 2/10. Dakins previously ordered. Recommend to  continue dakins packings q 12 h and zinc to the moist fly wound to assist with possible maceration.     Allergies: Dilaudid [hydromorphone] and Penicillins    Labs:   Lab Results   Component Value Date    WBC 27.9 (H) 02/12/2024    HGB 9.9 (L) 02/12/2024    HCT 29.5 (L) 02/12/2024    .0 02/12/2024    CREATSERUM 1.27 (H) 02/12/2024    CREATSERUM 1.27 (H) 02/12/2024    BUN 19 02/12/2024    BUN 19 02/12/2024     02/12/2024     02/12/2024    K 4.2 02/12/2024    K 4.2 02/12/2024    K 4.2 02/12/2024     (H) 02/12/2024     (H) 02/12/2024    CO2 13.0 (L) 02/12/2024    CO2 13.0 (L) 02/12/2024     (H) 02/12/2024     (H) 02/12/2024    CA 7.4 (L) 02/12/2024    CA 7.4 (L) 02/12/2024    ALB 2.3 (L) 02/12/2024    ALKPHO 62 02/10/2024    BILT 0.4 02/10/2024    TP 4.6 (L) 02/10/2024    AST 91 (H) 02/10/2024    ALT 38 02/10/2024    LIP 40 04/25/2023    MG 2.2 02/12/2024    PHOS 2.6 02/12/2024     No results found for: \"PREALBUMIN\"      Time Spent 30 Minutes.    Anna Vasquez, RN, BSN, [facility]  Formerly [facility]  [phone]

## 2024-02-13 ENCOUNTER — ANESTHESIA (OUTPATIENT)
Dept: SURGERY | Facility: HOSPITAL | Age: 69
End: 2024-02-13
Payer: MEDICARE

## 2024-02-13 ENCOUNTER — ANESTHESIA EVENT (OUTPATIENT)
Dept: SURGERY | Facility: HOSPITAL | Age: 69
End: 2024-02-13
Payer: MEDICARE

## 2024-02-13 RX ORDER — ROCURONIUM BROMIDE 10 MG/ML
INJECTION, SOLUTION INTRAVENOUS AS NEEDED
Status: DISCONTINUED | OUTPATIENT
Start: 2024-02-13 | End: 2024-02-13 | Stop reason: SURG

## 2024-02-13 RX ORDER — NEOSTIGMINE METHYLSULFATE 1 MG/ML
INJECTION, SOLUTION INTRAVENOUS AS NEEDED
Status: DISCONTINUED | OUTPATIENT
Start: 2024-02-13 | End: 2024-02-13 | Stop reason: SURG

## 2024-02-13 RX ORDER — GLYCOPYRROLATE 0.2 MG/ML
INJECTION, SOLUTION INTRAMUSCULAR; INTRAVENOUS AS NEEDED
Status: DISCONTINUED | OUTPATIENT
Start: 2024-02-13 | End: 2024-02-13 | Stop reason: SURG

## 2024-02-13 RX ORDER — SODIUM CHLORIDE, SODIUM LACTATE, POTASSIUM CHLORIDE, CALCIUM CHLORIDE 600; 310; 30; 20 MG/100ML; MG/100ML; MG/100ML; MG/100ML
INJECTION, SOLUTION INTRAVENOUS CONTINUOUS PRN
Status: DISCONTINUED | OUTPATIENT
Start: 2024-02-13 | End: 2024-02-13 | Stop reason: SURG

## 2024-02-13 RX ADMIN — ROCURONIUM BROMIDE 20 MG: 10 INJECTION, SOLUTION INTRAVENOUS at 10:59:00

## 2024-02-13 RX ADMIN — SODIUM CHLORIDE, SODIUM LACTATE, POTASSIUM CHLORIDE, CALCIUM CHLORIDE: 600; 310; 30; 20 INJECTION, SOLUTION INTRAVENOUS at 10:52:00

## 2024-02-13 RX ADMIN — NEOSTIGMINE METHYLSULFATE 4 MG: 1 INJECTION, SOLUTION INTRAVENOUS at 12:18:00

## 2024-02-13 RX ADMIN — GLYCOPYRROLATE 0.4 MG: 0.2 INJECTION, SOLUTION INTRAMUSCULAR; INTRAVENOUS at 12:18:00

## 2024-02-13 NOTE — BRIEF OP NOTE
Pre-Operative Diagnosis: Groin wound dehiscence and infection     Post-Operative Diagnosis: Groin wound dehiscence and infection     Procedure Performed:   Sharp and versajet debridement of skin subcutaneous fat of right abdominal wall groin and thigh [id number] cm  Sharp debridement of skin and subcataneous fat of popliteal incision 10x4 cm  Removal of infected graft  Wound vac placement    Surgeon(s) and Role:     * Kp Justice MD - Primary    Assistant(s):  Surgical Assistant.: Rosas Pineda     Surgical Findings:   Debrided to healthy bleeding tissues in groin   Muscle flap viable and secure  Gastrocnemius muscle viable and responded to bovie   Left 1 cm cuff of graft - back bleeding from popliteal artery      Specimen: graft, tissue and fluid for culture      Estimated Blood Loss: Blood Output: 300 mL (2/10/2024 10:03 PM)    Kp Justice MD  2/13/2024  12:32 PM

## 2024-02-13 NOTE — CM/SW NOTE
Ava went for wound debridement  and placement of wound vac today.  Remains on vent support.  Will need post-acute services after dc either HH or [facility] depending on functional needs.    CM/SW will follow and help assist with dc planning needs.    / to remain available for support and/or discharge planning.      Whitney Singletary MBA BSN RN CRRN   RN Case Manager  [phone]

## 2024-02-13 NOTE — PROGRESS NOTES
[facility] Infectious Disease  Progress Note    Ava Bowen Patient Status:  Inpatient    10/6/1955 MRN [id number]   Location [facility] OPERATING ROOM Attending Lissa Lee MD   Hosp Day # 3 PCP Ernesto De Dios     Subjective:  Patient seen/examined.  She remains intubated. Plans to return to OR today for further debridement of her R groin wound.  No F/C.  No new overnight issues.    Objective:  Blood pressure 144/61, pulse 68, temperature 98.2 °F (36.8 °C), temperature source Bladder, resp. rate 26, height 5' 4.02\" (1.626 m), weight 183 lb 3.2 oz (83.1 kg), SpO2 92%, not currently breastfeeding.    Intake/Output:    Intake/Output Summary (Last 24 hours) at 2024 1053  Last data filed at 2024 0556  Gross per 24 hour   Intake 3947.4 ml   Output 1125 ml   Net 2822.4 ml       Physical Exam:  General: Intubated.  HEENT:  Oropharynx clear, trachea ML.  ETT in place.  Heart: RRR S1S2 no murmurs.  Lungs: Essentially CTA b/l, no rhonchi, rales, wheezes.  Abdomen: Soft, NT/ND.  BS present.  No organomegaly.  Extremity: R groin wound open, needs furtehr debridement.  Neurological: No focal deficits.  Derm:  Warm, dry, free from rashes.    Lab Data Review:  Lab Results   Component Value Date    WBC 30.2 2024    HGB 9.5 2024    HCT 26.0 2024    .0 2024    CREATSERUM 1.05 2024    BUN 17 2024     2024    K 3.5 2024     2024    CO2 16.0 2024     2024    CA 7.2 2024    MG 1.9 2024    PHOS 3.0 2024      Cultures:   Single positive blood culture for klebsiella 2/10/24, repeats negative     Surgical cultures - klebsiella, enterococcus, streptococcus, e.coli    Radiology:  CONCLUSION:      Interval placement of enteric tube with the tip in the region the gastric body.      Mild left basilar linear atelectasis.      Assessment and Plan:     Acute gram  negative sepsis presenting with a bleeding complication after recent fem-pop bypass 1/24/24  - s/p emergent OR where extensive debridement and patch was performed - noting infected graft material  - Blood culture positive for klebsiella on admission, repeats negative  - OR cultures from debridement with klebsiella, enterococcus, strep anginosus, e.coli  - IV meropenem and vancomycin ongoing day #3 post-op  - Additional OR anticipated today     2.  Acute blood loss anemia, hemorrhage as a complication from recent bypass procedure  - ICU management ongoing, transfusions as needed     3.  DM II  - Possible DKA component with acidosis  - Management in the ICU     4.  Leukocytosis  - Multifactorial due to the above  - At 30K and up a bit today, will trend  - C.diff is negative     5.  Disposition - inpatient.  Continue broad spectrum antibiotics.  F/u MICs of each isolate for antibiotic adjustments as needed.  Further OR debridement anticipated today.  Trending temp and WBCs.  Ventilator wean as able.  Expect PICC and long course IV antibiotics given graft infection.  Trending temps and WBCs.  Will follow with further recommendations.    Dayana Cid DO, [facility] Infectious Disease  [phone]    2/13/2024  10:53 AM

## 2024-02-13 NOTE — PROGRESS NOTES
02/13/24 1300   Wound 02/13/24 Groin Anterior;Proximal;Right;Upper   Date First Assessed/Time First Assessed: 02/13/24 1118   Primary Wound Type: Incision  Location: Groin  Wound Location Orientation: Anterior;Proximal;Right;Upper   Treatments Wound Vac - Neg Pressure   Wound Vac Brand KCI   Dressing Wound vac sponge   Wound 02/13/24 Tibial Right   Date First Assessed/Time First Assessed: 02/13/24 1131   Primary Wound Type: Incision  Location: Tibial  Wound Location Orientation: Right   Treatments Wound Vac - Neg Pressure   Wound Vac Brand KCI   Dressing Wound vac sponge   Negative Pressure Wound Therapy Groin Right   Placement Date: 02/13/24   Inserted by: Dr. LEWIS  Location: Groin  Wound Location Orientation: Right   Wound photographed/measured No   Machine Status (On) Yes   Site Assessment ANGELO   Lisbet-wound Assessment ANGELO   Unit Type KCI  ULTA   Cycle Continuous;On   Target Pressure (mmHg) 125   Drainage Description Sanguineous   Dressing Status Intact   Canister Changed No   Wound Follow Up   Follow up needed Yes     Pt seen for wound vac check, s/p I & d w [facility] in the OR. Vac dressing to the r groin and rle are intact, vac showing patent seal at 125 mmHg. Spoke to pt's daughter re dressing.

## 2024-02-13 NOTE — PROGRESS NOTES
[facility]    Progress Note    Ava Bowen Patient Status:  Inpatient    10/6/1955 MRN [id number]   Location [facility] 2W/SW Attending Lissa Lee MD   Hosp Day # 3 PCP Ernesto De Dios     Date of Admission:  2/10/2024  Date of Consult:  24  Reason for Consultation:   DKA Management in a patient with type 2 diabetes    Patient became hypoglycemic today    History of Present Illness:   Patient is a 68 year old female who was admitted to the hospital for Bypass graft mechanical complication, initial encounter (HCC):  This is a 68 year-old woman admitted after presenting to the ER after having undergone right femoral artery to below-knee popliteal artery. She was found to have elevated blood sugars and started on insulin drip.    Past Medical History  Past Medical History:   Diagnosis Date    Anemia     Back pain     Back problem     Cataract     Chronic kidney disease (CKD)     COPD (chronic obstructive pulmonary disease) (HCC)     FEV 1 1.25 50%     Diabetes (HCC)     DM neuropathy    Essential hypertension     H/O angioplasty     2020    High blood pressure     High cholesterol     Neuropathy     Peripheral vascular disease (HCC)     PNA (pneumonia) 2018    Pulmonary emphysema (HCC)     Pulmonary nodule     4 mm RUL    Renal disorder     monitoring kidney labs    Sickle cell trait (HCC)     Sickle-cell anemia (HCC)     Tobacco abuse     Visual impairment        Past Surgical History  Past Surgical History:   Procedure Laterality Date    APPENDECTOMY      BACK SURGERY Right 2021    Right L3-4 extreme lateral interbody fusion, posterior decompression; LUMBAR LAMINECTOMY 1 LEVEL          x3    CHOLECYSTECTOMY      EXCIS LUMBAR DISK,ONE LEVEL          OTHER      bilateral leg stents  and        Family History  Family History   Problem Relation Age of Onset    Diabetes Mother         Passed from DM complications    Diabetes Sister          Had kidney transplant due to complications of DM    Kidney Disease Sister         Kidney failure secondary to diabetes; received kidney transplant in 2004    Glaucoma Sister     Diabetes Brother     Sickle Cell Son         Cause of death    Macular degeneration Neg        Social History  Social History     Socioeconomic History    Marital status:    Tobacco Use    Smoking status: Former     Packs/day: 1.00     Years: 30.00     Additional pack years: 0.00     Total pack years: 30.00     Types: Cigarettes     Quit date: 2019     Years since quittin.2    Smokeless tobacco: Never   Vaping Use    Vaping Use: Never used   Substance and Sexual Activity    Alcohol use: Not Currently     Comment: socially    Drug use: No   Social History Narrative    [facility]          Social Determinants of Health     Food Insecurity: No Food Insecurity (2024)    Food Insecurity     Food Insecurity: Never true   Transportation Needs: No Transportation Needs (2024)    Transportation Needs     Lack of Transportation: No   Housing Stability: Low Risk  (2024)    Housing Stability     Housing Instability: No           Current Medications:      Medications Prior to Admission   Medication Sig    [START ON 2024] HYDROcodone-acetaminophen  MG Oral Tab Take 1 tablet by mouth every 4 (four) hours as needed for Pain (max 6/day).    [START ON 3/15/2024] HYDROcodone-acetaminophen  MG Oral Tab Take 1 tablet by mouth every 4 (four) hours as needed for Pain (max 6/day).    LANTUS SOLOSTAR 100 UNIT/ML Subcutaneous Solution Pen-injector Inject 6 Units into the skin See Admin Instructions. Every other day    JANUVIA 25 MG Oral Tab Take 1 tablet (25 mg total) by mouth every morning.    rosuvastatin 40 MG Oral Tab Take 1 tablet (40 mg total) by mouth nightly.    glipiZIDE 10 MG Oral Tab Take 1 tablet (10 mg total) by mouth 2 (two) times daily.    D-5000 125 MCG (5000 UT) Oral Tab Take 1 tablet  (5,000 Units total) by mouth daily.    LISINOPRIL 40 MG Oral Tab TAKE 1 TABLET(40 MG) BY MOUTH DAILY (Patient taking differently: Take 1 tablet (40 mg total) by mouth every morning.)    hydrALAZINE 100 MG Oral Tab Take 1 tablet (100 mg total) by mouth every 8 (eight) hours.    Budesonide-Formoterol Fumarate 160-4.5 MCG/ACT Inhalation Aerosol Inhale 2 puffs into the lungs 2 (two) times daily. Rinse mouth with water, gargle, and spit to follow.    ipratropium-albuterol 0.5-2.5 (3) MG/3ML Inhalation Solution Take 3 mL by nebulization every 6 (six) hours as needed (shortness of breath refractory to HFA).    MYRBETRIQ 50 MG Oral Tablet 24 Hr Take 1 tablet (50 mg total) by mouth every morning.    albuterol 108 (90 Base) MCG/ACT Inhalation Aero Soln Inhale 2 puffs into the lungs every 6 (six) hours as needed for Wheezing or Shortness of Breath.    carvedilol 12.5 MG Oral Tab Take 2 tablets (25 mg total) by mouth daily. (Patient taking differently: Take 2 tablets (25 mg total) by mouth 2 (two) times daily with meals.)    clopidogrel 75 MG Oral Tab Take 1 tablet (75 mg total) by mouth daily. (Patient taking differently: Take 1 tablet (75 mg total) by mouth every morning.)    estradiol 0.1 MG/GM Vaginal Cream Apply a small amount to the perimeatal tissue every other day.    amLODIPine 10 MG Oral Tab Take 1 tablet (10 mg total) by mouth daily. (Patient taking differently: Take 1 tablet (10 mg total) by mouth every morning.)    aspirin 81 MG Oral Tab EC Take 1 tablet (81 mg total) by mouth every other day.       Allergies  Allergies   Allergen Reactions    Dilaudid [Hydromorphone] TONGUE SWELLING and ANGIOEDEMA    Penicillins HIVES and ANGIOEDEMA     Hives on eyelids (occurred when pt was in her 20s). Tolerated amoxicillin per chart. Tolerates cephalosporins.       Review of Systems:   Review of systems not obtained due to patient factors.    Physical Exam:   Vital Signs:  Blood pressure 129/58, pulse 57, temperature (!) 95.9  °F (35.5 °C), temperature source Bladder, resp. rate 21, height 5' 4.02\" (1.626 m), weight 183 lb 3.2 oz (83.1 kg), SpO2 99%, not currently breastfeeding.     General: No acute distress. Alert and oriented x 3.  HEENT: Moist mucous membranes. EOM-I. PERRL  Neck: No lymphadenopathy.  No JVD. No carotid bruits.  Respiratory: Clear to auscultation bilaterally.  No wheezes. No rhonchi.  Cardiovascular: S1, S2.  Regular rate and rhythm.  No murmurs. Equal pulses   Abdomen: Soft, nontender, nondistended.  Positive bowel sounds. No rebound tenderness  Neurologic: No focal neurological deficits.  Musculoskeletal: Full range of motion of all extremities.  No swelling noted.  Integument: No lesions. No erythema.  Psychiatric: Appropriate mood and affect.    Results:     Laboratory Data:  Lab Results   Component Value Date    WBC 30.2 (H) 02/13/2024    HGB 9.5 (L) 02/13/2024    HCT 26.0 (L) 02/13/2024    .0 02/13/2024    CREATSERUM 1.00 02/13/2024    BUN 17 02/13/2024     02/13/2024    K 3.5 02/13/2024     (H) 02/13/2024    CO2 19.0 (L) 02/13/2024    GLU 63 (L) 02/13/2024    CA 7.0 (L) 02/13/2024    ALB 2.3 (L) 02/12/2024    ALKPHO 62 02/10/2024    TP 4.6 (L) 02/10/2024    AST 91 (H) 02/10/2024    ALT 38 02/10/2024    PTT 30.3 02/10/2024    INR 1.51 (H) 02/10/2024    PTP 19.2 (H) 02/10/2024    T4F 1.3 06/18/2019    TSH 1.060 06/18/2019    LIP 40 04/25/2023    DDIMER 1.45 (H) 11/08/2019    ESRML 27 05/08/2020    CRP 3.11 (H) 05/08/2020    MG 1.9 02/13/2024    PHOS 3.0 02/13/2024    TROP <0.045 11/08/2019    CK 35 08/21/2023    B12 963 09/03/2019         Imaging:  No results found.       Impression:   Uncontrolled Type 2 Diabetes  - Insulin drip stopped and patient transitioned to Tresiba 30 units, but patient became hypoglycemia.  - Hold Tresiba  - Continue medium dose CF scale with regular insulin  - We will follow blood sugars       Thank you for allowing me to participate in the care of your  patient.    Grecia Armando MD  2/13/2024

## 2024-02-13 NOTE — ANESTHESIA POSTPROCEDURE EVALUATION
Patient: Ava Bowen    Procedure Summary       Date: 02/13/24 Room / Location: [facility] MAIN OR  / [facility] MAIN OR    Anesthesia Start: 1050 Anesthesia Stop: 1241    Procedure: Debridement of right groin, debridement of skin and subq fat, versajet of right leg, debrideent of skin, subq fat, and graft from right tibial area. (Right: Groin) Diagnosis: (Groin wound dehiscence)    Surgeons: Kp Justice MD Anesthesiologist: Sadi Burt MD    Anesthesia Type: general ASA Status: 4            Anesthesia Type: general    Vitals Value Taken Time   /51 02/13/24 1239   Temp 96.4 °F (35.8 °C) 02/13/24 1239   Pulse 73 02/13/24 1239   Resp 14 02/13/24 1239   SpO2 99 % 02/13/24 1239       [facility] AN Post Evaluation:   Patient Evaluated in ICU  Patient Participation: Note status: Intubated/sedated.  Level of Consciousness: obtunded/minimal responses  Airway Patency:patent (intubated)  Yes    Nausea/Vomiting: none  Cardiovascular Status: acceptable  Respiratory Status: acceptable and ventilator  Postoperative Hydration acceptable  Comments: Patient returned to , sign out given to ICU RN. Patient hemodynamically stable through transport and upon arrival to ICU. Placed on ventilator back at original settings      Sadi Burt MD  2/13/2024 12:41 PM

## 2024-02-13 NOTE — PROGRESS NOTES
Critical Care Progress Note     Assessment / Plan:  Acute respiratory failure - due to aspiration and ongoing metabolic acidosis  - continue full vent support  - minimize sedation  Hypovolemic shock - resolved  - monitor  Acute blood loss anemia  - monitor  PVD with bleeding from recent fem-pop bypass and infected graft  - per vascular  - meropenem and IV vancomycin (2/11- )  COPD  - BD protocol  DM  - off insulin gtt  - per endo  OMEGA on CKD, metabolic acidosis  - bicarb gtt  FEN  - NPO  Ppx  - Lovenox  - PPI  Dispo  - ICU    Critical care time: 35 minutes      Subjective:  No events overnight    Objective:  Vitals:    02/13/24 0800 02/13/24 1239 02/13/24 1240 02/13/24 1300   BP: 144/61 141/51 (!) 171/63 128/61   BP Location: Right arm  Right arm Right arm   Pulse: 68 73 83 70   Resp: 26 14  20   Temp: 98.2 °F (36.8 °C) (!) 96.4 °F (35.8 °C)  (!) 96.1 °F (35.6 °C)   TempSrc: Bladder   Bladder   SpO2: 92% 99%  99%   Weight:       Height:         Physical Exam:  General: intubated  Skin: no rash, ulcers or subcutaneous nodules  Eyes: anicteric sclerae, moist conjunctivae  Head, ears, nose, throat: atraumatic, oropharynx clear with moist mucous membranes  Neck: trachea midline with no thyromegaly  Heart: regular rate and rhythm, no murmurs / rubs / gallops  Lungs: clear bilaterally  Abdomen: soft, nontender, nondistended   Psych: sedated    Medications:  Reviewed in EMR    Lab Data:  Reviewed in EMR    Imaging:  I independently visualized all relevant chest imaging in PACS and agree with radiology interpretation except where noted.

## 2024-02-13 NOTE — PROGRESS NOTES
NEPHROLOGY DAILY PROGRESS NOTE     SUBJECTIVE:  No acute events overnight  Intubated, on nicardipine gtt       OBJECTIVE:    Total Intake/Output:    Intake/Output Summary (Last 24 hours) at 2/13/2024 0851  Last data filed at 2/13/2024 0556  Gross per 24 hour   Intake 3947.4 ml   Output 1125 ml   Net 2822.4 ml       PHYSICAL EXAM:  /61 (BP Location: Right arm)   Pulse 68   Temp 98.2 °F (36.8 °C) (Bladder)   Resp 26   Ht 5' 4.02\" (1.626 m)   Wt 183 lb 3.2 oz (83.1 kg)   LMP  (LMP Unknown)   SpO2 92%   BMI 31.43 kg/m²   GEN: intubated, sedated   HEENT: NCAT    CHEST: clear, mechanical breath sounds   CARDIAC: S1S2 normal  ABD: soft, NT/ND  : joseph   EXT: R lower ext edema noted   NEURO: sedated       CURRENT MEDICATIONS:     insulin degludec  30 Units Subcutaneous Nightly    insulin regular human  1-7 Units Subcutaneous 4 times per day    sodium hypochlorite   Topical 2 times per day    metoprolol  5 mg Intravenous Q6H    amLODIPine  10 mg Per NG Tube QAM    hydrALAZINE  100 mg Per NG Tube Q8H    pantoprazole  40 mg Intravenous Q24H    vancomycin  12.5 mg/kg Intravenous Q24H    fluticasone furoate-vilanterol  1 puff Inhalation Daily    [Held by provider] rosuvastatin  40 mg Oral Nightly    chlorhexidine gluconate  15 mL Mouth/Throat BID@0800,2000    enoxaparin  40 mg Subcutaneous Daily    meropenem  500 mg Intravenous q12h             LABS:  Patient Labs Reviewed in Detail. Pertinent Labs as follows:  Recent Labs   Lab 02/12/24  1805 02/12/24  2353 02/13/24  0659   * 187* 113*   BUN 17 18 17   CREATSERUM 1.26* 1.19* 1.05*   EGFRCR 47* 50* 59*   CA 7.2* 7.2* 7.2*    141 142   K 4.0 3.7 3.5   * 117* 116*   CO2 15.0* 15.0* 16.0*     Recent Labs   Lab 02/12/24  1623 02/12/24  2353 02/13/24  0802   RBC 3.20* 3.03* 3.21*   HGB 9.5* 9.0* 9.5*   HCT 26.4* 24.3* 26.0*   MCV 82.5 80.2 81.0   MCH 29.7 29.7 29.6   MCHC 36.0 37.0 36.5   RDW 15.0 15.1* 15.3*   NEPRELIM 24.94* 23.28* 25.95*   WBC  28.2* 26.3* 30.2*   .0 262.0 283.0           IMAGING:  XR CHEST AP PORTABLE  (CPT=71045)    Result Date: 2/11/2024  CONCLUSION:   Interval placement of enteric tube with the tip in the region the gastric body.  Mild left basilar linear atelectasis.      Dictated by (CST): Krunal Tay MD on 2/11/2024 at 9:19 AM     Finalized by (CST): Krunal Tay MD on 2/11/2024 at 9:21 AM            ASSESSMENT AND PLAN:   68 year old female with PMH of COPD, DM2, HTN, HL, PVD, Sickle cell anemia, CKD stage 3, recent fem-pop artery bypass admitted with acute hemorrhage from incision site.  Patient is s/p OR for repair.  Nephrology is consulted for OMEGA and metabolic acidosis.       Metabolic acidosis:  - lactic acid initially 7.6, now down trending   - arterial pH 7.33   - UA neg for ketones   - improving slowly   - continue bicarb gtt, increase to 150 meq of sodium bicarb in D5W  - serial labs     OMEGA on CKD stage 3  - baseline around 1.2  - creatinine currently below baseline   - follow renal fxn and I/Os     Hypertension   - amlodipine, hydralazine, metoprolol  - wean nicardipine gtt as tolerated   - lisinopril held given recent OMEGA.     Hypernatremia:  - resolved     Hypokalemia/ hypomagnesemia:  - repeat per protocol     Hypocalcemia:  - corrects to normal given hypoalbuminemia   - monitor Ca level while on bicarb gtt       Alverto RN    We will continue to follow Ava Gaston MD  [facility] - Nephrology

## 2024-02-13 NOTE — PROGRESS NOTES
[facility] CARDIOLOGY Progress Note      Ava Bowen is a 68 year old female who I assessed as follows:  Chief Complaint   Patient presents with    Bleeding          REASON FOR CONSULTATION:    HTN            ASSESSMENT/PLAN:     IMPRESSIONS:  Hemorrhage from LE fem pop bypass site, infected. S/p debridement 2/13/24  HTN  COPD  DM  PAD s/p fem-pop bypass Jan 2024  Sickle cell anemia  DM  Acute resp failure, intubated  OMEGA, on CKD, improved  HL, on statin PTA        PLAN:  IV nicardepine drip as needed  Continue oral BP meds  Resume coreg, 12.5 BID (taking 25 BID PTA)  Hydralazine IV PRN        SUBJECTIVE:    Intubated, sedated. Family at bedside      --------------------------------------------------------------------------------------------------------------------------------    HPI:         History sickle cell anemia, PAD s/p bypass 1/24 presents with hemorrhage from surgical site. Initially hypotensive on pressors, now on IV nicardepine for HTN. Unable to take po meds at this time. Creat elevated on admission, now improved.                No current outpatient medications on file.      Past Medical History:   Diagnosis Date    Anemia     Back pain     Back problem     Cataract     Chronic kidney disease (CKD)     COPD (chronic obstructive pulmonary disease) ([facility])     FEV 1 1.25 50% 2/20    Diabetes ([facility])     DM neuropathy    Essential hypertension     H/O angioplasty     07/08/2020    High blood pressure     High cholesterol     Neuropathy     Peripheral vascular disease ([facility])     PNA (pneumonia) 05/2018    Pulmonary emphysema ([facility])     Pulmonary nodule     4 mm RUL    Renal disorder     monitoring kidney labs    Sickle cell trait ([facility])     Sickle-cell anemia ([facility])     Tobacco abuse     Visual impairment      Past Surgical History:   Procedure Laterality Date    APPENDECTOMY      BACK SURGERY Right 09/09/2021    Right L3-4 extreme lateral interbody fusion, posterior decompression; LUMBAR  LAMINECTOMY 1 LEVEL          x3    CHOLECYSTECTOMY      EXCIS LUMBAR DISK,ONE LEVEL      2015    OTHER      bilateral leg stents  and      Family History   Problem Relation Age of Onset    Diabetes Mother         Passed from DM complications    Diabetes Sister         Had kidney transplant due to complications of DM    Kidney Disease Sister         Kidney failure secondary to diabetes; received kidney transplant in     Glaucoma Sister     Diabetes Brother     Sickle Cell Son         Cause of death    Macular degeneration Neg       Social History:  Social History     Socioeconomic History    Marital status:    Tobacco Use    Smoking status: Former     Packs/day: 1.00     Years: 30.00     Additional pack years: 0.00     Total pack years: 30.00     Types: Cigarettes     Quit date: 2019     Years since quittin.2    Smokeless tobacco: Never   Vaping Use    Vaping Use: Never used   Substance and Sexual Activity    Alcohol use: Not Currently     Comment: socially    Drug use: No   Social History Narrative    [facility]          Social Determinants of Health     Food Insecurity: No Food Insecurity (2024)    Food Insecurity     Food Insecurity: Never true   Transportation Needs: No Transportation Needs (2024)    Transportation Needs     Lack of Transportation: No   Housing Stability: Low Risk  (2024)    Housing Stability     Housing Instability: No          Medications:            Allergies  Allergies   Allergen Reactions    Dilaudid [Hydromorphone] TONGUE SWELLING and ANGIOEDEMA    Penicillins HIVES and ANGIOEDEMA     Hives on eyelids (occurred when pt was in her 20s). Tolerated amoxicillin per chart. Tolerates cephalosporins.               REVIEW OF SYSTEMS:   Sedated, unable to answer questions          EXAM:         TELE: SR          /67 (BP Location: Right arm)   Pulse 60   Temp (!) 95.9 °F (35.5 °C) (Bladder)   Resp 20   Ht 5' 4.02\" (1.626 m)    Wt 183 lb 3.2 oz (83.1 kg)   LMP  (LMP Unknown)   SpO2 99%   BMI 31.43 kg/m²   Temp (24hrs), Av °F (37.2 °C), Min:95.9 °F (35.5 °C), Max:100.4 °F (38 °C)    Wt Readings from Last 3 Encounters:   24 183 lb 3.2 oz (83.1 kg)   24 164 lb 3.2 oz (74.5 kg)   23 153 lb (69.4 kg)         Intake/Output Summary (Last 24 hours) at 2024 1324  Last data filed at 2024 0556  Gross per 24 hour   Intake 3847.4 ml   Output 1125 ml   Net 2722.4 ml         GENERAL: intubated, sedated  SKIN: no rashes  HEENT: atraumatic, normocephalic, throat without erythema  NECK: supple, no bruits  LUNGS: clear to auscultation  CARDIO: RRR without murmur or S3   GI: soft, nontender  EXTREMITIES: no cyanosis, clubbing or edema  NEUROLOGY: sedated  PSYCH: sedated          LABORATORY DATA:               ECG:        ECHO:          Labs:  Recent Labs   Lab 24  1805 24  2353 24  0659   * 187* 113*   BUN 17 18 17   CREATSERUM 1.26* 1.19* 1.05*   CA 7.2* 7.2* 7.2*    141 142   K 4.0 3.7 3.5   * 117* 116*   CO2 15.0* 15.0* 16.0*     No results for input(s): \"TROP\" in the last 168 hours.  Recent Labs   Lab 24  0802   RBC 3.21*   HGB 9.5*   HCT 26.0*   MCV 81.0   MCH 29.6   MCHC 36.5   RDW 15.3*   NEPRELIM 25.95*   WBC 30.2*   .0     No results for input(s): \"TROP\", \"TROPHS\", \"CK\" in the last 168 hours.    Imaging:  XR CHEST AP PORTABLE  (CPT=71045)    Result Date: 2024  CONCLUSION:   Interval placement of enteric tube with the tip in the region the gastric body.  Mild left basilar linear atelectasis.      Dictated by (CST): Krunal Tay MD on 2024 at 9:19 AM     Finalized by (CST): Krunal Tay MD on 2024 at 9:21 AM          XR CHEST AP PORTABLE  (CPT=71045)    Result Date: 2024  CONCLUSION: ETT 1.7 cm above the frida.  Right IJ catheter in the upper SVC.  No pneumothorax  [facility] provided a prelim report for this exam. This final  report has no significant discrepancies with the [facility] report. .    Dictated by (CST): Ronak Leonard MD on 2/11/2024 at 6:34 AM     Finalized by (CST): Ronak Leonard MD on 2/11/2024 at 6:37 AM                Jose Banegas MD

## 2024-02-13 NOTE — PLAN OF CARE
Problem: Diabetes/Glucose Control  Goal: Glucose maintained within prescribed range  Description: INTERVENTIONS:  - Monitor Blood Glucose as ordered  - Assess for signs and symptoms of hyperglycemia and hypoglycemia  - Administer ordered medications to maintain glucose within target range  - Assess barriers to adequate nutritional intake and initiate nutrition consult as needed  - Instruct patient on self management of diabetes  Outcome: Progressing     Problem: Safety Risk - Non-Violent Restraints  Goal: Patient will remain free from self-harm  Description: INTERVENTIONS:  - Apply the least restrictive restraint to prevent harm  - Notify patient and family of reasons restraints applied  - Assess for any contributing factors to confusion (electrolyte disturbances, delirium, medications)  - Discontinue any unnecessary medical devices as soon as possible  - Assess the patient's physical comfort, circulation, skin condition, hydration, nutrition and elimination needs   - Reorient and redirection as needed  - Assess for the need to continue restraints  2/13/2024 0413 by Vi Smiley, RN  Outcome: Not Progressing  2/12/2024 2206 by Vi Smiley, RN  Outcome: Not Progressing     Problem: CARDIOVASCULAR - ADULT  Goal: Maintains optimal cardiac output and hemodynamic stability  Description: INTERVENTIONS:  - Monitor vital signs, rhythm, and trends  - Monitor for bleeding, hypotension and signs of decreased cardiac output  - Evaluate effectiveness of vasoactive medications to optimize hemodynamic stability  - Monitor arterial and/or venous puncture sites for bleeding and/or hematoma  - Assess quality of pulses, skin color and temperature  - Assess for signs of decreased coronary artery perfusion - ex. Angina  - Evaluate fluid balance, assess for edema, trend weights  Outcome: Progressing  Goal: Absence of cardiac arrhythmias or at baseline  Description: INTERVENTIONS:  - Continuous cardiac monitoring, monitor  vital signs, obtain 12 lead EKG if indicated  - Evaluate effectiveness of antiarrhythmic and heart rate control medications as ordered  - Initiate emergency measures for life threatening arrhythmias  - Monitor electrolytes and administer replacement therapy as ordered  Outcome: Progressing     Problem: CARDIOVASCULAR - ADULT  Goal: Maintains optimal cardiac output and hemodynamic stability  Description: INTERVENTIONS:  - Monitor vital signs, rhythm, and trends  - Monitor for bleeding, hypotension and signs of decreased cardiac output  - Evaluate effectiveness of vasoactive medications to optimize hemodynamic stability  - Monitor arterial and/or venous puncture sites for bleeding and/or hematoma  - Assess quality of pulses, skin color and temperature  - Assess for signs of decreased coronary artery perfusion - ex. Angina  - Evaluate fluid balance, assess for edema, trend weights  Outcome: Progressing  Goal: Absence of cardiac arrhythmias or at baseline  Description: INTERVENTIONS:  - Continuous cardiac monitoring, monitor vital signs, obtain 12 lead EKG if indicated  - Evaluate effectiveness of antiarrhythmic and heart rate control medications as ordered  - Initiate emergency measures for life threatening arrhythmias  - Monitor electrolytes and administer replacement therapy as ordered  Outcome: Progressing     Problem: RESPIRATORY - ADULT  Goal: Achieves optimal ventilation and oxygenation  Description: INTERVENTIONS:  - Assess for changes in respiratory status  - Assess for changes in mentation and behavior  - Position to facilitate oxygenation and minimize respiratory effort  - Oxygen supplementation based on oxygen saturation or ABGs  - Provide Smoking Cessation handout, if applicable  - Encourage broncho-pulmonary hygiene including cough, deep breathe, Incentive Spirometry  - Assess the need for suctioning and perform as needed  - Assess and instruct to report SOB or any respiratory difficulty  - Respiratory  Therapy support as indicated  - Manage/alleviate anxiety  - Monitor for signs/symptoms of CO2 retention  Outcome: Progressing     Problem: METABOLIC/FLUID AND ELECTROLYTES - ADULT  Goal: Glucose maintained within prescribed range  Description: INTERVENTIONS:  - Monitor Blood Glucose as ordered  - Assess for signs and symptoms of hyperglycemia and hypoglycemia  - Administer ordered medications to maintain glucose within target range  - Assess barriers to adequate nutritional intake and initiate nutrition consult as needed  - Instruct patient on self management of diabetes  Outcome: Progressing  Goal: Electrolytes maintained within normal limits  Description: INTERVENTIONS:  - Monitor labs and rhythm and assess patient for signs and symptoms of electrolyte imbalances  - Administer electrolyte replacement as ordered  - Monitor response to electrolyte replacements, including rhythm and repeat lab results as appropriate  - Fluid restriction as ordered  - Instruct patient on fluid and nutrition restrictions as appropriate  Outcome: Progressing  Goal: Hemodynamic stability and optimal renal function maintained  Description: INTERVENTIONS:  - Monitor labs and assess for signs and symptoms of volume excess or deficit  - Monitor intake, output and patient weight  - Monitor urine specific gravity, serum osmolarity and serum sodium as indicated or ordered  - Monitor response to interventions for patient's volume status, including labs, urine output, blood pressure (other measures as available)  - Encourage oral intake as appropriate  - Instruct patient on fluid and nutrition restrictions as appropriate  Outcome: Progressing

## 2024-02-13 NOTE — RESPIRATORY THERAPY NOTE
Patient received intubated and on ventilator. AC/VC 20/500/+5/30%. No weaning this shift; patient went to OR for debridement. Respiratory status stable and tolerating ventilator well, suctioned as needed, moderate tan thick secretions.

## 2024-02-13 NOTE — ANESTHESIA PREPROCEDURE EVALUATION
Anesthesia PreOp Note    HPI:     Ava Bowen is a 68 year old female who presents for preoperative consultation requested by: Kp Justice MD    Date of Surgery: 2/10/2024 - 2/13/2024    Procedure(s):  Debridement of right groin  Indication: Groin wound dehiscence    Relevant Problems   No relevant active problems       NPO:                         History Review:  Patient Active Problem List    Diagnosis Date Noted    Bypass graft mechanical complication ([facility]) 02/10/2024    Bypass graft mechanical complication, initial encounter ([facility]) 02/10/2024    Anemia, unspecified type 02/10/2024    Acute kidney injury superimposed on CKD  ([facility]) 02/10/2024    Opioid dependence, uncomplicated ([facility]) 07/28/2023    Depression, recurrent ([facility]) 07/28/2023    Stage 3 chronic kidney disease, unspecified whether stage 3a or 3b CKD ([facility]) 07/28/2023    Hypoglycemia 06/26/2023    Pyelonephritis 04/25/2023    Hypernatremia 04/25/2023    Hyperglycemia 04/25/2023    Ureteral calculi 04/25/2023    Urinary tract infection 03/23/2023    Acute cystitis without hematuria 03/23/2023    Ileitis 03/23/2023    COVID-19 03/23/2023    UTI (urinary tract infection) 03/23/2023    Hyperlipidemia 10/17/2022    DDD (degenerative disc disease), lumbar 05/20/2022    Failed back syndrome of lumbar spine 05/20/2022    Myalgia 03/17/2022    Post-operative pain     Type 2 diabetes mellitus with hyperglycemia, with long-term current use of insulin ([facility])     Primary hypertension     S/P laminectomy     Family history of glaucoma in sister 05/04/2021    Pre-op testing     PAD (peripheral artery disease) ([facility]) 07/08/2020    Diabetic ulcer of left midfoot associated with type 2 diabetes mellitus, with fat layer exposed ([facility]) 05/08/2020    PVD (peripheral vascular disease) ([facility]) 05/03/2020    Centrilobular emphysema ([facility]) 11/08/2019    Type 2 diabetes mellitus without retinopathy ([facility]) 10/15/2019    Age-related nuclear cataract of both eyes 10/15/2019     Glaucoma suspect of both eyes 10/15/2019    Back pain with radiation 2018    Anemia 2018    Azotemia 2018    Hypokalemia 2018    Chest pain 2007       Past Medical History:   Diagnosis Date    Anemia     Back pain     Back problem     Cataract     Chronic kidney disease (CKD)     COPD (chronic obstructive pulmonary disease) ([facility])     FEV 1 1.25 50%     Diabetes (HCC)     DM neuropathy    Essential hypertension     H/O angioplasty     2020    High blood pressure     High cholesterol     Neuropathy     Peripheral vascular disease (HCC)     PNA (pneumonia) 2018    Pulmonary emphysema ([facility])     Pulmonary nodule     4 mm RUL    Renal disorder     monitoring kidney labs    Sickle cell trait (HCC)     Sickle-cell anemia (HCC)     Tobacco abuse     Visual impairment        Past Surgical History:   Procedure Laterality Date    APPENDECTOMY      BACK SURGERY Right 2021    Right L3-4 extreme lateral interbody fusion, posterior decompression; LUMBAR LAMINECTOMY 1 LEVEL          x3    CHOLECYSTECTOMY      EXCIS LUMBAR DISK,ONE LEVEL          OTHER      bilateral leg stents  and        Medications Prior to Admission   Medication Sig Dispense Refill Last Dose    [START ON 2024] HYDROcodone-acetaminophen  MG Oral Tab Take 1 tablet by mouth every 4 (four) hours as needed for Pain (max 6/day). 180 tablet 0 Past Month    [START ON 3/15/2024] HYDROcodone-acetaminophen  MG Oral Tab Take 1 tablet by mouth every 4 (four) hours as needed for Pain (max 6/day). 180 tablet 0 Past Month    LANTUS SOLOSTAR 100 UNIT/ML Subcutaneous Solution Pen-injector Inject 6 Units into the skin See Admin Instructions. Every other day   Past Week    JANUVIA 25 MG Oral Tab Take 1 tablet (25 mg total) by mouth every morning.   Past Week    rosuvastatin 40 MG Oral Tab Take 1 tablet (40 mg total) by mouth nightly.   2/10/2024    glipiZIDE 10 MG Oral Tab Take 1 tablet (10 mg  total) by mouth 2 (two) times daily.   Past Week    D-5000 125 MCG (5000 UT) Oral Tab Take 1 tablet (5,000 Units total) by mouth daily.   Past Week    LISINOPRIL 40 MG Oral Tab TAKE 1 TABLET(40 MG) BY MOUTH DAILY (Patient taking differently: Take 1 tablet (40 mg total) by mouth every morning.) 90 tablet 1 Past Week    hydrALAZINE 100 MG Oral Tab Take 1 tablet (100 mg total) by mouth every 8 (eight) hours. 270 tablet 1 Past Week    Budesonide-Formoterol Fumarate 160-4.5 MCG/ACT Inhalation Aerosol Inhale 2 puffs into the lungs 2 (two) times daily. Rinse mouth with water, gargle, and spit to follow. 1 each 2 2/10/2024    ipratropium-albuterol 0.5-2.5 (3) MG/3ML Inhalation Solution Take 3 mL by nebulization every 6 (six) hours as needed (shortness of breath refractory to HFA). 100 each 0 2/10/2024    MYRBETRIQ 50 MG Oral Tablet 24 Hr Take 1 tablet (50 mg total) by mouth every morning.   Unknown    albuterol 108 (90 Base) MCG/ACT Inhalation Aero Soln Inhale 2 puffs into the lungs every 6 (six) hours as needed for Wheezing or Shortness of Breath. 8.5 each 2 More than a month    carvedilol 12.5 MG Oral Tab Take 2 tablets (25 mg total) by mouth daily. (Patient taking differently: Take 2 tablets (25 mg total) by mouth 2 (two) times daily with meals.) 180 tablet 1 More than a month    clopidogrel 75 MG Oral Tab Take 1 tablet (75 mg total) by mouth daily. (Patient taking differently: Take 1 tablet (75 mg total) by mouth every morning.) 90 tablet 1 More than a month    estradiol 0.1 MG/GM Vaginal Cream Apply a small amount to the perimeatal tissue every other day. 45 g 2 Unknown    amLODIPine 10 MG Oral Tab Take 1 tablet (10 mg total) by mouth daily. (Patient taking differently: Take 1 tablet (10 mg total) by mouth every morning.) 90 tablet 0 More than a month    aspirin 81 MG Oral Tab EC Take 1 tablet (81 mg total) by mouth every other day.   Unknown     Current Facility-Administered Medications Ordered in Epic   Medication  Dose Route Frequency Provider Last Rate Last Admin    sodium bicarbonate 150 mEq in dextrose 5% 1,000 mL infusion  150 mEq Intravenous Continuous Kelsea Gaston MD        insulin degludec 100 units/mL flextouch 30 Units  30 Units Subcutaneous Nightly Grecia Armando MD   30 Units at 02/12/24 2012    insulin regular human (Novolin R, Humulin R) 100 UNIT/ML injection 1-7 Units  1-7 Units Subcutaneous 4 times per day Grecia Armando MD   2 Units at 02/12/24 2345    sodium hypochlorite (Dakin's) 0.125 % external solution   Topical 2 times per day Lissa Lee MD   Given at 02/13/24 0555    hydrALAzine (Apresoline) 20 mg/mL injection 10 mg  10 mg Intravenous Q4H PRN Lissa Lee MD   10 mg at 02/13/24 0721    metoprolol (Lopressor) 5 mg/5mL injection 5 mg  5 mg Intravenous Q6H Jose Banegas MD   5 mg at 02/13/24 0551    amLODIPine (Norvasc) tab 10 mg  10 mg Per NG Tube QAM Lissa Lee MD   10 mg at 02/12/24 1620    hydrALAZINE (Apresoline) tab 100 mg  100 mg Per NG Tube Q8H Lissa Lee MD   100 mg at 02/13/24 0602    pantoprazole (Protonix) 40 mg in sodium chloride 0.9% PF 10 mL IV push  40 mg Intravenous Q24H Tom Perkins MD   40 mg at 02/13/24 0747    niCARdipine (carDENE) 125 mg in sodium chloride 0.9% 250 mL infusion  5-15 mg/hr Intravenous Continuous Kp Justice MD 10 mL/hr at 02/13/24 0703 5 mg/hr at 02/13/24 0703    vancomycin (Vancocin) 1,000 mg in sodium chloride 0.9% 250 mL IVPB-ADDV  12.5 mg/kg Intravenous Q24H Polo Cano  mL/hr at 02/13/24 0551 1,000 mg at 02/13/24 0551    insulin regular human (Novolin R, Humulin R) 100 Units in sodium chloride 0.9% 100 mL standard infusion (100 mL)  1-28 Units/hr Intravenous Continuous Lissa Lee MD   Stopped at 02/11/24 1711    ondansetron (Zofran) 4 MG/2ML injection 4 mg  4 mg Intravenous Q6H PRN Lissa Lee MD        metoclopramide (Reglan) 5 mg/mL injection 5 mg  5 mg Intravenous Q8H PRN  Lissa Lee MD        albuterol (Ventolin HFA) 108 (90 Base) MCG/ACT inhaler 2 puff  2 puff Inhalation Q6H PRN Lissa Lee MD        fluticasone furoate-vilanterol (Breo Ellipta) 200-25 MCG/ACT inhaler 1 puff  1 puff Inhalation Daily Lissa Lee MD        ipratropium-albuterol (Duoneb) 0.5-2.5 (3) MG/3ML inhalation solution 3 mL  3 mL Nebulization Q6H PRN Lissa Lee MD        [Held by provider] rosuvastatin (Crestor) tab 40 mg  40 mg Oral Nightly Lissa Lee MD        fentaNYL (Sublimaze) 50 mcg/mL injection 25 mcg  25 mcg Intravenous Q30 Min PRN Kp Justice MD        Or    fentaNYL (Sublimaze) 50 mcg/mL injection 50 mcg  50 mcg Intravenous Q30 Min PRN Kp Justice MD   50 mcg at 02/13/24 0820    acetaminophen (Tylenol) tab 650 mg  650 mg Oral Q4H PRN Kp Justice MD        Or    acetaminophen (Tylenol) 160 MG/5ML oral liquid 650 mg  650 mg Oral Q4H PRN Kp Justice MD        Or    acetaminophen (Tylenol) rectal suppository 650 mg  650 mg Rectal Q4H PRN Kp Justice MD        Or    acetaminophen (Ofirmev) 10 mg/mL infusion premix 1,000 mg  1,000 mg Intravenous Q6H PRN Kp Justice  mL/hr at 02/12/24 2340 1,000 mg at 02/12/24 2340    fentaNYL (Sublimaze) 25 mcg BOLUS FROM BAG infusion  25 mcg Intravenous Q30 Min PRN Kp Justice MD        Or    fentaNYL (Sublimaze) 50 mcg BOLUS FROM BAG infusion  50 mcg Intravenous Q30 Min PRN Kp Justice MD        fentaNYL in sodium chloride 0.9% (Sublimaze) 1000 mcg/100mL infusion premix   mcg/hr Intravenous Continuous PRN Kp Justice MD        polyethylene glycol (PEG 3350) (Miralax) 17 g oral packet 17 g  17 g Oral Daily PRN Kp Justice MD        senna (Senokot) 8.8 MG/5ML oral syrup 17.6 mg  10 mL Oral Nightly PRN Kp Justice MD        bisacodyl (Dulcolax) 10 MG rectal suppository 10 mg  10 mg Rectal Daily PRN Kp Justice MD        fleet enema (Fleet) 7-19 GM/118ML rectal enema  133 mL  1 enema Rectal Once PRN Kp Justice MD        chlorhexidine gluconate (Peridex) 0.12 % oral solution 15 mL  15 mL Mouth/Throat BID@0800,2000 Kp Justice MD   15 mL at 02/13/24 0747    enoxaparin (Lovenox) 40 MG/0.4ML SUBQ injection 40 mg  40 mg Subcutaneous Daily Kp Justice MD   40 mg at 02/12/24 0806    midazolam (Versed) 2 MG/2ML injection 2 mg  2 mg Intravenous Q5 Min PRN Kp Justice MD        dexmedeTOMIDine in sodium chloride 0.9% (Precedex) 400 mcg/100mL infusion premix  0.2-1.5 mcg/kg/hr Intravenous Continuous Kp Justice MD        propofol (Diprivan) 10 mg/mL infusion premix  5-50 mcg/kg/min (Dosing Weight) Intravenous Continuous Kp Justice MD 16.3 mL/hr at 02/13/24 0447 35 mcg/kg/min at 02/13/24 0447    glucose (Dex4) 15 GM/59ML oral liquid 15 g  15 g Oral Q15 Min PRN Polo Cano MD        Or    glucose (Glutose) 40% oral gel 15 g  15 g Oral Q15 Min PRN Polo Cano MD        Or    glucose-vitamin C (Dex-4) chewable tab 4 tablet  4 tablet Oral Q15 Min PRN Polo Cano MD        Or    dextrose 50% injection 50 mL  50 mL Intravenous Q15 Min PRN Polo Cano MD        Or    glucose (Dex4) 15 GM/59ML oral liquid 30 g  30 g Oral Q15 Min PRPolo Lilly MD        Or    glucose (Glutose) 40% oral gel 30 g  30 g Oral Q15 Min PRPolo Lilly MD        Or    glucose-vitamin C (Dex-4) chewable tab 8 tablet  8 tablet Oral Q15 Min PRN Polo Cano MD        meropenem (Merrem) 500 mg in sodium chloride 0.9% 100 mL IVPB-MBP  500 mg Intravenous q12h Polo Cano MD 33.3 mL/hr at 02/13/24 0056 500 mg at 02/13/24 0056     No current Epic-ordered outpatient medications on file.       Allergies   Allergen Reactions    Dilaudid [Hydromorphone] TONGUE SWELLING and ANGIOEDEMA    Penicillins HIVES and ANGIOEDEMA     Hives on eyelids (occurred when pt was in her 20s). Tolerated amoxicillin per chart. Tolerates cephalosporins.       Family History   Problem Relation  Age of Onset    Diabetes Mother         Passed from DM complications    Diabetes Sister         Had kidney transplant due to complications of DM    Kidney Disease Sister         Kidney failure secondary to diabetes; received kidney transplant in     Glaucoma Sister     Diabetes Brother     Sickle Cell Son         Cause of death    Macular degeneration Neg      Social History     Socioeconomic History    Marital status:    Tobacco Use    Smoking status: Former     Packs/day: 1.00     Years: 30.00     Additional pack years: 0.00     Total pack years: 30.00     Types: Cigarettes     Quit date: 2019     Years since quittin.2    Smokeless tobacco: Never   Vaping Use    Vaping Use: Never used   Substance and Sexual Activity    Alcohol use: Not Currently     Comment: socially    Drug use: No       Available pre-op labs reviewed.  Lab Results   Component Value Date    WBC 30.2 (H) 2024    RBC 3.21 (L) 2024    HGB 9.5 (L) 2024    HCT 26.0 (L) 2024    MCV 81.0 2024    MCH 29.6 2024    MCHC 36.5 2024    RDW 15.3 (H) 2024    .0 2024     Lab Results   Component Value Date     2024    K 3.5 2024     (H) 2024    CO2 16.0 (L) 2024    BUN 17 2024    CREATSERUM 1.05 (H) 2024     (H) 2024    PGLU 142 (H) 2024    CA 7.2 (L) 2024     Lab Results   Component Value Date    INR 1.51 (H) 02/10/2024       Vital Signs:  Body mass index is 31.43 kg/m².   height is 1.626 m (5' 4.02\") and weight is 83.1 kg (183 lb 3.2 oz). Her bladder temperature is 98.2 °F (36.8 °C). Her blood pressure is 144/61 and her pulse is 68. Her respiration is 26 and oxygen saturation is 92%.   Vitals:    24 0500 24 0600 24 0700 24 0800   BP: 147/57 128/57 153/63 144/61   Pulse: 77 65 70 68   Resp: 25 (!) 27 (!) 31 26   Temp: 98.8 °F (37.1 °C) 98.8 °F (37.1 °C) 98.2 °F (36.8 °C) 98.2 °F (36.8  °C)   TempSrc: Bladder Bladder Bladder Bladder   SpO2: 92% 93% 93% 92%   Weight: 83.1 kg (183 lb 3.2 oz)      Height:            Anesthesia Evaluation     Patient summary reviewed and Nursing notes reviewed    No history of anesthetic complications   Airway   Comment: Deferred - intubated  Dental      Comment: Deferred - intubated    Pulmonary    (+) COPD    ROS comment: Intubated after weekend procedure for RLE arterial bleed - remains intubated in setting of respiratory acidosis and possible aspiration. Will remain intubated post procedure  PE comment: Mechanical, intubated  Cardiovascular   (+) hypertension    ROS comment: Severe PAD  PE comment: Grossly normal over mechanical vent    Neuro/Psych - negative ROS     GI/Hepatic/Renal    (+) chronic renal disease CRI    Endo/Other    (+) diabetes mellitus poorly controlled    Comments: Acute on chronic anemia, sickle cell - most recent hgb 9.5  Abdominal                  Anesthesia Plan:   ASA:  4  Plan:   General  Airway:  ETT  Plan Comments: I have discussed the anesthetic plan, major risks and alternatives with the patient and answered all questions. The patient desires to proceed with surgery and anesthesia as planned.     Patient to remain intubated and return to ICU after procedure  Consent obtained over the phone from daughterTiffanie  Patient on propofol @ 35, Nicardipine @ 5  Vent settings  and FiO2 30%  Informed Consent Plan and Risks Discussed With:  Healthcare power of   Use of Blood Products Discussed With:  Healthcare power of   Blood Product Use Consented        I have informed Ava Bowen and/or legal guardian or family member of the nature of the anesthetic plan, benefits, risks including possible dental damage if relevant, major complications, and any alternative forms of anesthetic management.   All of the patient's questions were answered to the best of my ability. The patient desires the anesthetic management as  planned.  Sadi Burt MD  2/13/2024 9:08 AM  Present on Admission:  **None**

## 2024-02-13 NOTE — PROGRESS NOTES
Patient remains intubated  Lactic acid normalized   Not hypotensive     Groin incision needs debridement today   Right calf not tense   No mottling of foot     To OR for additional debridement

## 2024-02-13 NOTE — PROGRESS NOTES
[facility] Hospitalist Progress Note     CC: Hospital Follow up    PCP: Ernesto De Dios       Assessment/Plan:     Principal Problem:    Bypass graft mechanical complication, initial encounter ([facility])  Active Problems:    Hyperglycemia    Bypass graft mechanical complication ([facility])    Anemia, unspecified type    Acute kidney injury superimposed on CKD  ([facility])    Ms. Bowen is a 68 year old female with PMH sig for CKD, COPD, DM, HTN, HLD, PVD, and sickle cell anemia who was recently admitted last week for right lower extremity femoral to popliteal bypass who presented with bleeding from surgical site. Taken for emergent OR on 2/10/24, noted to be grossly infected, started on vancomycin/meropenem. Bcx with klebsiella. BG elevated on admit, started on insulin gtt, endo consulted, now off insulin gtt. Kept intubated post op, pulm following.      Hemorrhage from recent surgical site  PAD  - s/p recent fem pop bypass 1/24/24 with Dr. Murrieta  - has been on ASA/plavix- held on admit  - Hg 6.0, s/p 3 units pRBC 2/10  - vascular surgery consulted, s/p emergent OR 2/10, removal of infected graft, s/p debridement, bone patch angioplasty   - plan for debridement 2/13/24    Infected surgical site, bacteremia  - continue IV vancomycin and meropenem, ID consult  - Bcx with klebsiella, repeat Bcx ordered  - will need further debridement per vascular surgery     Metabolic acidosis- improving  - likely 2/2 LA from blood loss  - pH 7.24 on admit, LA 7.6 on admit, now normalized  - likely multifactorial, 2/2 LA, blood loss, ?DKA, CKD  - renal consulted, started on bicarb gtt    Acute blood loss anemia  Chronic anemia  Sickle cell anemia  - outpatient f/u  - Hg 8.9 on discharge last week  - Hg 6.0 on admission, s/p 3 units pRBC  - monitor    ?DKA   DM2, hyperglycemia  - hold PO medications  - BG 500s in ED, pH 7.2  - insulin gtt ordered in ED  - endocrine consulted    Respiratory failure  - remained intubated post op  - s/p large emesis this AM  while intubated  - pulm following  - vent weaning per pulm     Hypotension  Hx HTN  - previously with uncontrolled HTN on prior admit  - BP low likely 2/2 hemorrhage  - BP labile, A-line  - initially on levophed, now on nicardipene gtt   - restart home meds when off NGT to LIS  - cardiology consult     COPD  - chronic, stable  -resume inhaler     OMEGA on CKD3  - cr 1.7 on admit, previously 1.2 on discharge last week  - monitor Cr and UOP    ACP  - CODE- FULL   - POA- daughter and son, updated daughter Tiffanie 2/12     FN:  - IVF: NS  - Diet: NPO     DVT Prophy:   Lines: PIV     Dispo: ICU     Outpatient records or previous hospital records reviewed.      Further recommendations pending patient's clinical course.  [facility] hospitalist to continue to follow patient while in house     Patient and/or patient's family given opportunity to ask questions and note understanding and agreeing with therapeutic plan as outlined     Lissa Lee MD  [facility] Hospitalist  Answering Service number: [phone]     Subjective:     No events overnight, was weaned off nicardipene gtt overnight but back on this AM.     OBJECTIVE:    Blood pressure 144/61, pulse 68, temperature 98.2 °F (36.8 °C), temperature source Bladder, resp. rate 26, height 5' 4.02\" (1.626 m), weight 183 lb 3.2 oz (83.1 kg), SpO2 92%, not currently breastfeeding.    Temp:  [98.2 °F (36.8 °C)-100.4 °F (38 °C)] 98.2 °F (36.8 °C)  Pulse:  [65-79] 68  Resp:  [25-31] 26  BP: (123-153)/(53-65) 144/61  SpO2:  [91 %-94 %] 92 %  AO: (117-168)/(47-75) 150/53  FiO2 (%):  [30 %] 30 %      Intake/Output:    Intake/Output Summary (Last 24 hours) at 2/13/2024 0922  Last data filed at 2/13/2024 0556  Gross per 24 hour   Intake 3947.4 ml   Output 1125 ml   Net 2822.4 ml       Last 3 Weights   02/13/24 0500 183 lb 3.2 oz (83.1 kg)   02/12/24 0400 180 lb 5.4 oz (81.8 kg)   02/11/24 0600 182 lb 5.1 oz (82.7 kg)   02/10/24 2142 171 lb 1.2 oz (77.6 kg)   02/10/24 2105 171 lb 1.2 oz (77.6 kg)    02/10/24 1642 164 lb 3.9 oz (74.5 kg)   01/27/24 0501 164 lb 3.2 oz (74.5 kg)   01/24/24 0549 158 lb (71.7 kg)   01/19/24 1112 160 lb (72.6 kg)   12/30/23 1216 155 lb (70.3 kg)   08/29/23 1357 153 lb (69.4 kg)     Exam   GEN: female, intubated, sedated  HEENT: ETT  Pulm: mechanical breath soudns  CV: RRR, no murmurs  ABD: Soft, non-tender, non-distended, +BS  SKIN: RLE warm to touch except R foot cool  EXT: no edema    Data Review:       Labs:     Recent Labs   Lab 02/12/24  1623 02/12/24 2353 02/13/24  0802   RBC 3.20* 3.03* 3.21*   HGB 9.5* 9.0* 9.5*   HCT 26.4* 24.3* 26.0*   MCV 82.5 80.2 81.0   MCH 29.7 29.7 29.6   MCHC 36.0 37.0 36.5   RDW 15.0 15.1* 15.3*   NEPRELIM 24.94* 23.28* 25.95*   WBC 28.2* 26.3* 30.2*   .0 262.0 283.0         Recent Labs   Lab 02/12/24  1805 02/12/24 2353 02/13/24  0659   * 187* 113*   BUN 17 18 17   CREATSERUM 1.26* 1.19* 1.05*   EGFRCR 47* 50* 59*   CA 7.2* 7.2* 7.2*    141 142   K 4.0 3.7 3.5   * 117* 116*   CO2 15.0* 15.0* 16.0*       Recent Labs   Lab 02/10/24  2151 02/11/24  2053 02/12/24  0551   ALT 38  --   --    AST 91*  --   --    ALB 2.3* 2.3* 2.3*         Imaging:  XR CHEST AP PORTABLE  (CPT=71045)    Result Date: 2/11/2024  CONCLUSION:   Interval placement of enteric tube with the tip in the region the gastric body.  Mild left basilar linear atelectasis.      Dictated by (CST): Krunal Tay MD on 2/11/2024 at 9:19 AM     Finalized by (CST): Krunal Tay MD on 2/11/2024 at 9:21 AM          XR CHEST AP PORTABLE  (CPT=71045)    Result Date: 2/11/2024  CONCLUSION: ETT 1.7 cm above the frida.  Right IJ catheter in the upper SVC.  No pneumothorax  [facility] provided a prelim report for this exam. This final report has no significant discrepancies with the [facility] report. .    Dictated by (CST): Ronak Leonard MD on 2/11/2024 at 6:34 AM     Finalized by (CST): Ronak Leonard MD on 2/11/2024 at 6:37 AM             Meds:      insulin  degludec  30 Units Subcutaneous Nightly    insulin regular human  1-7 Units Subcutaneous 4 times per day    sodium hypochlorite   Topical 2 times per day    metoprolol  5 mg Intravenous Q6H    amLODIPine  10 mg Per NG Tube QAM    hydrALAZINE  100 mg Per NG Tube Q8H    pantoprazole  40 mg Intravenous Q24H    vancomycin  12.5 mg/kg Intravenous Q24H    fluticasone furoate-vilanterol  1 puff Inhalation Daily    [Held by provider] rosuvastatin  40 mg Oral Nightly    chlorhexidine gluconate  15 mL Mouth/Throat BID@0800,2000    enoxaparin  40 mg Subcutaneous Daily    meropenem  500 mg Intravenous q12h      sodium bicarbonate in D5W infusion      niCARdipine 5 mg/hr (02/13/24 0703)    insulin regular Stopped (02/11/24 1711)    fentanyl      dexmedetomidine      propofol 35 mcg/kg/min (02/13/24 0447)     hydrALAzine, ondansetron, metoclopramide, albuterol, ipratropium-albuterol, fentaNYL **OR** fentaNYL, acetaminophen **OR** acetaminophen **OR** acetaminophen **OR** acetaminophen, fentaNYL (Sublimaze) **OR** fentaNYL (Sublimaze), fentanyl, polyethylene glycol (PEG 3350), senna, bisacodyl, fleet enema, midazolam, glucose **OR** glucose **OR** glucose-vitamin C **OR** dextrose **OR** glucose **OR** glucose **OR** glucose-vitamin C

## 2024-02-14 ENCOUNTER — APPOINTMENT (OUTPATIENT)
Dept: GENERAL RADIOLOGY | Facility: HOSPITAL | Age: 69
End: 2024-02-14
Attending: INTERNAL MEDICINE
Payer: MEDICARE

## 2024-02-14 NOTE — PROGRESS NOTES
02/14/24 0323   Vent Information   $ RT Standby Charge (per 15 min) 1   $ Vent Care / Non-Invasive Subsequent Day Yes   Is this patient on Chronic Ventilation? No   Vent Initiation Date 02/10/24   Ventilation Day(s) 5   Interface Invasive   Vent Type AV   Vent plugged into main power? Yes   Vent ID [id number]   Vent Mode VC/AC   Settings   FiO2 (%) 50 %   Resp Rate (Set) 20   Vt (Set, mL) 500 mL   Waveform Decelerating ramp   PEEP/CPAP (cm H2O) 5 cm H20   Peak Flow LPM 60   Trigger Sensitivity Flow (L/min) 1 L/min   Humidification Heater   H2O Bag Level 1/4 Full   Heater Temperature 98.6 °F (37 °C)   Readings   Total RR 20   Minute Ventilation (L/min) 9.6 L/min   Expiratory Tidal Volume 490 mL   PIP Observed (cm H2O) 23 cm H2O   MAP (cm H2O) 10   I/E Ratio 1:3.4   Plateau Pressure (cm H2O) 18 cm H2O   Alarms   High RR 40   Insp Pressure High (cm H2O) 40 cm H2O   Insp Pressure Low (cm H2O) 8 cm H2O   MV High (L/min) 20 L/min   MV Low (L/min) 3 L/min   Apnea Interval (sec) 20 seconds   ETT   Placement Date/Time: 02/10/24 (c) 7082   Airway Size: 7 mm  Cuffed: Cuffed  Insertion attempts: 1  Technique: Direct laryngoscopy  Placement Verification: Capnometry  Placed By: Anesthesiologist   Secured at (cm) 23 cm   Suctioned? N   Measured From Lips   Secured Location Left   Secured by Commercial tube chong   Site Condition Atraumatic   Site changed Rotated   Req'd equipment at bedside Bag mask     Patient remains intubated on above settings overnight. No issues this shift and small amount of secretions suctioned.

## 2024-02-14 NOTE — PROGRESS NOTES
[facility] Pharmacy Dosing Service      Follow Up Pharmacokinetic Consult for Vancomycin Dosing     Ava Bowen is a 68 year old female who is receiving vancomycin therapy for  infected arterial graft . Patient is on day 5 of vancomycin and is currently receiving 1000 mg IV every 24 hours. The current treatment and monitoring approach is non-AUC strategy.        Weight and Temperature:    Wt Readings from Last 1 Encounters:   24 84.3 kg (185 lb 13.6 oz)         Temp Readings from Last 1 Encounters:   24 99.5 °F (37.5 °C) (Bladder)      Labs:   Recent Labs   Lab 24  1751 24  2325 24  0517   CREATSERUM 1.03* 0.97 0.97      Estimated Creatinine Clearance: 49.3 mL/min (based on SCr of 0.97 mg/dL).     Recent Labs   Lab 24  0802 24  1559 24  2325   WBC 30.2* 28.3* 28.3*        Vancomycin Levels:  Lab Results   Component Value Date/Time    VANCR 11.1 2024 05:43 AM    VANCT 16.8 2024 05:17 AM       Corresponding 24 h-AUC: N/A     The Pharmacokinetic Target is:    Trough/random 10-15 mg/L    Renal Dosing Considerations:    None     Assessment/Plan:   Maintenance Regimen: Adjust vancomycin to 1250 mg IV every 36 hours.  The predicted AUC and trough with this new regimen are 406 mg-h/L and 12.7 mg/L, respectively    Monitorin) Plan for vancomycin trough to be obtained prior to 3rd dose    2) Pharmacy will order SCr as clinically indicated to assess renal function.    3) Pharmacy will monitor for toxicity and efficacy, adjust vancomycin dose and/or frequency, and order vancomycin levels as appropriate per the Pharmacy and Therapeutics Committee approved protocol until discontinuation of the medication.       We appreciate the opportunity to assist in the care of this patient.     Sky Brown, PharmD  2024  6:23 AM  [facility]acy Extension: [phone]

## 2024-02-14 NOTE — PROGRESS NOTES
Vascular surgery progress note    Patient seen and examined.  Remains intubated and sedated.  On examination no erythema, induration, fluctuance or drainage.  Wound VAC is secure with reasonable amount of serous drainage.  Her feet are reasonably perfused with no skin changes.  She is off pressors and on minimal vent settings.  Her labs appear to have stabilized.  Would recommend weaning to extubate.  She should remain bedrest after  extubation.  Will continue broad-spectrum antibiotics.  Plan is to go to to the operating room on Friday for washout and repeat wound VAC placement.  Will continue to monitor her closely.

## 2024-02-14 NOTE — PLAN OF CARE
Problem: Diabetes/Glucose Control  Goal: Glucose maintained within prescribed range  Description: INTERVENTIONS:  - Monitor Blood Glucose as ordered  - Assess for signs and symptoms of hyperglycemia and hypoglycemia  - Administer ordered medications to maintain glucose within target range  - Assess barriers to adequate nutritional intake and initiate nutrition consult as needed  - Instruct patient on self management of diabetes  Outcome: Progressing     Problem: Safety Risk - Non-Violent Restraints  Goal: Patient will remain free from self-harm  Description: INTERVENTIONS:  - Apply the least restrictive restraint to prevent harm  - Notify patient and family of reasons restraints applied  - Assess for any contributing factors to confusion (electrolyte disturbances, delirium, medications)  - Discontinue any unnecessary medical devices as soon as possible  - Assess the patient's physical comfort, circulation, skin condition, hydration, nutrition and elimination needs   - Reorient and redirection as needed  - Assess for the need to continue restraints  2/14/2024 0431 by Vi Smiley, RN  Outcome: Not Progressing  2/13/2024 2111 by Vi Smiley, RN  Outcome: Not Progressing     Problem: CARDIOVASCULAR - ADULT  Goal: Maintains optimal cardiac output and hemodynamic stability  Description: INTERVENTIONS:  - Monitor vital signs, rhythm, and trends  - Monitor for bleeding, hypotension and signs of decreased cardiac output  - Evaluate effectiveness of vasoactive medications to optimize hemodynamic stability  - Monitor arterial and/or venous puncture sites for bleeding and/or hematoma  - Assess quality of pulses, skin color and temperature  - Assess for signs of decreased coronary artery perfusion - ex. Angina  - Evaluate fluid balance, assess for edema, trend weights  Outcome: Progressing  Goal: Absence of cardiac arrhythmias or at baseline  Description: INTERVENTIONS:  - Continuous cardiac monitoring, monitor  vital signs, obtain 12 lead EKG if indicated  - Evaluate effectiveness of antiarrhythmic and heart rate control medications as ordered  - Initiate emergency measures for life threatening arrhythmias  - Monitor electrolytes and administer replacement therapy as ordered  Outcome: Progressing     Problem: RESPIRATORY - ADULT  Goal: Achieves optimal ventilation and oxygenation  Description: INTERVENTIONS:  - Assess for changes in respiratory status  - Assess for changes in mentation and behavior  - Position to facilitate oxygenation and minimize respiratory effort  - Oxygen supplementation based on oxygen saturation or ABGs  - Provide Smoking Cessation handout, if applicable  - Encourage broncho-pulmonary hygiene including cough, deep breathe, Incentive Spirometry  - Assess the need for suctioning and perform as needed  - Assess and instruct to report SOB or any respiratory difficulty  - Respiratory Therapy support as indicated  - Manage/alleviate anxiety  - Monitor for signs/symptoms of CO2 retention  Outcome: Progressing     Problem: METABOLIC/FLUID AND ELECTROLYTES - ADULT  Goal: Glucose maintained within prescribed range  Description: INTERVENTIONS:  - Monitor Blood Glucose as ordered  - Assess for signs and symptoms of hyperglycemia and hypoglycemia  - Administer ordered medications to maintain glucose within target range  - Assess barriers to adequate nutritional intake and initiate nutrition consult as needed  - Instruct patient on self management of diabetes  Outcome: Progressing  Goal: Electrolytes maintained within normal limits  Description: INTERVENTIONS:  - Monitor labs and rhythm and assess patient for signs and symptoms of electrolyte imbalances  - Administer electrolyte replacement as ordered  - Monitor response to electrolyte replacements, including rhythm and repeat lab results as appropriate  - Fluid restriction as ordered  - Instruct patient on fluid and nutrition restrictions as  appropriate  Outcome: Progressing  Goal: Hemodynamic stability and optimal renal function maintained  Description: INTERVENTIONS:  - Monitor labs and assess for signs and symptoms of volume excess or deficit  - Monitor intake, output and patient weight  - Monitor urine specific gravity, serum osmolarity and serum sodium as indicated or ordered  - Monitor response to interventions for patient's volume status, including labs, urine output, blood pressure (other measures as available)  - Encourage oral intake as appropriate  - Instruct patient on fluid and nutrition restrictions as appropriate  Outcome: Progressing

## 2024-02-14 NOTE — RESPIRATORY THERAPY NOTE
Patient remains inubated on full support with the following settings AC 20/500/70%/+5. Bilateral BS heard on auscultation. Suction provided as needed.

## 2024-02-14 NOTE — PLAN OF CARE
Pt went for second debridement of right groin/leg this morning. Returned with 2 wound vacs. Remains sedated on propofol with PRN fentanyl pushes for pain. Weaned off cardene gtt this afternoon. Q6h BMP. ABG drawn this afternoon and improved. Remains on bicarb gtt. 2 low blood sugars this afternoon. 2 amps D5 given. Endocrine notified and night insulin put on hold. Pascual and flexi in place. Wrist restraints in place for safety Daughter at bedside and updated on plan of care.     Problem: METABOLIC/FLUID AND ELECTROLYTES - ADULT  Goal: Hemodynamic stability and optimal renal function maintained  Description: INTERVENTIONS:  - Monitor labs and assess for signs and symptoms of volume excess or deficit  - Monitor intake, output and patient weight  - Monitor urine specific gravity, serum osmolarity and serum sodium as indicated or ordered  - Monitor response to interventions for patient's volume status, including labs, urine output, blood pressure (other measures as available)  - Encourage oral intake as appropriate  - Instruct patient on fluid and nutrition restrictions as appropriate  Outcome: Progressing     Problem: Diabetes/Glucose Control  Goal: Glucose maintained within prescribed range  Description: INTERVENTIONS:  - Monitor Blood Glucose as ordered  - Assess for signs and symptoms of hyperglycemia and hypoglycemia  - Administer ordered medications to maintain glucose within target range  - Assess barriers to adequate nutritional intake and initiate nutrition consult as needed  - Instruct patient on self management of diabetes  Outcome: Not Progressing     Problem: Safety Risk - Non-Violent Restraints  Goal: Patient will remain free from self-harm  Description: INTERVENTIONS:  - Apply the least restrictive restraint to prevent harm  - Notify patient and family of reasons restraints applied  - Assess for any contributing factors to confusion (electrolyte disturbances, delirium, medications)  - Discontinue any  unnecessary medical devices as soon as possible  - Assess the patient's physical comfort, circulation, skin condition, hydration, nutrition and elimination needs   - Reorient and redirection as needed  - Assess for the need to continue restraints  Outcome: Not Progressing     Problem: CARDIOVASCULAR - ADULT  Goal: Maintains optimal cardiac output and hemodynamic stability  Description: INTERVENTIONS:  - Monitor vital signs, rhythm, and trends  - Monitor for bleeding, hypotension and signs of decreased cardiac output  - Evaluate effectiveness of vasoactive medications to optimize hemodynamic stability  - Monitor arterial and/or venous puncture sites for bleeding and/or hematoma  - Assess quality of pulses, skin color and temperature  - Assess for signs of decreased coronary artery perfusion - ex. Angina  - Evaluate fluid balance, assess for edema, trend weights  Outcome: Not Progressing  Goal: Absence of cardiac arrhythmias or at baseline  Description: INTERVENTIONS:  - Continuous cardiac monitoring, monitor vital signs, obtain 12 lead EKG if indicated  - Evaluate effectiveness of antiarrhythmic and heart rate control medications as ordered  - Initiate emergency measures for life threatening arrhythmias  - Monitor electrolytes and administer replacement therapy as ordered  Outcome: Not Progressing     Problem: RESPIRATORY - ADULT  Goal: Achieves optimal ventilation and oxygenation  Description: INTERVENTIONS:  - Assess for changes in respiratory status  - Assess for changes in mentation and behavior  - Position to facilitate oxygenation and minimize respiratory effort  - Oxygen supplementation based on oxygen saturation or ABGs  - Provide Smoking Cessation handout, if applicable  - Encourage broncho-pulmonary hygiene including cough, deep breathe, Incentive Spirometry  - Assess the need for suctioning and perform as needed  - Assess and instruct to report SOB or any respiratory difficulty  - Respiratory Therapy  support as indicated  - Manage/alleviate anxiety  - Monitor for signs/symptoms of CO2 retention  Outcome: Not Progressing     Problem: METABOLIC/FLUID AND ELECTROLYTES - ADULT  Goal: Glucose maintained within prescribed range  Description: INTERVENTIONS:  - Monitor Blood Glucose as ordered  - Assess for signs and symptoms of hyperglycemia and hypoglycemia  - Administer ordered medications to maintain glucose within target range  - Assess barriers to adequate nutritional intake and initiate nutrition consult as needed  - Instruct patient on self management of diabetes  Outcome: Not Progressing  Goal: Electrolytes maintained within normal limits  Description: INTERVENTIONS:  - Monitor labs and rhythm and assess patient for signs and symptoms of electrolyte imbalances  - Administer electrolyte replacement as ordered  - Monitor response to electrolyte replacements, including rhythm and repeat lab results as appropriate  - Fluid restriction as ordered  - Instruct patient on fluid and nutrition restrictions as appropriate  Outcome: Not Progressing

## 2024-02-14 NOTE — PROGRESS NOTES
[facility] Hospitalist Progress Note     CC: Hospital Follow up    PCP: Ernesto De Dios       Assessment/Plan:     Principal Problem:    Bypass graft mechanical complication, initial encounter ([facility])  Active Problems:    Hyperglycemia    Bypass graft mechanical complication (HCC)    Anemia, unspecified type    Acute kidney injury superimposed on CKD  ([facility])    Ms. Bowen is a 68 year old female with PMH sig for CKD, COPD, DM, HTN, HLD, PVD, and sickle cell anemia who was recently admitted last week for right lower extremity femoral to popliteal bypass who presented with bleeding from surgical site. Taken for emergent OR on 2/10/24, noted to be grossly infected, started on vancomycin/meropenem. Bcx with klebsiella. BG elevated on admit, started on insulin gtt, endo consulted, now off insulin gtt. Kept intubated post op, pulm following.        PAD sp RLE fem-pop bypass last week c/b bleeding fm surg site fm wound dehiscence  - s/p recent fem pop bypass 1/24/24 with Dr. Murrieta  - has been on ASA/plavix- held on admit  - Hg 6.0, s/p 3 units pRBC 2/10  - vascular surgery consulted, s/p emergent OR 2/10, removal of infected graft, s/p debridement, bone patch angioplasty   - sp further debridement 2/13: \"Sharp and versajet debridement of skin subcutaneous fat of right abdominal wall groin and thigh [id number] cm Sharp debridement of skin and subcataneous fat of popliteal incision 10x4 cm Removal of infected graft Wound vac placement\"    Infected surgical site, bacteremia  - continue IV vancomycin and meropenem, ID consult  - Bcx with klebsiella, repeat Bcx ordered  - 2/10 wound x w bacteroides/ provatella f/u results repeat sent fm 2/13 debridement- abx adjustment per ID     Metabolic acidosis- improving  - likely 2/2 LA from blood loss +/- dka on admit  - pH 7.24 on admit, LA 7.6 on admit, now normalized  - likely multifactorial, 2/2 LA, blood loss, ?DKA, CKD  - renal consulted, started on bicarb gtt > now improved     Acute blood  loss anemia  Chronic anemia  Sickle cell anemia  - outpatient f/u  - Hg 8.9 on discharge last week  - Hg 6.0 on admission, s/p 3 units pRBC  - monitor    ?DKA   DM2, hyperglycemia  - hold PO medications  - BG 500s in ED, pH 7.2  - insulin gtt ordered in ED  - endocrine consulted > holding tresibe a noted hypoglycemic- correction on reg insulin correction dosing    Respiratory failure  - remained intubated post op both fm aspiration event and metabolic acidosis  - pulm following  - vent weaning per pulm- daily SBT, minimizing sedation as able     Hypovolemic / septic Shock/ Hypotension  Hx HTN  - previously with uncontrolled HTN on prior admit  - BP low likely 2/2 hemorrhage  - BP labile, A-line  - initially on levophed, now on nicardipene gtt   - restart home meds when off NGT to LIS  - cardiology consult     COPD  - chronic, stable  -resume inhaler     OMEGA on CKD3  - cr 1.7 on admit, previously 1.2 on discharge last week  - monitor Cr and UOP- improved     ACP  - CODE- FULL   - POA- daughter and son     FN:  - IVF: NS  - Diet: NPO     DVT Prophy:   Lines: PIV     Dispo: ICU     Guille Jain Hospitalist      Subjective:     Hospital course reviewed, back to OR 2/13 for further debridement- sp   \"Sharp and versajet debridement of skin subcutaneous fat of right abdominal wall groin and thigh [id number] cm  Sharp debridement of skin and subcataneous fat of popliteal incision 10x4 cm  Removal of infected graft  Wound vac placement\"  Per DR Justice's op noted debrided to healthy bleeding tissues, Muscle flap viable and secure  Gastrocnemius muscle viable and responded to bovie   Left 1 cm cuff of graft - back bleeding from popliteal artery     Ongoing temps, remains intubated/ sedated        OBJECTIVE:    Blood pressure 148/62, pulse 78, temperature 99.7 °F (37.6 °C), temperature source Bladder, resp. rate 23, height 5' 4.02\" (1.626 m), weight 185 lb 13.6 oz (84.3 kg), SpO2 98%, not currently breastfeeding.    Temp:  [95.9  °F (35.5 °C)-100.8 °F (38.2 °C)] 99.7 °F (37.6 °C)  Pulse:  [55-83] 78  Resp:  [14-25] 23  BP: (120-171)/(51-70) 148/62  SpO2:  [97 %-99 %] 98 %  AO: (120-164)/() 140/50  FiO2 (%):  [30 %-100 %] 50 %      Intake/Output:    Intake/Output Summary (Last 24 hours) at 2/14/2024 0813  Last data filed at 2/14/2024 0522  Gross per 24 hour   Intake 2871 ml   Output 1875 ml   Net 996 ml       Last 3 Weights   02/14/24 0500 185 lb 13.6 oz (84.3 kg)   02/13/24 0500 183 lb 3.2 oz (83.1 kg)   02/12/24 0400 180 lb 5.4 oz (81.8 kg)   02/11/24 0600 182 lb 5.1 oz (82.7 kg)   02/10/24 2142 171 lb 1.2 oz (77.6 kg)   02/10/24 2105 171 lb 1.2 oz (77.6 kg)   02/10/24 1642 164 lb 3.9 oz (74.5 kg)   01/27/24 0501 164 lb 3.2 oz (74.5 kg)   01/24/24 0549 158 lb (71.7 kg)   01/19/24 1112 160 lb (72.6 kg)   12/30/23 1216 155 lb (70.3 kg)   08/29/23 1357 153 lb (69.4 kg)     Exam   GEN: female, intubated, sedated  HEENT: ETT  Pulm: mechanical breath soudns  CV: RRR, no murmurs  ABD: Soft, non-tender, non-distended, +BS  SKIN: RLE warm to touch except R foot cool  EXT: no edema    Data Review:       Labs:     Recent Labs   Lab 02/13/24  0802 02/13/24  1559 02/13/24  2325   RBC 3.21* 2.91* 2.83*   HGB 9.5* 8.3* 7.9*   HCT 26.0* 24.0* 23.4*   MCV 81.0 82.5 82.7   MCH 29.6 28.5 27.9   MCHC 36.5 34.6 33.8   RDW 15.3* 15.5* 15.6*   NEPRELIM 25.95* 24.79* 25.30*   WBC 30.2* 28.3* 28.3*   .0 266.0 281.0         Recent Labs   Lab 02/13/24  1751 02/13/24 2325 02/14/24  0517   * 93 86   BUN 17 16 16   CREATSERUM 1.03* 0.97 0.97   EGFRCR 60 64 64   CA 7.1* 7.1* 7.3*    142 144   K 3.3* 3.7 3.8   * 115* 114*   CO2 20.0* 20.0* 22.0       Recent Labs   Lab 02/10/24  2151 02/11/24  2053 02/12/24  0551   ALT 38  --   --    AST 91*  --   --    ALB 2.3* 2.3* 2.3*         Imaging:  XR CHEST AP PORTABLE  (CPT=71045)    Result Date: 2/11/2024  CONCLUSION:   Interval placement of enteric tube with the tip in the region the gastric  body.  Mild left basilar linear atelectasis.      Dictated by (CST): Krunal Tay MD on 2/11/2024 at 9:19 AM     Finalized by (CST): Krunal Tay MD on 2/11/2024 at 9:21 AM             Meds:      vancomycin  1,250 mg Intravenous Q36H    furosemide  40 mg Intravenous Once    carvedilol  12.5 mg Per NG Tube BID    insulin regular human  1-7 Units Subcutaneous 4 times per day    sodium hypochlorite   Topical 2 times per day    amLODIPine  10 mg Per NG Tube QAM    hydrALAZINE  100 mg Per NG Tube Q8H    pantoprazole  40 mg Intravenous Q24H    fluticasone furoate-vilanterol  1 puff Inhalation Daily    [Held by provider] rosuvastatin  40 mg Oral Nightly    chlorhexidine gluconate  15 mL Mouth/Throat BID@0800,2000    enoxaparin  40 mg Subcutaneous Daily    meropenem  500 mg Intravenous q12h      niCARdipine Stopped (02/13/24 1430)    insulin regular Stopped (02/11/24 1711)    fentanyl      dexmedetomidine      propofol 25 mcg/kg/min (02/14/24 0118)     hydrALAzine, ondansetron, metoclopramide, albuterol, ipratropium-albuterol, fentaNYL **OR** fentaNYL, acetaminophen **OR** acetaminophen **OR** acetaminophen **OR** acetaminophen, fentaNYL (Sublimaze) **OR** fentaNYL (Sublimaze), fentanyl, polyethylene glycol (PEG 3350), senna, bisacodyl, fleet enema, midazolam, glucose **OR** glucose **OR** glucose-vitamin C **OR** dextrose **OR** glucose **OR** glucose **OR** glucose-vitamin C

## 2024-02-14 NOTE — PROGRESS NOTES
[facility]    Progress Note    Ava Bowen Patient Status:  Inpatient    10/6/1955 MRN [id number]   Location [facility] 2W/SW Attending Lissa Lee MD   Hosp Day # 4 PCP Ernesto De Dios     Date of Admission:  2/10/2024  Date of Consult:  24  Reason for Consultation:   DKA Management in a patient with type 2 diabetes    Patient became hypoglycemic today    History of Present Illness:   Patient is a 68 year old female who was admitted to the hospital for Bypass graft mechanical complication, initial encounter (HCC):  This is a 68 year-old woman admitted after presenting to the ER after having undergone right femoral artery to below-knee popliteal artery. She was found to have elevated blood sugars and started on insulin drip.    Past Medical History  Past Medical History:   Diagnosis Date    Anemia     Back pain     Back problem     Cataract     Chronic kidney disease (CKD)     COPD (chronic obstructive pulmonary disease) (HCC)     FEV 1 1.25 50%     Diabetes (HCC)     DM neuropathy    Essential hypertension     H/O angioplasty     2020    High blood pressure     High cholesterol     Neuropathy     Peripheral vascular disease (HCC)     PNA (pneumonia) 2018    Pulmonary emphysema (HCC)     Pulmonary nodule     4 mm RUL    Renal disorder     monitoring kidney labs    Sickle cell trait (HCC)     Sickle-cell anemia (HCC)     Tobacco abuse     Visual impairment        Past Surgical History  Past Surgical History:   Procedure Laterality Date    APPENDECTOMY      BACK SURGERY Right 2021    Right L3-4 extreme lateral interbody fusion, posterior decompression; LUMBAR LAMINECTOMY 1 LEVEL          x3    CHOLECYSTECTOMY      EXCIS LUMBAR DISK,ONE LEVEL          OTHER      bilateral leg stents  and        Family History  Family History   Problem Relation Age of Onset    Diabetes Mother         Passed from DM complications    Diabetes Sister          Had kidney transplant due to complications of DM    Kidney Disease Sister         Kidney failure secondary to diabetes; received kidney transplant in 2004    Glaucoma Sister     Diabetes Brother     Sickle Cell Son         Cause of death    Macular degeneration Neg        Social History  Social History     Socioeconomic History    Marital status:    Tobacco Use    Smoking status: Former     Packs/day: 1.00     Years: 30.00     Additional pack years: 0.00     Total pack years: 30.00     Types: Cigarettes     Quit date: 2019     Years since quittin.2    Smokeless tobacco: Never   Vaping Use    Vaping Use: Never used   Substance and Sexual Activity    Alcohol use: Not Currently     Comment: socially    Drug use: No   Social History Narrative    [facility]          Social Determinants of Health     Food Insecurity: No Food Insecurity (2024)    Food Insecurity     Food Insecurity: Never true   Transportation Needs: No Transportation Needs (2024)    Transportation Needs     Lack of Transportation: No   Housing Stability: Low Risk  (2024)    Housing Stability     Housing Instability: No           Current Medications:      Medications Prior to Admission   Medication Sig    HYDROcodone-acetaminophen  MG Oral Tab Take 1 tablet by mouth every 4 (four) hours as needed for Pain (max 6/day).    [START ON 3/15/2024] HYDROcodone-acetaminophen  MG Oral Tab Take 1 tablet by mouth every 4 (four) hours as needed for Pain (max 6/day).    LANTUS SOLOSTAR 100 UNIT/ML Subcutaneous Solution Pen-injector Inject 6 Units into the skin See Admin Instructions. Every other day    JANUVIA 25 MG Oral Tab Take 1 tablet (25 mg total) by mouth every morning.    rosuvastatin 40 MG Oral Tab Take 1 tablet (40 mg total) by mouth nightly.    glipiZIDE 10 MG Oral Tab Take 1 tablet (10 mg total) by mouth 2 (two) times daily.    D-5000 125 MCG (5000 UT) Oral Tab Take 1 tablet (5,000 Units total) by  mouth daily.    LISINOPRIL 40 MG Oral Tab TAKE 1 TABLET(40 MG) BY MOUTH DAILY (Patient taking differently: Take 1 tablet (40 mg total) by mouth every morning.)    hydrALAZINE 100 MG Oral Tab Take 1 tablet (100 mg total) by mouth every 8 (eight) hours.    Budesonide-Formoterol Fumarate 160-4.5 MCG/ACT Inhalation Aerosol Inhale 2 puffs into the lungs 2 (two) times daily. Rinse mouth with water, gargle, and spit to follow.    ipratropium-albuterol 0.5-2.5 (3) MG/3ML Inhalation Solution Take 3 mL by nebulization every 6 (six) hours as needed (shortness of breath refractory to HFA).    MYRBETRIQ 50 MG Oral Tablet 24 Hr Take 1 tablet (50 mg total) by mouth every morning.    albuterol 108 (90 Base) MCG/ACT Inhalation Aero Soln Inhale 2 puffs into the lungs every 6 (six) hours as needed for Wheezing or Shortness of Breath.    carvedilol 12.5 MG Oral Tab Take 2 tablets (25 mg total) by mouth daily. (Patient taking differently: Take 2 tablets (25 mg total) by mouth 2 (two) times daily with meals.)    clopidogrel 75 MG Oral Tab Take 1 tablet (75 mg total) by mouth daily. (Patient taking differently: Take 1 tablet (75 mg total) by mouth every morning.)    estradiol 0.1 MG/GM Vaginal Cream Apply a small amount to the perimeatal tissue every other day.    amLODIPine 10 MG Oral Tab Take 1 tablet (10 mg total) by mouth daily. (Patient taking differently: Take 1 tablet (10 mg total) by mouth every morning.)    aspirin 81 MG Oral Tab EC Take 1 tablet (81 mg total) by mouth every other day.       Allergies  Allergies   Allergen Reactions    Dilaudid [Hydromorphone] TONGUE SWELLING and ANGIOEDEMA    Penicillins HIVES and ANGIOEDEMA     Hives on eyelids (occurred when pt was in her 20s). Tolerated amoxicillin per chart. Tolerates cephalosporins.       Review of Systems:   Review of systems not obtained due to patient factors.    Physical Exam:   Vital Signs:  Blood pressure 154/70, pulse 77, temperature 99.5 °F (37.5 °C), temperature  source Bladder, resp. rate 25, height 5' 4.02\" (1.626 m), weight 185 lb 13.6 oz (84.3 kg), SpO2 98%, not currently breastfeeding.     General: No acute distress. Alert and oriented x 3.  HEENT: Moist mucous membranes. EOM-I. PERRL  Neck: No lymphadenopathy.  No JVD. No carotid bruits.  Respiratory: Clear to auscultation bilaterally.  No wheezes. No rhonchi.  Cardiovascular: S1, S2.  Regular rate and rhythm.  No murmurs. Equal pulses   Abdomen: Soft, nontender, nondistended.  Positive bowel sounds. No rebound tenderness  Neurologic: No focal neurological deficits.  Musculoskeletal: Full range of motion of all extremities.  No swelling noted.  Integument: No lesions. No erythema.  Psychiatric: Appropriate mood and affect.    Results:     Laboratory Data:  Lab Results   Component Value Date    WBC 28.3 (H) 02/13/2024    HGB 7.9 (L) 02/13/2024    HCT 23.4 (L) 02/13/2024    .0 02/13/2024    CREATSERUM 0.97 02/14/2024    BUN 16 02/14/2024     02/14/2024    K 3.8 02/14/2024     (H) 02/14/2024    CO2 22.0 02/14/2024    GLU 86 02/14/2024    CA 7.3 (L) 02/14/2024    ALB 2.3 (L) 02/12/2024    ALKPHO 62 02/10/2024    TP 4.6 (L) 02/10/2024    AST 91 (H) 02/10/2024    ALT 38 02/10/2024    PTT 30.3 02/10/2024    INR 1.51 (H) 02/10/2024    PTP 19.2 (H) 02/10/2024    T4F 1.3 06/18/2019    TSH 1.060 06/18/2019    LIP 40 04/25/2023    DDIMER 1.45 (H) 11/08/2019    ESRML 27 05/08/2020    CRP 3.11 (H) 05/08/2020    MG 1.9 02/13/2024    PHOS 3.4 02/14/2024    TROP <0.045 11/08/2019    CK 35 08/21/2023    B12 963 09/03/2019         Imaging:  No results found.       Impression:   Uncontrolled Type 2 Diabetes  - Insulin drip stopped yesterday and patient transitioned to Tresiba 30 units, but patient became hypoglycemic and Tresiba held  - Discontinue scheduled Tresiba  - Continue medium dose CF scale with regular insulin  - We will follow blood sugars       Thank you for allowing me to participate in the care of your  patient.    Grecia Armando MD  2/14/2024

## 2024-02-14 NOTE — PLAN OF CARE
Pt Extubated today. Remains on 3L nc. Increasingly more alert throughout the day, though forgetful at times. Biggest complaint is pain, medication increased. Family updated.   Problem: Patient Centered Care  Goal: Patient preferences are identified and integrated in the patient's plan of care  Description: Interventions:  - What would you like us to know as we care for you?   - Provide timely, complete, and accurate information to patient/family  - Incorporate patient and family knowledge, values, beliefs, and cultural backgrounds into the planning and delivery of care  - Encourage patient/family to participate in care and decision-making at the level they choose  - Honor patient and family perspectives and choices  Outcome: Progressing     Problem: Diabetes/Glucose Control  Goal: Glucose maintained within prescribed range  Description: INTERVENTIONS:  - Monitor Blood Glucose as ordered  - Assess for signs and symptoms of hyperglycemia and hypoglycemia  - Administer ordered medications to maintain glucose within target range  - Assess barriers to adequate nutritional intake and initiate nutrition consult as needed  - Instruct patient on self management of diabetes  Outcome: Progressing     Problem: Patient/Family Goals  Goal: Patient/Family Long Term Goal  Description: Patient's Long Term Goal:     Interventions:  -   - See additional Care Plan goals for specific interventions  Outcome: Progressing  Goal: Patient/Family Short Term Goal  Description: Patient's Short Term Goal:     Interventions:   -   - See additional Care Plan goals for specific interventions  Outcome: Progressing     Problem: Safety Risk - Non-Violent Restraints  Goal: Patient will remain free from self-harm  Description: INTERVENTIONS:  - Apply the least restrictive restraint to prevent harm  - Notify patient and family of reasons restraints applied  - Assess for any contributing factors to confusion (electrolyte disturbances, delirium,  medications)  - Discontinue any unnecessary medical devices as soon as possible  - Assess the patient's physical comfort, circulation, skin condition, hydration, nutrition and elimination needs   - Reorient and redirection as needed  - Assess for the need to continue restraints  2/14/2024 1559 by Anna Painting RN  Outcome: Progressing  2/14/2024 0732 by Anna Painting RN  Outcome: Progressing     Problem: CARDIOVASCULAR - ADULT  Goal: Maintains optimal cardiac output and hemodynamic stability  Description: INTERVENTIONS:  - Monitor vital signs, rhythm, and trends  - Monitor for bleeding, hypotension and signs of decreased cardiac output  - Evaluate effectiveness of vasoactive medications to optimize hemodynamic stability  - Monitor arterial and/or venous puncture sites for bleeding and/or hematoma  - Assess quality of pulses, skin color and temperature  - Assess for signs of decreased coronary artery perfusion - ex. Angina  - Evaluate fluid balance, assess for edema, trend weights  Outcome: Progressing  Goal: Absence of cardiac arrhythmias or at baseline  Description: INTERVENTIONS:  - Continuous cardiac monitoring, monitor vital signs, obtain 12 lead EKG if indicated  - Evaluate effectiveness of antiarrhythmic and heart rate control medications as ordered  - Initiate emergency measures for life threatening arrhythmias  - Monitor electrolytes and administer replacement therapy as ordered  Outcome: Progressing     Problem: RESPIRATORY - ADULT  Goal: Achieves optimal ventilation and oxygenation  Description: INTERVENTIONS:  - Assess for changes in respiratory status  - Assess for changes in mentation and behavior  - Position to facilitate oxygenation and minimize respiratory effort  - Oxygen supplementation based on oxygen saturation or ABGs  - Provide Smoking Cessation handout, if applicable  - Encourage broncho-pulmonary hygiene including cough, deep breathe, Incentive Spirometry  - Assess the need for suctioning and  perform as needed  - Assess and instruct to report SOB or any respiratory difficulty  - Respiratory Therapy support as indicated  - Manage/alleviate anxiety  - Monitor for signs/symptoms of CO2 retention  Outcome: Progressing     Problem: METABOLIC/FLUID AND ELECTROLYTES - ADULT  Goal: Glucose maintained within prescribed range  Description: INTERVENTIONS:  - Monitor Blood Glucose as ordered  - Assess for signs and symptoms of hyperglycemia and hypoglycemia  - Administer ordered medications to maintain glucose within target range  - Assess barriers to adequate nutritional intake and initiate nutrition consult as needed  - Instruct patient on self management of diabetes  Outcome: Progressing  Goal: Electrolytes maintained within normal limits  Description: INTERVENTIONS:  - Monitor labs and rhythm and assess patient for signs and symptoms of electrolyte imbalances  - Administer electrolyte replacement as ordered  - Monitor response to electrolyte replacements, including rhythm and repeat lab results as appropriate  - Fluid restriction as ordered  - Instruct patient on fluid and nutrition restrictions as appropriate  Outcome: Progressing  Goal: Hemodynamic stability and optimal renal function maintained  Description: INTERVENTIONS:  - Monitor labs and assess for signs and symptoms of volume excess or deficit  - Monitor intake, output and patient weight  - Monitor urine specific gravity, serum osmolarity and serum sodium as indicated or ordered  - Monitor response to interventions for patient's volume status, including labs, urine output, blood pressure (other measures as available)  - Encourage oral intake as appropriate  - Instruct patient on fluid and nutrition restrictions as appropriate  Outcome: Progressing

## 2024-02-14 NOTE — PROGRESS NOTES
02/14/24 1000   Negative Pressure Wound Therapy Groin Right   Placement Date: 02/13/24   Inserted by: Dr. LEWIS  Location: Groin  Wound Location Orientation: Right   Wound photographed/measured No   Machine Status (On) Yes   Site Assessment ANGELO   [place]-wound Assessment ANGELO   Unit Type KCI ULTA   Cycle Continuous;On   Target Pressure (mmHg) 125   Drainage Description Sanguineous   Dressing Status Intact   Canister Changed No   Wound Follow Up   Follow up needed Yes     Pt seen for wound vac assessment. Bedside RN had just changed the canister. Plan is for pt to return to OR w Dr. Justice on Fri 2/16 for further debridement and vac dressing change.    Continuity of Care Form    Patient Name: Meredith Tellez   :  1972  MRN:  [id number]    Admit date:  2020  Discharge date:  ***    Code Status Order: Full Code   Advance Directives:   Advance Care Flowsheet Documentation     Date/Time Healthcare Directive Type of Healthcare Directive Copy in [address] Agent's Name Healthcare Agent's Phone Number    20 [phone]  No, patient does not have an advance directive for healthcare treatment -- -- -- -- --          Admitting Physician:  Reggie Manjarrez DO  PCP: Ton Briggs DO    Discharging Nurse: [facility] Unit/Room#: 1724/4139-01  Discharging Unit Phone Number: ***    Emergency Contact:   Extended Emergency Contact Information  Primary Emergency Contact: Barrington Muniz  Address: [address] [place] [address], [address] [place] [address]  Home Phone: [phone]  Mobile Phone: [phone]  Relation: Other  Secondary Emergency Contact: cherelle hong  Mobile Phone: [phone]  Relation: Parent   needed? No    Past Surgical History:  History reviewed. No pertinent surgical history.     Immunization History:   Immunization History   Administered Date(s) Administered    Tdap (Boostrix, Adacel) 2017, 2020       Active Problems:  Patient Active Problem List   Diagnosis Code    Acute renal failure (ARF) ([facility] [address] N17.9    Substance abuse ([facility] [address] F19.10    Non-traumatic rhabdomyolysis M62.82    Hyperkalemia E87.5       Isolation/Infection:   Isolation          No Isolation        Patient Infection Status     None to display          Nurse Assessment:  Last Vital Signs: /86   Pulse 67   Temp 97.6 °F (36.4 °C) (Oral)   Resp 18   Ht 5' 8\" (1.727 m)   Wt 153 lb 3.2 oz (69.5 kg)   SpO2 99%   BMI 23.29 kg/m²     Last documented pain score (0-10 scale): Pain Level: 0  Last Weight:   Wt Readings from Last 1 Encounters:   20 153 lb 3.2 oz (69.5 kg)     Mental Status:  {IP PT MENTAL STATUS:}    IV Access:  { CYDNEY IV ACCESS:303387180}    Nursing Mobility/ADLs:  Walking   {CHP DME ERYC:366813579}  Transfer  {P DME DPPD:322294036}  Bathing  {CHP DME LDGA:208852027}  Dressing  {CHP DME SAFJ:808628931}  Toileting  {CHP DME GWUH:457710033}  Feeding  {CHP DME BFRJ:392627662}  Med Admin  {P DME MBWO:931076210}  Med Delivery   { CYDNEY MED Delivery:435443040}    Wound Care Documentation and Therapy:  Wound 17 Laceration Finger (Comment which one) (Active)   Number of days: 1272        Elimination:  Continence:   · Bowel: {YES / IL:05707}  · Bladder: {YES / DE:39684}  Urinary Catheter: {Urinary Catheter:958702054}   Colostomy/Ileostomy/Ileal Conduit: {YES / KB:90484}       Date of Last BM: ***    Intake/Output Summary (Last 24 hours) at 2020 1038  Last data filed at 2020 0916  Gross per 24 hour   Intake 1400 ml   Output 2275 ml   Net -875 ml     I/O last 3 completed shifts: In: 5707 [P.O.:1290;  I.V.:560]  Out: 2250 [Urine:2250]    Safety Concerns:     [employer] Safety Concerns:277499747}    Impairments/Disabilities:      [employer] Impairments/Disabilities:590593478}    Nutrition Therapy:  Current Nutrition Therapy:   [employer] Diet List:622651030}    Routes of Feeding: {P DME Other Feedings:407521376}  Liquids: {Slp liquid thickness:43265}  Daily Fluid Restriction: {P DME Yes amt example:262021438}  Last Modified Barium Swallow with Video (Video Swallowing Test): {Done Not Done WDQY:866213267}    Treatments at the Time of Hospital Discharge:   Respiratory Treatments: ***  Oxygen Therapy:  {Therapy; copd oxygen:40827}  Ventilator:    {[facility] Vent CQVQ:421884218}    Rehab Therapies: {THERAPEUTIC INTERVENTION:5634306692}  Weight Bearing Status/Restrictions: [employer] Weight Bearin}  Other Medical Equipment (for information only, NOT a DME order):  {EQUIPMENT:980556461}  Other Treatments: ***    Patient's personal belongings (please select all that are sent with patient):  {P DME Belongings:844022802}    RN SIGNATURE:  {Esignature:703107962}    CASE MANAGEMENT/SOCIAL WORK SECTION    Inpatient Status Date: ***    Readmission Risk Assessment Score:  Readmission Risk              Risk of Unplanned Readmission:        27           Discharging to Facility/ Agency   · Name:   · Address:  · Phone:  · Fax:    Dialysis Facility (if applicable)   · Name:  · Address:  · Dialysis Schedule:  · Phone:  · Fax:    / signature: {Esignature:075781065}    PHYSICIAN SECTION    Prognosis: {Prognosis:2229069050}    Condition at Discharge: [address] [place] [address] Patient Condition:622502471}    Rehab Potential (if transferring to Rehab): {Prognosis:0014229111}    Recommended Labs or Other Treatments After Discharge: ***    Physician Certification: I certify the above information and transfer of Nahun Oro  is necessary for the continuing treatment of the diagnosis listed and that he requires {Admit to Appropriate Level of Care:57433} for {GREATER/LESS:654260981} 30 days.      Update Admission H&P: {CHP DME Changes in LNUAD:458225531}    PHYSICIAN SIGNATURE:  {Esignature:892080110}

## 2024-02-14 NOTE — PROGRESS NOTES
[facility] CARDIOLOGY Progress Note      Ava Bowen is a 68 year old female who I assessed as follows:  Chief Complaint   Patient presents with    Bleeding          REASON FOR CONSULTATION:    HTN            ASSESSMENT/PLAN:     IMPRESSIONS:  Hemorrhage from LE fem pop bypass site, infected. S/p debridement 2/13/24  HTN  COPD  DM  PAD s/p fem-pop bypass Jan 2024  Sickle cell anemia  DM  Acute resp failure, intubated  OMEGA, on CKD, improved  HL, on statin PTA        PLAN:  IV nicardepine drip as needed  Continue oral BP meds  Resumed coreg, 12.5 BID (taking 25 BID PTA), increase as needed  Hydralazine IV PRN        SUBJECTIVE:    Intubated, sedated.       --------------------------------------------------------------------------------------------------------------------------------    HPI:         History sickle cell anemia, PAD s/p bypass 1/24 presents with hemorrhage from surgical site. Initially hypotensive on pressors, now on IV nicardepine for HTN. Unable to take po meds at this time. Creat elevated on admission, now improved.                No current outpatient medications on file.      Past Medical History:   Diagnosis Date    Anemia     Back pain     Back problem     Cataract     Chronic kidney disease (CKD)     COPD (chronic obstructive pulmonary disease) ([facility])     FEV 1 1.25 50% 2/20    Diabetes ([facility])     DM neuropathy    Essential hypertension     H/O angioplasty     07/08/2020    High blood pressure     High cholesterol     Neuropathy     Peripheral vascular disease ([facility])     PNA (pneumonia) 05/2018    Pulmonary emphysema ([facility])     Pulmonary nodule     4 mm RUL    Renal disorder     monitoring kidney labs    Sickle cell trait ([facility])     Sickle-cell anemia ([facility])     Tobacco abuse     Visual impairment      Past Surgical History:   Procedure Laterality Date    APPENDECTOMY      BACK SURGERY Right 09/09/2021    Right L3-4 extreme lateral interbody fusion, posterior decompression; LUMBAR  LAMINECTOMY 1 LEVEL          x3    CHOLECYSTECTOMY      EXCIS LUMBAR DISK,ONE LEVEL      2015    OTHER      bilateral leg stents  and      Family History   Problem Relation Age of Onset    Diabetes Mother         Passed from DM complications    Diabetes Sister         Had kidney transplant due to complications of DM    Kidney Disease Sister         Kidney failure secondary to diabetes; received kidney transplant in     Glaucoma Sister     Diabetes Brother     Sickle Cell Son         Cause of death    Macular degeneration Neg       Social History:  Social History     Socioeconomic History    Marital status:    Tobacco Use    Smoking status: Former     Packs/day: 1.00     Years: 30.00     Additional pack years: 0.00     Total pack years: 30.00     Types: Cigarettes     Quit date: 2019     Years since quittin.2    Smokeless tobacco: Never   Vaping Use    Vaping Use: Never used   Substance and Sexual Activity    Alcohol use: Not Currently     Comment: socially    Drug use: No   Social History Narrative    [employer] [facility]          Social Determinants of Health     Food Insecurity: No Food Insecurity (2024)    Food Insecurity     Food Insecurity: Never true   Transportation Needs: No Transportation Needs (2024)    Transportation Needs     Lack of Transportation: No   Housing Stability: Low Risk  (2024)    Housing Stability     Housing Instability: No          Medications:            Allergies  Allergies   Allergen Reactions    Dilaudid [Hydromorphone] TONGUE SWELLING and ANGIOEDEMA    Penicillins HIVES and ANGIOEDEMA     Hives on eyelids (occurred when pt was in her 20s). Tolerated amoxicillin per chart. Tolerates cephalosporins.               REVIEW OF SYSTEMS:   Sedated, unable to answer questions          EXAM:         TELE: SR          /70 (BP Location: Right arm)   Pulse 77   Temp 99.5 °F (37.5 °C) (Bladder)   Resp 25   Ht 5' 4.02\" (1.626 m)   Wt  185 lb 13.6 oz (84.3 kg)   LMP  (LMP Unknown)   SpO2 98%   BMI 31.88 kg/m²   Temp (24hrs), Av.4 °F (36.9 °C), Min:95.9 °F (35.5 °C), Max:100.8 °F (38.2 °C)    Wt Readings from Last 3 Encounters:   24 185 lb 13.6 oz (84.3 kg)   24 164 lb 3.2 oz (74.5 kg)   23 153 lb (69.4 kg)         Intake/Output Summary (Last 24 hours) at 2024 0644  Last data filed at 2024 0522  Gross per 24 hour   Intake 2871 ml   Output 1875 ml   Net 996 ml         GENERAL: intubated, sedated  SKIN: no rashes  HEENT: atraumatic, normocephalic, throat without erythema  NECK: supple, no bruits  LUNGS: clear to auscultation  CARDIO: RRR without murmur or S3   GI: soft, nontender  EXTREMITIES: no cyanosis, clubbing or edema  NEUROLOGY: sedated  PSYCH: sedated          LABORATORY DATA:               ECG:        ECHO:          Labs:  Recent Labs   Lab 24  1751 24  2325 24  0517   * 93 86   BUN 17 16 16   CREATSERUM 1.03* 0.97 0.97   CA 7.1* 7.1* 7.3*    142 144   K 3.3* 3.7 3.8   * 115* 114*   CO2 20.0* 20.0* 22.0     No results for input(s): \"TROP\" in the last 168 hours.  Recent Labs   Lab 24  2325   RBC 2.83*   HGB 7.9*   HCT 23.4*   MCV 82.7   MCH 27.9   MCHC 33.8   RDW 15.6*   NEPRELIM 25.30*   WBC 28.3*   .0     No results for input(s): \"TROP\", \"TROPHS\", \"CK\" in the last 168 hours.    Imaging:  XR CHEST AP PORTABLE  (CPT=71045)    Result Date: 2024  CONCLUSION:   Interval placement of enteric tube with the tip in the region the gastric body.  Mild left basilar linear atelectasis.      Dictated by (CST): Krunal Tay MD on 2024 at 9:19 AM     Finalized by (CST): Krunal Tay MD on 2024 at 9:21 AM                Jose Banegas MD

## 2024-02-14 NOTE — PAYOR COMM NOTE
--------------  ADMISSION REVIEW     Payor: [employer]  Subscriber #:  [id number]  Authorization Number: [id number]    Admit date: 2/10/24  Admit time:  9:20 PM       REVIEW DOCUMENTATION:     ED Provider Notes      Patient Seen in: [facility] Emergency Department    History     Chief Complaint   Patient presents with    Bleeding     Stated Complaint: femoral bleed    HPI:    This is a 68-year-old history of CKD, COPD, diabetes, hypertension, hyperlipidemia, sickle cell anemia, peripheral vascular disease, status post right lower extremity femoropopliteal bypass on 1/24/2024 with Dr. Murrieta, presented to the ER with profuse bleeding from her right lower extremity groin and wound dehiscence and pain.  Patient states that she had been doing okay after her surgery but then just prior to arrival her wound in her right groin popped open and she was having profuse bleeding onto the floor at home.  She is complaining of severe pain in this area.  Denies fevers, vomiting, diarrhea, chest pain or abdominal pain.        Review of Systems:  Review of Systems  Positive for stated complaint: femoral bleed. Constitutional and vital signs reviewed. All other systems reviewed and negative except as noted above.    Patient History:  Past Medical History:   Diagnosis Date    Anemia     Back pain     Back problem     Cataract     Chronic kidney disease (CKD)     COPD (chronic obstructive pulmonary disease) (HCC)     FEV 1 1.25 50% 2/20    Diabetes (HCC)     DM neuropathy    Essential hypertension     H/O angioplasty     07/08/2020    High blood pressure     High cholesterol     Neuropathy     Peripheral vascular disease (HCC)     PNA (pneumonia) 05/2018    Pulmonary emphysema (HCC)     Pulmonary nodule     4 mm RUL    Renal disorder     monitoring kidney labs    Sickle cell trait (HCC)     Sickle-cell anemia (HCC)     Tobacco abuse     Visual impairment        Physical Exam     ED Triage Vitals [02/10/24 1542]   BP 94/49    Pulse 71   Resp 22   Temp 98.9 °F (37.2 °C)   Temp src Temporal   SpO2 100 %   O2 Device None (Room air)       Current:/59 (BP Location: Right arm)   Pulse 73   Temp 97.5 °F (36.4 °C)   Resp 20   Wt 82.7 kg   LMP  (LMP Unknown)   SpO2 96%   BMI 31.30 kg/m²         Physical Exam  Vitals reviewed.   Constitutional:       General: She is in acute distress.      Appearance: She is ill-appearing.   HENT:      Head: Normocephalic and atraumatic.      Mouth/Throat:      Mouth: Mucous membranes are dry.   Eyes:      Conjunctiva/sclera: Conjunctivae normal.   Cardiovascular:      Rate and Rhythm: Normal rate and regular rhythm.      Heart sounds: No murmur heard.  Pulmonary:      Effort: Pulmonary effort is normal. No respiratory distress.      Breath sounds: No wheezing or rales.   Abdominal:      General: There is no distension.      Palpations: Abdomen is soft.      Tenderness: There is no abdominal tenderness.   Musculoskeletal:         General: Swelling present.      Right lower leg: Edema present.      Comments: Bilateral lower extremity edema noted, right greater than left, her distal feet are warm on touch with faint right distal pulse palpable.  Her wound does not have any pulsatile bleeding but does have some serous drainage and slight venous ooze to the right groin.   Skin:     General: Skin is warm and dry.      Coloration: Skin is pale.   Neurological:      General: No focal deficit present.      Mental Status: She is alert.   Psychiatric:      Comments: Anxious appearing          ED Course   Labs:   Labs Reviewed   BASIC METABOLIC PANEL (8) - Abnormal; Notable for the following components:       Result Value    Glucose 552 (*)     CO2 12.0 (*)     BUN 30 (*)     Creatinine 1.77 (*)     Calcium, Total 8.5 (*)     Calculated Osmolality 323 (*)     eGFR-Cr 31 (*)     All other components within normal limits   VENOUS BLOOD GAS - Abnormal; Notable for the following components:    Venous pH 7.24 (*)      Venous pCO2 25 (*)     Venous pO2 57 (*)     Venous Bicarbonate 12.9 (*)     Blood Gas Base Excess -14.9 (*)     Venous O2 Saturation 90.1 (*)     All other components within normal limits   LACTIC ACID, PLASMA - Abnormal; Notable for the following components:    Lactic Acid 7.6 (*)     All other components within normal limits   PROTHROMBIN TIME (PT) - Abnormal; Notable for the following components:    PT 19.2 (*)     INR 1.51 (*)     All other components within normal limits   MANUAL DIFFERENTIAL - Abnormal; Notable for the following components:    Neutrophil Absolute Manual 11.97 (*)     NRBC 3 (*)     RBC Morphology See morphology below (*)     All other components within normal limits   LACTIC ACID REFLEX POST POSTIVE - Abnormal; Notable for the following components:    Lactic Acid 3.4 (*)     All other components within normal limits   EXPANDED BLOOD GAS, ARTERIAL - Abnormal; Notable for the following components:    ABG pH 7.15 (*)     ABG PO2 414 (*)     ABG HCO3 13.8 (*)     Blood Gas Base Excess -14.3 (*)     Potassium Blood Gas 3.1 (*)     Lactic Acid (Blood Gas) 3.0 (*)     Ionized Calcium 1.45 (*)     Total Hemoglobin 9.4 (*)     Oxygen Content 14.0 (*)     All other components within normal limits   COMP METABOLIC PANEL (14) - Abnormal; Notable for the following components:    Glucose 213 (*)     Potassium 3.3 (*)     Chloride 119 (*)     CO2 14.0 (*)     BUN 26 (*)     Creatinine 1.34 (*)     Calcium, Total 8.4 (*)     Calculated Osmolality 309 (*)     eGFR-Cr 44 (*)     AST 91 (*)     Total Protein 4.6 (*)     Albumin 2.3 (*)     Globulin  2.3 (*)     All other components within normal limits   CBC, PLATELET; NO DIFFERENTIAL - Abnormal; Notable for the following components:    WBC 22.3 (*)     RBC 3.16 (*)     HGB 9.3 (*)     HCT 26.0 (*)     All other components within normal limits   LACTIC ACID REFLEX POST POSITIVE [id number] - Abnormal; Notable for the following components:    Lactic Acid 2.2 (*)      All other components within normal limits   HEMOGLOBIN A1C - Abnormal; Notable for the following components:    HgbA1C 7.9 (*)     Estimated Average Glucose 180 (*)     All other components within normal limits   BASIC METABOLIC PANEL (8) - Abnormal; Notable for the following components:    Glucose 150 (*)     Chloride 120 (*)     CO2 15.0 (*)     BUN 25 (*)     Creatinine 1.25 (*)     Calcium, Total 8.1 (*)     Calculated Osmolality 307 (*)     eGFR-Cr 48 (*)     All other components within normal limits   BASIC METABOLIC PANEL (8) - Abnormal; Notable for the following components:    Glucose 134 (*)     Sodium 147 (*)     Potassium 3.4 (*)     Chloride 122 (*)     CO2 16.0 (*)     Creatinine 1.27 (*)     Calcium, Total 7.5 (*)     Calculated Osmolality 310 (*)     eGFR-Cr 47 (*)     All other components within normal limits   ARTERIAL BLOOD GAS - Abnormal; Notable for the following components:    ABG pH 7.32 (*)     ABG pCO2 26 (*)     ABG PO2 104 (*)     ABG HCO3 16.2 (*)     Blood Gas Base Excess -11.2 (*)     All other components within normal limits   POCT GLUCOSE - Abnormal; Notable for the following components:    POC Glucose  556 (*)     All other components within normal limits   POCT GLUCOSE - Abnormal; Notable for the following components:    POC Glucose  575 (*)     All other components within normal limits   POCT ISTAT CG8 CARTRIDGE - Abnormal; Notable for the following components:    ISTAT Potassium 3.5 (*)     ISTAT Hematocrit 17 (*)     ISTAT Glucose 541 (*)     ISTAT Blood Gas pH 7.15 (*)     ISTAT Blood Gas PO2 >400 (*)     ISTAT Blood Gas TCO2 15 (*)     ISTAT Blood Gas HCO3 13.6 (*)     All other components within normal limits   POCT ISTAT CG8 CARTRIDGE - Abnormal; Notable for the following components:    ISTAT Potassium 3.1 (*)     ISTAT Ionized Calcium 1.37 (*)     ISTAT Hematocrit 23 (*)     ISTAT Glucose 419 (*)     ISTAT Blood Gas pH 7.10 (*)     ISTAT Blood Gas PO2 399 (*)     ISTAT  Blood Gas TCO2 15 (*)     ISTAT Blood Gas HCO3 13.8 (*)     All other components within normal limits   POCT GLUCOSE - Abnormal; Notable for the following components:    POC Glucose  262 (*)     All other components within normal limits   POCT GLUCOSE - Abnormal; Notable for the following components:    POC Glucose  203 (*)     All other components within normal limits   POCT GLUCOSE - Abnormal; Notable for the following components:    POC Glucose  176 (*)     All other components within normal limits   POCT GLUCOSE - Abnormal; Notable for the following components:    POC Glucose  150 (*)     All other components within normal limits   POCT GLUCOSE - Abnormal; Notable for the following components:    POC Glucose  138 (*)     All other components within normal limits   POCT GLUCOSE - Abnormal; Notable for the following components:    POC Glucose  162 (*)     All other components within normal limits   POCT GLUCOSE - Abnormal; Notable for the following components:    POC Glucose  177 (*)     All other components within normal limits   POCT GLUCOSE - Abnormal; Notable for the following components:    POC Glucose  161 (*)     All other components within normal limits   POCT GLUCOSE - Abnormal; Notable for the following components:    POC Glucose  163 (*)     All other components within normal limits   POCT GLUCOSE - Abnormal; Notable for the following components:    POC Glucose  152 (*)     All other components within normal limits   POCT GLUCOSE - Abnormal; Notable for the following components:    POC Glucose  150 (*)     All other components within normal limits   POCT GLUCOSE - Abnormal; Notable for the following components:    POC Glucose  140 (*)     All other components within normal limits   POCT GLUCOSE - Abnormal; Notable for the following components:    POC Glucose  136 (*)     All other components within normal limits   POCT GLUCOSE - Abnormal; Notable for the following components:    POC Glucose  127 (*)      All other components within normal limits   POCT GLUCOSE - Abnormal; Notable for the following components:    POC Glucose  126 (*)     All other components within normal limits   POCT GLUCOSE - Abnormal; Notable for the following components:    POC Glucose  150 (*)     All other components within normal limits   BLOOD CULTURE - Abnormal; Notable for the following components:    Blood Smear Gram Negative Rods (*)     All other components within normal limits    Narrative:       Anaerobic Bottle Time to Detection - 10 hr     Aerobic Bottle Time to Detection - 16 hr  6 min    TISSUE AEROBIC CULTURE - Abnormal; Notable for the following components:    Tissue Culture Result 4+ growth Klebsiella pneumoniae pneumoniae (*)     Tissue Culture Result 4+ growth Enterococcus faecalis (*)     Tissue Smear 1+ WBCs seen (*)     Tissue Smear 4+ Gram positive cocci in pairs (*)     Tissue Smear 3+ Gram Negative Rods (*)     Tissue Smear 2+ Gram Positive Rods (*)     All other components within normal limits   TISSUE AEROBIC CULTURE - Abnormal; Notable for the following components:    Tissue Culture Result 4+ growth Klebsiella pneumoniae pneumoniae (*)     Tissue Culture Result 2+ growth Enterococcus faecalis (*)     Tissue Smear 1+ WBCs seen (*)     Tissue Smear 1+ Gram positive cocci in pairs (*)     Tissue Smear 1+ Gram Negative Rods (*)     All other components within normal limits   TISSUE AEROBIC CULTURE - Abnormal; Notable for the following components:    Tissue Culture Result 4+ growth Klebsiella pneumoniae pneumoniae (*)     Tissue Smear 3+ WBCs seen (*)     Tissue Smear 1+ Gram positive cocci in pairs (*)     All other components within normal limits   TISSUE AEROBIC CULTURE - Abnormal; Notable for the following components:    Tissue Culture Result 4+ growth Klebsiella pneumoniae pneumoniae (*)     Tissue Culture Result 2+ growth Enterococcus faecalis (*)     Tissue Smear 1+ WBCs seen (*)     Tissue Smear 2+ Gram positive  cocci in pairs (*)     Tissue Smear 2+ Gram Negative Rods (*)     All other components within normal limits   TISSUE AEROBIC CULTURE - Abnormal; Notable for the following components:    Tissue Culture Result 4+ growth Klebsiella pneumoniae (*)     Tissue Smear 1+ WBCs seen (*)     Tissue Smear 1+ Gram positive cocci in pairs (*)     Tissue Smear 1+ Gram Negative Rods (*)     All other components within normal limits   TISSUE AEROBIC CULTURE - Abnormal; Notable for the following components:    Tissue Culture Result 2+ growth Klebsiella pneumoniae pneumoniae (*)     Tissue Smear 1+ Gram positive cocci in pairs (*)     Tissue Smear No WBCs seen (*)     All other components within normal limits   BLD CULT ID BY PCR - Abnormal; Notable for the following components:    Klebsiella pneumoniae by PCR Detected (*)     All other components within normal limits   CBC W/ DIFFERENTIAL - Abnormal; Notable for the following components:    WBC 16.4 (*)     RBC 2.17 (*)     HGB 6.0 (*)     HCT 18.9 (*)     RDW-SD 46.4 (*)     Neutrophil Absolute Prelim 11.87 (*)     All other components within normal limits   CBC W/ DIFFERENTIAL - Abnormal; Notable for the following components:    WBC 22.0 (*)     RBC 3.11 (*)     HGB 9.6 (*)     HCT 26.6 (*)     Neutrophil Absolute Prelim 19.63 (*)     Neutrophil Absolute 19.63 (*)     All other components within normal limits   PTT, ACTIVATED - Normal   ACETONE - Normal   VANCOMYCIN, RANDOM - Normal   POTASSIUM - Normal   TRIGLYCERIDES - Normal   SARS-COV-2/FLU A AND B/RSV BY PCR (GENEXPERT) - Normal    Narrative:     This test is intended for the qualitative detection and differentiation of SARS-CoV-2, influenza A, influenza B, and respiratory syncytial virus (RSV) viral RNA in nasopharyngeal or nares swabs from individuals suspected of respiratory viral infection consistent with COVID-19 by their healthcare provider. Signs and symptoms of respiratory viral infection due to SARS-CoV-2, influenza,  and RSV can be similar.    Test performed using the Xpert Xpress SARS-CoV-2/FLU/RSV (real time RT-PCR)  assay on the [employer] instrument, [address], [place] [address].   This test is being used under the Food and Drug Administration's Emergency Use Authorization.    The authorized Fact Sheet for Healthcare Providers for this assay is available upon request from the laboratory.   C. DIFFICILE(TOXIGENIC)PCR - Normal   CBC WITH DIFFERENTIAL WITH PLATELET    Narrative:     The following orders were created for panel order CBC With Differential With Platelet.  Procedure                               Abnormality         Status                     ---------                               -----------         ------                     CBC W/ DIFFERENTIAL[[id number]]          Abnormal            Final result                 Please view results for these tests on the individual orders.   MD BLOOD SMEAR CONSULT   HEMOGLOBIN A1C    Narrative:     [facility]   CBC WITH DIFFERENTIAL WITH PLATELET    Narrative:     The following orders were created for panel order CBC With Differential With Platelet.  Procedure                               Abnormality         Status                     ---------                               -----------         ------                     CBC W/ DIFFERENTIAL[[id number]]          Abnormal            Final result                 Please view results for these tests on the individual orders.   SCAN SLIDE   BETA HYDROXYBUTYRATE   HEMOGLOBIN A1C   BASIC METABOLIC PANEL (8)   CBC WITH DIFFERENTIAL WITH PLATELET   TYPE AND SCREEN    Narrative:     The following orders were created for panel order Type and screen.  Procedure                               Abnormality         Status                     ---------                               -----------         ------                     ABORH (Blood Type)[[id number]]                               Final result                Antibody Screen[[id number]]                                  Final result                 Please view results for these tests on the individual orders.   ABORH (BLOOD TYPE)   ANTIBODY SCREEN   PREPARE RBC   PREPARE RBC   RAINBOW DRAW LAVENDER   RAINBOW DRAW LIGHT GREEN   RAINBOW DRAW BLUE   RAINBOW DRAW GOLD   ANAEROBIC CULTURE   ANAEROBIC CULTURE   ANAEROBIC CULTURE   ANAEROBIC CULTURE   ANAEROBIC CULTURE   ANAEROBIC CULTURE   BLOOD CULTURE     Radiology findings:  I personally reviewed the images.   XR CHEST AP PORTABLE  (CPT=71045)    Result Date: 2/11/2024  CONCLUSION:   Interval placement of enteric tube with the tip in the region the gastric body.  Mild left basilar linear atelectasis.      Dictated by (CST): Krunal Tay MD on 2/11/2024 at 9:19 AM     Finalized by (CST): Krunal Tay MD on 2/11/2024 at 9:21 AM          XR CHEST AP PORTABLE  (CPT=71045)    Result Date: 2/11/2024  CONCLUSION: ETT 1.7 cm above the frida.  Right IJ catheter in the upper SVC.  No pneumothorax  [facility] provided a prelim report for this exam. This final report has no significant discrepancies with the [facility] report. .    Dictated by (CST): Ronak Leonard MD on 2/11/2024 at 6:34 AM     Finalized by (CST): Ronak Leonard MD on 2/11/2024 at 6:37 AM           EKG as interpreted by me: Normal sinus rhythm, rate of 73 beats minute, normal axis, normal intervals, no STEMI  Cardiac Monitor: Interpreted by me.   Pulse Readings from Last 1 Encounters:   02/11/24 73   , sinus,         MDM   This is a 68-year-old history of CKD, COPD, diabetes, hypertension, hyperlipidemia, sickle cell anemia, peripheral vascular disease, per chart review status post right lower extremity femoropopliteal bypass on 1/24/2024 with Dr. Murrieta, presented to the ER with profuse bleeding from her right lower extremity groin and wound dehiscence and pain.  On arrival patient initially with soft blood pressure though improved to 100s over 50s, on exam  she does not have any pulsatile bleeding but she does have wound dehiscence of her right groin with some slight venous oozing noted.  She is uncomfortable and in pain.  Her cardiopulmonary exam is unremarkable.  Concern at this time for graft compromise, bleeding from her femoropopliteal bypass graft, wound infection and dehiscence, will obtain CBC, CMP, coags, type and screen, initial blood glucose also in the 500s concern for hyperglycemia versus DKA in the setting of diabetes, will send a VBG, beta hydroxy, anticipate likely will admit to ICU and will consult vascular stat as she will likely require OR for further management.    ED Course as of 02/11/24 1329  ------------------------------------------------------------  Time: 02/10 1547  Comment: Case d/w Dr. Justice vascular surgery will come to [facility].   ------------------------------------------------------------  Time: 02/10 1615  Value: VENOUS pH(!): 7.24  Comment: Given hyperglycemia concern for DKA.   ------------------------------------------------------------  Time: 02/10 1625  Comment: Case moise Lee accepted admission to ICU.   ------------------------------------------------------------  Time: 02/10 1625  Comment: Vascular surgery cards.,  Plan to take patient to the OR from the ER and then to ICU.  ------------------------------------------------------------  Time: 02/10 1635  Value: Hemoglobin(!!): 6.0  Comment: (Reviewed)  ------------------------------------------------------------  Time: 02/10 1635  Value: WBC(!): 16.4  Comment: (Reviewed)  ------------------------------------------------------------  Time: 02/10 1636  Value: INR(!): 1.51  Comment: (Reviewed)  ------------------------------------------------------------  Time: 02/10 1639  Value: CREATININE(!): 1.77  Comment: (Reviewed)  ------------------------------------------------------------  Time: 02/10 1639  Value: Potassium: 4.3  Comment:  (Reviewed)  ------------------------------------------------------------  Time: 02/10 1645  Comment: Patient has been consented for OR by vascular surgery, plan for transfer to the OR from the ER and then back to ICU.  Case was discussed with intensivist accepts consult and will see on consult.  Patient's potassium is 4.3, will order IV potassium to go with her insulin drip.            Procedures:  Procedures    Due to a high probability of clinically significant, life threatening deterioration, the patient required my highest level of preparedness to intervene emergently and I personally spent 35 minutes of critical care time directly and personally managing the patient. This critical care time included obtaining a history; examining the patient; pulse oximetry; ordering and review of studies; arranging urgent treatment with development of a management plan; evaluation of patient's response to treatment; frequent reassessment; and, discussions with other providers.    This critical care time was performed to assess and manage the high probability of imminent, life-threatening deterioration that could result in multi-organ failure. It was exclusive of separately billable procedures and treating other patients and teaching time.      Disposition and Plan     Clinical Impression:  1. Bypass graft mechanical complication, initial encounter ([facility])    2. Hyperglycemia    3. Anemia, unspecified type    4. Acute kidney injury superimposed on CKD  ([facility])    5. Hemorrhage    6. Diabetic ketoacidosis without coma associated with type 2 diabetes mellitus ([facility])        Disposition:  Admit    Follow-up:  No follow-up provider specified.    Medications Prescribed:  Current Discharge Medication List          Hospital Problems       Present on Admission  Date Reviewed: 2/6/2024            ICD-10-CM Noted POA    * (Principal) Bypass graft mechanical complication, initial encounter ([facility]) T82.398A 2/10/2024 Unknown    Acute kidney injury  superimposed on CKD  (HCC) N17.9, N18.9 2/10/2024 Unknown    Anemia, unspecified type D64.9 2/10/2024 Unknown    Bypass graft mechanical complication (HCC) T82.398A 2/10/2024 Unknown    Hyperglycemia R73.9 4/25/2023 Unknown        This note may have been created using voice dictation technology and may include inadvertent errors.      Laura Meng DO  Attending Physician   Emergency Medicine           Signed by Laura Meng DO on 2/11/2024  1:29 PM         [facility] Hospitalist H&P         CC:       Chief Complaint   Patient presents with    Bleeding         PCP: Ernesto De Dios     Date of Admission: 2/10/2024  3:39 PM     ASSESSMENT / PLAN:      Ms. Bowen is a 68 year old female with PMH sig for CKD, COPD, DM, HTN, HLD, PVD, and sickle cell anemia who was recently admitted last week for right lower extremity femoral to popliteal bypass who presented with bleeding from surgical site. Initially hypotensive in EMS, labs pending, BG elevated.         Hemorrhage from recent surgical site  PAD  - s/p recent fem pop bypass 1/24/24 with Dr. Murrieta  - has been on ASA/plavix- hold today  - profuse bleeding today  - Hg 6.0, pRBC ordered in ED  - vascular surgery consulted, plan for emergent OR today     Acute blood loss anemia  Chronic anemia  Sickle cell anemia  - outpatient f/u  - Hg 8.9 on discharge last week  - Hg pending, likely will need transfusion     DM2, hyperglycemia  - hold PO medications  - BG 500s in ED, pH 7.2  - insulin gtt ordered in ED  - was started on lantus 6 units BID last admission, will reorder when weaned from insulin gtt     Hypotension  Hx HTN  - previously with uncontrolled HTN on prior admit  - BP low likely 2/2 hemorrhage  - BP labile, will likely need A-line  - hold home meds     Metabolic acidosis  - likely 2/2 LA from blood loss  - pH 7.24 on admit, LA 7.6     COPD  - chronic, stable  -resume inhaler     OMEGA on CKD3  - cr 1.7 on admit, previously 1.2 on discharge last week  - monitor Cr and  UOP     FN:  - IVF: NS, blood transfusion  - Diet: NPO for OR     DVT Prophy:   Lines: PIV     Dispo: pending clinical course      ID CONSULT  Assessment and Plan:     Acute gram negative sepsis presenting with a bleeding complication after recent fem-pop bypass 1/24/24  - s/p emergent OR where extensive debridement and patch was performed - noting infected graft material  - Blood culture positive for klebsiella on admission, repeats negative  - OR cultures from debridement with klebsiella, enterococcus, strep anginosus, e.coli  - IV meropenem and vancomycin ongoing day #2 post-op  - Additional OR anticipated     2.  Acute blood loss anemia, hemorrhage as a complication from recent bypass procedure  - ICU management ongoing, transfusions as needed     3.  DM II  - Possible DKA component with acidosis  - Management in the ICU     4.  Leukocytosis  - Multifactorial due to the above  - At 27K, will trend  - C.diff is negative     5.  Disposition - inpatient.  Continue broad spectrum antibiotics.  F/u MICs of each isolate for antibiotic adjustments as needed.  Trending temp and WBCs.  Ventilator wean as able.  Expect PICC and long course IV antibiotics given graft infection.  Further vascular surgery anticipated.  Trending temps and WBCs.  Will follow with further recommendations.       Dayana Cid DO, [facility] Infectious Disease       2/12/2024  3:46 PM    OR REPORT  Brief Op Note signed by Kp Justice MD at 2/10/2024  8:57 PM    Author: Kp Justice MD Service: Vascular Surgery Author Type: Physician   Filed: 2/10/2024  8:57 PM Date of Service: 2/10/2024  8:51 PM Status: Signed   : Kp Justice MD (Physician)     Pre-Operative Diagnosis: Anastomotic hemorrhage right fem popliteal bypass     Post-Operative Diagnosis: same      Procedure Performed:   Removal of infected graft  Debridement of right common femoral artery, removal of proximal SFA stent rings that were caging  profunda, profunda endarterectomy, thrombectomy of external iliac artery, common femoral artery and profunda  Bovine patch angioplasty of common femoral artery and profunda   Debridement of skin subcutaneous fat   Sartorius myoplasty   Saphenous vein harvest - not adequate     Surgeon(s) and Role:     * Kp Justice MD - Primary     Assistant(s):  Surgical Assistant.: Cat Chand     Surgical Findings:   Grossly infected - irrigated with irricept and dakins - wound left open and packed with dakins guaze  Multiple specimens for culture  Doppler profunda signal at completion      Specimen: multiple specimens for culture     Estimated Blood Loss: 300 mL      Kp Justice MD  2/10/2024  8:51 PM                    OR REPORT    Brief Op Note signed by Kp Justice MD at 2/13/2024 12:37 PM    Author: Kp Justice MD Service: Vascular Surgery Author Type: Physician   Filed: 2/13/2024 12:37 PM Date of Service: 2/13/2024 12:32 PM Status: Signed   : Kp Justice MD (Physician)     Pre-Operative Diagnosis: Groin wound dehiscence and infection     Post-Operative Diagnosis: Groin wound dehiscence and infection     Procedure Performed:   Sharp and versajet debridement of skin subcutaneous fat of right abdominal wall groin and thigh [id number] cm  Sharp debridement of skin and subcataneous fat of popliteal incision 10x4 cm  Removal of infected graft  Wound vac placement     Surgeon(s) and Role:     * Kp Justice MD - Primary     Assistant(s):  Surgical Assistant.: Rosas Pineda     Surgical Findings:   Debrided to healthy bleeding tissues in groin   Muscle flap viable and secure  Gastrocnemius muscle viable and responded to bovie   Left 1 cm cuff of graft - back bleeding from popliteal artery      Specimen: graft, tissue and fluid for culture      Estimated Blood Loss: Blood Output: 300 mL (2/10/2024 10:03 PM)     Kp Justice MD  2/13/2024  12:32 PM              2/11  [facility] Hospitalist Progress  Note      CC: Hospital Follow up     PCP: Ernesto De Dios         Assessment/Plan:      Principal Problem:    Bypass graft mechanical complication, initial encounter (HCC)  Active Problems:    Hyperglycemia    Bypass graft mechanical complication (HCC)    Anemia, unspecified type    Acute kidney injury superimposed on CKD  (HCC)       Ms. Bowen is a 68 year old female with PMH sig for CKD, COPD, DM, HTN, HLD, PVD, and sickle cell anemia who was recently admitted last week for right lower extremity femoral to popliteal bypass who presented with bleeding from surgical site. Taken for emergent OR on 2/10/24     Hemorrhage from recent surgical site  Infected surgical site  PAD  - s/p recent fem pop bypass 1/24/24 with Dr. Murrieta  - has been on ASA/plavix- hold today  - profuse bleeding today  - Hg 6.0, pRBC ordered in ED  - vascular surgery consulted, s/p emergent OR 2/10, removal of infected graft, s/p debridement, bone patch angioplasty   - will need further debridement per vascular surgery  - continue IV vancomycin and meropenem     Acute blood loss anemia  Chronic anemia  Sickle cell anemia  - outpatient f/u  - Hg 8.9 on discharge last week  - Hg 6.0 on admission, s/p 3 units pRBC  - monitor     ?DKA   DM2, hyperglycemia  - hold PO medications  - BG 500s in ED, pH 7.2  - insulin gtt ordered in ED  - endocrine consulted     Respiratory failure  - remained intubated post op  - s/p large emesis this AM while intubated  - pulm following  - vent weaning per pulm     Hypotension  Hx HTN  - previously with uncontrolled HTN on prior admit  - BP low likely 2/2 hemorrhage  - BP labile, will likely need A-line  - hold home meds     Metabolic acidosis  - likely 2/2 LA from blood loss  - pH 7.24 on admit, LA 7.6     COPD  - chronic, stable  -resume inhaler     OMEGA on CKD3  - cr 1.7 on admit, previously 1.2 on discharge last week  - monitor Cr and UOP     FN:  - IVF: NS  - Diet: NPO     DVT Prophy:   Lines: PIV     Dispo:  ICU     Outpatient records or previous hospital records reviewed.      Further recommendations pending patient's clinical course.  [facility] hospitalist to continue to follow patient while in house     Patient and/or patient's family given opportunity to ask questions and note understanding and agreeing with therapeutic plan as outlined     Lissa Lee MD  [facility] Hospitalist  Answering Service number: [phone]         Subjective:      S/p OR yesterday, remained intubated overnight. Had large amount of emesis this AM.      OBJECTIVE:     Blood pressure 144/89, pulse 73, temperature 98.8 °F (37.1 °C), resp. rate 25, weight 182 lb 5.1 oz (82.7 kg), SpO2 100%, not currently breastfeeding.     Temp:  [92.3 °F (33.5 °C)-98.9 °F (37.2 °C)] 98.8 °F (37.1 °C)  Pulse:  [] 73  Resp:  [15-27] 25  BP: ()/(43-97) 144/89  SpO2:  [94 %-100 %] 100 %  AO: (107-161)/() 145/59  FiO2 (%):  [40 %-60 %] 40 %        Intake/Output:     Intake/Output Summary (Last 24 hours) at 2/11/2024 0930  Last data filed at 2/11/2024 0837      Gross per 24 hour   Intake 7307.64 ml   Output 1610 ml   Net 5697.64 ml              Last 3 Weights   02/11/24 0600 182 lb 5.1 oz (82.7 kg)   02/10/24 2142 171 lb 1.2 oz (77.6 kg)   02/10/24 2105 171 lb 1.2 oz (77.6 kg)   02/10/24 1642 164 lb 3.9 oz (74.5 kg)   01/27/24 0501 164 lb 3.2 oz (74.5 kg)   01/24/24 0549 158 lb (71.7 kg)   01/19/24 1112 160 lb (72.6 kg)   12/30/23 1216 155 lb (70.3 kg)   08/29/23 1357 153 lb (69.4 kg)      Exam   GEN: female, intubated, sedated  HEENT: ETT  Pulm: mechanical breath soudns  CV: RRR, no murmurs  ABD: Soft, non-tender, non-distended, +BS  SKIN: RLE warm to touch except R foot cool  EXT: no edema        Data Review:       Labs:            Recent Labs   Lab 02/10/24  1550 02/10/24  2151 02/11/24  0543   RBC 2.17* 3.11* 3.16*   HGB 6.0* 9.6* 9.3*   HCT 18.9* 26.6* 26.0*   MCV 87.1 85.5 82.3   MCH 27.6 30.9 29.4   MCHC 31.7 36.1 35.8   RDW 14.5 13.9 13.9    NEPRELIM 11.87* 19.63*  --    WBC 16.4* 22.0* 22.3*   .0 218.0 271.0                  Recent Labs   Lab 02/10/24  1551 02/10/24  2151 02/11/24  0543   * 213* 150*   BUN 30* 26* 25*   CREATSERUM 1.77* 1.34* 1.25*   EGFRCR 31* 44* 48*   CA 8.5* 8.4* 8.1*    144 145   K 4.3 3.3* 3.8  3.8    119* 120*   CO2 12.0* 14.0* 15.0*             Recent Labs   Lab 02/10/24  2151   ALT 38   AST 91*   ALB 2.3*            Imaging:  XR CHEST AP PORTABLE  (CPT=71045)     Result Date: 2/11/2024  CONCLUSION:          Interval placement of enteric tube with the tip in the region the gastric body.  Mild left basilar linear atelectasis.      Dictated by (CST): Krunal Tay MD on 2/11/2024 at 9:19 AM     Finalized by (CST): Krunal Tay MD on 2/11/2024 at 9:21 AM           XR CHEST AP PORTABLE  (CPT=71045)     Result Date: 2/11/2024  CONCLUSION:        ETT 1.7 cm above the frida.  Right IJ catheter in the upper SVC.  No pneumothorax  [facility] provided a prelim report for this exam. This final report has no significant discrepancies with the [facility] report. .    Dictated by (CST): Ronak Leonard MD on 2/11/2024 at 6:34 AM     Finalized by (CST): Ronak Leonard MD on 2/11/2024 at 6:37 AM               Meds:       vancomycin  15 mg/kg Intravenous Once    pantoprazole  40 mg Intravenous Q24H    fluticasone furoate-vilanterol  1 puff Inhalation Daily    [Held by provider] rosuvastatin  40 mg Oral Nightly    chlorhexidine gluconate  15 mL Mouth/Throat BID@0800,2000    enoxaparin  40 mg Subcutaneous Daily    meropenem  500 mg Intravenous q12h    Vancomycin IV  1 each Intravenous See Admin Instructions (RX holding)       dextrose 5%-sodium chloride 0.9% 100 mL/hr at 02/11/24 0013    insulin regular 1.5 Units/hr (02/11/24 5472)    sodium chloride      fentanyl      dexmedetomidine      propofol 50 mcg/kg/min (02/11/24 4228)    vasopressin (Vasostrict) 20 Units in sodium chloride 0.9% 100 mL infusion for  septic shock      norepinephrine      niCARdipine 12 mg/hr (02/11/24 0845)      2/12  [facility] Hospitalist Progress Note      CC: Hospital Follow up     PCP: Ernesto De Dios         Assessment/Plan:      Principal Problem:    Bypass graft mechanical complication, initial encounter ([facility])  Active Problems:    Hyperglycemia    Bypass graft mechanical complication (HCC)    Anemia, unspecified type    Acute kidney injury superimposed on CKD  ([facility])       Ms. Bowen is a 68 year old female with PMH sig for CKD, COPD, DM, HTN, HLD, PVD, and sickle cell anemia who was recently admitted last week for right lower extremity femoral to popliteal bypass who presented with bleeding from surgical site. Taken for emergent OR on 2/10/24, noted to be grossly infected, started on vancomycin/meropenem. Bcx with klebsiella. BG elevated on admit, started on insulin gtt, endo consulted, now off insulin gtt. Kept intubated post op, pulm following.      Hemorrhage from recent surgical site  PAD  - s/p recent fem pop bypass 1/24/24 with Dr. Murrieta  - has been on ASA/plavix- held on admit  - Hg 6.0, s/p 3 units pRBC 2/10  - vascular surgery consulted, s/p emergent OR 2/10, removal of infected graft, s/p debridement, bone patch angioplasty      Infected surgical site, bacteremia  - continue IV vancomycin and meropenem, ID consult  - Bcx with klebsiella, repeat Bcx ordered  - will need further debridement per vascular surgery     Metabolic acidosis- improving  - likely 2/2 LA from blood loss  - pH 7.24 on admit, LA 7.6  - likely multifactorial, 2/2 LA, blood loss, ?DKA, CKD  - renal consulted, started on bicarb gtt     Acute blood loss anemia  Chronic anemia  Sickle cell anemia  - outpatient f/u  - Hg 8.9 on discharge last week  - Hg 6.0 on admission, s/p 3 units pRBC  - monitor     ?DKA   DM2, hyperglycemia  - hold PO medications  - BG 500s in ED, pH 7.2  - insulin gtt ordered in ED  - endocrine consulted     Respiratory failure  - remained  intubated post op  - s/p large emesis this AM while intubated  - pulm following  - vent weaning per pulm     Hypotension  Hx HTN  - previously with uncontrolled HTN on prior admit  - BP low likely 2/2 hemorrhage  - BP labile, A-line  - initially on levophed, now on nicardipene gtt   - restart home meds when off NGT to LIS  - cardiology consult     COPD  - chronic, stable  -resume inhaler     OMEGA on CKD3  - cr 1.7 on admit, previously 1.2 on discharge last week  - monitor Cr and UOP     ACP  - CODE- FULL   - POA- daughter and son, updated daughter Tiffanie 2/12     FN:  - IVF: NS  - Diet: NPO     DVT Prophy:   Lines: PIV     Dispo: ICU     Outpatient records or previous hospital records reviewed.      Further recommendations pending patient's clinical course.  [facility] hospitalist to continue to follow patient while in house     Patient and/or patient's family given opportunity to ask questions and note understanding and agreeing with therapeutic plan as outlined     Lissa Lee MD  [facility] Hospitalist  Answering Service number: [phone]    Critical care > 35 mins      Subjective:      Bcx + klebsiella, continued intubation due to acidosis     OBJECTIVE:     Blood pressure 146/60, pulse 80, temperature 99.1 °F (37.3 °C), temperature source Bladder, resp. rate 26, height 5' 4.02\" (1.626 m), weight 180 lb 5.4 oz (81.8 kg), SpO2 93%, not currently breastfeeding.     Temp:  [97.5 °F (36.4 °C)-99.1 °F (37.3 °C)] 99.1 °F (37.3 °C)  Pulse:  [70-80] 80  Resp:  [20-26] 26  BP: (108-157)/(56-69) 146/60  SpO2:  [91 %-100 %] 93 %  AO: (146-170)/(49-77) 146/54  FiO2 (%):  [30 %-40 %] 30 %        Intake/Output:     Intake/Output Summary (Last 24 hours) at 2/12/2024 0919  Last data filed at 2/12/2024 0536      Gross per 24 hour   Intake 3737.89 ml   Output 1395 ml   Net 2342.89 ml              Last 3 Weights   02/12/24 0400 180 lb 5.4 oz (81.8 kg)   02/11/24 0600 182 lb 5.1 oz (82.7 kg)   02/10/24 2142 171 lb 1.2 oz (77.6 kg)    02/10/24 2105 171 lb 1.2 oz (77.6 kg)   02/10/24 1642 164 lb 3.9 oz (74.5 kg)   01/27/24 0501 164 lb 3.2 oz (74.5 kg)   01/24/24 0549 158 lb (71.7 kg)   01/19/24 1112 160 lb (72.6 kg)   12/30/23 1216 155 lb (70.3 kg)   08/29/23 1357 153 lb (69.4 kg)      Exam   GEN: female, intubated, sedated  HEENT: ETT  Pulm: mechanical breath soudns  CV: RRR, no murmurs  ABD: Soft, non-tender, non-distended, +BS  SKIN: RLE warm to touch except R foot cool  EXT: no edema        Data Review:       Labs:             Recent Labs   Lab 02/11/24  1622 02/11/24 2116 02/12/24  0015 02/12/24  0551   RBC 2.73*  --  3.31* 3.50*   HGB 8.0* 10.0* 10.0* 9.9*   HCT 22.6* 27.6* 27.4* 29.5*   MCV 82.8  --  82.8 84.3   MCH 29.3  --  30.2 28.3   MCHC 35.4  --  36.5 33.6   RDW 14.5  --  14.7 14.8   NEPRELIM 18.55*  --  23.83* 25.24*   WBC 21.4*  --  26.6* 27.9*   .0  --  274.0 296.0                  Recent Labs   Lab 02/11/24 2053 02/12/24  0015 02/12/24  0551   * 229* 264*  264*   BUN 19 19 19  19   CREATSERUM 1.25* 1.28* 1.27*  1.27*   EGFRCR 48* 46* 47*  47*   CA 7.2* 7.4* 7.4*  7.4*    143 144  144   K 4.3 4.6  4.6 4.2  4.2  4.2   * 120* 120*  120*   CO2 14.0* 12.0* 13.0*  13.0*               Recent Labs   Lab 02/10/24 2151 02/11/24 2053 02/12/24  0551   ALT 38  --   --    AST 91*  --   --    ALB 2.3* 2.3* 2.3*            Imaging:  XR CHEST AP PORTABLE  (CPT=71045)     Result Date: 2/11/2024  CONCLUSION:          Interval placement of enteric tube with the tip in the region the gastric body.  Mild left basilar linear atelectasis.      Dictated by (CST): Krunal Tay MD on 2/11/2024 at 9:19 AM     Finalized by (CST): Krunal Tay MD on 2/11/2024 at 9:21 AM           XR CHEST AP PORTABLE  (CPT=71045)     Result Date: 2/11/2024  CONCLUSION:        ETT 1.7 cm above the frida.  Right IJ catheter in the upper SVC.  No pneumothorax  [facility] provided a prelim report for this exam. This final  report has no significant discrepancies with the [facility] report. .    Dictated by (CST): Ronak Leonard MD on 2/11/2024 at 6:34 AM     Finalized by (CST): Ronak Leonard MD on 2/11/2024 at 6:37 AM               Meds:       insulin degludec  30 Units Subcutaneous Nightly    insulin regular human  1-7 Units Subcutaneous 4 times per day    pantoprazole  40 mg Intravenous Q24H    vancomycin  12.5 mg/kg Intravenous Q24H    fluticasone furoate-vilanterol  1 puff Inhalation Daily    [Held by provider] rosuvastatin  40 mg Oral Nightly    chlorhexidine gluconate  15 mL Mouth/Throat BID@0800,2000    enoxaparin  40 mg Subcutaneous Daily    meropenem  500 mg Intravenous q12h       sodium bicarbonate 75 mEq in dextrose 5% 1,000 mL infusion 100 mL/hr at 02/12/24 0455    niCARdipine 15 mg/hr (02/12/24 0238)    insulin regular Stopped (02/11/24 1711)    fentanyl      dexmedetomidine      propofol 50 mcg/kg/min (02/12/24 0647)      [DISCONTINUED] acetaminophen **OR** HYDROcodone-acetaminophen **OR** HYDROcodone-acetaminophen, ondansetron, metoclopramide, morphINE **OR** morphINE **OR** morphINE, albuterol, ipratropium-albuterol, fentaNYL **OR** fentaNYL, acetaminophen **OR** acetaminophen **OR** acetaminophen **OR** acetaminophen, fentaNYL (Sublimaze) **OR** fentaNYL (Sublimaze), fentanyl, polyethylene glycol (PEG 3350), senna, bisacodyl, fleet enema, midazolam, sodium hypochlorite, glucose **OR** glucose **OR** glucose-vitamin C **OR** dextrose **OR** glucose **OR** glucose **OR** glucose-vitamin C        2/13  [facility] Hospitalist Progress Note      CC: Hospital Follow up     PCP: Ernesto De Dios         Assessment/Plan:      Principal Problem:    Bypass graft mechanical complication, initial encounter (HCC)  Active Problems:    Hyperglycemia    Bypass graft mechanical complication (HCC)    Anemia, unspecified type    Acute kidney injury superimposed on CKD  (HCC)    Ms. Bowen is a 68 year old female with PMH sig for CKD, COPD, DM,  HTN, HLD, PVD, and sickle cell anemia who was recently admitted last week for right lower extremity femoral to popliteal bypass who presented with bleeding from surgical site. Taken for emergent OR on 2/10/24, noted to be grossly infected, started on vancomycin/meropenem. Bcx with klebsiella. BG elevated on admit, started on insulin gtt, endo consulted, now off insulin gtt. Kept intubated post op, pulm following.      Hemorrhage from recent surgical site  PAD  - s/p recent fem pop bypass 1/24/24 with Dr. Murrieta  - has been on ASA/plavix- held on admit  - Hg 6.0, s/p 3 units pRBC 2/10  - vascular surgery consulted, s/p emergent OR 2/10, removal of infected graft, s/p debridement, bone patch angioplasty   - plan for debridement 2/13/24     Infected surgical site, bacteremia  - continue IV vancomycin and meropenem, ID consult  - Bcx with klebsiella, repeat Bcx ordered  - will need further debridement per vascular surgery     Metabolic acidosis- improving  - likely 2/2 LA from blood loss  - pH 7.24 on admit, LA 7.6 on admit, now normalized  - likely multifactorial, 2/2 LA, blood loss, ?DKA, CKD  - renal consulted, started on bicarb gtt     Acute blood loss anemia  Chronic anemia  Sickle cell anemia  - outpatient f/u  - Hg 8.9 on discharge last week  - Hg 6.0 on admission, s/p 3 units pRBC  - monitor     ?DKA   DM2, hyperglycemia  - hold PO medications  - BG 500s in ED, pH 7.2  - insulin gtt ordered in ED  - endocrine consulted     Respiratory failure  - remained intubated post op  - s/p large emesis this AM while intubated  - pulm following  - vent weaning per pulm     Hypotension  Hx HTN  - previously with uncontrolled HTN on prior admit  - BP low likely 2/2 hemorrhage  - BP labile, A-line  - initially on levophed, now on nicardipene gtt   - restart home meds when off NGT to LIS  - cardiology consult     COPD  - chronic, stable  -resume inhaler     OMEGA on CKD3  - cr 1.7 on admit, previously 1.2 on discharge last week  -  monitor Cr and UOP     ACP  - CODE- FULL   - POA- daughter and son, updated daughter Tiffanie 2/12     FN:  - IVF: NS  - Diet: NPO     DVT Prophy:   Lines: PIV     Dispo: ICU     Outpatient records or previous hospital records reviewed.      Further recommendations pending patient's clinical course.  [facility] hospitalist to continue to follow patient while in house     Patient and/or patient's family given opportunity to ask questions and note understanding and agreeing with therapeutic plan as outlined     Lissa Lee MD  [facility] Hospitalist  Answering Service number: [phone]      Subjective:      No events overnight, was weaned off nicardipene gtt overnight but back on this AM.      OBJECTIVE:     Blood pressure 144/61, pulse 68, temperature 98.2 °F (36.8 °C), temperature source Bladder, resp. rate 26, height 5' 4.02\" (1.626 m), weight 183 lb 3.2 oz (83.1 kg), SpO2 92%, not currently breastfeeding.     Temp:  [98.2 °F (36.8 °C)-100.4 °F (38 °C)] 98.2 °F (36.8 °C)  Pulse:  [65-79] 68  Resp:  [25-31] 26  BP: (123-153)/(53-65) 144/61  SpO2:  [91 %-94 %] 92 %  AO: (117-168)/(47-75) 150/53  FiO2 (%):  [30 %] 30 %        Intake/Output:     Intake/Output Summary (Last 24 hours) at 2/13/2024 0922  Last data filed at 2/13/2024 0556      Gross per 24 hour   Intake 3947.4 ml   Output 1125 ml   Net 2822.4 ml              Last 3 Weights   02/13/24 0500 183 lb 3.2 oz (83.1 kg)   02/12/24 0400 180 lb 5.4 oz (81.8 kg)   02/11/24 0600 182 lb 5.1 oz (82.7 kg)   02/10/24 2142 171 lb 1.2 oz (77.6 kg)   02/10/24 2105 171 lb 1.2 oz (77.6 kg)   02/10/24 1642 164 lb 3.9 oz (74.5 kg)   01/27/24 0501 164 lb 3.2 oz (74.5 kg)   01/24/24 0549 158 lb (71.7 kg)   01/19/24 1112 160 lb (72.6 kg)   12/30/23 1216 155 lb (70.3 kg)   08/29/23 1357 153 lb (69.4 kg)      Exam   GEN: female, intubated, sedated  HEENT: ETT  Pulm: mechanical breath soudns  CV: RRR, no murmurs  ABD: Soft, non-tender, non-distended, +BS  SKIN: RLE warm to touch except R  foot cool  EXT: no edema     Data Review:       Labs:            Recent Labs   Lab 02/12/24  1623 02/12/24  2353 02/13/24  0802   RBC 3.20* 3.03* 3.21*   HGB 9.5* 9.0* 9.5*   HCT 26.4* 24.3* 26.0*   MCV 82.5 80.2 81.0   MCH 29.7 29.7 29.6   MCHC 36.0 37.0 36.5   RDW 15.0 15.1* 15.3*   NEPRELIM 24.94* 23.28* 25.95*   WBC 28.2* 26.3* 30.2*   .0 262.0 283.0                  Recent Labs   Lab 02/12/24  1805 02/12/24 2353 02/13/24  0659   * 187* 113*   BUN 17 18 17   CREATSERUM 1.26* 1.19* 1.05*   EGFRCR 47* 50* 59*   CA 7.2* 7.2* 7.2*    141 142   K 4.0 3.7 3.5   * 117* 116*   CO2 15.0* 15.0* 16.0*               Recent Labs   Lab 02/10/24  2151 02/11/24 2053 02/12/24  0551   ALT 38  --   --    AST 91*  --   --    ALB 2.3* 2.3* 2.3*            Imaging:  XR CHEST AP PORTABLE  (CPT=71045)     Result Date: 2/11/2024  CONCLUSION:          Interval placement of enteric tube with the tip in the region the gastric body.  Mild left basilar linear atelectasis.      Dictated by (CST): Krunal Tay MD on 2/11/2024 at 9:19 AM     Finalized by (CST): Krunal Tay MD on 2/11/2024 at 9:21 AM           XR CHEST AP PORTABLE  (CPT=71045)     Result Date: 2/11/2024  CONCLUSION:        ETT 1.7 cm above the frida.  Right IJ catheter in the upper SVC.  No pneumothorax  [facility] provided a prelim report for this exam. This final report has no significant discrepancies with the [facility] report. .    Dictated by (CST): Ronak Leonard MD on 2/11/2024 at 6:34 AM     Finalized by (CST): Ronak Leonard MD on 2/11/2024 at 6:37 AM               Meds:       insulin degludec  30 Units Subcutaneous Nightly    insulin regular human  1-7 Units Subcutaneous 4 times per day    sodium hypochlorite   Topical 2 times per day    metoprolol  5 mg Intravenous Q6H    amLODIPine  10 mg Per NG Tube QAM    hydrALAZINE  100 mg Per NG Tube Q8H    pantoprazole  40 mg Intravenous Q24H    vancomycin  12.5 mg/kg Intravenous Q24H     fluticasone furoate-vilanterol  1 puff Inhalation Daily    [Held by provider] rosuvastatin  40 mg Oral Nightly    chlorhexidine gluconate  15 mL Mouth/Throat BID@0800,2000    enoxaparin  40 mg Subcutaneous Daily    meropenem  500 mg Intravenous q12h       sodium bicarbonate in D5W infusion      niCARdipine 5 mg/hr (02/13/24 0703)    insulin regular Stopped (02/11/24 1711)    fentanyl      dexmedetomidine      propofol 35 mcg/kg/min (02/13/24 0447)         MEDICATIONS ADMINISTERED IN LAST 1 DAY:  acetaminophen (Ofirmev) 10 mg/mL infusion premix 1,000 mg       Date Action Dose Route User    2/14/2024 0322 New Bag 1,000 mg Intravenous Vi Smiley RN    2/13/2024 1255 New Bag 1,000 mg Intravenous Jodi Benavides, JESSIE          amLODIPine (Norvasc) tab 10 mg       Date Action Dose Route User    2/14/2024 0844 Given 10 mg Per NG Tube Anna Painting RN    2/13/2024 1302 Given 10 mg Per NG Tube Jodi Benavides RN          carvedilol (Coreg) tab 12.5 mg       Date Action Dose Route User    2/14/2024 0844 Given 12.5 mg Per NG Tube Anna Painting RN    2/13/2024 2016 Given 12.5 mg Per NG Tube Vi Smiley RN          chlorhexidine gluconate (Peridex) 0.12 % oral solution 15 mL       Date Action Dose Route User    2/14/2024 0844 Given 15 mL Mouth/Throat Anna Painting RN    2/13/2024 2016 Given 15 mL Mouth/Throat Vi Smiley RN          dextrose 50% injection 50 mL       Date Action Dose Route User    2/13/2024 2100 Given 50 mL Intravenous Vi Smiley RN    2/13/2024 1702 Given 50 mL Intravenous Tiffanie Arellano RN    2/13/2024 1400 Given 50 mL Intravenous Tiffanie Arellano RN          enoxaparin (Lovenox) 40 MG/0.4ML SUBQ injection 40 mg       Date Action Dose Route User    2/14/2024 0845 Given 40 mg Subcutaneous (Right Lower Abdomen) Anna Painting RN    2/13/2024 1303 Given 40 mg Subcutaneous (Left Lower Abdomen) Benavides, Zapata, RN          fentaNYL (Sublimaze) 50 mcg/mL injection 50 mcg       Date Action Dose  Route User    2/14/2024 0844 Given 50 mcg Intravenous Anna Painting RN    2/14/2024 0520 Given 50 mcg Intravenous Vi Smiley RN    2/13/2024 2201 Given 50 mcg Intravenous Vi Smiley RN    2/13/2024 1737 Given 50 mcg Intravenous Tiffanie Arellano RN    2/13/2024 1529 Given 50 mcg Intravenous Jodi Benavides RN    2/13/2024 1252 Given 50 mcg Intravenous Jodi Benavides RN    2/13/2024 1002 Given 50 mcg Intravenous Tiffanie Arellano RN          fentaNYL (Sublimaze) 50 mcg/mL injection       Date Action Dose Route User    2/13/2024 1218 Given 50 mcg Intravenous Sadi Burt MD    2/13/2024 1059 Given 50 mcg Intravenous Sadi Burt MD          glycopyrrolate (Robinul) 0.2 MG/ML injection       Date Action Dose Route User    2/13/2024 1218 Given 0.4 mg Intravenous Sadi Burt MD          hydrALAZINE (Apresoline) tab 100 mg       Date Action Dose Route User    2/14/2024 0618 Given 100 mg Per NG Tube Vi Smiley RN    2/13/2024 2203 Given 100 mg Per NG Tube Vi Smiley RN    2/13/2024 1602 Given 100 mg Per NG Tube Jodi Benavides RN          lactated ringers infusion       Date Action Dose Route User    2/13/2024 1052 New Bag (none) Intravenous Sadi Burt MD          meropenem (Merrem) 500 mg in sodium chloride 0.9% 100 mL IVPB-MBP       Date Action Dose Route User    2/14/2024 0042 New Bag 500 mg Intravenous Vi Smiley RN    2/13/2024 1303 New Bag 500 mg Intravenous Jodi Benavides RN          metoprolol (Lopressor) 5 mg/5mL injection 5 mg       Date Action Dose Route User    2/13/2024 1200 Given 5 mg Intravenous Tiffanie Arellano RN          neostigmine (Bloxiverz) 10 mg/10mL injection       Date Action Dose Route User    2/13/2024 1218 Given 4 mg Intravenous Sadi Burt MD          pantoprazole (Protonix) 40 mg in sodium chloride 0.9% PF 10 mL IV push       Date Action Dose Route User    2/14/2024 0841 Given 40 mg Intravenous Anna Painting, RN           potassium chloride (Klor-Con) 20 MEQ oral powder 40 mEq       Date Action Dose Route User    2/13/2024 2016 Given 40 mEq Oral Vi Smiley RN          propofol (Diprivan) 10 mg/mL infusion premix       Date Action Dose Route User    2/14/2024 0118 New Bag 25 mcg/kg/min × 77.6 kg (Dosing Weight) Intravenous Vi Smiley RN    2/13/2024 1830 New Bag 25 mcg/kg/min × 77.6 kg (Dosing Weight) Intravenous Tiffanie Arellano RN    2/13/2024 1450 Rate/Dose Change 25 mcg/kg/min × 77.6 kg (Dosing Weight) Intravenous Tiffanie Arellano RN    2/13/2024 1025 New Bag 35 mcg/kg/min × 77.6 kg (Dosing Weight) Intravenous Tiffanie Arellano RN          propofol (Diprivan) 10 mg/mL infusion premix       Date Action Dose Route User    2/13/2024 1213 New Bag 35 mcg/kg/min × 83.1 kg Intravenous Sadi Burt MD          rocuronium (Zemuron) 50 mg/5mL injection       Date Action Dose Route User    2/13/2024 1059 Given 20 mg Intravenous Sadi Burt MD          sodium bicarbonate 150 mEq in dextrose 5% 1,000 mL infusion       Date Action Dose Route User    2/13/2024 2154 New Bag 150 mEq Intravenous Vi Smiley RN    2/13/2024 0958 New Bag 150 mEq Intravenous Tiffanie Arellano RN            Vitals (last day)       Date/Time Temp Pulse Resp BP SpO2 Weight O2 Device O2 Flow Rate (L/min) Who    02/14/24 0800 99.9 °F (37.7 °C) 72 20 145/50 98 % -- -- -- MW    02/14/24 0700 99.7 °F (37.6 °C) 78 23 148/62 98 % -- Ventilator -- MW    02/14/24 0600 99.5 °F (37.5 °C) 77 25 154/70 98 % -- Ventilator -- SF    02/14/24 0500 99.5 °F (37.5 °C) 71 20 146/59 99 % 185 lb 13.6 oz Ventilator -- SF    02/14/24 0400 100.8 °F (38.2 °C) 71 20 129/57 97 % -- Ventilator -- SF    02/14/24 0300 100.8 °F (38.2 °C) 75 23 141/61 98 % -- Ventilator -- SF    02/14/24 0200 100.6 °F (38.1 °C) 76 22 143/62 98 % -- Ventilator -- SF    02/14/24 0100 100.4 °F (38 °C) 75 23 140/59 97 % -- Ventilator -- SF    02/14/24 0000 100.4 °F (38 °C) 74  21 138/60 97 % -- Ventilator -- SF    02/13/24 2300 100.2 °F (37.9 °C) 71 20 139/56 98 % -- Ventilator -- SF    02/13/24 2200 100 °F (37.8 °C) 74 23 143/62 97 % -- Ventilator -- SF    02/13/24 2100 99.7 °F (37.6 °C) 74 21 142/58 97 % -- Ventilator -- SF    02/13/24 2000 99.3 °F (37.4 °C) 73 23 143/62 97 % -- Ventilator -- SF    02/13/24 1900 99.1 °F (37.3 °C) 69 18 141/60 97 % -- Ventilator -- SF    02/13/24 1800 98.6 °F (37 °C) 64 16 143/53 97 % -- Ventilator -- VM    02/13/24 1700 98.2 °F (36.8 °C) 62 19 139/53 98 % -- Ventilator -- VM    02/13/24 1600 97.5 °F (36.4 °C) 58 20 129/53 97 % -- Ventilator -- VM    02/13/24 1500 96.6 °F (35.9 °C) 56 21 134/59 98 % -- Ventilator -- VM    02/13/24 1430 96.3 °F (35.7 °C) 55 22 130/66 98 % -- Ventilator -- VM    02/13/24 1400 95.9 °F (35.5 °C) 56 24 120/64 99 % -- Ventilator -- VM    02/13/24 1345 95.9 °F (35.5 °C) 57 21 129/58 99 % -- Ventilator -- VM    02/13/24 1330 95.9 °F (35.5 °C) 58 23 134/58 99 % -- Ventilator -- VM    02/13/24 1315 95.9 °F (35.5 °C) 60 20 137/67 99 % -- Ventilator -- VM    02/13/24 1300 96.1 °F (35.6 °C) 70 20 128/61 99 % -- Ventilator -- VM    02/13/24 1240 -- 83 -- 171/63 -- -- -- -- SM    02/13/24 1239 96.4 °F (35.8 °C) 73 14 141/51 99 % -- -- -- JK    02/13/24 0800 98.2 °F (36.8 °C) 68 26 144/61 92 % -- Ventilator -- VM    02/13/24 0700 98.2 °F (36.8 °C) 70 31 153/63 93 % -- Ventilator -- VM    02/13/24 0600 98.8 °F (37.1 °C) 65 27 128/57 93 % -- Ventilator -- SF    02/13/24 0500 98.8 °F (37.1 °C) 77 25 147/57 92 % 183 lb 3.2 oz Ventilator -- SF    02/13/24 0400 99 °F (37.2 °C) 76 28 141/57 93 % -- Ventilator -- SF    02/13/24 0300 99.3 °F (37.4 °C) 73 28 145/62 93 % -- Ventilator -- SF    02/13/24 0200 99.7 °F (37.6 °C) 74 29 145/62 93 % -- Ventilator -- SF    02/13/24 0100 100 °F (37.8 °C) 75 29 139/54 93 % -- Ventilator -- SF    02/13/24 0000 100.4 °F (38 °C) 71 25 123/54 92 % -- Ventilator -- SF          CIWA Scores (since admission)        None          Blood Transfusion Record       Product Unit Status Volume Start End            Transfuse RBC       24  [id number]  [id number] Completed 02/11/24 2031 295 mL 02/11/24 1842 02/11/24 2030       24  [id number]  [id number] Completed 02/10/24 2200 350 mL 02/10/24 1855 02/10/24 1905       24  [id number]  [id number] Completed 02/10/24 2200 350 mL 02/10/24 1813 02/10/24 1823       24  [id number]  [id number] Completed 02/10/24 2200 350 mL 02/10/24 1758 02/10/24 1808

## 2024-02-14 NOTE — PROGRESS NOTES
NEPHROLOGY DAILY PROGRESS NOTE     SUBJECTIVE:  Intubated, sedated   Febrile earlier this AM       OBJECTIVE:    Total Intake/Output:    Intake/Output Summary (Last 24 hours) at 2/14/2024 0825  Last data filed at 2/14/2024 0522  Gross per 24 hour   Intake 2871 ml   Output 1875 ml   Net 996 ml       PHYSICAL EXAM:  /62 (BP Location: Right arm)   Pulse 78   Temp 99.7 °F (37.6 °C) (Bladder)   Resp 23   Ht 5' 4.02\" (1.626 m)   Wt 185 lb 13.6 oz (84.3 kg)   LMP  (LMP Unknown)   SpO2 98%   BMI 31.88 kg/m²   GEN: intubated, sedated   HEENT: NCAT    CHEST: coarse, mechanical breath sounds   CARDIAC: S1S2 normal  ABD: soft, NT/ND  : joseph in place    EXT: + lower ext edema noted, wound vac noted- RLE     NEURO: sedated       CURRENT MEDICATIONS:     vancomycin  1,250 mg Intravenous Q36H    furosemide  40 mg Intravenous Once    carvedilol  12.5 mg Per NG Tube BID    insulin regular human  1-7 Units Subcutaneous 4 times per day    sodium hypochlorite   Topical 2 times per day    amLODIPine  10 mg Per NG Tube QAM    hydrALAZINE  100 mg Per NG Tube Q8H    pantoprazole  40 mg Intravenous Q24H    fluticasone furoate-vilanterol  1 puff Inhalation Daily    [Held by provider] rosuvastatin  40 mg Oral Nightly    chlorhexidine gluconate  15 mL Mouth/Throat BID@0800,2000    enoxaparin  40 mg Subcutaneous Daily    meropenem  500 mg Intravenous q12h             LABS:  Patient Labs Reviewed in Detail. Pertinent Labs as follows:  Recent Labs   Lab 02/13/24  1751 02/13/24  2325 02/14/24  0517   * 93 86   BUN 17 16 16   CREATSERUM 1.03* 0.97 0.97   EGFRCR 60 64 64   CA 7.1* 7.1* 7.3*    142 144   K 3.3* 3.7 3.8   * 115* 114*   CO2 20.0* 20.0* 22.0     Recent Labs   Lab 02/13/24  0802 02/13/24  1559 02/13/24  2325   RBC 3.21* 2.91* 2.83*   HGB 9.5* 8.3* 7.9*   HCT 26.0* 24.0* 23.4*   MCV 81.0 82.5 82.7   MCH 29.6 28.5 27.9   MCHC 36.5 34.6 33.8   RDW 15.3* 15.5* 15.6*   NEPRELIM 25.95* 24.79* 25.30*   WBC  30.2* 28.3* 28.3*   .0 266.0 281.0           IMAGING:  No results found.     ASSESSMENT AND PLAN:   68 year old female with PMH of COPD, DM2, HTN, HL, PVD, Sickle cell anemia, CKD stage 3, recent fem-pop artery bypass admitted with acute hemorrhage from incision site.  Patient is s/p OR for repair.  Nephrology is consulted for OMEGA and metabolic acidosis.       Metabolic acidosis:  - lactic acid initially 7.6, now down trending   - arterial pH now improved   - UA neg for ketones   - improved, bicarb gtt stopped     OMEGA on CKD stage 3  - baseline around 1.2  - creatinine currently below baseline, ok for IV lasix   - follow renal fxn and I/Os     Hypertension   - amlodipine, hydralazine, coreg  - weaned off nicardipine   - lisinopril held given recent OMEGA, can resume if needed for blood pressure control    - cards following     Hypernatremia:  - resolved     Hypokalemia/ hypomagnesemia:  - replete per protocol     Hypocalcemia:  - corrects to normal given hypoalbuminemia   - monitor Ca levels     Alverto RN     We will continue to follow Ava Gaston MD  [facility] - Nephrology

## 2024-02-14 NOTE — DIETARY NOTE
ADULT NUTRITION INITIAL ASSESSMENT    Pt is at high nutrition risk.  Pt does not meet malnutrition criteria.        RECOMMENDATIONS TO MD: See Nutrition Intervention for Enteral Nutrition (EN) rx. EN order set placed.     ADMITTING DIAGNOSIS:  Hyperglycemia [R73.9]  Bypass graft mechanical complication, initial encounter (Former[facility]) [T82.398A]  Acute kidney injury superimposed on CKD  ([facility]) [N17.9, N18.9]  Anemia, unspecified type [D64.9]  PERTINENT PAST MEDICAL HISTORY:   Past Medical History:   Diagnosis Date    Anemia     Back pain     Back problem     Cataract     Chronic kidney disease (CKD)     COPD (chronic obstructive pulmonary disease) ([facility])     FEV 1 1.25 50% 2/20    Diabetes ([facility])     DM neuropathy    Essential hypertension     H/O angioplasty     07/08/2020    High blood pressure     High cholesterol     Neuropathy     Peripheral vascular disease ([facility])     PNA (pneumonia) 05/2018    Pulmonary emphysema ([facility])     Pulmonary nodule     4 mm RUL    Renal disorder     monitoring kidney labs    Sickle cell trait ([facility])     Sickle-cell anemia ([facility])     Tobacco abuse     Visual impairment        PATIENT STATUS: Initial 02/14/24: Pt admit for Bleeding from surgical site (recent right lower extremity femoral to popliteal bypass).  PMH sig for CKD, COPD, DM, HTN, HLD, PVD, sickle cell anemia.   Pt intubated on 2/10 and currently sedated on Propofol 11.6 ml/hr (306 kcals via lipids). S/p wound debridement 2/13 with wound vac in place and 2/16 plan for debridement of rt groin wound with graft placement. Renal service following for OMEGA and metabolic acidosis, s/p bicarb GTT      Pt assessed due to consult for tube feeding. Day#4 NPO. OG tube for EN access. +BM. Net I/O: +9.8L with edema on lasix. Pt overweight for ht using UBW as % of IBW, appears well nourished.   Pt screened at no nutrition risk on admit/RN (no wt loss, no decline in po intake). RX tube feeds to meet initial nutrition needs during 1st week critical  illness.   FOOD/NUTRITION RELATED HISTORY:  Appetite: Good  Intake: N/A  Intake Meeting Needs: TF will meet needs once at goal rate  Percent Meals Eaten (last 3 days)       None           Food Allergies: No Known Food Allergies (NKFA)  Cultural/Ethnic/Gnosticism Preferences: Not Obtained    GASTROINTESTINAL: +BM 2/13 OG tube for EN access. On opioids and prn bowel regime.     MEDICATIONS: reviewed    niCARdipine Stopped (02/13/24 1430)    insulin regular Stopped (02/11/24 1711)    fentanyl      dexmedetomidine      propofol 25 mcg/kg/min (02/14/24 0858)        vancomycin  1,250 mg Intravenous Q36H    carvedilol  12.5 mg Per NG Tube BID    insulin regular human  1-7 Units Subcutaneous 4 times per day    sodium hypochlorite   Topical 2 times per day    amLODIPine  10 mg Per NG Tube QAM    hydrALAZINE  100 mg Per NG Tube Q8H    pantoprazole  40 mg Intravenous Q24H    fluticasone furoate-vilanterol  1 puff Inhalation Daily    [Held by provider] rosuvastatin  40 mg Oral Nightly    chlorhexidine gluconate  15 mL Mouth/Throat BID@0800,2000    enoxaparin  40 mg Subcutaneous Daily    meropenem  500 mg Intravenous q12h   PRN meds: hydrALAzine, ondansetron, metoclopramide, albuterol, ipratropium-albuterol, fentaNYL **OR** fentaNYL, acetaminophen **OR** acetaminophen **OR** acetaminophen **OR** acetaminophen, fentaNYL (Sublimaze) **OR** fentaNYL (Sublimaze), fentanyl, polyethylene glycol (PEG 3350), senna, bisacodyl, fleet enema, midazolam, glucose **OR** glucose **OR** glucose-vitamin C **OR** dextrose **OR** glucose **OR** glucose **OR** glucose-vitamin C    LABS: reviewed. Creat and acidosis improving. Insulin basal and Med TDD correction factor in place for BG control.   Recent Labs     02/11/24 2053 02/12/24  0015 02/12/24  0551 02/12/24  1225 02/13/24  0659 02/13/24  0954 02/13/24  1751 02/13/24  2325 02/14/24  0517   *   < > 264*  264*   < > 113*   < > 128* 93 86   BUN 19   < > 19  19   < > 17   < > 17 16 16    CREATSERUM 1.25*   < > 1.27*  1.27*   < > 1.05*   < > 1.03* 0.97 0.97   CA 7.2*   < > 7.4*  7.4*   < > 7.2*   < > 7.1* 7.1* 7.3*   MG 2.5  --  2.2  --  1.9  --   --   --   --       < > 144  144   < > 142   < > 140 142 144   K 4.3   < > 4.2  4.2  4.2   < > 3.5   < > 3.3* 3.7 3.8   *   < > 120*  120*   < > 116*   < > 114* 115* 114*   CO2 14.0*   < > 13.0*  13.0*   < > 16.0*   < > 20.0* 20.0* 22.0   PHOS 2.9  --  2.6  --  3.0  --   --   --  3.4   OSMOCALC 307*   < > 309*  309*   < > 296*   < > 293 295 298*    < > = values in this interval not displayed.       NUTRITION RELATED PHYSICAL FINDINGS:  - Nutrition Focused Physical Exam (NFPE): well nourished per visual exam  - Fluid Accumulation:  +1-2 LE edema, +1-2 feet.   see RN documentation for details  - Skin Integrity: intact see RN documentation for details    ANTHROPOMETRICS:  HT: 162.6 cm (5' 4.02\")  WT: 84.3 kg (185 lb 13.6 oz)  2/14. NOTE: 171 lbs 2/10 admit, 158 lbs 1/24 (estimate dry and UBW)    BMI: Body mass index is 31.88 kg/m².  BMI CLASSIFICATION: 30-34.9 kg/m2 - obesity class I  IBW: 120 lbs        154 % IBW  Usual Body Wt: 158 lbs 1/24/24       117% UBW d/t fluid gains.     WEIGHT HISTORY:  Patient Weight(s) for the past 336 hrs:   Weight   02/14/24 0500 84.3 kg (185 lb 13.6 oz)   02/13/24 0500 83.1 kg (183 lb 3.2 oz)   02/12/24 0400 81.8 kg (180 lb 5.4 oz)   02/11/24 0600 82.7 kg (182 lb 5.1 oz)   02/10/24 2142 77.6 kg (171 lb 1.2 oz)   02/10/24 2105 77.6 kg (171 lb 1.2 oz)   02/10/24 1642 74.5 kg (164 lb 3.9 oz)     Wt Readings from Last 10 Encounters:   02/14/24 84.3 kg (185 lb 13.6 oz)   01/27/24 74.5 kg (164 lb 3.2 oz)   08/29/23 69.4 kg (153 lb)   08/21/23 72.6 kg (160 lb)   07/28/23 71.7 kg (158 lb)   07/15/23 71.7 kg (158 lb)   07/07/23 71.2 kg (157 lb)   06/26/23 75.8 kg (167 lb)   06/20/23 77.1 kg (170 lb)   06/06/23 80.3 kg (177 lb)     NUTRITION DIAGNOSIS/PROBLEM:   Inadequate oral intake related to Decreased ability to  consume sufficient energy in the setting of intubation/OG in place as evidenced by 0 nutrition intake. (Intervention now TF start)     NUTRITION INTERVENTION:     NUTRITION PRESCRIPTION:   Estimated Nutrition needs: --dosing wt of 72 kg - wt taken on 8/21/23 (estimated UBW/dry wt)   Calories: 1217-2560 calories/day (24-25 calories per kg Dosing wt) = [place] [employer] equation ([id number] using dosing wt/current wt) goal of 80% first 5-7 days or during acute phase of illness.   Protein:   g protein/day (1.2-2.0 g protein/kg Ideal body wt (IBW))  Fluid Needs: 30 ml/kg dosing wt= 2160 ml (maintenance) Adjust per clinical status.     - Diet:       Procedures    NPO    - Enteral Nutrition: via OG tube: Promote 25 ml/hr ave 22 hr infusion time; advance 10 ml q 4 hrs to goal 50 ml/hr + Prosource 1 pack/day  to provide 1140 kcals (~1440 total kcals including lipid/Propofol) 81 g protein, 924 ml h20, FWF: 30 ml q 4 hrs  (180 ml), total h20: 1104 ml, 100% initial goal nutrition.   - Medical Food Supplements-NPO.  - Vitamin and mineral supplements: none  - Feeding assistance: NPO  - Nutrition education: not appropriate at this time   - Coordination of nutrition care: collaboration with other providers  - Discharge and transfer of nutrition care to new setting or provider: monitor plans    MONITOR AND EVALUATE/NUTRITION GOALS:  - Food and Nutrient Intake:      Monitor: for PO initiation  - Food and Nutrient Administration:      Monitor: tolerance to enteral nutrition, adequacy of enteral nutrition, for enteral nutrition adjustment, and propofol rate  - Anthropometric Measurement:    Monitor weight  - Nutrition Goals:      allow wt loss due to fluid losses, EN + non-nutritive kcal >80% energy needs within first week critical illness, labs within acceptable limits, euglycemia, and support wound healing    DIETITIAN FOLLOW UP: RD to follow and monitor nutrition status    Esther Tyler RD, LDN, [facility] ([id number])

## 2024-02-14 NOTE — PROGRESS NOTES
[facility] Infectious Disease  Progress Note    Ava Bowen Patient Status:  Inpatient    10/6/1955 MRN [id number]   Location [facility] 2W/SW Attending Guille Jain MD   Hosp Day # 4 PCP Ernesto De Dios     Subjective:    Pt seen and examined resting in bed, awake, intubated. Noted fever this morning, Tmax 100.8    Review of Systems:  Unable     Objective:  Blood pressure 145/50, pulse 72, temperature 99.9 °F (37.7 °C), resp. rate 20, height 5' 4.02\" (1.626 m), weight 185 lb 13.6 oz (84.3 kg), SpO2 98%, not currently breastfeeding.      Physical Exam:  General: Awake, intubated NAD  HEENT: +ETT, mucous membranes dry.  Heart: RRR S1S2 no murmurs.  Lungs: Essentially CTA b/l, no rhonchi, rales, wheezes.  Abdomen: Soft, NT/ND.  BS present.  No organomegaly.  Extremity: +right groin with wound vac in place, +right calf with wound vac in place  Derm:  Warm, dry, free from rashes.  +Pascual    Lab Data Review:  Lab Results   Component Value Date    WBC 28.3 2024    HGB 7.9 2024    HCT 23.4 2024    .0 2024    CREATSERUM 0.97 2024    BUN 16 2024     2024    K 3.8 2024     2024    CO2 22.0 2024    GLU 86 2024    CA 7.3 2024    PHOS 3.4 2024        Cultures:  Hospital Encounter on 02/10/24   1. Tissue Aerobic Culture     Status: None (Preliminary result)    Collection Time: 24 11:42 AM    Specimen: Femoral graft; Tissue   Result Value Ref Range    Tissue Smear No WBCs seen N/A    Tissue Smear No organisms seen N/A   2. Aerobic Bacterial Culture     Status: None (Preliminary result)    Collection Time: 24 11:36 AM    Specimen: Calf, right; Body fluid, unspecified   Result Value Ref Range    Aerobic Smear 1+ WBCs seen N/A    Aerobic Smear No organisms seen N/A   3. Urine Culture, Routine     Status: None (Preliminary result)    Collection Time: 24  4:19 PM     Specimen: Urine, joseph catheter   Result Value Ref Range    Urine Culture Insufficient growth at 18-24 hours N/A   4. Blood Culture     Status: None (Preliminary result)    Collection Time: 02/12/24  9:49 AM    Specimen: Blood,peripheral   Result Value Ref Range    Blood Culture Result No Growth 1 Day N/A   5. Anaerobic Culture     Status: Abnormal    Collection Time: 02/10/24  6:47 PM    Specimen: Tissue   Result Value Ref Range    Anaerobic Culture 4+ growth Bacteroides thetaiotaomicron (A) N/A    Anaerobic Culture 4+ growth Prevotella species (A) N/A        Radiology:    XR CHEST AP PORTABLE  (CPT=71045)    Result Date: 2/11/2024  CONCLUSION:   Interval placement of enteric tube with the tip in the region the gastric body.  Mild left basilar linear atelectasis.      Dictated by (CST): Krunal Tay MD on 2/11/2024 at 9:19 AM     Finalized by (CST): Krunal Tay MD on 2/11/2024 at 9:21 AM          XR CHEST AP PORTABLE  (CPT=71045)    Result Date: 2/11/2024  CONCLUSION: ETT 1.7 cm above the frida.  Right IJ catheter in the upper SVC.  No pneumothorax  [facility] provided a prelim report for this exam. This final report has no significant discrepancies with the [facility] report. .    Dictated by (CST): Ronak Leonard MD on 2/11/2024 at 6:34 AM     Finalized by (CST): Ronak Leonard MD on 2/11/2024 at 6:37 AM               Assessment and Plan:    Acute gram negative sepsis presenting with a bleeding complication after recent fem-pop bypass 1/24/24  - s/p emergent OR where extensive debridement and patch was performed - noting infected graft material  - Blood culture positive for klebsiella on admission, repeats negative  - OR cultures from debridement on 2/10/24 with klebsiella, enterococcus, strep anginosus, e.coli, and anaerobes   - s/p debridement, removal of infected graft and wound vac placement on 2/13/24, OR cul sent and pending   - Plans for OR Friday per vascular note  - IV meropenem and  vancomycin ongoing day #4 post-op     Acute blood loss anemia, hemorrhage as a complication from recent bypass procedure  - ICU management ongoing, transfusions as needed     DM II  - Possible DKA component with acidosis  - Management in the ICU     Leukocytosis  - Multifactorial due to the above  - trending down  - C.diff is negative  - Will continue to trend    Fever  - Tmax 100.8  - will repeat blood cultures  - monitor fever curve    Recommendations  - Continue IV Vancomycin and meropenem  - Follow pending cultures  - Continue to trend temps and WBCs  - Post op care/wound vac per vascular  - Further recommendations to follow       Plan of care discussed with Dr. Cid    If you have any questions or concerns please call [facility] Infectious Disease at [phone].     ZAIRA Dietz    2/14/2024  8:52 AM

## 2024-02-14 NOTE — PROGRESS NOTES
[facility] Hospitalist Progress Note     CC: Hospital Follow up    PCP: Ernesto De Dios       Assessment/Plan:     Principal Problem:    Bypass graft mechanical complication, initial encounter ([facility])  Active Problems:    Hyperglycemia    Bypass graft mechanical complication (HCC)    Anemia, unspecified type    Acute kidney injury superimposed on CKD  ([facility])    Ms. Bowen is a 68 year old female with PMH sig for CKD, COPD, DM, HTN, HLD, PVD, and sickle cell anemia who was recently admitted last week for right lower extremity femoral to popliteal bypass who presented with bleeding from surgical site. Taken for emergent OR on 2/10/24, noted to be grossly infected, started on vancomycin/meropenem. Bcx with klebsiella. BG elevated on admit, started on insulin gtt, endo consulted, now off insulin gtt. Kept intubated post op, pulm following.        PAD sp RLE fem-pop bypass last week c/b bleeding fm surg site fm wound dehiscence  - s/p recent fem pop bypass 1/24/24 with Dr. Murrieta  - has been on ASA/plavix- held on admit  - Hg 6.0, s/p 3 units pRBC 2/10  - vascular surgery consulted, s/p emergent OR 2/10, removal of infected graft, s/p debridement, bone patch angioplasty   - sp further debridement 2/13: \"Sharp and versajet debridement of skin subcutaneous fat of right abdominal wall groin and thigh [id number] cm Sharp debridement of skin and subcataneous fat of popliteal incision 10x4 cm Removal of infected graft Wound vac placement\"    Infected surgical site, bacteremia  - continue IV vancomycin and meropenem, ID consult  - Bcx with klebsiella, repeat Bcx ordered  - 2/10 wound x w bacteroides/ provatella f/u results repeat sent fm 2/13 debridement- abx adjustment per ID     Metabolic acidosis- improving  - likely 2/2 LA from blood loss +/- dka on admit  - pH 7.24 on admit, LA 7.6 on admit, now normalized  - likely multifactorial, 2/2 LA, blood loss, ?DKA, CKD  - renal consulted, started on bicarb gtt > now improved     Acute blood  loss anemia  Chronic anemia  Sickle cell anemia  - outpatient f/u  - Hg 8.9 on discharge last week  - Hg 6.0 on admission, s/p 3 units pRBC  - monitor    ?DKA   DM2, hyperglycemia  - hold PO medications  - BG 500s in ED, pH 7.2  - insulin gtt ordered in ED  - endocrine consulted > holding tresibe a noted hypoglycemic- correction on reg insulin correction dosing    Respiratory failure  - remained intubated post op both fm aspiration event and metabolic acidosis  - pulm following  - vent weaning per pulm- daily SBT, minimizing sedation as able     Hypovolemic / septic Shock/ Hypotension  Hx HTN  - previously with uncontrolled HTN on prior admit  - BP low likely 2/2 hemorrhage  - BP labile, A-line  - initially on levophed, now on nicardipene gtt   - restart home meds when off NGT to LIS  - cardiology consult     COPD  - chronic, stable  -resume inhaler     OMEGA on CKD3  - cr 1.7 on admit, previously 1.2 on discharge last week  - monitor Cr and UOP- improved     ACP  - CODE- FULL   - POA- daughter and son     FN:  - IVF: NS  - Diet: NPO     DVT Prophy:   Lines: PIV     Daughter updated at length via phone 2/14/24    Dispo: ICU     Guille Jain, Hospitalist      Subjective:     Hospital course reviewed, back to OR 2/13 for further debridement- sp   \"Sharp and versajet debridement of skin subcutaneous fat of right abdominal wall groin and thigh [id number] cm  Sharp debridement of skin and subcataneous fat of popliteal incision 10x4 cm  Removal of infected graft  Wound vac placement\"  Per DR Justice's op noted debrided to healthy bleeding tissues, Muscle flap viable and secure  Gastrocnemius muscle viable and responded to bovie   Left 1 cm cuff of graft - back bleeding from popliteal artery     Ongoing temps, remains intubated/ sedated        OBJECTIVE:    Blood pressure 140/49, pulse 73, temperature 100 °F (37.8 °C), resp. rate 20, height 5' 4.02\" (1.626 m), weight 185 lb 13.6 oz (84.3 kg), SpO2 96%, not currently  breastfeeding.    Temp:  [95.9 °F (35.5 °C)-100.8 °F (38.2 °C)] 100 °F (37.8 °C)  Pulse:  [55-83] 73  Resp:  [14-25] 20  BP: (120-171)/(49-70) 140/49  SpO2:  [96 %-99 %] 96 %  AO: (120-164)/() 140/47  FiO2 (%):  [30 %-100 %] 40 %      Intake/Output:    Intake/Output Summary (Last 24 hours) at 2/14/2024 0945  Last data filed at 2/14/2024 0522  Gross per 24 hour   Intake 2871 ml   Output 1875 ml   Net 996 ml       Last 3 Weights   02/14/24 0500 185 lb 13.6 oz (84.3 kg)   02/13/24 0500 183 lb 3.2 oz (83.1 kg)   02/12/24 0400 180 lb 5.4 oz (81.8 kg)   02/11/24 0600 182 lb 5.1 oz (82.7 kg)   02/10/24 2142 171 lb 1.2 oz (77.6 kg)   02/10/24 2105 171 lb 1.2 oz (77.6 kg)   02/10/24 1642 164 lb 3.9 oz (74.5 kg)   01/27/24 0501 164 lb 3.2 oz (74.5 kg)   01/24/24 0549 158 lb (71.7 kg)   01/19/24 1112 160 lb (72.6 kg)   12/30/23 1216 155 lb (70.3 kg)   08/29/23 1357 153 lb (69.4 kg)     Exam   GEN: intubated, sedated  HEENT: ETT nc/at  Pulm: mechanical breath sounds diminished bl  CV: RRR, + murmur  ABD: Soft, non-tender, non-distended, +BS  EXT: no edema + wound vac and groin sites c/d/i    Data Review:       Labs:     Recent Labs   Lab 02/13/24  0802 02/13/24  1559 02/13/24  2325   RBC 3.21* 2.91* 2.83*   HGB 9.5* 8.3* 7.9*   HCT 26.0* 24.0* 23.4*   MCV 81.0 82.5 82.7   MCH 29.6 28.5 27.9   MCHC 36.5 34.6 33.8   RDW 15.3* 15.5* 15.6*   NEPRELIM 25.95* 24.79* 25.30*   WBC 30.2* 28.3* 28.3*   .0 266.0 281.0         Recent Labs   Lab 02/13/24  1751 02/13/24  2325 02/14/24  0517   * 93 86   BUN 17 16 16   CREATSERUM 1.03* 0.97 0.97   EGFRCR 60 64 64   CA 7.1* 7.1* 7.3*    142 144   K 3.3* 3.7 3.8   * 115* 114*   CO2 20.0* 20.0* 22.0       Recent Labs   Lab 02/10/24  2151 02/11/24 2053 02/12/24  0551   ALT 38  --   --    AST 91*  --   --    ALB 2.3* 2.3* 2.3*         Imaging:  No results found.      Meds:      vancomycin  1,250 mg Intravenous Q36H    carvedilol  12.5 mg Per NG Tube BID    insulin  regular human  1-7 Units Subcutaneous 4 times per day    sodium hypochlorite   Topical 2 times per day    amLODIPine  10 mg Per NG Tube QAM    hydrALAZINE  100 mg Per NG Tube Q8H    pantoprazole  40 mg Intravenous Q24H    fluticasone furoate-vilanterol  1 puff Inhalation Daily    [Held by provider] rosuvastatin  40 mg Oral Nightly    chlorhexidine gluconate  15 mL Mouth/Throat BID@0800,2000    enoxaparin  40 mg Subcutaneous Daily    meropenem  500 mg Intravenous q12h      niCARdipine Stopped (02/13/24 1430)    insulin regular Stopped (02/11/24 1711)    fentanyl      dexmedetomidine      propofol 25 mcg/kg/min (02/14/24 0858)     hydrALAzine, ondansetron, metoclopramide, albuterol, ipratropium-albuterol, fentaNYL **OR** fentaNYL, acetaminophen **OR** acetaminophen **OR** acetaminophen **OR** acetaminophen, fentaNYL (Sublimaze) **OR** fentaNYL (Sublimaze), fentanyl, polyethylene glycol (PEG 3350), senna, bisacodyl, fleet enema, midazolam, glucose **OR** glucose **OR** glucose-vitamin C **OR** dextrose **OR** glucose **OR** glucose **OR** glucose-vitamin C         There are no Wet Read(s) to document.

## 2024-02-14 NOTE — PROGRESS NOTES
Critical Care Progress Note     Assessment / Plan:  Acute respiratory failure - due to aspiration and ongoing metabolic acidosis  - continue full vent support  - daily SBT  - minimize sedation  - Lasix  Hypovolemic shock - resolved  - monitor  Acute blood loss anemia  - monitor  PVD with bleeding from recent fem-pop bypass and infected graft  - meropenem and IV vancomycin (2/11- )  - wound vacs in place  - plan for return trip to OR on 2/16  COPD  - BD protocol  DM  - insulin per endo  OMEGA on CKD, metabolic acidosis  - off bicarb gtt  FEN  - start TFs if she is not extubated  Ppx  - Lovenox  - PPI  Dispo  - ICU    Critical care time: 35 minutes      Subjective:  No events overnight    Objective:  Vitals:    02/14/24 0400 02/14/24 0500 02/14/24 0600 02/14/24 0700   BP: 129/57 146/59 154/70 148/62   BP Location: Right arm Right arm Right arm Right arm   Pulse: 71 71 77 78   Resp: 20 20 25 23   Temp: (!) 100.8 °F (38.2 °C) 99.5 °F (37.5 °C) 99.5 °F (37.5 °C) 99.7 °F (37.6 °C)   TempSrc: Bladder Bladder Bladder Bladder   SpO2: 97% 99% 98% 98%   Weight:  185 lb 13.6 oz (84.3 kg)     Height:         Physical Exam:  General: intubated  Skin: no rash, ulcers or subcutaneous nodules  Eyes: anicteric sclerae, moist conjunctivae  Head, ears, nose, throat: atraumatic, oropharynx clear with moist mucous membranes  Neck: trachea midline with no thyromegaly  Heart: regular rate and rhythm, no murmurs / rubs / gallops  Lungs: clear bilaterally  Abdomen: soft, nontender, nondistended   Extremities: wound vacs in place  Psych: sedated    Medications:  Reviewed in EMR    Lab Data:  Reviewed in EMR    Imaging:  I independently visualized all relevant chest imaging in PACS and agree with radiology interpretation except where noted.

## 2024-02-14 NOTE — RESPIRATORY THERAPY NOTE
Patient received on full vent support with the following settings AC 20/500/40%/+5. Bilateral BS heard on auscultation. Suction provided as needed.    1030: Patient placed on SBT 5/5 40%,patient tolerating well. Per Dr. Hawkins, ok to extubate if patient is tolerating well.       RR 19  MV 8.4  RSBI 44  Spo2 96%  NIF -23    1157: Patient was extubated to a NC 3L, Spo2 96%. Patient appears comfortable. Will continue to monitor patient.

## 2024-02-15 NOTE — PROGRESS NOTES
NEPHROLOGY DAILY PROGRESS NOTE     SUBJECTIVE:  Reports breathing is ok   Reports pain on her right side       OBJECTIVE:    Total Intake/Output:    Intake/Output Summary (Last 24 hours) at 2/15/2024 0841  Last data filed at 2/15/2024 0500  Gross per 24 hour   Intake 2040.5 ml   Output 1175 ml   Net 865.5 ml       PHYSICAL EXAM:  /61   Pulse 75   Temp 99 °F (37.2 °C)   Resp 18   Ht 5' 4.02\" (1.626 m)   Wt 185 lb 13.6 oz (84.3 kg)   LMP  (LMP Unknown)   SpO2 90%   BMI 31.88 kg/m²   GEN: NAD, on oxygen     HEENT: NCAT    CHEST: clear anteriorly   CARDIAC: S1S2 normal  ABD: soft, NT/ND  : joseph in place    EXT: + lower ext edema noted, wound vac noted- RLE     NEURO: awake, alert      CURRENT MEDICATIONS:     potassium chloride  40 mEq Intravenous Once    carvedilol  25 mg Per NG Tube BID    vancomycin  1,250 mg Intravenous Q36H    insulin regular human  1-7 Units Subcutaneous 4 times per day    sodium hypochlorite   Topical 2 times per day    amLODIPine  10 mg Per NG Tube QAM    hydrALAZINE  100 mg Per NG Tube Q8H    pantoprazole  40 mg Intravenous Q24H    fluticasone furoate-vilanterol  1 puff Inhalation Daily    [Held by provider] rosuvastatin  40 mg Oral Nightly    chlorhexidine gluconate  15 mL Mouth/Throat BID@0800,2000    enoxaparin  40 mg Subcutaneous Daily    meropenem  500 mg Intravenous q12h             LABS:  Patient Labs Reviewed in Detail. Pertinent Labs as follows:  Recent Labs   Lab 02/13/24 2325 02/14/24  0517 02/15/24  0448   GLU 93 86 180*   BUN 16 16 17   CREATSERUM 0.97 0.97 0.93   EGFRCR 64 64 68   CA 7.1* 7.3* 7.5*    144 142   K 3.7 3.8 3.3*   * 114* 110   CO2 20.0* 22.0 24.0     Recent Labs   Lab 02/13/24  1559 02/13/24  2325 02/14/24  1439 02/15/24  0448   RBC 2.91* 2.83*  --  3.05*   HGB 8.3* 7.9* 9.5* 8.7*   HCT 24.0* 23.4*  --  25.1*   MCV 82.5 82.7  --  82.3   MCH 28.5 27.9  --  28.5   MCHC 34.6 33.8  --  34.7   RDW 15.5* 15.6*  --  16.1*   NEPRELIM 24.79*  25.30*  --  26.30*   WBC 28.3* 28.3*  --  29.8*   .0 281.0  --  306.0           IMAGING:  No results found.     ASSESSMENT AND PLAN:   68 year old female with PMH of COPD, DM2, HTN, HL, PVD, Sickle cell anemia, CKD stage 3, recent fem-pop artery bypass admitted with acute hemorrhage from incision site.  Patient is s/p OR for repair.  Nephrology is consulted for OMEGA and metabolic acidosis.       Metabolic acidosis:  - lactic acid initially 7.6, now down trending   - arterial pH now improved   - UA neg for ketones   - resolved     OMEGA on CKD stage 3  - baseline around 1.2  - creatinine currently below baseline, ok for IV lasix.    - follow renal fxn and I/Os     Hypertension   - amlodipine, hydralazine, coreg  - weaned off nicardipine   - lisinopril held given recent OMEGA, can resume if needed for blood pressure control    - cards following     Hypernatremia:  - resolved     Hypokalemia/ hypomagnesemia:  - replete per protocol     Hypocalcemia:  - corrects to normal given hypoalbuminemia   - monitor Ca levels     Dw RN     We will continue to follow      Kelsea Gaston MD  [facility] Nephrology

## 2024-02-15 NOTE — PROGRESS NOTES
[facility] Hospitalist Progress Note     CC: Hospital Follow up    PCP: Ernesto De Dios       Assessment/Plan:     Principal Problem:    Bypass graft mechanical complication, initial encounter ([facility])  Active Problems:    Hyperglycemia    Bypass graft mechanical complication ([facility])    Anemia, unspecified type    Acute kidney injury superimposed on CKD  ([facility])    Ms. Bowen is a 68 year old female with PMH sig for CKD, COPD, DM, HTN, HLD, PVD, and sickle cell anemia who was recently admitted last week for right lower extremity femoral to popliteal bypass who presented with bleeding from surgical site. Taken for emergent OR on 2/10/24, noted to be grossly infected, started on vancomycin/meropenem. Bcx with klebsiella. BG elevated on admit, started on insulin gtt, endo consulted, now off insulin gtt. Kept intubated post op, pulm following.        PAD sp RLE fem-pop bypass last week c/b bleeding fm surg site fm wound dehiscence  - s/p recent fem pop bypass 1/24/24 with Dr. Murrieta  - has been on ASA/plavix- held on admit  - Hg 6.0, s/p 3 units pRBC 2/10  - vascular surgery consulted, s/p emergent OR 2/10, removal of infected graft, s/p debridement, bone patch angioplasty   - sp further debridement 2/13 w debridement to healthy tissue and placement of wound vac  - plan for back to OR 2/16 er vascular  - getting IV lasix for post op fluid overload also PT, OT [place] vascular recs  - on IVP morphine 1-4mg  q 2 using 2mg or 4mg doses reports some pain relief but doesn't last- will add in oral agent (norco)       Infected surgical site, bacteremia  - continue IV vancomycin and meropenem, ID consult  - Bcx with klebsiella, repeat Bcx ordered  - 2/10 wound cx and 2/13 poly microbial  - abx adjustment per ID f/u cx results     Metabolic acidosis- improved   - likely 2/2 LA from blood loss +/- dka on admit  - pH 7.24 on admit, LA 7.6 on admit, now normalized  - likely multifactorial, 2/2 LA, blood loss, ?DKA, CKD  - renal consulted, sp  bicarb gtt    Acute blood loss anemia  Chronic anemia  Sickle cell anemia  - outpatient f/u  - Hg 8.9 on discharge last week  - Hg 6.0 on admission, s/p 3 units pRBC  - monitor    ?DKA   DM2, hyperglycemia  - hold PO medications  - BG 500s in ED, pH 7.2  - insulin gtt ordered in ED  - endocrine consulted > holding tresibe a noted hypoglycemic- correction on reg insulin correction dosing    Respiratory failure  - remained intubated post op both fm aspiration event and metabolic acidosis  - pulm following  - extubated 2/14 pm     Hypovolemic / septic Shock/ Hypotension  Hx HTN  - previously with uncontrolled HTN on prior admit  - BP low likely 2/2 hemorrhage  - BP labile, A-line  - initially on levophed, now on nicardipene gtt   - restart home meds when off NGT to LIS  - cardiology consult     COPD  - chronic, stable  -resume inhaler     OMEGA on CKD3  - cr 1.7 on admit, previously 1.2 on discharge last week  - monitor Cr and UOP- improved     ACP  - CODE- FULL   - POA- daughter and son     FN:  - IVF: NS  - Diet: NPO     DVT Prophy:   Lines: PIV     Daughter updated at length via phone 2/14/24    Dispo: ICU     Guille Jain, Hospitalist      Subjective:     Extubated, reports pain le some relief w medicine but doesn't last    OBJECTIVE:    Blood pressure 150/61, pulse 75, temperature 99 °F (37.2 °C), resp. rate 18, height 5' 4.02\" (1.626 m), weight 185 lb 13.6 oz (84.3 kg), SpO2 90%, not currently breastfeeding.    Temp:  [98.6 °F (37 °C)-100 °F (37.8 °C)] 99 °F (37.2 °C)  Pulse:  [62-83] 75  Resp:  [17-28] 18  BP: (125-155)/(49-62) 150/61  SpO2:  [90 %-99 %] 90 %  AO: (147-179)/(50-62) 156/53  FiO2 (%):  [40 %] 40 %      Intake/Output:    Intake/Output Summary (Last 24 hours) at 2/15/2024 0935  Last data filed at 2/15/2024 0500  Gross per 24 hour   Intake 2040.5 ml   Output 1025 ml   Net 1015.5 ml       Last 3 Weights   02/14/24 0500 185 lb 13.6 oz (84.3 kg)   02/13/24 0500 183 lb 3.2 oz (83.1 kg)   02/12/24 0400 180  lb 5.4 oz (81.8 kg)   02/11/24 0600 182 lb 5.1 oz (82.7 kg)   02/10/24 2142 171 lb 1.2 oz (77.6 kg)   02/10/24 2105 171 lb 1.2 oz (77.6 kg)   02/10/24 1642 164 lb 3.9 oz (74.5 kg)   01/27/24 0501 164 lb 3.2 oz (74.5 kg)   01/24/24 0549 158 lb (71.7 kg)   01/19/24 1112 160 lb (72.6 kg)   12/30/23 1216 155 lb (70.3 kg)   08/29/23 1357 153 lb (69.4 kg)     Exam   GEN: nad, awake  HEENT: nc/at  Pulm: diminished bl bases  CV: RRR, + murmur  ABD: Soft, non-tender, non-distended, +BS  EXT: + bl edema + wound vac and groin sites c/d/i    Data Review:       Labs:     Recent Labs   Lab 02/13/24  1559 02/13/24 2325 02/14/24  1439 02/15/24  0448   RBC 2.91* 2.83*  --  3.05*   HGB 8.3* 7.9* 9.5* 8.7*   HCT 24.0* 23.4*  --  25.1*   MCV 82.5 82.7  --  82.3   MCH 28.5 27.9  --  28.5   MCHC 34.6 33.8  --  34.7   RDW 15.5* 15.6*  --  16.1*   NEPRELIM 24.79* 25.30*  --  26.30*   WBC 28.3* 28.3*  --  29.8*   .0 281.0  --  306.0         Recent Labs   Lab 02/13/24  2325 02/14/24  0517 02/15/24  0448   GLU 93 86 180*   BUN 16 16 17   CREATSERUM 0.97 0.97 0.93   EGFRCR 64 64 68   CA 7.1* 7.3* 7.5*    144 142   K 3.7 3.8 3.3*   * 114* 110   CO2 20.0* 22.0 24.0       Recent Labs   Lab 02/10/24  2151 02/11/24  2053 02/12/24  0551   ALT 38  --   --    AST 91*  --   --    ALB 2.3* 2.3* 2.3*         Imaging:  No results found.      Meds:      potassium chloride  40 mEq Intravenous Once    carvedilol  25 mg Per NG Tube BID    furosemide  20 mg Intravenous BID (Diuretic)    vancomycin  1,250 mg Intravenous Q36H    insulin regular human  1-7 Units Subcutaneous 4 times per day    sodium hypochlorite   Topical 2 times per day    amLODIPine  10 mg Per NG Tube QAM    hydrALAZINE  100 mg Per NG Tube Q8H    pantoprazole  40 mg Intravenous Q24H    fluticasone furoate-vilanterol  1 puff Inhalation Daily    [Held by provider] rosuvastatin  40 mg Oral Nightly    chlorhexidine gluconate  15 mL Mouth/Throat BID@0800,2000    enoxaparin  40  mg Subcutaneous Daily    meropenem  500 mg Intravenous q12h      dextrose 10%      insulin regular Stopped (02/11/24 1711)    dexmedetomidine      propofol Stopped (02/14/24 1018)     dextrose 10%, lipase-protease-amylase (Lip-Prot-Amyl) **AND** sodium bicarbonate, morphINE **OR** morphINE **OR** morphINE, hydrALAzine, ondansetron, metoclopramide, albuterol, ipratropium-albuterol, acetaminophen **OR** acetaminophen **OR** acetaminophen **OR** acetaminophen, polyethylene glycol (PEG 3350), senna, bisacodyl, fleet enema, glucose **OR** glucose **OR** glucose-vitamin C **OR** dextrose **OR** glucose **OR** glucose **OR** glucose-vitamin C

## 2024-02-15 NOTE — CM/SW NOTE
Patient was successfully extubated on 2/14/24.  PT/OT orders placed today.    CM/SW will follow-up with therapy recommendations and provide assistance with DC planning needs.  Patient had used [facility] back in June.    / to remain available for support and/or discharge planning.      Whitney Singletary MBA BSN RN CRRN   RN Case Manager  [phone]

## 2024-02-15 NOTE — PROGRESS NOTES
Vascular Surgery Progress Note    /61   Pulse 75   Temp 99 °F (37.2 °C)   Resp 18   Ht 5' 4.02\" (1.626 m)   Wt 185 lb 13.6 oz (84.3 kg)   LMP  (LMP Unknown)   SpO2 90%   BMI 31.88 kg/m²     Recent Labs   Lab 02/13/24  1559 02/13/24  2325 02/14/24  1439 02/15/24  0448   RBC 2.91* 2.83*  --  3.05*   HGB 8.3* 7.9* 9.5* 8.7*   HCT 24.0* 23.4*  --  25.1*   MCV 82.5 82.7  --  82.3   MCH 28.5 27.9  --  28.5   MCHC 34.6 33.8  --  34.7   RDW 15.5* 15.6*  --  16.1*   NEPRELIM 24.79* 25.30*  --  26.30*   WBC 28.3* 28.3*  --  29.8*   .0 281.0  --  306.0       Recent Labs   Lab 02/13/24  2325 02/14/24  0517 02/15/24  0448   GLU 93 86 180*   BUN 16 16 17   CREATSERUM 0.97 0.97 0.93   CA 7.1* 7.3* 7.5*    144 142   K 3.7 3.8 3.3*   * 114* 110   CO2 20.0* 22.0 24.0       Patient seen and examined.  Extubated.  Neurologically intact.  She is very limited movement in the right leg.  She is able to follow commands and engage in conversation but is very fatigued after the current state of her hospitalization.  Vitals and labs stable.  On examination her groin is flat with no hematoma.  Expected serous drainage from the right groin.  She is significantly edematous throughout her bilateral upper and lower extremities.  She received 1 dose of Lasix.  I have initiated additional Lasix for 3 doses for today into tomorrow.  Continue IV antibiotics per ID.  Will initiate physical therapy and Occupational Therapy for passive range of motion given her deconditioned state.  Will plan for right groin washout and debridement with possible Kerecis placement and wound VAC placement tomorrow with Dr. Justice.  Further management per hospitalist and other consulting services.    Flavio Taveras MD  [facility]  Vascular Surgery

## 2024-02-15 NOTE — PAYOR COMM NOTE
--------------  CONTINUED STAY REVIEW----CLINICALS FOR 2/14---PLEASE PATIENT IS STILL IN HOUSE AND IN ICU--PLEASE FAX BACK APPROVED DAYS      Payor: [employer]  Subscriber #:  [id number]  Authorization Number: [id number]    Admit date: 2/10/24  Admit time:  9:20 PM    Admitting Physician: Lissa Lee MD  Attending Physician:  Guille Jain MD  Primary Care Physician: Ernesto De Dios    2/14  Subjective:     Pt seen and examined resting in bed, awake, intubated. Noted fever this morning, Tmax 100.8     Review of Systems:  Unable      Objective:  Blood pressure 145/50, pulse 72, temperature 99.9 °F (37.7 °C), resp. rate 20, height 5' 4.02\" (1.626 m), weight 185 lb 13.6 oz (84.3 kg), SpO2 98%, not currently breastfeeding.        Physical Exam:  General: Awake, intubated NAD  HEENT: +ETT, mucous membranes dry.  Heart: RRR S1S2 no murmurs.  Lungs: Essentially CTA b/l, no rhonchi, rales, wheezes.  Abdomen: Soft, NT/ND.  BS present.  No organomegaly.  Extremity: +right groin with wound vac in place, +right calf with wound vac in place  Derm:  Warm, dry, free from rashes.  +Pascual     Lab Data Review:        Lab Results   Component Value Date     WBC 28.3 02/13/2024     HGB 7.9 02/13/2024     HCT 23.4 02/13/2024     .0 02/13/2024     CREATSERUM 0.97 02/14/2024     BUN 16 02/14/2024      02/14/2024     K 3.8 02/14/2024      02/14/2024     CO2 22.0 02/14/2024     GLU 86 02/14/2024     CA 7.3 02/14/2024     PHOS 3.4 02/14/2024         Cultures:        Hospital Encounter on 02/10/24   1. Tissue Aerobic Culture     Status: None (Preliminary result)     Collection Time: 02/13/24 11:42 AM     Specimen: Femoral graft; Tissue   Result Value Ref Range     Tissue Smear No WBCs seen N/A     Tissue Smear No organisms seen N/A   2. Aerobic Bacterial Culture     Status: None (Preliminary result)     Collection Time: 02/13/24 11:36 AM     Specimen: Calf, right; Body fluid, unspecified   Result Value Ref  Range     Aerobic Smear 1+ WBCs seen N/A     Aerobic Smear No organisms seen N/A   3. Urine Culture, Routine     Status: None (Preliminary result)     Collection Time: 02/12/24  4:19 PM     Specimen: Urine, joseph catheter   Result Value Ref Range     Urine Culture Insufficient growth at 18-24 hours N/A   4. Blood Culture     Status: None (Preliminary result)     Collection Time: 02/12/24  9:49 AM     Specimen: Blood,peripheral   Result Value Ref Range     Blood Culture Result No Growth 1 Day N/A   5. Anaerobic Culture     Status: Abnormal     Collection Time: 02/10/24  6:47 PM     Specimen: Tissue   Result Value Ref Range     Anaerobic Culture 4+ growth Bacteroides thetaiotaomicron (A) N/A     Anaerobic Culture 4+ growth Prevotella species (A) N/A         Radiology:     XR CHEST AP PORTABLE  (CPT=71045)     Result Date: 2/11/2024  CONCLUSION:          Interval placement of enteric tube with the tip in the region the gastric body.  Mild left basilar linear atelectasis.      Dictated by (CST): Krunal Tay MD on 2/11/2024 at 9:19 AM     Finalized by (CST): Krunal Tay MD on 2/11/2024 at 9:21 AM           XR CHEST AP PORTABLE  (CPT=71045)     Result Date: 2/11/2024  CONCLUSION:        ETT 1.7 cm above the frida.  Right IJ catheter in the upper SVC.  No pneumothorax  [facility] provided a prelim report for this exam. This final report has no significant discrepancies with the [facility] report. .    Dictated by (CST): Ronak Leonard MD on 2/11/2024 at 6:34 AM     Finalized by (CST): Ronak Leonard MD on 2/11/2024 at 6:37 AM                  Assessment and Plan:     Acute gram negative sepsis presenting with a bleeding complication after recent fem-pop bypass 1/24/24  - s/p emergent OR where extensive debridement and patch was performed - noting infected graft material  - Blood culture positive for klebsiella on admission, repeats negative  - OR cultures from debridement on 2/10/24 with klebsiella,  enterococcus, strep anginosus, e.coli, and anaerobes   - s/p debridement, removal of infected graft and wound vac placement on 2/13/24, OR cul sent and pending   - Plans for OR Friday per vascular note  - IV meropenem and vancomycin ongoing day #4 post-op      Acute blood loss anemia, hemorrhage as a complication from recent bypass procedure  - ICU management ongoing, transfusions as needed     DM II  - Possible DKA component with acidosis  - Management in the ICU     Leukocytosis  - Multifactorial due to the above  - trending down  - C.diff is negative  - Will continue to trend     Fever  - Tmax 100.8  - will repeat blood cultures  - monitor fever curve     Recommendations  - Continue IV Vancomycin and meropenem  - Follow pending cultures  - Continue to trend temps and WBCs  - Post op care/wound vac per vascular  - Further recommendations to follow         Plan of care discussed with Dr. Cid     If you have any questions or concerns please call [facility] at [phone].      ZAIRA Dietz     2/14/2024  8:52 AM                 MEDICATIONS ADMINISTERED IN LAST 1 DAY:  Transfuse RBC       Date Action Dose Route User    2/14/2024 1105 New Bag (none) Intravenous Anna Painting RN          acetaminophen (Ofirmev) 10 mg/mL infusion premix 1,000 mg       Date Action Dose Route User    2/15/2024 0059 New Bag 1,000 mg Intravenous Dakota Jones RN    2/14/2024 1836 New Bag 1,000 mg Intravenous Anna Painting RN          dextrose 10% infusion (TPN no rate)       Date Action Dose Route User    2/14/2024 1748 New Bag 1,000 mL Intravenous Anna Painting RN          enoxaparin (Lovenox) 40 MG/0.4ML SUBQ injection 40 mg       Date Action Dose Route User    2/15/2024 0845 Given 40 mg Subcutaneous (Left Lower Abdomen) Oscar Coughlin, JESSIE          fentaNYL (Sublimaze) 50 mcg/mL injection 25 mcg       Date Action Dose Route User    2/14/2024 1023 Given 25 mcg Intravenous Anna Painting RN           meropenem (Merrem) 500 mg in sodium chloride 0.9% 100 mL IVPB-MBP       Date Action Dose Route User    2/15/2024 0058 New Bag 500 mg Intravenous Dakota Jones RN    2/14/2024 1327 New Bag 500 mg Intravenous Anna Painting RN          morphINE PF 2 MG/ML injection 2 mg       Date Action Dose Route User    2/14/2024 1430 Given 2 mg Intravenous Anna Painting RN          morphINE PF 2 MG/ML injection 2 mg       Date Action Dose Route User    2/15/2024 0949 Given 2 mg Intravenous Oscar Coughlin RN          morphINE PF 4 MG/ML injection 4 mg       Date Action Dose Route User    2/15/2024 0602 Given 4 mg Intravenous Dakota Jones RN    2/15/2024 0358 Given 4 mg Intravenous Dakota Jones RN    2/14/2024 2352 Given 4 mg Intravenous Padma Umaña RN    2/14/2024 2100 Given 4 mg Intravenous Dakota Jones RN    2/14/2024 1845 Given 4 mg Intravenous Anna Painting RN    2/14/2024 1634 Given 4 mg Intravenous Anna Painting RN          pantoprazole (Protonix) 40 mg in sodium chloride 0.9% PF 10 mL IV push       Date Action Dose Route User    2/15/2024 0848 Given 40 mg Intravenous Oscar Coughlin RN          potassium chloride 40 mEq in 250mL sodium chloride 0.9% IVPB premix       Date Action Dose Route User    2/15/2024 0835 New Bag 40 mEq Intravenous Oscar Coughlin RN          sodium chloride 0.9% infusion       Date Action Dose Route User    2/14/2024 1105 New Bag (none) Intravenous Anna Painting RN          vancomycin (Vancocin) 1.25 g in sodium chloride 0.9% 250mL IVPB premix       Date Action Dose Route User    2/14/2024 1742 New Bag 1,250 mg Intravenous Anna Painting RN            Vitals (last day)       Date/Time Temp Pulse Resp BP SpO2 Weight O2 Device O2 Flow Rate (L/min) Who    02/15/24 0800 99 °F (37.2 °C) 75 18 150/61 90 % -- -- -- DG    02/15/24 0700 99 °F (37.2 °C) 77 20 155/56 92 % -- -- -- DG    02/15/24 0600 99.1 °F (37.3 °C) 70 18 143/58 95 % -- Nasal cannula -- MS    02/15/24 0500 99.1  °F (37.3 °C) 71 18 147/61 96 % -- Nasal cannula -- MS    02/15/24 0400 99 °F (37.2 °C) 75 19 149/58 96 % -- Nasal cannula -- MS    02/15/24 0300 99.1 °F (37.3 °C) 71 19 141/55 99 % -- Nasal cannula -- MS    02/15/24 0200 99.3 °F (37.4 °C) 68 17 144/61 96 % -- Nasal cannula -- MS    02/15/24 0100 99.5 °F (37.5 °C) 74 22 142/58 95 % -- Nasal cannula -- MS    02/15/24 0000 99.3 °F (37.4 °C) 66 18 144/62 98 % -- Nasal cannula -- MS    02/14/24 2300 99.1 °F (37.3 °C) 62 18 149/61 96 % -- Nasal cannula -- MS    02/14/24 2200 99.1 °F (37.3 °C) 72 20 140/60 95 % -- Nasal cannula -- MS    02/14/24 2100 99 °F (37.2 °C) 74 23 152/57 95 % -- Nasal cannula -- MS    02/14/24 2000 99 °F (37.2 °C) 71 20 147/55 92 % -- Nasal cannula -- MS    02/14/24 1900 98.8 °F (37.1 °C) 69 22 146/56 96 % -- -- -- MW    02/14/24 1900 -- -- -- -- -- -- Nasal cannula -- MS    02/14/24 1800 98.6 °F (37 °C) 69 26 141/55 96 % -- Nasal cannula 3 L/min MW    02/14/24 1700 98.8 °F (37.1 °C) 69 21 125/53 93 % -- Nasal cannula 3 L/min MW    02/14/24 1600 98.8 °F (37.1 °C) 69 23 132/60 94 % -- Nasal cannula 3 L/min MW    02/14/24 1500 98.8 °F (37.1 °C) 69 28 142/58 92 % -- Nasal cannula 3 L/min MW    02/14/24 1400 99.1 °F (37.3 °C) 71 18 130/49 93 % -- Nasal cannula 3 L/min MW    02/14/24 1328 99.1 °F (37.3 °C) 72 26 -- 93 % -- -- -- MW    02/14/24 1300 99.3 °F (37.4 °C) 73 23 140/53 93 % -- Nasal cannula 3 L/min MW    02/14/24 1200 99.5 °F (37.5 °C) 73 24 139/56 94 % -- Nasal cannula 3 L/min MW    02/14/24 1157 -- -- -- -- 96 % -- Nasal cannula  3 L/min VA    02/14/24 1120 99.7 °F (37.6 °C) 83 17 -- 92 % -- Ventilator -- MW    02/14/24 1105 99.9 °F (37.7 °C) 70 19 144/52 96 % -- -- -- MW    02/14/24 1100 99.9 °F (37.7 °C) 70 23 -- 96 % -- Ventilator -- MW    02/14/24 1030 -- -- -- -- 96 % -- Ventilator -- VA    02/14/24 1000 100 °F (37.8 °C) 70 20 147/53 94 % -- -- -- MW    02/14/24 0900 100 °F (37.8 °C) 73 20 140/49 96 % -- Ventilator -- MW    02/14/24  0800 99.9 °F (37.7 °C) 72 20 145/50 98 % -- -- -- MW    02/14/24 0700 99.7 °F (37.6 °C) 78 23 148/62 98 % -- Ventilator -- MW    02/14/24 0600 99.5 °F (37.5 °C) 77 25 154/70 98 % -- Ventilator -- SF    02/14/24 0500 99.5 °F (37.5 °C) 71 20 146/59 99 % 185 lb 13.6 oz Ventilator -- SF    02/14/24 0400 100.8 °F (38.2 °C) 71 20 129/57 97 % -- Ventilator -- SF    02/14/24 0300 100.8 °F (38.2 °C) 75 23 141/61 98 % -- Ventilator -- SF    02/14/24 0200 100.6 °F (38.1 °C) 76 22 143/62 98 % -- Ventilator -- SF    02/14/24 0100 100.4 °F (38 °C) 75 23 140/59 97 % -- Ventilator -- SF    02/14/24 0000 100.4 °F (38 °C) 74 21 138/60 97 % -- Ventilator -- SF          CIWA Scores (since admission)       None          Blood Transfusion Record       Product Unit Status Volume Start End            Transfuse RBC       24  [id number]  [id number] Completed 02/14/24 1328 357.5 mL 02/14/24 1105 02/14/24 1328       24  [id number]  [id number] Completed 02/11/24 2031 295 mL 02/11/24 1842 02/11/24 2030       24  [id number]  [id number] Completed 02/10/24 2200 350 mL 02/10/24 1855 02/10/24 1905       24  [id number]  [id number] Completed 02/10/24 2200 350 mL 02/10/24 1813 02/10/24 1823       24  [id number]  [id number] Completed 02/10/24 2200 350 mL 02/10/24 1758 02/10/24 1808

## 2024-02-15 NOTE — PROGRESS NOTES
[facility] CARDIOLOGY Progress Note      Ava Bowen is a 68 year old female who I assessed as follows:  Chief Complaint   Patient presents with    Bleeding          REASON FOR CONSULTATION:    HTN            ASSESSMENT/PLAN:     IMPRESSIONS:  Hemorrhage from LE fem pop bypass site, infected. S/p debridement 24  HTN  COPD  DM  PAD s/p fem-pop bypass 2024  Sickle cell anemia  DM  Acute resp failure, now extubated  OMEGA, on CKD, improved  HL, on statin PTA        PLAN:  Continue oral BP meds  Increase coreg to her usual dose  Hydralazine IV PRN        SUBJECTIVE:    Denies chest pain or dyspnea. Tired.      --------------------------------------------------------------------------------------------------------------------------------    HPI:         History sickle cell anemia, PAD s/p bypass  presents with hemorrhage from surgical site. Initially hypotensive on pressors, now on IV nicardepine for HTN. Unable to take po meds at this time. Creat elevated on admission, now improved.                No current outpatient medications on file.      Past Medical History:   Diagnosis Date    Anemia     Back pain     Back problem     Cataract     Chronic kidney disease (CKD)     COPD (chronic obstructive pulmonary disease) ([facility])     FEV 1 1.25 50%     Diabetes ([facility])     DM neuropathy    Essential hypertension     H/O angioplasty     2020    High blood pressure     High cholesterol     Neuropathy     Peripheral vascular disease ([facility])     PNA (pneumonia) 2018    Pulmonary emphysema ([facility])     Pulmonary nodule     4 mm RUL    Renal disorder     monitoring kidney labs    Sickle cell trait ([facility])     Sickle-cell anemia ([facility])     Tobacco abuse     Visual impairment      Past Surgical History:   Procedure Laterality Date    APPENDECTOMY      BACK SURGERY Right 2021    Right L3-4 extreme lateral interbody fusion, posterior decompression; LUMBAR LAMINECTOMY 1 LEVEL          x3     CHOLECYSTECTOMY      EXCIS LUMBAR DISK,ONE LEVEL          OTHER      bilateral leg stents  and      Family History   Problem Relation Age of Onset    Diabetes Mother         Passed from DM complications    Diabetes Sister         Had kidney transplant due to complications of DM    Kidney Disease Sister         Kidney failure secondary to diabetes; received kidney transplant in     Glaucoma Sister     Diabetes Brother     Sickle Cell Son         Cause of death    Macular degeneration Neg       Social History:  Social History     Socioeconomic History    Marital status:    Tobacco Use    Smoking status: Former     Packs/day: 1.00     Years: 30.00     Additional pack years: 0.00     Total pack years: 30.00     Types: Cigarettes     Quit date: 2019     Years since quittin.2    Smokeless tobacco: Never   Vaping Use    Vaping Use: Never used   Substance and Sexual Activity    Alcohol use: Not Currently     Comment: socially    Drug use: No   Social History Narrative    [employer]          Social Determinants of Health     Food Insecurity: No Food Insecurity (2024)    Food Insecurity     Food Insecurity: Never true   Transportation Needs: No Transportation Needs (2024)    Transportation Needs     Lack of Transportation: No   Housing Stability: Low Risk  (2024)    Housing Stability     Housing Instability: No          Medications:            Allergies  Allergies   Allergen Reactions    Dilaudid [Hydromorphone] TONGUE SWELLING and ANGIOEDEMA    Penicillins HIVES and ANGIOEDEMA     Hives on eyelids (occurred when pt was in her 20s). Tolerated amoxicillin per chart. Tolerates cephalosporins.               REVIEW OF SYSTEMS:   Sedated, unable to answer questions          EXAM:         TELE: SR          /58 (BP Location: Right arm)   Pulse 70   Temp 99.1 °F (37.3 °C) (Bladder)   Resp 18   Ht 5' 4.02\" (1.626 m)   Wt 185 lb 13.6 oz (84.3 kg)   LMP  (LMP  Unknown)   SpO2 95%   BMI 31.88 kg/m²   Temp (24hrs), Av.3 °F (37.4 °C), Min:98.6 °F (37 °C), Max:100 °F (37.8 °C)    Wt Readings from Last 3 Encounters:   24 185 lb 13.6 oz (84.3 kg)   24 164 lb 3.2 oz (74.5 kg)   23 153 lb (69.4 kg)         Intake/Output Summary (Last 24 hours) at 2/15/2024 0709  Last data filed at 2/15/2024 0500  Gross per 24 hour   Intake 2040.5 ml   Output 1175 ml   Net 865.5 ml         GENERAL: no acute distress  SKIN: no rashes  HEENT: atraumatic, normocephalic, throat without erythema  NECK: supple, no bruits  LUNGS: clear to auscultation  CARDIO: RRR without murmur or S3   GI: soft, nontender  EXTREMITIES: no cyanosis, clubbing or edema  NEUROLOGY: moving all extremities  PSYCH: cooperative          LABORATORY DATA:               ECG:        ECHO:          Labs:  Recent Labs   Lab 24  2325 24  0517 02/15/24  0448   GLU 93 86 180*   BUN 16 16 17   CREATSERUM 0.97 0.97 0.93   CA 7.1* 7.3* 7.5*    144 142   K 3.7 3.8 3.3*   * 114* 110   CO2 20.0* 22.0 24.0     No results for input(s): \"TROP\" in the last 168 hours.  Recent Labs   Lab 02/15/24  0448   RBC 3.05*   HGB 8.7*   HCT 25.1*   MCV 82.3   MCH 28.5   MCHC 34.7   RDW 16.1*   NEPRELIM 26.30*   WBC 29.8*   .0     No results for input(s): \"TROP\", \"TROPHS\", \"CK\" in the last 168 hours.    Imaging:  No results found.         Jose Banegas MD

## 2024-02-15 NOTE — PLAN OF CARE
Problem: Patient Centered Care  Goal: Patient preferences are identified and integrated in the patient's plan of care  Description: Interventions:  - What would you like us to know as we care for you?   Problem: Safety Risk - Non-Violent Restraints  Goal: Patient will remain free from self-harm  Description: INTERVENTIONS:  - Apply the least restrictive restraint to prevent harm  - Notify patient and family of reasons restraints applied  - Assess for any contributing factors to confusion (electrolyte disturbances, delirium, medications)  - Discontinue any unnecessary medical devices as soon as possible  - Assess the patient's physical comfort, circulation, skin condition, hydration, nutrition and elimination needs   - Reorient and redirection as needed  - Assess for the need to continue restraints  2/15/2024 0503 by Dakota Jones, RN  Outcome: Progressing  2/15/2024 0226 by Dakota Jones, RN  Outcome: Progressing     Problem: CARDIOVASCULAR - ADULT  Goal: Maintains optimal cardiac output and hemodynamic stability  Description: INTERVENTIONS:  - Monitor vital signs, rhythm, and trends  - Monitor for bleeding, hypotension and signs of decreased cardiac output  - Evaluate effectiveness of vasoactive medications to optimize hemodynamic stability  - Monitor arterial and/or venous puncture sites for bleeding and/or hematoma  - Assess quality of pulses, skin color and temperature  - Assess for signs of decreased coronary artery perfusion - ex. Angina  - Evaluate fluid balance, assess for edema, trend weights  Outcome: Progressing  Goal: Absence of cardiac arrhythmias or at baseline  Description: INTERVENTIONS:  - Continuous cardiac monitoring, monitor vital signs, obtain 12 lead EKG if indicated  - Evaluate effectiveness of antiarrhythmic and heart rate control medications as ordered  - Initiate emergency measures for life threatening arrhythmias  - Monitor electrolytes and administer replacement therapy as  ordered  Outcome: Progressing     Problem: RESPIRATORY - ADULT  Goal: Achieves optimal ventilation and oxygenation  Description: INTERVENTIONS:  - Assess for changes in respiratory status  - Assess for changes in mentation and behavior  - Position to facilitate oxygenation and minimize respiratory effort  - Oxygen supplementation based on oxygen saturation or ABGs  - Provide Smoking Cessation handout, if applicable  - Encourage broncho-pulmonary hygiene including cough, deep breathe, Incentive Spirometry  - Assess the need for suctioning and perform as needed  - Assess and instruct to report SOB or any respiratory difficulty  - Respiratory Therapy support as indicated  - Manage/alleviate anxiety  - Monitor for signs/symptoms of CO2 retention  Outcome: Progressing     Problem: METABOLIC/FLUID AND ELECTROLYTES - ADULT  Goal: Glucose maintained within prescribed range  Description: INTERVENTIONS:  - Monitor Blood Glucose as ordered  - Assess for signs and symptoms of hyperglycemia and hypoglycemia  - Administer ordered medications to maintain glucose within target range  - Assess barriers to adequate nutritional intake and initiate nutrition consult as needed  - Instruct patient on self management of diabetes  Outcome: Progressing  Goal: Electrolytes maintained within normal limits  Description: INTERVENTIONS:  - Monitor labs and rhythm and assess patient for signs and symptoms of electrolyte imbalances  - Administer electrolyte replacement as ordered  - Monitor response to electrolyte replacements, including rhythm and repeat lab results as appropriate  - Fluid restriction as ordered  - Instruct patient on fluid and nutrition restrictions as appropriate  Outcome: Progressing  Goal: Hemodynamic stability and optimal renal function maintained  Description: INTERVENTIONS:  - Monitor labs and assess for signs and symptoms of volume excess or deficit  - Monitor intake, output and patient weight  - Monitor urine specific  gravity, serum osmolarity and serum sodium as indicated or ordered  - Monitor response to interventions for patient's volume status, including labs, urine output, blood pressure (other measures as available)  - Encourage oral intake as appropriate  - Instruct patient on fluid and nutrition restrictions as appropriate  Outcome: Progressing     - Provide timely, complete, and accurate information to patient/family  - Incorporate patient and family knowledge, values, beliefs, and cultural backgrounds into the planning and delivery of care  - Encourage patient/family to participate in care and decision-making at the level they choose  - Honor patient and family perspectives and choices  Outcome: Progressing     Problem: Diabetes/Glucose Control  Goal: Glucose maintained within prescribed range  Description: INTERVENTIONS:  - Monitor Blood Glucose as ordered  - Assess for signs and symptoms of hyperglycemia and hypoglycemia  - Administer ordered medications to maintain glucose within target range  - Assess barriers to adequate nutritional intake and initiate nutrition consult as needed  - Instruct patient on self management of diabetes  Outcome: Progressing     Problem: Patient/Family Goals  Goal: Patient/Family Long Term Goal  Description: Patient's Long Term Goal:     Interventions:  -   - See additional Care Plan goals for specific interventions  Outcome: Progressing  Goal: Patient/Family Short Term Goal  Description: Patient's Short Term Goal:     Interventions:   -   - See additional Care Plan goals for specific interventions  Outcome: Progressing

## 2024-02-15 NOTE — PLAN OF CARE
Passed speech eval, added norco for pain, updated family on new room assignment and debridement scheduled for tomorrow, discontinued D10, added diet, will be NPO at midnight, transfer to [facility]        Problem: Patient Centered Care  Goal: Patient preferences are identified and integrated in the patient's plan of care  Description: Interventions:  - What would you like us to know as we care for you?   - Provide timely, complete, and accurate information to patient/family  - Incorporate patient and family knowledge, values, beliefs, and cultural backgrounds into the planning and delivery of care  - Encourage patient/family to participate in care and decision-making at the level they choose  - Honor patient and family perspectives and choices  2/15/2024 1722 by Oscar Coughlin RN  Outcome: Progressing  2/15/2024 1722 by Oscar Coughlin RN  Outcome: Progressing       Problem: RESPIRATORY - ADULT  Goal: Achieves optimal ventilation and oxygenation  Description: INTERVENTIONS:  - Assess for changes in respiratory status  - Assess for changes in mentation and behavior  - Position to facilitate oxygenation and minimize respiratory effort  - Oxygen supplementation based on oxygen saturation or ABGs  - Provide Smoking Cessation handout, if applicable  - Encourage broncho-pulmonary hygiene including cough, deep breathe, Incentive Spirometry  - Assess the need for suctioning and perform as needed  - Assess and instruct to report SOB or any respiratory difficulty  - Respiratory Therapy support as indicated  - Manage/alleviate anxiety  - Monitor for signs/symptoms of CO2 retention  2/15/2024 1722 by Oscar Coughlin, RN  Outcome: Progressing  2/15/2024 1722 by Oscar Coughlin, RN  Outcome: Progressing

## 2024-02-15 NOTE — CDS QUERY
How to answer this Query:     1.) DON'T CLICK COSIGN BUTTON FIRST  2.) Click \"3 dots...\" to the right of cosign button and click EDIT on the toolbar.  2.) Type an \"X\" in the bracket for the diagnosis that applies. (You may also add additional clinical details as you feel necessary to substantiate your response).   3.) Finally click \"Sign\" to complete response.  Thank You  CLINICAL DOCUMENTATION CLARIFICATION FORM     Dear Doctor Vinh:   Clinical information (provided below) indicates a debridement was done 2-13  PLEASE (X) ALL STATEMENTS THAT APPLY TO THIS PROCEDURE.  SELECTION BY PROVIDER ONLY    Clarification Request #1: TYPE of Procedure  (x ) Excisional Debridement  ( ) Non-Excisional Debridement  Clarification Request #2   Type of instrument used:          ( x ) scalpel         (x  )  Scissors         (  ) rongeur         (  )  Other         (x  ) Irrigation of wound (e.g., Versajet)   ( ) Other (please specify):   ______________________________________________  2-13 Brief op report:  Pre-Operative Diagnosis: Groin wound dehiscence and infection     Post-Operative Diagnosis: Groin wound dehiscence and infection     Procedure Performed:   Sharp and versajet debridement of skin subcutaneous fat of right abdominal wall groin and thigh [id number] cm  Sharp debridement of skin and subcataneous fat of popliteal incision 10x4 cm  Removal of infected graft  Wound vac placement     Surgeon(s) and Role:     * Kp Justice MD - Primary     Assistant(s):  Surgical Assistant.: Rosas Pineda     Surgical Findings:   Debrided to healthy bleeding tissues in groin   Muscle flap viable and secure  Gastrocnemius muscle viable and responded to bovie   Left 1 cm cuff of graft - back bleeding from popliteal artery      Specimen: graft, tissue and fluid for culture         If you have any questions, please contact Clinical :  Brisa Dsouza RN CCDS at [phone]     Thank You!

## 2024-02-15 NOTE — CDS QUERY
How to answer this Query:     1.) DON'T CLICK COSIGN BUTTON FIRST  2.) Click \"3 dots...\" to the right of cosign button and click EDIT on the toolbar.  2.) Type an \"X\" in the bracket for the diagnosis that applies. (You may also add additional clinical details as you feel necessary to substantiate your response).   3.) Finally click \"Sign\" to complete response.  Thank You  CLINICAL DOCUMENTATION CLARIFICATION FORM     Dear Doctor Vinh:   Clinical information (provided below) indicates a debridement was done 2-10  PLEASE (X) ALL STATEMENTS THAT APPLY TO THIS PROCEDURE.  SELECTION BY PROVIDER ONLY     Clarification Request #1: TYPE of Procedure  (x ) Excisional Debridement  ( ) Non-Excisional Debridement  Clarification Request #2  Depth of debridement/type of tissue removed:      (x  )skin,      (x  )subcutaneous tissue,     (  ) fascia,      (  )muscle,      (  )bone   Clarification Request #3   Type of instrument used:          (x  ) scalpel         ( x )  Scissors         (  ) rongeur         (  )  Other         (  ) Irrigation of wound (e.g., Versajet)   ( ) Other (please specify):   ______________________________________________  Brief op report 2-10    Pre-Operative Diagnosis: Anastomotic hemorrhage right fem popliteal bypass     Post-Operative Diagnosis: same      Procedure Performed:   Removal of infected graft  Debridement of right common femoral artery, removal of proximal SFA stent rings that were caging profunda, profunda endarterectomy, thrombectomy of external iliac artery, common femoral artery and profunda  Bovine patch angioplasty of common femoral artery and profunda   Debridement of skin subcutaneous fat   Sartorius myoplasty   Saphenous vein harvest - not adequate     Surgeon(s) and Role:     * Kp Justice MD - Primary     Assistant(s):  Surgical Assistant.: Cat Chand     Surgical Findings:   Grossly infected - irrigated with irricept and dakins - wound left open and packed with dakins  david  Multiple specimens for culture  Doppler profunda signal at completion      Specimen: multiple specimens for culture     Estimated Blood Loss: 300 mL      If you have any questions, please contact Clinical :  Brisa Dsouza RN CCDS at [phone]     Thank You!

## 2024-02-15 NOTE — SLP NOTE
ADULT SWALLOWING EVALUATION    ASSESSMENT    ASSESSMENT/OVERALL IMPRESSION:  PPE REQUIRED. THIS THERAPIST WORE GLOVES AND MASK FOR DURATION OF EVALUATION. HANDS WASHED UPON ENTRANCE/EXIT.    Per MD H&P, \"Ms. Bowen is a 68 year old female with PMH sig for CKD, COPD, DM, HTN, HLD, PVD, and sickle cell anemia who was recently admitted last week for right lower extremity femoral to popliteal bypass who presented with bleeding from surgical site. Initially hypotensive in EMS, labs pending, BG elevated.\"    SLP BSSE orders received and acknowledged. A swallow evaluation warranted per \"ventilation\". Pt afebrile with clear vocal quality, on 3L/Min, with oxygen saturation at 90%. Pt with no hx of dysphagia at [facility].   Pt positioned 90 degrees in bed, alert/cooperative. Oral motor skills within functional limits. Pt edentulous, dentures at home, reports can eat fine without them. Pt presented with trials of hard solids and thin liquids via straw. Pt with no complaints of pain. Pt with adequate oral acceptance and bilabial seal across all trials. Pt with intact bite, prolonged mastication of solids, and delayed A-P transit. Pt's swallow response appears timely with adequate hyolaryngeal elevation/excursion. No clinical signs of aspiration (e.g., immediate/delayed throat clear, immediate/delayed cough, wet vocal quality, increased O2 effort) observed across all trials. 2/11 CXR indicates \"Interval placement of enteric tube with the tip in the region the gastric body. Mild left basilar linear atelectasis\". Oxygen status remained stable t/o the entire evaluation.     At this time, pt presents with mild oral dysphagia and probable pharyngeal dysfunction. Recommend a regular diet and thin liquids with strict adherence to safe swallowing compensatory strategies. Results and recommendations reviewed with RN, pt. Pt v/u to all results/recommendations. Recommendations remain written on whiteboard. SLP collaborated with RN for MD diet  orders.     PLAN: SLP to f/u x1-2 meal assessments, monitor imaging, and VFSS if clinically indicated         RECOMMENDATIONS   Diet Recommendations - Solids: Regular  Diet Recommendations - Liquids: Thin Liquids                        Compensatory Strategies Recommended: Slow rate  Aspiration Precautions: Upright position;Slow rate  Medication Administration Recommendations: No restrictions  Treatment Plan/Recommendations: Aspiration precautions    HISTORY   MEDICAL HISTORY  Reason for Referral:  (\"ventilation\")    Problem List  Principal Problem:    Bypass graft mechanical complication, initial encounter ([facility])  Active Problems:    Hyperglycemia    Bypass graft mechanical complication ([facility])    Anemia, unspecified type    Acute kidney injury superimposed on CKD  ([facility])      Past Medical History  Past Medical History:   Diagnosis Date    Anemia     Back pain     Back problem     Cataract     Chronic kidney disease (CKD)     COPD (chronic obstructive pulmonary disease) ([facility])     FEV 1 1.25 50% 2/20    Diabetes (HCC)     DM neuropathy    Essential hypertension     H/O angioplasty     07/08/2020    High blood pressure     High cholesterol     Neuropathy     Peripheral vascular disease (HCC)     PNA (pneumonia) 05/2018    Pulmonary emphysema (HCC)     Pulmonary nodule     4 mm RUL    Renal disorder     monitoring kidney labs    Sickle cell trait (HCC)     Sickle-cell anemia (HCC)     Tobacco abuse     Visual impairment        Prior Living Situation: Home with support  Diet Prior to Admission: Regular;Thin liquids  Precautions: Aspiration    Patient/Family Goals: did not state    SWALLOWING HISTORY  Current Diet Consistency: NPO  Dysphagia History: none  Imaging Results: see above    SUBJECTIVE       OBJECTIVE   ORAL MOTOR EXAMINATION  Dentition: Edentulous  Symmetry: Within Functional Limits  Strength: Within Functional Limits  Tone: Within Functional Limits  Range of Motion: Within Functional Limits  Rate of Motion:  Within Functional Limits    Voice Quality: Clear  Respiratory Status: Nasal cannula;Supplemental O2  Consistencies Trialed: Thin liquids;Hard solid  Method of Presentation: Self presentation;Straw  Patient Positioning: Upright;Midline    Oral Phase of Swallow: Impaired  Bolus Retrieval: Intact  Bilabial Seal: Intact  Bolus Formation: Impaired  Bolus Propulsion: Impaired  Mastication: Impaired  Retention: Intact    Pharyngeal Phase of Swallow: Within Functional Limits  Laryngeal Elevation Timing: Appears intact  Laryngeal Elevation Strength: Appears intact  Laryngeal Elevation Coordination: Appears intact  (Please note: Silent aspiration cannot be evaluated clinically. Videofluoroscopic Swallow Study is required to rule-out silent aspiration.)    Esophageal Phase of Swallow: No complaints consistent with possible esophageal involvement  Comments: NA              GOALS  Goal #1 The patient will tolerate regular consistency and thin liquids without overt signs or symptoms of aspiration with 100 % accuracy over 1-2 session(s).  In Progress   Goal #2 The patient/family/caregiver will demonstrate understanding and implementation of aspiration precautions and swallow strategies independently over 1-2 session(s).    In Progress     FOLLOW UP  Treatment Plan/Recommendations: Aspiration precautions  Number of Visits to Meet Established Goals:  (1-2)  Follow Up Needed (Documentation Required): Yes  SLP Follow-up Date: 02/16/24    Thank you for your referral.   If you have any questions, please contact JONATHAN Kaiser M.S. CCC-SLP  Speech Language Pathologist  Phone Number Ext. [phone]

## 2024-02-15 NOTE — PROGRESS NOTES
[facility] Infectious Disease  Progress Note    Ava Bowen Patient Status:  Inpatient    10/6/1955 MRN [id number]   Location [facility] 2W/SW Attending Guille Jain MD   Hosp Day # 5 PCP Ernesto De Dios     Subjective:  Patient seen/examined.  She was extubated yesterday and is tolerating well.  Plans for return to OR again tomorrow for further debridement.    Objective:  Blood pressure 153/56, pulse 78, temperature 99.1 °F (37.3 °C), resp. rate 24, height 5' 4.02\" (1.626 m), weight 185 lb 13.6 oz (84.3 kg), SpO2 96%, not currently breastfeeding.    Intake/Output:    Intake/Output Summary (Last 24 hours) at 2/15/2024 1233  Last data filed at 2/15/2024 1047  Gross per 24 hour   Intake 1984.5 ml   Output 1275 ml   Net 709.5 ml     Physical Exam:  General: Awake, alert, non-tox, NAD.  HEENT:  Oropharynx clear, trachea ML.  Heart: RRR S1S2 no murmurs.  Lungs: Essentially CTA b/l, no rhonchi, rales, wheezes.  Abdomen: Soft, NT/ND.  BS present.  No organomegaly.  Extremity: R groin dressing intact - wound stable per vascular input.  Neurological: No focal deficits.  Derm:  Warm, dry, free from rashes.    Lab Data Review:  Lab Results   Component Value Date    WBC 29.8 02/15/2024    HGB 8.7 02/15/2024    HCT 25.1 02/15/2024    .0 02/15/2024    CREATSERUM 0.93 02/15/2024    BUN 17 02/15/2024     02/15/2024    K 3.3 02/15/2024     02/15/2024    CO2 24.0 02/15/2024     02/15/2024    CA 7.5 02/15/2024    PHOS 4.3 02/15/2024      Cultures:   Single positive blood culture for klebsiella 2/10/24, repeats negative     Surgical cultures - klebsiella, enterococcus, streptococcus, e.coli    Radiology:  CONCLUSION:      Interval placement of enteric tube with the tip in the region the gastric body.      Mild left basilar linear atelectasis.      Assessment and Plan:     Acute gram negative sepsis presenting with a bleeding complication after recent  fem-pop bypass 1/24/24  - s/p emergent OR where extensive debridement and patch was performed - noting infected graft material  - Blood culture positive for klebsiella on admission, repeats negative  - Wound cultures with   - OR cultures from debridement with klebsiella, enterococcus, strep anginosus, e.coli, bacteroides, prevotella 2/10/24  - Return to OR 2/13/24 for debridement and removal of infected graft with VAC placement  - OR cultures from debridement with klebsiella, e.coli, candida, enterococcus on 2/13/24  - Return to OR anticipated tomorrow for further debridement  - IV vancomycin and meropenem ongoing     2.  Leukocytosis and low grade fevers ongoing  - Multifactorial due to the above  - WBCs at 29K and ~same, will trend  - C.diff is negative    3.  Acute blood loss anemia, hemorrhage as a complication from recent bypass procedure  - ICU management ongoing, transfusions as needed     4.  DM II  - Possible DKA component with acidosis  - Management in the ICU     5.  Disposition - inpatient.  Continue broad spectrum antibiotics.  Further OR debridement anticipated tomorrow.  Trending temp and WBCs.  Ventilator wean as able.  Expect PICC and long course IV antibiotics given graft infection - 6 weeks from the timing of her last procedure when all procedures are complete.  Final choice of agent(s) to be determined.    Dayana Cid DO, [facility] Infectious Disease  [phone]    2/15/2024  12:33 PM

## 2024-02-15 NOTE — PROGRESS NOTES
[facility] Pulmonary, Critical Care and Sleep    Ava Bowen Patient Status:  Inpatient    10/6/1955 MRN [id number]   Location [facility] 2W/SW Attending Guille Jain MD   Hosp Day # 5 PCP Ernesto De Dios     Date of Admission: 2/10/2024  3:39 PM    Admission Diagnosis: Hyperglycemia [R73.9]  Bypass graft mechanical complication, initial encounter ([facility]) [T82.398A]  Acute kidney injury superimposed on CKD  ([facility]) [N17.9, N18.9]  Anemia, unspecified type [D64.9]    S: Extubated yesterday and feeling improved.     Scheduled Medications:     potassium chloride  40 mEq Intravenous Once    carvedilol  25 mg Per NG Tube BID    furosemide  20 mg Intravenous BID (Diuretic)    vancomycin  1,250 mg Intravenous Q36H    insulin regular human  1-7 Units Subcutaneous 4 times per day    sodium hypochlorite   Topical 2 times per day    amLODIPine  10 mg Per NG Tube QAM    hydrALAZINE  100 mg Per NG Tube Q8H    pantoprazole  40 mg Intravenous Q24H    fluticasone furoate-vilanterol  1 puff Inhalation Daily    [Held by provider] rosuvastatin  40 mg Oral Nightly    chlorhexidine gluconate  15 mL Mouth/Throat BID@0800,2000    enoxaparin  40 mg Subcutaneous Daily    meropenem  500 mg Intravenous q12h       Infusing Medications:     dextrose 10%      insulin regular Stopped (24 1711)    dexmedetomidine      propofol Stopped (24 1018)       PRN Medications:  dextrose 10%, lipase-protease-amylase (Lip-Prot-Amyl) **AND** sodium bicarbonate, morphINE **OR** morphINE **OR** morphINE, hydrALAzine, ondansetron, metoclopramide, albuterol, ipratropium-albuterol, acetaminophen **OR** acetaminophen **OR** acetaminophen **OR** acetaminophen, polyethylene glycol (PEG 3350), senna, bisacodyl, fleet enema, glucose **OR** glucose **OR** glucose-vitamin C **OR** dextrose **OR** glucose **OR** glucose **OR** glucose-vitamin C    OBJECTIVE:  /61   Pulse 75   Temp 99 °F (37.2 °C)   Resp 18   Ht 162.6 cm (5' 4.02\")   Wt  185 lb 13.6 oz (84.3 kg)   LMP  (LMP Unknown)   SpO2 90%   BMI 31.88 kg/m²    Temp (24hrs), Av.2 °F (37.3 °C), Min:98.6 °F (37 °C), Max:100 °F (37.8 °C)      Vent Mode: CPAP;PS  FiO2 (%):  [40 %] 40 %  PEEP/CPAP (cm H2O):  [5 cm H20] 5 cm H20      Wt Readings from Last 3 Encounters:   24 185 lb 13.6 oz (84.3 kg)   24 164 lb 3.2 oz (74.5 kg)   23 153 lb (69.4 kg)       I/O last 3 completed shifts:  In: 4269.5 [I.V.:3912; Blood:357.5]  Out: 2250 [Urine:1700; Drains:550]  No intake/output data recorded.     O2: 2 LNC  General: NAD.   Neuro: Alert, no focal deficits.    HEENT: PERRL  Neck : No LAD  CV: RRR, nl S1, S2, no S4, S3 or murmur.   Lungs: Clear bilaterally.   Abd: Nontender, non distended.   Ext: 2+ edema.   Skin: No rashes.     Recent Labs   Lab 24  1559 24  2325 24  1439 02/15/24  0448   WBC 28.3* 28.3*  --  29.8*   HGB 8.3* 7.9* 9.5* 8.7*   HCT 24.0* 23.4*  --  25.1*   .0 281.0  --  306.0     Recent Labs   Lab 02/10/24  2151 24  0543 24  2053 24  0015 24  0551 24  1225 24  2325 24  0517 02/15/24  0448   *   < > 192*   < > 264*  264*   < > 93 86 180*   BUN 26*   < > 19   < > 19  19   < > 16 16 17   CREATSERUM 1.34*   < > 1.25*   < > 1.27*  1.27*   < > 0.97 0.97 0.93   CA 8.4*   < > 7.2*   < > 7.4*  7.4*   < > 7.1* 7.3* 7.5*      < > 145   < > 144  144   < > 142 144 142   K 3.3*   < > 4.3   < > 4.2  4.2  4.2   < > 3.7 3.8 3.3*   *   < > 123*   < > 120*  120*   < > 115* 114* 110   CO2 14.0*   < > 14.0*   < > 13.0*  13.0*   < > 20.0* 22.0 24.0   AST 91*  --   --   --   --   --   --   --   --    ALT 38  --   --   --   --   --   --   --   --    ALB 2.3*  --  2.3*  --  2.3*  --   --   --   --     < > = values in this interval not displayed.     Recent Labs   Lab 02/10/24  1551   INR 1.51*   PTT 30.3     Recent Labs   Lab 24  0550 24  1512   ABGPHT 7.33* 7.38   VXHTHJ5C 23* 29*    LPROT7W 82 204*   ABGHCO3 15.2* 19.5*   SITE Arterial Line Arterial Line   THGB 8.7*  --        COVID-19 Lab Results    COVID-19  Lab Results   Component Value Date    COVID19 Not Detected 02/10/2024    COVID19 Not Detected 01/23/2024    COVID19 Not Detected 01/02/2024       Pro-Calcitonin  No results for input(s): \"PCT\" in the last 168 hours.    Cardiac  No results for input(s): \"TROP\", \"PBNP\" in the last 168 hours.    Creatinine Kinase  No results for input(s): \"CK\" in the last 168 hours.    Inflammatory Markers  No results for input(s): \"CRP\", \"PORFIRIO\", \"LDH\", \"DDIMER\" in the last 168 hours.    Imaging:   CXR 2/11:    Chest images personally reviewed.   Assessment/Plan   Acute respiratory failure - due to aspiration and ongoing metabolic acidosis and likely now fluid overload now with 3rd spaced fluids.   Intubated 2/11 post op, extubated 2/14  - Agree with diuresis.   - Pulmonary toilet.   Hypovolemic shock - resolved  - monitor. Now likely fluid overloaded.   Acute blood loss anemia  - monitor  ID: PVD with bleeding from recent fem-pop bypass and infected graft  - meropenem and IV vancomycin (2/11- )  - wound vacs in place  - plan for return trip to OR on 2/16  COPD  - BD protocol  DM  - insulin per endo  OMEGA on CKD, metabolic acidosis  - off bicarb gtt  - Renal following.   FEN  - swallow eval and advance diet  Ppx  - Lovenox  - PPI  Dispo  - OK for floor from pulmonary standpoint.     My best regards,         Tom Gil MD  [facility] Pulmonary, Critical Care and Sleep Medicine

## 2024-02-15 NOTE — PROGRESS NOTES
02/15/24 1000   Negative Pressure Wound Therapy Groin Right   Placement Date: 02/13/24   Inserted by: Dr. KESSLER  Location: Groin  Wound Location Orientation: Right   Wound photographed/measured No   Machine Status (On) Yes   Site Assessment ANGELO   [place]-wound Assessment ANGELO   Unit Type KCI ULTA   Cycle Continuous;On   Target Pressure (mmHg) 125   Drainage Description Serosanguineous   Dressing Status Intact   Canister Changed No   Wound Follow Up   Follow up needed Yes     Pt is scheduled to return to OR tomorrow w Dr. Kessler for I and D of the R groin site with wound vac dressing change. Wound care will continue to follow.

## 2024-02-16 ENCOUNTER — APPOINTMENT (OUTPATIENT)
Dept: PICC SERVICES | Facility: HOSPITAL | Age: 69
End: 2024-02-16
Attending: SURGERY
Payer: MEDICARE

## 2024-02-16 ENCOUNTER — ANESTHESIA EVENT (OUTPATIENT)
Dept: SURGERY | Facility: HOSPITAL | Age: 69
End: 2024-02-16
Payer: MEDICARE

## 2024-02-16 ENCOUNTER — ANESTHESIA (OUTPATIENT)
Dept: SURGERY | Facility: HOSPITAL | Age: 69
End: 2024-02-16
Payer: MEDICARE

## 2024-02-16 RX ORDER — ROCURONIUM BROMIDE 10 MG/ML
INJECTION, SOLUTION INTRAVENOUS AS NEEDED
Status: DISCONTINUED | OUTPATIENT
Start: 2024-02-16 | End: 2024-02-16 | Stop reason: SURG

## 2024-02-16 RX ORDER — PHENYLEPHRINE HCL 10 MG/ML
VIAL (ML) INJECTION AS NEEDED
Status: DISCONTINUED | OUTPATIENT
Start: 2024-02-16 | End: 2024-02-16 | Stop reason: SURG

## 2024-02-16 RX ORDER — ONDANSETRON 2 MG/ML
INJECTION INTRAMUSCULAR; INTRAVENOUS AS NEEDED
Status: DISCONTINUED | OUTPATIENT
Start: 2024-02-16 | End: 2024-02-16 | Stop reason: SURG

## 2024-02-16 RX ORDER — MAGNESIUM SULFATE HEPTAHYDRATE 500 MG/ML
INJECTION, SOLUTION INTRAMUSCULAR; INTRAVENOUS AS NEEDED
Status: DISCONTINUED | OUTPATIENT
Start: 2024-02-16 | End: 2024-02-16 | Stop reason: SURG

## 2024-02-16 RX ORDER — GLYCOPYRROLATE 0.2 MG/ML
INJECTION, SOLUTION INTRAMUSCULAR; INTRAVENOUS AS NEEDED
Status: DISCONTINUED | OUTPATIENT
Start: 2024-02-16 | End: 2024-02-16 | Stop reason: SURG

## 2024-02-16 RX ORDER — LIDOCAINE HYDROCHLORIDE 10 MG/ML
INJECTION, SOLUTION EPIDURAL; INFILTRATION; INTRACAUDAL; PERINEURAL AS NEEDED
Status: DISCONTINUED | OUTPATIENT
Start: 2024-02-16 | End: 2024-02-16 | Stop reason: SURG

## 2024-02-16 RX ORDER — DEXAMETHASONE SODIUM PHOSPHATE 4 MG/ML
VIAL (ML) INJECTION AS NEEDED
Status: DISCONTINUED | OUTPATIENT
Start: 2024-02-16 | End: 2024-02-16 | Stop reason: SURG

## 2024-02-16 RX ORDER — SODIUM CHLORIDE, SODIUM LACTATE, POTASSIUM CHLORIDE, CALCIUM CHLORIDE 600; 310; 30; 20 MG/100ML; MG/100ML; MG/100ML; MG/100ML
INJECTION, SOLUTION INTRAVENOUS CONTINUOUS PRN
Status: DISCONTINUED | OUTPATIENT
Start: 2024-02-16 | End: 2024-02-16 | Stop reason: SURG

## 2024-02-16 RX ORDER — LIDOCAINE HYDROCHLORIDE 40 MG/ML
SOLUTION TOPICAL AS NEEDED
Status: DISCONTINUED | OUTPATIENT
Start: 2024-02-16 | End: 2024-02-16 | Stop reason: SURG

## 2024-02-16 RX ORDER — EPHEDRINE SULFATE 50 MG/ML
INJECTION, SOLUTION INTRAVENOUS AS NEEDED
Status: DISCONTINUED | OUTPATIENT
Start: 2024-02-16 | End: 2024-02-16 | Stop reason: SURG

## 2024-02-16 RX ADMIN — EPHEDRINE SULFATE 5 MG: 50 INJECTION, SOLUTION INTRAVENOUS at 14:24:00

## 2024-02-16 RX ADMIN — LIDOCAINE HYDROCHLORIDE 30 MG: 10 INJECTION, SOLUTION EPIDURAL; INFILTRATION; INTRACAUDAL; PERINEURAL at 13:47:00

## 2024-02-16 RX ADMIN — ROCURONIUM BROMIDE 35 MG: 10 INJECTION, SOLUTION INTRAVENOUS at 13:48:00

## 2024-02-16 RX ADMIN — DEXAMETHASONE SODIUM PHOSPHATE 4 MG: 4 MG/ML VIAL (ML) INJECTION at 13:59:00

## 2024-02-16 RX ADMIN — PHENYLEPHRINE HCL 100 MCG: 10 MG/ML VIAL (ML) INJECTION at 14:22:00

## 2024-02-16 RX ADMIN — LIDOCAINE HYDROCHLORIDE 3 ML: 40 SOLUTION TOPICAL at 13:50:00

## 2024-02-16 RX ADMIN — SODIUM CHLORIDE, SODIUM LACTATE, POTASSIUM CHLORIDE, CALCIUM CHLORIDE: 600; 310; 30; 20 INJECTION, SOLUTION INTRAVENOUS at 13:36:00

## 2024-02-16 RX ADMIN — GLYCOPYRROLATE 0.1 MG: 0.2 INJECTION, SOLUTION INTRAMUSCULAR; INTRAVENOUS at 13:59:00

## 2024-02-16 RX ADMIN — SODIUM CHLORIDE, SODIUM LACTATE, POTASSIUM CHLORIDE, CALCIUM CHLORIDE: 600; 310; 30; 20 INJECTION, SOLUTION INTRAVENOUS at 14:56:00

## 2024-02-16 RX ADMIN — PHENYLEPHRINE HCL 100 MCG: 10 MG/ML VIAL (ML) INJECTION at 14:24:00

## 2024-02-16 RX ADMIN — ROCURONIUM BROMIDE 5 MG: 10 INJECTION, SOLUTION INTRAVENOUS at 13:47:00

## 2024-02-16 RX ADMIN — GLYCOPYRROLATE 0.1 MG: 0.2 INJECTION, SOLUTION INTRAMUSCULAR; INTRAVENOUS at 14:22:00

## 2024-02-16 RX ADMIN — MAGNESIUM SULFATE HEPTAHYDRATE 1 G: 500 INJECTION, SOLUTION INTRAMUSCULAR; INTRAVENOUS at 13:56:00

## 2024-02-16 RX ADMIN — ONDANSETRON 4 MG: 2 INJECTION INTRAMUSCULAR; INTRAVENOUS at 14:50:00

## 2024-02-16 NOTE — PROGRESS NOTES
[facility] Hospitalist Progress Note     CC: Hospital Follow up    PCP: Ernesto De Dios       Assessment/Plan:     Principal Problem:    Bypass graft mechanical complication, initial encounter ([facility])  Active Problems:    Hyperglycemia    Bypass graft mechanical complication ([facility])    Anemia, unspecified type    Acute kidney injury superimposed on CKD  ([facility])    Ms. Bowen is a 68 year old female with PMH sig for CKD, COPD, DM, HTN, HLD, PVD, and sickle cell anemia who was recently admitted last week for right lower extremity femoral to popliteal bypass who presented with bleeding from surgical site. Taken for emergent OR on 2/10/24, noted to be grossly infected, started on vancomycin/meropenem. Bcx with klebsiella. BG elevated on admit, started on insulin gtt, endo consulted, now off insulin gtt. Kept intubated post op, pulm following.        PAD sp RLE fem-pop bypass last week c/b bleeding fm surg site fm wound dehiscence  - s/p recent fem pop bypass 1/24/24 with Dr. Murrieta  - has been on ASA/plavix- held on admit  - Hg 6.0, s/p 3 units pRBC 2/10  - vascular surgery consulted, s/p emergent OR 2/10, removal of infected graft, s/p debridement, bone patch angioplasty   - sp further debridement 2/13 w debridement to healthy tissue and placement of wound vac  - back to OR today d/w Dr Justice anticipating further debridements/ OR needed after this  - getting IV lasix for post op fluid overload also PT, OT [facility] recs  - on IVP morphine 1-4mg  q 2 using 2mg or 4mg doses reports some pain relief but doesn't last- reports norco doesn't work but morphine does- open to adding oral morphine to iv options to inc duration of sx relief will start at 5mg msir and can adjust fm there      Septic shock (now improved) fm post op wound infection  - continue IV vancomycin and meropenem, ID consult  - Bcx with klebsiella, repeat Bcx ordered  - 2/10 wound cx and 2/13 poly microbial  - abx adjustment per ID f/u cx results  - ID notes  appreciated- tenative plan for 6 weeks abx from from final surgical plans- final abx tbd pending cx data     Metabolic acidosis- improved   - likely 2/2 LA from blood loss +/- dka on admit  - pH 7.24 on admit, LA 7.6 on admit, now normalized  - likely multifactorial, 2/2 LA, blood loss, ?DKA, CKD  - renal consulted, sp bicarb gtt    Acute blood loss anemia  Chronic anemia  Sickle cell anemia  - outpatient f/u  - Hg 8.9 on discharge last week  - Hg 6.0 on admission, s/p 3 units pRBC  - monitor    ?DKA   DM2, hyperglycemia  - hold PO medications  - BG 500s in ED, pH 7.2  - insulin gtt ordered in ED  - endocrine consulted > holding tresibe a noted hypoglycemic- correction on reg insulin correction dosing    Respiratory failure  - remained intubated post op both fm aspiration event and metabolic acidosis  - pulm following  - extubated 2/14 pm     Hypovolemic / septic Shock/ Hypotension  Hx HTN  - previously with uncontrolled HTN on prior admit  - BP low likely 2/2 hemorrhage  - BP labile, A-line  - initially on levophed, now on nicardipene gtt   - restart home meds when off NGT to LIS  - cardiology consult     COPD  - chronic, stable  -resume inhaler     OMEGA on CKD3  - cr 1.7 on admit, previously 1.2 on discharge last week  - monitor Cr and UOP- improved     ACP  - CODE- FULL   - POA- daughter and son     FN:  - IVF: NS  - Diet: NPO     DVT Prophy:   Lines: PIV     Daughter Tiffanie updated at length via phone 2/16/24    Dispo: tbd     Guille Jain, Hospitalist      Subjective:     Reports feeling scared she is gonna die from surgery today. Reports no relief fm norco- + relief fm IVP morphien but doesn't last open to trying po morphine    OBJECTIVE:    Blood pressure 141/54, pulse 74, temperature 99.8 °F (37.7 °C), temperature source Oral, resp. rate 18, height 5' 4.02\" (1.626 m), weight 182 lb 3.2 oz (82.6 kg), SpO2 94%, not currently breastfeeding.    Temp:  [97.2 °F (36.2 °C)-99.8 °F (37.7 °C)] 99.8 °F (37.7 °C)  Pulse:   [67-82] 74  Resp:  [18-30] 18  BP: (111-156)/(49-64) 141/54  SpO2:  [93 %-100 %] 94 %      Intake/Output:    Intake/Output Summary (Last 24 hours) at 2/16/2024 0849  Last data filed at 2/16/2024 0609  Gross per 24 hour   Intake 1200 ml   Output 2315 ml   Net -1115 ml       Last 3 Weights   02/16/24 0500 182 lb 3.2 oz (82.6 kg)   02/14/24 0500 185 lb 13.6 oz (84.3 kg)   02/13/24 0500 183 lb 3.2 oz (83.1 kg)   02/12/24 0400 180 lb 5.4 oz (81.8 kg)   02/11/24 0600 182 lb 5.1 oz (82.7 kg)   02/10/24 2142 171 lb 1.2 oz (77.6 kg)   02/10/24 2105 171 lb 1.2 oz (77.6 kg)   02/10/24 1642 164 lb 3.9 oz (74.5 kg)   01/27/24 0501 164 lb 3.2 oz (74.5 kg)   01/24/24 0549 158 lb (71.7 kg)   01/19/24 1112 160 lb (72.6 kg)   12/30/23 1216 155 lb (70.3 kg)   08/29/23 1357 153 lb (69.4 kg)     Exam   GEN: nad, awake  HEENT: nc/at  Pulm: diminished bl bases  CV: RRR, + murmur  ABD: Soft, non-tender, non-distended, +BS  EXT: + bl edema + wound vac and groin sites c/d/i    Data Review:       Labs:     Recent Labs   Lab 02/13/24  2325 02/14/24  1439 02/15/24  0448 02/16/24  0623   RBC 2.83*  --  3.05* 2.77*   HGB 7.9* 9.5* 8.7* 8.1*   HCT 23.4*  --  25.1* 23.1*   MCV 82.7  --  82.3 83.4   MCH 27.9  --  28.5 29.2   MCHC 33.8  --  34.7 35.1   RDW 15.6*  --  16.1* 16.1*   NEPRELIM 25.30*  --  26.30* 22.98*   WBC 28.3*  --  29.8* 26.6*   .0  --  306.0 319.0         Recent Labs   Lab 02/13/24  2325 02/14/24  0517 02/15/24  0448 02/16/24  0623   GLU 93 86 180*  --    BUN 16 16 17  --    CREATSERUM 0.97 0.97 0.93  --    EGFRCR 64 64 68  --    CA 7.1* 7.3* 7.5*  --     144 142  --    K 3.7 3.8 3.3* 3.4*   * 114* 110  --    CO2 20.0* 22.0 24.0  --        Recent Labs   Lab 02/10/24  2151 02/11/24 2053 02/12/24  0551   ALT 38  --   --    AST 91*  --   --    ALB 2.3* 2.3* 2.3*         Imaging:  No results found.      Meds:      insulin regular human  1-7 Units Subcutaneous 4 times per day    potassium chloride  40 mEq Intravenous  Once    carvedilol  25 mg Per NG Tube BID    insulin degludec  10 Units Subcutaneous Nightly    vancomycin  125 mg Oral Daily    vancomycin  1,250 mg Intravenous Q36H    sodium hypochlorite   Topical 2 times per day    amLODIPine  10 mg Per NG Tube QAM    hydrALAZINE  100 mg Per NG Tube Q8H    pantoprazole  40 mg Intravenous Q24H    fluticasone furoate-vilanterol  1 puff Inhalation Daily    rosuvastatin  40 mg Oral Nightly    chlorhexidine gluconate  15 mL Mouth/Throat BID@0800,2000    enoxaparin  40 mg Subcutaneous Daily    meropenem  500 mg Intravenous q12h      dextrose 5%-sodium chloride 0.45% 50 mL/hr at 02/16/24 0022     HYDROcodone-acetaminophen, morphINE **OR** morphINE **OR** morphINE, hydrALAzine, ondansetron, metoclopramide, albuterol, ipratropium-albuterol, acetaminophen **OR** acetaminophen **OR** acetaminophen **OR** acetaminophen, polyethylene glycol (PEG 3350), senna, bisacodyl, fleet enema, glucose **OR** glucose **OR** glucose-vitamin C **OR** dextrose **OR** glucose **OR** glucose **OR** glucose-vitamin C

## 2024-02-16 NOTE — PROGRESS NOTES
S: R internal jugular Cordis in place. Order received to remove per Dr. Anderson.  B: Patient aware line to be removed.  A: Patient instructed on procedure to remove line. Patient in supine position with HOB flat. Dressing and sutures removed and site cleansed with CHG swabstick.Line removed on end expiration while patient held breath. Line easily removed with moderate sized clot noted at tip of catheter. Pressure held with sterile gauze for 5 minutes and then dressed with 2x2 gauze and Tegaderm.  R: Patient tolerated procedure will with no complications. Report given to Sue MONTENEGRO RN. Patient resting/asleep in bed after procedure.

## 2024-02-16 NOTE — PROGRESS NOTES
02/16/24 0900   Negative Pressure Wound Therapy Groin Right   Placement Date: 02/13/24   Inserted by: Dr. KESSLER  Location: Groin  Wound Location Orientation: Right   Wound photographed/measured No   Machine Status (On) Yes   Site Assessment ANGELO   [place]-wound Assessment ANGELO   Unit Type KCI ULTA   Cycle Continuous;On   Target Pressure (mmHg) 125   Drainage Description Serosanguineous   Dressing Status Intact   Canister Changed No   Wound Follow Up   Follow up needed Yes     Pt planned to go to the OR today w Dr. Kessler for I & D and wound vac dressing change. Wound RN will continue to follow.

## 2024-02-16 NOTE — PROGRESS NOTES
02/16/24 0752   VISIT TYPE   SLP Inpatient Visit Type (Documentation Required) Attempted Treatment  (SLP attempted swallow f/u to assess safety of current diet, regular/thin liquids. Pt currently NPO for procedure. Will f/u as schedule allows)   FOLLOW UP/PLAN   Follow Up Needed (Documentation Required) Yes   SLP Follow-up Date 02/17/24     Trina Irwin M.S. CCC-SLP  Speech Language Pathologist  Phone Number Ext. [phone]

## 2024-02-16 NOTE — PLAN OF CARE
Problem: Safety Risk - Non-Violent Restraints  Goal: Patient will remain free from self-harm  Description: INTERVENTIONS:  - Apply the least restrictive restraint to prevent harm  - Notify patient and family of reasons restraints applied  - Assess for any contributing factors to confusion (electrolyte disturbances, delirium, medications)  - Discontinue any unnecessary medical devices as soon as possible  - Assess the patient's physical comfort, circulation, skin condition, hydration, nutrition and elimination needs   - Reorient and redirection as needed  - Assess for the need to continue restraints  2/15/2024 2013 by Dakota Jones, RN  Outcome: Completed  2/15/2024 2013 by Dakota Jones, RN  Reactivated

## 2024-02-16 NOTE — PLAN OF CARE
Pt. A/Ox4, on 2L, VSS. PRN morphine given throughout the night for pain. Wound vacs in place. NPO at 0000 for procedure today. No acute events. Pt educated on call light use, within reach. Safety measures in place.     Problem: Patient Centered Care  Goal: Patient preferences are identified and integrated in the patient's plan of care  Description: Interventions:  - What would you like us to know as we care for you? From home alone  - Provide timely, complete, and accurate information to patient/family  - Incorporate patient and family knowledge, values, beliefs, and cultural backgrounds into the planning and delivery of care  - Encourage patient/family to participate in care and decision-making at the level they choose  - Honor patient and family perspectives and choices  Outcome: Progressing     Problem: Diabetes/Glucose Control  Goal: Glucose maintained within prescribed range  Description: INTERVENTIONS:  - Monitor Blood Glucose as ordered  - Assess for signs and symptoms of hyperglycemia and hypoglycemia  - Administer ordered medications to maintain glucose within target range  - Assess barriers to adequate nutritional intake and initiate nutrition consult as needed  - Instruct patient on self management of diabetes  Outcome: Progressing    Problem: CARDIOVASCULAR - ADULT  Goal: Maintains optimal cardiac output and hemodynamic stability  Description: INTERVENTIONS:  - Monitor vital signs, rhythm, and trends  - Monitor for bleeding, hypotension and signs of decreased cardiac output  - Evaluate effectiveness of vasoactive medications to optimize hemodynamic stability  - Monitor arterial and/or venous puncture sites for bleeding and/or hematoma  - Assess quality of pulses, skin color and temperature  - Assess for signs of decreased coronary artery perfusion - ex. Angina  - Evaluate fluid balance, assess for edema, trend weights  Outcome: Progressing  Goal: Absence of cardiac arrhythmias or at  baseline  Description: INTERVENTIONS:  - Continuous cardiac monitoring, monitor vital signs, obtain 12 lead EKG if indicated  - Evaluate effectiveness of antiarrhythmic and heart rate control medications as ordered  - Initiate emergency measures for life threatening arrhythmias  - Monitor electrolytes and administer replacement therapy as ordered  Outcome: Progressing     Problem: RESPIRATORY - ADULT  Goal: Achieves optimal ventilation and oxygenation  Description: INTERVENTIONS:  - Assess for changes in respiratory status  - Assess for changes in mentation and behavior  - Position to facilitate oxygenation and minimize respiratory effort  - Oxygen supplementation based on oxygen saturation or ABGs  - Provide Smoking Cessation handout, if applicable  - Encourage broncho-pulmonary hygiene including cough, deep breathe, Incentive Spirometry  - Assess the need for suctioning and perform as needed  - Assess and instruct to report SOB or any respiratory difficulty  - Respiratory Therapy support as indicated  - Manage/alleviate anxiety  - Monitor for signs/symptoms of CO2 retention  Outcome: Progressing     Problem: METABOLIC/FLUID AND ELECTROLYTES - ADULT  Goal: Glucose maintained within prescribed range  Description: INTERVENTIONS:  - Monitor Blood Glucose as ordered  - Assess for signs and symptoms of hyperglycemia and hypoglycemia  - Administer ordered medications to maintain glucose within target range  - Assess barriers to adequate nutritional intake and initiate nutrition consult as needed  - Instruct patient on self management of diabetes  Outcome: Progressing  Goal: Electrolytes maintained within normal limits  Description: INTERVENTIONS:  - Monitor labs and rhythm and assess patient for signs and symptoms of electrolyte imbalances  - Administer electrolyte replacement as ordered  - Monitor response to electrolyte replacements, including rhythm and repeat lab results as appropriate  - Fluid restriction as  ordered  - Instruct patient on fluid and nutrition restrictions as appropriate  Outcome: Progressing  Goal: Hemodynamic stability and optimal renal function maintained  Description: INTERVENTIONS:  - Monitor labs and assess for signs and symptoms of volume excess or deficit  - Monitor intake, output and patient weight  - Monitor urine specific gravity, serum osmolarity and serum sodium as indicated or ordered  - Monitor response to interventions for patient's volume status, including labs, urine output, blood pressure (other measures as available)  - Encourage oral intake as appropriate  - Instruct patient on fluid and nutrition restrictions as appropriate  Outcome: Progressing

## 2024-02-16 NOTE — CDS QUERY
How to answer this Query:     1.) DON'T CLICK COSIGN BUTTON FIRST  2.) Click \"3 dots...\" to the right of cosign button and click EDIT on the toolbar.  2.) Type an \"X\" in the bracket for the diagnosis that applies. (You may also add additional clinical details as you feel necessary to substantiate your response).   3.) Finally click \"Sign\" to complete response.  Thank You  CLINICAL DOCUMENTATION CLARIFICATION FORM  Sepsis vs Bacteremia      Dear Doctor Jain :    PLEASE DOCUMENT ANY ADDITIONAL DIAGNOSES AND/OR SPECIFICITY IN THE PROGRESS  NOTES AND/OR DISCHARGE SUMMARY.      ( x)  Septic shock (severe sepsis plus hypotension and organ hypoperfusion)  (  )  Bacteremia (abnormal lab finding only, does not indicate systemic illness)       (  )  Other: [ Please clarify: ________________________________________]    ________________________________________________    Clinical Indicators:    H&P: Hemorrhage from recent surgical site   s/p recent fem pop bypass 1/24/24     ID consult and progress notes : Acute gram negative sepsis presenting with a bleeding complication after recent fem-pop bypass 1/24/24   2-14 to 2-16 Dr Jain: Infected surgical site, bacteremia  - continue IV vancomycin and meropenem, ID consult  - Bcx with klebsiella, repeat Bcx ordered  - 2/10 wound cx and 2/13 poly microbial    Meds include meropenem and vancomycin IVPB      If you have any questions, please contact Clinical :  Brisa Dsouza RN CCDS at [phone]     Thank You!  THIS FORM IS A PERMANENT PART OF THE MEDICAL RECORD

## 2024-02-16 NOTE — PROGRESS NOTES
[facility]    Progress Note    Ava Bowen Patient Status:  Inpatient    10/6/1955 MRN [id number]   Location [facility] 2W/SW Attending Lissa Lee MD   Hosp Day # 6 PCP Ernesto De Dios     Date of Admission:  2/10/2024  Date of Consult:  24  Reason for Consultation:   DKA Management in a patient with type 2 diabetes    Patient's blood sugars stable, starting to rise    History of Present Illness:   Patient is a 68 year old female who was admitted to the hospital for Bypass graft mechanical complication, initial encounter (HCC):  This is a 68 year-old woman admitted after presenting to the ER after having undergone right femoral artery to below-knee popliteal artery. She was found to have elevated blood sugars and started on insulin drip.    Past Medical History  Past Medical History:   Diagnosis Date    Anemia     Back pain     Back problem     Cataract     Chronic kidney disease (CKD)     COPD (chronic obstructive pulmonary disease) (HCC)     FEV 1 1.25 50%     Diabetes (HCC)     DM neuropathy    Essential hypertension     H/O angioplasty     2020    High blood pressure     High cholesterol     Neuropathy     Peripheral vascular disease (HCC)     PNA (pneumonia) 2018    Pulmonary emphysema (HCC)     Pulmonary nodule     4 mm RUL    Renal disorder     monitoring kidney labs    Sickle cell trait (HCC)     Sickle-cell anemia (HCC)     Tobacco abuse     Visual impairment        Past Surgical History  Past Surgical History:   Procedure Laterality Date    APPENDECTOMY      BACK SURGERY Right 2021    Right L3-4 extreme lateral interbody fusion, posterior decompression; LUMBAR LAMINECTOMY 1 LEVEL          x3    CHOLECYSTECTOMY      EXCIS LUMBAR DISK,ONE LEVEL          OTHER      bilateral leg stents  and        Family History  Family History   Problem Relation Age of Onset    Diabetes Mother         Passed from DM complications    Diabetes  Sister         Had kidney transplant due to complications of DM    Kidney Disease Sister         Kidney failure secondary to diabetes; received kidney transplant in 2004    Glaucoma Sister     Diabetes Brother     Sickle Cell Son         Cause of death    Macular degeneration Neg        Social History  Social History     Socioeconomic History    Marital status:    Tobacco Use    Smoking status: Former     Packs/day: 1.00     Years: 30.00     Additional pack years: 0.00     Total pack years: 30.00     Types: Cigarettes     Quit date: 2019     Years since quittin.2    Smokeless tobacco: Never   Vaping Use    Vaping Use: Never used   Substance and Sexual Activity    Alcohol use: Not Currently     Comment: socially    Drug use: No   Social History Narrative    [employer]          Social Determinants of Health     Food Insecurity: No Food Insecurity (2024)    Food Insecurity     Food Insecurity: Never true   Transportation Needs: No Transportation Needs (2024)    Transportation Needs     Lack of Transportation: No   Housing Stability: Low Risk  (2024)    Housing Stability     Housing Instability: No           Current Medications:      Medications Prior to Admission   Medication Sig    HYDROcodone-acetaminophen  MG Oral Tab Take 1 tablet by mouth every 4 (four) hours as needed for Pain (max 6/day).    [START ON 3/15/2024] HYDROcodone-acetaminophen  MG Oral Tab Take 1 tablet by mouth every 4 (four) hours as needed for Pain (max 6/day).    LANTUS SOLOSTAR 100 UNIT/ML Subcutaneous Solution Pen-injector Inject 6 Units into the skin See Admin Instructions. Every other day    JANUVIA 25 MG Oral Tab Take 1 tablet (25 mg total) by mouth every morning.    rosuvastatin 40 MG Oral Tab Take 1 tablet (40 mg total) by mouth nightly.    glipiZIDE 10 MG Oral Tab Take 1 tablet (10 mg total) by mouth 2 (two) times daily.    D-5000 125 MCG (5000 UT) Oral Tab Take 1 tablet (5,000 Units  total) by mouth daily.    LISINOPRIL 40 MG Oral Tab TAKE 1 TABLET(40 MG) BY MOUTH DAILY (Patient taking differently: Take 1 tablet (40 mg total) by mouth every morning.)    hydrALAZINE 100 MG Oral Tab Take 1 tablet (100 mg total) by mouth every 8 (eight) hours.    Budesonide-Formoterol Fumarate 160-4.5 MCG/ACT Inhalation Aerosol Inhale 2 puffs into the lungs 2 (two) times daily. Rinse mouth with water, gargle, and spit to follow.    ipratropium-albuterol 0.5-2.5 (3) MG/3ML Inhalation Solution Take 3 mL by nebulization every 6 (six) hours as needed (shortness of breath refractory to HFA).    MYRBETRIQ 50 MG Oral Tablet 24 Hr Take 1 tablet (50 mg total) by mouth every morning.    albuterol 108 (90 Base) MCG/ACT Inhalation Aero Soln Inhale 2 puffs into the lungs every 6 (six) hours as needed for Wheezing or Shortness of Breath.    carvedilol 12.5 MG Oral Tab Take 2 tablets (25 mg total) by mouth daily. (Patient taking differently: Take 2 tablets (25 mg total) by mouth 2 (two) times daily with meals.)    clopidogrel 75 MG Oral Tab Take 1 tablet (75 mg total) by mouth daily. (Patient taking differently: Take 1 tablet (75 mg total) by mouth every morning.)    estradiol 0.1 MG/GM Vaginal Cream Apply a small amount to the perimeatal tissue every other day.    amLODIPine 10 MG Oral Tab Take 1 tablet (10 mg total) by mouth daily. (Patient taking differently: Take 1 tablet (10 mg total) by mouth every morning.)    aspirin 81 MG Oral Tab EC Take 1 tablet (81 mg total) by mouth every other day.       Allergies  Allergies   Allergen Reactions    Dilaudid [Hydromorphone] TONGUE SWELLING and ANGIOEDEMA    Penicillins HIVES and ANGIOEDEMA     Hives on eyelids (occurred when pt was in her 20s). Tolerated amoxicillin per chart. Tolerates cephalosporins.       Review of Systems:   Review of systems not obtained due to patient factors.    Physical Exam:   Vital Signs:  Blood pressure 126/55, pulse 70, temperature 98.5 °F (36.9 °C),  temperature source Oral, resp. rate 20, height 5' 4.02\" (1.626 m), weight 182 lb 3.2 oz (82.6 kg), SpO2 97%, not currently breastfeeding.     General: No acute distress. Alert and oriented x 3.  HEENT: Moist mucous membranes. EOM-I. PERRL  Neck: No lymphadenopathy.  No JVD. No carotid bruits.  Respiratory: Clear to auscultation bilaterally.  No wheezes. No rhonchi.  Cardiovascular: S1, S2.  Regular rate and rhythm.  No murmurs. Equal pulses   Abdomen: Soft, nontender, nondistended.  Positive bowel sounds. No rebound tenderness  Neurologic: No focal neurological deficits.  Musculoskeletal: Full range of motion of all extremities.  No swelling noted.  Integument: No lesions. No erythema.  Psychiatric: Appropriate mood and affect.    Results:     Laboratory Data:  Lab Results   Component Value Date    WBC 29.8 (H) 02/15/2024    HGB 8.7 (L) 02/15/2024    HCT 25.1 (L) 02/15/2024    .0 02/15/2024    CREATSERUM 0.93 02/15/2024    BUN 17 02/15/2024     02/15/2024    K 3.3 (L) 02/15/2024     02/15/2024    CO2 24.0 02/15/2024     (H) 02/15/2024    CA 7.5 (L) 02/15/2024    ALB 2.3 (L) 02/12/2024    ALKPHO 62 02/10/2024    TP 4.6 (L) 02/10/2024    AST 91 (H) 02/10/2024    ALT 38 02/10/2024    PTT 30.3 02/10/2024    INR 1.51 (H) 02/10/2024    PTP 19.2 (H) 02/10/2024    T4F 1.3 06/18/2019    TSH 1.060 06/18/2019    LIP 40 04/25/2023    DDIMER 1.45 (H) 11/08/2019    ESRML 27 05/08/2020    CRP 3.11 (H) 05/08/2020    MG 1.9 02/13/2024    PHOS 4.3 02/15/2024    TROP <0.045 11/08/2019    CK 35 08/21/2023    B12 963 09/03/2019         Imaging:  No results found.       Impression:   Uncontrolled Type 2 Diabetes  - Patient extubated, now will be NPO after midnight  - Start Tresiba 10 units at night  - Continue medium dose CF scale with regular insulin after midnight  - We will follow blood sugars       Thank you for allowing me to participate in the care of your patient.    Grecia Armando MD  2/15/2024

## 2024-02-16 NOTE — BRIEF OP NOTE
Pre-Operative Diagnosis: Right groin necrotizing soft tissue infection     Post-Operative Diagnosis: same     Procedure Performed:   Versajet debridement of subcutaneous fat   Skin substitute placement with Kerecis grafts  7x10 surgiclose mesh  95 subcutaneous cm surgiclose micro   Two 38 surgiclose micro   250 subcutaneous cm surgiclose mesh     Surgeon(s) and Role:     * Kp Justice MD - Primary    Assistant(s):  Surgical Assistant.: Rosas Pineda     Surgical Findings:   Healthy tissues at groin and secure viable muscle flap   On closer inspection in operating room foot beginning to have mottling - will need aka   Popliteal incision left open to drain     Specimen: none     Estimated Blood Loss: Blood Output: 20 mL (2/16/2024  2:51 PM    Kp Justice MD  2/16/2024  3:09 PM

## 2024-02-16 NOTE — ANESTHESIA PREPROCEDURE EVALUATION
Anesthesia PreOp Note    HPI:     Ava Bowen is a 68 year old female who presents for preoperative consultation requested by: Kp Justice MD    Date of Surgery: 2/10/2024 - 2/16/2024    Procedure(s):  Debridement of right groin wound and Kerecis graft placement  Indication: Right groin wound    Relevant Problems   No relevant active problems       NPO:  Last Liquid Consumption Date: 02/16/24 (`)  Last Liquid Consumption Time: 0839 (sips with am meds)  Last Solid Consumption Date: 02/15/24  Last Solid Consumption Time: 1815  Last Liquid Consumption Date: 02/16/24 (`)          History Review:  Patient Active Problem List    Diagnosis Date Noted    Bypass graft mechanical complication ([facility]) 02/10/2024    Bypass graft mechanical complication, initial encounter ([facility]) 02/10/2024    Anemia, unspecified type 02/10/2024    Acute kidney injury superimposed on CKD  ([facility]) 02/10/2024    Opioid dependence, uncomplicated ([facility]) 07/28/2023    Depression, recurrent ([facility]) 07/28/2023    Stage 3 chronic kidney disease, unspecified whether stage 3a or 3b CKD ([facility]) 07/28/2023    Hypoglycemia 06/26/2023    Pyelonephritis 04/25/2023    Hypernatremia 04/25/2023    Hyperglycemia 04/25/2023    Ureteral calculi 04/25/2023    Urinary tract infection 03/23/2023    Acute cystitis without hematuria 03/23/2023    Ileitis 03/23/2023    COVID-19 03/23/2023    UTI (urinary tract infection) 03/23/2023    Hyperlipidemia 10/17/2022    DDD (degenerative disc disease), lumbar 05/20/2022    Failed back syndrome of lumbar spine 05/20/2022    Myalgia 03/17/2022    Post-operative pain     Type 2 diabetes mellitus with hyperglycemia, with long-term current use of insulin ([facility])     Primary hypertension     S/P laminectomy     Family history of glaucoma in sister 05/04/2021    Pre-op testing     PAD (peripheral artery disease) ([facility]) 07/08/2020    Diabetic ulcer of left midfoot associated with type 2 diabetes mellitus, with fat layer exposed ([facility])  2020    PVD (peripheral vascular disease) ([facility]) 2020    Centrilobular emphysema ([facility]) 2019    Type 2 diabetes mellitus without retinopathy ([facility]) 10/15/2019    Age-related nuclear cataract of both eyes 10/15/2019    Glaucoma suspect of both eyes 10/15/2019    Back pain with radiation 2018    Anemia 2018    Azotemia 2018    Hypokalemia 2018    Chest pain 2007       Past Medical History:   Diagnosis Date    Anemia     Back pain     Back problem     Cataract     Chronic kidney disease (CKD)     COPD (chronic obstructive pulmonary disease) ([facility])     FEV 1 1.25 50%     Diabetes ([facility])     DM neuropathy    Essential hypertension     H/O angioplasty     2020    High blood pressure     High cholesterol     Neuropathy     Peripheral vascular disease ([facility])     PNA (pneumonia) 2018    Pulmonary emphysema ([facility])     Pulmonary nodule     4 mm RUL    Renal disorder     monitoring kidney labs    Sickle cell trait ([facility])     Sickle-cell anemia ([facility])     Tobacco abuse     Visual impairment        Past Surgical History:   Procedure Laterality Date    APPENDECTOMY      BACK SURGERY Right 2021    Right L3-4 extreme lateral interbody fusion, posterior decompression; LUMBAR LAMINECTOMY 1 LEVEL          x3    CHOLECYSTECTOMY      EXCIS LUMBAR DISK,ONE LEVEL          OTHER      bilateral leg stents  and        Medications Prior to Admission   Medication Sig Dispense Refill Last Dose    HYDROcodone-acetaminophen  MG Oral Tab Take 1 tablet by mouth every 4 (four) hours as needed for Pain (max 6/day). 180 tablet 0 Past Month    [START ON 3/15/2024] HYDROcodone-acetaminophen  MG Oral Tab Take 1 tablet by mouth every 4 (four) hours as needed for Pain (max 6/day). 180 tablet 0 Past Month    LANTUS SOLOSTAR 100 UNIT/ML Subcutaneous Solution Pen-injector Inject 6 Units into the skin See Admin Instructions. Every other day   Past Week    JANUVIA 25 MG  Oral Tab Take 1 tablet (25 mg total) by mouth every morning.   Past Week    rosuvastatin 40 MG Oral Tab Take 1 tablet (40 mg total) by mouth nightly.   2/10/2024    glipiZIDE 10 MG Oral Tab Take 1 tablet (10 mg total) by mouth 2 (two) times daily.   Past Week    D-5000 125 MCG (5000 UT) Oral Tab Take 1 tablet (5,000 Units total) by mouth daily.   Past Week    LISINOPRIL 40 MG Oral Tab TAKE 1 TABLET(40 MG) BY MOUTH DAILY (Patient taking differently: Take 1 tablet (40 mg total) by mouth every morning.) 90 tablet 1 Past Week    hydrALAZINE 100 MG Oral Tab Take 1 tablet (100 mg total) by mouth every 8 (eight) hours. 270 tablet 1 Past Week    Budesonide-Formoterol Fumarate 160-4.5 MCG/ACT Inhalation Aerosol Inhale 2 puffs into the lungs 2 (two) times daily. Rinse mouth with water, gargle, and spit to follow. 1 each 2 2/10/2024    ipratropium-albuterol 0.5-2.5 (3) MG/3ML Inhalation Solution Take 3 mL by nebulization every 6 (six) hours as needed (shortness of breath refractory to HFA). 100 each 0 2/10/2024    MYRBETRIQ 50 MG Oral Tablet 24 Hr Take 1 tablet (50 mg total) by mouth every morning.   Unknown    albuterol 108 (90 Base) MCG/ACT Inhalation Aero Soln Inhale 2 puffs into the lungs every 6 (six) hours as needed for Wheezing or Shortness of Breath. 8.5 each 2 More than a month    carvedilol 12.5 MG Oral Tab Take 2 tablets (25 mg total) by mouth daily. (Patient taking differently: Take 2 tablets (25 mg total) by mouth 2 (two) times daily with meals.) 180 tablet 1 More than a month    clopidogrel 75 MG Oral Tab Take 1 tablet (75 mg total) by mouth daily. (Patient taking differently: Take 1 tablet (75 mg total) by mouth every morning.) 90 tablet 1 More than a month    estradiol 0.1 MG/GM Vaginal Cream Apply a small amount to the perimeatal tissue every other day. 45 g 2 Unknown    amLODIPine 10 MG Oral Tab Take 1 tablet (10 mg total) by mouth daily. (Patient taking differently: Take 1 tablet (10 mg total) by mouth every  morning.) 90 tablet 0 More than a month    aspirin 81 MG Oral Tab EC Take 1 tablet (81 mg total) by mouth every other day.   Unknown     Current Facility-Administered Medications Ordered in Epic   Medication Dose Route Frequency Provider Last Rate Last Admin    [MAR Hold] insulin regular human (Novolin R, Humulin R) 100 UNIT/ML injection 1-7 Units  1-7 Units Subcutaneous 4 times per day Grecia Armando MD        [MAR Hold] morphINE 20 mg/mL concentrated oral solution 5 mg  5 mg Oral Q4H PRN Guille Jain MD        [MAR Hold] magnesium sulfate 4 g/100mL IVPB premix 4 g  4 g Intravenous Once Guille Jain MD        [MAR Hold] carvedilol (Coreg) tab 25 mg  25 mg Per NG Tube BID Jose Banegas MD   25 mg at 02/16/24 0839    dextrose 5%-sodium chloride 0.45% infusion   Intravenous Continuous Natalia Robles APRN 50 mL/hr at 02/16/24 0022 New Bag at 02/16/24 0022    [MAR Hold] insulin degludec 100 units/mL flextouch 10 Units  10 Units Subcutaneous Nightly Grecia Armando MD   10 Units at 02/15/24 2238    vancomycin (Firvanq) 50 mg/mL oral solution 125 mg  125 mg Oral Daily Guille Jain MD   125 mg at 02/15/24 2043    vancomycin (Vancocin) 1.25 g in sodium chloride 0.9% 250mL IVPB premix  1,250 mg Intravenous Q36H [place]o .7 mL/hr at 02/16/24 0609 1,250 mg at 02/16/24 0609    [MAR Hold] morphINE PF 2 MG/ML injection 1 mg  1 mg Intravenous Q2H PRN Bravo Hawkins MD        Or    [MAR Hold] morphINE PF 2 MG/ML injection 2 mg  2 mg Intravenous Q2H PRN Bravo Hawkins MD   2 mg at 02/16/24 1027    Or    [MAR Hold] morphINE PF 4 MG/ML injection 4 mg  4 mg Intravenous Q2H PRN Bravo Hawkins MD   4 mg at 02/15/24 2239    [MAR Hold] sodium hypochlorite (Dakin's) 0.125 % external solution   Topical 2 times per day Kp Justice MD   Given at 02/13/24 0555    [MAR Hold] hydrALAzine (Apresoline) 20 mg/mL injection 10 mg  10 mg Intravenous Q4H PRN Kp Justice MD   10 mg at 02/13/24  0721    [MAR Hold] amLODIPine (Norvasc) tab 10 mg  10 mg Per NG Tube QAM Kp Justice MD   10 mg at 02/16/24 0839    [MAR Hold] hydrALAZINE (Apresoline) tab 100 mg  100 mg Per NG Tube Q8H Kp Justice MD   100 mg at 02/16/24 0609    [MAR Hold] pantoprazole (Protonix) 40 mg in sodium chloride 0.9% PF 10 mL IV push  40 mg Intravenous Q24H Kp Justice MD   40 mg at 02/16/24 0839    [MAR Hold] ondansetron (Zofran) 4 MG/2ML injection 4 mg  4 mg Intravenous Q6H PRN Kp Justice MD        [MAR Hold] metoclopramide (Reglan) 5 mg/mL injection 5 mg  5 mg Intravenous Q8H PRN Kp Justice MD        [MAR Hold] albuterol (Ventolin HFA) 108 (90 Base) MCG/ACT inhaler 2 puff  2 puff Inhalation Q6H PRN Kp Justice MD        [MAR Hold] fluticasone furoate-vilanterol (Breo Ellipta) 200-25 MCG/ACT inhaler 1 puff  1 puff Inhalation Daily Kp Justice MD        [MAR Hold] ipratropium-albuterol (Duoneb) 0.5-2.5 (3) MG/3ML inhalation solution 3 mL  3 mL Nebulization Q6H PRN Kp Justice MD        [MAR Hold] rosuvastatin (Crestor) tab 40 mg  40 mg Oral Nightly Guille Jain MD   40 mg at 02/15/24 2043    [MAR Hold] enoxaparin (Lovenox) 40 MG/0.4ML SUBQ injection 40 mg  40 mg Subcutaneous Daily Kp Justice MD   40 mg at 02/15/24 0845    [MAR Hold] glucose (Dex4) 15 GM/59ML oral liquid 15 g  15 g Oral Q15 Min PRN Kp Justice MD        Or    [MAR Hold] glucose (Glutose) 40% oral gel 15 g  15 g Oral Q15 Min PRN Kp Justice MD        Or    [MAR Hold] glucose-vitamin C (Dex-4) chewable tab 4 tablet  4 tablet Oral Q15 Min PRN Kp Justice MD        Or    [MAR Hold] dextrose 50% injection 50 mL  50 mL Intravenous Q15 Min PRN Kp Justice MD   50 mL at 02/13/24 2100    Or    [MAR Hold] glucose (Dex4) 15 GM/59ML oral liquid 30 g  30 g Oral Q15 Min PRN Kp Justice MD        Or    [MAR Hold] glucose (Glutose) 40% oral gel 30 g  30 g Oral Q15 Min PRN Kp Justice MD        Or    [MAR  Hold] glucose-vitamin C (Dex-4) chewable tab 8 tablet  8 tablet Oral Q15 Min PRN Kp Justice MD        meropenem (Merrem) 500 mg in sodium chloride 0.9% 100 mL IVPB-MBP  500 mg Intravenous q12h Kp Justice MD 33.3 mL/hr at 24 0022 500 mg at 24 0022     No current Epic-ordered outpatient medications on file.       Allergies   Allergen Reactions    Dilaudid [Hydromorphone] TONGUE SWELLING and ANGIOEDEMA    Penicillins HIVES and ANGIOEDEMA     Hives on eyelids (occurred when pt was in her 20s). Tolerated amoxicillin per chart. Tolerates cephalosporins.       Family History   Problem Relation Age of Onset    Diabetes Mother         Passed from DM complications    Diabetes Sister         Had kidney transplant due to complications of DM    Kidney Disease Sister         Kidney failure secondary to diabetes; received kidney transplant in     Glaucoma Sister     Diabetes Brother     Sickle Cell Son         Cause of death    Macular degeneration Neg      Social History     Socioeconomic History    Marital status:    Tobacco Use    Smoking status: Former     Packs/day: 1.00     Years: 30.00     Additional pack years: 0.00     Total pack years: 30.00     Types: Cigarettes     Quit date: 2019     Years since quittin.2    Smokeless tobacco: Never   Vaping Use    Vaping Use: Never used   Substance and Sexual Activity    Alcohol use: Not Currently     Comment: socially    Drug use: No       Available pre-op labs reviewed.  Lab Results   Component Value Date    WBC 26.6 (H) 2024    RBC 2.77 (L) 2024    HGB 8.1 (L) 2024    HCT 23.1 (L) 2024    MCV 83.4 2024    MCH 29.2 2024    MCHC 35.1 2024    RDW 16.1 (H) 2024    .0 2024     Lab Results   Component Value Date     2024    K 3.4 (L) 2024    K 3.5 2024     (H) 2024    CO2 22.0 2024    BUN 23 2024    CREATSERUM 1.01 2024      (H) 02/16/2024    PGLU 128 (H) 02/16/2024    CA 7.6 (L) 02/16/2024     Lab Results   Component Value Date    INR 1.51 (H) 02/10/2024       Vital Signs:  Body mass index is 31.26 kg/m².   height is 1.626 m (5' 4.02\") and weight is 82.6 kg (182 lb 3.2 oz). Her oral temperature is 99.8 °F (37.7 °C). Her blood pressure is 141/54 and her pulse is 74. Her respiration is 18 and oxygen saturation is 94%.   Vitals:    02/15/24 2039 02/16/24 0500 02/16/24 0613 02/16/24 0836   BP: 136/53  126/55 141/54   Pulse: 73  70 74   Resp: 20  20 18   Temp: 98.2 °F (36.8 °C)  98.5 °F (36.9 °C) 99.8 °F (37.7 °C)   TempSrc: Oral  Oral Oral   SpO2: 100%  97% 94%   Weight:  82.6 kg (182 lb 3.2 oz)     Height:            Anesthesia Evaluation     Patient summary reviewed and Nursing notes reviewed    No history of anesthetic complications   Airway   Mallampati: I  TM distance: >3 FB  Neck ROM: full  Dental      Pulmonary - normal exam   (+) COPD  Cardiovascular - normal exam  (+) hypertension    ROS comment: Pvd recent fem pop bypass complicated by bleeding     LV EJECTION FRACTION: 70 %      PERFUSION IMAGING: The myocardial perfusion images post pharmacologic stress demonstrate normal perfusion.  The resting images normal perfusion.       Neuro/Psych      GI/Hepatic/Renal    (+) chronic renal disease CRI    Endo/Other    (+) diabetes mellitus  Abdominal                  Anesthesia Plan:   ASA:  3  Plan:   General  Informed Consent Plan and Risks Discussed With:  Patient      I have informed Ava Bowen and/or legal guardian or family member of the nature of the anesthetic plan, benefits, risks including possible dental damage if relevant, major complications, and any alternative forms of anesthetic management.   All of the patient's questions were answered to the best of my ability. The patient desires the anesthetic management as planned.  Cristhian Michele MD  2/16/2024 1:20 PM  Present on Admission:  **None**

## 2024-02-16 NOTE — PROGRESS NOTES
[facility] CARDIOLOGY Progress Note      Ava Bowen is a 68 year old female who I assessed as follows:  Chief Complaint   Patient presents with    Bleeding          REASON FOR CONSULTATION:    HTN            ASSESSMENT/PLAN:     IMPRESSIONS:  Hemorrhage from LE fem pop bypass site, infected. S/p debridement 24  HTN  COPD  DM  PAD s/p fem-pop bypass 2024  Sickle cell anemia  DM  Acute resp failure, now extubated  OMEGA, on CKD, improved  HL, on statin PTA        PLAN:  Continue oral BP meds  Hydralazine IV PRN        SUBJECTIVE:    Denies chest pain or dyspnea. No palpitations.       --------------------------------------------------------------------------------------------------------------------------------    HPI:         History sickle cell anemia, PAD s/p bypass  presents with hemorrhage from surgical site. Initially hypotensive on pressors, now on IV nicardepine for HTN. Unable to take po meds at this time. Creat elevated on admission, now improved.                No current outpatient medications on file.      Past Medical History:   Diagnosis Date    Anemia     Back pain     Back problem     Cataract     Chronic kidney disease (CKD)     COPD (chronic obstructive pulmonary disease) ([facility])     FEV 1 1.25 50%     Diabetes ([facility])     DM neuropathy    Essential hypertension     H/O angioplasty     2020    High blood pressure     High cholesterol     Neuropathy     Peripheral vascular disease ([facility])     PNA (pneumonia) 2018    Pulmonary emphysema ([facility])     Pulmonary nodule     4 mm RUL    Renal disorder     monitoring kidney labs    Sickle cell trait ([facility])     Sickle-cell anemia ([facility])     Tobacco abuse     Visual impairment      Past Surgical History:   Procedure Laterality Date    APPENDECTOMY      BACK SURGERY Right 2021    Right L3-4 extreme lateral interbody fusion, posterior decompression; LUMBAR LAMINECTOMY 1 LEVEL          x3    CHOLECYSTECTOMY      EXCIS  LUMBAR DISK,ONE LEVEL          OTHER      bilateral leg stents  and      Family History   Problem Relation Age of Onset    Diabetes Mother         Passed from DM complications    Diabetes Sister         Had kidney transplant due to complications of DM    Kidney Disease Sister         Kidney failure secondary to diabetes; received kidney transplant in     Glaucoma Sister     Diabetes Brother     Sickle Cell Son         Cause of death    Macular degeneration Neg       Social History:  Social History     Socioeconomic History    Marital status:    Tobacco Use    Smoking status: Former     Packs/day: 1.00     Years: 30.00     Additional pack years: 0.00     Total pack years: 30.00     Types: Cigarettes     Quit date: 2019     Years since quittin.2    Smokeless tobacco: Never   Vaping Use    Vaping Use: Never used   Substance and Sexual Activity    Alcohol use: Not Currently     Comment: socially    Drug use: No   Social History Narrative    [facility]          Social Determinants of Health     Food Insecurity: No Food Insecurity (2024)    Food Insecurity     Food Insecurity: Never true   Transportation Needs: No Transportation Needs (2024)    Transportation Needs     Lack of Transportation: No   Housing Stability: Low Risk  (2024)    Housing Stability     Housing Instability: No          Medications:            Allergies  Allergies   Allergen Reactions    Dilaudid [Hydromorphone] TONGUE SWELLING and ANGIOEDEMA    Penicillins HIVES and ANGIOEDEMA     Hives on eyelids (occurred when pt was in her 20s). Tolerated amoxicillin per chart. Tolerates cephalosporins.               REVIEW OF SYSTEMS:   Sedated, unable to answer questions          EXAM:         TELE: SR          /55 (BP Location: Right arm)   Pulse 70   Temp 98.5 °F (36.9 °C) (Oral)   Resp 20   Ht 5' 4.02\" (1.626 m)   Wt 182 lb 3.2 oz (82.6 kg)   LMP  (LMP Unknown)   SpO2 97%   BMI 31.26  kg/m²   Temp (24hrs), Av.6 °F (37 °C), Min:97.2 °F (36.2 °C), Max:99.3 °F (37.4 °C)    Wt Readings from Last 3 Encounters:   24 182 lb 3.2 oz (82.6 kg)   24 164 lb 3.2 oz (74.5 kg)   23 153 lb (69.4 kg)         Intake/Output Summary (Last 24 hours) at 2024 0658  Last data filed at 2024 0609  Gross per 24 hour   Intake 1200 ml   Output 2315 ml   Net -1115 ml         GENERAL: no acute distress  SKIN: no rashes  HEENT: atraumatic, normocephalic, throat without erythema  NECK: supple, no bruits  LUNGS: clear to auscultation  CARDIO: RRR without murmur or S3   GI: soft, nontender  EXTREMITIES: no cyanosis, clubbing or edema  NEUROLOGY: moving all extremities  PSYCH: cooperative          LABORATORY DATA:               ECG:        ECHO:          Labs:  Recent Labs   Lab 24  2325 24  0517 02/15/24  0448   GLU 93 86 180*   BUN 16 16 17   CREATSERUM 0.97 0.97 0.93   CA 7.1* 7.3* 7.5*    144 142   K 3.7 3.8 3.3*   * 114* 110   CO2 20.0* 22.0 24.0     No results for input(s): \"TROP\" in the last 168 hours.  Recent Labs   Lab 02/15/24  0448   RBC 3.05*   HGB 8.7*   HCT 25.1*   MCV 82.3   MCH 28.5   MCHC 34.7   RDW 16.1*   NEPRELIM 26.30*   WBC 29.8*   .0     No results for input(s): \"TROP\", \"TROPHS\", \"CK\" in the last 168 hours.    Imaging:  No results found.         Jose Banegas MD

## 2024-02-16 NOTE — PROGRESS NOTES
NEPHROLOGY DAILY PROGRESS NOTE     SUBJECTIVE:  Continues to have pain   Denies SOB       OBJECTIVE:    Total Intake/Output:    Intake/Output Summary (Last 24 hours) at 2/16/2024 0946  Last data filed at 2/16/2024 0609  Gross per 24 hour   Intake 1200 ml   Output 2315 ml   Net -1115 ml       PHYSICAL EXAM:  /54 (BP Location: Right arm)   Pulse 74   Temp 99.8 °F (37.7 °C) (Oral)   Resp 18   Ht 5' 4.02\" (1.626 m)   Wt 182 lb 3.2 oz (82.6 kg)   LMP  (LMP Unknown)   SpO2 94%   BMI 31.26 kg/m²   GEN: NAD, on oxygen     HEENT: NCAT    CHEST: CTAB   CARDIAC: S1S2 normal  ABD: soft, NT/ND  : joseph in place    EXT: + lower ext edema noted, wound vac noted- RLE, swelling R hand noted      NEURO: awake, answering questions       CURRENT MEDICATIONS:     insulin regular human  1-7 Units Subcutaneous 4 times per day    potassium chloride  40 mEq Intravenous Once    carvedilol  25 mg Per NG Tube BID    insulin degludec  10 Units Subcutaneous Nightly    vancomycin  125 mg Oral Daily    vancomycin  1,250 mg Intravenous Q36H    sodium hypochlorite   Topical 2 times per day    amLODIPine  10 mg Per NG Tube QAM    hydrALAZINE  100 mg Per NG Tube Q8H    pantoprazole  40 mg Intravenous Q24H    fluticasone furoate-vilanterol  1 puff Inhalation Daily    rosuvastatin  40 mg Oral Nightly    enoxaparin  40 mg Subcutaneous Daily    meropenem  500 mg Intravenous q12h             LABS:  Patient Labs Reviewed in Detail. Pertinent Labs as follows:  Recent Labs   Lab 02/13/24 2325 02/14/24  0517 02/15/24  0448 02/16/24  0623   GLU 93 86 180*  --    BUN 16 16 17  --    CREATSERUM 0.97 0.97 0.93  --    EGFRCR 64 64 68  --    CA 7.1* 7.3* 7.5*  --     144 142  --    K 3.7 3.8 3.3* 3.4*   * 114* 110  --    CO2 20.0* 22.0 24.0  --      Recent Labs   Lab 02/13/24 2325 02/14/24  1439 02/15/24  0448 02/16/24  0623   RBC 2.83*  --  3.05* 2.77*   HGB 7.9* 9.5* 8.7* 8.1*   HCT 23.4*  --  25.1* 23.1*   MCV 82.7  --  82.3 83.4    MCH 27.9  --  28.5 29.2   MCHC 33.8  --  34.7 35.1   RDW 15.6*  --  16.1* 16.1*   NEPRELIM 25.30*  --  26.30* 22.98*   WBC 28.3*  --  29.8* 26.6*   .0  --  306.0 319.0           IMAGING:  No results found.     ASSESSMENT AND PLAN:   68 year old female with PMH of COPD, DM2, HTN, HL, PVD, Sickle cell anemia, CKD stage 3, recent fem-pop artery bypass admitted with acute hemorrhage from incision site.  Patient is s/p OR for repair.  Nephrology is consulted for OMEGA and metabolic acidosis.       Metabolic acidosis:  - lactic acid initially 7.6, then down trended    - arterial pH now improved   - UA neg for ketones   - resolved     OMEGA on CKD stage 3  - baseline around 1.2  - creatinine improved below baseline, has evidence of fluid overload and has been receiving IV lasix. Received 20 IV lasix today    - await labs from today   - follow renal fxn and I/Os     Hypertension   - amlodipine, hydralazine, coreg  - weaned off nicardipine   - lisinopril held given recent OMEGA, can resume if needed for blood pressure control    - cards following     Hypernatremia:  - resolved     Hypokalemia/ hypomagnesemia:  - replete per protocol     Hypocalcemia:  - corrects to normal given hypoalbuminemia   - monitor Ca levels     We will continue to follow      Kelsea Gaston MD  [facility] - Nephrology

## 2024-02-16 NOTE — PROGRESS NOTES
Called and spoke with daughter  Findings on right foot concerning for necrosis and will need Above knee amputation     I discussed this with daughter   Will discuss with patient tomorrow after anesthetic decreases     Patient to remain on bedrest

## 2024-02-16 NOTE — PROGRESS NOTES
[facility] Infectious Disease  Progress Note    Ava Bowen Patient Status:  Inpatient    10/6/1955 MRN [id number]   Location [facility] 3W/SW Attending Guille Jain MD   Hosp Day # 6 PCP Ernesto De Dios     Subjective:    Pt seen and examined resting in bed, tolerating the IV abx. States she is hungry no other c/o. OR this afternoon    Review of Systems:  Review of systems reviewed and negative except as mentioned    Objective:  Blood pressure 141/54, pulse 74, temperature 99.8 °F (37.7 °C), temperature source Oral, resp. rate 18, height 5' 4.02\" (1.626 m), weight 182 lb 3.2 oz (82.6 kg), SpO2 94%, not currently breastfeeding.          Physical Exam:  General: Awake, alert, non-tox, NAD.  HEENT:  Oropharynx clear, trachea ML.  Heart: RRR S1S2 no murmurs.  Lungs: Essentially CTA b/l, no rhonchi, rales, wheezes.  Abdomen: Soft, NT/ND.  BS present.  No organomegaly.  Extremity: +RLE wound vac  Neurological: No focal deficits.  Derm:  Warm, dry, free from rashes.    Lab Data Review:  Lab Results   Component Value Date    WBC 26.6 2024    HGB 8.1 2024    HCT 23.1 2024    .0 2024    CREATSERUM 1.01 2024    BUN 23 2024     2024    K 3.4 2024    K 3.5 2024     2024    CO2 22.0 2024     2024    CA 7.6 2024    MG 1.5 2024    PHOS 4.1 2024        Cultures:  Hospital Encounter on 02/10/24   1. Blood Culture     Status: None (Preliminary result)    Collection Time: 24 11:56 AM    Specimen: Blood,peripheral   Result Value Ref Range    Blood Culture Result No Growth 1 Day N/A   2. Tissue Aerobic Culture     Status: Abnormal    Collection Time: 24 11:42 AM    Specimen: Femoral graft; Tissue   Result Value Ref Range    Tissue Culture Result  N/A     Mixture of organisms suggestive of normal skin karla    Tissue Culture Result 1+ growth Klebsiella  pneumoniae (A) N/A    Tissue Smear No WBCs seen N/A    Tissue Smear No organisms seen N/A   3. Anaerobic Culture     Status: None (Preliminary result)    Collection Time: 02/13/24 11:42 AM    Specimen: Femoral graft; Tissue   Result Value Ref Range    Anaerobic Culture Pending N/A   4. Aerobic Bacterial Culture     Status: Abnormal    Collection Time: 02/13/24 11:36 AM    Specimen: Calf, right; Body fluid, unspecified   Result Value Ref Range    Aerobic Culture Result  N/A     Mixture of organisms suggestive of normal skin karla    Aerobic Culture Result 1+ growth Klebsiella pneumoniae (A) N/A    Aerobic Smear 1+ WBCs seen N/A    Aerobic Smear No organisms seen N/A   5. Urine Culture, Routine     Status: Abnormal    Collection Time: 02/12/24  4:19 PM    Specimen: Urine, joseph catheter   Result Value Ref Range    Urine Culture 10,000 - 50,000 CFU/ML Candida albicans (A) N/A        Radiology:    XR CHEST AP PORTABLE  (CPT=71045)    Result Date: 2/11/2024  CONCLUSION:   Interval placement of enteric tube with the tip in the region the gastric body.  Mild left basilar linear atelectasis.      Dictated by (CST): Krunal Tay MD on 2/11/2024 at 9:19 AM     Finalized by (CST): Krunal Tay MD on 2/11/2024 at 9:21 AM          XR CHEST AP PORTABLE  (CPT=71045)    Result Date: 2/11/2024  CONCLUSION: ETT 1.7 cm above the frida.  Right IJ catheter in the upper SVC.  No pneumothorax  [facility] provided a prelim report for this exam. This final report has no significant discrepancies with the [facility] report. .    Dictated by (CST): Ronak Leonard MD on 2/11/2024 at 6:34 AM     Finalized by (CST): Ronak Leonard MD on 2/11/2024 at 6:37 AM           Assessment and Plan:    Acute gram negative sepsis presenting with a bleeding complication after recent fem-pop bypass 1/24/24  - s/p emergent OR where extensive debridement and patch was performed - noting infected graft material  - Blood culture positive for klebsiella  on admission, repeats negative  - OR cultures from debridement with klebsiella, enterococcus, strep anginosus, e.coli, bacteroides, prevotella 2/10/24  - Return to OR 2/13/24 for debridement and removal of infected graft with VAC placement  - OR cultures from debridement with klebsiella, e.coli, candida, enterococcus on 2/13/24  - Plans for OR again today  - IV vancomycin and meropenem ongoing     Leukocytosis and low grade fevers ongoing  - Multifactorial due to the above  - trending down  - C.diff is negative     Acute blood loss anemia, hemorrhage as a complication from recent bypass procedure  - per attending      DM II  - Possible DKA component with acidosis  - per attending     Recommendations  - Continue IV Vancomycin and meropenem  - Follow pending cultures  - wound vac per surgery  - Repeat labs tomorrow  - Further recommendations to follow       If you have any questions or concerns please call [facility] Infectious Disease at [phone].     ZAIRA Dietz    2/16/2024  11:12 AM

## 2024-02-16 NOTE — PROGRESS NOTES
[facility] Pulmonary, Critical Care and Sleep    Ava Bowen Patient Status:  Inpatient    10/6/1955 MRN [id number]   Location [facility] 2W/SW Attending Guille Jain MD   Hosp Day # 6 PCP Ernesto De Dios     Date of Admission: 2/10/2024  3:39 PM    Admission Diagnosis: Hyperglycemia [R73.9]  Bypass graft mechanical complication, initial encounter ([facility]) [T82.398A]  Acute kidney injury superimposed on CKD  ([facility]) [N17.9, N18.9]  Anemia, unspecified type [D64.9]    S: Breathing OK.     Scheduled Medications:     insulin regular human  1-7 Units Subcutaneous 4 times per day    potassium chloride  40 mEq Intravenous Once    carvedilol  25 mg Per NG Tube BID    furosemide  20 mg Intravenous BID (Diuretic)    insulin degludec  10 Units Subcutaneous Nightly    vancomycin  125 mg Oral Daily    vancomycin  1,250 mg Intravenous Q36H    sodium hypochlorite   Topical 2 times per day    amLODIPine  10 mg Per NG Tube QAM    hydrALAZINE  100 mg Per NG Tube Q8H    pantoprazole  40 mg Intravenous Q24H    fluticasone furoate-vilanterol  1 puff Inhalation Daily    rosuvastatin  40 mg Oral Nightly    chlorhexidine gluconate  15 mL Mouth/Throat BID@0800,2000    enoxaparin  40 mg Subcutaneous Daily    meropenem  500 mg Intravenous q12h       Infusing Medications:     dextrose 5%-sodium chloride 0.45% 50 mL/hr at 24 0022       PRN Medications:  HYDROcodone-acetaminophen, morphINE **OR** morphINE **OR** morphINE, hydrALAzine, ondansetron, metoclopramide, albuterol, ipratropium-albuterol, acetaminophen **OR** acetaminophen **OR** acetaminophen **OR** acetaminophen, polyethylene glycol (PEG 3350), senna, bisacodyl, fleet enema, glucose **OR** glucose **OR** glucose-vitamin C **OR** dextrose **OR** glucose **OR** glucose **OR** glucose-vitamin C    OBJECTIVE:  /55 (BP Location: Right arm)   Pulse 70   Temp 98.5 °F (36.9 °C) (Oral)   Resp 20   Ht 162.6 cm (5' 4.02\")   Wt 182 lb 3.2 oz (82.6 kg)   LMP  (LMP  Unknown)   SpO2 97%   BMI 31.26 kg/m²    Temp (24hrs), Av.6 °F (37 °C), Min:97.2 °F (36.2 °C), Max:99.3 °F (37.4 °C)             Wt Readings from Last 3 Encounters:   24 182 lb 3.2 oz (82.6 kg)   24 164 lb 3.2 oz (74.5 kg)   23 153 lb (69.4 kg)       I/O last 3 completed shifts:  In: 2707 [P.O.:200; I.V.:; IV PIGGYBACK:450]  Out: 2665 [Urine:1840; Drains:825]  No intake/output data recorded.     O2: 2 LNC  General: NAD.   Neuro: Alert, no focal deficits.    HEENT: PERRL  Neck : No LAD  CV: RRR, nl S1, S2, no S4, S3 or murmur.   Lungs: Clear bilaterally.   Abd: Nontender, non distended.   Ext: 2+ edema.   Skin: No rashes.     Recent Labs   Lab 24  2325 24  1439 02/15/24  0448 24  0623   WBC 28.3*  --  29.8* 26.6*   HGB 7.9* 9.5* 8.7* 8.1*   HCT 23.4*  --  25.1* 23.1*   .0  --  306.0 319.0     Recent Labs   Lab 02/10/24  2151 24  0543 243 24  0015 24  0551 24  1225 24  2325 24  0517 02/15/24  0448 24  0623   *   < > 192*   < > 264*  264*   < > 93 86 180*  --    BUN 26*   < > 19   < > 19  19   < > 16 16 17  --    CREATSERUM 1.34*   < > 1.25*   < > 1.27*  1.27*   < > 0.97 0.97 0.93  --    CA 8.4*   < > 7.2*   < > 7.4*  7.4*   < > 7.1* 7.3* 7.5*  --       < > 145   < > 144  144   < > 142 144 142  --    K 3.3*   < > 4.3   < > 4.2  4.2  4.2   < > 3.7 3.8 3.3* 3.4*   *   < > 123*   < > 120*  120*   < > 115* 114* 110  --    CO2 14.0*   < > 14.0*   < > 13.0*  13.0*   < > 20.0* 22.0 24.0  --    AST 91*  --   --   --   --   --   --   --   --   --    ALT 38  --   --   --   --   --   --   --   --   --    ALB 2.3*  --  2.3*  --  2.3*  --   --   --   --   --     < > = values in this interval not displayed.     Recent Labs   Lab 02/10/24  1551   INR 1.51*   PTT 30.3     Recent Labs   Lab 24  0550 24  1512   ABGPHT 7.33* 7.38   ACWGNN6L 23* 29*   WHRUG4F 82 204*   ABGHCO3 15.2* 19.5*   SITE  Arterial Line Arterial Line   [facility] 8.7*  --        COVID-19 Lab Results    COVID-19  Lab Results   Component Value Date    COVID19 Not Detected 02/10/2024    COVID19 Not Detected 01/23/2024    COVID19 Not Detected 01/02/2024       Pro-Calcitonin  No results for input(s): \"PCT\" in the last 168 hours.    Cardiac  No results for input(s): \"TROP\", \"PBNP\" in the last 168 hours.    Creatinine Kinase  No results for input(s): \"CK\" in the last 168 hours.    Inflammatory Markers  No results for input(s): \"CRP\", \"PORFIRIO\", \"LDH\", \"DDIMER\" in the last 168 hours.    Imaging:   CXR 2/11:    Chest images personally reviewed.   Assessment/Plan   Acute respiratory failure - due to aspiration and ongoing metabolic acidosis and likely now fluid overload now with 3rd spaced fluids.   Intubated 2/11 post op, extubated 2/14  - Agree with diuresis. Still net positive. Would recommend continued diuresis.   - Pulmonary toilet.   Hypovolemic shock - resolved  - monitor. Now likely fluid overloaded.   Acute blood loss anemia  - monitor  ID: PVD with bleeding from recent fem-pop bypass and infected graft  - meropenem and IV vancomycin (2/11- )  - wound vacs in place  - plan for return trip to OR on 2/16  COPD  - BD protocol  DM  - insulin per endo  OMEGA on CKD, metabolic acidosis  - off bicarb gtt  - Renal following and managing diuretics.   FEN  - swallow eval and advance diet  Ppx  - Lovenox  - PPI  Dispo  - Will follow.     My best regards,         Tom Gil MD  [facility] Pulmonary, Critical Care and Sleep Medicine

## 2024-02-16 NOTE — ANESTHESIA PROCEDURE NOTES
Airway  Date/Time: 2/16/2024 1:50 PM  Urgency: Elective      General Information and Staff    Patient location during procedure: OR  Anesthesiologist: Cristhian Michele MD  Resident/CRNA: Magalie Frost CRNA  Performed: CRNA   Performed by: Magalie Frost CRNA  Authorized by: Cristhian Michele MD      Indications and Patient Condition  Indications for airway management: anesthesia  Sedation level: deep  Preoxygenated: yes  Patient position: sniffing  Mask difficulty assessment: 2 - vent by mask + OA or adjuvant +/- NMBA    Final Airway Details  Final airway type: endotracheal airway      Successful airway: ETT  Cuffed: yes   Successful intubation technique: direct laryngoscopy  Endotracheal tube insertion site: oral  Blade: GlideScope  Blade size: #3  ETT size (mm): 7.0    Cormack-Lehane Classification: grade I - full view of glottis  Placement verified by: capnometry   Measured from: lips  ETT to lips (cm): 21  Number of attempts at approach: 1

## 2024-02-16 NOTE — VASCULAR ACCESS
Vascular access consult for placement of venous access prior to surgery.  On assessment:  Right arm with Rt Ac PIV, infiltrated. Blistering/area os open skin at AC site from PIV observed. Swelling from hand to near area of armpit. Unable to localize vein due to swelling. PIV removed.  Left arm- non compressible veins starting from forearm to most of basilic vein. No identifiable cephalic vein seen. Compressible paired brachial about 10 cm below axillary bend.   Patient scheduled to be picked up for surgery soon. Midline placed for venous access for OR. Will re-assess need for PICC placement for long term IV abx per ID on Monday 2/19.

## 2024-02-16 NOTE — OCCUPATIONAL THERAPY NOTE
OT order received, chart reviewed. Per order, \"Ok for passive range of motion to the lower legs. To remain in bed. No flexion to right hip.\" However, per chart, plan is for patient to return to OR today for further debridement of groin wound. OT will discontinue current order and place patient ON HOLD. Will appreciate new order post-op when patient cleared for functional participation with therapy.    Leslie Irwin OTR/L  [facility]  #[id number]

## 2024-02-16 NOTE — PROGRESS NOTES
[facility]    Progress Note    Ava Bowen Patient Status:  Inpatient    10/6/1955 MRN [id number]   Location [facility] 3W/SW Attending Guille Jain MD   Hosp Day # 6 PCP Ernesto De Dios     Subjective:   Ava Bowen is a(n) 68 year old female     who was admitted to the hospital for Bypass graft mechanical complication, initial encounter (HCC):  This is a 68 year-old woman admitted after presenting to the ER after having undergone right femoral artery to below-knee popliteal artery. She was found to have elevated blood sugars and started on insulin drip.    I discussed with Pt and RN  NPO for procedure   Pt is hungry and reports pain today     Objective:   Vital Signs:  Blood pressure 141/54, pulse 74, temperature 99.8 °F (37.7 °C), temperature source Oral, resp. rate 18, height 5' 4.02\" (1.626 m), weight 182 lb 3.2 oz (82.6 kg), SpO2 94%, not currently breastfeeding.                 General: Awake and alert.    HENT: Eye: EOMI,    Neck/Thyroid: neck inspection: normal   Lungs:  Speaking full sentences  MSK: Moves extremities spontaneously.    Neuro:speech is clear. Awake, alert   Psych: Orientated to time, place, person & situation    Skin: Skin is dry       Assessment and Plan:     Bypass graft mechanical complication, initial encounter (HCC)         Hyperglycemia         Bypass graft mechanical complication (HCC)         Anemia, unspecified type         Acute kidney injury superimposed on CKD  (HCC)   PMH of COPD, DM2, HTN, HL, PVD, Sickle cell anemia, CKD stage 3, recent fem-pop artery bypass admitted with acute hemorrhage from incision site. Patient is s/p OR for repair.       Uncontrolled Type 2 Diabetes  Tresiba 10 units at night   medium dose CF scale with regular insulin after midnight  - We will follow blood sugars       I discussed plan with pt and Rn     Thank you for allowing me to participate in the care of your patient.    Results:       Latest Reference Range  & Units 02/15/24 11:25 02/15/24 17:39 02/15/24 20:37 02/15/24 23:54 02/16/24 06:11   POC GLUCOSE 70 - 99 mg/dL 186 (H) 238 (H) 150 (H) 146 (H) 140 (H)      Lab Results   Component Value Date    WBC 26.6 (H) 02/16/2024    HGB 8.1 (L) 02/16/2024    HCT 23.1 (L) 02/16/2024    .0 02/16/2024    CREATSERUM 1.01 02/16/2024    BUN 23 02/16/2024     02/16/2024    K 3.4 (L) 02/16/2024    K 3.5 02/16/2024     (H) 02/16/2024    CO2 22.0 02/16/2024     (H) 02/16/2024    CA 7.6 (L) 02/16/2024    ALB 2.3 (L) 02/12/2024    ALKPHO 62 02/10/2024    BILT 0.4 02/10/2024    TP 4.6 (L) 02/10/2024    AST 91 (H) 02/10/2024    ALT 38 02/10/2024    PTT 30.3 02/10/2024    INR 1.51 (H) 02/10/2024    T4F 1.3 06/18/2019    TSH 1.060 06/18/2019    LIP 40 04/25/2023    DDIMER 1.45 (H) 11/08/2019    ESRML 27 05/08/2020    CRP 3.11 (H) 05/08/2020    MG 1.5 (L) 02/16/2024    PHOS 4.1 02/16/2024    TROP <0.045 11/08/2019    CK 35 08/21/2023    B12 963 09/03/2019       No results found.              Dougie Casarez MD  2/16/2024        DOS  2/16/2024

## 2024-02-16 NOTE — ANESTHESIA POSTPROCEDURE EVALUATION
Patient: Ava Bowen    Procedure Summary       Date: 02/16/24 Room / Location: [facility] MAIN OR  / [facility] MAIN OR    Anesthesia Start: 1336 Anesthesia Stop: 1519    Procedure: Debridement of right groin wound and Kerecis graft placement (Right) Diagnosis: (Right groin wound)    Surgeons: Kp Justice MD Anesthesiologist: Veronica Barillas MD    Anesthesia Type: general ASA Status: 3            Anesthesia Type: general    Vitals Value Taken Time   /69 02/16/24 1516   Temp 97.4 02/16/24 1519   Pulse 75 02/16/24 1519   Resp 16 02/16/24 1519   SpO2 93 % 02/16/24 1519   Vitals shown include unfiled device data.    [facility] AN Post Evaluation:   Patient Evaluated in PACU  Patient Participation: complete - patient participated  Level of Consciousness: awake  Pain Management: adequate  Airway Patency:patent  Dental exam unchanged from preop  Yes    Cardiovascular Status: acceptable  Respiratory Status: acceptable  Postoperative Hydration acceptable      VERONICA BARILLAS MD  2/16/2024 3:19 PM

## 2024-02-17 PROBLEM — E11.65 UNCONTROLLED TYPE 2 DIABETES MELLITUS WITH HYPERGLYCEMIA (HCC): Status: ACTIVE | Noted: 2024-02-17

## 2024-02-17 PROBLEM — E11.65 UNCONTROLLED TYPE 2 DIABETES MELLITUS WITH HYPERGLYCEMIA (HCC): Status: ACTIVE | Noted: 2024-01-01

## 2024-02-17 NOTE — PROGRESS NOTES
[facility]    Progress Note    Ava Bowen Patient Status:  Inpatient    10/6/1955 MRN [id number]   Location [facility] 3W/SW Attending Guille Jain MD   Hosp Day # 7 PCP Ernesto De Dios     Subjective:   Ava Bowen is a(n) 68 year old female      In bed   Pain is better controlled now   Hungry and would like to eat   Objective:   Vital Signs:  Blood pressure 135/54, pulse 71, temperature 99.4 °F (37.4 °C), temperature source Oral, resp. rate 20, height 5' 4.02\" (1.626 m), weight 181 lb 11.2 oz (82.4 kg), SpO2 94%, not currently breastfeeding.                    General: Awake and alert.    HENT: Eye: EOMI,    Neck/Thyroid: neck inspection: normal   Lungs:  Speaking full sentences  Neuro:speech is clear. Awake, alert   Psych: Orientated to time, place, person & situation    Skin: Skin is dry    Latest Reference Range & Units 24 06:11 24 12:08 24 15:35 24 18:42 24 20:35   POC GLUCOSE 70 - 99 mg/dL 140 (H) 128 (H) 127 (H) 190 (H) 216 (H)        Assessment and Plan:     Bypass graft mechanical complication, initial encounter (HCC)     Hyperglycemia     Bypass graft mechanical complication (HCC)     Anemia, unspecified type     Acute kidney injury superimposed on CKD  (HCC)           Acute kidney injury superimposed on CKD  (HCC)   PMH of COPD, DM2, HTN, HL, PVD, Sickle cell anemia, CKD stage 3, recent fem-pop artery bypass admitted with acute hemorrhage from incision site. Patient is s/p OR for repair.        Uncontrolled Type 2 Diabetes  Tresiba 10 units at night  Novolog 2 units TIDAC  medium dose CF scale with regular insulin after midnight  - We will follow blood sugars      I discussed plan with pt and Rn    Thank you for allowing me to participate in the care of your patient.    Results:     Lab Results   Component Value Date    WBC 18.9 (H) 2024    HGB 7.1 (L) 2024    HCT 21.1 (L) 2024    .0 2024    CREATSERUM  1.01 02/16/2024    BUN 23 02/16/2024     02/16/2024    K 3.4 (L) 02/16/2024    K 3.5 02/16/2024     (H) 02/16/2024    CO2 22.0 02/16/2024     (H) 02/16/2024    CA 7.6 (L) 02/16/2024    ALB 2.3 (L) 02/12/2024    ALKPHO 62 02/10/2024    BILT 0.4 02/10/2024    TP 4.6 (L) 02/10/2024    AST 91 (H) 02/10/2024    ALT 38 02/10/2024    PTT 30.3 02/10/2024    INR 1.51 (H) 02/10/2024    T4F 1.3 06/18/2019    TSH 1.060 06/18/2019    LIP 40 04/25/2023    DDIMER 1.45 (H) 11/08/2019    ESRML 27 05/08/2020    CRP 3.11 (H) 05/08/2020    MG 1.7 02/16/2024    PHOS 4.1 02/16/2024    TROP <0.045 11/08/2019    CK 35 08/21/2023    B12 963 09/03/2019       No results found.              Dougie Casarez MD  2/17/2024        DOS is the same date the note was signed

## 2024-02-17 NOTE — SPIRITUAL CARE NOTE
Spiritual Care Visit Note    Patient Name: Ava Bowen Date of Spiritual Care Visit: 24   : 10/6/1955 Primary Dx: Bypass graft mechanical complication, initial encounter (HCC)       Referred By: Referral From: Nurse    Spiritual Care Taxonomy:    Intended Effects: De-escalate emotionally charged situations    Methods: Collaborate with care team member;Explore presence of God    Interventions: Active listening;Ask guided questions;Acknowledge response to difficult experience;Acknowledge current situation   Offered prayer and words of scripture with words of hope, and encouragement. Called Sister for patient, as pt would like family to bring cell phone and  - when they come to hospital when they come to visit, was not able to get  her stuff,  since  she came to hospital in emergency situation.  Pt experiencing the most pain, she has every felt- Pt said. Also nervous about amputation surgery that is coming.    Has a sister that is supporting her and granddaughter.     provided support for pt at this difficult time.    Visit Type/Summary:     - Spiritual Care: Responded to a request for spiritual care and assessed the patient for spiritual care needs. Responded to a request via the on call phone       Spoke w/nurse about pt concerns. Called family member for pt.     remains available for visits, if requested. Pt appreciates  visits, as able.    Spiritual Care support can be requested via an Epic consult. For urgent/immediate needs, please contact the On Call  at: [place]: estevan [phone]    Rev, Chaplain Jackie Moy [phone]} Anatoliy

## 2024-02-17 NOTE — PROGRESS NOTES
Discussed operative findings with patient   Will need above knee amputation   Tentative plan for amputation next Thursday to allow diuresis   Wound vac needs to stay on until Thursday

## 2024-02-17 NOTE — PROGRESS NOTES
[facility] CARDIOLOGY Progress Note      Ava Bowen is a 68 year old female who I assessed as follows:  Chief Complaint   Patient presents with    Bleeding          REASON FOR CONSULTATION:    HTN            ASSESSMENT/PLAN:     IMPRESSIONS:  Hemorrhage from LE fem pop bypass site, infected.   -S/P debridement 24  -S/P debridement 24  HTN  COPD  DM  PAD s/p fem-pop bypass 2024  Sickle cell anemia  -Hb 7.1  DM  Acute resp failure, now extubated  OMEGA on CKD  -improved  HL  -on statin PTA  11,  HFpEF        PLAN:  Continue oral BP meds  Hydralazine IV PRN  3.   IV Lasix  4.   F/U BMP / BNP  5.   Anticipate AKA next week      SUBJECTIVE:    Denies chest pain or dyspnea. No palpitations.       --------------------------------------------------------------------------------------------------------------------------------    HPI:         No chest pain.  No SOB a rest   Anxious over LE amputation.                No current outpatient medications on file.      Past Medical History:   Diagnosis Date    Anemia     Back pain     Back problem     Cataract     Chronic kidney disease (CKD)     COPD (chronic obstructive pulmonary disease) ([facility])     FEV 1 1.25 50%     Diabetes ([facility])     DM neuropathy    Essential hypertension     H/O angioplasty     2020    High blood pressure     High cholesterol     Neuropathy     Peripheral vascular disease ([facility])     PNA (pneumonia) 2018    Pulmonary emphysema ([facility])     Pulmonary nodule     4 mm RUL    Renal disorder     monitoring kidney labs    Sickle cell trait ([facility])     Sickle-cell anemia ([facility])     Tobacco abuse     Visual impairment      Past Surgical History:   Procedure Laterality Date    APPENDECTOMY      BACK SURGERY Right 2021    Right L3-4 extreme lateral interbody fusion, posterior decompression; LUMBAR LAMINECTOMY 1 LEVEL          x3    CHOLECYSTECTOMY      EXCIS LUMBAR DISK,ONE LEVEL          OTHER      bilateral leg  stents  and      Family History   Problem Relation Age of Onset    Diabetes Mother         Passed from DM complications    Diabetes Sister         Had kidney transplant due to complications of DM    Kidney Disease Sister         Kidney failure secondary to diabetes; received kidney transplant in     Glaucoma Sister     Diabetes Brother     Sickle Cell Son         Cause of death    Macular degeneration Neg       Social History:  Social History     Socioeconomic History    Marital status:    Tobacco Use    Smoking status: Former     Packs/day: 1.00     Years: 30.00     Additional pack years: 0.00     Total pack years: 30.00     Types: Cigarettes     Quit date: 2019     Years since quittin.2    Smokeless tobacco: Never   Vaping Use    Vaping Use: Never used   Substance and Sexual Activity    Alcohol use: Not Currently     Comment: socially    Drug use: No   Social History Narrative    [facility]          Social Determinants of Health     Food Insecurity: No Food Insecurity (2024)    Food Insecurity     Food Insecurity: Never true   Transportation Needs: No Transportation Needs (2024)    Transportation Needs     Lack of Transportation: No   Housing Stability: Low Risk  (2024)    Housing Stability     Housing Instability: No          Medications:            Allergies  Allergies   Allergen Reactions    Dilaudid [Hydromorphone] TONGUE SWELLING and ANGIOEDEMA    Penicillins HIVES and ANGIOEDEMA     Hives on eyelids (occurred when pt was in her 20s). Tolerated amoxicillin per chart. Tolerates cephalosporins.               REVIEW OF SYSTEMS:   Sedated, unable to answer questions          EXAM:         TELE: SR          /51 (BP Location: Right arm)   Pulse 73   Temp 99.1 °F (37.3 °C) (Oral)   Resp 18   Ht 5' 4.02\" (1.626 m)   Wt 181 lb 11.2 oz (82.4 kg)   LMP  (LMP Unknown)   SpO2 96%   BMI 31.17 kg/m²   Temp (24hrs), Av.6 °F (37 °C), Min:97.5 °F (36.4  °C), Max:99.4 °F (37.4 °C)    Wt Readings from Last 3 Encounters:   02/17/24 181 lb 11.2 oz (82.4 kg)   01/27/24 164 lb 3.2 oz (74.5 kg)   08/29/23 153 lb (69.4 kg)         Intake/Output Summary (Last 24 hours) at 2/17/2024 1054  Last data filed at 2/17/2024 1000  Gross per 24 hour   Intake 1697.5 ml   Output 2070 ml   Net -372.5 ml         GENERAL: no acute distress  SKIN: no rashes  HEENT: atraumatic, normocephalic, throat without erythema  NECK: supple, no bruits  LUNGS: clear to auscultation  CARDIO: RRR without murmur or S3   GI: soft, nontender  EXTREMITIES: no cyanosis, clubbing or edema  NEUROLOGY: moving all extremities  PSYCH: cooperative          LABORATORY DATA:         Labs:  Recent Labs   Lab 02/15/24  0448 02/16/24  0623 02/17/24  0730   * 114* 180*   BUN 17 23 27*   CREATSERUM 0.93 1.01 1.06*   CA 7.5* 7.6* 7.7*    142 142   K 3.3* 3.5  3.4* 4.1  4.1    113* 114*   CO2 24.0 22.0 21.0     No results for input(s): \"TROP\" in the last 168 hours.  Recent Labs   Lab 02/17/24  0730   RBC 2.47*   HGB 7.1*   HCT 21.1*   MCV 85.4   MCH 28.7   MCHC 33.6   RDW 16.0*   NEPRELIM 16.87*   WBC 18.9*   .0     No results for input(s): \"TROP\", \"TROPHS\", \"CK\" in the last 168 hours.    Imaging:  No results found.         Rikki Shaver MD

## 2024-02-17 NOTE — PLAN OF CARE
Pain medication around the clock for pain management . Wound vac intact.  Problem: Diabetes/Glucose Control  Goal: Glucose maintained within prescribed range  Description: INTERVENTIONS:  - Monitor Blood Glucose as ordered  - Assess for signs and symptoms of hyperglycemia and hypoglycemia  - Administer ordered medications to maintain glucose within target range  - Assess barriers to adequate nutritional intake and initiate nutrition consult as needed  - Instruct patient on self management of diabetes  Outcome: Progressing     Problem: CARDIOVASCULAR - ADULT  Goal: Maintains optimal cardiac output and hemodynamic stability  Description: INTERVENTIONS:  - Monitor vital signs, rhythm, and trends  - Monitor for bleeding, hypotension and signs of decreased cardiac output  - Evaluate effectiveness of vasoactive medications to optimize hemodynamic stability  - Monitor arterial and/or venous puncture sites for bleeding and/or hematoma  - Assess quality of pulses, skin color and temperature  - Assess for signs of decreased coronary artery perfusion - ex. Angina  - Evaluate fluid balance, assess for edema, trend weights  Outcome: Progressing  Goal: Absence of cardiac arrhythmias or at baseline  Description: INTERVENTIONS:  - Continuous cardiac monitoring, monitor vital signs, obtain 12 lead EKG if indicated  - Evaluate effectiveness of antiarrhythmic and heart rate control medications as ordered  - Initiate emergency measures for life threatening arrhythmias  - Monitor electrolytes and administer replacement therapy as ordered  Outcome: Progressing     Problem: RESPIRATORY - ADULT  Goal: Achieves optimal ventilation and oxygenation  Description: INTERVENTIONS:  - Assess for changes in respiratory status  - Assess for changes in mentation and behavior  - Position to facilitate oxygenation and minimize respiratory effort  - Oxygen supplementation based on oxygen saturation or ABGs  - Provide Smoking Cessation handout, if  applicable  - Encourage broncho-pulmonary hygiene including cough, deep breathe, Incentive Spirometry  - Assess the need for suctioning and perform as needed  - Assess and instruct to report SOB or any respiratory difficulty  - Respiratory Therapy support as indicated  - Manage/alleviate anxiety  - Monitor for signs/symptoms of CO2 retention  Outcome: Progressing     Problem: METABOLIC/FLUID AND ELECTROLYTES - ADULT  Goal: Glucose maintained within prescribed range  Description: INTERVENTIONS:  - Monitor Blood Glucose as ordered  - Assess for signs and symptoms of hyperglycemia and hypoglycemia  - Administer ordered medications to maintain glucose within target range  - Assess barriers to adequate nutritional intake and initiate nutrition consult as needed  - Instruct patient on self management of diabetes  Outcome: Progressing  Goal: Electrolytes maintained within normal limits  Description: INTERVENTIONS:  - Monitor labs and rhythm and assess patient for signs and symptoms of electrolyte imbalances  - Administer electrolyte replacement as ordered  - Monitor response to electrolyte replacements, including rhythm and repeat lab results as appropriate  - Fluid restriction as ordered  - Instruct patient on fluid and nutrition restrictions as appropriate  Outcome: Progressing  Goal: Hemodynamic stability and optimal renal function maintained  Description: INTERVENTIONS:  - Monitor labs and assess for signs and symptoms of volume excess or deficit  - Monitor intake, output and patient weight  - Monitor urine specific gravity, serum osmolarity and serum sodium as indicated or ordered  - Monitor response to interventions for patient's volume status, including labs, urine output, blood pressure (other measures as available)  - Encourage oral intake as appropriate  - Instruct patient on fluid and nutrition restrictions as appropriate  Outcome: Progressing

## 2024-02-17 NOTE — PLAN OF CARE
Pt alert and oriented x4. 2L NC. Max assist. Wound vac in place. Pascual in place due to wounds. Hgb this am 7.1, MD aware. IV Meropenem. IV Vanco. PRN Morphine given. PRN Norco given. Pt and family updated on plan of care. Plan for diuresis and AKA Thursday. Safety precautions in place. Call light within reach.    Problem: Patient Centered Care  Goal: Patient preferences are identified and integrated in the patient's plan of care  Description: Interventions:  - What would you like us to know as we care for you?  - Provide timely, complete, and accurate information to patient/family  - Incorporate patient and family knowledge, values, beliefs, and cultural backgrounds into the planning and delivery of care  - Encourage patient/family to participate in care and decision-making at the level they choose  - Honor patient and family perspectives and choices  Outcome: Progressing     Problem: Diabetes/Glucose Control  Goal: Glucose maintained within prescribed range  Description: INTERVENTIONS:  - Monitor Blood Glucose as ordered  - Assess for signs and symptoms of hyperglycemia and hypoglycemia  - Administer ordered medications to maintain glucose within target range  - Assess barriers to adequate nutritional intake and initiate nutrition consult as needed  - Instruct patient on self management of diabetes  Outcome: Progressing     Problem: Patient/Family Goals  Goal: Patient/Family Long Term Goal  Description: Patient's Long Term Goal: discharge    Interventions:  - Pain management  - Surgical intervention  - Wound care  - See additional Care Plan goals for specific interventions  Outcome: Progressing  Goal: Patient/Family Short Term Goal  Description: Patient's Short Term Goal: pain management    Interventions:   - See additional Care Plan goals for specific interventions  Outcome: Progressing     Problem: CARDIOVASCULAR - ADULT  Goal: Maintains optimal cardiac output and hemodynamic stability  Description:  INTERVENTIONS:  - Monitor vital signs, rhythm, and trends  - Monitor for bleeding, hypotension and signs of decreased cardiac output  - Evaluate effectiveness of vasoactive medications to optimize hemodynamic stability  - Monitor arterial and/or venous puncture sites for bleeding and/or hematoma  - Assess quality of pulses, skin color and temperature  - Assess for signs of decreased coronary artery perfusion - ex. Angina  - Evaluate fluid balance, assess for edema, trend weights  Outcome: Progressing  Goal: Absence of cardiac arrhythmias or at baseline  Description: INTERVENTIONS:  - Continuous cardiac monitoring, monitor vital signs, obtain 12 lead EKG if indicated  - Evaluate effectiveness of antiarrhythmic and heart rate control medications as ordered  - Initiate emergency measures for life threatening arrhythmias  - Monitor electrolytes and administer replacement therapy as ordered  Outcome: Progressing     Problem: RESPIRATORY - ADULT  Goal: Achieves optimal ventilation and oxygenation  Description: INTERVENTIONS:  - Assess for changes in respiratory status  - Assess for changes in mentation and behavior  - Position to facilitate oxygenation and minimize respiratory effort  - Oxygen supplementation based on oxygen saturation or ABGs  - Provide Smoking Cessation handout, if applicable  - Encourage broncho-pulmonary hygiene including cough, deep breathe, Incentive Spirometry  - Assess the need for suctioning and perform as needed  - Assess and instruct to report SOB or any respiratory difficulty  - Respiratory Therapy support as indicated  - Manage/alleviate anxiety  - Monitor for signs/symptoms of CO2 retention  Outcome: Progressing     Problem: METABOLIC/FLUID AND ELECTROLYTES - ADULT  Goal: Glucose maintained within prescribed range  Description: INTERVENTIONS:  - Monitor Blood Glucose as ordered  - Assess for signs and symptoms of hyperglycemia and hypoglycemia  - Administer ordered medications to maintain  glucose within target range  - Assess barriers to adequate nutritional intake and initiate nutrition consult as needed  - Instruct patient on self management of diabetes  Outcome: Progressing  Goal: Electrolytes maintained within normal limits  Description: INTERVENTIONS:  - Monitor labs and rhythm and assess patient for signs and symptoms of electrolyte imbalances  - Administer electrolyte replacement as ordered  - Monitor response to electrolyte replacements, including rhythm and repeat lab results as appropriate  - Fluid restriction as ordered  - Instruct patient on fluid and nutrition restrictions as appropriate  Outcome: Progressing  Goal: Hemodynamic stability and optimal renal function maintained  Description: INTERVENTIONS:  - Monitor labs and assess for signs and symptoms of volume excess or deficit  - Monitor intake, output and patient weight  - Monitor urine specific gravity, serum osmolarity and serum sodium as indicated or ordered  - Monitor response to interventions for patient's volume status, including labs, urine output, blood pressure (other measures as available)  - Encourage oral intake as appropriate  - Instruct patient on fluid and nutrition restrictions as appropriate  Outcome: Progressing

## 2024-02-17 NOTE — PROGRESS NOTES
NEPHROLOGY DAILY PROGRESS NOTE     SUBJECTIVE:  Patient seen and examined at bedside.    OBJECTIVE:    Total Intake/Output:    Intake/Output Summary (Last 24 hours) at 2/17/2024 1447  Last data filed at 2/17/2024 1058  Gross per 24 hour   Intake 1847.5 ml   Output 1720 ml   Net 127.5 ml       PHYSICAL EXAM:  /51 (BP Location: Right arm)   Pulse 73   Temp 99.1 °F (37.3 °C) (Oral)   Resp 18   Ht 5' 4.02\" (1.626 m)   Wt 181 lb 11.2 oz (82.4 kg)   LMP  (LMP Unknown)   SpO2 96%   BMI 31.17 kg/m²   GEN: NAD, on oxygen     HEENT: NCAT    CHEST: no distress  CARDIAC: S1S2 normal  ABD: soft, NT/ND  EXT: + lower ext edema noted, wound vac noted- RLE, swelling R hand noted      NEURO: awake, answering questions       CURRENT MEDICATIONS:     insulin aspart  2 Units Subcutaneous TID CC    magnesium sulfate  4 g Intravenous Once    insulin aspart  1-7 Units Subcutaneous TID CC    carvedilol  25 mg Per NG Tube BID    insulin degludec  10 Units Subcutaneous Nightly    vancomycin  125 mg Oral Daily    vancomycin  1,250 mg Intravenous Q36H    sodium hypochlorite   Topical 2 times per day    amLODIPine  10 mg Per NG Tube QAM    hydrALAZINE  100 mg Per NG Tube Q8H    pantoprazole  40 mg Intravenous Q24H    fluticasone furoate-vilanterol  1 puff Inhalation Daily    rosuvastatin  40 mg Oral Nightly    meropenem  500 mg Intravenous q12h             LABS:  Patient Labs Reviewed in Detail. Pertinent Labs as follows:  Recent Labs   Lab 02/15/24  0448 02/16/24  0623 02/17/24  0730   * 114* 180*   BUN 17 23 27*   CREATSERUM 0.93 1.01 1.06*   EGFRCR 68 61 58*   CA 7.5* 7.6* 7.7*    142 142   K 3.3* 3.5  3.4* 4.1  4.1    113* 114*   CO2 24.0 22.0 21.0     Recent Labs   Lab 02/15/24  0448 02/16/24  0623 02/17/24  0730   RBC 3.05* 2.77* 2.47*   HGB 8.7* 8.1* 7.1*   HCT 25.1* 23.1* 21.1*   MCV 82.3 83.4 85.4   MCH 28.5 29.2 28.7   MCHC 34.7 35.1 33.6   RDW 16.1* 16.1* 16.0*   NEPRELIM 26.30* 22.98* 16.87*   WBC  29.8* 26.6* 18.9*   .0 319.0 291.0       ASSESSMENT AND PLAN:   68 year old female with PMH of COPD, DM2, HTN, HL, PVD, Sickle cell anemia, CKD stage 3, recent fem-pop artery bypass admitted with acute hemorrhage from incision site.  Patient is s/p OR for repair.  Nephrology is consulted for OMEGA and metabolic acidosis.         OMEGA on CKD stage 3  - baseline around 1.2  - creatinine improved below baseline, has evidence of fluid overload and has been receiving IV lasix.   - continue diuretics  - follow renal fxn and I/Os     Hypertension   - amlodipine, hydralazine, coreg  - weaned off nicardipine   - lisinopril held given recent OMEGA, can resume if needed for blood pressure control now with improved kidney function  - cards following     Metabolic acidosis:  - resolved     Hypernatremia:  - resolved     Hypokalemia/ hypomagnesemia:  - replete per protocol     Hypocalcemia:  - corrects to normal given hypoalbuminemia   - monitor Ca levels         We will continue to follow      DO Dina Malhotra - Nephrology

## 2024-02-17 NOTE — PROGRESS NOTES
[facility] Infectious Disease  Progress Note    Ava Bowen Patient Status:  Inpatient    10/6/1955 MRN [id number]   Location [facility] 3W/SW Attending Edy Foote, DO   Hosp Day # 7 PCP Ernesto De Dios     Subjective:    Pt seen and examined resting in bed, tolerating the IV abx, family at bedside. Some RLE pain.    Review of Systems:  Review of systems reviewed and negative except as mentioned    Objective:  Blood pressure 124/51, pulse 73, temperature 99.1 °F (37.3 °C), temperature source Oral, resp. rate 18, height 5' 4.02\" (1.626 m), weight 181 lb 11.2 oz (82.4 kg), SpO2 96%, not currently breastfeeding.      Physical Exam:  General: Awake, alert, non-tox, NAD.  HEENT:  Oropharynx clear, trachea ML.  Heart: RRR S1S2 no murmurs.  Lungs: Essentially CTA b/l, no rhonchi, rales, wheezes.  Abdomen: Soft, NT/ND.  BS present.  No organomegaly.  Extremity: +RLE wound vac in place  Neurological: No focal deficits.  Derm:  Warm, dry, free from rashes.    Lab Data Review:  Lab Results   Component Value Date    WBC 18.9 2024    HGB 7.1 2024    HCT 21.1 2024    .0 2024    CREATSERUM 1.06 2024    BUN 27 2024     2024    K 4.1 2024    K 4.1 2024     2024    CO2 21.0 2024     2024    CA 7.7 2024    ALB 2.2 2024    MG 1.8 2024    PHOS 5.1 2024        Cultures:  Hospital Encounter on 02/10/24   1. Blood Culture     Status: None (Preliminary result)    Collection Time: 24 11:56 AM    Specimen: Blood,peripheral   Result Value Ref Range    Blood Culture Result No Growth 2 Days N/A   2. Tissue Aerobic Culture     Status: Abnormal    Collection Time: 24 11:42 AM    Specimen: Femoral graft; Tissue   Result Value Ref Range    Tissue Culture Result  N/A     Mixture of organisms suggestive of normal skin karla    Tissue Culture Result 1+ growth  Klebsiella pneumoniae (A) N/A    Tissue Smear No WBCs seen N/A    Tissue Smear No organisms seen N/A   3. Anaerobic Culture     Status: None (Preliminary result)    Collection Time: 02/13/24 11:42 AM    Specimen: Femoral graft; Tissue   Result Value Ref Range    Anaerobic Culture No Anaerobes to date N/A   4. Aerobic Bacterial Culture     Status: Abnormal    Collection Time: 02/13/24 11:36 AM    Specimen: Calf, right; Body fluid, unspecified   Result Value Ref Range    Aerobic Culture Result  N/A     Mixture of organisms suggestive of normal skin karla    Aerobic Culture Result 1+ growth Klebsiella pneumoniae (A) N/A    Aerobic Smear 1+ WBCs seen N/A    Aerobic Smear No organisms seen N/A   5. Urine Culture, Routine     Status: Abnormal    Collection Time: 02/12/24  4:19 PM    Specimen: Urine, joseph catheter   Result Value Ref Range    Urine Culture 10,000 - 50,000 CFU/ML Candida albicans (A) N/A        Radiology:    XR CHEST AP PORTABLE  (CPT=71045)    Result Date: 2/11/2024  CONCLUSION:   Interval placement of enteric tube with the tip in the region the gastric body.  Mild left basilar linear atelectasis.      Dictated by (CST): Krunal Tay MD on 2/11/2024 at 9:19 AM     Finalized by (CST): Krunal Tay MD on 2/11/2024 at 9:21 AM          XR CHEST AP PORTABLE  (CPT=71045)    Result Date: 2/11/2024  CONCLUSION: ETT 1.7 cm above the frida.  Right IJ catheter in the upper SVC.  No pneumothorax  [facility] provided a prelim report for this exam. This final report has no significant discrepancies with the [facility] report. .    Dictated by (CST): Ronak Leonard MD on 2/11/2024 at 6:34 AM     Finalized by (CST): Ronak Leonard MD on 2/11/2024 at 6:37 AM               Assessment and Plan:    Acute gram negative sepsis presenting with a bleeding complication after recent fem-pop bypass 1/24/24  - s/p emergent OR where extensive debridement and patch was performed - noting infected graft material  - Blood  culture positive for klebsiella on admission, repeats negative  - OR cultures from debridement with klebsiella, enterococcus, strep anginosus, e.coli, bacteroides, prevotella 2/10/24  - Return to OR 2/13/24 for debridement and removal of infected graft with VAC placement  - OR cultures from debridement with klebsiella, e.coli, candida, enterococcus on 2/13/24  - s/p OR debridement again on 2/16/24  - plans for AKA tentatively Thursday per vascular surgery  - IV vancomycin and meropenem ongoing     Leukocytosis and low grade fevers ongoing  - Multifactorial due to the above  - trending down  - C.diff is negative     Acute blood loss anemia, hemorrhage as a complication from recent bypass procedure  - per attending      DM II  - Possible DKA component with acidosis  - per attending      Recommendations  - Continue IV Vancomycin and meropenem  - Follow pending cultures  - wound vac per surgery  - Repeat labs tomorrow  - Further recommendations to follow          If you have any questions or concerns please call [facility] Infectious Disease at [phone].     ZAIRA Dietz    2/17/2024  10:56 AM

## 2024-02-17 NOTE — PROGRESS NOTES
Pulmonary Progress Note      NAME: Ava Bowen - ROOM: 340/340-A - MRN: [id number] - Age: 68 year old - : 10/6/1955    Assessment/Plan:  Acute respiratory failure - likely due to fluid overload  -improving with diuresis and now weaning O2 to 2 LPM  ID: PVD with bleeding from recent fem-pop bypass and infected graft  - meropenem and IV vancomycin (- )  - wound vacs in place  - OR / vascular notes reviewed - may need AKA  COPD  - BD protocol  - wean O2  DM  - insulin per endo  OMEGA on CKD, metabolic acidosis  - Renal following and managing diuretics.   FEN  - swallow eval and advance diet  Ppx  - Lovenox  - PPI  Dispo  - Doing OK out of ICU.  Resp status close to baseline.  Will follow peripherally       Alejandro South M.D.  Pulmonary and Critical Care Medicine  [facility]      Subjective:  No acute events overnight.     Medications:   insulin aspart  2 Units Subcutaneous TID CC    magnesium sulfate  4 g Intravenous Once    insulin aspart  1-7 Units Subcutaneous TID CC    carvedilol  25 mg Per NG Tube BID    insulin degludec  10 Units Subcutaneous Nightly    vancomycin  125 mg Oral Daily    vancomycin  1,250 mg Intravenous Q36H    sodium hypochlorite   Topical 2 times per day    amLODIPine  10 mg Per NG Tube QAM    hydrALAZINE  100 mg Per NG Tube Q8H    pantoprazole  40 mg Intravenous Q24H    fluticasone furoate-vilanterol  1 puff Inhalation Daily    rosuvastatin  40 mg Oral Nightly    meropenem  500 mg Intravenous q12h     Intake/Output Summary (Last 24 hours) at 2024 0826  Last data filed at 2024 0403  Gross per 24 hour   Intake 1297.5 ml   Output 2070 ml   Net -772.5 ml       Physical Exam:  /54 (BP Location: Right arm)   Pulse 71   Temp 99.4 °F (37.4 °C) (Oral)   Resp 20   Ht 5' 4.02\" (1.626 m)   Wt 181 lb 11.2 oz (82.4 kg)   LMP  (LMP Unknown)   SpO2 94%   BMI 31.17 kg/m²   Physical Exam:   General: alert, cooperative, no respiratory distress.   HEENT: Normocephalic  atraumatic.Lips, mucosa, and tongue normal.  No thrush noted.   Neck: supple without mass   Lungs: clear   Chest wall: No tenderness or deformity.   Heart: Regular rate and rhythm, normal S1S2, no murmur.   Abdomen: soft, non-tender, non-distended, positive BS.   Extremity: legs wrapped   Skin: no new rash   Neuro: alert    Recent Labs   Lab 02/17/24  0730   RBC 2.47*   HGB 7.1*   HCT 21.1*   MCV 85.4   MCH 28.7   MCHC 33.6   RDW 16.0*   NEPRELIM 16.87*   WBC 18.9*   .0     Recent Labs   Lab 02/10/24  2151 02/11/24  0543 02/11/24  2053 02/12/24  0015 02/12/24  0551 02/12/24  1225 02/15/24  0448 02/16/24  0623 02/17/24  0730   *   < > 192*   < > 264*  264*   < > 180* 114* 180*   BUN 26*   < > 19   < > 19  19   < > 17 23 27*   CREATSERUM 1.34*   < > 1.25*   < > 1.27*  1.27*   < > 0.93 1.01 1.06*   CA 8.4*   < > 7.2*   < > 7.4*  7.4*   < > 7.5* 7.6* 7.7*   ALB 2.3*  --  2.3*  --  2.3*  --   --   --  2.2*      < > 145   < > 144  144   < > 142 142 142   K 3.3*   < > 4.3   < > 4.2  4.2  4.2   < > 3.3* 3.5  3.4* 4.1  4.1   *   < > 123*   < > 120*  120*   < > 110 113* 114*   CO2 14.0*   < > 14.0*   < > 13.0*  13.0*   < > 24.0 22.0 21.0   ALKPHO 62  --   --   --   --   --   --   --   --    AST 91*  --   --   --   --   --   --   --   --    ALT 38  --   --   --   --   --   --   --   --    BILT 0.4  --   --   --   --   --   --   --   --    TP 4.6*  --   --   --   --   --   --   --   --     < > = values in this interval not displayed.       Imaging: I independently visualized all relevant chest imaging in PACS, agree with radiology interpretation except where noted.

## 2024-02-17 NOTE — PROGRESS NOTES
[facility] Hospitalist Progress Note     CC: Hospital Follow up    PCP: Ernesto De Dios       Assessment/Plan:     Principal Problem:    Bypass graft mechanical complication, initial encounter ([facility])  Active Problems:    Hyperglycemia    Bypass graft mechanical complication (HCC)    Anemia, unspecified type    Acute kidney injury superimposed on CKD  (HCC)    Uncontrolled type 2 diabetes mellitus with hyperglycemia (HCC)      Ms. Bowen is a 68 year old female with PMH sig for CKD, COPD, DM, HTN, HLD, PVD, and sickle cell anemia who was recently admitted last week for right lower extremity femoral to popliteal bypass who presented with bleeding from surgical site. Taken for emergent OR on 2/10/24, noted to be grossly infected, started on vancomycin/meropenem. Bcx with klebsiella. BG elevated on admit, started on insulin gtt, endo consulted, now off insulin gtt. Kept intubated post op, pulm following.Seems to have been slow to recovery despite multiple surgeries now planning AKA on Thursday.        PAD sp RLE fem-pop bypass last week c/b bleeding fm surg site fm wound dehiscence  - s/p recent fem pop bypass 1/24/24 with Dr. Murrieta  - has been on ASA/plavix- held on admit  - Hg 6.0, s/p 3 units pRBC 2/10  - vascular surgery consulted, s/p emergent OR 2/10, removal of infected graft, s/p debridement, bone patch angioplasty   - sp further debridement 2/13 and placement of wound vac  - back to OR 2/16 again and now plans for AKA next week   -Seems now will need AKA  -Pain meds PRN     Septic shock (now improved) fm post op wound infection  - continue IV vancomycin and meropenem, ID consult following   - Bcx with klebsiella, repeat Bcx ordered  - 2/10 wound cx and 2/13 poly microbial  - abx adjustment per ID f/u cx results  - ID notes appreciated- tenative plan for 6 weeks abx from from final surgical plans- final abx tbd pending cx data and surgical plans    -Wound vac in place    Metabolic acidosis- resolved    - likely 2/2 LA from blood loss +/- dka on admit  - pH 7.24 on admit, LA 7.6 on admit, now normalized  - likely multifactorial, 2/2 LA, blood loss, ?DKA, CKD  - renal consulted, sp bicarb gtt     Acute blood loss anemia from surgical site   Chronic anemia  Sickle cell anemia  -Stable currently, trend CBC   - outpatient f/u  - Hg 8.9 on discharge last week  - Hg 6.0 on admission, s/p 3 units pRBC  - monitor     ?DKA   DM2, hyperglycemia  -A1c 7.9   - Hold PO medications  - BG 500s in ED, pH 7.2  - insulin gtt ordered in ED  -Now on Degludec 10 at bedtime and aspart 2 units with meals      Respiratory failure  - remained intubated post op both fm aspiration event and metabolic acidosis  - pulm following  - extubated 2/14 pm  Now on 2L NC      Hypovolemic / septic Shock/ Hypotension- improved  Hx HTN  BP stable now, hydral 100 Q8 hours   Amlodipine 10 d, Coreg 25 BID      COPD  - chronic, stable  -resume inhaler     OMEGA on CKD3  - cr 1.7 on admit, previously 1.2 on discharge last week  - monitor Cr and UOP- improved   Stable  Monitor      ACP  - CODE- FULL   - POA- daughter and son     FN:  - IVF: Stop   - Diet: Carb controlled diet      DVT Prophy: Hold due to bleeding   Lines: PIV     Daughter Tiffanie updated at length via phone 2/16/24 by Dr Jain , updated family at bedside today     Questions/concerns were discussed with patient and/or family by bedside.    Thank You,  Edy Foote, DO    Hospitalist with [facility]     Subjective:     Tear full and scared about need for amputation , mild pain     OBJECTIVE:    Blood pressure 124/51, pulse 73, temperature 99.1 °F (37.3 °C), temperature source Oral, resp. rate 18, height 5' 4.02\" (1.626 m), weight 181 lb 11.2 oz (82.4 kg), SpO2 96%, not currently breastfeeding.    Temp:  [97.5 °F (36.4 °C)-99.4 °F (37.4 °C)] 99.1 °F (37.3 °C)  Pulse:  [65-79] 73  Resp:  [12-20] 18  BP: (124-152)/(50-71) 124/51  SpO2:  [86 %-98 %] 96 %      Intake/Output:    Intake/Output  Summary (Last 24 hours) at 2/17/2024 1305  Last data filed at 2/17/2024 1058  Gross per 24 hour   Intake 1947.5 ml   Output 1720 ml   Net 227.5 ml       Last 3 Weights   02/17/24 0403 181 lb 11.2 oz (82.4 kg)   02/16/24 0500 182 lb 3.2 oz (82.6 kg)   02/14/24 0500 185 lb 13.6 oz (84.3 kg)   02/13/24 0500 183 lb 3.2 oz (83.1 kg)   02/12/24 0400 180 lb 5.4 oz (81.8 kg)   02/11/24 0600 182 lb 5.1 oz (82.7 kg)   02/10/24 2142 171 lb 1.2 oz (77.6 kg)   02/10/24 2105 171 lb 1.2 oz (77.6 kg)   02/10/24 1642 164 lb 3.9 oz (74.5 kg)   01/27/24 0501 164 lb 3.2 oz (74.5 kg)   01/24/24 0549 158 lb (71.7 kg)   01/19/24 1112 160 lb (72.6 kg)   12/30/23 1216 155 lb (70.3 kg)   08/29/23 1357 153 lb (69.4 kg)       Exam   Gen: No acute distress, awake and alert   Pulm: Lungs clear, normal respiratory effort  CV: Heart with regular rate and rhythm  Abd: Abdomen soft, nontender, nondistended  MSK: Right leg with wound vac in place, ACE wrap in place as well  + joseph         Data Review:       Labs:     Recent Labs   Lab 02/15/24  0448 02/16/24  0623 02/17/24  0730   RBC 3.05* 2.77* 2.47*   HGB 8.7* 8.1* 7.1*   HCT 25.1* 23.1* 21.1*   MCV 82.3 83.4 85.4   MCH 28.5 29.2 28.7   MCHC 34.7 35.1 33.6   RDW 16.1* 16.1* 16.0*   NEPRELIM 26.30* 22.98* 16.87*   WBC 29.8* 26.6* 18.9*   .0 319.0 291.0         Recent Labs   Lab 02/15/24  0448 02/16/24  0623 02/17/24  0730   * 114* 180*   BUN 17 23 27*   CREATSERUM 0.93 1.01 1.06*   EGFRCR 68 61 58*   CA 7.5* 7.6* 7.7*    142 142   K 3.3* 3.5  3.4* 4.1  4.1    113* 114*   CO2 24.0 22.0 21.0       Recent Labs   Lab 02/10/24  2151 02/11/24 2053 02/12/24  0551 02/17/24  0730   ALT 38  --   --   --    AST 91*  --   --   --    ALB 2.3* 2.3* 2.3* 2.2*         Imaging:  No results found.      Meds:      insulin aspart  2 Units Subcutaneous TID CC    magnesium sulfate  4 g Intravenous Once    insulin aspart  1-7 Units Subcutaneous TID CC    carvedilol  25 mg Per NG Tube BID     insulin degludec  10 Units Subcutaneous Nightly    vancomycin  125 mg Oral Daily    vancomycin  1,250 mg Intravenous Q36H    sodium hypochlorite   Topical 2 times per day    amLODIPine  10 mg Per NG Tube QAM    hydrALAZINE  100 mg Per NG Tube Q8H    pantoprazole  40 mg Intravenous Q24H    fluticasone furoate-vilanterol  1 puff Inhalation Daily    rosuvastatin  40 mg Oral Nightly    meropenem  500 mg Intravenous q12h      sodium chloride 50 mL/hr at 02/16/24 1615     morphINE, acetaminophen **OR** HYDROcodone-acetaminophen **OR** HYDROcodone-acetaminophen, ondansetron, metoclopramide, HYDROmorphone, morphINE **OR** morphINE **OR** morphINE, hydrALAzine, ondansetron, metoclopramide, albuterol, ipratropium-albuterol, glucose **OR** glucose **OR** glucose-vitamin C **OR** dextrose **OR** glucose **OR** glucose **OR** glucose-vitamin C

## 2024-02-18 NOTE — PROGRESS NOTES
[facility]    Progress Note    Ava Bowen Patient Status:  Inpatient    10/6/1955 MRN [id number]   Location [facility] 3W/SW Attending Edy Foote, DO   Hosp Day # 8 PCP Ernesto De Dios     Subjective:   Ava Bowen is a(n) 68 year old female      In bed   O2 NC     Tolerated po intake yesterday   Had some protein shakes over night     Objective:   Vital Signs:  Blood pressure 134/61, pulse 68, temperature 98.8 °F (37.1 °C), temperature source Oral, resp. rate 18, height 5' 4.02\" (1.626 m), weight 186 lb 1.6 oz (84.4 kg), SpO2 93%, not currently breastfeeding.                    NC O2   General: Awake and alert.    HENT: Eye: EOMI,   .    Neuro:speech is clear. Awake, alert   Psych: Orientated to time, place, person & situation    Skin: Skin is dry       Assessment and Plan:      Bypass graft mechanical complication, initial encounter ([facility])     Hyperglycemia     Bypass graft mechanical complication (HCC)     Anemia, unspecified type     Acute kidney injury superimposed on CKD  (HCC)            Acute kidney injury superimposed on CKD  (HCC)   PMH of COPD, DM2, HTN, HL, PVD, Sickle cell anemia, CKD stage 3, recent fem-pop artery bypass admitted with acute hemorrhage from incision site. Patient is s/p OR for repair.          Uncontrolled Type 2 Diabetes  Tresiba 10-> 15 units at night  Novolog 2 -> 5 units TIDAC  medium dose CF scale with regular insulin after midnight  - We will follow blood sugars      I discussed plan with pt and Rn    Thank you for allowing me to participate in the care of your patient.        Results:      Latest Reference Range & Units 24 20:35 24 08:48 24 12:37 24 17:40 24 20:48   POC GLUCOSE 70 - 99 mg/dL 216 (H) 203 (H) 287 (H) 343 (H) 377 (H)   (H): Data is abnormally high  Lab Results   Component Value Date    WBC 17.4 (H) 2024    HGB 7.5 (L) 2024    HCT 22.0 (L) 2024    .0 2024     CREATSERUM 1.20 (H) 02/18/2024    BUN 41 (H) 02/18/2024     02/18/2024    K 4.2 02/18/2024     02/18/2024    CO2 24.0 02/18/2024     (H) 02/18/2024    CA 8.0 (L) 02/18/2024    ALB 2.2 (L) 02/18/2024    ALKPHO 62 02/10/2024    BILT 0.4 02/10/2024    TP 4.6 (L) 02/10/2024    AST 91 (H) 02/10/2024    ALT 38 02/10/2024    PTT 30.3 02/10/2024    INR 1.51 (H) 02/10/2024    T4F 1.3 06/18/2019    TSH 1.060 06/18/2019    LIP 40 04/25/2023    DDIMER 1.45 (H) 11/08/2019    ESRML 27 05/08/2020    CRP 3.11 (H) 05/08/2020    MG 1.8 02/18/2024    PHOS 4.1 02/18/2024    TROP <0.045 11/08/2019    CK 35 08/21/2023    B12 963 09/03/2019       No results found.              Dougie Casarez MD  2/18/2024        DOS is the same date the note was signed

## 2024-02-18 NOTE — SLP NOTE
SPEECH DAILY NOTE - INPATIENT    ASSESSMENT & PLAN   ASSESSMENT  PPE REQUIRED. THIS THERAPIST WORE GLOVES AND MASK FOR DURATION OF EVALUATION. HANDS WASHED UPON ENTRANCE/EXIT.    SLP f/u for ongoing meal assessment per recommendations of regular/thin liquids per BSE. RN reports pt tolerates diet and medication well with no overt clinical s/s aspiration. Pt denies any swallowing challenges.     Pt positioned upright in bed, fatigued/cooperative. Pt afebrile, tolerating 1L/Min O2NC with oxygen status 96% prior to the start of oral trials. SLP reviewed aspiration precautions and safe swallowing compensatory strategies with the patient. Safe swallow guidelines remain written on the white board in purple. Patient v/u. Provided no assistance, pt tolerates hard solids and thin liquids via straw with no overt clinical signs/symptoms of aspiration. Oxygen status remained stable t/o the entire session. Recommend remain on current diet with adherence to aspiration precautions and swallow strategies. No further swallow services warranted at this time. Please re consult if needed. RN alerted with results and recommendations.     MOST RECENT CXR 2/11   CONCLUSION:   Interval placement of enteric tube with the tip in the region the gastric body.   Mild left basilar linear atelectasis.       Diet Recommendations - Solids: Regular  Diet Recommendations - Liquids: Thin Liquids    Compensatory Strategies Recommended: Slow rate  Aspiration Precautions: Upright position;Slow rate  Medication Administration Recommendations: No restrictions    Patient Experiencing Pain: No                Treatment Plan  Treatment Plan/Recommendations: No further inpatient SLP service warranted    Interdisciplinary Communication: Discussed with RN            GOALS  Goal #1 The patient will tolerate regular consistency and thin liquids without overt signs or symptoms of aspiration with 100 % accuracy over 1-2 session(s).  Goal Met 2/18   Goal #2 The  patient/family/caregiver will demonstrate understanding and implementation of aspiration precautions and swallow strategies independently over 1-2 session(s).    Goal Met 2/18     FOLLOW UP  Follow Up Needed (Documentation Required): No  SLP Follow-up Date: 02/18/24  Number of Visits to Meet Established Goals:  (1-2)    Session: 1    If you have any questions, please contact JONATHAN Kaiser M.S. CCC-SLP  Speech Language Pathologist  Phone Number Ext. [phone]

## 2024-02-18 NOTE — PHYSICAL THERAPY NOTE
PT Orders and pt chart reviewed. Per EMR and RN pt is currently on bedrest and not appropriate to work with PT. Pt is scheduled for AKA 2/20/24. Will need new PT orders post-op.   Thank you,    Latricia Caraballo, PT  2/18/2024

## 2024-02-18 NOTE — PROGRESS NOTES
[facility] CARDIOLOGY Progress Note      Ava Bowen is a 68 year old female who I assessed as follows:  Chief Complaint   Patient presents with    Bleeding          REASON FOR CONSULTATION:    HTN            ASSESSMENT/PLAN:     IMPRESSIONS:  Hemorrhage from LE fem pop bypass site, infected.   -S/P debridement 24  -S/P debridement 24  HTN  COPD  DM  PAD s/p fem-pop bypass 2024  Sickle cell anemia  -Hb 7.1  DM  Acute resp failure, now extubated  OMEGA on CKD  HL  -on statin PTA  11,  HFpEF        PLAN:  Continue oral BP meds  Hydralazine IV PRN  3.   Holding further Lasix  4.   F/U BMP  5.   Anticipate AKA next week      SUBJECTIVE:    Denies chest pain or dyspnea. No palpitations. Chronic LE discomfort - no acute change.      No current outpatient medications on file.      Past Medical History:   Diagnosis Date    Anemia     Back pain     Back problem     Cataract     Chronic kidney disease (CKD)     COPD (chronic obstructive pulmonary disease) (HCC)     FEV 1 1.25 50%     Diabetes (HCC)     DM neuropathy    Essential hypertension     H/O angioplasty     2020    High blood pressure     High cholesterol     Neuropathy     Peripheral vascular disease (HCC)     PNA (pneumonia) 2018    Pulmonary emphysema (HCC)     Pulmonary nodule     4 mm RUL    Renal disorder     monitoring kidney labs    Sickle cell trait (HCC)     Sickle-cell anemia (HCC)     Tobacco abuse     Visual impairment      Past Surgical History:   Procedure Laterality Date    APPENDECTOMY      BACK SURGERY Right 2021    Right L3-4 extreme lateral interbody fusion, posterior decompression; LUMBAR LAMINECTOMY 1 LEVEL          x3    CHOLECYSTECTOMY      EXCIS LUMBAR DISK,ONE LEVEL          OTHER      bilateral leg stents  and      Family History   Problem Relation Age of Onset    Diabetes Mother         Passed from DM complications    Diabetes Sister         Had kidney transplant due to  complications of DM    Kidney Disease Sister         Kidney failure secondary to diabetes; received kidney transplant in     Glaucoma Sister     Diabetes Brother     Sickle Cell Son         Cause of death    Macular degeneration Neg       Social History:  Social History     Socioeconomic History    Marital status:    Tobacco Use    Smoking status: Former     Packs/day: 1.00     Years: 30.00     Additional pack years: 0.00     Total pack years: 30.00     Types: Cigarettes     Quit date: 2019     Years since quittin.2    Smokeless tobacco: Never   Vaping Use    Vaping Use: Never used   Substance and Sexual Activity    Alcohol use: Not Currently     Comment: socially    Drug use: No   Social History Narrative    [employer]          Social Determinants of Health     Food Insecurity: No Food Insecurity (2024)    Food Insecurity     Food Insecurity: Never true   Transportation Needs: No Transportation Needs (2024)    Transportation Needs     Lack of Transportation: No   Housing Stability: Low Risk  (2024)    Housing Stability     Housing Instability: No          Medications:            Allergies  Allergies   Allergen Reactions    Penicillins HIVES and ANGIOEDEMA     Hives on eyelids (occurred when pt was in her 20s). Tolerated amoxicillin per chart. Tolerates cephalosporins.               REVIEW OF SYSTEMS:   Sedated, unable to answer questions          EXAM:         TELE: SR          /58 (BP Location: Right arm)   Pulse 69   Temp 98.6 °F (37 °C) (Oral)   Resp 18   Ht 5' 4.02\" (1.626 m)   Wt 186 lb 1.6 oz (84.4 kg)   LMP  (LMP Unknown)   SpO2 96%   BMI 31.93 kg/m²   Temp (24hrs), Av.6 °F (37 °C), Min:97.3 °F (36.3 °C), Max:99.7 °F (37.6 °C)    Wt Readings from Last 3 Encounters:   24 186 lb 1.6 oz (84.4 kg)   24 164 lb 3.2 oz (74.5 kg)   23 153 lb (69.4 kg)         Intake/Output Summary (Last 24 hours) at 2024 1106  Last data filed  at 2/18/2024 1100  Gross per 24 hour   Intake 1430 ml   Output 1550 ml   Net -120 ml         GENERAL: no acute distress  SKIN: no rashes  HEENT: atraumatic, normocephalic, throat without erythema  NECK: supple, no bruits  LUNGS: clear to auscultation  CARDIO: RRR without murmur or S3   GI: soft, nontender  EXTREMITIES: no cyanosis, clubbing or edema  NEUROLOGY: moving all extremities  PSYCH: cooperative          LABORATORY DATA:         Labs:  Recent Labs   Lab 02/16/24  0623 02/17/24  0730 02/18/24  0632   * 180* 201*   BUN 23 27* 41*   CREATSERUM 1.01 1.06* 1.20*   CA 7.6* 7.7* 8.0*    142 142   K 3.5  3.4* 4.1  4.1 4.2   * 114* 111   CO2 22.0 21.0 24.0     No results for input(s): \"TROP\" in the last 168 hours.  Recent Labs   Lab 02/18/24  0632   RBC 2.57*   HGB 7.5*   HCT 22.0*   MCV 85.6   MCH 29.2   MCHC 34.1   RDW 15.7*   NEPRELIM 14.12*   WBC 17.4*   .0     No results for input(s): \"TROP\", \"TROPHS\", \"CK\" in the last 168 hours.    Imaging:  No results found.         Rikki Shaver MD

## 2024-02-18 NOTE — PLAN OF CARE
Patient is alert and oriented times 4. On 1L NC. Complains of occasional pain to the R foot . Wound vac in place after wound dehiscence. Plan for AKA on Thursday.   Problem: Patient Centered Care  Goal: Patient preferences are identified and integrated in the patient's plan of care  Description: Interventions:  - Provide timely, complete, and accurate information to patient/family  - Incorporate patient and family knowledge, values, beliefs, and cultural backgrounds into the planning and delivery of care  - Encourage patient/family to participate in care and decision-making at the level they choose  - Honor patient and family perspectives and choices  Outcome: Progressing     Problem: Diabetes/Glucose Control  Goal: Glucose maintained within prescribed range  Description: INTERVENTIONS:  - Monitor Blood Glucose as ordered  - Assess for signs and symptoms of hyperglycemia and hypoglycemia  - Administer ordered medications to maintain glucose within target range  - Assess barriers to adequate nutritional intake and initiate nutrition consult as needed  - Instruct patient on self management of diabetes  Outcome: Progressing     Problem: Patient/Family Goals  Goal: Patient/Family Short Term Goal  Description: Patient's Short Term Goal: pain management    Interventions:   - See additional Care Plan goals for specific interventions  Outcome: Progressing     Problem: METABOLIC/FLUID AND ELECTROLYTES - ADULT  Goal: Glucose maintained within prescribed range  Description: INTERVENTIONS:  - Monitor Blood Glucose as ordered  - Assess for signs and symptoms of hyperglycemia and hypoglycemia  - Administer ordered medications to maintain glucose within target range  - Assess barriers to adequate nutritional intake and initiate nutrition consult as needed  - Instruct patient on self management of diabetes  Outcome: Progressing

## 2024-02-18 NOTE — PLAN OF CARE
Pt. A/Ox4 on 2L, VSS. Norco dose adjusted to reflect home dose with good pain relief overnight. No acute events. Pt educated on call light use, within reach. Safety measures in place.     Problem: Patient Centered Care  Goal: Patient preferences are identified and integrated in the patient's plan of care  Description: Interventions:  - What would you like us to know as we care for you? From home alone  - Provide timely, complete, and accurate information to patient/family  - Incorporate patient and family knowledge, values, beliefs, and cultural backgrounds into the planning and delivery of care  - Encourage patient/family to participate in care and decision-making at the level they choose  - Honor patient and family perspectives and choices  Outcome: Progressing     Problem: Diabetes/Glucose Control  Goal: Glucose maintained within prescribed range  Description: INTERVENTIONS:  - Monitor Blood Glucose as ordered  - Assess for signs and symptoms of hyperglycemia and hypoglycemia  - Administer ordered medications to maintain glucose within target range  - Assess barriers to adequate nutritional intake and initiate nutrition consult as needed  - Instruct patient on self management of diabetes  Outcome: Progressing     Problem: Patient/Family Goals  Goal: Patient/Family Long Term Goal  Description: Patient's Long Term Goal: discharge    Interventions:  - Pain management  - Surgical intervention  - Wound care  - See additional Care Plan goals for specific interventions  Outcome: Progressing  Goal: Patient/Family Short Term Goal  Description: Patient's Short Term Goal: pain management    Interventions:   - See additional Care Plan goals for specific interventions  Outcome: Progressing     Problem: CARDIOVASCULAR - ADULT  Goal: Maintains optimal cardiac output and hemodynamic stability  Description: INTERVENTIONS:  - Monitor vital signs, rhythm, and trends  - Monitor for bleeding, hypotension and signs of decreased  cardiac output  - Evaluate effectiveness of vasoactive medications to optimize hemodynamic stability  - Monitor arterial and/or venous puncture sites for bleeding and/or hematoma  - Assess quality of pulses, skin color and temperature  - Assess for signs of decreased coronary artery perfusion - ex. Angina  - Evaluate fluid balance, assess for edema, trend weights  Outcome: Progressing  Goal: Absence of cardiac arrhythmias or at baseline  Description: INTERVENTIONS:  - Continuous cardiac monitoring, monitor vital signs, obtain 12 lead EKG if indicated  - Evaluate effectiveness of antiarrhythmic and heart rate control medications as ordered  - Initiate emergency measures for life threatening arrhythmias  - Monitor electrolytes and administer replacement therapy as ordered  Outcome: Progressing     Problem: RESPIRATORY - ADULT  Goal: Achieves optimal ventilation and oxygenation  Description: INTERVENTIONS:  - Assess for changes in respiratory status  - Assess for changes in mentation and behavior  - Position to facilitate oxygenation and minimize respiratory effort  - Oxygen supplementation based on oxygen saturation or ABGs  - Provide Smoking Cessation handout, if applicable  - Encourage broncho-pulmonary hygiene including cough, deep breathe, Incentive Spirometry  - Assess the need for suctioning and perform as needed  - Assess and instruct to report SOB or any respiratory difficulty  - Respiratory Therapy support as indicated  - Manage/alleviate anxiety  - Monitor for signs/symptoms of CO2 retention  Outcome: Progressing     Problem: METABOLIC/FLUID AND ELECTROLYTES - ADULT  Goal: Glucose maintained within prescribed range  Description: INTERVENTIONS:  - Monitor Blood Glucose as ordered  - Assess for signs and symptoms of hyperglycemia and hypoglycemia  - Administer ordered medications to maintain glucose within target range  - Assess barriers to adequate nutritional intake and initiate nutrition consult as  needed  - Instruct patient on self management of diabetes  Outcome: Progressing  Goal: Electrolytes maintained within normal limits  Description: INTERVENTIONS:  - Monitor labs and rhythm and assess patient for signs and symptoms of electrolyte imbalances  - Administer electrolyte replacement as ordered  - Monitor response to electrolyte replacements, including rhythm and repeat lab results as appropriate  - Fluid restriction as ordered  - Instruct patient on fluid and nutrition restrictions as appropriate  Outcome: Progressing  Goal: Hemodynamic stability and optimal renal function maintained  Description: INTERVENTIONS:  - Monitor labs and assess for signs and symptoms of volume excess or deficit  - Monitor intake, output and patient weight  - Monitor urine specific gravity, serum osmolarity and serum sodium as indicated or ordered  - Monitor response to interventions for patient's volume status, including labs, urine output, blood pressure (other measures as available)  - Encourage oral intake as appropriate  - Instruct patient on fluid and nutrition restrictions as appropriate  Outcome: Progressing

## 2024-02-18 NOTE — PROGRESS NOTES
[facility]     [facility]    Ava Bowen Patient Status:  Inpatient    10/6/1955 MRN [id number]   Location [facility] 3W/SW Attending Edy Foote, DO   Hosp Day # 8 PCP Ernesto Bowen     History:  Past Medical History:   Diagnosis Date    Anemia     Back pain     Back problem     Cataract     Chronic kidney disease (CKD)     COPD (chronic obstructive pulmonary disease) (HCC)     FEV 1 1.25 50%     Diabetes (HCC)     DM neuropathy    Essential hypertension     H/O angioplasty     2020    High blood pressure     High cholesterol     Neuropathy     Peripheral vascular disease (HCC)     PNA (pneumonia) 2018    Pulmonary emphysema (HCC)     Pulmonary nodule     4 mm RUL    Renal disorder     monitoring kidney labs    Sickle cell trait (HCC)     Sickle-cell anemia (HCC)     Tobacco abuse     Visual impairment      Past Surgical History:   Procedure Laterality Date    APPENDECTOMY      BACK SURGERY Right 2021    Right L3-4 extreme lateral interbody fusion, posterior decompression; LUMBAR LAMINECTOMY 1 LEVEL          x3    CHOLECYSTECTOMY      EXCIS LUMBAR DISK,ONE LEVEL          OTHER      bilateral leg stents  and      Family History   Problem Relation Age of Onset    Diabetes Mother         Passed from DM complications    Diabetes Sister         Had kidney transplant due to complications of DM    Kidney Disease Sister         Kidney failure secondary to diabetes; received kidney transplant in     Glaucoma Sister     Diabetes Brother     Sickle Cell Son         Cause of death    Macular degeneration Neg       reports that she quit smoking about 4 years ago. Her smoking use included cigarettes. She has a 30 pack-year smoking history. She has never used smokeless tobacco. She reports that she does not currently use alcohol. She reports that she does not use drugs.    Allergies:  Allergies   Allergen  Reactions    Penicillins HIVES and ANGIOEDEMA     Hives on eyelids (occurred when pt was in her 20s). Tolerated amoxicillin per chart. Tolerates cephalosporins.       Medications:    Current Facility-Administered Medications:     insulin aspart (NovoLOG) 100 Units/mL FlexPen 2 Units, 2 Units, Subcutaneous, TID CC    vancomycin (Vancocin) 1,000 mg in sodium chloride 0.9% 250 mL IVPB-ADDV, 1,000 mg, Intravenous, Q36H    HYDROcodone-acetaminophen (Norco)  MG per tab 1 tablet, 1 tablet, Oral, Q4H PRN    HYDROcodone-acetaminophen (Norco)  MG per tab 2 tablet, 2 tablet, Oral, Q4H PRN    acetaminophen (Tylenol) tab 650 mg, 650 mg, Oral, Q4H PRN    morphINE 20 mg/mL concentrated oral solution 5 mg, 5 mg, Oral, Q4H PRN    magnesium sulfate 4 g/100mL IVPB premix 4 g, 4 g, Intravenous, Once    ondansetron (Zofran) 4 MG/2ML injection 4 mg, 4 mg, Intravenous, Q6H PRN    metoclopramide (Reglan) 5 mg/mL injection 10 mg, 10 mg, Intravenous, Q8H PRN    HYDROmorphone (Dilaudid) 1 MG/ML injection 0.5 mg, 0.5 mg, Intravenous, Q3H PRN    insulin aspart (NovoLOG) 100 Units/mL FlexPen 1-7 Units, 1-7 Units, Subcutaneous, TID CC    carvedilol (Coreg) tab 25 mg, 25 mg, Per NG Tube, BID    insulin degludec 100 units/mL flextouch 10 Units, 10 Units, Subcutaneous, Nightly    vancomycin (Firvanq) 50 mg/mL oral solution 125 mg, 125 mg, Oral, Daily    morphINE PF 2 MG/ML injection 1 mg, 1 mg, Intravenous, Q2H PRN **OR** morphINE PF 2 MG/ML injection 2 mg, 2 mg, Intravenous, Q2H PRN **OR** morphINE PF 4 MG/ML injection 4 mg, 4 mg, Intravenous, Q2H PRN    sodium hypochlorite (Dakin's) 0.125 % external solution, , Topical, 2 times per day    hydrALAzine (Apresoline) 20 mg/mL injection 10 mg, 10 mg, Intravenous, Q4H PRN    amLODIPine (Norvasc) tab 10 mg, 10 mg, Per NG Tube, QAM    hydrALAZINE (Apresoline) tab 100 mg, 100 mg, Per NG Tube, Q8H    pantoprazole (Protonix) 40 mg in sodium chloride 0.9% PF 10 mL IV push, 40 mg, Intravenous,  Q24H    ondansetron (Zofran) 4 MG/2ML injection 4 mg, 4 mg, Intravenous, Q6H PRN    metoclopramide (Reglan) 5 mg/mL injection 5 mg, 5 mg, Intravenous, Q8H PRN    albuterol (Ventolin HFA) 108 (90 Base) MCG/ACT inhaler 2 puff, 2 puff, Inhalation, Q6H PRN    fluticasone furoate-vilanterol (Breo Ellipta) 200-25 MCG/ACT inhaler 1 puff, 1 puff, Inhalation, Daily    ipratropium-albuterol (Duoneb) 0.5-2.5 (3) MG/3ML inhalation solution 3 mL, 3 mL, Nebulization, Q6H PRN    rosuvastatin (Crestor) tab 40 mg, 40 mg, Oral, Nightly    glucose (Dex4) 15 GM/59ML oral liquid 15 g, 15 g, Oral, Q15 Min PRN **OR** glucose (Glutose) 40% oral gel 15 g, 15 g, Oral, Q15 Min PRN **OR** glucose-vitamin C (Dex-4) chewable tab 4 tablet, 4 tablet, Oral, Q15 Min PRN **OR** dextrose 50% injection 50 mL, 50 mL, Intravenous, Q15 Min PRN **OR** glucose (Dex4) 15 GM/59ML oral liquid 30 g, 30 g, Oral, Q15 Min PRN **OR** glucose (Glutose) 40% oral gel 30 g, 30 g, Oral, Q15 Min PRN **OR** glucose-vitamin C (Dex-4) chewable tab 8 tablet, 8 tablet, Oral, Q15 Min PRN    meropenem (Merrem) 500 mg in sodium chloride 0.9% 100 mL IVPB-MBP, 500 mg, Intravenous, q12h    Review of Systems:   Constitutional: Negative for anorexia, chills, fatigue, fevers, malaise, night sweats and weight loss.  Eyes: Negative for visual disturbance, irritation and redness.  Ears, nose, mouth, throat, and face: Negative for hearing loss, tinnitus, nasal congestion, snoring, sore throat, hoarseness and voice change.  Respiratory: Negative for cough, sputum, hemoptysis, chest pain, wheezing, dyspnea on exertion, or stridor.  Cardiovascular: Negative for chest pain, palpitations, irregular heart beats, syncope, fatigue, orthopnea, paroxysmal nocturnal dyspnea, lower extremity edema.  Gastrointestinal: Negative for dysphagia, odynophagia, reflux symptoms, nausea, vomiting, change in bowel habits, diarrhea, constipation and abdominal pain.  Integument/breast: Negative for rash, skin  lesions, and pruritus.  Hematologic/lymphatic: Negative for easy bruising, bleeding, and lymphadenopathy.  Musculoskeletal: Negative for myalgias, arthralgias, muscle weakness.  Neurological: Negative for headaches, dizziness, seizures, memory problems, trouble swallowing, speech problems, gait problems and weakness.  Behavioral/Psych: Negative for active tobacco use.  Endocrine: No history of of diabetes, thyroid disorder.  All other review of systems are negative.    Vital signs in last 24 hours:  Patient Vitals for the past 24 hrs:   BP Temp Temp src Pulse Resp SpO2 Weight   02/18/24 0614 134/61 98.8 °F (37.1 °C) Oral 68 18 93 % 186 lb 1.6 oz (84.4 kg)   02/17/24 1957 136/58 97.3 °F (36.3 °C) Axillary 78 20 94 % --   02/17/24 1449 134/54 99.7 °F (37.6 °C) Oral 69 18 93 % --   02/17/24 0852 124/51 99.1 °F (37.3 °C) Oral 73 18 96 % --       Physical Exam:   General: alert, cooperative, oriented.  No respiratory distress.   Head: Normocephalic, without obvious abnormality, atraumatic.   Eyes: Conjunctivae/corneas clear.  No scleral icterus.  No conjunctival     hemorrhage.   Nose: Nares normal.   Throat: Lips, mucosa, and tongue normal.  No thrush noted.   Neck: Soft, supple neck; trachea midline, no adenopathy, no thyromegaly.   Lungs: CTAB, normal and equal bilateral chest rise   Chest wall: No tenderness or deformity.   Heart: Regular rate and rhythm, normal S1S2, no murmur.   Abdomen: soft, non-tender, non-distended, no masses, no guarding, no     rebound, positive BS.   Extremity: no edema, no cyanosis   Skin: No rashes or lesions.   Neurological: Alert, interactive, no focal deficits    Labs:  Lab Results   Component Value Date    WBC 17.4 02/18/2024    HGB 7.5 02/18/2024    HCT 22.0 02/18/2024    .0 02/18/2024       Radiology:  Reviewed,       Cultures:  Reviewed,     Assessment and Plan:    Acute gram negative sepsis with Post operative infection of R fem-pop bypass 1/24/24:  - Readmitted with  bleeding from surgical site on 2/10 with + blood cul   - S/P emergent OR for removal of infected graft, Debridement of R CFA, removal of stent rings thrombectomy of EIA, CFA, Profunda, Bovin patch angioplasty of CFA, Sartorius Myoplasty, 2/10.  - OR cultures from debridement with klebsiella, Enterococcus, strep anginosus, E coli  - Return to OR again on 2/13 for further debridement and VAC placement,   - S/P debridement again on 2/16.   -  On IV meropenem and vancomycin, pharm to dose,   -  Further surgery anticipate, ? Amputation, VAC in place now,      2.   Leukocytosis: sec to above,Improving trend,     3.    DM: Needs optimal control,     4.    Disposition: in house, a middle aged female with recent R Fem- Pop bypass admitted with infected surgical site and gram negative sepsis, taken to OR multiple times,   - Follow OR cul,   - Follow Creatinine trend,   - Continue IV Vanc and Meropenem, pharm to dose,   - Trending temps and WBCs.  Will follow with further recommendations.     Thank you for consulting [facility] for Ava Bowen.  If you have any questions or concerns please call [facility] at [phone].     Yue Ayala MD  2/18/2024  8:07 AM

## 2024-02-18 NOTE — PROGRESS NOTES
[facility] Hospitalist Progress Note     CC: Hospital Follow up    PCP: Ernesto De Dios       Assessment/Plan:     Principal Problem:    Bypass graft mechanical complication, initial encounter ([facility])  Active Problems:    Hyperglycemia    Bypass graft mechanical complication (HCC)    Anemia, unspecified type    Acute kidney injury superimposed on CKD  (HCC)    Uncontrolled type 2 diabetes mellitus with hyperglycemia (HCC)      Ms. Bowen is a 68 year old female with PMH sig for CKD, COPD, DM, HTN, HLD, PVD, and sickle cell anemia who was recently admitted last week for right lower extremity femoral to popliteal bypass who presented with bleeding from surgical site. Taken for emergent OR on 2/10/24, noted to be grossly infected, started on vancomycin/meropenem. Bcx with klebsiella. BG elevated on admit, started on insulin gtt, endo consulted, now off insulin gtt. Kept intubated post op, pulm following.Seems to have been slow to recovery despite multiple surgeries now planning AKA on Thursday.        PAD sp RLE fem-pop bypass last week c/b bleeding fm surg site fm wound dehiscence  - s/p recent fem pop bypass 1/24/24 with Dr. Murrieta  - has been on ASA/plavix- held on admit  - Hg down to 6.0, s/p 3 units pRBC 2/10- watching close now, hgb stable currently no signs of bleeding   - vascular surgery consulted, s/p emergent OR 2/10, removal of infected graft, s/p debridement, bone patch angioplasty   - sp further debridement 2/13 and placement of wound vac  - back to OR 2/16 again and now plans for AKA next week Thursday   -Seems now will need AKA  -Pain meds PRN  -Pain worsening will add scheduled norco 5 mg Q12 hours   - lasix PRN     Septic shock (now improved) fm post op wound infection  - continue IV vancomycin and meropenem, ID consult following   - Bcx with klebsiella, repeat Bcx ordered  - 2/10 wound cx and 2/13 poly microbial  - abx adjustment per ID f/u cx results  - ID notes appreciated- tenative plan  for 6 weeks abx from from final surgical plans- final abx tbd pending cx data and surgical plans    -Wound vac in place  -Keep joseph due to large leg wound   Wound care following     Metabolic acidosis- resolved   - likely 2/2 LA from blood loss +/- dka on admit  - pH 7.24 on admit, LA 7.6 on admit, now normalized  - likely multifactorial, 2/2 LA, blood loss, ?DKA, CKD  - renal consulted, sp bicarb gtt     Acute blood loss anemia from surgical site   Chronic anemia  Sickle cell anemia  -Stable currently, trend CBC   - outpatient f/u  - Hg 8.9 on discharge last week  - Hg 6.0 on admission, s/p 3 units pRBC  - monitor, hgb stable         DM2, hyperglycemia persists   -A1c 7.9   - Hold PO medications  - BG 500s in ED, pH 7.2  - insulin gtt ordered in ED  -increase Degludec 15 at bedtime and aspart 5 units with meals      Respiratory failure  - remained intubated post op both fm aspiration event and metabolic acidosis  - pulm following  - extubated 2/14 pm  Now on 2L NC - wean as able      Hypovolemic / septic Shock/ Hypotension- improved  Hx HTN  BP stable now, hydral 100 Q8 hours   Amlodipine 10 d, Coreg 25 BID   Holding home lisinopril, BP ok watch and consider resuming pending BP       COPD  - chronic, stable  -resumed inhalers     OMEGA on CKD3- at baseline now   - cr 1.7 on admit, previously 1.2 on discharge last week  - monitor Cr and UOP- improved   Stable  Monitor  Renal following       ACP  - CODE- FULL   - POA- daughter and son     FN:  - IVF: Stopped   - Diet: Carb controlled diet      DVT Prophy: Hold due to bleeding   Lines: PIV     Daughter Tiffanie updated at length via phone 2/18/20    Questions/concerns were discussed with patient and/or family by bedside.    Thank You,  Edy Foote,     Hospitalist with [facility]     Subjective:     Feeling ok, some pain in right leg, no nausea, denies SOB, no fevers some anxiety about amputation     OBJECTIVE:    Blood pressure 134/61, pulse 68, temperature  98.8 °F (37.1 °C), temperature source Oral, resp. rate 18, height 5' 4.02\" (1.626 m), weight 186 lb 1.6 oz (84.4 kg), SpO2 93%, not currently breastfeeding.    Temp:  [97.3 °F (36.3 °C)-99.7 °F (37.6 °C)] 98.8 °F (37.1 °C)  Pulse:  [68-78] 68  Resp:  [18-20] 18  BP: (134-136)/(54-61) 134/61  SpO2:  [93 %-94 %] 93 %      Intake/Output:    Intake/Output Summary (Last 24 hours) at 2/18/2024 0854  Last data filed at 2/18/2024 0614  Gross per 24 hour   Intake 1590 ml   Output 2050 ml   Net -460 ml       Last 3 Weights   02/18/24 0614 186 lb 1.6 oz (84.4 kg)   02/17/24 0403 181 lb 11.2 oz (82.4 kg)   02/16/24 0500 182 lb 3.2 oz (82.6 kg)   02/14/24 0500 185 lb 13.6 oz (84.3 kg)   02/13/24 0500 183 lb 3.2 oz (83.1 kg)   02/12/24 0400 180 lb 5.4 oz (81.8 kg)   02/11/24 0600 182 lb 5.1 oz (82.7 kg)   02/10/24 2142 171 lb 1.2 oz (77.6 kg)   02/10/24 2105 171 lb 1.2 oz (77.6 kg)   02/10/24 1642 164 lb 3.9 oz (74.5 kg)   01/27/24 0501 164 lb 3.2 oz (74.5 kg)   01/24/24 0549 158 lb (71.7 kg)   01/19/24 1112 160 lb (72.6 kg)   12/30/23 1216 155 lb (70.3 kg)   08/29/23 1357 153 lb (69.4 kg)       Exam   Gen: No acute distress, awake and alert   Pulm: Lungs clear, normal respiratory effort, 2 L NC   CV: Heart with regular rate and rhythm  Abd: Abdomen soft, nontender, nondistended  MSK: Right leg with wound vac in place, ACE wrap in place as well  + joseph         Data Review:       Labs:     Recent Labs   Lab 02/16/24  0623 02/17/24  0730 02/18/24  0632   RBC 2.77* 2.47* 2.57*   HGB 8.1* 7.1* 7.5*   HCT 23.1* 21.1* 22.0*   MCV 83.4 85.4 85.6   MCH 29.2 28.7 29.2   MCHC 35.1 33.6 34.1   RDW 16.1* 16.0* 15.7*   NEPRELIM 22.98* 16.87* 14.12*   WBC 26.6* 18.9* 17.4*   .0 291.0 346.0         Recent Labs   Lab 02/16/24 0623 02/17/24  0730 02/18/24  0632   * 180* 201*   BUN 23 27* 41*   CREATSERUM 1.01 1.06* 1.20*   EGFRCR 61 58* 50*   CA 7.6* 7.7* 8.0*    142 142   K 3.5  3.4* 4.1  4.1 4.2   * 114* 111   CO2  22.0 21.0 24.0       Recent Labs   Lab 02/11/24 2053 02/12/24  0551 02/17/24  0730 02/18/24  0632   ALB 2.3* 2.3* 2.2* 2.2*         Imaging:  No results found.      Meds:      HYDROcodone-acetaminophen  1 tablet Oral q12h    insulin aspart  2 Units Subcutaneous TID CC    vancomycin  1,000 mg Intravenous Q36H    magnesium sulfate  4 g Intravenous Once    insulin aspart  1-7 Units Subcutaneous TID CC    carvedilol  25 mg Per NG Tube BID    insulin degludec  10 Units Subcutaneous Nightly    vancomycin  125 mg Oral Daily    sodium hypochlorite   Topical 2 times per day    amLODIPine  10 mg Per NG Tube QAM    hydrALAZINE  100 mg Per NG Tube Q8H    pantoprazole  40 mg Intravenous Q24H    fluticasone furoate-vilanterol  1 puff Inhalation Daily    rosuvastatin  40 mg Oral Nightly    meropenem  500 mg Intravenous q12h         HYDROcodone-acetaminophen, HYDROcodone-acetaminophen, acetaminophen, morphINE, ondansetron, metoclopramide, HYDROmorphone, morphINE **OR** morphINE **OR** morphINE, hydrALAzine, ondansetron, metoclopramide, albuterol, ipratropium-albuterol, glucose **OR** glucose **OR** glucose-vitamin C **OR** dextrose **OR** glucose **OR** glucose **OR** glucose-vitamin C

## 2024-02-18 NOTE — PROGRESS NOTES
NEPHROLOGY DAILY PROGRESS NOTE     SUBJECTIVE:  Patient seen and examined at bedside.    OBJECTIVE:    Total Intake/Output:    Intake/Output Summary (Last 24 hours) at 2/18/2024 1305  Last data filed at 2/18/2024 1100  Gross per 24 hour   Intake 1250 ml   Output 1550 ml   Net -300 ml       PHYSICAL EXAM:  /62 (BP Location: Right arm)   Pulse 64   Temp 98.8 °F (37.1 °C) (Oral)   Resp 18   Ht 5' 4.02\" (1.626 m)   Wt 186 lb 1.6 oz (84.4 kg)   LMP  (LMP Unknown)   SpO2 96%   BMI 31.93 kg/m²   GEN: NAD, on oxygen     HEENT: NCAT    CHEST: no distress  CARDIAC: S1S2 normal  ABD: soft, NT/ND  EXT: + lower ext edema noted, swelling R hand noted      NEURO: awake, answering questions       CURRENT MEDICATIONS:     HYDROcodone-acetaminophen  1 tablet Oral q12h    insulin aspart  5 Units Subcutaneous TID CC    insulin degludec  15 Units Subcutaneous Nightly    vancomycin  1,000 mg Intravenous Q36H    magnesium sulfate  4 g Intravenous Once    insulin aspart  1-7 Units Subcutaneous TID CC    carvedilol  25 mg Per NG Tube BID    vancomycin  125 mg Oral Daily    sodium hypochlorite   Topical 2 times per day    amLODIPine  10 mg Per NG Tube QAM    hydrALAZINE  100 mg Per NG Tube Q8H    pantoprazole  40 mg Intravenous Q24H    fluticasone furoate-vilanterol  1 puff Inhalation Daily    rosuvastatin  40 mg Oral Nightly    meropenem  500 mg Intravenous q12h             LABS:  Patient Labs Reviewed in Detail. Pertinent Labs as follows:  Recent Labs   Lab 02/16/24 0623 02/17/24  0730 02/18/24  0632   * 180* 201*   BUN 23 27* 41*   CREATSERUM 1.01 1.06* 1.20*   EGFRCR 61 58* 50*   CA 7.6* 7.7* 8.0*    142 142   K 3.5  3.4* 4.1  4.1 4.2   * 114* 111   CO2 22.0 21.0 24.0     Recent Labs   Lab 02/16/24 0623 02/17/24  0730 02/18/24  0632   RBC 2.77* 2.47* 2.57*   HGB 8.1* 7.1* 7.5*   HCT 23.1* 21.1* 22.0*   MCV 83.4 85.4 85.6   MCH 29.2 28.7 29.2   MCHC 35.1 33.6 34.1   RDW 16.1* 16.0* 15.7*   NEPRELIM  22.98* 16.87* 14.12*   WBC 26.6* 18.9* 17.4*   .0 291.0 346.0       ASSESSMENT AND PLAN:   68 year old female with PMH of COPD, DM2, HTN, HL, PVD, Sickle cell anemia, CKD stage 3, recent fem-pop artery bypass admitted with acute hemorrhage from incision site.  Patient is s/p OR for repair.  Nephrology is consulted for OMEGA and metabolic acidosis.         OMEGA on CKD stage 3  - baseline around 1.2  - creatinine improved below baseline, has evidence of fluid overload and has been receiving IV lasix.   - hold diuretics today, monitor response  - follow renal fxn and I/Os     Hypertension   - amlodipine, hydralazine, coreg  - weaned off nicardipine   - lisinopril held given recent OMEGA, can resume if needed for blood pressure control now with improved kidney function  - cards following     Metabolic acidosis:  - resolved     Hypernatremia:  - resolved     Hypokalemia/ hypomagnesemia:  - replete per protocol     Hypocalcemia:  - corrects to normal given hypoalbuminemia   - monitor Ca levels         We will continue to follow      DO Dina Malhotra - Nephrology

## 2024-02-19 NOTE — DIETARY NOTE
ADULT NUTRITION REASSESSMENT    Pt is at moderate nutrition risk.  Pt does not meet malnutrition criteria.      RECOMMENDATIONS TO MD: See Nutrition Intervention for oral nutritional supplement (ONS) specifics     ADMITTING DIAGNOSIS:  Hyperglycemia [R73.9]  Bypass graft mechanical complication, initial encounter ([facility]) [T82.398A]  Acute kidney injury superimposed on CKD  ([facility]) [N17.9, N18.9]  Anemia, unspecified type [D64.9]  PERTINENT PAST MEDICAL HISTORY:   Past Medical History:   Diagnosis Date    Anemia     Back pain     Back problem     Cataract     Chronic kidney disease (CKD)     COPD (chronic obstructive pulmonary disease) ([facility])     FEV 1 1.25 50% 2/20    Diabetes ([facility])     DM neuropathy    Essential hypertension     H/O angioplasty     07/08/2020    High blood pressure     High cholesterol     Neuropathy     Peripheral vascular disease ([facility])     PNA (pneumonia) 05/2018    Pulmonary emphysema ([facility])     Pulmonary nodule     4 mm RUL    Renal disorder     monitoring kidney labs    Sickle cell trait ([facility])     Sickle-cell anemia ([facility])     Tobacco abuse     Visual impairment      PATIENT STATUS: Initial 02/14/24: Pt admit for Bleeding from surgical site (recent right lower extremity femoral to popliteal bypass).  PMH sig for CKD, COPD, DM, HTN, HLD, PVD, sickle cell anemia.   Pt intubated on 2/10 and currently sedated on Propofol 11.6 ml/hr (306 kcals via lipids). S/p wound debridement 2/13 with wound vac in place and 2/16 plan for debridement of rt groin wound with graft placement. Renal service following for OMEGA and metabolic acidosis, s/p bicarb GTT      Pt assessed due to consult for tube feeding. Day#4 NPO. OG tube for EN access. +BM. Net I/O: +9.8L with edema on lasix. Pt overweight for ht using UBW as % of IBW, appears well nourished.   Pt screened at no nutrition risk on admit/RN (no wt loss, no decline in po intake). RX tube feeds to meet initial nutrition needs during 1st week critical illness.      Update 02/19/24: RA completed per protocol. Chart reviewed, extubated 2/14 pm - diet initiated after SLP evaluation. Plan for right AKA on Thursday per discussion with RN. Poor oral intake noted per EMR review. Per PCT, pt not drinking ONS. Pt visited, reports does not have much of an appetite but that is not new. Pt reports getting ONS at home but has not received them much here. Provided flavor preferences. Encouraged increased energy and protein intake with ONS to help maximize nutrition. Pt in agreement to drink shakes and asked for one now. Adjusted ONS order.     FOOD/NUTRITION RELATED HISTORY:  Appetite: Poor  Intake: ~0-25% x7 meals documented since last visit  Intake Meeting Needs: No, but oral nutrition supplements (ONS) to maximize  Percent Meals Eaten (last 6 days)       Date/Time Percent Meals Eaten (%)    02/15/24 1753 0 %     Percent Meals Eaten (%): refused dinner at 02/15/24 1753    02/17/24 1000 20 %    02/17/24 1200 25 %    02/17/24 1832 0 %     Percent Meals Eaten (%): few bites at 02/17/24 1832    02/18/24 1100 15 %    02/19/24 1100 15 %    02/19/24 1412 10 %          Food Allergies: No Known Food Allergies ([facility])  Cultural/Ethnic/Yarsanism Preferences: Not Obtained    GASTROINTESTINAL: +BM 2/19/2024 - extra large;soft, Loss of appetite, and Swallow evaluation noted    MEDICATIONS: reviewed    fluconazole  200 mg Oral Daily    HYDROcodone-acetaminophen  1 tablet Oral q12h    insulin aspart  5 Units Subcutaneous TID CC    insulin degludec  15 Units Subcutaneous Nightly    vancomycin  1,000 mg Intravenous Q36H    magnesium sulfate  4 g Intravenous Once    insulin aspart  1-7 Units Subcutaneous TID CC    carvedilol  25 mg Per NG Tube BID    vancomycin  125 mg Oral Daily    sodium hypochlorite   Topical 2 times per day    amLODIPine  10 mg Per NG Tube QAM    hydrALAZINE  100 mg Per NG Tube Q8H    pantoprazole  40 mg Intravenous Q24H    fluticasone furoate-vilanterol  1 puff Inhalation Daily     rosuvastatin  40 mg Oral Nightly    meropenem  500 mg Intravenous q12h   PRN meds: HYDROcodone-acetaminophen, HYDROcodone-acetaminophen, acetaminophen, morphINE, ondansetron, HYDROmorphone, morphINE **OR** morphINE **OR** morphINE, hydrALAzine, ondansetron, metoclopramide, albuterol, ipratropium-albuterol, glucose **OR** glucose **OR** glucose-vitamin C **OR** dextrose **OR** glucose **OR** glucose **OR** glucose-vitamin C    LABS: reviewed A1c 7.9% on 2/10/24  Recent Labs     02/17/24  0730 02/18/24  0632 02/19/24  0536   * 201* 77   BUN 27* 41* 45*   CREATSERUM 1.06* 1.20* 1.08*   CA 7.7* 8.0* 8.1*   MG 1.8 1.8 1.8    142 145   K 4.1  4.1 4.2 4.3   * 111 114*   CO2 21.0 24.0 26.0   PHOS 5.1 4.1 3.7   OSMOCALC 304* 310* 310*     NUTRITION RELATED PHYSICAL FINDINGS:  - Nutrition Focused Physical Exam (NFPE): well nourished per visual exam  - Fluid Accumulation: +2 Left Lower extremity and Right Hand (s) and +3 Right Foot  see RN documentation for details  - Skin Integrity: at risk, intact, and surgical wound(s) wound vac in place see RN documentation for details    ANTHROPOMETRICS:  HT: 162.6 cm (5' 4.02\")  WT: 85.8 kg (189 lb 1.6 oz)    Last Visit Wt: 185# 14 oz on 2/14/24  Admit Wt: 171# 1 oz on 2/10/24  BMI: Body mass index is 32.44 kg/m².  BMI CLASSIFICATION: 30-34.9 kg/m2 - obesity class I  IBW: 120 lbs        158% IBW  Usual Body Wt: 158 lbs 1/24/24       120% UBW     WEIGHT HISTORY:  Patient Weight(s) for the past 336 hrs:   Weight   02/19/24 0445 85.8 kg (189 lb 1.6 oz)   02/18/24 0614 84.4 kg (186 lb 1.6 oz)   02/17/24 0403 82.4 kg (181 lb 11.2 oz)   02/16/24 0500 82.6 kg (182 lb 3.2 oz)   02/14/24 0500 84.3 kg (185 lb 13.6 oz)   02/13/24 0500 83.1 kg (183 lb 3.2 oz)   02/12/24 0400 81.8 kg (180 lb 5.4 oz)   02/11/24 0600 82.7 kg (182 lb 5.1 oz)   02/10/24 2142 77.6 kg (171 lb 1.2 oz)   02/10/24 2105 77.6 kg (171 lb 1.2 oz)   02/10/24 1642 74.5 kg (164 lb 3.9 oz)     Wt Readings from  Last 10 Encounters:   02/19/24 85.8 kg (189 lb 1.6 oz)   01/27/24 74.5 kg (164 lb 3.2 oz)   08/29/23 69.4 kg (153 lb)   08/21/23 72.6 kg (160 lb)   07/28/23 71.7 kg (158 lb)   07/15/23 71.7 kg (158 lb)   07/07/23 71.2 kg (157 lb)   06/26/23 75.8 kg (167 lb)   06/20/23 77.1 kg (170 lb)   06/06/23 80.3 kg (177 lb)     NUTRITION DIAGNOSIS/PROBLEM:   Inadequate oral intake related to Decreased ability to consume sufficient energy in the setting of intubation/OG in place as evidenced by 0 nutrition intake. (Intervention now TF start)     NUTRITION DIAGNOSIS PROGRESS:  Improvement (unresolved) - pt extubated, diet initiated - poor intake    NUTRITION INTERVENTION:   NUTRITION PRESCRIPTION:   Estimated Nutrition needs: --dosing wt of 72 kg - wt taken on 8/21/23 (estimated UBW/dry wt)   Calories: 3975-3851 calories/day (24-25 calories per kg Dosing wt) = [place] [employer] equation (0664-4571 using dosing wt/current wt) goal of 80% first 5-7 days or during acute phase of illness.   Protein:   g protein/day (1.2-2.0 g protein/kg Ideal body wt (IBW))  Fluid Needs: 30 ml/kg dosing wt= 2160 ml (maintenance) Adjust per clinical status.     - Diet:       Procedures    Carbohydrate controlled diet 1800 kcal/60 grams; Is Patient on Accuchecks? Yes      - Medical Food Supplements- Glucerna (220 calories and 10 grams protein) BID Chocolate, SF Shake (200 calories and 7 grams protein) Daily  Mix Naveed with Chocolate Glucerna BID  - Vitamin and mineral supplements: none  - Feeding assistance: meal set up  - Nutrition education: not appropriate at this time   - Coordination of nutrition care: collaboration with other providers  - Discharge and transfer of nutrition care to new setting or provider: monitor plans    MONITOR AND EVALUATE/NUTRITION GOALS:  - Food and Nutrient Intake:      Monitor: adequacy of PO intake and adequacy of supplement intake  - Food and Nutrient Administration:      Monitor: N/A  - Anthropometric Measurement:     Monitor weight  - Nutrition Goals:      allow wt loss due to fluid losses, PO and supplement greater than 75% of needs, good supplement intake, minimize lean body mass loss, labs within acceptable limits, euglycemia, and support wound healing    DIETITIAN FOLLOW UP: RD to follow and monitor nutrition status    Erin Rosenberg MS, JUAN CARLOS, RDN, LDN  [id number]

## 2024-02-19 NOTE — PROGRESS NOTES
Reached out to Vascular surgery about patients discoloration to vaginal area and increase swelling. Md stated as long as Wound  VAC is working there should not be a problem. Will continue to monitor for any changes.

## 2024-02-19 NOTE — PLAN OF CARE
Problem: Patient Centered Care  Goal: Patient preferences are identified and integrated in the patient's plan of care  Description: Interventions:  - What would you like us to know as we care for you?   - Provide timely, complete, and accurate information to patient/family  - Incorporate patient and family knowledge, values, beliefs, and cultural backgrounds into the planning and delivery of care  - Encourage patient/family to participate in care and decision-making at the level they choose  - Honor patient and family perspectives and choices  Outcome: Progressing     Problem: Diabetes/Glucose Control  Goal: Glucose maintained within prescribed range  Description: INTERVENTIONS:  - Monitor Blood Glucose as ordered  - Assess for signs and symptoms of hyperglycemia and hypoglycemia  - Administer ordered medications to maintain glucose within target range  - Assess barriers to adequate nutritional intake and initiate nutrition consult as needed  - Instruct patient on self management of diabetes  Outcome: Progressing     Problem: Patient/Family Goals  Goal: Patient/Family Long Term Goal  Description: Patient's Long Term Goal: discharge    Interventions:  - Pain management  - Surgical intervention  - Wound care  - See additional Care Plan goals for specific interventions  Outcome: Progressing  Goal: Patient/Family Short Term Goal  Description: Patient's Short Term Goal: pain management    Interventions:   - See additional Care Plan goals for specific interventions  Outcome: Progressing     Problem: CARDIOVASCULAR - ADULT  Goal: Maintains optimal cardiac output and hemodynamic stability  Description: INTERVENTIONS:  - Monitor vital signs, rhythm, and trends  - Monitor for bleeding, hypotension and signs of decreased cardiac output  - Evaluate effectiveness of vasoactive medications to optimize hemodynamic stability  - Monitor arterial and/or venous puncture sites for bleeding and/or hematoma  - Assess quality of pulses,  skin color and temperature  - Assess for signs of decreased coronary artery perfusion - ex. Angina  - Evaluate fluid balance, assess for edema, trend weights  Outcome: Progressing  Goal: Absence of cardiac arrhythmias or at baseline  Description: INTERVENTIONS:  - Continuous cardiac monitoring, monitor vital signs, obtain 12 lead EKG if indicated  - Evaluate effectiveness of antiarrhythmic and heart rate control medications as ordered  - Initiate emergency measures for life threatening arrhythmias  - Monitor electrolytes and administer replacement therapy as ordered  Outcome: Progressing     Problem: RESPIRATORY - ADULT  Goal: Achieves optimal ventilation and oxygenation  Description: INTERVENTIONS:  - Assess for changes in respiratory status  - Assess for changes in mentation and behavior  - Position to facilitate oxygenation and minimize respiratory effort  - Oxygen supplementation based on oxygen saturation or ABGs  - Provide Smoking Cessation handout, if applicable  - Encourage broncho-pulmonary hygiene including cough, deep breathe, Incentive Spirometry  - Assess the need for suctioning and perform as needed  - Assess and instruct to report SOB or any respiratory difficulty  - Respiratory Therapy support as indicated  - Manage/alleviate anxiety  - Monitor for signs/symptoms of CO2 retention  Outcome: Progressing     Problem: METABOLIC/FLUID AND ELECTROLYTES - ADULT  Goal: Glucose maintained within prescribed range  Description: INTERVENTIONS:  - Monitor Blood Glucose as ordered  - Assess for signs and symptoms of hyperglycemia and hypoglycemia  - Administer ordered medications to maintain glucose within target range  - Assess barriers to adequate nutritional intake and initiate nutrition consult as needed  - Instruct patient on self management of diabetes  Outcome: Progressing  Goal: Electrolytes maintained within normal limits  Description: INTERVENTIONS:  - Monitor labs and rhythm and assess patient for signs  and symptoms of electrolyte imbalances  - Administer electrolyte replacement as ordered  - Monitor response to electrolyte replacements, including rhythm and repeat lab results as appropriate  - Fluid restriction as ordered  - Instruct patient on fluid and nutrition restrictions as appropriate  Outcome: Progressing  Goal: Hemodynamic stability and optimal renal function maintained  Description: INTERVENTIONS:  - Monitor labs and assess for signs and symptoms of volume excess or deficit  - Monitor intake, output and patient weight  - Monitor urine specific gravity, serum osmolarity and serum sodium as indicated or ordered  - Monitor response to interventions for patient's volume status, including labs, urine output, blood pressure (other measures as available)  - Encourage oral intake as appropriate  - Instruct patient on fluid and nutrition restrictions as appropriate  Outcome: Progressing

## 2024-02-19 NOTE — PROGRESS NOTES
[facility] CARDIOLOGY Progress Note      Ava Bowen is a 68 year old female who I assessed as follows:  Chief Complaint   Patient presents with    Bleeding          REASON FOR CONSULTATION:    HTN            ASSESSMENT/PLAN:     IMPRESSIONS:  Hemorrhage from LE fem pop bypass site, infected.   -S/P debridement 24  -S/P debridement 24  HTN  COPD  DM  PAD s/p fem-pop bypass 2024  Sickle cell anemia  -Hb 7.1  DM  Acute resp failure, now extubated  OMEGA on CKD  HL  -on statin PTA  11,  HFpEF      PLAN:  Continue oral BP meds  Hydralazine IV PRN  3.   Holding further Lasix  4.   F/U BMP  5.   Anticipate AKA this week      SUBJECTIVE:    Denies chest pain or dyspnea. No new LE edema. No palpitations. Chronic LE discomfort - no acute change. I/O +2.6 L for admission. Cr mproving.      No current outpatient medications on file.      Past Medical History:   Diagnosis Date    Anemia     Back pain     Back problem     Cataract     Chronic kidney disease (CKD)     COPD (chronic obstructive pulmonary disease) ([facility])     FEV 1 1.25 50%     Diabetes ([facility])     DM neuropathy    Essential hypertension     H/O angioplasty     2020    High blood pressure     High cholesterol     Neuropathy     Peripheral vascular disease ([facility])     PNA (pneumonia) 2018    Pulmonary emphysema ([facility])     Pulmonary nodule     4 mm RUL    Renal disorder     monitoring kidney labs    Sickle cell trait ([facility])     Sickle-cell anemia ([facility])     Tobacco abuse     Visual impairment      Past Surgical History:   Procedure Laterality Date    APPENDECTOMY      BACK SURGERY Right 2021    Right L3-4 extreme lateral interbody fusion, posterior decompression; LUMBAR LAMINECTOMY 1 LEVEL          x3    CHOLECYSTECTOMY      EXCIS LUMBAR DISK,ONE LEVEL          OTHER      bilateral leg stents  and      Family History   Problem Relation Age of Onset    Diabetes Mother         Passed from DM complications     Diabetes Sister         Had kidney transplant due to complications of DM    Kidney Disease Sister         Kidney failure secondary to diabetes; received kidney transplant in     Glaucoma Sister     Diabetes Brother     Sickle Cell Son         Cause of death    Macular degeneration Neg       Social History:  Social History     Socioeconomic History    Marital status:    Tobacco Use    Smoking status: Former     Packs/day: 1.00     Years: 30.00     Additional pack years: 0.00     Total pack years: 30.00     Types: Cigarettes     Quit date: 2019     Years since quittin.2    Smokeless tobacco: Never   Vaping Use    Vaping Use: Never used   Substance and Sexual Activity    Alcohol use: Not Currently     Comment: socially    Drug use: No   Social History Narrative    [facility]          Social Determinants of Health     Food Insecurity: No Food Insecurity (2024)    Food Insecurity     Food Insecurity: Never true   Transportation Needs: No Transportation Needs (2024)    Transportation Needs     Lack of Transportation: No   Housing Stability: Low Risk  (2024)    Housing Stability     Housing Instability: No          Medications:            Allergies  Allergies   Allergen Reactions    Penicillins HIVES and ANGIOEDEMA     Hives on eyelids (occurred when pt was in her 20s). Tolerated amoxicillin per chart. Tolerates cephalosporins.               REVIEW OF SYSTEMS:   Sedated, unable to answer questions          EXAM:         TELE: SR          /65   Pulse 67   Temp 98 °F (36.7 °C) (Oral)   Resp 20   Ht 5' 4.02\" (1.626 m)   Wt 189 lb 1.6 oz (85.8 kg)   LMP  (LMP Unknown)   SpO2 95%   BMI 32.44 kg/m²   Temp (24hrs), Av.5 °F (36.9 °C), Min:97.8 °F (36.6 °C), Max:99.5 °F (37.5 °C)    Wt Readings from Last 3 Encounters:   24 189 lb 1.6 oz (85.8 kg)   24 164 lb 3.2 oz (74.5 kg)   23 153 lb (69.4 kg)         Intake/Output Summary (Last 24 hours) at  2/19/2024 1114  Last data filed at 2/19/2024 0626  Gross per 24 hour   Intake 840 ml   Output 1800 ml   Net -960 ml         GENERAL: no acute distress  SKIN: no rashes  HEENT: atraumatic, normocephalic, throat without erythema  NECK: supple, no bruits  LUNGS: clear to auscultation  CARDIO: RRR without murmur or S3   GI: soft, nontender  EXTREMITIES: no cyanosis, clubbing or edema  NEUROLOGY: moving all extremities  PSYCH: cooperative          LABORATORY DATA:         Labs:  Recent Labs   Lab 02/17/24  0730 02/18/24  0632 02/19/24  0536   * 201* 77   BUN 27* 41* 45*   CREATSERUM 1.06* 1.20* 1.08*   CA 7.7* 8.0* 8.1*    142 145   K 4.1  4.1 4.2 4.3   * 111 114*   CO2 21.0 24.0 26.0     No results for input(s): \"TROP\" in the last 168 hours.  Recent Labs   Lab 02/19/24  0536   RBC 2.62*   HGB 7.2*   HCT 22.9*   MCV 87.4   MCH 27.5   MCHC 31.4   RDW 16.1*   NEPRELIM 18.64*   WBC 22.1*   .0     No results for input(s): \"TROP\", \"TROPHS\", \"CK\" in the last 168 hours.    Imaging:  No results found.     Kin Rich MD  Interventional Cardiology, [place]

## 2024-02-19 NOTE — PLAN OF CARE
AxOx4, RA, NSR, joseph in place and incontinent of bowel, Max assist. New blood consent obtained, 1 unit PRBC given. ACHS. PLAN: monitor hgb and wound vac and AKA Thursday   Problem: Patient Centered Care  Goal: Patient preferences are identified and integrated in the patient's plan of care  Description: Interventions:  - What would you like us to know as we care for you? From home alone  - Provide timely, complete, and accurate information to patient/family  - Incorporate patient and family knowledge, values, beliefs, and cultural backgrounds into the planning and delivery of care  - Encourage patient/family to participate in care and decision-making at the level they choose  - Honor patient and family perspectives and choices  2/19/2024 1013 by Ainsley Bullard RN  Outcome: Progressing  2/19/2024 1012 by Ainsley Bullard RN  Outcome: Progressing     Problem: Diabetes/Glucose Control  Goal: Glucose maintained within prescribed range  Description: INTERVENTIONS:  - Monitor Blood Glucose as ordered  - Assess for signs and symptoms of hyperglycemia and hypoglycemia  - Administer ordered medications to maintain glucose within target range  - Assess barriers to adequate nutritional intake and initiate nutrition consult as needed  - Instruct patient on self management of diabetes  2/19/2024 1013 by Ainsley Bullard RN  Outcome: Progressing  2/19/2024 1012 by Ainsley Bullard RN  Outcome: Progressing     Problem: Patient/Family Goals  Goal: Patient/Family Long Term Goal  Description: Patient's Long Term Goal: discharge    Interventions:  - Pain management  - Surgical intervention  - Wound care  - See additional Care Plan goals for specific interventions  2/19/2024 1013 by Ainsley Bullard RN  Outcome: Progressing  2/19/2024 1012 by Ainsley Bullard RN  Outcome: Progressing  Goal: Patient/Family Short Term Goal  Description: Patient's Short Term Goal: pain management    Interventions:   - See additional Care Plan goals for specific  interventions  2/19/2024 1013 by Ainsley Bullard RN  Outcome: Progressing  2/19/2024 1012 by Ainsley Bullard RN  Outcome: Progressing     Problem: CARDIOVASCULAR - ADULT  Goal: Maintains optimal cardiac output and hemodynamic stability  Description: INTERVENTIONS:  - Monitor vital signs, rhythm, and trends  - Monitor for bleeding, hypotension and signs of decreased cardiac output  - Evaluate effectiveness of vasoactive medications to optimize hemodynamic stability  - Monitor arterial and/or venous puncture sites for bleeding and/or hematoma  - Assess quality of pulses, skin color and temperature  - Assess for signs of decreased coronary artery perfusion - ex. Angina  - Evaluate fluid balance, assess for edema, trend weights  2/19/2024 1013 by Ainsley Bullard RN  Outcome: Progressing  2/19/2024 1012 by Ainsley Bullard RN  Outcome: Progressing  Goal: Absence of cardiac arrhythmias or at baseline  Description: INTERVENTIONS:  - Continuous cardiac monitoring, monitor vital signs, obtain 12 lead EKG if indicated  - Evaluate effectiveness of antiarrhythmic and heart rate control medications as ordered  - Initiate emergency measures for life threatening arrhythmias  - Monitor electrolytes and administer replacement therapy as ordered  2/19/2024 1013 by Ainsley Bullard RN  Outcome: Progressing  2/19/2024 1012 by Ainsley Bullard RN  Outcome: Progressing     Problem: RESPIRATORY - ADULT  Goal: Achieves optimal ventilation and oxygenation  Description: INTERVENTIONS:  - Assess for changes in respiratory status  - Assess for changes in mentation and behavior  - Position to facilitate oxygenation and minimize respiratory effort  - Oxygen supplementation based on oxygen saturation or ABGs  - Provide Smoking Cessation handout, if applicable  - Encourage broncho-pulmonary hygiene including cough, deep breathe, Incentive Spirometry  - Assess the need for suctioning and perform as needed  - Assess and instruct to report SOB or any  respiratory difficulty  - Respiratory Therapy support as indicated  - Manage/alleviate anxiety  - Monitor for signs/symptoms of CO2 retention  2/19/2024 1013 by Ainsley Bullard RN  Outcome: Progressing  2/19/2024 1012 by Ainsley Bullard RN  Outcome: Progressing     Problem: METABOLIC/FLUID AND ELECTROLYTES - ADULT  Goal: Glucose maintained within prescribed range  Description: INTERVENTIONS:  - Monitor Blood Glucose as ordered  - Assess for signs and symptoms of hyperglycemia and hypoglycemia  - Administer ordered medications to maintain glucose within target range  - Assess barriers to adequate nutritional intake and initiate nutrition consult as needed  - Instruct patient on self management of diabetes  2/19/2024 1013 by Ainsley Bullard RN  Outcome: Progressing  2/19/2024 1012 by Ainsley Bullard RN  Outcome: Progressing  Goal: Electrolytes maintained within normal limits  Description: INTERVENTIONS:  - Monitor labs and rhythm and assess patient for signs and symptoms of electrolyte imbalances  - Administer electrolyte replacement as ordered  - Monitor response to electrolyte replacements, including rhythm and repeat lab results as appropriate  - Fluid restriction as ordered  - Instruct patient on fluid and nutrition restrictions as appropriate  2/19/2024 1013 by Ainsley Bullard RN  Outcome: Progressing  2/19/2024 1012 by Ainsley Bullard RN  Outcome: Progressing  Goal: Hemodynamic stability and optimal renal function maintained  Description: INTERVENTIONS:  - Monitor labs and assess for signs and symptoms of volume excess or deficit  - Monitor intake, output and patient weight  - Monitor urine specific gravity, serum osmolarity and serum sodium as indicated or ordered  - Monitor response to interventions for patient's volume status, including labs, urine output, blood pressure (other measures as available)  - Encourage oral intake as appropriate  - Instruct patient on fluid and nutrition restrictions as  appropriate  2/19/2024 1013 by Ainsley Bullard, RN  Outcome: Progressing  2/19/2024 1012 by Ainsley Bullard, RN  Outcome: Progressing

## 2024-02-19 NOTE — PROGRESS NOTES
[facility] Hospitalist Progress Note     CC: Hospital Follow up    PCP: Ernesto De Dios       Assessment/Plan:     Principal Problem:    Bypass graft mechanical complication, initial encounter ([facility])  Active Problems:    Hyperglycemia    Bypass graft mechanical complication (HCC)    Anemia, unspecified type    Acute kidney injury superimposed on CKD  (HCC)    Uncontrolled type 2 diabetes mellitus with hyperglycemia (HCC)      Ms. Bowen is a 68 year old female with PMH sig for CKD, COPD, DM, HTN, HLD, PVD, and sickle cell anemia who was recently admitted last week for right lower extremity femoral to popliteal bypass who presented with bleeding from surgical site. Taken for emergent OR on 2/10/24, noted to be grossly infected, started on vancomycin/meropenem. Bcx with klebsiella. BG elevated on admit, started on insulin gtt, endo consulted, now off insulin gtt. Kept intubated post op, pulm following.Seems to have been slow to recovery despite multiple surgeries now planning AKA on Thursday.        PAD sp RLE fem-pop bypass last week c/b bleeding fm surg site fm wound dehiscence  - s/p recent fem pop bypass 1/24/24 with Dr. Murrieta  - has been on ASA/plavix- held on admit  - Hg down to 6.0, s/p 3 units pRBC 2/10- watching close now, hgb stable currently no signs of bleeding   - vascular surgery consulted, s/p emergent OR 2/10, removal of infected graft, s/p debridement, bone patch angioplasty   - sp further debridement 2/13 and placement of wound vac  - back to OR 2/16 again and now plans for AKA next week Thursday   -Seems now will need AKA  -Pain meds PRN  -Pain better today with scheduled norco, still anxious and nervous about surgery   - lasix PRN     Septic shock (now improved) fm post op wound infection  - continue IV vancomycin and meropenem, ID consult following   - Bcx with klebsiella, repeat Bcx ordered  - 2/10 wound cx and 2/13 poly microbial  - abx adjustment per ID f/u cx results  - ID notes  appreciated- tenative plan for 6 weeks abx from from final surgical plans- final abx tbd pending cx data and surgical plans    -Wound vac in place  -Keep joseph due to large leg wound   Wound care following     Metabolic acidosis- resolved   - likely 2/2 LA from blood loss +/- dka on admit  - pH 7.24 on admit, LA 7.6 on admit, now normalized  - likely multifactorial, 2/2 LA, blood loss, ?DKA, CKD  - renal consulted, sp bicarb gtt     Acute blood loss anemia from surgical site   Chronic anemia  Sickle cell anemia  -Stable currently, trend CBC   - outpatient f/u  - Hg 8.9 on discharge last week  - Hg 6.0 on admission, s/p 3 units pRBC  - monitor, hgb stable         DM2, hyperglycemia better now   -A1c 7.9   - Hold PO medications  - BG 500s in ED, pH 7.2  - insulin gtt ordered in ED  -increased Degludec 15 at bedtime and aspart 5 units with meals , this seems to be a good regimen now      Respiratory failure  - remained intubated post op both fm aspiration event and metabolic acidosis  - pulm following  - extubated 2/14 pm  Now on 2L NC - wean as able      Hypovolemic / septic Shock/ Hypotension- improved  Hx HTN  BP stable now, hydral 100 Q8 hours   Amlodipine 10 d, Coreg 25 BID   Holding home lisinopril, BP ok watch and consider resuming pending BP       COPD  - chronic, stable  -resumed inhalers     OMEGA on CKD3- at baseline now   - cr 1.7 on admit, previously 1.2 on discharge last week  - monitor Cr and UOP- improved   Stable  Monitor  Renal following       ACP  - CODE- FULL   - POA- daughter and son     FN:  - IVF: Stopped   - Diet: Carb controlled diet      DVT Prophy: Hold due to bleeding   Lines: PIV     Daughter Tiffanie updated at length via phone 2/18/20, no new updates today.     Questions/concerns were discussed with patient and/or family by bedside.    Thank You,  Edy Foote,     Hospitalist with [facility]     Subjective:     Feels anxiety about procedure, pain seems better today , no nausea      OBJECTIVE:    Blood pressure 128/58, pulse 69, temperature 98 °F (36.7 °C), temperature source Oral, resp. rate 18, height 5' 4.02\" (1.626 m), weight 189 lb 1.6 oz (85.8 kg), SpO2 92%, not currently breastfeeding.    Temp:  [97.8 °F (36.6 °C)-99.5 °F (37.5 °C)] 98 °F (36.7 °C)  Pulse:  [65-77] 69  Resp:  [16-20] 18  BP: (116-142)/(49-73) 128/58  SpO2:  [91 %-97 %] 92 %      Intake/Output:    Intake/Output Summary (Last 24 hours) at 2/19/2024 1300  Last data filed at 2/19/2024 1100  Gross per 24 hour   Intake 1352.5 ml   Output 2500 ml   Net -1147.5 ml       Last 3 Weights   02/19/24 0445 189 lb 1.6 oz (85.8 kg)   02/18/24 0614 186 lb 1.6 oz (84.4 kg)   02/17/24 0403 181 lb 11.2 oz (82.4 kg)   02/16/24 0500 182 lb 3.2 oz (82.6 kg)   02/14/24 0500 185 lb 13.6 oz (84.3 kg)   02/13/24 0500 183 lb 3.2 oz (83.1 kg)   02/12/24 0400 180 lb 5.4 oz (81.8 kg)   02/11/24 0600 182 lb 5.1 oz (82.7 kg)   02/10/24 2142 171 lb 1.2 oz (77.6 kg)   02/10/24 2105 171 lb 1.2 oz (77.6 kg)   02/10/24 1642 164 lb 3.9 oz (74.5 kg)   01/27/24 0501 164 lb 3.2 oz (74.5 kg)   01/24/24 0549 158 lb (71.7 kg)   01/19/24 1112 160 lb (72.6 kg)   12/30/23 1216 155 lb (70.3 kg)   08/29/23 1357 153 lb (69.4 kg)       Exam   Gen: No acute distress, awake and alert   Pulm: Lungs clear, normal respiratory effort, 2 L NC   CV: Heart with regular rate and rhythm  Abd: Abdomen soft, nontender, nondistended  MSK: Right leg with wound vac in place, ACE wrap in place as well  + joseph         Data Review:       Labs:     Recent Labs   Lab 02/17/24  0730 02/18/24  0632 02/19/24  0536 02/19/24  1159   RBC 2.47* 2.57* 2.62*  --    HGB 7.1* 7.5* 7.2* 8.2*   HCT 21.1* 22.0* 22.9* 25.7*   MCV 85.4 85.6 87.4  --    MCH 28.7 29.2 27.5  --    MCHC 33.6 34.1 31.4  --    RDW 16.0* 15.7* 16.1*  --    NEPRELIM 16.87* 14.12* 18.64*  --    WBC 18.9* 17.4* 22.1*  --    .0 346.0 417.0  --          Recent Labs   Lab 02/17/24  0730 02/18/24  0632 02/19/24  0536   GLU  180* 201* 77   BUN 27* 41* 45*   CREATSERUM 1.06* 1.20* 1.08*   EGFRCR 58* 50* 57*   CA 7.7* 8.0* 8.1*    142 145   K 4.1  4.1 4.2 4.3   * 111 114*   CO2 21.0 24.0 26.0       Recent Labs   Lab 02/17/24  0730 02/18/24  0632 02/19/24  0536   ALB 2.2* 2.2* 2.2*         Imaging:  No results found.      Meds:      fluconazole  200 mg Oral Daily    HYDROcodone-acetaminophen  1 tablet Oral q12h    insulin aspart  5 Units Subcutaneous TID CC    insulin degludec  15 Units Subcutaneous Nightly    vancomycin  1,000 mg Intravenous Q36H    magnesium sulfate  4 g Intravenous Once    insulin aspart  1-7 Units Subcutaneous TID CC    carvedilol  25 mg Per NG Tube BID    vancomycin  125 mg Oral Daily    sodium hypochlorite   Topical 2 times per day    amLODIPine  10 mg Per NG Tube QAM    hydrALAZINE  100 mg Per NG Tube Q8H    pantoprazole  40 mg Intravenous Q24H    fluticasone furoate-vilanterol  1 puff Inhalation Daily    rosuvastatin  40 mg Oral Nightly    meropenem  500 mg Intravenous q12h         HYDROcodone-acetaminophen, HYDROcodone-acetaminophen, acetaminophen, morphINE, ondansetron, HYDROmorphone, morphINE **OR** morphINE **OR** morphINE, hydrALAzine, ondansetron, metoclopramide, albuterol, ipratropium-albuterol, glucose **OR** glucose **OR** glucose-vitamin C **OR** dextrose **OR** glucose **OR** glucose **OR** glucose-vitamin C

## 2024-02-19 NOTE — PAYOR COMM NOTE
--------------  CONTINUED STAY REVIEW    Payor: [employer]  Subscriber #:  [id number]  Authorization Number: [id number]    Admit date: 2/10/24  Admit time:  9:20 PM    Admitting Physician: Lissa Lee MD  Attending Physician:  Edy Foote DO  Primary Care Physician: Ernesto De Dios    REVIEW DOCUMENTATION:   2-19-24       CARDIOLOGY Progress Note    ASSESSMENT/PLAN:      IMPRESSIONS:  Hemorrhage from LE fem pop bypass site, infected.   -S/P debridement 2/13/24  -S/P debridement 2/16/24  HTN  COPD  DM  PAD s/p fem-pop bypass Jan 2024  Sickle cell anemia  -Hb 7.1  DM  Acute resp failure, now extubated  OMEGA on CKD  HL  -on statin PTA  11,  HFpEF        PLAN:  Continue oral BP meds  Hydralazine IV PRN  3.   Holding further Lasix  4.   F/U BMP  5.   Anticipate AKA this week        SUBJECTIVE:     Denies chest pain or dyspnea. No new LE edema. No palpitations. Chronic LE discomfort - no acute change. I/O +2.6 L for admission. Cr mproving.    Labs:      Recent Labs   GLU 77   BUN 45*   CREATSERUM 1.08*   CA 8.1*      K 4.3   *   CO2 26.0      No results for input(s): \"TROP\" in the last 168 hours.      Recent Labs   Lab 02/19/24  0536   RBC 2.62*   HGB 7.2*   HCT 22.9*   MCV 87.4   MCH 27.5   MCHC 31.4   RDW 16.1*   NEPRELIM 18.64*   WBC 22.1*   .0        ID PROGRESS NOTE:      IMPRESSION/PLAN:   Postop complication of rt fem pop 1/24 bypass  Bacteremia wound drainage and needed debridement graft removal;see other notes for more details;two surgeries so far 2/13 and 2/16  Cultures mixed karla and candida too[candida in urine too]  Already on meropenem and vanc; added diflucan  Pain and rising wbc are negative signs        MEDICATIONS ADMINISTERED IN LAST 1 DAY:  Transfuse RBC       Date Action Dose Route User    2/19/2024 0911 New Bag (none) Intravenous Ainsley Bullard, RN          amLODIPine (Norvasc) tab 10 mg       Date Action Dose Route User    2/19/2024 1112 Given 10 mg Per NG Tube  Ainsley Bullard RN          carvedilol (Coreg) tab 25 mg       Date Action Dose Route User    2/19/2024 1112 Given 25 mg Per NG Tube Ainsley Bullard RN    2/18/2024 2114 Given 25 mg Per NG Tube Ai Siddiqui RN          fluconazole (Diflucan) tab 200 mg       Date Action Dose Route User    2/19/2024 0933 Given 200 mg Oral Ainsley Bullard RN          fluticasone furoate-vilanterol (Breo Ellipta) 200-25 MCG/ACT inhaler 1 puff       Date Action Dose Route User    2/19/2024 0923 Given 1 puff Inhalation Ainsley Bullard RN          furosemide (Lasix) 10 mg/mL injection 20 mg       Date Action Dose Route User    2/19/2024 1111 Given 20 mg Intravenous Ainsley Bullard RN          furosemide (Lasix) 10 mg/mL injection 20 mg       Date Action Dose Route User    2/19/2024 0914 Given 20 mg Intravenous Ainsley Bullard RN          hydrALAZINE (Apresoline) tab 100 mg       Date Action Dose Route User    2/19/2024 0544 Given 100 mg Per NG Tube Ai Siddqiui RN    2/18/2024 2114 Given 100 mg Per NG Tube Ai Siddiqui RN    2/18/2024 1435 Given 100 mg Per NG Tube Reyes Soto, Oscar, RN          HYDROcodone-acetaminophen (Norco)  MG per tab 2 tablet       Date Action Dose Route User    2/19/2024 0544 Given 2 tablet Oral Ai Siddiqui RN    2/19/2024 0121 Given 2 tablet Oral Ai Siddiqui RN    2/18/2024 1846 Given 2 tablet Oral Reyes Soto, Oscar, RN          HYDROcodone-acetaminophen (Norco) 5-325 MG per tab 1 tablet       Date Action Dose Route User    2/19/2024 0918 Given 1 tablet Oral Ainsley Bullard RN    2/18/2024 2114 Given 1 tablet Oral Ai Siddiqui RN          HYDROmorphone (Dilaudid) 1 MG/ML injection 0.5 mg       Date Action Dose Route User    2/19/2024 0751 Given 0.5 mg Intravenous Ainsley Bullard RN    2/18/2024 1654 Given 0.5 mg Intravenous Reyes Soto, Oscar, RN    2/18/2024 1205 Given 0.5 mg Intravenous Reyes Soto, Oscar, RN          insulin aspart (NovoLOG) 100 Units/mL FlexPen 1-7 Units       Date Action Dose  Route User    2/18/2024 1847 Given 5 Units Subcutaneous (Left Lower Abdomen) Reyes Soto, Oscar, RN    2/18/2024 1435 Given 3 Units Subcutaneous (Right Upper Arm) Reyes Soto, Oscar, RN          insulin aspart (NovoLOG) 100 Units/mL FlexPen 5 Units       Date Action Dose Route User    2/19/2024 0928 Given 5 Units Subcutaneous (Left Lower Arm) Ainsley Bullard RN    2/18/2024 1846 Given 5 Units Subcutaneous (Left Upper Abdomen) Reyes Soto, Oscar, RN    2/18/2024 1435 Given 5 Units Subcutaneous (Right Upper Arm) Reyes Soto, Oscar, RN          insulin degludec 100 units/mL flextouch 15 Units       Date Action Dose Route User    2/18/2024 2123 Given 15 Units Subcutaneous (Left Lower Abdomen) Ai Siddiqui RN          meropenem (Merrem) 500 mg in sodium chloride 0.9% 100 mL IVPB-MBP       Date Action Dose Route User    2/19/2024 0115 New Bag 500 mg Intravenous Ai Siddiqui RN    2/18/2024 1205 New Bag 500 mg Intravenous Reyes Soto, Oscar, RN          morphINE PF 2 MG/ML injection 2 mg       Date Action Dose Route User    2/18/2024 2123 Given 2 mg Intravenous Ai Siddiqui RN    2/18/2024 1434 Given 2 mg Intravenous Reyes Soto, Oscar, RN          morphINE PF 4 MG/ML injection 4 mg       Date Action Dose Route User    2/19/2024 0954 Given 4 mg Intravenous Ainsley Bullard RN    2/19/2024 0521 Given 4 mg Intravenous Ai Siddiqui RN          pantoprazole (Protonix) 40 mg in sodium chloride 0.9% PF 10 mL IV push       Date Action Dose Route User    2/19/2024 0921 Given 40 mg Intravenous Ainsley Bullard RN          rosuvastatin (Crestor) tab 40 mg       Date Action Dose Route User    2/18/2024 2115 Given 40 mg Oral Ai Siddiqui RN          sodium chloride 0.9% infusion       Date Action Dose Route User    2/19/2024 0700 New Bag (none) Intravenous Ainsley Bullard RN          vancomycin (Vancocin) 1,000 mg in sodium chloride 0.9% 250 mL IVPB-ADDV       Date Action Dose Route User    2/19/2024 0543 New Bag 1,000 mg  Intravenous Ai Siddiqui, RN          vancomycin (Firvanq) 50 mg/mL oral solution 125 mg       Date Action Dose Route User    2/19/2024 0933 Given 125 mg Oral Ainsley Bullard, RN            Vitals (last day)       Date/Time Temp Pulse Resp BP SpO2 Weight O2 Device O2 Flow Rate (L/min) [facility]    02/19/24 1100 98 °F (36.7 °C) 67 20 125/65 95 % -- -- -- SY    02/19/24 1030 98 °F (36.7 °C) 67 18 125/60 97 % -- -- -- SY    02/19/24 1000 98 °F (36.7 °C) 68 18 137/58 97 % -- -- -- SY    02/18/24 1200 98.8 °F (37.1 °C) 64 18 126/62 96 % -- Nasal cannula 1 L/min OR    02/18/24 0941 98.6 °F (37 °C) 69 18 120/58 96 % -- Nasal cannula 1 L/min OR    02/18/24 0614 98.8 °F (37.1 °C) 68 18 134/61 93 % -- Nasal cannula 2 L/min MD    02/18/24 0614 -- -- -- -- -- 186 lb 1.6 oz -- -- TP              Blood Transfusion Record       Product Unit Status Volume Start End            Transfuse RBC       24  [id number]  [id number] Completed 02/19/24 1114 272.5 mL 02/19/24 0911 02/19/24 1100

## 2024-02-19 NOTE — PROGRESS NOTES
02/19/24 1100   Negative Pressure Wound Therapy Groin Right   Placement Date: 02/13/24   Inserted by: Dr. KESSLER  Location: Groin  Wound Location Orientation: Right   Wound photographed/measured No   Machine Status (On) Yes   Site Assessment ANGELO   [place]-wound Assessment ANGELO   Unit Type KCI ULTA   Cycle Continuous;On   Target Pressure (mmHg) 125   Drainage Description Serosanguineous   Wound Follow Up   Follow up needed Yes     Seal to dressing is intact, vac running at 125, serosanguinous drainage in canister. Dr. Kessler requested to leave dressing in place until OR on Thursday.

## 2024-02-19 NOTE — PROGRESS NOTES
[facility] Infectious Disease  Progress Note    Ava Bowen Patient Status:  Inpatient    10/6/1955 MRN [id number]   Location [facility] 3W/SW Attending Edy Foote DO   Hosp Day # 9 PCP Ernesto MIGUEL Lanre Bowen is a 68 year old female.   Chief Complaint   Patient presents with    Bleeding       HPI:      Much discomfort per pt               REVIEW OF SYSTEMS:   A comprehensive 10 point review of systems was completed.  Pertinent positives and negatives noted in the the HPI.       Allergies:  Allergies   Allergen Reactions    Penicillins HIVES and ANGIOEDEMA     Hives on eyelids (occurred when pt was in her 20s). Tolerated amoxicillin per chart. Tolerates cephalosporins.        Current Meds:    Current Facility-Administered Medications:     furosemide (Lasix) 10 mg/mL injection 20 mg, 20 mg, Intravenous, Once    fluconazole (Diflucan) tab 200 mg, 200 mg, Oral, Daily    HYDROcodone-acetaminophen (Norco) 5-325 MG per tab 1 tablet, 1 tablet, Oral, q12h    insulin aspart (NovoLOG) 100 Units/mL FlexPen 5 Units, 5 Units, Subcutaneous, TID CC    insulin degludec 100 units/mL flextouch 15 Units, 15 Units, Subcutaneous, Nightly    vancomycin (Vancocin) 1,000 mg in sodium chloride 0.9% 250 mL IVPB-ADDV, 1,000 mg, Intravenous, Q36H    HYDROcodone-acetaminophen (Norco)  MG per tab 1 tablet, 1 tablet, Oral, Q4H PRN    HYDROcodone-acetaminophen (Norco)  MG per tab 2 tablet, 2 tablet, Oral, Q4H PRN    acetaminophen (Tylenol) tab 650 mg, 650 mg, Oral, Q4H PRN    morphINE 20 mg/mL concentrated oral solution 5 mg, 5 mg, Oral, Q4H PRN    magnesium sulfate 4 g/100mL IVPB premix 4 g, 4 g, Intravenous, Once    ondansetron (Zofran) 4 MG/2ML injection 4 mg, 4 mg, Intravenous, Q6H PRN    HYDROmorphone (Dilaudid) 1 MG/ML injection 0.5 mg, 0.5 mg, Intravenous, Q3H PRN    insulin aspart (NovoLOG) 100 Units/mL FlexPen 1-7 Units, 1-7 Units, Subcutaneous, TID  CC    carvedilol (Coreg) tab 25 mg, 25 mg, Per NG Tube, BID    vancomycin (Firvanq) 50 mg/mL oral solution 125 mg, 125 mg, Oral, Daily    morphINE PF 2 MG/ML injection 1 mg, 1 mg, Intravenous, Q2H PRN **OR** morphINE PF 2 MG/ML injection 2 mg, 2 mg, Intravenous, Q2H PRN **OR** morphINE PF 4 MG/ML injection 4 mg, 4 mg, Intravenous, Q2H PRN    sodium hypochlorite (Dakin's) 0.125 % external solution, , Topical, 2 times per day    hydrALAzine (Apresoline) 20 mg/mL injection 10 mg, 10 mg, Intravenous, Q4H PRN    amLODIPine (Norvasc) tab 10 mg, 10 mg, Per NG Tube, QAM    hydrALAZINE (Apresoline) tab 100 mg, 100 mg, Per NG Tube, Q8H    pantoprazole (Protonix) 40 mg in sodium chloride 0.9% PF 10 mL IV push, 40 mg, Intravenous, Q24H    ondansetron (Zofran) 4 MG/2ML injection 4 mg, 4 mg, Intravenous, Q6H PRN    metoclopramide (Reglan) 5 mg/mL injection 5 mg, 5 mg, Intravenous, Q8H PRN    albuterol (Ventolin HFA) 108 (90 Base) MCG/ACT inhaler 2 puff, 2 puff, Inhalation, Q6H PRN    fluticasone furoate-vilanterol (Breo Ellipta) 200-25 MCG/ACT inhaler 1 puff, 1 puff, Inhalation, Daily    ipratropium-albuterol (Duoneb) 0.5-2.5 (3) MG/3ML inhalation solution 3 mL, 3 mL, Nebulization, Q6H PRN    rosuvastatin (Crestor) tab 40 mg, 40 mg, Oral, Nightly    glucose (Dex4) 15 GM/59ML oral liquid 15 g, 15 g, Oral, Q15 Min PRN **OR** glucose (Glutose) 40% oral gel 15 g, 15 g, Oral, Q15 Min PRN **OR** glucose-vitamin C (Dex-4) chewable tab 4 tablet, 4 tablet, Oral, Q15 Min PRN **OR** dextrose 50% injection 50 mL, 50 mL, Intravenous, Q15 Min PRN **OR** glucose (Dex4) 15 GM/59ML oral liquid 30 g, 30 g, Oral, Q15 Min PRN **OR** glucose (Glutose) 40% oral gel 30 g, 30 g, Oral, Q15 Min PRN **OR** glucose-vitamin C (Dex-4) chewable tab 8 tablet, 8 tablet, Oral, Q15 Min PRN    meropenem (Merrem) 500 mg in sodium chloride 0.9% 100 mL IVPB-MBP, 500 mg, Intravenous, q12h   No current outpatient medications on file.        HISTORY:  Past Medical  History:   Diagnosis Date    Anemia     Back pain     Back problem     Cataract     Chronic kidney disease (CKD)     COPD (chronic obstructive pulmonary disease) (HCC)     FEV 1 1.25 50%     Diabetes (HCC)     DM neuropathy    Essential hypertension     H/O angioplasty     2020    High blood pressure     High cholesterol     Neuropathy     Peripheral vascular disease (HCC)     PNA (pneumonia) 2018    Pulmonary emphysema (HCC)     Pulmonary nodule     4 mm RUL    Renal disorder     monitoring kidney labs    Sickle cell trait (HCC)     Sickle-cell anemia (HCC)     Tobacco abuse     Visual impairment       Past Surgical History:   Procedure Laterality Date    APPENDECTOMY      BACK SURGERY Right 2021    Right L3-4 extreme lateral interbody fusion, posterior decompression; LUMBAR LAMINECTOMY 1 LEVEL          x3    CHOLECYSTECTOMY      EXCIS LUMBAR DISK,ONE LEVEL          OTHER      bilateral leg stents  and         Social history and family history negative related to present illness except as above.    PHYSICAL EXAM:   /58   Pulse 68   Temp 98 °F (36.7 °C) (Oral)   Resp 18   Ht 5' 4.02\" (1.626 m)   Wt 189 lb 1.6 oz (85.8 kg)   LMP  (LMP Unknown)   SpO2 97%   BMI 32.44 kg/m²   GENERAL:  Awake, alert, oriented x3. Non-tox, non-septic and in NAD.  HEENT:  Normocephalic, no scleral abnormalities.  Oropharynx clear, trachea ML.  NECK:  Supple, no masses, no lymphadenopathy.  LUNGS:  Clear to auscultation b/l, no rhonchi, rales, or wheezes.  CARDIO: RRR S1/S2, no rubs, clicks, heaves, or murmurs.  GI:  Soft NT/ND, BS present x4 quadrants, no HSM.  EXTREMITIES:  leg wrapped; foot warm but swollen  NEURO:  No focal neurologic deficits.  DERM:  Warm, dry, no rashes.    IMPRESSION/PLAN:   Postop complication of rt fem pop  bypass  Bacteremia wound drainage and needed debridement graft removal;see other notes for more details;two surgeries so far  and   Cultures  mixed karla and candida too[candida in urine too]  Already on meropenem and vanc; added diflucan  Pain and rising wbc are negative signs  Spoke with pt rn >35 min            Recent Results (from the past 72 hour(s))   POCT Glucose    Collection Time: 02/16/24 12:08 PM   Result Value Ref Range    POC Glucose  128 (H) 70 - 99 mg/dL   POCT Glucose    Collection Time: 02/16/24  3:35 PM   Result Value Ref Range    POC Glucose  127 (H) 70 - 99 mg/dL   POCT Glucose    Collection Time: 02/16/24  6:42 PM   Result Value Ref Range    POC Glucose  190 (H) 70 - 99 mg/dL   Magnesium    Collection Time: 02/16/24  6:50 PM   Result Value Ref Range    Magnesium 1.7 1.6 - 2.6 mg/dL   POCT Glucose    Collection Time: 02/16/24  8:35 PM   Result Value Ref Range    POC Glucose  216 (H) 70 - 99 mg/dL   Potassium    Collection Time: 02/17/24  7:30 AM   Result Value Ref Range    Potassium 4.1 3.5 - 5.1 mmol/L   Renal Function Panel    Collection Time: 02/17/24  7:30 AM   Result Value Ref Range    Glucose 180 (H) 70 - 99 mg/dL    Sodium 142 136 - 145 mmol/L    Potassium 4.1 3.5 - 5.1 mmol/L    Chloride 114 (H) 98 - 112 mmol/L    CO2 21.0 21.0 - 32.0 mmol/L    Anion Gap 7 0 - 18 mmol/L    BUN 27 (H) 9 - 23 mg/dL    Creatinine 1.06 (H) 0.55 - 1.02 mg/dL    BUN/CREA Ratio 25.5 (H) 10.0 - 20.0    Calcium, Total 7.7 (L) 8.7 - 10.4 mg/dL    Calculated Osmolality 304 (H) 275 - 295 mOsm/kg    eGFR-Cr 58 (L) >=60 mL/min/1.73m2    Albumin 2.2 (L) 3.2 - 4.8 g/dL    Phosphorus 5.1 2.4 - 5.1 mg/dL   Magnesium    Collection Time: 02/17/24  7:30 AM   Result Value Ref Range    Magnesium 1.8 1.6 - 2.6 mg/dL   CBC W/ DIFFERENTIAL    Collection Time: 02/17/24  7:30 AM   Result Value Ref Range    WBC 18.9 (H) 4.0 - 11.0 x10(3) uL    RBC 2.47 (L) 3.80 - 5.30 x10(6)uL    HGB 7.1 (L) 12.0 - 16.0 g/dL    HCT 21.1 (L) 35.0 - 48.0 %    MCV 85.4 80.0 - 100.0 fL    MCH 28.7 26.0 - 34.0 pg    MCHC 33.6 31.0 - 37.0 g/dL    RDW-SD 49.8 (H) 35.1 - 46.3 fL    RDW 16.0 (H)  11.0 - 15.0 %    .0 150.0 - 450.0 10(3)uL    Neutrophil Absolute Prelim 16.87 (H) 1.50 - 7.70 x10 (3) uL    Neutrophil Absolute 16.87 (H) 1.50 - 7.70 x10(3) uL    Lymphocyte Absolute 0.93 (L) 1.00 - 4.00 x10(3) uL    Monocyte Absolute 0.81 0.10 - 1.00 x10(3) uL    Eosinophil Absolute 0.00 0.00 - 0.70 x10(3) uL    Basophil Absolute 0.03 0.00 - 0.20 x10(3) uL    Immature Granulocyte Absolute 0.24 0.00 - 1.00 x10(3) uL    Neutrophil % 89.3 %    Lymphocyte % 4.9 %    Monocyte % 4.3 %    Eosinophil % 0.0 %    Basophil % 0.2 %    Immature Granulocyte % 1.3 %   POCT Glucose    Collection Time: 02/17/24  8:48 AM   Result Value Ref Range    POC Glucose  203 (H) 70 - 99 mg/dL   POCT Glucose    Collection Time: 02/17/24 12:37 PM   Result Value Ref Range    POC Glucose  287 (H) 70 - 99 mg/dL   Vancomycin Trough, Serum    Collection Time: 02/17/24  5:14 PM   Result Value Ref Range    Vancomycin Trough 15.6 10.0 - 20.0 ug/mL   POCT Glucose    Collection Time: 02/17/24  5:40 PM   Result Value Ref Range    POC Glucose  343 (H) 70 - 99 mg/dL   POCT Glucose    Collection Time: 02/17/24  8:48 PM   Result Value Ref Range    POC Glucose  377 (H) 70 - 99 mg/dL   Renal Function Panel    Collection Time: 02/18/24  6:32 AM   Result Value Ref Range    Glucose 201 (H) 70 - 99 mg/dL    Sodium 142 136 - 145 mmol/L    Potassium 4.2 3.5 - 5.1 mmol/L    Chloride 111 98 - 112 mmol/L    CO2 24.0 21.0 - 32.0 mmol/L    Anion Gap 7 0 - 18 mmol/L    BUN 41 (H) 9 - 23 mg/dL    Creatinine 1.20 (H) 0.55 - 1.02 mg/dL    BUN/CREA Ratio 34.2 (H) 10.0 - 20.0    Calcium, Total 8.0 (L) 8.7 - 10.4 mg/dL    Calculated Osmolality 310 (H) 275 - 295 mOsm/kg    eGFR-Cr 50 (L) >=60 mL/min/1.73m2    Albumin 2.2 (L) 3.2 - 4.8 g/dL    Phosphorus 4.1 2.4 - 5.1 mg/dL   Magnesium    Collection Time: 02/18/24  6:32 AM   Result Value Ref Range    Magnesium 1.8 1.6 - 2.6 mg/dL   BNP (Brain Natriuretic Peptide)    Collection Time: 02/18/24  6:32 AM   Result Value Ref  Range    Beta Natriuretic Peptide 736 (H) <100 pg/mL   CBC W/ DIFFERENTIAL    Collection Time: 02/18/24  6:32 AM   Result Value Ref Range    WBC 17.4 (H) 4.0 - 11.0 x10(3) uL    RBC 2.57 (L) 3.80 - 5.30 x10(6)uL    HGB 7.5 (L) 12.0 - 16.0 g/dL    HCT 22.0 (L) 35.0 - 48.0 %    MCV 85.6 80.0 - 100.0 fL    MCH 29.2 26.0 - 34.0 pg    MCHC 34.1 31.0 - 37.0 g/dL    RDW-SD 48.9 (H) 35.1 - 46.3 fL    RDW 15.7 (H) 11.0 - 15.0 %    .0 150.0 - 450.0 10(3)uL    Neutrophil Absolute Prelim 14.12 (H) 1.50 - 7.70 x10 (3) uL    Neutrophil Absolute 14.12 (H) 1.50 - 7.70 x10(3) uL    Lymphocyte Absolute 1.41 1.00 - 4.00 x10(3) uL    Monocyte Absolute 1.50 (H) 0.10 - 1.00 x10(3) uL    Eosinophil Absolute 0.10 0.00 - 0.70 x10(3) uL    Basophil Absolute 0.02 0.00 - 0.20 x10(3) uL    Immature Granulocyte Absolute 0.28 0.00 - 1.00 x10(3) uL    Neutrophil % 81.0 %    Lymphocyte % 8.1 %    Monocyte % 8.6 %    Eosinophil % 0.6 %    Basophil % 0.1 %    Immature Granulocyte % 1.6 %   POCT Glucose    Collection Time: 02/18/24  9:28 AM   Result Value Ref Range    POC Glucose  213 (H) 70 - 99 mg/dL   POCT Glucose    Collection Time: 02/18/24  1:54 PM   Result Value Ref Range    POC Glucose  228 (H) 70 - 99 mg/dL   POCT Glucose    Collection Time: 02/18/24  5:08 PM   Result Value Ref Range    POC Glucose  293 (H) 70 - 99 mg/dL   POCT Glucose    Collection Time: 02/18/24  8:38 PM   Result Value Ref Range    POC Glucose  153 (H) 70 - 99 mg/dL   Renal Function Panel    Collection Time: 02/19/24  5:36 AM   Result Value Ref Range    Glucose 77 70 - 99 mg/dL    Sodium 145 136 - 145 mmol/L    Potassium 4.3 3.5 - 5.1 mmol/L    Chloride 114 (H) 98 - 112 mmol/L    CO2 26.0 21.0 - 32.0 mmol/L    Anion Gap 5 0 - 18 mmol/L    BUN 45 (H) 9 - 23 mg/dL    Creatinine 1.08 (H) 0.55 - 1.02 mg/dL    BUN/CREA Ratio 41.7 (H) 10.0 - 20.0    Calcium, Total 8.1 (L) 8.7 - 10.4 mg/dL    Calculated Osmolality 310 (H) 275 - 295 mOsm/kg    eGFR-Cr 57 (L) >=60  mL/min/1.73m2    Albumin 2.2 (L) 3.2 - 4.8 g/dL    Phosphorus 3.7 2.4 - 5.1 mg/dL   Magnesium    Collection Time: 02/19/24  5:36 AM   Result Value Ref Range    Magnesium 1.8 1.6 - 2.6 mg/dL   CBC W/ DIFFERENTIAL    Collection Time: 02/19/24  5:36 AM   Result Value Ref Range    WBC 22.1 (H) 4.0 - 11.0 x10(3) uL    RBC 2.62 (L) 3.80 - 5.30 x10(6)uL    HGB 7.2 (L) 12.0 - 16.0 g/dL    HCT 22.9 (L) 35.0 - 48.0 %    MCV 87.4 80.0 - 100.0 fL    MCH 27.5 26.0 - 34.0 pg    MCHC 31.4 31.0 - 37.0 g/dL    RDW-SD 51.9 (H) 35.1 - 46.3 fL    RDW 16.1 (H) 11.0 - 15.0 %    .0 150.0 - 450.0 10(3)uL    Neutrophil Absolute Prelim 18.64 (H) 1.50 - 7.70 x10 (3) uL    Neutrophil Absolute 18.64 (H) 1.50 - 7.70 x10(3) uL    Lymphocyte Absolute 1.56 1.00 - 4.00 x10(3) uL    Monocyte Absolute 1.48 (H) 0.10 - 1.00 x10(3) uL    Eosinophil Absolute 0.16 0.00 - 0.70 x10(3) uL    Basophil Absolute 0.04 0.00 - 0.20 x10(3) uL    Immature Granulocyte Absolute 0.23 0.00 - 1.00 x10(3) uL    Neutrophil % 84.3 %    Lymphocyte % 7.1 %    Monocyte % 6.7 %    Eosinophil % 0.7 %    Basophil % 0.2 %    Immature Granulocyte % 1.0 %   ABORH (Blood Type)    Collection Time: 02/19/24  7:47 AM   Result Value Ref Range    ABO BLOOD TYPE A     RH BLOOD TYPE Positive    Antibody Screen    Collection Time: 02/19/24  7:47 AM   Result Value Ref Range    Antibody Screen Negative    Prepare RBC Once    Collection Time: 02/19/24  8:43 AM   Result Value Ref Range    Blood Product [id number]     Unit Number [id number]     UNIT ABO A     UNIT RH POS     Product Status Issued     Expiration Date [id number]     Blood Type Barcode 6200     Unit Volume 350 ml   POCT Glucose    Collection Time: 02/19/24  9:27 AM   Result Value Ref Range    POC Glucose  105 (H) 70 - 99 mg/dL         Noah Perez MD     CALL [facility] AT [phone] IF QUESTIONS OR CONCERNS  THANKS

## 2024-02-20 NOTE — PROGRESS NOTES
[facility]    Progress Note    Ava Bowen Patient Status:  Inpatient    10/6/1955 MRN [id number]   Location [facility] 3W/SW Attending Edy Foote DO   Hosp Day # 13 PCP Ernesto De Dios     Subjective:  Doing well  Appetite is improving    Diabetes History:  Type 2 DM  Patient follows with me in the diabetes clinic  A1c is 7.9 % ( 2024)  Home regimen:   Glipizide 5 mg BID  Ozempic to 2 mg weekly     A comprehensive 10 point review of systems was completed.  Pertinent positives and negatives noted in the the HPI.      Objective/Physical Exam:  Physical Exam:   Vital Signs:  Blood pressure 110/49, pulse 61, temperature 99.2 °F (37.3 °C), temperature source Oral, resp. rate 16, height 5' 4.02\" (1.626 m), weight 180 lb 3.2 oz (81.7 kg), SpO2 93%, not currently breastfeeding.      General Appearance:  alert, well developed, in no acute distress  Head: Atraumatic  Eyes:  normal conjunctivae, sclera., normal sclera and normal pupils  Throat/Neck: normal sound to voice. Normal hearing, normal speech  Respiratory:  Speaking in full sentences, non-labored. no increased work of breathing, no audible wheezing    Neuro: motor grossly intact, moving all extremities without difficulty  Psychiatric:  oriented to time, self, and place  Extremities: s/p R AKA      Labs:  Bg reviewed    Assessment/Plan:  Patient Active Problem List   Diagnosis    Anemia    Azotemia    Hypokalemia    Back pain with radiation    Type 2 diabetes mellitus without retinopathy (HCC)    Age-related nuclear cataract of both eyes    Glaucoma suspect of both eyes    Centrilobular emphysema (HCC)    PVD (peripheral vascular disease) (HCC)    Diabetic ulcer of left midfoot associated with type 2 diabetes mellitus, with fat layer exposed (HCC)    PAD (peripheral artery disease) (HCC)    Pre-op testing    Family history of glaucoma in sister    Primary hypertension    S/P laminectomy    Post-operative pain    Type 2  diabetes mellitus with hyperglycemia, with long-term current use of insulin ([facility])    Myalgia    DDD (degenerative disc disease), lumbar    Failed back syndrome of lumbar spine    Chest pain    Hyperlipidemia    Urinary tract infection    Acute cystitis without hematuria    Ileitis    COVID-19    UTI (urinary tract infection)    Pyelonephritis    Hypernatremia    Hyperglycemia    Ureteral calculi    Hypoglycemia    Opioid dependence, uncomplicated ([facility])    Depression, recurrent ([facility])    Stage 3 chronic kidney disease, unspecified whether stage 3a or 3b CKD ([facility])    Bypass graft mechanical complication ([facility])    Bypass graft mechanical complication, initial encounter ([facility])    Anemia, unspecified type    Acute kidney injury superimposed on CKD ([facility])    Uncontrolled type 2 diabetes mellitus with hyperglycemia ([facility])       Type 2 DM  With PAD   S/p AKA     Dextrose at 100 ml/hr  Discussed with RN to let me know if this is stopped  Will adjust insulin based on that     PLAN:   Tresiba 10 units nightly  Novolog 5 TID with meals along with CF  Accuchecks ac and hs  Hypoglycemia protocol  Diabetic diet     We will follow        Luna Ludwig MD

## 2024-02-20 NOTE — PROGRESS NOTES
NEPHROLOGY DAILY PROGRESS NOTE     SUBJECTIVE:  Patient seen and examined at bedside. No complaints    OBJECTIVE:    Total Intake/Output:    Intake/Output Summary (Last 24 hours) at 2/20/2024 1636  Last data filed at 2/20/2024 1546  Gross per 24 hour   Intake 660 ml   Output 1760 ml   Net -1100 ml       PHYSICAL EXAM:  /50 (BP Location: Right arm)   Pulse 67   Temp 99.3 °F (37.4 °C) (Oral)   Resp 18   Ht 5' 4.02\" (1.626 m)   Wt 180 lb 3.2 oz (81.7 kg)   LMP  (LMP Unknown)   SpO2 94%   BMI 30.92 kg/m²   GEN: NAD, on oxygen     HEENT: NCAT    CHEST: no distress  CARDIAC: S1S2 normal  ABD: soft, NT/ND  EXT: + lower ext edema noted, swelling R hand noted      NEURO: awake, answering questions       CURRENT MEDICATIONS:     pantoprazole  40 mg Oral QAM AC    insulin degludec  17 Units Subcutaneous Nightly    insulin aspart  7 Units Subcutaneous TID CC    HYDROcodone-acetaminophen  2 tablet Oral q12h    [START ON 2/21/2024] amLODIPine  10 mg Oral QAM    carvedilol  25 mg Oral BID    hydrALAZINE  100 mg Oral Q8H    fluconazole  200 mg Oral Daily    vancomycin  1,000 mg Intravenous Q36H    magnesium sulfate  4 g Intravenous Once    insulin aspart  1-7 Units Subcutaneous TID CC    vancomycin  125 mg Oral Daily    sodium hypochlorite   Topical 2 times per day    fluticasone furoate-vilanterol  1 puff Inhalation Daily    rosuvastatin  40 mg Oral Nightly    meropenem  500 mg Intravenous q12h             LABS:  Patient Labs Reviewed in Detail. Pertinent Labs as follows:  Recent Labs   Lab 02/18/24  0632 02/19/24  0536 02/20/24  0627   * 77 162*   BUN 41* 45* 46*   CREATSERUM 1.20* 1.08* 1.17*   EGFRCR 50* 57* 51*   CA 8.0* 8.1* 8.0*    145 143   K 4.2 4.3 4.1    114* 111   CO2 24.0 26.0 24.0     Recent Labs   Lab 02/18/24  0632 02/19/24  0536 02/19/24  1159 02/20/24  0627   RBC 2.57* 2.62*  --  2.94*   HGB 7.5* 7.2* 8.2* 8.4*   HCT 22.0* 22.9* 25.7* 25.2*   MCV 85.6 87.4  --  85.7   MCH 29.2 27.5   --  28.6   MCHC 34.1 31.4  --  33.3   RDW 15.7* 16.1*  --  16.1*   NEPRELIM 14.12* 18.64*  --  19.95*   WBC 17.4* 22.1*  --  22.7*   .0 417.0  --  422.0       ASSESSMENT AND PLAN:   68 year old female with PMH of COPD, DM2, HTN, HL, PVD, Sickle cell anemia, CKD stage 3, recent fem-pop artery bypass admitted with acute hemorrhage from incision site.  Patient is s/p OR for repair.  Nephrology is consulted for OMEGA and metabolic acidosis.         OMEGA on CKD stage 3  - baseline around 1.2  - creatinine improved near baseline, has evidence of fluid overload and has been receiving IV lasix.   - hold diuretics today, monitor response  - follow renal fxn and I/Os     Hypertension   - amlodipine, hydralazine, coreg  - weaned off nicardipine   - lisinopril held given recent OMEGA, can resume if needed for blood pressure control now with improved kidney function  - cards following     Metabolic acidosis:  - resolved     Hypernatremia:  - resolved     Hypokalemia/ hypomagnesemia:  - replete per protocol     Hypocalcemia:  - corrects to normal given hypoalbuminemia   - monitor Ca levels         We will continue to follow      Felipe Lei MD  [facility] Neph[facility]

## 2024-02-20 NOTE — PROGRESS NOTES
[facility]    Progress Note    Ava Bowen Patient Status:  Inpatient    10/6/1955 MRN [id number]   Location [facility] 3W/SW Attending Edy Foote,    Hosp Day # 10 PCP Ernesto De Dios     Subjective:   Ava Bowen is a(n) 68 year old female      Feeling better   l c/o Rt leg pain  Objective:   Vital Signs:  Blood pressure 123/50, pulse 68, temperature 99.2 °F (37.3 °C), temperature source Oral, resp. rate 18, height 5' 4.02\" (1.626 m), weight 180 lb 3.2 oz (81.7 kg), SpO2 95%, not currently breastfeeding.                    General: Awake and alert.    HENT: Eye: EOMI,    Neck/Thyroid: neck inspection: normal   Lungs:  Speaking full sentences  MSK: Moves extremities spontaneously.    Neuro:speech is clear. Awake, alert   Psych: Orientated to time, place, person & situation    Skin: Skin is dry       Assessment and Plan:     Bypass graft mechanical complication, initial encounter (HCC)    Hyperglycemia    Bypass graft mechanical complication (HCC)    Anemia, unspecified type    Acute kidney injury superimposed on CKD  (HCC)    Uncontrolled type 2 diabetes mellitus with hyperglycemia (HCC)    PMH of COPD, DM2, HTN, HL, PVD, Sickle cell anemia, CKD stage 3, recent fem-pop artery bypass admitted with acute hemorrhage from incision site. Patient is s/p OR for repair.           Uncontrolled Type 2 Diabetes  Tresiba 15->17 units at night  Novolog  5->7 units TIDAC  medium dose CF scale with regular insulin after midnight  - We will follow blood sugars      I discussed plan with pt     Thank you for allowing me to participate in the care of your patient.         Results:      Latest Reference Range & Units 24 12:35 24 16:21 24 21:26 24 08:36   POC GLUCOSE 70 - 99 mg/dL 171 (H) 158 (H) 205 (H) 186 (H)   (   Lab Results   Component Value Date    WBC 22.7 (H) 2024    HGB 8.4 (L) 2024    HCT 25.2 (L) 2024    .0 2024    CREATSERUM  1.17 (H) 02/20/2024    BUN 46 (H) 02/20/2024     02/20/2024    K 4.1 02/20/2024     02/20/2024    CO2 24.0 02/20/2024     (H) 02/20/2024    CA 8.0 (L) 02/20/2024    ALB 2.2 (L) 02/19/2024    ALKPHO 62 02/10/2024    BILT 0.4 02/10/2024    TP 4.6 (L) 02/10/2024    AST 91 (H) 02/10/2024    ALT 38 02/10/2024    PTT 30.3 02/10/2024    INR 1.51 (H) 02/10/2024    T4F 1.3 06/18/2019    TSH 1.060 06/18/2019    LIP 40 04/25/2023    DDIMER 1.45 (H) 11/08/2019    ESRML 27 05/08/2020    CRP 3.11 (H) 05/08/2020    MG 1.8 02/19/2024    PHOS 3.7 02/19/2024    TROP <0.045 11/08/2019    CK 35 08/21/2023    B12 963 09/03/2019       No results found.              Dougie Casarez MD  2/20/2024        DOS is the same date the note was signed

## 2024-02-20 NOTE — PLAN OF CARE
Problem: Patient Centered Care  Goal: Patient preferences are identified and integrated in the patient's plan of care  Description: Interventions:  - What would you like us to know as we care for you? From home alone  - Provide timely, complete, and accurate information to patient/family  - Incorporate patient and family knowledge, values, beliefs, and cultural backgrounds into the planning and delivery of care  - Encourage patient/family to participate in care and decision-making at the level they choose  - Honor patient and family perspectives and choices  Outcome: Progressing     Problem: Diabetes/Glucose Control  Goal: Glucose maintained within prescribed range  Description: INTERVENTIONS:  - Monitor Blood Glucose as ordered  - Assess for signs and symptoms of hyperglycemia and hypoglycemia  - Administer ordered medications to maintain glucose within target range  - Assess barriers to adequate nutritional intake and initiate nutrition consult as needed  - Instruct patient on self management of diabetes  Outcome: Progressing     Problem: Patient/Family Goals  Goal: Patient/Family Long Term Goal  Description: Patient's Long Term Goal: discharge    Interventions:  - Pain management  - Surgical intervention  - Wound care  - See additional Care Plan goals for specific interventions  Outcome: Progressing  Goal: Patient/Family Short Term Goal  Description: Patient's Short Term Goal: pain management    Interventions:   - See additional Care Plan goals for specific interventions  Outcome: Progressing     Problem: CARDIOVASCULAR - ADULT  Goal: Maintains optimal cardiac output and hemodynamic stability  Description: INTERVENTIONS:  - Monitor vital signs, rhythm, and trends  - Monitor for bleeding, hypotension and signs of decreased cardiac output  - Evaluate effectiveness of vasoactive medications to optimize hemodynamic stability  - Monitor arterial and/or venous puncture sites for bleeding and/or hematoma  - Assess  quality of pulses, skin color and temperature  - Assess for signs of decreased coronary artery perfusion - ex. Angina  - Evaluate fluid balance, assess for edema, trend weights  Outcome: Progressing  Goal: Absence of cardiac arrhythmias or at baseline  Description: INTERVENTIONS:  - Continuous cardiac monitoring, monitor vital signs, obtain 12 lead EKG if indicated  - Evaluate effectiveness of antiarrhythmic and heart rate control medications as ordered  - Initiate emergency measures for life threatening arrhythmias  - Monitor electrolytes and administer replacement therapy as ordered  Outcome: Progressing     Problem: RESPIRATORY - ADULT  Goal: Achieves optimal ventilation and oxygenation  Description: INTERVENTIONS:  - Assess for changes in respiratory status  - Assess for changes in mentation and behavior  - Position to facilitate oxygenation and minimize respiratory effort  - Oxygen supplementation based on oxygen saturation or ABGs  - Provide Smoking Cessation handout, if applicable  - Encourage broncho-pulmonary hygiene including cough, deep breathe, Incentive Spirometry  - Assess the need for suctioning and perform as needed  - Assess and instruct to report SOB or any respiratory difficulty  - Respiratory Therapy support as indicated  - Manage/alleviate anxiety  - Monitor for signs/symptoms of CO2 retention  Outcome: Progressing     Problem: METABOLIC/FLUID AND ELECTROLYTES - ADULT  Goal: Glucose maintained within prescribed range  Description: INTERVENTIONS:  - Monitor Blood Glucose as ordered  - Assess for signs and symptoms of hyperglycemia and hypoglycemia  - Administer ordered medications to maintain glucose within target range  - Assess barriers to adequate nutritional intake and initiate nutrition consult as needed  - Instruct patient on self management of diabetes  Outcome: Progressing  Goal: Electrolytes maintained within normal limits  Description: INTERVENTIONS:  - Monitor labs and rhythm and  assess patient for signs and symptoms of electrolyte imbalances  - Administer electrolyte replacement as ordered  - Monitor response to electrolyte replacements, including rhythm and repeat lab results as appropriate  - Fluid restriction as ordered  - Instruct patient on fluid and nutrition restrictions as appropriate  Outcome: Progressing  Goal: Hemodynamic stability and optimal renal function maintained  Description: INTERVENTIONS:  - Monitor labs and assess for signs and symptoms of volume excess or deficit  - Monitor intake, output and patient weight  - Monitor urine specific gravity, serum osmolarity and serum sodium as indicated or ordered  - Monitor response to interventions for patient's volume status, including labs, urine output, blood pressure (other measures as available)  - Encourage oral intake as appropriate  - Instruct patient on fluid and nutrition restrictions as appropriate  Outcome: Progressing     Patient is alert and orientated x 4. Patient is on RA. Patient is on IV ABT. Patient has midline in place. Patient has wound vac in place. Patient has joseph in place

## 2024-02-20 NOTE — PROGRESS NOTES
[facility]    Cardiology Progress Note    Ava Bowen Patient Status:  Inpatient    10/6/1955 MRN [id number]   Location [facility] 3W/SW Attending Edy Foote,    Hosp Day # 10 PCP Ernesto De Dios       Impression/Plan:  68 year old female presenting with:    Hemorrhage from LE fem pop bypass site, infected.   -S/P debridement 24  -S/P debridement 24  HTN  HL  COPD  DM  PAD s/p fem-pop bypass 2024  Sickle cell anemia  -Hb 7.1  Acute resp failure, now extubated  OMEGA on CKD  HFpEF    - Cont statin  - Cont amlodipine, carvedilol, hydralazine for BP control; titrate as needed  - AKA as per vascular surgery  - Monitor on tele  - Will follow     Subjective: No events overnight.  Today patient denies chest pain, palpitations, dyspnea.  Inquiring about surgical plan.    Patient Active Problem List   Diagnosis    Anemia    Azotemia    Hypokalemia    Back pain with radiation    Type 2 diabetes mellitus without retinopathy (HCC)    Age-related nuclear cataract of both eyes    Glaucoma suspect of both eyes    Centrilobular emphysema (HCC)    PVD (peripheral vascular disease) (HCC)    Diabetic ulcer of left midfoot associated with type 2 diabetes mellitus, with fat layer exposed (HCC)    PAD (peripheral artery disease) ([facility])    Pre-op testing    Family history of glaucoma in sister    Primary hypertension    S/P laminectomy    Post-operative pain    Type 2 diabetes mellitus with hyperglycemia, with long-term current use of insulin (HCC)    Myalgia    DDD (degenerative disc disease), lumbar    Failed back syndrome of lumbar spine    Chest pain    Hyperlipidemia    Urinary tract infection    Acute cystitis without hematuria    Ileitis    COVID-19    UTI (urinary tract infection)    Pyelonephritis    Hypernatremia    Hyperglycemia    Ureteral calculi    Hypoglycemia    Opioid dependence, uncomplicated (HCC)    Depression, recurrent (HCC)    Stage 3 chronic kidney disease,  unspecified whether stage 3a or 3b CKD (HCC)    Bypass graft mechanical complication (HCC)    Bypass graft mechanical complication, initial encounter (HCC)    Anemia, unspecified type    Acute kidney injury superimposed on CKD  (HCC)    Uncontrolled type 2 diabetes mellitus with hyperglycemia (HCC)       Objective:   Temp: 98.6 °F (37 °C)  Pulse: 68  Resp: 18  BP: 130/58    Intake/Output:     Intake/Output Summary (Last 24 hours) at 2/20/2024 1045  Last data filed at 2/20/2024 0630  Gross per 24 hour   Intake 752.5 ml   Output 2210 ml   Net -1457.5 ml       Last 3 Weights   02/20/24 0554 180 lb 3.2 oz (81.7 kg)   02/19/24 0445 189 lb 1.6 oz (85.8 kg)   02/18/24 0614 186 lb 1.6 oz (84.4 kg)   02/17/24 0403 181 lb 11.2 oz (82.4 kg)   02/16/24 0500 182 lb 3.2 oz (82.6 kg)   02/14/24 0500 185 lb 13.6 oz (84.3 kg)   02/13/24 0500 183 lb 3.2 oz (83.1 kg)   02/12/24 0400 180 lb 5.4 oz (81.8 kg)   02/11/24 0600 182 lb 5.1 oz (82.7 kg)   02/10/24 2142 171 lb 1.2 oz (77.6 kg)   02/10/24 2105 171 lb 1.2 oz (77.6 kg)   02/10/24 1642 164 lb 3.9 oz (74.5 kg)   01/27/24 0501 164 lb 3.2 oz (74.5 kg)   01/24/24 0549 158 lb (71.7 kg)   01/19/24 1112 160 lb (72.6 kg)   12/30/23 1216 155 lb (70.3 kg)   08/29/23 1357 153 lb (69.4 kg)       Tele: NSR, pSVT    Physical Exam:    General: Alert and oriented x 3. No apparent distress. No respiratory or constitutional distress.  HEENT: Normocephalic, anicteric sclera, neck supple, no thyromegaly or adenopathy.  Neck: No JVD, carotids 2+, no bruits.  Cardiac: Regular rate and rhythm. S1, S2 normal. No murmur, pericardial rub, S3, thrill, heave or extra cardiac sounds.  Lungs: Clear without wheezes, rales, rhonchi or dullness.  Normal excursions and effort.  Abdomen: Soft, non-tender. No organosplenomegally, mass or rebound, BS-present.  Neurologic: Alert and oriented, normal affect. No motor or coordinational deficit.  Skin: Warm and dry.     Laboratory/Data:    Labs:         Recent Labs   Lab  02/13/24  1559 02/13/24  2325 02/16/24  0623 02/17/24  0730 02/18/24  0632 02/19/24  0536 02/19/24  1159 02/20/24  0627   WBC 28.3*   < > 26.6* 18.9* 17.4* 22.1*  --  22.7*   HGB 8.3*   < > 8.1* 7.1* 7.5* 7.2* 8.2* 8.4*   MCV 82.5   < > 83.4 85.4 85.6 87.4  --  85.7   .0   < > 319.0 291.0 346.0 417.0  --  422.0   BAND 1  --   --   --   --   --   --   --     < > = values in this interval not displayed.       Recent Labs   Lab 02/15/24  0448 02/16/24 0623 02/16/24  1850 02/17/24  0730 02/18/24  0632 02/19/24  0536 02/20/24  0627    142  --  142 142 145 143   K 3.3* 3.5  3.4*  --  4.1  4.1 4.2 4.3 4.1    113*  --  114* 111 114* 111   CO2 24.0 22.0  --  21.0 24.0 26.0 24.0   BUN 17 23  --  27* 41* 45* 46*   CREATSERUM 0.93 1.01  --  1.06* 1.20* 1.08* 1.17*   CA 7.5* 7.6*  --  7.7* 8.0* 8.1* 8.0*   MG  --  1.5* 1.7 1.8 1.8 1.8  --    PHOS 4.3 4.1  --  5.1 4.1 3.7  --    * 114*  --  180* 201* 77 162*       Recent Labs   Lab 02/17/24  0730 02/18/24  0632 02/19/24  0536   ALB 2.2* 2.2* 2.2*       No results for input(s): \"TROP\" in the last 168 hours.    Allergies:   Allergies   Allergen Reactions    Penicillins HIVES and ANGIOEDEMA     Hives on eyelids (occurred when pt was in her 20s). Tolerated amoxicillin per chart. Tolerates cephalosporins.       Medications:  Current Facility-Administered Medications   Medication Dose Route Frequency    pantoprazole (Protonix) DR tab 40 mg  40 mg Oral QAM AC    insulin degludec 100 units/mL flextouch 17 Units  17 Units Subcutaneous Nightly    insulin aspart (NovoLOG) 100 Units/mL FlexPen 7 Units  7 Units Subcutaneous TID CC    fluconazole (Diflucan) tab 200 mg  200 mg Oral Daily    HYDROcodone-acetaminophen (Norco) 5-325 MG per tab 1 tablet  1 tablet Oral q12h    vancomycin (Vancocin) 1,000 mg in sodium chloride 0.9% 250 mL IVPB-ADDV  1,000 mg Intravenous Q36H    HYDROcodone-acetaminophen (Norco)  MG per tab 1 tablet  1 tablet Oral Q4H PRN     HYDROcodone-acetaminophen (Norco)  MG per tab 2 tablet  2 tablet Oral Q4H PRN    acetaminophen (Tylenol) tab 650 mg  650 mg Oral Q4H PRN    morphINE 20 mg/mL concentrated oral solution 5 mg  5 mg Oral Q4H PRN    magnesium sulfate 4 g/100mL IVPB premix 4 g  4 g Intravenous Once    ondansetron (Zofran) 4 MG/2ML injection 4 mg  4 mg Intravenous Q6H PRN    HYDROmorphone (Dilaudid) 1 MG/ML injection 0.5 mg  0.5 mg Intravenous Q3H PRN    insulin aspart (NovoLOG) 100 Units/mL FlexPen 1-7 Units  1-7 Units Subcutaneous TID CC    carvedilol (Coreg) tab 25 mg  25 mg Per NG Tube BID    vancomycin (Firvanq) 50 mg/mL oral solution 125 mg  125 mg Oral Daily    morphINE PF 2 MG/ML injection 1 mg  1 mg Intravenous Q2H PRN    Or    morphINE PF 2 MG/ML injection 2 mg  2 mg Intravenous Q2H PRN    Or    morphINE PF 4 MG/ML injection 4 mg  4 mg Intravenous Q2H PRN    sodium hypochlorite (Dakin's) 0.125 % external solution   Topical 2 times per day    hydrALAzine (Apresoline) 20 mg/mL injection 10 mg  10 mg Intravenous Q4H PRN    amLODIPine (Norvasc) tab 10 mg  10 mg Per NG Tube QAM    hydrALAZINE (Apresoline) tab 100 mg  100 mg Per NG Tube Q8H    ondansetron (Zofran) 4 MG/2ML injection 4 mg  4 mg Intravenous Q6H PRN    metoclopramide (Reglan) 5 mg/mL injection 5 mg  5 mg Intravenous Q8H PRN    albuterol (Ventolin HFA) 108 (90 Base) MCG/ACT inhaler 2 puff  2 puff Inhalation Q6H PRN    fluticasone furoate-vilanterol (Breo Ellipta) 200-25 MCG/ACT inhaler 1 puff  1 puff Inhalation Daily    ipratropium-albuterol (Duoneb) 0.5-2.5 (3) MG/3ML inhalation solution 3 mL  3 mL Nebulization Q6H PRN    rosuvastatin (Crestor) tab 40 mg  40 mg Oral Nightly    glucose (Dex4) 15 GM/59ML oral liquid 15 g  15 g Oral Q15 Min PRN    Or    glucose (Glutose) 40% oral gel 15 g  15 g Oral Q15 Min PRN    Or    glucose-vitamin C (Dex-4) chewable tab 4 tablet  4 tablet Oral Q15 Min PRN    Or    dextrose 50% injection 50 mL  50 mL Intravenous Q15 Min PRN    Or     glucose (Dex4) 15 GM/59ML oral liquid 30 g  30 g Oral Q15 Min PRN    Or    glucose (Glutose) 40% oral gel 30 g  30 g Oral Q15 Min PRN    Or    glucose-vitamin C (Dex-4) chewable tab 8 tablet  8 tablet Oral Q15 Min PRN    meropenem (Merrem) 500 mg in sodium chloride 0.9% 100 mL IVPB-MBP  500 mg Intravenous q12h       Jerzy Lawler MD  2/20/2024  10:45 AM

## 2024-02-20 NOTE — PROGRESS NOTES
NEPHROLOGY DAILY PROGRESS NOTE     SUBJECTIVE:  Patient seen and examined at bedside. No complaints    OBJECTIVE:    Total Intake/Output:    Intake/Output Summary (Last 24 hours) at 2/19/2024 1826  Last data filed at 2/19/2024 1600  Gross per 24 hour   Intake 1592.5 ml   Output 2550 ml   Net -957.5 ml       PHYSICAL EXAM:  BP (P) 102/45   Pulse 65   Temp 98 °F (36.7 °C) (Oral)   Resp 18   Ht 5' 4.02\" (1.626 m)   Wt 189 lb 1.6 oz (85.8 kg)   LMP  (LMP Unknown)   SpO2 93%   BMI 32.44 kg/m²   GEN: NAD, on oxygen     HEENT: NCAT    CHEST: no distress  CARDIAC: S1S2 normal  ABD: soft, NT/ND  EXT: + lower ext edema noted, swelling R hand noted      NEURO: awake, answering questions       CURRENT MEDICATIONS:     fluconazole  200 mg Oral Daily    HYDROcodone-acetaminophen  1 tablet Oral q12h    insulin aspart  5 Units Subcutaneous TID CC    insulin degludec  15 Units Subcutaneous Nightly    vancomycin  1,000 mg Intravenous Q36H    magnesium sulfate  4 g Intravenous Once    insulin aspart  1-7 Units Subcutaneous TID CC    carvedilol  25 mg Per NG Tube BID    vancomycin  125 mg Oral Daily    sodium hypochlorite   Topical 2 times per day    amLODIPine  10 mg Per NG Tube QAM    hydrALAZINE  100 mg Per NG Tube Q8H    pantoprazole  40 mg Intravenous Q24H    fluticasone furoate-vilanterol  1 puff Inhalation Daily    rosuvastatin  40 mg Oral Nightly    meropenem  500 mg Intravenous q12h             LABS:  Patient Labs Reviewed in Detail. Pertinent Labs as follows:  Recent Labs   Lab 02/17/24  0730 02/18/24  0632 02/19/24  0536   * 201* 77   BUN 27* 41* 45*   CREATSERUM 1.06* 1.20* 1.08*   EGFRCR 58* 50* 57*   CA 7.7* 8.0* 8.1*    142 145   K 4.1  4.1 4.2 4.3   * 111 114*   CO2 21.0 24.0 26.0     Recent Labs   Lab 02/17/24  0730 02/18/24  0632 02/19/24  0536 02/19/24  1159   RBC 2.47* 2.57* 2.62*  --    HGB 7.1* 7.5* 7.2* 8.2*   HCT 21.1* 22.0* 22.9* 25.7*   MCV 85.4 85.6 87.4  --    MCH 28.7 29.2 27.5   --    MCHC 33.6 34.1 31.4  --    RDW 16.0* 15.7* 16.1*  --    NEPRELIM 16.87* 14.12* 18.64*  --    WBC 18.9* 17.4* 22.1*  --    .0 346.0 417.0  --        ASSESSMENT AND PLAN:   68 year old female with PMH of COPD, DM2, HTN, HL, PVD, Sickle cell anemia, CKD stage 3, recent fem-pop artery bypass admitted with acute hemorrhage from incision site.  Patient is s/p OR for repair.  Nephrology is consulted for OMEGA and metabolic acidosis.         OMEGA on CKD stage 3  - baseline around 1.2  - creatinine improved near baseline, has evidence of fluid overload and has been receiving IV lasix.   - hold diuretics today, monitor response  - follow renal fxn and I/Os     Hypertension   - amlodipine, hydralazine, coreg  - weaned off nicardipine   - lisinopril held given recent OMEGA, can resume if needed for blood pressure control now with improved kidney function  - cards following     Metabolic acidosis:  - resolved     Hypernatremia:  - resolved     Hypokalemia/ hypomagnesemia:  - replete per protocol     Hypocalcemia:  - corrects to normal given hypoalbuminemia   - monitor Ca levels         We will continue to follow      Felipe Lei MD  [facility] - Nephrology

## 2024-02-20 NOTE — PROGRESS NOTES
[facility] Infectious Disease  Progress Note    Ava Bowen Patient Status:  Inpatient    10/6/1955 MRN [id number]   Location [facility] 3W/SW Attending Edy Foote DO   Hosp Day # 10 PCP Ernesto De Dios     Subjective:  Patient seen and examined in bed. Reports feeling tired today. Pain controlled this morning. No acute overnight events. Wound VAC remains intact. Tolerating IV antibiotics. Plans for AKA Thursday. Denies F/C. Denies n/v/d. Otherwise no new complaints.     Objective:  Blood pressure 130/58, pulse 68, temperature 98.6 °F (37 °C), temperature source Oral, resp. rate 18, height 5' 4.02\" (1.626 m), weight 180 lb 3.2 oz (81.7 kg), SpO2 95%, not currently breastfeeding.    Intake/Output:    Intake/Output Summary (Last 24 hours) at 2024 0928  Last data filed at 2024 0630  Gross per 24 hour   Intake 752.5 ml   Output 2210 ml   Net -1457.5 ml       Physical Exam:  General: Awake, alert, non-tox, NAD.  HEENT:  Oropharynx clear, trachea ML.  Heart: RRR S1S2 no murmurs.  Lungs: Essentially CTA b/l, no rhonchi, rales, wheezes.  Abdomen: Soft, NT/ND.  BS present.  No organomegaly.  Extremity: RLE wrapped. + wound VAC. + R pedal edema.  Neurological: No focal deficits.  Derm:  Warm and dry.    Lab Data Review:  Lab Results   Component Value Date    WBC 22.7 2024    HGB 8.4 2024    HCT 25.2 2024    .0 2024    CREATSERUM 1.17 2024    BUN 46 2024     2024    K 4.1 2024     2024    CO2 24.0 2024     2024    CA 8.0 2024        Cultures:  Hospital Encounter on 02/10/24   1. Blood Culture     Status: None    Collection Time: 24 11:56 AM    Specimen: Blood,peripheral   Result Value Ref Range    Blood Culture Result No Growth 5 Days N/A   2. Tissue Aerobic Culture     Status: Abnormal    Collection Time: 24 11:42 AM    Specimen: Femoral graft;  Tissue   Result Value Ref Range    Tissue Culture Result  N/A     Mixture of organisms suggestive of normal skin karla    Tissue Culture Result 1+ growth Klebsiella pneumoniae (A) N/A    Tissue Smear No WBCs seen N/A    Tissue Smear No organisms seen N/A   3. Anaerobic Culture     Status: None    Collection Time: 02/13/24 11:42 AM    Specimen: Femoral graft; Tissue   Result Value Ref Range    Anaerobic Culture No Anaerobes isolated N/A   4. Aerobic Bacterial Culture     Status: Abnormal    Collection Time: 02/13/24 11:36 AM    Specimen: Calf, right; Body fluid, unspecified   Result Value Ref Range    Aerobic Culture Result  N/A     Mixture of organisms suggestive of normal skin karla    Aerobic Culture Result 1+ growth Klebsiella pneumoniae (A) N/A    Aerobic Smear 1+ WBCs seen N/A    Aerobic Smear No organisms seen N/A   5. Urine Culture, Routine     Status: Abnormal    Collection Time: 02/12/24  4:19 PM    Specimen: Urine, joseph catheter   Result Value Ref Range    Urine Culture 10,000 - 50,000 CFU/ML Candida albicans (A) N/A       Radiology:  No results found.      Assessment and Plan:  1.  Acute gram-negative sepsis with post-op infection of R fem-pop bypass on 1/24/24  - Blood cultures on admission isolating Klebsiella pneumoniae.  - All repeat blood cultures remain negative to date.  - S/p emergent OR where extensive debridement and patch was performed noting infected graft material on 2/10/24.  - OR cultures are polymicrobial isolating Klebsiella, Enterococcus, Strep anginosus, E.coli, Bacteroides and Prevotella.   - Return to OR, 2/13, for further debridement and VAC placement.  - OR cultures isolating Finegoldia, Klebsiella, E.coli, Enterococcus and Candida  - Return to OR again on 2/16 for debridement and placement of Kerecis grafts -- now plans for AKA Thursday.  - IV vancomycin + IV meropenem + PO Diflucan, ongoing.    2.  Multifactorial leukocytosis and low-grade fever secondary to the above  - WBC  trend variable -- currently at 22.7K as of this AM.  - C. difficile PCR negative.  - Temp (24hrs), Av.4 °F (36.9 °C), Min:97.8 °F (36.6 °C), Max:99.2 °F (37.3 °C)  - Will trend.    3.  OMEGA on CKD3  - Baseline Cr around 1.2 -- 1.17 as of this AM.  - Diuresis on hold.   - As per nephrology.    3.  DM2  - Optimal glycemic control.  - Endo following.    4.  Recs  - Continue IV vancomycin + IV meropenem + PO Diflucan at this time.  Pharmacy to dose.  - F/u WBC and fever curve.  - F/u cultures.  - Diuresis as per cardiology/nephrology.  - Post-op care/VAC management as per surgery -- plans for AKA Thursday  - Supportive care as per the primary team.  - Discussed plan of care with nursing.  - Discussed plan of care with patient.  All questions addressed understanding verbalized.  - Further recommendations pending clinical course.    Please note that this report has been produced using speech recognition software and may contain errors related to that system including, but not limited to, errors in grammar, punctuation, and spelling, as well as words and phrases that possibly may have been recognized inappropriately.  If there are any questions or concerns, contact the dictating provider for clarification.     The  [employer] Cures Act makes medical notes like these available to patients in the interest of transparency. Please be advised this is a medical document. Medical documents are intended to carry relevant information, facts as evident, and the clinical opinion of the practitioner. The medical note is intended as peer to peer communication and may appear blunt or direct. It is written in medical language and may contain abbreviations or verbiage that are unfamiliar.     If you have any questions or concerns please call [facility] and [facility] Infectious Disease at [phone].     Dakota Villalba, KARINE    2024  9:28 AM

## 2024-02-20 NOTE — PROGRESS NOTES
02/20/24 1400   Negative Pressure Wound Therapy Groin Right   Placement Date: 02/13/24   Inserted by: Dr. KESSLER  Location: Groin  Wound Location Orientation: Right   Wound photographed/measured No   Machine Status (On) Yes   Site Assessment ANGELO   [place]-wound Assessment Edema;Painful;Moist;Fragile   Unit Type KCI ULTA   Cycle Continuous;On   Target Pressure (mmHg) 125   Drainage Description Serous   Dressing Status New Drainage;Dressing Reinforced   Canister Changed No   Wound Follow Up   Follow up needed Yes     Pt was seen after bedside RN administered pain meds for eval of leaking right groin wound vac dressing. The original vac sponge placed in the OR with Dr. Kessler was left undisturbed, wet drape was removed, area dried, skin prep and new drape applied, stoma paste used for creases. Patent seal obtained at 125 mmHg. Dr. Kessler notified of the dressing. Bedside Rn advised to call wound RN for any issues. Ace wrap re applied and right foot elevated off of mattress with a pillow.

## 2024-02-20 NOTE — PROGRESS NOTES
Vascular surgery progress note    Patient seen and examined.  Much more awake alert.  She is still having ongoing pain issues.  Will work on pain control.  Her dressings are intact and wound VAC is holding suction.  No erythema, induration, fluctuance.  Her edema is much improved.  In the right foot she has ongoing ischemic changes.  Plan for right lower extremity amputation by Dr. segura on Thursday.  Further management per hospita list and other consulting services.

## 2024-02-20 NOTE — PROGRESS NOTES
02/20/24 1000   Negative Pressure Wound Therapy Groin Right   Placement Date: 02/13/24   Inserted by: Dr. KESSLER  Location: Groin  Wound Location Orientation: Right   Wound photographed/measured No   Machine Status (On) Yes   Site Assessment ANGELO   Lisbet-wound Assessment ANGELO   Unit Type KCI ULTA   Cycle Continuous;On   Target Pressure (mmHg) 125   Drainage Description Serous;Yellow   Dressing Status Intact   Canister Changed No   Wound Follow Up   Follow up needed Yes     Pt seen for wound vac check. Ace wrap remains in place to the R leg. Heels are elevated with pillows, feet warm to touch. Plan for wound vac dressing change by Dr. Kessler in the OR this week.

## 2024-02-20 NOTE — PLAN OF CARE
Pt alert and oriented x4. Reports pain to RLE. [place] administered for pain management. Wound vac in place. Draining serosanguinous drainage. Joseph in place with clear yellow urine. Lisbet and joseph care performed. Receiving IV abx. Placed on 2L O2 overnight for desaturation. Plan for AKA on Thursday.     Problem: Patient Centered Care  Goal: Patient preferences are identified and integrated in the patient's plan of care  Description: Interventions:  - What would you like us to know as we care for you? From home alone  - Provide timely, complete, and accurate information to patient/family  - Incorporate patient and family knowledge, values, beliefs, and cultural backgrounds into the planning and delivery of care  - Encourage patient/family to participate in care and decision-making at the level they choose  - Honor patient and family perspectives and choices  Outcome: Progressing     Problem: Diabetes/Glucose Control  Goal: Glucose maintained within prescribed range  Description: INTERVENTIONS:  - Monitor Blood Glucose as ordered  - Assess for signs and symptoms of hyperglycemia and hypoglycemia  - Administer ordered medications to maintain glucose within target range  - Assess barriers to adequate nutritional intake and initiate nutrition consult as needed  - Instruct patient on self management of diabetes  Outcome: Progressing     Problem: CARDIOVASCULAR - ADULT  Goal: Maintains optimal cardiac output and hemodynamic stability  Description: INTERVENTIONS:  - Monitor vital signs, rhythm, and trends  - Monitor for bleeding, hypotension and signs of decreased cardiac output  - Evaluate effectiveness of vasoactive medications to optimize hemodynamic stability  - Monitor arterial and/or venous puncture sites for bleeding and/or hematoma  - Assess quality of pulses, skin color and temperature  - Assess for signs of decreased coronary artery perfusion - ex. Angina  - Evaluate fluid balance, assess for edema, trend  weights  Outcome: Progressing  Goal: Absence of cardiac arrhythmias or at baseline  Description: INTERVENTIONS:  - Continuous cardiac monitoring, monitor vital signs, obtain 12 lead EKG if indicated  - Evaluate effectiveness of antiarrhythmic and heart rate control medications as ordered  - Initiate emergency measures for life threatening arrhythmias  - Monitor electrolytes and administer replacement therapy as ordered  Outcome: Progressing     Problem: RESPIRATORY - ADULT  Goal: Achieves optimal ventilation and oxygenation  Description: INTERVENTIONS:  - Assess for changes in respiratory status  - Assess for changes in mentation and behavior  - Position to facilitate oxygenation and minimize respiratory effort  - Oxygen supplementation based on oxygen saturation or ABGs  - Provide Smoking Cessation handout, if applicable  - Encourage broncho-pulmonary hygiene including cough, deep breathe, Incentive Spirometry  - Assess the need for suctioning and perform as needed  - Assess and instruct to report SOB or any respiratory difficulty  - Respiratory Therapy support as indicated  - Manage/alleviate anxiety  - Monitor for signs/symptoms of CO2 retention  Outcome: Progressing     Problem: METABOLIC/FLUID AND ELECTROLYTES - ADULT  Goal: Glucose maintained within prescribed range  Description: INTERVENTIONS:  - Monitor Blood Glucose as ordered  - Assess for signs and symptoms of hyperglycemia and hypoglycemia  - Administer ordered medications to maintain glucose within target range  - Assess barriers to adequate nutritional intake and initiate nutrition consult as needed  - Instruct patient on self management of diabetes  Outcome: Progressing  Goal: Electrolytes maintained within normal limits  Description: INTERVENTIONS:  - Monitor labs and rhythm and assess patient for signs and symptoms of electrolyte imbalances  - Administer electrolyte replacement as ordered  - Monitor response to electrolyte replacements, including  rhythm and repeat lab results as appropriate  - Fluid restriction as ordered  - Instruct patient on fluid and nutrition restrictions as appropriate  Outcome: Progressing  Goal: Hemodynamic stability and optimal renal function maintained  Description: INTERVENTIONS:  - Monitor labs and assess for signs and symptoms of volume excess or deficit  - Monitor intake, output and patient weight  - Monitor urine specific gravity, serum osmolarity and serum sodium as indicated or ordered  - Monitor response to interventions for patient's volume status, including labs, urine output, blood pressure (other measures as available)  - Encourage oral intake as appropriate  - Instruct patient on fluid and nutrition restrictions as appropriate  Outcome: Progressing     Problem: METABOLIC/FLUID AND ELECTROLYTES - ADULT  Goal: Glucose maintained within prescribed range  Description: INTERVENTIONS:  - Monitor Blood Glucose as ordered  - Assess for signs and symptoms of hyperglycemia and hypoglycemia  - Administer ordered medications to maintain glucose within target range  - Assess barriers to adequate nutritional intake and initiate nutrition consult as needed  - Instruct patient on self management of diabetes  Outcome: Progressing  Goal: Electrolytes maintained within normal limits  Description: INTERVENTIONS:  - Monitor labs and rhythm and assess patient for signs and symptoms of electrolyte imbalances  - Administer electrolyte replacement as ordered  - Monitor response to electrolyte replacements, including rhythm and repeat lab results as appropriate  - Fluid restriction as ordered  - Instruct patient on fluid and nutrition restrictions as appropriate  Outcome: Progressing  Goal: Hemodynamic stability and optimal renal function maintained  Description: INTERVENTIONS:  - Monitor labs and assess for signs and symptoms of volume excess or deficit  - Monitor intake, output and patient weight  - Monitor urine specific gravity, serum  osmolarity and serum sodium as indicated or ordered  - Monitor response to interventions for patient's volume status, including labs, urine output, blood pressure (other measures as available)  - Encourage oral intake as appropriate  - Instruct patient on fluid and nutrition restrictions as appropriate  Outcome: Progressing

## 2024-02-20 NOTE — PROGRESS NOTES
[facility]    Progress Note    Ava Bowen Patient Status:  Inpatient    10/6/1955 MRN [id number]   Location [facility] 3W/SW Attending Edy Foote, DO   Hosp Day # 9 PCP Ernesto De Dios     Subjective:   Ava Bowen is a(n) 68 year old female      In bed   Eating breakfast   More alert and asking Qs today   Still has Rt   leg pain   Objective:   Vital Signs:  Blood pressure (P) 102/45, pulse 65, temperature 98 °F (36.7 °C), temperature source Oral, resp. rate 18, height 5' 4.02\" (1.626 m), weight 189 lb 1.6 oz (85.8 kg), SpO2 93%, not currently breastfeeding.                    General: Awake and alert.    HENT: Eye: EOMI,    Neck/Thyroid: neck inspection: normal   Lungs:  Speaking full sentences  MSK: Moves extremities spontaneously.    Neuro:speech is clear. Awake, alert   Psych: Orientated to time, place, person & situation    Skin: Skin is dry       Assessment and Plan:     Bypass graft mechanical complication, initial encounter (HCC)        Hyperglycemia        Bypass graft mechanical complication (HCC)        Anemia, unspecified type        Acute kidney injury superimposed on CKD  (HCC)        Uncontrolled type 2 diabetes mellitus with hyperglycemia (HCC)      Acute kidney injury superimposed on CKD  (HCC)   PMH of COPD, DM2, HTN, HL, PVD, Sickle cell anemia, CKD stage 3, recent fem-pop artery bypass admitted with acute hemorrhage from incision site. Patient is s/p OR for repair.           Uncontrolled Type 2 Diabetes  Tresiba 15 units at night  Novolog  5 units TIDAC  medium dose CF scale with regular insulin after midnight  - We will follow blood sugars      I discussed plan with pt     Thank you for allowing me to participate in the care of your patient.      Results:      Latest Reference Range & Units 24 09:27 24 12:35 24 16:21   POC GLUCOSE 70 - 99 mg/dL 105 (H) 171 (H) 158 (H)   (H): Data is abnormally high  Lab Results   Component Value Date     WBC 22.1 (H) 02/19/2024    HGB 8.2 (L) 02/19/2024    HCT 25.7 (L) 02/19/2024    .0 02/19/2024    CREATSERUM 1.08 (H) 02/19/2024    BUN 45 (H) 02/19/2024     02/19/2024    K 4.3 02/19/2024     (H) 02/19/2024    CO2 26.0 02/19/2024    GLU 77 02/19/2024    CA 8.1 (L) 02/19/2024    ALB 2.2 (L) 02/19/2024    ALKPHO 62 02/10/2024    BILT 0.4 02/10/2024    TP 4.6 (L) 02/10/2024    AST 91 (H) 02/10/2024    ALT 38 02/10/2024    PTT 30.3 02/10/2024    INR 1.51 (H) 02/10/2024    T4F 1.3 06/18/2019    TSH 1.060 06/18/2019    LIP 40 04/25/2023    DDIMER 1.45 (H) 11/08/2019    ESRML 27 05/08/2020    CRP 3.11 (H) 05/08/2020    MG 1.8 02/19/2024    PHOS 3.7 02/19/2024    TROP <0.045 11/08/2019    CK 35 08/21/2023    B12 963 09/03/2019       No results found.              Dougie Casarez MD  2/19/2024        DOS is the same date the note was signed

## 2024-02-20 NOTE — PROGRESS NOTES
[facility] Hospitalist Progress Note     CC: Hospital Follow up    PCP: Ernesto De Dios       Assessment/Plan:     Principal Problem:    Bypass graft mechanical complication, initial encounter ([facility])  Active Problems:    Hyperglycemia    Bypass graft mechanical complication (HCC)    Anemia, unspecified type    Acute kidney injury superimposed on CKD  (HCC)    Uncontrolled type 2 diabetes mellitus with hyperglycemia (HCC)      Ms. Bowen is a 68 year old female with PMH sig for CKD, COPD, DM, HTN, HLD, PVD, and sickle cell anemia who was recently admitted last week for right lower extremity femoral to popliteal bypass who presented with bleeding from surgical site. Taken for emergent OR on 2/10/24, noted to be grossly infected, started on vancomycin/meropenem. Bcx with klebsiella. BG elevated on admit, started on insulin gtt, endo consulted, now off insulin gtt. Kept intubated post op, pulm following.Seems to have been slow to recovery despite multiple surgeries now planning AKA on Thursday.        PAD sp RLE fem-pop bypass last week c/b bleeding fm surg site fm wound dehiscence  - s/p recent fem pop bypass 1/24/24 with Dr. Murrieta  - has been on ASA/plavix- held on admit  - Hg down to 6.0, s/p 3 units pRBC 2/10- watching close now, hgb stable currently no signs of bleeding   - vascular surgery consulted, s/p emergent OR 2/10, removal of infected graft, s/p debridement, bone patch angioplasty   - sp further debridement 2/13 and placement of wound vac  - back to OR 2/16 again and now plans for AKA Thursday 2/22  -Seems now will need AKA  -Pain meds PRN  -Pain more severe today increase norco to 10 BID   - lasix PRN     Septic shock (now improved) fm post op wound infection  - continue IV vancomycin and meropenem, ID consult following   - Bcx with klebsiella, repeat Bcx ordered  - 2/10 wound cx and 2/13 poly microbial  - abx adjustment per ID f/u cx results  - ID notes appreciated- tenative plan for 6 weeks  abx from from final surgical plans- final abx tbd pending cx data and surgical plans    -Wound vac in place  -Keep joseph due to large leg wound   Wound care following     Metabolic acidosis- resolved   - likely 2/2 LA from blood loss +/- dka on admit  - pH 7.24 on admit, LA 7.6 on admit, now normalized  - likely multifactorial, 2/2 LA, blood loss, ?DKA, CKD  - renal consulted, sp bicarb gtt     Acute blood loss anemia from surgical site   Chronic anemia  Sickle cell anemia  -Stable currently, trend CBC   - outpatient f/u  - Hg 8.9 on discharge last week  - Hg 6.0 on admission, s/p 3 units pRBC  - monitor, hgb stable in the 8s        DM2, hyperglycemia better now   -A1c 7.9   - Hold PO medications  - BG 500s in ED, pH 7.2  - insulin gtt ordered in ED  -increased Degludec 15 at bedtime and aspart 5 units with meals , this seems to be a good regimen now      Respiratory failure  - remained intubated post op both fm aspiration event and metabolic acidosis  - pulm following  - extubated 2/14 pm  Now on 2L NC - wean as able      Hypovolemic / septic Shock/ Hypotension- improved  Hx HTN  BP stable now, hydral 100 Q8 hours   Amlodipine 10 d, Coreg 25 BID   Holding home lisinopril, BP ok watch and consider resuming pending BP       COPD  - chronic, stable  -resumed inhalers     OMEGA on CKD3- at baseline now   - cr 1.7 on admit, previously 1.2 on discharge last week  - monitor Cr and UOP- improved   Stable  Monitor  Renal following       ACP  - CODE- FULL   - POA- daughter and son     FN:  - IVF: Stopped   - Diet: Carb controlled diet      DVT Prophy: Hold due to bleeding   Lines: PIV     Daughter Tiffanie updated at length via phone 2/18/20, no new updates since that time     Questions/concerns were discussed with patient and/or family by bedside.    Thank You,  Edy Foote,     Hospitalist with [facility]     Subjective:     Feels more pain in right leg today, no fevers or chills, less anxiety, no nausea , no  chest pains     OBJECTIVE:    Blood pressure 130/58, pulse 68, temperature 98.6 °F (37 °C), temperature source Oral, resp. rate 18, height 5' 4.02\" (1.626 m), weight 180 lb 3.2 oz (81.7 kg), SpO2 95%, not currently breastfeeding.    Temp:  [98.6 °F (37 °C)-99.2 °F (37.3 °C)] 98.6 °F (37 °C)  Pulse:  [64-72] 68  Resp:  [18] 18  BP: (116-130)/(50-60) 130/58  SpO2:  [92 %-95 %] 95 %      Intake/Output:    Intake/Output Summary (Last 24 hours) at 2/20/2024 1255  Last data filed at 2/20/2024 1052  Gross per 24 hour   Intake 420 ml   Output 1510 ml   Net -1090 ml       Last 3 Weights   02/20/24 0554 180 lb 3.2 oz (81.7 kg)   02/19/24 0445 189 lb 1.6 oz (85.8 kg)   02/18/24 0614 186 lb 1.6 oz (84.4 kg)   02/17/24 0403 181 lb 11.2 oz (82.4 kg)   02/16/24 0500 182 lb 3.2 oz (82.6 kg)   02/14/24 0500 185 lb 13.6 oz (84.3 kg)   02/13/24 0500 183 lb 3.2 oz (83.1 kg)   02/12/24 0400 180 lb 5.4 oz (81.8 kg)   02/11/24 0600 182 lb 5.1 oz (82.7 kg)   02/10/24 2142 171 lb 1.2 oz (77.6 kg)   02/10/24 2105 171 lb 1.2 oz (77.6 kg)   02/10/24 1642 164 lb 3.9 oz (74.5 kg)   01/27/24 0501 164 lb 3.2 oz (74.5 kg)   01/24/24 0549 158 lb (71.7 kg)   01/19/24 1112 160 lb (72.6 kg)   12/30/23 1216 155 lb (70.3 kg)   08/29/23 1357 153 lb (69.4 kg)       Exam   Gen: No acute distress, awake and alert   Pulm: Lungs clear, normal respiratory effort, 2 L NC   CV: Heart with regular rate and rhythm  Abd: Abdomen soft, nontender, nondistended  MSK: Right leg with wound vac in place, ACE wrap in place as well  + joseph         Data Review:       Labs:     Recent Labs   Lab 02/18/24  0632 02/19/24  0536 02/19/24  1159 02/20/24  0627   RBC 2.57* 2.62*  --  2.94*   HGB 7.5* 7.2* 8.2* 8.4*   HCT 22.0* 22.9* 25.7* 25.2*   MCV 85.6 87.4  --  85.7   MCH 29.2 27.5  --  28.6   MCHC 34.1 31.4  --  33.3   RDW 15.7* 16.1*  --  16.1*   NEPRELIM 14.12* 18.64*  --  19.95*   WBC 17.4* 22.1*  --  22.7*   .0 417.0  --  422.0         Recent Labs   Lab  02/18/24  0632 02/19/24  0536 02/20/24  0627   * 77 162*   BUN 41* 45* 46*   CREATSERUM 1.20* 1.08* 1.17*   EGFRCR 50* 57* 51*   CA 8.0* 8.1* 8.0*    145 143   K 4.2 4.3 4.1    114* 111   CO2 24.0 26.0 24.0       Recent Labs   Lab 02/17/24  0730 02/18/24  0632 02/19/24  0536   ALB 2.2* 2.2* 2.2*         Imaging:  No results found.      Meds:      pantoprazole  40 mg Oral QAM AC    insulin degludec  17 Units Subcutaneous Nightly    insulin aspart  7 Units Subcutaneous TID CC    HYDROcodone-acetaminophen  2 tablet Oral q12h    fluconazole  200 mg Oral Daily    vancomycin  1,000 mg Intravenous Q36H    magnesium sulfate  4 g Intravenous Once    insulin aspart  1-7 Units Subcutaneous TID CC    carvedilol  25 mg Per NG Tube BID    vancomycin  125 mg Oral Daily    sodium hypochlorite   Topical 2 times per day    amLODIPine  10 mg Per NG Tube QAM    hydrALAZINE  100 mg Per NG Tube Q8H    fluticasone furoate-vilanterol  1 puff Inhalation Daily    rosuvastatin  40 mg Oral Nightly    meropenem  500 mg Intravenous q12h         HYDROcodone-acetaminophen, HYDROcodone-acetaminophen, acetaminophen, morphINE, ondansetron, HYDROmorphone, morphINE **OR** morphINE **OR** morphINE, hydrALAzine, ondansetron, metoclopramide, albuterol, ipratropium-albuterol, glucose **OR** glucose **OR** glucose-vitamin C **OR** dextrose **OR** glucose **OR** glucose **OR** glucose-vitamin C

## 2024-02-21 ENCOUNTER — APPOINTMENT (OUTPATIENT)
Dept: CT IMAGING | Facility: HOSPITAL | Age: 69
End: 2024-02-21
Attending: STUDENT IN AN ORGANIZED HEALTH CARE EDUCATION/TRAINING PROGRAM
Payer: MEDICARE

## 2024-02-21 ENCOUNTER — APPOINTMENT (OUTPATIENT)
Dept: ULTRASOUND IMAGING | Facility: HOSPITAL | Age: 69
End: 2024-02-21
Attending: STUDENT IN AN ORGANIZED HEALTH CARE EDUCATION/TRAINING PROGRAM
Payer: MEDICARE

## 2024-02-21 NOTE — PLAN OF CARE
Problem: Patient Centered Care  Goal: Patient preferences are identified and integrated in the patient's plan of care  Description: Interventions:  - What would you like us to know as we care for you? From home alone  - Provide timely, complete, and accurate information to patient/family  - Incorporate patient and family knowledge, values, beliefs, and cultural backgrounds into the planning and delivery of care  - Encourage patient/family to participate in care and decision-making at the level they choose  - Honor patient and family perspectives and choices  2/21/2024 0614 by eKdar Salazar  Outcome: Progressing  2/21/2024 0613 by Kedar Salazar  Outcome: Progressing     Problem: Diabetes/Glucose Control  Goal: Glucose maintained within prescribed range  Description: INTERVENTIONS:  - Monitor Blood Glucose as ordered  - Assess for signs and symptoms of hyperglycemia and hypoglycemia  - Administer ordered medications to maintain glucose within target range  - Assess barriers to adequate nutritional intake and initiate nutrition consult as needed  - Instruct patient on self management of diabetes  2/21/2024 0614 by Kedar Salazar  Outcome: Progressing  2/21/2024 0613 by Kedar Salazar  Outcome: Progressing     Problem: Patient/Family Goals  Goal: Patient/Family Long Term Goal  Description: Patient's Long Term Goal: discharge    Interventions:  - Pain management  - Surgical intervention  - Wound care  - See additional Care Plan goals for specific interventions  2/21/2024 0614 by Kedar Salazar  Outcome: Progressing  2/21/2024 0613 by Kedar Salazar  Outcome: Progressing  Goal: Patient/Family Short Term Goal  Description: Patient's Short Term Goal: pain management    Interventions:   - See additional Care Plan goals for specific interventions  Outcome: Progressing     Problem: CARDIOVASCULAR - ADULT  Goal: Maintains optimal cardiac output and hemodynamic stability  Description: INTERVENTIONS:  - Monitor vital signs,  rhythm, and trends  - Monitor for bleeding, hypotension and signs of decreased cardiac output  - Evaluate effectiveness of vasoactive medications to optimize hemodynamic stability  - Monitor arterial and/or venous puncture sites for bleeding and/or hematoma  - Assess quality of pulses, skin color and temperature  - Assess for signs of decreased coronary artery perfusion - ex. Angina  - Evaluate fluid balance, assess for edema, trend weights  2/21/2024 0614 by Kedar Salazar  Outcome: Progressing  2/21/2024 0613 by Kedar Salazar  Outcome: Progressing  Goal: Absence of cardiac arrhythmias or at baseline  Description: INTERVENTIONS:  - Continuous cardiac monitoring, monitor vital signs, obtain 12 lead EKG if indicated  - Evaluate effectiveness of antiarrhythmic and heart rate control medications as ordered  - Initiate emergency measures for life threatening arrhythmias  - Monitor electrolytes and administer replacement therapy as ordered  Outcome: Progressing     Problem: RESPIRATORY - ADULT  Goal: Achieves optimal ventilation and oxygenation  Description: INTERVENTIONS:  - Assess for changes in respiratory status  - Assess for changes in mentation and behavior  - Position to facilitate oxygenation and minimize respiratory effort  - Oxygen supplementation based on oxygen saturation or ABGs  - Provide Smoking Cessation handout, if applicable  - Encourage broncho-pulmonary hygiene including cough, deep breathe, Incentive Spirometry  - Assess the need for suctioning and perform as needed  - Assess and instruct to report SOB or any respiratory difficulty  - Respiratory Therapy support as indicated  - Manage/alleviate anxiety  - Monitor for signs/symptoms of CO2 retention  2/21/2024 0614 by Kedar Salazar  Outcome: Progressing  2/21/2024 0613 by Kedar Salazar  Outcome: Progressing     Problem: METABOLIC/FLUID AND ELECTROLYTES - ADULT  Goal: Glucose maintained within prescribed range  Description: INTERVENTIONS:  - Monitor  Blood Glucose as ordered  - Assess for signs and symptoms of hyperglycemia and hypoglycemia  - Administer ordered medications to maintain glucose within target range  - Assess barriers to adequate nutritional intake and initiate nutrition consult as needed  - Instruct patient on self management of diabetes  2/21/2024 0614 by Kedar Salazar  Outcome: Progressing  2/21/2024 0613 by Kedar Salazar  Outcome: Progressing  Goal: Electrolytes maintained within normal limits  Description: INTERVENTIONS:  - Monitor labs and rhythm and assess patient for signs and symptoms of electrolyte imbalances  - Administer electrolyte replacement as ordered  - Monitor response to electrolyte replacements, including rhythm and repeat lab results as appropriate  - Fluid restriction as ordered  - Instruct patient on fluid and nutrition restrictions as appropriate  2/21/2024 0614 by Kedar Salazar  Outcome: Progressing  2/21/2024 0613 by Kedar Salazar  Outcome: Progressing  Goal: Hemodynamic stability and optimal renal function maintained  Description: INTERVENTIONS:  - Monitor labs and assess for signs and symptoms of volume excess or deficit  - Monitor intake, output and patient weight  - Monitor urine specific gravity, serum osmolarity and serum sodium as indicated or ordered  - Monitor response to interventions for patient's volume status, including labs, urine output, blood pressure (other measures as available)  - Encourage oral intake as appropriate  - Instruct patient on fluid and nutrition restrictions as appropriate  2/21/2024 0614 by Kedar Salazar  Outcome: Progressing  2/21/2024 0613 by Kedar Salazar  Outcome: Progressing

## 2024-02-21 NOTE — PROGRESS NOTES
[facility] Infectious Disease  Progress Note    Ava Bowen Patient Status:  Inpatient    10/6/1955 MRN [id number]   Location [facility] 3W/SW Attending Edy Foote DO   Hosp Day # 11 PCP Ernesto De Dios     Subjective:  Patient seen and examined in bed. Reports worsening R leg pain this morning. VAC remains intact. Plans for R AKA with Dr. Justice on Thursday.     Objective:  Blood pressure 131/53, pulse 65, temperature 99.2 °F (37.3 °C), temperature source Oral, resp. rate 18, height 5' 4.02\" (1.626 m), weight 180 lb 3.2 oz (81.7 kg), SpO2 92%, not currently breastfeeding.    Intake/Output:    Intake/Output Summary (Last 24 hours) at 2024 0848  Last data filed at 2024 0600  Gross per 24 hour   Intake 1390 ml   Output 2325 ml   Net -935 ml       Physical Exam:  General: Awake and alert in NAD.  HEENT:  Oropharynx clear, trachea ML.  Heart: RRR S1S2 no murmurs.  Lungs: Essentially CTA b/l, no rhonchi, rales, wheezes.  Abdomen: Soft, NT/ND.  BS present.  No organomegaly.  Extremity: RLE with ACE wrap intact. +wound VAC.  Neurological: No focal deficits.  Derm:  Warm and dry.    Lab Data Review:  Lab Results   Component Value Date    WBC 19.1 2024    HGB 7.8 2024    HCT 22.9 2024    .0 2024    CREATSERUM 1.03 2024    BUN 45 2024     2024    K 4.1 2024     2024    CO2 25.0 2024     2024    CA 7.7 2024        Cultures:  Hospital Encounter on 02/10/24   1. Blood Culture     Status: None    Collection Time: 24 11:56 AM    Specimen: Blood,peripheral   Result Value Ref Range    Blood Culture Result No Growth 5 Days N/A   2. Tissue Aerobic Culture     Status: Abnormal    Collection Time: 24 11:42 AM    Specimen: Femoral graft; Tissue   Result Value Ref Range    Tissue Culture Result  N/A     Mixture of organisms suggestive of normal skin karla     Tissue Culture Result 1+ growth Klebsiella pneumoniae (A) N/A    Tissue Smear No WBCs seen N/A    Tissue Smear No organisms seen N/A   3. Anaerobic Culture     Status: None    Collection Time: 02/13/24 11:42 AM    Specimen: Femoral graft; Tissue   Result Value Ref Range    Anaerobic Culture No Anaerobes isolated N/A   4. Aerobic Bacterial Culture     Status: Abnormal    Collection Time: 02/13/24 11:36 AM    Specimen: Calf, right; Body fluid, unspecified   Result Value Ref Range    Aerobic Culture Result  N/A     Mixture of organisms suggestive of normal skin karla    Aerobic Culture Result 1+ growth Klebsiella pneumoniae (A) N/A    Aerobic Smear 1+ WBCs seen N/A    Aerobic Smear No organisms seen N/A   5. Urine Culture, Routine     Status: Abnormal    Collection Time: 02/12/24  4:19 PM    Specimen: Urine, joseph catheter   Result Value Ref Range    Urine Culture 10,000 - 50,000 CFU/ML Candida albicans (A) N/A       Radiology:  CTA ABDOMEN/PELVIS LOWER EXT BILAT W RUNOFF (NOD=75345)    Result Date: 2/21/2024  CONCLUSION:   Completely occluded right superficial femoral artery stent is unchanged in appearance since the prior exams.  Completely occluded left superficial femoral artery stent is new since 6/20/2023.  Multifocal areas of high-grade stenosis and narrowing seen involving the mid to distal superficial femoral artery with a focal area of complete occlusion within the distal left superficial femoral artery which is new since 6/20/2023.  Markedly limited evaluation of bilateral calf vessels due to extensive atherosclerotic calcification.  Large soft tissue ulceration and wounds seen within the anterior right pelvis and upper thigh and medial knee.  No abscess or drainable collection seen at this point.  Anasarca  Multiple other incidental findings as described in the body of the report.    Dictated by (CST): Elder Wagoner MD on 2/21/2024 at 8:26 AM     Finalized by (CST): Elder Wagoner MD on 2/21/2024 at 8:43 AM           US VENOUS DOPPLER LEG LEFT - DIAG IMG (CPT=93971)    Result Date: 2024  CONCLUSION: No evidence of left lower extremity DVT.   Dictated by (CST): Ron Chaudhari MD on 2024 at 6:15 AM     Finalized by (CST): Ron Chaudhari MD on 2024 at 6:15 AM             Assessment and Plan:  1.  Acute gram-negative sepsis with post-op infection of R fem-pop bypass on 24  - Blood cultures on admission isolating Klebsiella pneumoniae.  - All repeat blood cultures remain negative to date.  - S/p emergent OR where extensive debridement and patch was performed noting infected graft material on 2/10/24.  - OR cultures are polymicrobial isolating Klebsiella, Enterococcus, Strep anginosus, E.coli, Bacteroides and Prevotella.   - Return to OR, , for further debridement and VAC placement.  - OR cultures isolating Finegoldia, Klebsiella, E.coli, Enterococcus and Candida  - Return to OR again on  for debridement and placement of Kerecis grafts -- now plans for AK.  - CT, , with completely occluded R superficial femoral artery stent unchanged from prior exam, and newly occluded L superficial femoral artery stent. Multifocal high-grade stenosis and narrowing of mid to distal superficial femoral artery with new focal area of complete occlusion within distal L superficial femoral artery. Extensive b/l calf vessel atherosclerotic calcification. Large soft tissue ulceration and wounds of anterior R pelvis, upper thigh and medial knee. No abscess or drainable collection.  - IV vancomycin + IV meropenem + PO Diflucan, ongoing.    2.  Multifactorial leukocytosis and low-grade fever secondary to the above  - WBC with slight improvement -- currently at 19.1K from 22.7K  - Temp (24hrs), Av °F (37.2 °C), Min:98.6 °F (37 °C), Max:99.3 °F (37.4 °C)  - C.diff PCR negative.  - Will trend.    3.  OMEGA on CKD3  - Baseline Cr around 1.2 -- 1.03 as of this AM.  - Diuresis on hold.   - As per nephrology.    3.  DM2  -  Optimal glycemic control.  - Endo following.    4.  Recs  - Continue IV vancomycin + IV meropenem + PO Diflucan at this time.  Pharmacy to dose.  - F/u WBC and fever curve.  - Diuresis as per cardiology/nephrology.  - Post-op care/VAC management as per surgery -- plans for AKA Thursday  - Supportive care as per the primary team.  - Discussed plan of care with nursing.  - Discussed plan of care with patient.  All questions addressed understanding verbalized.  - Further recommendations pending clinical course.    Please note that this report has been produced using speech recognition software and may contain errors related to that system including, but not limited to, errors in grammar, punctuation, and spelling, as well as words and phrases that possibly may have been recognized inappropriately.  If there are any questions or concerns, contact the dictating provider for clarification.     The 21st Century Cures Act makes medical notes like these available to patients in the interest of transparency. Please be advised this is a medical document. Medical documents are intended to carry relevant information, facts as evident, and the clinical opinion of the practitioner. The medical note is intended as peer to peer communication and may appear blunt or direct. It is written in medical language and may contain abbreviations or verbiage that are unfamiliar.     If you have any questions or concerns please call [facility] Infectious Disease at [phone].     Dakota Villalba, APRN    2/21/2024  8:48 AM

## 2024-02-21 NOTE — PROGRESS NOTES
NEPHROLOGY DAILY PROGRESS NOTE     SUBJECTIVE:  Patient seen and examined at bedside. No complaints    OBJECTIVE:    Total Intake/Output:    Intake/Output Summary (Last 24 hours) at 2/21/2024 1637  Last data filed at 2/21/2024 1227  Gross per 24 hour   Intake 730 ml   Output 2725 ml   Net -1995 ml       PHYSICAL EXAM:  /49 (BP Location: Right arm)   Pulse 65   Temp 98.4 °F (36.9 °C) (Oral)   Resp 18   Ht 5' 4.02\" (1.626 m)   Wt 180 lb 3.2 oz (81.7 kg)   LMP  (LMP Unknown)   SpO2 93%   BMI 30.92 kg/m²   GEN: NAD, on oxygen     HEENT: NCAT    CHEST: no distress  CARDIAC: S1S2 normal  ABD: soft, NT/ND  EXT: + lower ext edema noted, swelling R hand noted      NEURO: awake, answering questions       CURRENT MEDICATIONS:     vancomycin  125 mg Oral Daily    HYDROcodone-acetaminophen  2 tablet Oral TID    insulin aspart  10 Units Subcutaneous TID CC    pantoprazole  40 mg Oral QAM AC    insulin degludec  17 Units Subcutaneous Nightly    amLODIPine  10 mg Oral QAM    carvedilol  25 mg Oral BID    hydrALAZINE  100 mg Oral Q8H    vancomycin  750 mg Intravenous Q36H    fluconazole  200 mg Oral Daily    magnesium sulfate  4 g Intravenous Once    insulin aspart  1-7 Units Subcutaneous TID CC    sodium hypochlorite   Topical 2 times per day    fluticasone furoate-vilanterol  1 puff Inhalation Daily    rosuvastatin  40 mg Oral Nightly    meropenem  500 mg Intravenous q12h             LABS:  Patient Labs Reviewed in Detail. Pertinent Labs as follows:  Recent Labs   Lab 02/19/24  0536 02/20/24  0627 02/21/24  0531   GLU 77 162* 146*   BUN 45* 46* 45*   CREATSERUM 1.08* 1.17* 1.03*   EGFRCR 57* 51* 60   CA 8.1* 8.0* 7.7*    143 147*   K 4.3 4.1 4.1   * 111 116*   CO2 26.0 24.0 25.0     Recent Labs   Lab 02/19/24  0536 02/19/24  1159 02/20/24  0627 02/21/24  0531   RBC 2.62*  --  2.94* 2.71*   HGB 7.2* 8.2* 8.4* 7.8*   HCT 22.9* 25.7* 25.2* 22.9*   MCV 87.4  --  85.7 84.5   MCH 27.5  --  28.6 28.8   MCHC 31.4   --  33.3 34.1   RDW 16.1*  --  16.1* 16.3*   NEPRELIM 18.64*  --  19.95* 16.39*   WBC 22.1*  --  22.7* 19.1*   .0  --  422.0 466.0*       ASSESSMENT AND PLAN:   68 year old female with PMH of COPD, DM2, HTN, HL, PVD, Sickle cell anemia, CKD stage 3, recent fem-pop artery bypass admitted with acute hemorrhage from incision site.  Patient is s/p OR for repair.  Nephrology is consulted for OMEGA and metabolic acidosis.         OMEGA on CKD stage 3  - baseline around 1.2  - creatinine improved near baseline, has evidence of fluid overload and has been receiving IV lasix.   - hold diuretics today, monitor response  - follow renal fxn and I/Os     Hypertension   - amlodipine, hydralazine, coreg  - weaned off nicardipine   - lisinopril held given recent OMEGA, can resume if needed for blood pressure control now with improved kidney function  - cards following     Metabolic acidosis:  - resolved     Hypernatremia:  - resolved     Hypokalemia/ hypomagnesemia:  - replete per protocol     Hypocalcemia:  - corrects to normal given hypoalbuminemia   - monitor Ca levels         We will continue to follow      Felipe Lei MD  [facility] - Nephrology

## 2024-02-21 NOTE — PROGRESS NOTES
[facility]    Progress Note    Ava Bowen Patient Status:  Inpatient    10/6/1955 MRN [id number]   Location [facility] 3W/SW Attending Edy Foote,    Hosp Day # 11 PCP Ernesto De Dios     Subjective:   Ava Bowen is a(n) 68 year old female    Feeling better   Still has Rt leg pain  Objective:   Vital Signs:  Blood pressure 123/50, pulse 65, temperature 99.2 °F (37.3 °C), temperature source Oral, resp. rate 18, height 5' 4.02\" (1.626 m), weight 180 lb 3.2 oz (81.7 kg), SpO2 94%, not currently breastfeeding.                    General: Awake and alert.    HENT: Eye: EOMI,    Lungs:  Speaking full sentences  Neuro:speech is clear. Awake, alert   Psych: Orientated to time, place, person & situation    Skin: Skin is dry     Assessment and Plan:     Bypass graft mechanical complication, initial encounter (HCC)    Hyperglycemia    Bypass graft mechanical complication (HCC)    Anemia, unspecified type    Acute kidney injury superimposed on CKD (HCC)    Uncontrolled type 2 diabetes mellitus with hyperglycemia (HCC)    COPD, DM2, HTN, HL, PVD, Sickle cell anemia, CKD stage 3, recent fem-pop artery bypass admitted with acute hemorrhage from incision site.     Uncontrolled Type 2 Diabetes  Tresiba 17 units at night  Novolog  7->10 units TIDAC  medium dose CF scale with regular insulin after midnight  - We will follow blood sugars      I discussed plan with pt     Thank you for allowing me to participate in the care of your patient.      Results:     Lab Results   Component Value Date    WBC 19.1 (H) 2024    HGB 7.8 (L) 2024    HCT 22.9 (L) 2024    .0 (H) 2024    CREATSERUM 1.03 (H) 2024    BUN 45 (H) 2024     (H) 2024    K 4.1 2024     (H) 2024    CO2 25.0 2024     (H) 2024    CA 7.7 (L) 2024    ALB 2.2 (L) 2024    ALKPHO 62 02/10/2024    BILT 0.4 02/10/2024    TP 4.6 (L)  02/10/2024    AST 91 (H) 02/10/2024    ALT 38 02/10/2024    PTT 30.3 02/10/2024    INR 1.51 (H) 02/10/2024    T4F 1.3 06/18/2019    TSH 1.060 06/18/2019    LIP 40 04/25/2023    DDIMER 1.45 (H) 11/08/2019    ESRML 27 05/08/2020    CRP 3.11 (H) 05/08/2020    MG 1.8 02/19/2024    PHOS 3.7 02/19/2024    TROP <0.045 11/08/2019    CK 35 08/21/2023    B12 963 09/03/2019       CTA ABDOMEN/PELVIS LOWER EXT BILAT W RUNOFF (GZR=[id number])    Result Date: 2/21/2024  CONCLUSION:   Completely occluded right superficial femoral artery stent is unchanged in appearance since the prior exams.  Completely occluded left superficial femoral artery stent is new since 6/20/2023.  Multifocal areas of high-grade stenosis and narrowing seen involving the mid to distal superficial femoral artery with a focal area of complete occlusion within the distal left superficial femoral artery which is new since 6/20/2023.  Markedly limited evaluation of bilateral calf vessels due to extensive atherosclerotic calcification.  Large soft tissue ulceration and wounds seen within the anterior right pelvis and upper thigh and medial knee.  No abscess or drainable collection seen at this point.  Anasarca  Multiple other incidental findings as described in the body of the report.    Dictated by (CST): Elder Wagoner MD on 2/21/2024 at 8:26 AM     Finalized by (CST): Elder Wagoner MD on 2/21/2024 at 8:43 AM          US VENOUS DOPPLER LEG LEFT - DIAG IMG (CPT=93971)    Result Date: 2/21/2024  CONCLUSION: No evidence of left lower extremity DVT.   Dictated by (CST): Ron Chaudhari MD on 2/21/2024 at 6:15 AM     Finalized by (CST): Ron Chaudhari MD on 2/21/2024 at 6:15 AM                     Dougie Casarez MD  2/21/2024        DOS is the same date the note was signed

## 2024-02-21 NOTE — PROGRESS NOTES
02/21/24 0900   Negative Pressure Wound Therapy Groin Right   Placement Date: 02/13/24   Inserted by: Dr. KESSLER  Location: Groin  Wound Location Orientation: Right   Wound photographed/measured No   Machine Status (On) Yes   Site Assessment ANGELO   Unit Type KCI ULTA   Cycle Continuous;On   Target Pressure (mmHg) 125   Drainage Description Serous   Dressing Status Intact   Canister Changed No   Wound Follow Up   Follow up needed Yes     Pt seen for wound vac check. Patenet seal noted, ace in place, leg elevated w pillow, foot is warm. Pt advised of planned sx tomorrow w Dr. Kessler scheduled for 10:20 am

## 2024-02-21 NOTE — PROGRESS NOTES
[facility]    Cardiology Progress Note    Ava Bowen Patient Status:  Inpatient    10/6/1955 MRN [id number]   Location [facility] 3W/SW Attending Edy Foote,    Hosp Day # 11 PCP Ernesto De Dios       Impression/Plan:  68 year old female presenting with:     Hemorrhage from LE fem pop bypass site, infected.   -S/P debridement 24  -S/P debridement 24  HTN  HL  COPD  DM  PAD s/p fem-pop bypass 2024  Sickle cell anemia  -Hb 7.1  Acute resp failure, now extubated  OMEGA on CKD  HFpEF     - Cont statin  - Cont amlodipine, carvedilol, hydralazine for BP control; titrate as needed  - AKA as per vascular surgery; planned for tomorrow per chart review  - Monitor on tele  - Will follow     Subjective: No events overnight.  Today patient complaining of bilat LE pain.  Denies chest pain, palpitations, dyspnea.    Patient Active Problem List   Diagnosis    Anemia    Azotemia    Hypokalemia    Back pain with radiation    Type 2 diabetes mellitus without retinopathy (HCC)    Age-related nuclear cataract of both eyes    Glaucoma suspect of both eyes    Centrilobular emphysema (HCC)    PVD (peripheral vascular disease) (HCC)    Diabetic ulcer of left midfoot associated with type 2 diabetes mellitus, with fat layer exposed (HCC)    PAD (peripheral artery disease) (HCC)    Pre-op testing    Family history of glaucoma in sister    Primary hypertension    S/P laminectomy    Post-operative pain    Type 2 diabetes mellitus with hyperglycemia, with long-term current use of insulin (HCC)    Myalgia    DDD (degenerative disc disease), lumbar    Failed back syndrome of lumbar spine    Chest pain    Hyperlipidemia    Urinary tract infection    Acute cystitis without hematuria    Ileitis    COVID-19    UTI (urinary tract infection)    Pyelonephritis    Hypernatremia    Hyperglycemia    Ureteral calculi    Hypoglycemia    Opioid dependence, uncomplicated (HCC)    Depression, recurrent (HCC)     Stage 3 chronic kidney disease, unspecified whether stage 3a or 3b CKD (HCC)    Bypass graft mechanical complication (HCC)    Bypass graft mechanical complication, initial encounter (HCC)    Anemia, unspecified type    Acute kidney injury superimposed on CKD (HCC)    Uncontrolled type 2 diabetes mellitus with hyperglycemia (HCC)       Objective:   Temp: 99.2 °F (37.3 °C)  Pulse: 65  Resp: 18  BP: 131/53    Intake/Output:     Intake/Output Summary (Last 24 hours) at 2/21/2024 1106  Last data filed at 2/21/2024 0600  Gross per 24 hour   Intake 1210 ml   Output 2325 ml   Net -1115 ml       Last 3 Weights   02/20/24 0554 180 lb 3.2 oz (81.7 kg)   02/19/24 0445 189 lb 1.6 oz (85.8 kg)   02/18/24 0614 186 lb 1.6 oz (84.4 kg)   02/17/24 0403 181 lb 11.2 oz (82.4 kg)   02/16/24 0500 182 lb 3.2 oz (82.6 kg)   02/14/24 0500 185 lb 13.6 oz (84.3 kg)   02/13/24 0500 183 lb 3.2 oz (83.1 kg)   02/12/24 0400 180 lb 5.4 oz (81.8 kg)   02/11/24 0600 182 lb 5.1 oz (82.7 kg)   02/10/24 2142 171 lb 1.2 oz (77.6 kg)   02/10/24 2105 171 lb 1.2 oz (77.6 kg)   02/10/24 1642 164 lb 3.9 oz (74.5 kg)   01/27/24 0501 164 lb 3.2 oz (74.5 kg)   01/24/24 0549 158 lb (71.7 kg)   01/19/24 1112 160 lb (72.6 kg)   12/30/23 1216 155 lb (70.3 kg)   08/29/23 1357 153 lb (69.4 kg)       Tele: NSR, pSVT    Physical Exam:    General: Alert and oriented x 3. No apparent distress. No respiratory or constitutional distress.  HEENT: Normocephalic, anicteric sclera, neck supple, no thyromegaly or adenopathy.  Neck: No JVD, carotids 2+, no bruits.  Cardiac: Regular rate and rhythm. S1, S2 normal. No murmur, pericardial rub, S3, thrill, heave or extra cardiac sounds.  Lungs: Clear without wheezes, rales, rhonchi or dullness.  Normal excursions and effort.  Abdomen: Soft, non-tender. No organosplenomegally, mass or rebound, BS-present.  Neurologic: Alert and oriented, normal affect. No motor or coordinational deficit.  Skin: Warm and dry.      Laboratory/Data:    Labs:         Recent Labs   Lab 02/17/24  0730 02/18/24  0632 02/19/24  0536 02/19/24  1159 02/20/24  0627 02/21/24  0531   WBC 18.9* 17.4* 22.1*  --  22.7* 19.1*   HGB 7.1* 7.5* 7.2* 8.2* 8.4* 7.8*   MCV 85.4 85.6 87.4  --  85.7 84.5   .0 346.0 417.0  --  422.0 466.0*       Recent Labs   Lab 02/15/24  0448 02/16/24  0623 02/16/24  1850 02/17/24  0730 02/18/24  0632 02/19/24  0536 02/20/24  0627 02/21/24  0531    142  --  142 142 145 143 147*   K 3.3* 3.5  3.4*  --  4.1  4.1 4.2 4.3 4.1 4.1    113*  --  114* 111 114* 111 116*   CO2 24.0 22.0  --  21.0 24.0 26.0 24.0 25.0   BUN 17 23  --  27* 41* 45* 46* 45*   CREATSERUM 0.93 1.01  --  1.06* 1.20* 1.08* 1.17* 1.03*   CA 7.5* 7.6*  --  7.7* 8.0* 8.1* 8.0* 7.7*   MG  --  1.5* 1.7 1.8 1.8 1.8  --   --    PHOS 4.3 4.1  --  5.1 4.1 3.7  --   --    * 114*  --  180* 201* 77 162* 146*       Recent Labs   Lab 02/17/24 0730 02/18/24  0632 02/19/24  0536   ALB 2.2* 2.2* 2.2*       No results for input(s): \"TROP\" in the last 168 hours.    Allergies:   Allergies   Allergen Reactions    Penicillins HIVES and ANGIOEDEMA     Hives on eyelids (occurred when pt was in her 20s). Tolerated amoxicillin per chart. Tolerates cephalosporins.       Medications:  Current Facility-Administered Medications   Medication Dose Route Frequency    vancomycin (Vancocin) cap 125 mg  125 mg Oral Daily    HYDROcodone-acetaminophen (Norco) 5-325 MG per tab 2 tablet  2 tablet Oral TID    insulin aspart (NovoLOG) 100 Units/mL FlexPen 10 Units  10 Units Subcutaneous TID CC    pantoprazole (Protonix) DR tab 40 mg  40 mg Oral QAM AC    insulin degludec 100 units/mL flextouch 17 Units  17 Units Subcutaneous Nightly    amLODIPine (Norvasc) tab 10 mg  10 mg Oral QAM    carvedilol (Coreg) tab 25 mg  25 mg Oral BID    hydrALAZINE (Apresoline) tab 100 mg  100 mg Oral Q8H    vancomycin (Vancocin) 750 mg in sodium chloride 0.9% 250 mL IVPB-ADDV  750 mg Intravenous  Q36H    fluconazole (Diflucan) tab 200 mg  200 mg Oral Daily    HYDROcodone-acetaminophen (Norco)  MG per tab 1 tablet  1 tablet Oral Q4H PRN    HYDROcodone-acetaminophen (Norco)  MG per tab 2 tablet  2 tablet Oral Q4H PRN    acetaminophen (Tylenol) tab 650 mg  650 mg Oral Q4H PRN    morphINE 20 mg/mL concentrated oral solution 5 mg  5 mg Oral Q4H PRN    magnesium sulfate 4 g/100mL IVPB premix 4 g  4 g Intravenous Once    ondansetron (Zofran) 4 MG/2ML injection 4 mg  4 mg Intravenous Q6H PRN    HYDROmorphone (Dilaudid) 1 MG/ML injection 0.5 mg  0.5 mg Intravenous Q3H PRN    insulin aspart (NovoLOG) 100 Units/mL FlexPen 1-7 Units  1-7 Units Subcutaneous TID CC    morphINE PF 2 MG/ML injection 1 mg  1 mg Intravenous Q2H PRN    Or    morphINE PF 2 MG/ML injection 2 mg  2 mg Intravenous Q2H PRN    Or    morphINE PF 4 MG/ML injection 4 mg  4 mg Intravenous Q2H PRN    sodium hypochlorite (Dakin's) 0.125 % external solution   Topical 2 times per day    hydrALAzine (Apresoline) 20 mg/mL injection 10 mg  10 mg Intravenous Q4H PRN    ondansetron (Zofran) 4 MG/2ML injection 4 mg  4 mg Intravenous Q6H PRN    metoclopramide (Reglan) 5 mg/mL injection 5 mg  5 mg Intravenous Q8H PRN    albuterol (Ventolin HFA) 108 (90 Base) MCG/ACT inhaler 2 puff  2 puff Inhalation Q6H PRN    fluticasone furoate-vilanterol (Breo Ellipta) 200-25 MCG/ACT inhaler 1 puff  1 puff Inhalation Daily    ipratropium-albuterol (Duoneb) 0.5-2.5 (3) MG/3ML inhalation solution 3 mL  3 mL Nebulization Q6H PRN    rosuvastatin (Crestor) tab 40 mg  40 mg Oral Nightly    glucose (Dex4) 15 GM/59ML oral liquid 15 g  15 g Oral Q15 Min PRN    Or    glucose (Glutose) 40% oral gel 15 g  15 g Oral Q15 Min PRN    Or    glucose-vitamin C (Dex-4) chewable tab 4 tablet  4 tablet Oral Q15 Min PRN    Or    dextrose 50% injection 50 mL  50 mL Intravenous Q15 Min PRN    Or    glucose (Dex4) 15 GM/59ML oral liquid 30 g  30 g Oral Q15 Min PRN    Or    glucose (Glutose)  40% oral gel 30 g  30 g Oral Q15 Min PRN    Or    glucose-vitamin C (Dex-4) chewable tab 8 tablet  8 tablet Oral Q15 Min PRN    meropenem (Merrem) 500 mg in sodium chloride 0.9% 100 mL IVPB-MBP  500 mg Intravenous q12h       Jerzy Lawler MD  2/21/2024  11:06 AM

## 2024-02-21 NOTE — PLAN OF CARE
Patient is alert and oriented. Endorses severe pain in lower extremities. Left lower extermity cool with decreased sensation, no pulse. US and CT completed overnight. Called to relay results to vascular surgeon. No new orders. Dilaudid dose increased to 1 mg with better control of pain. Repositioned as needed. IV antibiotics continued. Pascual intact. Wound vac intact. Consent for surgery signed and in chart. Safety precautions in place.     Problem: Patient Centered Care  Goal: Patient preferences are identified and integrated in the patient's plan of care  Description: Interventions:  - What would you like us to know as we care for you? From home alone  - Provide timely, complete, and accurate information to patient/family  - Incorporate patient and family knowledge, values, beliefs, and cultural backgrounds into the planning and delivery of care  - Encourage patient/family to participate in care and decision-making at the level they choose  - Honor patient and family perspectives and choices  Outcome: Progressing     Problem: Diabetes/Glucose Control  Goal: Glucose maintained within prescribed range  Description: INTERVENTIONS:  - Monitor Blood Glucose as ordered  - Assess for signs and symptoms of hyperglycemia and hypoglycemia  - Administer ordered medications to maintain glucose within target range  - Assess barriers to adequate nutritional intake and initiate nutrition consult as needed  - Instruct patient on self management of diabetes  Outcome: Progressing     Problem: Patient/Family Goals  Goal: Patient/Family Long Term Goal  Description: Patient's Long Term Goal: discharge    Interventions:  - Pain management  - Surgical intervention  - Wound care  - See additional Care Plan goals for specific interventions  Outcome: Progressing  Goal: Patient/Family Short Term Goal  Description: Patient's Short Term Goal: pain management    Interventions:   - See additional Care Plan goals for specific  interventions  Outcome: Progressing     Problem: CARDIOVASCULAR - ADULT  Goal: Maintains optimal cardiac output and hemodynamic stability  Description: INTERVENTIONS:  - Monitor vital signs, rhythm, and trends  - Monitor for bleeding, hypotension and signs of decreased cardiac output  - Evaluate effectiveness of vasoactive medications to optimize hemodynamic stability  - Monitor arterial and/or venous puncture sites for bleeding and/or hematoma  - Assess quality of pulses, skin color and temperature  - Assess for signs of decreased coronary artery perfusion - ex. Angina  - Evaluate fluid balance, assess for edema, trend weights  Outcome: Progressing  Goal: Absence of cardiac arrhythmias or at baseline  Description: INTERVENTIONS:  - Continuous cardiac monitoring, monitor vital signs, obtain 12 lead EKG if indicated  - Evaluate effectiveness of antiarrhythmic and heart rate control medications as ordered  - Initiate emergency measures for life threatening arrhythmias  - Monitor electrolytes and administer replacement therapy as ordered  Outcome: Progressing     Problem: RESPIRATORY - ADULT  Goal: Achieves optimal ventilation and oxygenation  Description: INTERVENTIONS:  - Assess for changes in respiratory status  - Assess for changes in mentation and behavior  - Position to facilitate oxygenation and minimize respiratory effort  - Oxygen supplementation based on oxygen saturation or ABGs  - Provide Smoking Cessation handout, if applicable  - Encourage broncho-pulmonary hygiene including cough, deep breathe, Incentive Spirometry  - Assess the need for suctioning and perform as needed  - Assess and instruct to report SOB or any respiratory difficulty  - Respiratory Therapy support as indicated  - Manage/alleviate anxiety  - Monitor for signs/symptoms of CO2 retention  Outcome: Progressing     Problem: METABOLIC/FLUID AND ELECTROLYTES - ADULT  Goal: Glucose maintained within prescribed range  Description:  INTERVENTIONS:  - Monitor Blood Glucose as ordered  - Assess for signs and symptoms of hyperglycemia and hypoglycemia  - Administer ordered medications to maintain glucose within target range  - Assess barriers to adequate nutritional intake and initiate nutrition consult as needed  - Instruct patient on self management of diabetes  Outcome: Progressing  Goal: Electrolytes maintained within normal limits  Description: INTERVENTIONS:  - Monitor labs and rhythm and assess patient for signs and symptoms of electrolyte imbalances  - Administer electrolyte replacement as ordered  - Monitor response to electrolyte replacements, including rhythm and repeat lab results as appropriate  - Fluid restriction as ordered  - Instruct patient on fluid and nutrition restrictions as appropriate  Outcome: Progressing  Goal: Hemodynamic stability and optimal renal function maintained  Description: INTERVENTIONS:  - Monitor labs and assess for signs and symptoms of volume excess or deficit  - Monitor intake, output and patient weight  - Monitor urine specific gravity, serum osmolarity and serum sodium as indicated or ordered  - Monitor response to interventions for patient's volume status, including labs, urine output, blood pressure (other measures as available)  - Encourage oral intake as appropriate  - Instruct patient on fluid and nutrition restrictions as appropriate  Outcome: Progressing     Problem: PAIN - ADULT  Goal: Verbalizes/displays adequate comfort level or patient's stated pain goal  Description: INTERVENTIONS:  - Encourage pt to monitor pain and request assistance  - Assess pain using appropriate pain scale  - Administer analgesics based on type and severity of pain and evaluate response  - Implement non-pharmacological measures as appropriate and evaluate response  - Consider cultural and social influences on pain and pain management  - Manage/alleviate anxiety  - Utilize distraction and/or relaxation techniques  -  Monitor for opioid side effects  - Notify MD/LIP if interventions unsuccessful or patient reports new pain  - Anticipate increased pain with activity and pre-medicate as appropriate  Outcome: Not Progressing

## 2024-02-21 NOTE — PROGRESS NOTES
[facility] Hospitalist Progress Note     CC: Hospital Follow up    PCP: Ernesto De Dios       Assessment/Plan:     Principal Problem:    Bypass graft mechanical complication, initial encounter ([facility])  Active Problems:    Hyperglycemia    Bypass graft mechanical complication (HCC)    Anemia, unspecified type    Acute kidney injury superimposed on CKD (HCC)    Uncontrolled type 2 diabetes mellitus with hyperglycemia (HCC)      Ms. Bowen is a 68 year old female with PMH sig for CKD, COPD, DM, HTN, HLD, PVD, and sickle cell anemia who was recently admitted last week for right lower extremity femoral to popliteal bypass who presented with bleeding from surgical site. Taken for emergent OR on 2/10/24, noted to be grossly infected, started on vancomycin/meropenem. Bcx with klebsiella. BG elevated on admit, started on insulin gtt, endo consulted, now off insulin gtt. Kept intubated post op, pulm following.Seems to have been slow to recovery despite multiple surgeries now planning AKA on Thursday.        PAD sp RLE fem-pop bypass last week c/b bleeding fm surg site fm wound dehiscence  - s/p recent fem pop bypass 1/24/24 with Dr. Murrieta  - has been on ASA/plavix- held on admit  - Hg down to 6.0, s/p 3 units pRBC 2/10- watching close now, hgb stable currently no signs of bleeding   - vascular surgery consulted, s/p emergent OR 2/10, removal of infected graft, s/p debridement, bone patch angioplasty   - sp further debridement 2/13 and placement of wound vac  - back to OR 2/16 again and now plans [facility] Thursday [facility]22[facility]   -Seems now will need AKA  -Pain meds PRN  -Pain more severe today increase norco to 10 TID  - lasix PRN   Some coolness and decreased pulse on left leg , CTA with occluded stent in femoral artery, discussed with vascular no further intervention at this time      Septic shock (now improved) fm post op wound infection  - continue IV vancomycin and meropenem, ID consult following   -  Bcx with klebsiella, repeat Bcx ordered  - 2/10 wound cx and 2/13 poly microbial  - abx adjustment per ID f/u cx results  - ID notes appreciated- tenative plan for 6 weeks abx from from final surgical plans- final abx tbd pending cx data and surgical plans    -Wound vac in place  -Keep joseph due to large leg wound   Wound care following     Metabolic acidosis- resolved   - likely 2/2 LA from blood loss +/- dka on admit  - pH 7.24 on admit, LA 7.6 on admit, now normalized  - likely multifactorial, 2/2 LA, blood loss, ?DKA, CKD  - renal consulted, sp bicarb gtt     Acute blood loss anemia from surgical site   Chronic anemia  Sickle cell anemia  -Stable currently, trend CBC   - outpatient f/u  - Hg 8.9 on discharge last week  - Hg 6.0 on admission, s/p 3 units pRBC  - monitor, hgb stable in the 8s        DM2, hyperglycemia better now   -A1c 7.9   - Hold PO medications  - BG 500s in ED, pH 7.2  - insulin gtt ordered in ED  -increased Degludec 17 at bedtime and aspart 10 units with meals , NPO at MN so insulin adjusted accordingly      Respiratory failure  - remained intubated post op both fm aspiration event and metabolic acidosis  - pulm following  - extubated 2/14 pm  Now on 2L NC - wean as able      Hypovolemic / septic Shock/ Hypotension- improved  Hx HTN  BP stable now, hydral 100 Q8 hours   Amlodipine 10 d, Coreg 25 BID   Holding home lisinopril, BP ok watch and consider resuming pending BP       COPD  - chronic, stable  -resumed inhalers     OMEGA on CKD3- at baseline now   - cr 1.7 on admit, previously 1.2 on discharge last week  - monitor Cr and UOP- improved   Stable  Monitor  Renal following       ACP  - CODE- FULL   - POA- daughter and son     FN:  - IVF: Stopped   - Diet: Carb controlled diet      DVT Prophy: Hold due to bleeding   Lines: PIV     Daughter Tiffanie updated at length via phone 2/18/20, no new updates since that time     Questions/concerns were discussed with patient and/or family by  bedside.    Thank You,  Edy Foote, DO    Hospitalist with [facility]     Subjective:     Pain in right leg worse today per patient, asking when procedure will be     OBJECTIVE:    Blood pressure 131/53, pulse 65, temperature 99.2 °F (37.3 °C), temperature source Oral, resp. rate 18, height 5' 4.02\" (1.626 m), weight 180 lb 3.2 oz (81.7 kg), SpO2 92%, not currently breastfeeding.    Temp:  [98.8 °F (37.1 °C)-99.3 °F (37.4 °C)] 99.2 °F (37.3 °C)  Pulse:  [65-75] 65  Resp:  [18] 18  BP: (123-133)/(50-55) 131/53  SpO2:  [92 %-95 %] 92 %      Intake/Output:    Intake/Output Summary (Last 24 hours) at 2/21/2024 1036  Last data filed at 2/21/2024 0600  Gross per 24 hour   Intake 1390 ml   Output 2325 ml   Net -935 ml       Last 3 Weights   02/20/24 0554 180 lb 3.2 oz (81.7 kg)   02/19/24 0445 189 lb 1.6 oz (85.8 kg)   02/18/24 0614 186 lb 1.6 oz (84.4 kg)   02/17/24 0403 181 lb 11.2 oz (82.4 kg)   02/16/24 0500 182 lb 3.2 oz (82.6 kg)   02/14/24 0500 185 lb 13.6 oz (84.3 kg)   02/13/24 0500 183 lb 3.2 oz (83.1 kg)   02/12/24 0400 180 lb 5.4 oz (81.8 kg)   02/11/24 0600 182 lb 5.1 oz (82.7 kg)   02/10/24 2142 171 lb 1.2 oz (77.6 kg)   02/10/24 2105 171 lb 1.2 oz (77.6 kg)   02/10/24 1642 164 lb 3.9 oz (74.5 kg)   01/27/24 0501 164 lb 3.2 oz (74.5 kg)   01/24/24 0549 158 lb (71.7 kg)   01/19/24 1112 160 lb (72.6 kg)   12/30/23 1216 155 lb (70.3 kg)   08/29/23 1357 153 lb (69.4 kg)       Exam   Gen: No acute distress, awake and alert   Pulm: Lungs clear, normal respiratory effort, 2 L NC   CV: Heart with regular rate and rhythm  Abd: Abdomen soft, nontender, nondistended  MSK: Right leg with wound vac in place, ACE wrap in place as well  + joseph         Data Review:       Labs:     Recent Labs   Lab 02/19/24  0536 02/19/24  1159 02/20/24  0627 02/21/24  0531   RBC 2.62*  --  2.94* 2.71*   HGB 7.2* 8.2* 8.4* 7.8*   HCT 22.9* 25.7* 25.2* 22.9*   MCV 87.4  --  85.7 84.5   MCH 27.5  --  28.6 28.8   MCHC 31.4  --   33.3 34.1   RDW 16.1*  --  16.1* 16.3*   NEPRELIM 18.64*  --  19.95* 16.39*   WBC 22.1*  --  22.7* 19.1*   .0  --  422.0 466.0*         Recent Labs   Lab 02/19/24  0536 02/20/24  0627 02/21/24  0531   GLU 77 162* 146*   BUN 45* 46* 45*   CREATSERUM 1.08* 1.17* 1.03*   EGFRCR 57* 51* 60   CA 8.1* 8.0* 7.7*    143 147*   K 4.3 4.1 4.1   * 111 116*   CO2 26.0 24.0 25.0       Recent Labs   Lab 02/17/24  0730 02/18/24  0632 02/19/24  0536   ALB 2.2* 2.2* 2.2*         Imaging:  CTA ABDOMEN/PELVIS LOWER EXT BILAT W RUNOFF (LEU=55426)    Result Date: 2/21/2024  CONCLUSION:   Completely occluded right superficial femoral artery stent is unchanged in appearance since the prior exams.  Completely occluded left superficial femoral artery stent is new since 6/20/2023.  Multifocal areas of high-grade stenosis and narrowing seen involving the mid to distal superficial femoral artery with a focal area of complete occlusion within the distal left superficial femoral artery which is new since 6/20/2023.  Markedly limited evaluation of bilateral calf vessels due to extensive atherosclerotic calcification.  Large soft tissue ulceration and wounds seen within the anterior right pelvis and upper thigh and medial knee.  No abscess or drainable collection seen at this point.  Anasarca  Multiple other incidental findings as described in the body of the report.    Dictated by (CST): Elder Wagoner MD on 2/21/2024 at 8:26 AM     Finalized by (CST): Elder Wagoner MD on 2/21/2024 at 8:43 AM          US VENOUS DOPPLER LEG LEFT - DIAG IMG (CPT=93971)    Result Date: 2/21/2024  CONCLUSION: No evidence of left lower extremity DVT.   Dictated by (CST): Ron Chaudhari MD on 2/21/2024 at 6:15 AM     Finalized by (CST): Ron Chaudhari MD on 2/21/2024 at 6:15 AM             Meds:      vancomycin  125 mg Oral Daily    HYDROcodone-acetaminophen  2 tablet Oral TID    insulin aspart  10 Units Subcutaneous TID CC    pantoprazole  40 mg Oral QAM  AC    insulin degludec  17 Units Subcutaneous Nightly    amLODIPine  10 mg Oral QAM    carvedilol  25 mg Oral BID    hydrALAZINE  100 mg Oral Q8H    vancomycin  750 mg Intravenous Q36H    fluconazole  200 mg Oral Daily    magnesium sulfate  4 g Intravenous Once    insulin aspart  1-7 Units Subcutaneous TID CC    sodium hypochlorite   Topical 2 times per day    fluticasone furoate-vilanterol  1 puff Inhalation Daily    rosuvastatin  40 mg Oral Nightly    meropenem  500 mg Intravenous q12h         HYDROcodone-acetaminophen, HYDROcodone-acetaminophen, acetaminophen, morphINE, ondansetron, HYDROmorphone, morphINE **OR** morphINE **OR** morphINE, hydrALAzine, ondansetron, metoclopramide, albuterol, ipratropium-albuterol, glucose **OR** glucose **OR** glucose-vitamin C **OR** dextrose **OR** glucose **OR** glucose **OR** glucose-vitamin C

## 2024-02-21 NOTE — PROGRESS NOTES
Vascular Surgery Progress Note    /53 (BP Location: Right arm)   Pulse 65   Temp 99.2 °F (37.3 °C) (Oral)   Resp 18   Ht 5' 4.02\" (1.626 m)   Wt 180 lb 3.2 oz (81.7 kg)   LMP  (LMP Unknown)   SpO2 92%   BMI 30.92 kg/m²     Recent Labs   Lab 02/19/24  0536 02/19/24  1159 02/20/24  0627 02/21/24  0531   RBC 2.62*  --  2.94* 2.71*   HGB 7.2* 8.2* 8.4* 7.8*   HCT 22.9* 25.7* 25.2* 22.9*   MCV 87.4  --  85.7 84.5   MCH 27.5  --  28.6 28.8   MCHC 31.4  --  33.3 34.1   RDW 16.1*  --  16.1* 16.3*   NEPRELIM 18.64*  --  19.95* 16.39*   WBC 22.1*  --  22.7* 19.1*   .0  --  422.0 466.0*       Recent Labs   Lab 02/19/24  0536 02/20/24  0627 02/21/24  0531   GLU 77 162* 146*   BUN 45* 46* 45*   CREATSERUM 1.08* 1.17* 1.03*   CA 8.1* 8.0* 7.7*    143 147*   K 4.3 4.1 4.1   * 111 116*   CO2 26.0 24.0 25.0       Patient with ongoing pain issues.  I informed her that we will just need to proceed with amputation as scheduled tomorrow.  Groin incision intact with wound VAC holding suction.  Ongoing ischemic changes on the right.  No issues on the left.  Plan for right lower extremity amputation tomorrow with Dr. Justice.  Other management per hospitalist and other consulting services.    Flavio Taveras MD  [facility]  Vascular Surgery

## 2024-02-22 ENCOUNTER — ANESTHESIA EVENT (OUTPATIENT)
Dept: SURGERY | Facility: HOSPITAL | Age: 69
End: 2024-02-22
Payer: MEDICARE

## 2024-02-22 ENCOUNTER — APPOINTMENT (OUTPATIENT)
Dept: PICC SERVICES | Facility: HOSPITAL | Age: 69
End: 2024-02-22
Attending: INTERNAL MEDICINE
Payer: MEDICARE

## 2024-02-22 ENCOUNTER — ANESTHESIA (OUTPATIENT)
Dept: SURGERY | Facility: HOSPITAL | Age: 69
End: 2024-02-22
Payer: MEDICARE

## 2024-02-22 PROCEDURE — 36430 TRANSFUSION BLD/BLD COMPNT: CPT | Performed by: ANESTHESIOLOGY

## 2024-02-22 RX ORDER — SODIUM CHLORIDE 9 MG/ML
INJECTION, SOLUTION INTRAVENOUS CONTINUOUS PRN
Status: DISCONTINUED | OUTPATIENT
Start: 2024-02-22 | End: 2024-02-22 | Stop reason: SURG

## 2024-02-22 RX ORDER — EPHEDRINE SULFATE 50 MG/ML
INJECTION, SOLUTION INTRAVENOUS AS NEEDED
Status: DISCONTINUED | OUTPATIENT
Start: 2024-02-22 | End: 2024-02-22 | Stop reason: SURG

## 2024-02-22 RX ORDER — PHENYLEPHRINE HCL 10 MG/ML
VIAL (ML) INJECTION AS NEEDED
Status: DISCONTINUED | OUTPATIENT
Start: 2024-02-22 | End: 2024-02-22 | Stop reason: SURG

## 2024-02-22 RX ORDER — LIDOCAINE HYDROCHLORIDE 10 MG/ML
INJECTION, SOLUTION EPIDURAL; INFILTRATION; INTRACAUDAL; PERINEURAL AS NEEDED
Status: DISCONTINUED | OUTPATIENT
Start: 2024-02-22 | End: 2024-02-22 | Stop reason: SURG

## 2024-02-22 RX ORDER — SODIUM CHLORIDE, SODIUM LACTATE, POTASSIUM CHLORIDE, CALCIUM CHLORIDE 600; 310; 30; 20 MG/100ML; MG/100ML; MG/100ML; MG/100ML
INJECTION, SOLUTION INTRAVENOUS CONTINUOUS PRN
Status: DISCONTINUED | OUTPATIENT
Start: 2024-02-22 | End: 2024-02-22 | Stop reason: SURG

## 2024-02-22 RX ORDER — ROCURONIUM BROMIDE 10 MG/ML
INJECTION, SOLUTION INTRAVENOUS AS NEEDED
Status: DISCONTINUED | OUTPATIENT
Start: 2024-02-22 | End: 2024-02-22 | Stop reason: SURG

## 2024-02-22 RX ORDER — ONDANSETRON 2 MG/ML
INJECTION INTRAMUSCULAR; INTRAVENOUS AS NEEDED
Status: DISCONTINUED | OUTPATIENT
Start: 2024-02-22 | End: 2024-02-22 | Stop reason: SURG

## 2024-02-22 RX ADMIN — SODIUM CHLORIDE, SODIUM LACTATE, POTASSIUM CHLORIDE, CALCIUM CHLORIDE: 600; 310; 30; 20 INJECTION, SOLUTION INTRAVENOUS at 13:25:00

## 2024-02-22 RX ADMIN — ROCURONIUM BROMIDE 40 MG: 10 INJECTION, SOLUTION INTRAVENOUS at 11:25:00

## 2024-02-22 RX ADMIN — SODIUM CHLORIDE: 9 INJECTION, SOLUTION INTRAVENOUS at 11:18:00

## 2024-02-22 RX ADMIN — LIDOCAINE HYDROCHLORIDE 25 MG: 10 INJECTION, SOLUTION EPIDURAL; INFILTRATION; INTRACAUDAL; PERINEURAL at 13:14:00

## 2024-02-22 RX ADMIN — PHENYLEPHRINE HCL 100 MCG: 10 MG/ML VIAL (ML) INJECTION at 11:30:00

## 2024-02-22 RX ADMIN — PHENYLEPHRINE HCL 150 MCG: 10 MG/ML VIAL (ML) INJECTION at 12:43:00

## 2024-02-22 RX ADMIN — PHENYLEPHRINE HCL 150 MCG: 10 MG/ML VIAL (ML) INJECTION at 12:36:00

## 2024-02-22 RX ADMIN — LIDOCAINE HYDROCHLORIDE 30 MG: 10 INJECTION, SOLUTION EPIDURAL; INFILTRATION; INTRACAUDAL; PERINEURAL at 11:25:00

## 2024-02-22 RX ADMIN — PHENYLEPHRINE HCL 100 MCG: 10 MG/ML VIAL (ML) INJECTION at 12:13:00

## 2024-02-22 RX ADMIN — ONDANSETRON 4 MG: 2 INJECTION INTRAMUSCULAR; INTRAVENOUS at 13:10:00

## 2024-02-22 RX ADMIN — SODIUM CHLORIDE, SODIUM LACTATE, POTASSIUM CHLORIDE, CALCIUM CHLORIDE: 600; 310; 30; 20 INJECTION, SOLUTION INTRAVENOUS at 13:39:00

## 2024-02-22 RX ADMIN — EPHEDRINE SULFATE 10 MG: 50 INJECTION, SOLUTION INTRAVENOUS at 12:18:00

## 2024-02-22 NOTE — PROGRESS NOTES
[facility]    Progress Note    Ava Bowen Patient Status:  Inpatient    10/6/1955 MRN [id number]   Location [facility] 3W/SW Attending Edy Foote,    Hosp Day # 12 PCP Ernesto De Dios     Subjective:   Ava Bowen is a(n) 68 year old female      Was seen in the morning  NPO after MN for surgery   Discussed with Pt and Rn multiple times.   Decreased dinner dose of insulin last night of short acting insulin     Objective:   Vital Signs:  Blood pressure 111/63, pulse 55, temperature 97.7 °F (36.5 °C), temperature source Oral, resp. rate 16, height 5' 4.02\" (1.626 m), weight 180 lb 3.2 oz (81.7 kg), SpO2 92%, not currently breastfeeding.                    General: Awake and alert.    HENT: Eye: EOMI,    Neck/Thyroid: neck inspection: normal   Neuro:speech is clear. Awake, alert   Psych: Orientated to time, place, person & situation    Skin: Skin is dry       Assessment and Plan:     Bypass graft mechanical complication, initial encounter (HCC)    Hyperglycemia    Bypass graft mechanical complication (HCC)    Anemia, unspecified type    Acute kidney injury superimposed on CKD (HCC)    Uncontrolled type 2 diabetes mellitus with hyperglycemia (HCC)    Plan is OR today for Rt above knee amputation       Uncontrolled Type 2 Diabetes  Tresiba 17 -> 10 units at night  Novolog   10 -> 5 units TIDAC  medium dose CF scale  ACHS if eating Q 4 hrs if NPO   Will start D5 LR if needed since she has been on NPO after MN   - We will follow blood sugars      I discussed plan with pt     Thank you for allowing me to participate in the care of your patient.    Results:     Lab Results   Component Value Date    WBC 18.0 (H) 2024    HGB 8.3 (L) 2024    HCT 26.1 (L) 2024    .0 (H) 2024    CREATSERUM 0.97 2024    BUN 36 (H) 2024     (H) 2024    K 4.1 2024     (H) 2024    CO2 26.0 2024    GLU 82 2024    CA 7.8 (L)  02/22/2024    ALB 2.2 (L) 02/19/2024    ALKPHO 62 02/10/2024    BILT 0.4 02/10/2024    TP 4.6 (L) 02/10/2024    AST 91 (H) 02/10/2024    ALT 38 02/10/2024    PTT 30.3 02/10/2024    INR 1.51 (H) 02/10/2024    T4F 1.3 06/18/2019    TSH 1.060 06/18/2019    LIP 40 04/25/2023    DDIMER 1.45 (H) 11/08/2019    ESRML 27 05/08/2020    CRP 3.11 (H) 05/08/2020    MG 1.8 02/19/2024    PHOS 3.7 02/19/2024    TROP <0.045 11/08/2019    CK 35 08/21/2023    B12 963 09/03/2019       CTA ABDOMEN/PELVIS LOWER EXT BILAT W RUNOFF (PWQ=49353)    Result Date: 2/21/2024  CONCLUSION:   Completely occluded right superficial femoral artery stent is unchanged in appearance since the prior exams.  Completely occluded left superficial femoral artery stent is new since 6/20/2023.  Multifocal areas of high-grade stenosis and narrowing seen involving the mid to distal superficial femoral artery with a focal area of complete occlusion within the distal left superficial femoral artery which is new since 6/20/2023.  Markedly limited evaluation of bilateral calf vessels due to extensive atherosclerotic calcification.  Large soft tissue ulceration and wounds seen within the anterior right pelvis and upper thigh and medial knee.  No abscess or drainable collection seen at this point.  Anasarca  Multiple other incidental findings as described in the body of the report.    Dictated by (CST): Elder Wagoner MD on 2/21/2024 at 8:26 AM     Finalized by (CST): Elder Wagoner MD on 2/21/2024 at 8:43 AM          US VENOUS DOPPLER LEG LEFT - DIAG IMG (CPT=93971)    Result Date: 2/21/2024  CONCLUSION: No evidence of left lower extremity DVT.   Dictated by (CST): Ron Chaudhari MD on 2/21/2024 at 6:15 AM     Finalized by (CST): Ron Chaudhari MD on 2/21/2024 at 6:15 AM                     Dougie Casarez MD  2/22/2024        DOS is the same date the note was signed

## 2024-02-22 NOTE — PLAN OF CARE
A&OX4, complete care. On 1L after amputation due to drowsiness. Family visiting at bedside.   Problem: Patient Centered Care  Goal: Patient preferences are identified and integrated in the patient's plan of care  Description: Interventions:  - What would you like us to know as we care for you? From home alone  - Provide timely, complete, and accurate information to patient/family  - Incorporate patient and family knowledge, values, beliefs, and cultural backgrounds into the planning and delivery of care  - Encourage patient/family to participate in care and decision-making at the level they choose  - Honor patient and family perspectives and choices  Outcome: Progressing     Problem: Diabetes/Glucose Control  Goal: Glucose maintained within prescribed range  Description: INTERVENTIONS:  - Monitor Blood Glucose as ordered  - Assess for signs and symptoms of hyperglycemia and hypoglycemia  - Administer ordered medications to maintain glucose within target range  - Assess barriers to adequate nutritional intake and initiate nutrition consult as needed  - Instruct patient on self management of diabetes  Outcome: Progressing     Problem: Patient/Family Goals  Goal: Patient/Family Long Term Goal  Description: Patient's Long Term Goal: discharge    Interventions:  - Pain management  - Surgical intervention  - Wound care  - See additional Care Plan goals for specific interventions  Outcome: Progressing  Goal: Patient/Family Short Term Goal  Description: Patient's Short Term Goal: pain management    Interventions:   - See additional Care Plan goals for specific interventions  Outcome: Progressing     Problem: CARDIOVASCULAR - ADULT  Goal: Maintains optimal cardiac output and hemodynamic stability  Description: INTERVENTIONS:  - Monitor vital signs, rhythm, and trends  - Monitor for bleeding, hypotension and signs of decreased cardiac output  - Evaluate effectiveness of vasoactive medications to optimize hemodynamic  stability  - Monitor arterial and/or venous puncture sites for bleeding and/or hematoma  - Assess quality of pulses, skin color and temperature  - Assess for signs of decreased coronary artery perfusion - ex. Angina  - Evaluate fluid balance, assess for edema, trend weights  Outcome: Progressing  Goal: Absence of cardiac arrhythmias or at baseline  Description: INTERVENTIONS:  - Continuous cardiac monitoring, monitor vital signs, obtain 12 lead EKG if indicated  - Evaluate effectiveness of antiarrhythmic and heart rate control medications as ordered  - Initiate emergency measures for life threatening arrhythmias  - Monitor electrolytes and administer replacement therapy as ordered  Outcome: Progressing     Problem: RESPIRATORY - ADULT  Goal: Achieves optimal ventilation and oxygenation  Description: INTERVENTIONS:  - Assess for changes in respiratory status  - Assess for changes in mentation and behavior  - Position to facilitate oxygenation and minimize respiratory effort  - Oxygen supplementation based on oxygen saturation or ABGs  - Provide Smoking Cessation handout, if applicable  - Encourage broncho-pulmonary hygiene including cough, deep breathe, Incentive Spirometry  - Assess the need for suctioning and perform as needed  - Assess and instruct to report SOB or any respiratory difficulty  - Respiratory Therapy support as indicated  - Manage/alleviate anxiety  - Monitor for signs/symptoms of CO2 retention  Outcome: Progressing     Problem: METABOLIC/FLUID AND ELECTROLYTES - ADULT  Goal: Glucose maintained within prescribed range  Description: INTERVENTIONS:  - Monitor Blood Glucose as ordered  - Assess for signs and symptoms of hyperglycemia and hypoglycemia  - Administer ordered medications to maintain glucose within target range  - Assess barriers to adequate nutritional intake and initiate nutrition consult as needed  - Instruct patient on self management of diabetes  Outcome: Progressing  Goal: Electrolytes  maintained within normal limits  Description: INTERVENTIONS:  - Monitor labs and rhythm and assess patient for signs and symptoms of electrolyte imbalances  - Administer electrolyte replacement as ordered  - Monitor response to electrolyte replacements, including rhythm and repeat lab results as appropriate  - Fluid restriction as ordered  - Instruct patient on fluid and nutrition restrictions as appropriate  Outcome: Progressing  Goal: Hemodynamic stability and optimal renal function maintained  Description: INTERVENTIONS:  - Monitor labs and assess for signs and symptoms of volume excess or deficit  - Monitor intake, output and patient weight  - Monitor urine specific gravity, serum osmolarity and serum sodium as indicated or ordered  - Monitor response to interventions for patient's volume status, including labs, urine output, blood pressure (other measures as available)  - Encourage oral intake as appropriate  - Instruct patient on fluid and nutrition restrictions as appropriate  Outcome: Progressing     Problem: PAIN - ADULT  Goal: Verbalizes/displays adequate comfort level or patient's stated pain goal  Description: INTERVENTIONS:  - Encourage pt to monitor pain and request assistance  - Assess pain using appropriate pain scale  - Administer analgesics based on type and severity of pain and evaluate response  - Implement non-pharmacological measures as appropriate and evaluate response  - Consider cultural and social influences on pain and pain management  - Manage/alleviate anxiety  - Utilize distraction and/or relaxation techniques  - Monitor for opioid side effects  - Notify MD/LIP if interventions unsuccessful or patient reports new pain  - Anticipate increased pain with activity and pre-medicate as appropriate  Outcome: Progressing

## 2024-02-22 NOTE — PROGRESS NOTES
02/22/24 0800   Negative Pressure Wound Therapy Groin Right   Placement Date: 02/13/24   Inserted by: Dr. KESSLER  Location: Groin  Wound Location Orientation: Right   Wound photographed/measured No   Machine Status (On) Yes   Site Assessment ANGELO   [place]-wound Assessment ANGELO   Unit Type KCI ULTA   Cycle Continuous;On   Target Pressure (mmHg) 125   Drainage Description Serous   Dressing Status Intact   Canister Changed No   Wound Follow Up   Follow up needed Yes     Pt was seen for vac check, patent seal detected at 125 mmHg. Pt is planned to go to OR this a m for I and D of the R groin wound, vac change and AKA w Dr. Kessler.

## 2024-02-22 NOTE — ANESTHESIA PREPROCEDURE EVALUATION
Anesthesia PreOp Note    HPI:     Ava Bowen is a 68 year old female who presents for preoperative consultation requested by: Kp Justice MD    Date of Surgery: 2/10/2024 - 2/22/2024    Procedure(s):  Right above knee amputation, possible myoplasty  Indication: Groin wound dehiscence, gangrene of right foot    Relevant Problems   No relevant active problems       NPO:  Last Liquid Consumption Date: 02/16/24 (`)  Last Liquid Consumption Time: 0839 (sips with am meds)  Last Solid Consumption Date: 02/15/24  Last Solid Consumption Time: 1815  Last Liquid Consumption Date: 02/16/24 (`)          History Review:  Patient Active Problem List    Diagnosis Date Noted    Uncontrolled type 2 diabetes mellitus with hyperglycemia ([facility]) 02/17/2024    Bypass graft mechanical complication ([facility]) 02/10/2024    Bypass graft mechanical complication, initial encounter ([facility]) 02/10/2024    Anemia, unspecified type 02/10/2024    Acute kidney injury superimposed on CKD ([facility]) 02/10/2024    Opioid dependence, uncomplicated ([facility]) 07/28/2023    Depression, recurrent ([facility]) 07/28/2023    Stage 3 chronic kidney disease, unspecified whether stage 3a or 3b CKD ([facility]) 07/28/2023    Hypoglycemia 06/26/2023    Pyelonephritis 04/25/2023    Hypernatremia 04/25/2023    Hyperglycemia 04/25/2023    Ureteral calculi 04/25/2023    Urinary tract infection 03/23/2023    Acute cystitis without hematuria 03/23/2023    Ileitis 03/23/2023    COVID-19 03/23/2023    UTI (urinary tract infection) 03/23/2023    Hyperlipidemia 10/17/2022    DDD (degenerative disc disease), lumbar 05/20/2022    Failed back syndrome of lumbar spine 05/20/2022    Myalgia 03/17/2022    Post-operative pain     Type 2 diabetes mellitus with hyperglycemia, with long-term current use of insulin ([facility])     Primary hypertension     S/P laminectomy     Family history of glaucoma in sister 05/04/2021    Pre-op testing     PAD (peripheral artery disease) ([facility]) 07/08/2020    Diabetic ulcer  of left midfoot associated with type 2 diabetes mellitus, with fat layer exposed ([facility]) 2020    PVD (peripheral vascular disease) ([facility]) 2020    Centrilobular emphysema ([facility]) 2019    Type 2 diabetes mellitus without retinopathy ([facility]) 10/15/2019    Age-related nuclear cataract of both eyes 10/15/2019    Glaucoma suspect of both eyes 10/15/2019    Back pain with radiation 2018    Anemia 2018    Azotemia 2018    Hypokalemia 2018    Chest pain 2007       Past Medical History:   Diagnosis Date    Anemia     Back pain     Back problem     Cataract     Chronic kidney disease (CKD)     COPD (chronic obstructive pulmonary disease) ([facility])     FEV 1 1.25 50%     Diabetes ([facility])     DM neuropathy    Essential hypertension     H/O angioplasty     2020    High blood pressure     High cholesterol     Neuropathy     Peripheral vascular disease ([facility])     PNA (pneumonia) 2018    Pulmonary emphysema ([facility])     Pulmonary nodule     4 mm RUL    Renal disorder     monitoring kidney labs    Sickle cell trait ([facility])     Sickle-cell anemia ([facility])     Tobacco abuse     Visual impairment        Past Surgical History:   Procedure Laterality Date    APPENDECTOMY      BACK SURGERY Right 2021    Right L3-4 extreme lateral interbody fusion, posterior decompression; LUMBAR LAMINECTOMY 1 LEVEL          x3    CHOLECYSTECTOMY      EXCIS LUMBAR DISK,ONE LEVEL          OTHER      bilateral leg stents  and        Medications Prior to Admission   Medication Sig Dispense Refill Last Dose    HYDROcodone-acetaminophen  MG Oral Tab Take 1 tablet by mouth every 4 (four) hours as needed for Pain (max 6/day). 180 tablet 0 Past Month    [START ON 3/15/2024] HYDROcodone-acetaminophen  MG Oral Tab Take 1 tablet by mouth every 4 (four) hours as needed for Pain (max 6/day). 180 tablet 0 Past Month    LANTUS SOLOSTAR 100 UNIT/ML Subcutaneous Solution Pen-injector Inject 6  Units into the skin See Admin Instructions. Every other day   Past Week    JANUVIA 25 MG Oral Tab Take 1 tablet (25 mg total) by mouth every morning.   Past Week    rosuvastatin 40 MG Oral Tab Take 1 tablet (40 mg total) by mouth nightly.   2/10/2024    glipiZIDE 10 MG Oral Tab Take 1 tablet (10 mg total) by mouth 2 (two) times daily.   Past Week    D-5000 125 MCG (5000 UT) Oral Tab Take 1 tablet (5,000 Units total) by mouth daily.   Past Week    LISINOPRIL 40 MG Oral Tab TAKE 1 TABLET(40 MG) BY MOUTH DAILY (Patient taking differently: Take 1 tablet (40 mg total) by mouth every morning.) 90 tablet 1 Past Week    hydrALAZINE 100 MG Oral Tab Take 1 tablet (100 mg total) by mouth every 8 (eight) hours. 270 tablet 1 Past Week    Budesonide-Formoterol Fumarate 160-4.5 MCG/ACT Inhalation Aerosol Inhale 2 puffs into the lungs 2 (two) times daily. Rinse mouth with water, gargle, and spit to follow. 1 each 2 2/10/2024    ipratropium-albuterol 0.5-2.5 (3) MG/3ML Inhalation Solution Take 3 mL by nebulization every 6 (six) hours as needed (shortness of breath refractory to HFA). 100 each 0 2/10/2024    MYRBETRIQ 50 MG Oral Tablet 24 Hr Take 1 tablet (50 mg total) by mouth every morning.   Unknown    albuterol 108 (90 Base) MCG/ACT Inhalation Aero Soln Inhale 2 puffs into the lungs every 6 (six) hours as needed for Wheezing or Shortness of Breath. 8.5 each 2 More than a month    carvedilol 12.5 MG Oral Tab Take 2 tablets (25 mg total) by mouth daily. (Patient taking differently: Take 2 tablets (25 mg total) by mouth 2 (two) times daily with meals.) 180 tablet 1 More than a month    clopidogrel 75 MG Oral Tab Take 1 tablet (75 mg total) by mouth daily. (Patient taking differently: Take 1 tablet (75 mg total) by mouth every morning.) 90 tablet 1 More than a month    estradiol 0.1 MG/GM Vaginal Cream Apply a small amount to the perimeatal tissue every other day. 45 g 2 Unknown    amLODIPine 10 MG Oral Tab Take 1 tablet (10 mg  total) by mouth daily. (Patient taking differently: Take 1 tablet (10 mg total) by mouth every morning.) 90 tablet 0 More than a month    aspirin 81 MG Oral Tab EC Take 1 tablet (81 mg total) by mouth every other day.   Unknown     Current Facility-Administered Medications Ordered in Epic   Medication Dose Route Frequency Provider Last Rate Last Admin    vancomycin (Vancocin) cap 125 mg  125 mg Oral Daily Jeff Hartmann, DO   125 mg at 02/22/24 0825    HYDROcodone-acetaminophen (Norco) 5-325 MG per tab 2 tablet  2 tablet Oral TID Long, Edy, DO   2 tablet at 02/22/24 0825    insulin aspart (NovoLOG) 100 Units/mL FlexPen 10 Units  10 Units Subcutaneous TID CC Dougie Casarez MD   5 Units at 02/21/24 1849    HYDROmorphone (Dilaudid) 1 MG/ML injection 1 mg  1 mg Intravenous Q3H PRN Long, Edy, DO   1 mg at 02/22/24 0849    pantoprazole (Protonix) DR tab 40 mg  40 mg Oral QAM AC Long, Edy, DO   40 mg at 02/22/24 0700    insulin degludec 100 units/mL flextouch 17 Units  17 Units Subcutaneous Nightly Dougie Casarez MD   17 Units at 02/21/24 2014    amLODIPine (Norvasc) tab 10 mg  10 mg Oral QAM Long, Edy, DO   10 mg at 02/22/24 0825    carvedilol (Coreg) tab 25 mg  25 mg Oral BID Long, Edy, DO   25 mg at 02/22/24 0826    hydrALAZINE (Apresoline) tab 100 mg  100 mg Oral Q8H Long, Edy, DO   100 mg at 02/22/24 0706    vancomycin (Vancocin) 750 mg in sodium chloride 0.9% 250 mL IVPB-ADDV  750 mg Intravenous Q36H Long, Edy,  mL/hr at 02/22/24 0826 750 mg at 02/22/24 0826    fluconazole (Diflucan) tab 200 mg  200 mg Oral Daily Noah Perez MD   200 mg at 02/22/24 0825    HYDROcodone-acetaminophen (Norco)  MG per tab 1 tablet  1 tablet Oral Q4H PRN Max Souza MD        HYDROcodone-acetaminophen (Norco)  MG per tab 2 tablet  2 tablet Oral Q4H PRN Max Souza MD   2 tablet at 02/20/24 0641    acetaminophen (Tylenol) tab 650 mg  650 mg Oral Q4H PRN Max Souza MD         morphINE 20 mg/mL concentrated oral solution 5 mg  5 mg Oral Q4H PRN Kp Justice MD        magnesium sulfate 4 g/100mL IVPB premix 4 g  4 g Intravenous Once Kp Justice MD        ondansetron (Zofran) 4 MG/2ML injection 4 mg  4 mg Intravenous Q6H PRN Kp Justice MD        insulin aspart (NovoLOG) 100 Units/mL FlexPen 1-7 Units  1-7 Units Subcutaneous TID CC Grecia Armando MD   7 Units at 02/20/24 1756    morphINE PF 2 MG/ML injection 1 mg  1 mg Intravenous Q2H PRN Kp Justice MD        Or    morphINE PF 2 MG/ML injection 2 mg  2 mg Intravenous Q2H PRN Kp Justice MD   2 mg at 02/18/24 2123    Or    morphINE PF 4 MG/ML injection 4 mg  4 mg Intravenous Q2H PRN Kp Justice MD   4 mg at 02/21/24 1106    sodium hypochlorite (Dakin's) 0.125 % external solution   Topical 2 times per day Kp Justice MD   Given at 02/13/24 0555    hydrALAzine (Apresoline) 20 mg/mL injection 10 mg  10 mg Intravenous Q4H PRN Kp Justice MD   10 mg at 02/13/24 0721    ondansetron (Zofran) 4 MG/2ML injection 4 mg  4 mg Intravenous Q6H PRN Kp Justice MD        metoclopramide (Reglan) 5 mg/mL injection 5 mg  5 mg Intravenous Q8H PRN Kp Justice MD        albuterol (Ventolin HFA) 108 (90 Base) MCG/ACT inhaler 2 puff  2 puff Inhalation Q6H PRN Kp Justice MD        fluticasone furoate-vilanterol (Breo Ellipta) 200-25 MCG/ACT inhaler 1 puff  1 puff Inhalation Daily Lissa Lee MD   1 puff at 02/22/24 0829    ipratropium-albuterol (Duoneb) 0.5-2.5 (3) MG/3ML inhalation solution 3 mL  3 mL Nebulization Q6H PRN Kp Justice MD        rosuvastatin (Crestor) tab 40 mg  40 mg Oral Nightly Kp Justice MD   40 mg at 02/21/24 2014    glucose (Dex4) 15 GM/59ML oral liquid 15 g  15 g Oral Q15 Min PRN Kp Justice MD        Or    glucose (Glutose) 40% oral gel 15 g  15 g Oral Q15 Min PRN Kp Justice MD        Or    glucose-vitamin C (Dex-4) chewable tab 4 tablet  4 tablet  Oral Q15 Min PRN Kp Justice MD        Or    dextrose 50% injection 50 mL  50 mL Intravenous Q15 Min PRN Kp Justice MD   50 mL at 24 2100    Or    glucose (Dex4) 15 GM/59ML oral liquid 30 g  30 g Oral Q15 Min PRN Kp Justice MD        Or    glucose (Glutose) 40% oral gel 30 g  30 g Oral Q15 Min PRN Kp Justice MD        Or    glucose-vitamin C (Dex-4) chewable tab 8 tablet  8 tablet Oral Q15 Min PRN Kp Justice MD        meropenem (Merrem) 500 mg in sodium chloride 0.9% 100 mL IVPB-MBP  500 mg Intravenous q12h Kp Justice MD 33.3 mL/hr at 24 0114 500 mg at 24 0114     No current [facility]-ordered outpatient medications on file.       Allergies   Allergen Reactions    Penicillins HIVES and ANGIOEDEMA     Hives on eyelids (occurred when pt was in her 20s). Tolerated amoxicillin per chart. Tolerates cephalosporins.       Family History   Problem Relation Age of Onset    Diabetes Mother         Passed from DM complications    Diabetes Sister         Had kidney transplant due to complications of DM    Kidney Disease Sister         Kidney failure secondary to diabetes; received kidney transplant in     Glaucoma Sister     Diabetes Brother     Sickle Cell Son         Cause of death    Macular degeneration Neg      Social History     Socioeconomic History    Marital status:    Tobacco Use    Smoking status: Former     Packs/day: 1.00     Years: 30.00     Additional pack years: 0.00     Total pack years: 30.00     Types: Cigarettes     Quit date: 2019     Years since quittin.2    Smokeless tobacco: Never   Vaping Use    Vaping Use: Never used   Substance and Sexual Activity    Alcohol use: Not Currently     Comment: socially    Drug use: No       Available pre-op labs reviewed.  Lab Results   Component Value Date    WBC 18.0 (H) 2024    RBC 2.76 (L) 2024    HGB 7.9 (L) 2024    HCT 23.5 (L) 2024    MCV 85.1 2024    MCH 28.6  02/22/2024    MCHC 33.6 02/22/2024    RDW 16.3 (H) 02/22/2024    .0 (H) 02/22/2024     Lab Results   Component Value Date     (H) 02/22/2024    K 4.1 02/22/2024     (H) 02/22/2024    CO2 26.0 02/22/2024    BUN 36 (H) 02/22/2024    CREATSERUM 0.97 02/22/2024    GLU 82 02/22/2024    PGLU 93 02/22/2024    CA 7.8 (L) 02/22/2024     Lab Results   Component Value Date    INR 1.51 (H) 02/10/2024       Vital Signs:  Body mass index is 30.92 kg/m².   height is 1.626 m (5' 4.02\") and weight is 81.7 kg (180 lb 3.2 oz). Her oral temperature is 99 °F (37.2 °C). Her blood pressure is 111/56 and her pulse is 62. Her respiration is 18 and oxygen saturation is 93%.   Vitals:    02/21/24 2021 02/21/24 2305 02/22/24 0517 02/22/24 0823   BP: 113/49 117/61 116/62 111/56   Pulse: 61 62 61 62   Resp: 18  18 18   Temp: 98.5 °F (36.9 °C)  98.5 °F (36.9 °C) 99 °F (37.2 °C)   TempSrc: Oral  Oral Oral   SpO2: 93%  93% 93%   Weight:       Height:            Anesthesia Evaluation     Patient summary reviewed and Nursing notes reviewed    Airway   Mallampati: II  TM distance: >3 FB  Neck ROM: limited  Dental    (+) upper dentures and lower dentures    Pulmonary    (+) COPD, rhonchi  Cardiovascular   Exercise tolerance: poor  (+) hypertension, CAD    Rhythm: regular  Rate: normal  ROS comment: Severe PAD    Neuro/Psych    (+)  neuromuscular disease (Diabetic neuropathy),        GI/Hepatic/Renal      Endo/Other    (+) diabetes mellitus poorly controlled  Abdominal                  Anesthesia Plan:   ASA:  3  Plan:   General  Airway:  ETT and Video laryngoscope  Informed Consent Plan and Risks Discussed With:  Patient  Discussed plan with:  Attending      I have informed Ava Bowen and/or legal guardian or family member of the nature of the anesthetic plan, benefits, risks including possible dental damage if relevant, major complications, and any alternative forms of anesthetic management.   All of the patient's questions  were answered to the best of my ability. The patient desires the anesthetic management as planned.  Lanre Quiñones MD  2/22/2024 9:36 AM  Present on Admission:  **None**

## 2024-02-22 NOTE — ANESTHESIA PROCEDURE NOTES
Airway  Date/Time: 2/22/2024 11:26 AM  Urgency: elective      General Information and Staff    Patient location during procedure: OR  Anesthesiologist: Lanre Quiñones MD  Performed: anesthesiologist   Performed by: Lanre Quiñones MD  Authorized by: Lanre Quiñones MD      Indications and Patient Condition  Indications for airway management: anesthesia  Sedation level: deep  Preoxygenated: yes  Patient position: sniffing  MILS maintained throughout  Mask difficulty assessment: 1 - vent by mask    Final Airway Details  Final airway type: endotracheal airway      Successful airway: ETT  Cuffed: yes   Successful intubation technique: Video laryngoscopy  Facilitating devices/methods: intubating stylet  Endotracheal tube insertion site: oral  Blade type: Loopback video laryngoscope.  Blade size: #3  ETT size (mm): 7.5    Cormack-Lehane Classification: grade I - full view of glottis  Placement verified by: capnometry   Measured from: lips  ETT to lips (cm): 20  Number of attempts at approach: 1

## 2024-02-22 NOTE — PROGRESS NOTES
[facility]    Cardiology Progress Note    Ava Bowen Patient Status:  Inpatient    10/6/1955 MRN [id number]   Location [facility] 3W/SW Attending Edy Foote,    Hosp Day # 12 PCP Ernesto De Dios       Impression/Plan:  68 year old female presenting with:     Hemorrhage from LE fem pop bypass site, infected.   -S/P debridement 24  -S/P debridement 24  HTN  - BP well controlled on amlodipine 10mg, coreg 25mg BID, and hydralazine 100mg TID. Holding home lisinopril.  HL  COPD  DM  PAD s/p fem-pop bypass 2024  Sickle cell anemia  -Hb 7.1  Acute resp failure, now extubated  OMEGA on CKD, resolved. Cr 0.97.  HFpEF     - Cont statin  - Cont amlodipine, carvedilol, hydralazine for BP control; titrate as needed  - Cr 0.97, so if renal function continues to remain stable, can add back lisinopril if further BP control is needed.  - AKA as per vascular surgery; planned for tomorrow per chart review  - Monitor on tele    Will sign off at this time. Please call with additional questions.     Subjective: AKA today, no new complaints. BP has been stable on regimen.    Patient Active Problem List   Diagnosis    Anemia    Azotemia    Hypokalemia    Back pain with radiation    Type 2 diabetes mellitus without retinopathy (HCC)    Age-related nuclear cataract of both eyes    Glaucoma suspect of both eyes    Centrilobular emphysema (HCC)    PVD (peripheral vascular disease) (HCC)    Diabetic ulcer of left midfoot associated with type 2 diabetes mellitus, with fat layer exposed (HCC)    PAD (peripheral artery disease) (HCC)    Pre-op testing    Family history of glaucoma in sister    Primary hypertension    S/P laminectomy    Post-operative pain    Type 2 diabetes mellitus with hyperglycemia, with long-term current use of insulin (HCC)    Myalgia    DDD (degenerative disc disease), lumbar    Failed back syndrome of lumbar spine    Chest pain    Hyperlipidemia    Urinary tract infection     Acute cystitis without hematuria    Ileitis    COVID-19    UTI (urinary tract infection)    Pyelonephritis    Hypernatremia    Hyperglycemia    Ureteral calculi    Hypoglycemia    Opioid dependence, uncomplicated (HCC)    Depression, recurrent (HCC)    Stage 3 chronic kidney disease, unspecified whether stage 3a or 3b CKD (HCC)    Bypass graft mechanical complication (HCC)    Bypass graft mechanical complication, initial encounter (HCC)    Anemia, unspecified type    Acute kidney injury superimposed on CKD (HCC)    Uncontrolled type 2 diabetes mellitus with hyperglycemia (HCC)       Objective:   Temp: 99 °F (37.2 °C)  Pulse: 62  Resp: 18  BP: 111/56    Intake/Output:     Intake/Output Summary (Last 24 hours) at 2/22/2024 1142  Last data filed at 2/22/2024 0922  Gross per 24 hour   Intake 380 ml   Output 2575 ml   Net -2195 ml       Last 3 Weights   02/20/24 0554 180 lb 3.2 oz (81.7 kg)   02/19/24 0445 189 lb 1.6 oz (85.8 kg)   02/18/24 0614 186 lb 1.6 oz (84.4 kg)   02/17/24 0403 181 lb 11.2 oz (82.4 kg)   02/16/24 0500 182 lb 3.2 oz (82.6 kg)   02/14/24 0500 185 lb 13.6 oz (84.3 kg)   02/13/24 0500 183 lb 3.2 oz (83.1 kg)   02/12/24 0400 180 lb 5.4 oz (81.8 kg)   02/11/24 0600 182 lb 5.1 oz (82.7 kg)   02/10/24 2142 171 lb 1.2 oz (77.6 kg)   02/10/24 2105 171 lb 1.2 oz (77.6 kg)   02/10/24 1642 164 lb 3.9 oz (74.5 kg)   01/27/24 0501 164 lb 3.2 oz (74.5 kg)   01/24/24 0549 158 lb (71.7 kg)   01/19/24 1112 160 lb (72.6 kg)   12/30/23 1216 155 lb (70.3 kg)   08/29/23 1357 153 lb (69.4 kg)       Tele: NSR, pSVT    Physical Exam:    General: Alert and oriented x 3. No apparent distress. No respiratory or constitutional distress.  HEENT: Normocephalic, anicteric sclera, neck supple, no thyromegaly or adenopathy.  Neck: No JVD, carotids 2+, no bruits.  Cardiac: Regular rate and rhythm. S1, S2 normal. No murmur, pericardial rub, S3, thrill, heave or extra cardiac sounds.  Lungs: Clear without wheezes, rales, rhonchi or  dullness.  Normal excursions and effort.  Abdomen: Soft, non-tender. No organosplenomegally, mass or rebound, BS-present.  Neurologic: Alert and oriented, normal affect. No motor or coordinational deficit.  Skin: Warm and dry.     Laboratory/Data:    Labs:         Recent Labs   Lab 02/18/24  0632 02/19/24  0536 02/19/24  1159 02/20/24  0627 02/21/24  0531 02/22/24  0514   WBC 17.4* 22.1*  --  22.7* 19.1* 18.0*   HGB 7.5* 7.2* 8.2* 8.4* 7.8* 7.9*   MCV 85.6 87.4  --  85.7 84.5 85.1   .0 417.0  --  422.0 466.0* 487.0*       Recent Labs   Lab 02/16/24  0623 02/16/24  1850 02/17/24  0730 02/18/24  0632 02/19/24  0536 02/20/24  0627 02/21/24  0531 02/22/24  0514     --  142 142 145 143 147* 148*   K 3.5  3.4*  --  4.1  4.1 4.2 4.3 4.1 4.1 4.1   *  --  114* 111 114* 111 116* 117*   CO2 22.0  --  21.0 24.0 26.0 24.0 25.0 26.0   BUN 23  --  27* 41* 45* 46* 45* 36*   CREATSERUM 1.01  --  1.06* 1.20* 1.08* 1.17* 1.03* 0.97   CA 7.6*  --  7.7* 8.0* 8.1* 8.0* 7.7* 7.8*   MG 1.5* 1.7 1.8 1.8 1.8  --   --   --    PHOS 4.1  --  5.1 4.1 3.7  --   --   --    *  --  180* 201* 77 162* 146* 82       Recent Labs   Lab 02/17/24 0730 02/18/24 0632 02/19/24  0536   ALB 2.2* 2.2* 2.2*       No results for input(s): \"TROP\" in the last 168 hours.    Allergies:   Allergies   Allergen Reactions    Penicillins HIVES and ANGIOEDEMA     Hives on eyelids (occurred when pt was in her 20s). Tolerated amoxicillin per chart. Tolerates cephalosporins.       Medications:      Kin Rich MD  Interventional Cardiology, [place]

## 2024-02-22 NOTE — PLAN OF CARE
Problem: Patient Centered Care  Goal: Patient preferences are identified and integrated in the patient's plan of care  Description: Interventions:  - What would you like us to know as we care for you? From home alone  - Provide timely, complete, and accurate information to patient/family  - Incorporate patient and family knowledge, values, beliefs, and cultural backgrounds into the planning and delivery of care  - Encourage patient/family to participate in care and decision-making at the level they choose  - Honor patient and family perspectives and choices  Outcome: Progressing     Problem: Diabetes/Glucose Control  Goal: Glucose maintained within prescribed range  Description: INTERVENTIONS:  - Monitor Blood Glucose as ordered  - Assess for signs and symptoms of hyperglycemia and hypoglycemia  - Administer ordered medications to maintain glucose within target range  - Assess barriers to adequate nutritional intake and initiate nutrition consult as needed  - Instruct patient on self management of diabetes  Outcome: Progressing     Problem: Patient/Family Goals  Goal: Patient/Family Long Term Goal  Description: Patient's Long Term Goal: discharge    Interventions:  - Pain management  - Surgical intervention  - Wound care  - See additional Care Plan goals for specific interventions  Outcome: Progressing  Goal: Patient/Family Short Term Goal  Description: Patient's Short Term Goal: pain management    Interventions:   - See additional Care Plan goals for specific interventions  Outcome: Progressing     Problem: CARDIOVASCULAR - ADULT  Goal: Maintains optimal cardiac output and hemodynamic stability  Description: INTERVENTIONS:  - Monitor vital signs, rhythm, and trends  - Monitor for bleeding, hypotension and signs of decreased cardiac output  - Evaluate effectiveness of vasoactive medications to optimize hemodynamic stability  - Monitor arterial and/or venous puncture sites for bleeding and/or hematoma  - Assess  quality of pulses, skin color and temperature  - Assess for signs of decreased coronary artery perfusion - ex. Angina  - Evaluate fluid balance, assess for edema, trend weights  Outcome: Progressing  Goal: Absence of cardiac arrhythmias or at baseline  Description: INTERVENTIONS:  - Continuous cardiac monitoring, monitor vital signs, obtain 12 lead EKG if indicated  - Evaluate effectiveness of antiarrhythmic and heart rate control medications as ordered  - Initiate emergency measures for life threatening arrhythmias  - Monitor electrolytes and administer replacement therapy as ordered  Outcome: Progressing     Problem: RESPIRATORY - ADULT  Goal: Achieves optimal ventilation and oxygenation  Description: INTERVENTIONS:  - Assess for changes in respiratory status  - Assess for changes in mentation and behavior  - Position to facilitate oxygenation and minimize respiratory effort  - Oxygen supplementation based on oxygen saturation or ABGs  - Provide Smoking Cessation handout, if applicable  - Encourage broncho-pulmonary hygiene including cough, deep breathe, Incentive Spirometry  - Assess the need for suctioning and perform as needed  - Assess and instruct to report SOB or any respiratory difficulty  - Respiratory Therapy support as indicated  - Manage/alleviate anxiety  - Monitor for signs/symptoms of CO2 retention  Outcome: Progressing     Problem: METABOLIC/FLUID AND ELECTROLYTES - ADULT  Goal: Glucose maintained within prescribed range  Description: INTERVENTIONS:  - Monitor Blood Glucose as ordered  - Assess for signs and symptoms of hyperglycemia and hypoglycemia  - Administer ordered medications to maintain glucose within target range  - Assess barriers to adequate nutritional intake and initiate nutrition consult as needed  - Instruct patient on self management of diabetes  Outcome: Progressing  Goal: Electrolytes maintained within normal limits  Description: INTERVENTIONS:  - Monitor labs and rhythm and  assess patient for signs and symptoms of electrolyte imbalances  - Administer electrolyte replacement as ordered  - Monitor response to electrolyte replacements, including rhythm and repeat lab results as appropriate  - Fluid restriction as ordered  - Instruct patient on fluid and nutrition restrictions as appropriate  Outcome: Progressing  Goal: Hemodynamic stability and optimal renal function maintained  Description: INTERVENTIONS:  - Monitor labs and assess for signs and symptoms of volume excess or deficit  - Monitor intake, output and patient weight  - Monitor urine specific gravity, serum osmolarity and serum sodium as indicated or ordered  - Monitor response to interventions for patient's volume status, including labs, urine output, blood pressure (other measures as available)  - Encourage oral intake as appropriate  - Instruct patient on fluid and nutrition restrictions as appropriate  Outcome: Progressing     Problem: PAIN - ADULT  Goal: Verbalizes/displays adequate comfort level or patient's stated pain goal  Description: INTERVENTIONS:  - Encourage pt to monitor pain and request assistance  - Assess pain using appropriate pain scale  - Administer analgesics based on type and severity of pain and evaluate response  - Implement non-pharmacological measures as appropriate and evaluate response  - Consider cultural and social influences on pain and pain management  - Manage/alleviate anxiety  - Utilize distraction and/or relaxation techniques  - Monitor for opioid side effects  - Notify MD/LIP if interventions unsuccessful or patient reports new pain  - Anticipate increased pain with activity and pre-medicate as appropriate  Outcome: Progressing

## 2024-02-22 NOTE — PROGRESS NOTES
[facility] Infectious Disease  Progress Note    Ava Bowen Patient Status:  Inpatient    10/6/1955 MRN [id number]   Location [facility] POST ANESTHESIA CARE UNIT Attending Edy Foote,    Hosp Day # 12 PCP Ernesto MIGUEL Lanre Bowen is a 68 year old female.   Chief Complaint   Patient presents with    Bleeding       HPI:      Seen earlier               REVIEW OF SYSTEMS:   A comprehensive 10 point review of systems was completed.  Pertinent positives and negatives noted in the the HPI.       Allergies:  Allergies   Allergen Reactions    Penicillins HIVES and ANGIOEDEMA     Hives on eyelids (occurred when pt was in her 20s). Tolerated amoxicillin per chart. Tolerates cephalosporins.        Current Meds:    Current Facility-Administered Medications:     lactated ringers infusion, , Intravenous, Continuous    lactated ringers IV bolus 500 mL, 500 mL, Intravenous, Once PRN    atropine 0.1 MG/ML injection 0.5 mg, 0.5 mg, Intravenous, PRN    naloxone (Narcan) 0.4 MG/ML injection 0.08 mg, 0.08 mg, Intravenous, PRN    fentaNYL (Sublimaze) 50 mcg/mL injection 25 mcg, 25 mcg, Intravenous, Q5 Min PRN    fentaNYL (Sublimaze) 50 mcg/mL injection 50 mcg, 50 mcg, Intravenous, Q5 Min PRN    HYDROmorphone (Dilaudid) 1 MG/ML injection 0.2 mg, 0.2 mg, Intravenous, Q5 Min PRN    HYDROmorphone (Dilaudid) 1 MG/ML injection 0.4 mg, 0.4 mg, Intravenous, Q5 Min PRN    morphINE PF 4 MG/ML injection 2 mg, 2 mg, Intravenous, Q10 Min PRN    morphINE PF 4 MG/ML injection 4 mg, 4 mg, Intravenous, Q10 Min PRN    labetalol (Trandate) 5 mg/mL injection 5 mg, 5 mg, Intravenous, Q5 Min PRN    ondansetron (Zofran) 4 MG/2ML injection 4 mg, 4 mg, Intravenous, Q6H PRN    metoclopramide (Reglan) 5 mg/mL injection 10 mg, 10 mg, Intravenous, Q8H PRN    hydrALAzine (Apresoline) 20 mg/mL injection 5 mg, 5 mg, Intravenous, Q20 Min PRN    albuterol (Ventolin) (2.5 MG/3ML) 0.083% nebulizer  solution 2.5 mg, 2.5 mg, Nebulization, PRN    vancomycin (Vancocin) cap 125 mg, 125 mg, Oral, Daily    [MAR Hold] HYDROcodone-acetaminophen (Norco) 5-325 MG per tab 2 tablet, 2 tablet, Oral, TID    [MAR Hold] insulin aspart (NovoLOG) 100 Units/mL FlexPen 10 Units, 10 Units, Subcutaneous, TID CC    [MAR Hold] HYDROmorphone (Dilaudid) 1 MG/ML injection 1 mg, 1 mg, Intravenous, Q3H PRN    [MAR Hold] pantoprazole (Protonix) DR tab 40 mg, 40 mg, Oral, QAM AC    [MAR Hold] insulin degludec 100 units/mL flextouch 17 Units, 17 Units, Subcutaneous, Nightly    [MAR Hold] amLODIPine (Norvasc) tab 10 mg, 10 mg, Oral, QAM    [MAR Hold] carvedilol (Coreg) tab 25 mg, 25 mg, Oral, BID    [MAR Hold] hydrALAZINE (Apresoline) tab 100 mg, 100 mg, Oral, Q8H    vancomycin (Vancocin) 750 mg in sodium chloride 0.9% 250 mL IVPB-ADDV, 750 mg, Intravenous, Q36H    fluconazole (Diflucan) tab 200 mg, 200 mg, Oral, Daily    [MAR Hold] HYDROcodone-acetaminophen (Norco)  MG per tab 1 tablet, 1 tablet, Oral, Q4H PRN    [MAR Hold] HYDROcodone-acetaminophen (Norco)  MG per tab 2 tablet, 2 tablet, Oral, Q4H PRN    [MAR Hold] acetaminophen (Tylenol) tab 650 mg, 650 mg, Oral, Q4H PRN    [MAR Hold] morphINE 20 mg/mL concentrated oral solution 5 mg, 5 mg, Oral, Q4H PRN    [MAR Hold] magnesium sulfate 4 g/100mL IVPB premix 4 g, 4 g, Intravenous, Once    [MAR Hold] ondansetron (Zofran) 4 MG/2ML injection 4 mg, 4 mg, Intravenous, Q6H PRN    [MAR Hold] insulin aspart (NovoLOG) 100 Units/mL FlexPen 1-7 Units, 1-7 Units, Subcutaneous, TID CC    [MAR Hold] morphINE PF 2 MG/ML injection 1 mg, 1 mg, Intravenous, Q2H PRN **OR** [MAR Hold] morphINE PF 2 MG/ML injection 2 mg, 2 mg, Intravenous, Q2H PRN **OR** [MAR Hold] morphINE PF 4 MG/ML injection 4 mg, 4 mg, Intravenous, Q2H PRN    [MAR Hold] sodium hypochlorite (Dakin's) 0.125 % external solution, , Topical, 2 times per day    [MAR Hold] hydrALAzine (Apresoline) 20 mg/mL injection 10 mg, 10 mg,  Intravenous, Q4H PRN    [MAR Hold] ondansetron (Zofran) 4 MG/2ML injection 4 mg, 4 mg, Intravenous, Q6H PRN    [MAR Hold] metoclopramide (Reglan) 5 mg/mL injection 5 mg, 5 mg, Intravenous, Q8H PRN    [MAR Hold] albuterol (Ventolin HFA) 108 (90 Base) MCG/ACT inhaler 2 puff, 2 puff, Inhalation, Q6H PRN    [MAR Hold] fluticasone furoate-vilanterol (Breo Ellipta) 200-25 MCG/ACT inhaler 1 puff, 1 puff, Inhalation, Daily    [MAR Hold] ipratropium-albuterol (Duoneb) 0.5-2.5 (3) MG/3ML inhalation solution 3 mL, 3 mL, Nebulization, Q6H PRN    [MAR Hold] rosuvastatin (Crestor) tab 40 mg, 40 mg, Oral, Nightly    [MAR Hold] glucose (Dex4) 15 GM/59ML oral liquid 15 g, 15 g, Oral, Q15 Min PRN **OR** [MAR Hold] glucose (Glutose) 40% oral gel 15 g, 15 g, Oral, Q15 Min PRN **OR** [MAR Hold] glucose-vitamin C (Dex-4) chewable tab 4 tablet, 4 tablet, Oral, Q15 Min PRN **OR** [MAR Hold] dextrose 50% injection 50 mL, 50 mL, Intravenous, Q15 Min PRN **OR** [MAR Hold] glucose (Dex4) 15 GM/59ML oral liquid 30 g, 30 g, Oral, Q15 Min PRN **OR** [MAR Hold] glucose (Glutose) 40% oral gel 30 g, 30 g, Oral, Q15 Min PRN **OR** [MAR Hold] glucose-vitamin C (Dex-4) chewable tab 8 tablet, 8 tablet, Oral, Q15 Min PRN    meropenem (Merrem) 500 mg in sodium chloride 0.9% 100 mL IVPB-MBP, 500 mg, Intravenous, q12h   No current outpatient medications on file.        HISTORY:  Past Medical History:   Diagnosis Date    Anemia     Back pain     Back problem     Cataract     Chronic kidney disease (CKD)     COPD (chronic obstructive pulmonary disease) (HCC)     FEV 1 1.25 50% 2/20    Diabetes (HCC)     DM neuropathy    Essential hypertension     H/O angioplasty     07/08/2020    High blood pressure     High cholesterol     Neuropathy     Peripheral vascular disease (HCC)     PNA (pneumonia) 05/2018    Pulmonary emphysema (HCC)     Pulmonary nodule     4 mm RUL    Renal disorder     monitoring kidney labs    Sickle cell trait (HCC)     Sickle-cell anemia  (HCC)     Tobacco abuse     Visual impairment       Past Surgical History:   Procedure Laterality Date    APPENDECTOMY      BACK SURGERY Right 2021    Right L3-4 extreme lateral interbody fusion, posterior decompression; LUMBAR LAMINECTOMY 1 LEVEL          x3    CHOLECYSTECTOMY      EXCIS LUMBAR DISK,ONE LEVEL      2015    OTHER      bilateral leg stents  and         Social history and family history negative related to present illness except as above.    PHYSICAL EXAM:   /50 (BP Location: Right arm)   Pulse 59   Temp 97.8 °F (36.6 °C) (Temporal)   Resp 18   Ht 5' 4.02\" (1.626 m)   Wt 180 lb 3.2 oz (81.7 kg)   LMP  (LMP Unknown)   SpO2 97%   BMI 30.92 kg/m²   GENERAL:  Awake, alert, oriented x3. Non-tox, non-septic and in NAD.  HEENT:  Normocephalic, no scleral abnormalities.  Oropharynx clear, trachea ML.  NECK:  Supple, no masses, no lymphadenopathy.  LUNGS:  Clear to auscultation b/l, no rhonchi, rales, or wheezes.  CARDIO: RRR S1/S2, no rubs, clicks, heaves, or murmurs.  GI:  Soft NT/ND, BS present x4 quadrants, no HSM.  EXTREMITIES:  leg not viewed  NEURO:  No focal neurologic deficits.  DERM:  Warm, dry, no rashes.    IMPRESSION/PLAN:        Postop complication of rt fem pop  bypass  Bacteremia wound drainage and needed debridement graft removal;see other notes for more details;two surgeries so far  and   Cultures mixed karla and candida too[candida in urine too]  Already on meropenem and vanc; added diflucan  Pain and rising wbc are negative signs  Spoke with pt rn >35 min   for amp later today            Recent Results (from the past 72 hour(s))   POCT Glucose    Collection Time: 24  4:21 PM   Result Value Ref Range    POC Glucose  158 (H) 70 - 99 mg/dL   POCT Glucose    Collection Time: 24  9:26 PM   Result Value Ref Range    POC Glucose  205 (H) 70 - 99 mg/dL   Prepare RBC Once    Collection Time: 24 12:40 AM   Result Value Ref Range     Blood Product [id number]     Unit Number [id number]     UNIT ABO A     UNIT RH POS     Product Status Presumed Transfused     Expiration Date [id number]     Blood Type Barcode 6200     Unit Volume 350 ml   Basic Metabolic Panel (8)    Collection Time: 02/20/24  6:27 AM   Result Value Ref Range    Glucose 162 (H) 70 - 99 mg/dL    Sodium 143 136 - 145 mmol/L    Potassium 4.1 3.5 - 5.1 mmol/L    Chloride 111 98 - 112 mmol/L    CO2 24.0 21.0 - 32.0 mmol/L    Anion Gap 8 0 - 18 mmol/L    BUN 46 (H) 9 - 23 mg/dL    Creatinine 1.17 (H) 0.55 - 1.02 mg/dL    BUN/CREA Ratio 39.3 (H) 10.0 - 20.0    Calcium, Total 8.0 (L) 8.7 - 10.4 mg/dL    Calculated Osmolality 311 (H) 275 - 295 mOsm/kg    eGFR-Cr 51 (L) >=60 mL/min/1.73m2   CBC W/ DIFFERENTIAL    Collection Time: 02/20/24  6:27 AM   Result Value Ref Range    WBC 22.7 (H) 4.0 - 11.0 x10(3) uL    RBC 2.94 (L) 3.80 - 5.30 x10(6)uL    HGB 8.4 (L) 12.0 - 16.0 g/dL    HCT 25.2 (L) 35.0 - 48.0 %    MCV 85.7 80.0 - 100.0 fL    MCH 28.6 26.0 - 34.0 pg    MCHC 33.3 31.0 - 37.0 g/dL    RDW-SD 50.8 (H) 35.1 - 46.3 fL    RDW 16.1 (H) 11.0 - 15.0 %    .0 150.0 - 450.0 10(3)uL    Neutrophil Absolute Prelim 19.95 (H) 1.50 - 7.70 x10 (3) uL    Neutrophil Absolute 19.95 (H) 1.50 - 7.70 x10(3) uL    Lymphocyte Absolute 1.21 1.00 - 4.00 x10(3) uL    Monocyte Absolute 1.13 (H) 0.10 - 1.00 x10(3) uL    Eosinophil Absolute 0.11 0.00 - 0.70 x10(3) uL    Basophil Absolute 0.04 0.00 - 0.20 x10(3) uL    Immature Granulocyte Absolute 0.25 0.00 - 1.00 x10(3) uL    Neutrophil % 87.9 %    Lymphocyte % 5.3 %    Monocyte % 5.0 %    Eosinophil % 0.5 %    Basophil % 0.2 %    Immature Granulocyte % 1.1 %   POCT Glucose    Collection Time: 02/20/24  8:36 AM   Result Value Ref Range    POC Glucose  186 (H) 70 - 99 mg/dL   POCT Glucose    Collection Time: 02/20/24 12:15 PM   Result Value Ref Range    POC Glucose  176 (H) 70 - 99 mg/dL   POCT Glucose    Collection Time: 02/20/24  5:04 PM   Result Value  Ref Range    POC Glucose  376 (H) 70 - 99 mg/dL   Vancomycin Trough, Serum    Collection Time: 02/20/24  6:08 PM   Result Value Ref Range    Vancomycin Trough 16.2 10.0 - 20.0 ug/mL   Basic Metabolic Panel (8)    Collection Time: 02/21/24  5:31 AM   Result Value Ref Range    Glucose 146 (H) 70 - 99 mg/dL    Sodium 147 (H) 136 - 145 mmol/L    Potassium 4.1 3.5 - 5.1 mmol/L    Chloride 116 (H) 98 - 112 mmol/L    CO2 25.0 21.0 - 32.0 mmol/L    Anion Gap 6 0 - 18 mmol/L    BUN 45 (H) 9 - 23 mg/dL    Creatinine 1.03 (H) 0.55 - 1.02 mg/dL    BUN/CREA Ratio 43.7 (H) 10.0 - 20.0    Calcium, Total 7.7 (L) 8.7 - 10.4 mg/dL    Calculated Osmolality 318 (H) 275 - 295 mOsm/kg    eGFR-Cr 60 >=60 mL/min/1.73m2   CBC W/ DIFFERENTIAL    Collection Time: 02/21/24  5:31 AM   Result Value Ref Range    WBC 19.1 (H) 4.0 - 11.0 x10(3) uL    RBC 2.71 (L) 3.80 - 5.30 x10(6)uL    HGB 7.8 (L) 12.0 - 16.0 g/dL    HCT 22.9 (L) 35.0 - 48.0 %    MCV 84.5 80.0 - 100.0 fL    MCH 28.8 26.0 - 34.0 pg    MCHC 34.1 31.0 - 37.0 g/dL    RDW-SD 50.4 (H) 35.1 - 46.3 fL    RDW 16.3 (H) 11.0 - 15.0 %    .0 (H) 150.0 - 450.0 10(3)uL    Neutrophil Absolute Prelim 16.39 (H) 1.50 - 7.70 x10 (3) uL    Neutrophil Absolute 16.39 (H) 1.50 - 7.70 x10(3) uL    Lymphocyte Absolute 1.35 1.00 - 4.00 x10(3) uL    Monocyte Absolute 1.00 0.10 - 1.00 x10(3) uL    Eosinophil Absolute 0.08 0.00 - 0.70 x10(3) uL    Basophil Absolute 0.04 0.00 - 0.20 x10(3) uL    Immature Granulocyte Absolute 0.20 0.00 - 1.00 x10(3) uL    Neutrophil % 86.1 %    Lymphocyte % 7.1 %    Monocyte % 5.2 %    Eosinophil % 0.4 %    Basophil % 0.2 %    Immature Granulocyte % 1.0 %   POCT Glucose    Collection Time: 02/21/24 10:20 AM   Result Value Ref Range    POC Glucose  145 (H) 70 - 99 mg/dL   POCT Glucose    Collection Time: 02/21/24  1:12 PM   Result Value Ref Range    POC Glucose  142 (H) 70 - 99 mg/dL   POCT Glucose    Collection Time: 02/21/24  5:48 PM   Result Value Ref Range    POC  Glucose  91 70 - 99 mg/dL   POCT Glucose    Collection Time: 02/21/24  8:55 PM   Result Value Ref Range    POC Glucose  159 (H) 70 - 99 mg/dL   Basic Metabolic Panel (8)    Collection Time: 02/22/24  5:14 AM   Result Value Ref Range    Glucose 82 70 - 99 mg/dL    Sodium 148 (H) 136 - 145 mmol/L    Potassium 4.1 3.5 - 5.1 mmol/L    Chloride 117 (H) 98 - 112 mmol/L    CO2 26.0 21.0 - 32.0 mmol/L    Anion Gap 5 0 - 18 mmol/L    BUN 36 (H) 9 - 23 mg/dL    Creatinine 0.97 0.55 - 1.02 mg/dL    BUN/CREA Ratio 37.1 (H) 10.0 - 20.0    Calcium, Total 7.8 (L) 8.7 - 10.4 mg/dL    Calculated Osmolality 313 (H) 275 - 295 mOsm/kg    eGFR-Cr 64 >=60 mL/min/1.73m2   CBC W/ DIFFERENTIAL    Collection Time: 02/22/24  5:14 AM   Result Value Ref Range    WBC 18.0 (H) 4.0 - 11.0 x10(3) uL    RBC 2.76 (L) 3.80 - 5.30 x10(6)uL    HGB 7.9 (L) 12.0 - 16.0 g/dL    HCT 23.5 (L) 35.0 - 48.0 %    MCV 85.1 80.0 - 100.0 fL    MCH 28.6 26.0 - 34.0 pg    MCHC 33.6 31.0 - 37.0 g/dL    RDW-SD 50.8 (H) 35.1 - 46.3 fL    RDW 16.3 (H) 11.0 - 15.0 %    .0 (H) 150.0 - 450.0 10(3)uL    Neutrophil Absolute Prelim 15.70 (H) 1.50 - 7.70 x10 (3) uL    Neutrophil Absolute 15.70 (H) 1.50 - 7.70 x10(3) uL    Lymphocyte Absolute 1.14 1.00 - 4.00 x10(3) uL    Monocyte Absolute 0.91 0.10 - 1.00 x10(3) uL    Eosinophil Absolute 0.13 0.00 - 0.70 x10(3) uL    Basophil Absolute 0.03 0.00 - 0.20 x10(3) uL    Immature Granulocyte Absolute 0.13 0.00 - 1.00 x10(3) uL    Neutrophil % 87.1 %    Lymphocyte % 6.3 %    Monocyte % 5.0 %    Eosinophil % 0.7 %    Basophil % 0.2 %    Immature Granulocyte % 0.7 %   POCT Glucose    Collection Time: 02/22/24  7:19 AM   Result Value Ref Range    POC Glucose  93 70 - 99 mg/dL   Prepare RBC STAT    Collection Time: 02/22/24 11:59 AM   Result Value Ref Range    Blood Product [id number]     Unit Number [id number]     UNIT ABO A     UNIT RH POS     Product Status Issued     Expiration Date [id number]     Blood Type Barcode 6200      Unit Volume 350 ml   Prepare RBC STAT    Collection Time: 02/22/24 11:59 AM   Result Value Ref Range    Blood Product [id number]     Unit Number [id number]     UNIT ABO A     UNIT RH POS     Product Status Issued     Expiration Date [id number]     Blood Type Barcode 6200     Unit Volume 350 ml   POCT Glucose    Collection Time: 02/22/24  1:39 PM   Result Value Ref Range    POC Glucose  72 70 - 99 mg/dL   Hemoglobin & Hematocrit    Collection Time: 02/22/24  1:43 PM   Result Value Ref Range    HGB 8.3 (L) 12.0 - 16.0 g/dL    HCT 26.1 (L) 35.0 - 48.0 %         Noah Perez MD     CALL [facility] AT [phone] IF QUESTIONS OR CONCERNS  THANKS

## 2024-02-22 NOTE — BRIEF OP NOTE
Pre-Operative Diagnosis: Groin wound dehiscence, gangrene of right foot     Post-Operative Diagnosis: Groin wound dehiscence, gangrene of right foot      Procedure Performed:   Debridement of skin and subcuatenous tissues 24 x15x4 with versajet  Gracilis myoplasty   Right above knee amputation     Surgeon(s) and Role:     * Kp Justice MD - Primary    Assistant(s):        Surgical Findings:   Healthy tissues at amputation level   Additional coverage of artery needed therefore myoplasty done      Specimen: right aka specimen     Estimated Blood Loss: 800 mL     Kp Justice MD  2/22/2024  1:31 PM

## 2024-02-22 NOTE — PROGRESS NOTES
NEPHROLOGY DAILY PROGRESS NOTE     SUBJECTIVE:  Patient seen and examined at bedside. No complaints    OBJECTIVE:    Total Intake/Output:    Intake/Output Summary (Last 24 hours) at 2/22/2024 1658  Last data filed at 2/22/2024 1630  Gross per 24 hour   Intake 2020 ml   Output 1475 ml   Net 545 ml       PHYSICAL EXAM:  /49 (BP Location: Right arm)   Pulse 55   Temp 97.7 °F (36.5 °C) (Oral)   Resp 16   Ht 5' 4.02\" (1.626 m)   Wt 180 lb 3.2 oz (81.7 kg)   LMP  (LMP Unknown)   SpO2 97%   BMI 30.92 kg/m²   GEN: NAD, on oxygen     HEENT: NCAT    CHEST: no distress  CARDIAC: S1S2 normal  ABD: soft, NT/ND  EXT: + lower ext edema noted, swelling R hand noted      NEURO: awake, answering questions       CURRENT MEDICATIONS:     vancomycin  125 mg Oral Daily    HYDROcodone-acetaminophen  2 tablet Oral TID    insulin aspart  10 Units Subcutaneous TID CC    pantoprazole  40 mg Oral QAM AC    insulin degludec  17 Units Subcutaneous Nightly    amLODIPine  10 mg Oral QAM    carvedilol  25 mg Oral BID    hydrALAZINE  100 mg Oral Q8H    vancomycin  750 mg Intravenous Q36H    fluconazole  200 mg Oral Daily    magnesium sulfate  4 g Intravenous Once    insulin aspart  1-7 Units Subcutaneous TID CC    sodium hypochlorite   Topical 2 times per day    fluticasone furoate-vilanterol  1 puff Inhalation Daily    rosuvastatin  40 mg Oral Nightly    meropenem  500 mg Intravenous q12h             LABS:  Patient Labs Reviewed in Detail. Pertinent Labs as follows:  Recent Labs   Lab 02/20/24 0627 02/21/24  0531 02/22/24  0514   * 146* 82   BUN 46* 45* 36*   CREATSERUM 1.17* 1.03* 0.97   EGFRCR 51* 60 64   CA 8.0* 7.7* 7.8*    147* 148*   K 4.1 4.1 4.1    116* 117*   CO2 24.0 25.0 26.0     Recent Labs   Lab 02/20/24  0627 02/21/24  0531 02/22/24  0514 02/22/24  1343   RBC 2.94* 2.71* 2.76*  --    HGB 8.4* 7.8* 7.9* 8.3*   HCT 25.2* 22.9* 23.5* 26.1*   MCV 85.7 84.5 85.1  --    MCH 28.6 28.8 28.6  --    MCHC 33.3  34.1 33.6  --    RDW 16.1* 16.3* 16.3*  --    NEPRELIM 19.95* 16.39* 15.70*  --    WBC 22.7* 19.1* 18.0*  --    .0 466.0* 487.0*  --        ASSESSMENT AND PLAN:   68 year old female with PMH of COPD, DM2, HTN, HL, PVD, Sickle cell anemia, CKD stage 3, recent fem-pop artery bypass admitted with acute hemorrhage from incision site.  Patient is s/p OR for repair.  Nephrology is consulted for OMEGA and metabolic acidosis.         OMEGA on CKD stage 3  - baseline around 1.2  - creatinine improved near baseline, has evidence of fluid overload and has been receiving IV lasix.   - hold diuretics today, monitor response  - follow renal fxn and I/Os     Hypertension   - amlodipine, hydralazine, coreg  - weaned off nicardipine   - lisinopril held given recent OMEGA, can resume if needed for blood pressure control now with improved kidney function  - cards following     Metabolic acidosis:  - resolved     Hypernatremia:  - resolved     Hypokalemia/ hypomagnesemia:  - replete per protocol     Hypocalcemia:  - corrects to normal given hypoalbuminemia   - monitor Ca levels         We will continue to follow      Felipe Lei MD  [facility]

## 2024-02-22 NOTE — PROGRESS NOTES
[facility] Hospitalist Progress Note     CC: Hospital Follow up    PCP: Ernesto De Dios       Assessment/Plan:     Principal Problem:    Bypass graft mechanical complication, initial encounter ([facility])  Active Problems:    Hyperglycemia    Bypass graft mechanical complication (HCC)    Anemia, unspecified type    Acute kidney injury superimposed on CKD (HCC)    Uncontrolled type 2 diabetes mellitus with hyperglycemia (HCC)      Ms. Bowen is a 68 year old female with PMH sig for CKD, COPD, DM, HTN, HLD, PVD, and sickle cell anemia who was recently admitted last week for right lower extremity femoral to popliteal bypass who presented with bleeding from surgical site. Taken for emergent OR on 2/10/24, noted to be grossly infected, started on vancomycin/meropenem. Bcx with klebsiella. BG elevated on admit, started on insulin gtt, endo consulted, now off insulin gtt. Kept intubated post op, pulm following. Polymicrobial infection on meropenum , IV vanco and now fluconazole, is now S/P AKA on right on 2/22.       PAD sp RLE fem-pop bypass last week c/b bleeding fm surg site fm wound dehiscence  - s/p recent fem pop bypass 1/24/24 with Dr. Murrieta  - has been on ASA/plavix- held on admit  - Hg down to 6.0, s/p 3 units pRBC 2/10- watching close now, hgb stable currently no signs of bleeding   - vascular surgery consulted, s/p emergent OR 2/10, removal of infected graft, s/p debridement, bone patch angioplasty   - sp further debridement 2/13 and placement of wound vac  - back to OR 2/16 again and now plans for AKA Thursday 2/22, NPO at [place] tonight   -Seems now will need AKA  -Pain meds PRN  -Pain more severe today increase norco to 10 TID  - lasix PRN   Some coolness and decreased pulse on left leg , CTA with occluded stent in femoral artery, discussed with vascular no further intervention at this time      Septic shock (now improved) fm post op wound infection, polymicrobial   - continue IV vancomycin and meropenem  add fluconazole , ID consult following   - Bcx with klebsiella,   - 2/10 wound cx and 2/13 poly microbial  - abx adjustment per ID f/u cx results  - ID notes appreciated- tenative plan for 6 weeks abx from from final surgical plans- final abx tbd pending cx data and surgical plans   -Now S/P right AKA on 2/22 final determination of treatment will need to be sorted per ID   -Keep joseph due to large leg wound       Metabolic acidosis- resolved   - likely 2/2 LA from blood loss +/- dka on admit  - pH 7.24 on admit, LA 7.6 on admit, now normalized  - likely multifactorial, 2/2 LA, blood loss, ?DKA, CKD  - renal consulted, sp bicarb gtt     Acute blood loss anemia from surgical site   Chronic anemia  Sickle cell anemia  -Stable currently, trend CBC   - outpatient f/u  - Hg 8.9 on discharge last week  - Hg 6.0 on admission, s/p 3 units pRBC  - monitor, hgb stable in the 8s        DM2, hyperglycemia better now   -A1c 7.9   - Hold PO medications  - BG 500s in ED, pH 7.2  - insulin gtt ordered in ED  -increased Degludec 17 at bedtime and aspart 10 units with meals , endo following      Respiratory failure  - remained intubated post op both fm aspiration event and metabolic acidosis  - pulm following  - extubated 2/14 pm  Now on 2L NC - wean as able      Hypovolemic / septic Shock/ Hypotension- improved  Hx HTN  BP stable now, hydral 100 Q8 hours   Amlodipine 10 d, Coreg 25 BID   Holding home lisinopril, BP ok watch and consider resuming pending BP       COPD  - chronic, stable  -resumed inhalers     OMEGA on CKD3- at baseline now   - cr 1.7 on admit, previously 1.2 on discharge last week  - monitor Cr and UOP- improved   Stable  Monitor  Renal following       ACP  - CODE- FULL   - POA- daughter and son     FN:  - IVF: Stopped   - Diet: Carb controlled diet      DVT Prophy: Hold due to bleeding   Lines: PIV     Daughter Tiffanie updated at length via phone 2/18/20,     Questions/concerns were discussed with patient and/or family  by bedside.    Thank You,  Edy Foote, DO    Hospitalist with [facility]     Subjective:     Sleepy  seen after amputation     OBJECTIVE:    Blood pressure 113/49, pulse 55, temperature 97.7 °F (36.5 °C), temperature source Oral, resp. rate 16, height 5' 4.02\" (1.626 m), weight 180 lb 3.2 oz (81.7 kg), SpO2 97%, not currently breastfeeding.    Temp:  [97.4 °F (36.3 °C)-99 °F (37.2 °C)] 97.7 °F (36.5 °C)  Pulse:  [55-68] 55  Resp:  [11-19] 16  BP: (102-117)/(45-62) 113/49  SpO2:  [88 %-97 %] 97 %      Intake/Output:    Intake/Output Summary (Last 24 hours) at 2/22/2024 1709  Last data filed at 2/22/2024 1700  Gross per 24 hour   Intake 2710 ml   Output 1475 ml   Net 1235 ml       Last 3 Weights   02/20/24 0554 180 lb 3.2 oz (81.7 kg)   02/19/24 0445 189 lb 1.6 oz (85.8 kg)   02/18/24 0614 186 lb 1.6 oz (84.4 kg)   02/17/24 0403 181 lb 11.2 oz (82.4 kg)   02/16/24 0500 182 lb 3.2 oz (82.6 kg)   02/14/24 0500 185 lb 13.6 oz (84.3 kg)   02/13/24 0500 183 lb 3.2 oz (83.1 kg)   02/12/24 0400 180 lb 5.4 oz (81.8 kg)   02/11/24 0600 182 lb 5.1 oz (82.7 kg)   02/10/24 2142 171 lb 1.2 oz (77.6 kg)   02/10/24 2105 171 lb 1.2 oz (77.6 kg)   02/10/24 1642 164 lb 3.9 oz (74.5 kg)   01/27/24 0501 164 lb 3.2 oz (74.5 kg)   01/24/24 0549 158 lb (71.7 kg)   01/19/24 1112 160 lb (72.6 kg)   12/30/23 1216 155 lb (70.3 kg)   08/29/23 1357 153 lb (69.4 kg)       Exam   Gen: sleepy   Pulm: Lungs clear, normal respiratory effort, 2 L NC   CV: Heart with regular rate and rhythm  Abd: Abdomen soft, nontender, nondistended        Data Review:       Labs:     Recent Labs   Lab 02/20/24  0627 02/21/24  0531 02/22/24  0514 02/22/24  1343   RBC 2.94* 2.71* 2.76*  --    HGB 8.4* 7.8* 7.9* 8.3*   HCT 25.2* 22.9* 23.5* 26.1*   MCV 85.7 84.5 85.1  --    MCH 28.6 28.8 28.6  --    MCHC 33.3 34.1 33.6  --    RDW 16.1* 16.3* 16.3*  --    NEPRELIM 19.95* 16.39* 15.70*  --    WBC 22.7* 19.1* 18.0*  --    .0 466.0* 487.0*  --           Recent Labs   Lab 02/20/24  0627 02/21/24  0531 02/22/24  0514   * 146* 82   BUN 46* 45* 36*   CREATSERUM 1.17* 1.03* 0.97   EGFRCR 51* 60 64   CA 8.0* 7.7* 7.8*    147* 148*   K 4.1 4.1 4.1    116* 117*   CO2 24.0 25.0 26.0       Recent Labs   Lab 02/17/24  0730 02/18/24  0632 02/19/24  0536   ALB 2.2* 2.2* 2.2*         Imaging:  CTA ABDOMEN/PELVIS LOWER EXT BILAT W RUNOFF (AQU=[id number])    Result Date: 2/21/2024  CONCLUSION:   Completely occluded right superficial femoral artery stent is unchanged in appearance since the prior exams.  Completely occluded left superficial femoral artery stent is new since 6/20/2023.  Multifocal areas of high-grade stenosis and narrowing seen involving the mid to distal superficial femoral artery with a focal area of complete occlusion within the distal left superficial femoral artery which is new since 6/20/2023.  Markedly limited evaluation of bilateral calf vessels due to extensive atherosclerotic calcification.  Large soft tissue ulceration and wounds seen within the anterior right pelvis and upper thigh and medial knee.  No abscess or drainable collection seen at this point.  Anasarca  Multiple other incidental findings as described in the body of the report.    Dictated by (CST): Elder Wagoner MD on 2/21/2024 at 8:26 AM     Finalized by (CST): Elder Wagoner MD on 2/21/2024 at 8:43 AM          US VENOUS DOPPLER LEG LEFT - DIAG IMG (CPT=93971)    Result Date: 2/21/2024  CONCLUSION: No evidence of left lower extremity DVT.   Dictated by (CST): Ron Chaudhari MD on 2/21/2024 at 6:15 AM     Finalized by (CST): Ron Chaudhari MD on 2/21/2024 at 6:15 AM             Meds:      vancomycin  125 mg Oral Daily    HYDROcodone-acetaminophen  2 tablet Oral TID    insulin aspart  10 Units Subcutaneous TID CC    pantoprazole  40 mg Oral QAM AC    insulin degludec  17 Units Subcutaneous Nightly    amLODIPine  10 mg Oral QAM    carvedilol  25 mg Oral BID    hydrALAZINE  100 mg  Oral Q8H    vancomycin  750 mg Intravenous Q36H    fluconazole  200 mg Oral Daily    magnesium sulfate  4 g Intravenous Once    insulin aspart  1-7 Units Subcutaneous TID CC    sodium hypochlorite   Topical 2 times per day    fluticasone furoate-vilanterol  1 puff Inhalation Daily    rosuvastatin  40 mg Oral Nightly    meropenem  500 mg Intravenous q12h      dextrose in lactated ringers 100 mL/hr at 02/22/24 1436       acetaminophen **OR** HYDROcodone-acetaminophen **OR** HYDROcodone-acetaminophen, ondansetron, metoclopramide, morphINE **OR** morphINE **OR** morphINE, HYDROmorphone, HYDROcodone-acetaminophen, HYDROcodone-acetaminophen, acetaminophen, morphINE, morphINE **OR** morphINE **OR** morphINE, hydrALAzine, ondansetron, metoclopramide, albuterol, ipratropium-albuterol, glucose **OR** glucose **OR** glucose-vitamin C **OR** dextrose **OR** glucose **OR** glucose **OR** glucose-vitamin C

## 2024-02-23 NOTE — PROGRESS NOTES
[facility] Infectious Disease  Progress Note    Ava Bowen Patient Status:  Inpatient    10/6/1955 MRN [id number]   Location [facility] 3W/SW Attending Max Souza MD   Hosp Day # 13 PCP Ernesto De Dios     Subjective:  Patient seen and examined in bed. Reports ongoing fatigue and pain. S/p debridement, gracilis myoplasty and R AKA on . Wound VAC in place. Continues to tolerate IV antibiotics. Denies F/C. Otherwise no new complaints.     Objective:  Blood pressure 100/51, pulse 61, temperature 99 °F (37.2 °C), temperature source Oral, resp. rate 16, height 5' 4.02\" (1.626 m), weight 180 lb 3.2 oz (81.7 kg), SpO2 94%, not currently breastfeeding.    Intake/Output:    Intake/Output Summary (Last 24 hours) at 2024 0913  Last data filed at 2024 0832  Gross per 24 hour   Intake 3890 ml   Output 1150 ml   Net 2740 ml       Physical Exam:  General: Awake, alert, non-tox, NAD.  HEENT:  Oropharynx clear, trachea ML.  Heart: RRR S1S2 no murmurs.  Lungs: Essentially CTA b/l, no rhonchi, rales, wheezes.  Abdomen: Soft, NT/ND.  BS present.  No organomegaly.  Extremity: R AKA site with surgical dressing intact. +wound VAC.  Neurological: No focal deficits.  Derm:  Warm and dry.    Lab Data Review:  Lab Results   Component Value Date    WBC 15.9 2024    HGB 7.2 2024    HCT 21.9 2024    .0 2024    CREATSERUM 0.99 2024    BUN 27 2024     2024    K 4.3 2024     2024    CO2 24.0 2024     2024    CA 7.2 2024        Cultures:  Hospital Encounter on 02/10/24   1. Blood Culture     Status: None    Collection Time: 24 11:56 AM    Specimen: Blood,peripheral   Result Value Ref Range    Blood Culture Result No Growth 5 Days N/A   2. Tissue Aerobic Culture     Status: Abnormal    Collection Time: 24 11:42 AM    Specimen: Femoral graft; Tissue   Result Value Ref  Range    Tissue Culture Result  N/A     Mixture of organisms suggestive of normal skin karla    Tissue Culture Result 1+ growth Klebsiella pneumoniae (A) N/A    Tissue Smear No WBCs seen N/A    Tissue Smear No organisms seen N/A   3. Anaerobic Culture     Status: None    Collection Time: 02/13/24 11:42 AM    Specimen: Femoral graft; Tissue   Result Value Ref Range    Anaerobic Culture No Anaerobes isolated N/A   4. Aerobic Bacterial Culture     Status: Abnormal    Collection Time: 02/13/24 11:36 AM    Specimen: Calf, right; Body fluid, unspecified   Result Value Ref Range    Aerobic Culture Result  N/A     Mixture of organisms suggestive of normal skin karla    Aerobic Culture Result 1+ growth Klebsiella pneumoniae (A) N/A    Aerobic Smear 1+ WBCs seen N/A    Aerobic Smear No organisms seen N/A   5. Urine Culture, Routine     Status: Abnormal    Collection Time: 02/12/24  4:19 PM    Specimen: Urine, joseph catheter   Result Value Ref Range    Urine Culture 10,000 - 50,000 CFU/ML Candida albicans (A) N/A       Radiology:  CTA ABDOMEN/PELVIS LOWER EXT BILAT W RUNOFF (SKI=[id number])    Result Date: 2/21/2024  CONCLUSION:   Completely occluded right superficial femoral artery stent is unchanged in appearance since the prior exams.  Completely occluded left superficial femoral artery stent is new since 6/20/2023.  Multifocal areas of high-grade stenosis and narrowing seen involving the mid to distal superficial femoral artery with a focal area of complete occlusion within the distal left superficial femoral artery which is new since 6/20/2023.  Markedly limited evaluation of bilateral calf vessels due to extensive atherosclerotic calcification.  Large soft tissue ulceration and wounds seen within the anterior right pelvis and upper thigh and medial knee.  No abscess or drainable collection seen at this point.  Anasarca  Multiple other incidental findings as described in the body of the report.    Dictated by (CST): Ciaran  MD Elder on 2024 at 8:26 AM     Finalized by (CST): Elder Wagoner MD on 2024 at 8:43 AM          US VENOUS DOPPLER LEG LEFT - DIAG IMG (CPT=93971)    Result Date: 2024  CONCLUSION: No evidence of left lower extremity DVT.   Dictated by (CST): Ron Chaudhari MD on 2024 at 6:15 AM     Finalized by (CST): Ron Chaudhari MD on 2024 at 6:15 AM             Assessment and Plan:  1.  Acute gram-negative sepsis with post-op infection of R fem-pop bypass on 24  - Blood cultures on admission isolating Klebsiella pneumoniae.  - All repeat blood cultures remain negative to date.  - S/p emergent OR where extensive debridement and patch was performed noting infected graft material on 2/10.  - OR cultures, 2/10, are polymicrobial isolating Klebsiella, Enterococcus, Strep anginosus, E.coli, Bacteroides and Prevotella.   - Return to OR on  for further debridement and VAC placement, and again on  for debridement and Kerecis graft placement.  - OR cultures, , isolating Finegoldia, Klebsiella, E.coli, Enterococcus and Candida.  - CT, , with completely occluded R superficial femoral artery stent, and newly occluded L superficial femoral artery stent. Multifocal high-grade stenosis and narrowing of mid to distal superficial femoral artery. New focal area of complete occlusion within distal L superficial femoral artery. Extensive b/l calf vessel atherosclerotic calcification. Large soft tissue ulceration and wounds of anterior R pelvis, upper thigh and medial knee. No abscess or drainable collection.   - LLE Venous Doppler, , negative for DVT.   - Now s/p debridement, gracilis myoplasty and R AKA on .  - Surgical pathology pending.  - Vascular following.  - IV vancomycin + IV meropenem + PO Diflucan, ongoing.    2.  Multifactorial leukocytosis and low-grade fever secondary to the above  - WBC down-trending -- at 15.9K as of this AM.  - Temp (24hrs), Av.5 °F (36.9 °C), Min:97.4 °F (36.3  °C), Max:100 °F (37.8 °C)  - C.diff PCR negative.  - Will trend.    3.  OMEGA on CKD3  - Baseline Cr around 1.2 -- 0.99 as of this AM.  - Diuresis on hold.   - As per nephrology.    3.  DM2  - Optimal glycemic control.  - Endo following.    4.  Recs  - Continue IV vancomycin + IV meropenem + PO Diflucan at this time.  Pharmacy to dose.  - F/u WBC and fever curve.  - Diuresis as per cardiology/nephrology.  - Post-op/VAC management as per vascular surgery and wound care team.  - Supportive care as per the primary team.  - Discussed plan of care with nursing.  - Discussed plan of care with patient.  All questions addressed understanding verbalized.  - Further recommendations pending clinical course.    Please note that this report has been produced using speech recognition software and may contain errors related to that system including, but not limited to, errors in grammar, punctuation, and spelling, as well as words and phrases that possibly may have been recognized inappropriately.  If there are any questions or concerns, contact the dictating provider for clarification.     The 21st Century Cures Act makes medical notes like these available to patients in the interest of transparency. Please be advised this is a medical document. Medical documents are intended to carry relevant information, facts as evident, and the clinical opinion of the practitioner. The medical note is intended as peer to peer communication and may appear blunt or direct. It is written in medical language and may contain abbreviations or verbiage that are unfamiliar.     If you have any questions or concerns please call [facility] Infectious Disease at [phone].     Dakota Villalba, APRN    2/23/2024  9:13 AM

## 2024-02-23 NOTE — ANESTHESIA POSTPROCEDURE EVALUATION
Patient: Ava oBwen    Procedure Summary       Date: 02/22/24 Room / Location: [facility] MAIN OR  / [facility] MAIN OR    Anesthesia Start: 1123 Anesthesia Stop: 1340    Procedure: Right above knee amputation (Right: Leg Upper) Diagnosis: (Groin wound dehiscence, gangrene of right foot)    Surgeons: Kp Justice MD Anesthesiologist: Lanre Quiñones MD    Anesthesia Type: general ASA Status: 3            Anesthesia Type: No value filed.    Vitals Value Taken Time   /49 02/23/24 0355   Temp 99.2 °F (37.3 °C) 02/23/24 0355   Pulse 60 02/23/24 0842   Resp 16 02/23/24 0355   SpO2 93 % 02/23/24 0355   Vitals shown include unfiled device data.    [facility] AN Post Evaluation:   Patient Evaluated in PACU  Patient Participation: complete - patient participated  Level of Consciousness: awake  Pain Score: 3  Pain Management: adequate  Airway Patency:patent  Dental exam unchanged from preop  Yes    Nausea/Vomiting: none  Cardiovascular Status: acceptable  Respiratory Status: acceptable  Postoperative Hydration acceptable      Lanre Quiñones MD  2/23/2024 8:42 AM

## 2024-02-23 NOTE — PROGRESS NOTES
NEPHROLOGY DAILY PROGRESS NOTE     SUBJECTIVE:  Patient seen and examined at bedside. No complaints. S/p AKA     OBJECTIVE:    Total Intake/Output:    Intake/Output Summary (Last 24 hours) at 2/23/2024 1608  Last data filed at 2/23/2024 1418  Gross per 24 hour   Intake 2360.83 ml   Output 1250 ml   Net 1110.83 ml       PHYSICAL EXAM:  /45 (BP Location: Right arm)   Pulse 63   Temp 98.7 °F (37.1 °C) (Oral)   Resp 16   Ht 5' 4.02\" (1.626 m)   Wt 180 lb 3.2 oz (81.7 kg)   LMP  (LMP Unknown)   SpO2 91%   BMI 30.92 kg/m²   GEN: NAD, on oxygen     HEENT: NCAT    CHEST: no distress  CARDIAC: S1S2 normal  ABD: soft, NT/ND  EXT: R. AKA noted. Trace edema    CURRENT MEDICATIONS:     insulin degludec  10 Units Subcutaneous Nightly    furosemide  20 mg Intravenous Once    gabapentin  100 mg Oral TID    insulin aspart  5 Units Subcutaneous TID CC    vancomycin  125 mg Oral Daily    HYDROcodone-acetaminophen  2 tablet Oral TID    pantoprazole  40 mg Oral QAM AC    amLODIPine  10 mg Oral QAM    carvedilol  25 mg Oral BID    hydrALAZINE  100 mg Oral Q8H    vancomycin  750 mg Intravenous Q36H    fluconazole  200 mg Oral Daily    magnesium sulfate  4 g Intravenous Once    insulin aspart  1-7 Units Subcutaneous TID CC    sodium hypochlorite   Topical 2 times per day    fluticasone furoate-vilanterol  1 puff Inhalation Daily    rosuvastatin  40 mg Oral Nightly    meropenem  500 mg Intravenous q12h             LABS:  Patient Labs Reviewed in Detail. Pertinent Labs as follows:  Recent Labs   Lab 02/21/24  0531 02/22/24  0514 02/23/24  0636   * 82 196*   BUN 45* 36* 27*   CREATSERUM 1.03* 0.97 0.99   EGFRCR 60 64 63   CA 7.7* 7.8* 7.2*   * 148* 147*   K 4.1 4.1 4.3   * 117* 119*   CO2 25.0 26.0 24.0     Recent Labs   Lab 02/21/24  0531 02/22/24  0514 02/22/24  1343 02/23/24  0636   RBC 2.71* 2.76*  --  2.51*   HGB 7.8* 7.9* 8.3* 7.2*   HCT 22.9* 23.5* 26.1* 21.9*   MCV 84.5 85.1  --  87.3   MCH 28.8 28.6   --  28.7   MCHC 34.1 33.6  --  32.9   RDW 16.3* 16.3*  --  16.6*   NEPRELIM 16.39* 15.70*  --  13.79*   WBC 19.1* 18.0*  --  15.9*   .0* 487.0*  --  359.0       ASSESSMENT AND PLAN:   68 year old female with PMH of COPD, DM2, HTN, HL, PVD, Sickle cell anemia, CKD stage 3, recent fem-pop artery bypass admitted with acute hemorrhage from incision site.  Patient is s/p OR for repair.  Nephrology is consulted for OMEGA and metabolic acidosis.         OMEGA on CKD stage 3  - baseline around 1.2  - creatinine improved near baseline, has evidence of fluid overload and has been receiving IV lasix.   - hold diuretics ,  reserve PRN given poor po intake  - follow renal fxn and I/Os     Hypertension   - amlodipine, hydralazine, coreg  - weaned off nicardipine   - lisinopril held given recent OMEGA, can resume if needed for blood pressure control now with improved kidney function  - cards following     Metabolic acidosis:  - resolved     Hypernatremia:  - 2L Free water deficit  - start d5w @ 100ml /hr (higher given pt receiving lasix w/ PRBC infusion)  -endo following w/ glucose mgmt  -encourage po intake  -bmp in AM     Hypokalemia/ hypomagnesemia:  - replete per protocol     Hypocalcemia:  - corrects to normal given hypoalbuminemia   - monitor Ca levels         We will continue to follow      Felipe Lei MD  [facility]

## 2024-02-23 NOTE — PROGRESS NOTES
[facility]    Progress Note    Ava Bowen Patient Status:  Inpatient    10/6/1955 MRN [id number]   Location [facility] 3W/SW Attending Edy Foote DO   Hosp Day # 14 PCP Ernesto De Dios     Subjective:  Doing okay  Appetite is poor    Diabetes History:  Type 2 DM  Patient follows with me in the diabetes clinic  A1c is 7.9 % ( 2024)  Home regimen:   Glipizide 5 mg BID  Ozempic to 2 mg weekly     A comprehensive 10 point review of systems was completed.  Pertinent positives and negatives noted in the the HPI.      Objective/Physical Exam:  Physical Exam:   Vital Signs:  Blood pressure 104/52, pulse 64, temperature 98.8 °F (37.1 °C), temperature source Oral, resp. rate 16, height 5' 4.02\" (1.626 m), weight 174 lb 1.6 oz (79 kg), SpO2 94%, not currently breastfeeding.      General Appearance:  alert, well developed, in no acute distress  Head: Atraumatic  Eyes:  normal conjunctivae, sclera., normal sclera and normal pupils  Throat/Neck: normal sound to voice. Normal hearing, normal speech  Respiratory:  Speaking in full sentences, non-labored. no increased work of breathing, no audible wheezing    Neuro: motor grossly intact, moving all extremities without difficulty  Psychiatric:  oriented to time, self, and place  Extremities: s/p R AKA      Labs:  Bg reviewed    Assessment/Plan:  Patient Active Problem List   Diagnosis    Anemia    Azotemia    Hypokalemia    Back pain with radiation    Type 2 diabetes mellitus without retinopathy (HCC)    Age-related nuclear cataract of both eyes    Glaucoma suspect of both eyes    Centrilobular emphysema (HCC)    PVD (peripheral vascular disease) (HCC)    Diabetic ulcer of left midfoot associated with type 2 diabetes mellitus, with fat layer exposed (HCC)    PAD (peripheral artery disease) (HCC)    Pre-op testing    Family history of glaucoma in sister    Primary hypertension    S/P laminectomy    Post-operative pain    Type 2 diabetes  mellitus with hyperglycemia, with long-term current use of insulin ([facility])    Myalgia    DDD (degenerative disc disease), lumbar    Failed back syndrome of lumbar spine    Chest pain    Hyperlipidemia    Urinary tract infection    Acute cystitis without hematuria    Ileitis    COVID-19    UTI (urinary tract infection)    Pyelonephritis    Hypernatremia    Hyperglycemia    Ureteral calculi    Hypoglycemia    Opioid dependence, uncomplicated ([facility])    Depression, recurrent ([facility])    Stage 3 chronic kidney disease, unspecified whether stage 3a or 3b CKD ([facility])    Bypass graft mechanical complication ([facility])    Bypass graft mechanical complication, initial encounter ([facility])    Anemia, unspecified type    Acute kidney injury superimposed on CKD ([facility])    Uncontrolled type 2 diabetes mellitus with hyperglycemia ([facility])       Type 2 DM  With PAD   S/p AKA     Dextrose at 100 ml/hr      PLAN:   Tresiba 10 units nightly  Novolog  CF with meals  Accuchecks ac and hs  Hypoglycemia protocol  Diabetic diet     Please let the endocrinology service know if there is any change in IV fluids since that will change insulin requirements.    We will follow        Luna Ludwig MD

## 2024-02-23 NOTE — PROGRESS NOTES
02/23/24 1448   Wound 01/24/24 Leg Right;Upper   Date First Assessed/Time First Assessed: 01/24/24 0832   Present on Original Admission: No  Primary Wound Type: Incision  Location: Leg  Wound Location Orientation: Right;Upper   Wound Image    Site Assessment ANGELO   Treatments Wound Vac - Neg Pressure   Wound Vac Brand KCI     Pt seen for wound vac check. Vac is showing patent seal, sponge is compressed at 125 mmHg. There is a large amount of serosanguinous drainage collecting in the canister. An additional canister was ordered from unit and is at the bedside. Wound care will change the dressing on Monday 2/26. If any issues arise over the weekend, notify MD.

## 2024-02-23 NOTE — PAYOR COMM NOTE
--------------  CONTINUED STAY REVIEW    Payor: [employer]  Subscriber #:  [id number]  Authorization Number: [id number] / [id number]    Admit date: 2/10/24  Admit time:  9:20 PM    Admitting Physician: Edy Foote DO  Attending Physician:  Max Souza MD  Primary Care Physician: Ernesto De Dios    REVIEW DOCUMENTATION:  2/23  Assessment/Plan:      Principal Problem:    Bypass graft mechanical complication, initial encounter (HCC)  Active Problems:    Hyperglycemia    Bypass graft mechanical complication (HCC)    Anemia, unspecified type    Acute kidney injury superimposed on CKD (HCC)    Uncontrolled type 2 diabetes mellitus with hyperglycemia (HCC)     Ms. Bowen is a 68 year old female with PMH sig for CKD, COPD, DM, HTN, HLD, PVD, and sickle cell anemia who was recently admitted last week for right lower extremity femoral to popliteal bypass who presented with bleeding from surgical site. Taken for emergent OR on 2/10/24, noted to be grossly infected, started on vancomycin/meropenem. Bcx with Klebsiella. BG elevated on admit, started on insulin gtt, endo consulted, now off insulin gtt. Kept intubated post op, pulm following. Polymicrobial infection on meropenum, IV vanco and now fluconazole.  Now S/P AKA on right on 2/22/24.       PAD sp RLE fem-pop bypass last week c/b bleeding fm surg site fm wound dehiscence  - s/p recent fem pop bypass 1/24/24 with Dr. Murrieta  - has been on ASA/plavix- held on admit  - Hg down to 6.0, s/p 3 units pRBC 2/10- watching close now, hgb stable currently no signs of bleeding   - vascular surgery consulted, s/p emergent OR 2/10, removal of infected graft, s/p debridement, bone patch angioplasty   - sp further debridement 2/13 and placement of wound vac  - back to OR 2/16   - s/p right AKA  2/22/24  - Pain meds PRN, gabapentin   - lasix PRN   - Some coolness and decreased pulse on left leg , CTA with occluded stent in femoral artery, discussed with vascular no  further intervention at this time       Septic shock (now improved) fm post op wound infection, polymicrobial   - continue IV vancomycin and meropenem add fluconazole , ID consult following   - Bcx with klebsiella,   - 2/10 wound cx and 2/13 poly microbial  - abx adjustment per ID f/u cx results  - ID notes appreciated- tenative plan for 6 weeks abx from from final surgical plans- final abx tbd pending cx data and surgical plans   - Now S/P right AKA on 2/22 final determination of treatment will need to be sorted per ID   - Keep joseph due to large leg wound      Metabolic acidosis- resolved   OMEGA on CKD3- at baseline now   - likely 2/2 LA from blood loss +/- dka on admit  - pH 7.24 on admit, LA 7.6 on admit, now normalized  - likely multifactorial, 2/2 LA, blood loss, ?DKA, CKD  - cr 1.7 on admit, previously 1.2 on discharge last week  - monitor Cr and UOP- improved   - renal consulted, sp bicarb gtt  - Stable, Monitor     Acute blood loss anemia from surgical site   Chronic anemia  Sickle cell anemia  - Stable currently, trend CBC   - outpatient f/u  - Hg 8.9 on discharge last week  - Hg 6.0 on admission, s/p 4 units pRBC, last unit 2/23/34  - monitor hgb     DM2, hyperglycemia better now   - A1c 7.9   - Hold PO medications  - BG 500s in ED, pH 7.2  - insulin gtt ordered in ED  - increased Degludec 17 at bedtime and aspart 10 units with meals , endo following      Respiratory failure resolved   - remained intubated post op both fm aspiration event and metabolic acidosis  - pulm following  - extubated 2/14 pm  - Now on 1L NC - wean as able      Hypovolemic / septic Shock/ Hypotension- improved  Hx HTN  - BP stable now, hydral 100 Q8 hours   - Amlodipine 10 d, Coreg 25 BID   - Holding home lisinopril  - BP ok, watch and consider resuming pending BP       COPD  - chronic, stable  - resumed inhalers     ACP  - CODE- FULL   - POA- daughter and son     FN:  - IVF: complete   - Diet: Carb controlled diet      DVT Prophy:  Hold for now due to bleeding   Lines: PIV     Daughter Tiffanie updated at length via phone 2/18/20 by Dr. Foote       Questions/concerns were discussed with patient and/or family by bedside.     Thank You,  Max Souza MD      Hospitalist with [facility]      Subjective:      Sleepy, still has pain.  Tolerating diet so far but not eating much.             MEDICATIONS ADMINISTERED IN LAST 1 DAY:  Transfuse RBC       Date Action Dose Route User    2/23/2024 1208 New Bag (none) Intravenous Erin Elmore RN          carvedilol (Coreg) tab 25 mg       Date Action Dose Route User    2/23/2024 0911 Given 25 mg Oral Erin Elmore RN    2/22/2024 2115 Given 25 mg Oral Brenda Dunn RN          dextrose in lactated ringers 5% infusion       Date Action Dose Route User    2/22/2024 2323 New Bag (none) Intravenous Brenda Dunn RN    2/22/2024 1436 New Bag (none) Intravenous Heather Esteban RN          fluconazole (Diflucan) tab 200 mg       Date Action Dose Route User    2/23/2024 0911 Given 200 mg Oral Erin Elmore RN          fluticasone furoate-vilanterol (Breo Ellipta) 200-25 MCG/ACT inhaler 1 puff       Date Action Dose Route User    2/23/2024 0928 Given 1 puff Inhalation Erin Elmore RN          furosemide (Lasix) 10 mg/mL injection 20 mg       Date Action Dose Route User    2/23/2024 1203 Given 20 mg Intravenous Erin Elmore RN          HYDROcodone-acetaminophen (Norco)  MG per tab 2 tablet       Date Action Dose Route User    2/23/2024 0501 Given 2 tablet Oral Brenda Dunn RN          HYDROcodone-acetaminophen (Norco) 5-325 MG per tab 2 tablet       Date Action Dose Route User    2/23/2024 0911 Given 2 tablet Oral Erin Elmore RN    2/22/2024 2207 Given 2 tablet Oral Brenda Dunn RN          HYDROmorphone (Dilaudid) 1 MG/ML injection 1 mg       Date Action Dose Route User    2/22/2024 2115 Given 1 mg Intravenous Brenda Dunn RN          insulin aspart (NovoLOG) 100  Units/mL FlexPen 1-7 Units       Date Action Dose Route User    2/23/2024 1048 Given 3 Units Subcutaneous (Right Upper Arm) Erin Elmore RN          insulin aspart (NovoLOG) 100 Units/mL FlexPen 5 Units       Date Action Dose Route User    2/23/2024 1048 Given 5 Units Subcutaneous (Right Upper Arm) Erin Elmore RN          insulin degludec 100 units/mL flextouch 10 Units       Date Action Dose Route User    2/22/2024 2116 Given 10 Units Subcutaneous (Left Upper Abdomen) Brenda Dunn RN          lactated ringers infusion       Date Action Dose Route User    2/22/2024 1325 New Bag (none) Intravenous Lanre Quiñones MD          meropenem (Merrem) 500 mg in sodium chloride 0.9% 100 mL IVPB-MBP       Date Action Dose Route User    2/23/2024 0357 New Bag 500 mg Intravenous Brenda Dunn RN    2/22/2024 1614 New Bag 500 mg Intravenous Heather Esteban RN          morphINE PF 4 MG/ML injection 4 mg       Date Action Dose Route User    2/23/2024 1137 Given 4 mg Intravenous Erin Elmore RN    2/23/2024 0405 Given 4 mg Intravenous Brenda Dunn RN          pantoprazole (Protonix) DR tab 40 mg       Date Action Dose Route User    2/23/2024 0501 Given 40 mg Oral Brenda Dunn RN          rosuvastatin (Crestor) tab 40 mg       Date Action Dose Route User    2/22/2024 2115 Given 40 mg Oral Brenda Dunn RN          sodium chloride 0.9% infusion       Date Action Dose Route User    2/23/2024 1203 New Bag (none) Intravenous Erin Elmore RN          vancomycin (Vancocin) cap 125 mg       Date Action Dose Route User    2/23/2024 0911 Given 125 mg Oral Erin Elmore RN            Vitals (last day)       Date/Time Temp Pulse Resp BP SpO2 Weight O2 Device O2 Flow Rate (L/min) [facility]    02/23/24 1308 99 °F (37.2 °C) 61 16 100/50 95 % -- Nasal cannula 1 L/min CK    02/23/24 1223 98.7 °F (37.1 °C) 63 18 106/53 94 % -- Nasal cannula 1 L/min CK    02/23/24 1208 98.3 °F (36.8 °C) 62 18 103/49 94 % -- Nasal cannula  1 L/min CK    02/23/24 1103 -- 61 -- 107/51 -- -- -- -- CK    02/23/24 0903 99 °F (37.2 °C) 61 16 100/51 94 % -- None (Room air) -- CK    02/23/24 0355 99.2 °F (37.3 °C) 61 16 110/49 93 % -- None (Room air) -- CB    02/22/24 2323 -- 62 -- 107/53 -- -- -- -- CB    02/22/24 2108 100 °F (37.8 °C) 62 18 111/49 92 % -- Nasal cannula 1 L/min CB    02/22/24 1830 -- 55 16 111/63 92 % -- Nasal cannula 1 L/min AR    02/22/24 1730 -- 58 14 101/53 94 % -- Nasal cannula 1 L/min AR    02/22/24 1630 -- 55 16 113/49 97 % -- Nasal cannula 2 L/min AR    02/22/24 1600 -- 58 14 104/52 95 % -- Nasal cannula 2 L/min AR    02/22/24 1531 -- 60 18 111/49 94 % -- Nasal cannula 2 L/min AR    02/22/24 1516 -- -- -- -- 92 % -- Nasal cannula 2 L/min AR    02/22/24 1515 -- 60 18 108/45 88 % -- None (Room air) -- AR    02/22/24 1500 -- 55 18 113/48 91 % -- None (Room air) -- AR    02/22/24 1445 -- 59 18 109/49 91 % -- None (Room air) -- AR    02/22/24 1430 97.7 °F (36.5 °C) 60 18 115/51 94 % -- Nasal cannula 2 L/min AR    02/22/24 1418 97.4 °F (36.3 °C) 59 19 114/52 96 % -- Nasal cannula 2 L/min AL    02/22/24 1408 -- 61 19 116/52 97 % -- Nasal cannula 2 L/min AL    02/22/24 1358 -- 59 18 109/50 97 % -- Nasal cannula 3 L/min AL    02/22/24 1355 -- -- -- -- -- -- Nasal cannula 3 L/min AL    02/22/24 1348 -- 59 17 109/48 97 % -- Nasal cannula 5 L/min AL    02/22/24 1339 -- -- -- -- -- -- Nasal cannula 5 L/min AL    02/22/24 1338 97.8 °F (36.6 °C) 61 11 102/51 90 % -- None (Room air) -- AL    02/22/24 0823 99 °F (37.2 °C) 62 18 111/56 93 % -- None (Room air) -- AR    02/22/24 0517 98.5 °F (36.9 °C) 61 18 116/62 93 % -- None (Room air) -- LS          CIWA Scores (since admission)       None          Blood Transfusion Record       Product Unit Status Volume Start End            Transfuse RBC       24  [id number]  [id number] Transfusing  02/23/24 1208        24  [id number]  [id number] Completed 02/22/24 1344 350 mL 02/22/24 1229 02/22/24 1239        24  [id number]  [id number] Completed 02/22/24 1344 350 mL 02/22/24 1207 02/22/24 1223       24  [id number]  [id number] Completed 02/19/24 1114 272.5 mL 02/19/24 0911 02/19/24 1100       24  [id number]  [id number] Completed 02/14/24 1328 357.5 mL 02/14/24 1105 02/14/24 1328       24  [id number]  [id number] Completed 02/11/24 2031 295 mL 02/11/24 1842 02/11/24 2030       24  [id number]  [id number] Completed 02/10/24 2200 350 mL 02/10/24 1855 02/10/24 1905       24  [id number]  [id number] Completed 02/10/24 2200 350 mL 02/10/24 1813 02/10/24 1823       24  [id number]  [id number] Completed 02/10/24 2200 350 mL 02/10/24 1758 02/10/24 1808                Procedures:      Plan:

## 2024-02-23 NOTE — PLAN OF CARE
Problem: Patient Centered Care  Goal: Patient preferences are identified and integrated in the patient's plan of care  Description: Interventions:  - What would you like us to know as we care for you? From home alone  - Provide timely, complete, and accurate information to patient/family  - Incorporate patient and family knowledge, values, beliefs, and cultural backgrounds into the planning and delivery of care  - Encourage patient/family to participate in care and decision-making at the level they choose  - Honor patient and family perspectives and choices  Outcome: Progressing     Problem: Diabetes/Glucose Control  Goal: Glucose maintained within prescribed range  Description: INTERVENTIONS:  - Monitor Blood Glucose as ordered  - Assess for signs and symptoms of hyperglycemia and hypoglycemia  - Administer ordered medications to maintain glucose within target range  - Assess barriers to adequate nutritional intake and initiate nutrition consult as needed  - Instruct patient on self management of diabetes  Outcome: Progressing     Problem: Patient/Family Goals  Goal: Patient/Family Long Term Goal  Description: Patient's Long Term Goal: discharge    Interventions:  - Pain management  - Surgical intervention  - Wound care  - See additional Care Plan goals for specific interventions  Outcome: Progressing  Goal: Patient/Family Short Term Goal  Description: Patient's Short Term Goal: pain management    Interventions:   - See additional Care Plan goals for specific interventions  Outcome: Progressing     Problem: CARDIOVASCULAR - ADULT  Goal: Maintains optimal cardiac output and hemodynamic stability  Description: INTERVENTIONS:  - Monitor vital signs, rhythm, and trends  - Monitor for bleeding, hypotension and signs of decreased cardiac output  - Evaluate effectiveness of vasoactive medications to optimize hemodynamic stability  - Monitor arterial and/or venous puncture sites for bleeding and/or hematoma  - Assess  quality of pulses, skin color and temperature  - Assess for signs of decreased coronary artery perfusion - ex. Angina  - Evaluate fluid balance, assess for edema, trend weights  Outcome: Progressing  Goal: Absence of cardiac arrhythmias or at baseline  Description: INTERVENTIONS:  - Continuous cardiac monitoring, monitor vital signs, obtain 12 lead EKG if indicated  - Evaluate effectiveness of antiarrhythmic and heart rate control medications as ordered  - Initiate emergency measures for life threatening arrhythmias  - Monitor electrolytes and administer replacement therapy as ordered  Outcome: Progressing     Problem: RESPIRATORY - ADULT  Goal: Achieves optimal ventilation and oxygenation  Description: INTERVENTIONS:  - Assess for changes in respiratory status  - Assess for changes in mentation and behavior  - Position to facilitate oxygenation and minimize respiratory effort  - Oxygen supplementation based on oxygen saturation or ABGs  - Provide Smoking Cessation handout, if applicable  - Encourage broncho-pulmonary hygiene including cough, deep breathe, Incentive Spirometry  - Assess the need for suctioning and perform as needed  - Assess and instruct to report SOB or any respiratory difficulty  - Respiratory Therapy support as indicated  - Manage/alleviate anxiety  - Monitor for signs/symptoms of CO2 retention  Outcome: Progressing     Problem: METABOLIC/FLUID AND ELECTROLYTES - ADULT  Goal: Glucose maintained within prescribed range  Description: INTERVENTIONS:  - Monitor Blood Glucose as ordered  - Assess for signs and symptoms of hyperglycemia and hypoglycemia  - Administer ordered medications to maintain glucose within target range  - Assess barriers to adequate nutritional intake and initiate nutrition consult as needed  - Instruct patient on self management of diabetes  Outcome: Progressing  Goal: Electrolytes maintained within normal limits  Description: INTERVENTIONS:  - Monitor labs and rhythm and  assess patient for signs and symptoms of electrolyte imbalances  - Administer electrolyte replacement as ordered  - Monitor response to electrolyte replacements, including rhythm and repeat lab results as appropriate  - Fluid restriction as ordered  - Instruct patient on fluid and nutrition restrictions as appropriate  Outcome: Progressing  Goal: Hemodynamic stability and optimal renal function maintained  Description: INTERVENTIONS:  - Monitor labs and assess for signs and symptoms of volume excess or deficit  - Monitor intake, output and patient weight  - Monitor urine specific gravity, serum osmolarity and serum sodium as indicated or ordered  - Monitor response to interventions for patient's volume status, including labs, urine output, blood pressure (other measures as available)  - Encourage oral intake as appropriate  - Instruct patient on fluid and nutrition restrictions as appropriate  Outcome: Progressing     IV fluids in progress.  Antibiotics continued.  Call light within reach. Bed locked and in lowest position.  Right AKA dressing clan and intact, wound vac in place.  Dilaudid, Norco and Morphine for pain.

## 2024-02-23 NOTE — PROGRESS NOTES
[facility] Hospitalist Progress Note     CC: Hospital Follow up    PCP: Ernesto De Dios       Assessment/Plan:     Principal Problem:    Bypass graft mechanical complication, initial encounter (Formerly [facility])  Active Problems:    Hyperglycemia    Bypass graft mechanical complication (HCC)    Anemia, unspecified type    Acute kidney injury superimposed on CKD (HCC)    Uncontrolled type 2 diabetes mellitus with hyperglycemia (HCC)    Ms. Bowen is a 68 year old female with PMH sig for CKD, COPD, DM, HTN, HLD, PVD, and sickle cell anemia who was recently admitted last week for right lower extremity femoral to popliteal bypass who presented with bleeding from surgical site. Taken for emergent OR on 2/10/24, noted to be grossly infected, started on vancomycin/meropenem. Bcx with Klebsiella. BG elevated on admit, started on insulin gtt, endo consulted, now off insulin gtt. Kept intubated post op, pulm following. Polymicrobial infection on meropenum, IV vanco and now fluconazole.  Now S/P AKA on right on 2/22/24.       PAD sp RLE fem-pop bypass last week c/b bleeding fm surg site fm wound dehiscence  - s/p recent fem pop bypass 1/24/24 with Dr. Murrieta  - has been on ASA/plavix- held on admit  - Hg down to 6.0, s/p 3 units pRBC 2/10- watching close now, hgb stable currently no signs of bleeding   - vascular surgery consulted, s/p emergent OR 2/10, removal of infected graft, s/p debridement, bone patch angioplasty   - sp further debridement 2/13 and placement of wound vac  - back to OR 2/16   - s/p right AKA  2/22/24  - Pain meds PRN, gabapentin   - lasix PRN   - Some coolness and decreased pulse on left leg , CTA with occluded stent in femoral artery, discussed with vascular no further intervention at this time      Septic shock (now improved) fm post op wound infection, polymicrobial   - continue IV vancomycin and meropenem add fluconazole , ID consult following   - Bcx with klebsiella,   - 2/10 wound cx and 2/13 poly  microbial  - abx adjustment per ID f/u cx results  - ID notes appreciated- tenative plan for 6 weeks abx from from final surgical plans- final abx tbd pending cx data and surgical plans   - Now S/P right AKA on 2/22 final determination of treatment will need to be sorted per ID   - Keep joseph due to large leg wound     Metabolic acidosis- resolved   OMEGA on CKD3- at baseline now   - likely 2/2 LA from blood loss +/- dka on admit  - pH 7.24 on admit, LA 7.6 on admit, now normalized  - likely multifactorial, 2/2 LA, blood loss, ?DKA, CKD  - cr 1.7 on admit, previously 1.2 on discharge last week  - monitor Cr and UOP- improved   - renal consulted, sp bicarb gtt  - Stable, Monitor    Acute blood loss anemia from surgical site   Chronic anemia  Sickle cell anemia  - Stable currently, trend CBC   - outpatient f/u  - Hg 8.9 on discharge last week  - Hg 6.0 on admission, s/p 4 units pRBC, last unit 2/23/34  - monitor hgb     DM2, hyperglycemia better now   - A1c 7.9   - Hold PO medications  - BG 500s in ED, pH 7.2  - insulin gtt ordered in ED  - increased Degludec 17 at bedtime and aspart 10 units with meals , endo following      Respiratory failure resolved   - remained intubated post op both fm aspiration event and metabolic acidosis  - pulm following  - extubated 2/14 pm  - Now on 1L NC - wean as able      Hypovolemic / septic Shock/ Hypotension- improved  Hx HTN  - BP stable now, hydral 100 Q8 hours   - Amlodipine 10 d, Coreg 25 BID   - Holding home lisinopril  - BP ok, watch and consider resuming pending BP       COPD  - chronic, stable  - resumed inhalers     ACP  - CODE- FULL   - POA- daughter and son     FN:  - IVF: complete   - Diet: Carb controlled diet      DVT Prophy: Hold for now due to bleeding   Lines: PIV     Daughter Tiffanie updated at length via phone 2/18/20 by Dr. Foote      Questions/concerns were discussed with patient and/or family by bedside.    Thank You,  Max Souza MD     Hospitalist with [facility]     Subjective:     Sleepy, still has pain.  Tolerating diet so far but not eating much.     OBJECTIVE:    Blood pressure 107/51, pulse 61, temperature 99 °F (37.2 °C), temperature source Oral, resp. rate 16, height 5' 4.02\" (1.626 m), weight 180 lb 3.2 oz (81.7 kg), SpO2 94%, not currently breastfeeding.    Temp:  [97.4 °F (36.3 °C)-100 °F (37.8 °C)] 99 °F (37.2 °C)  Pulse:  [55-62] 61  Resp:  [11-19] 16  BP: (100-116)/(45-63) 107/51  SpO2:  [88 %-97 %] 94 %      Intake/Output:    Intake/Output Summary (Last 24 hours) at 2/23/2024 1130  Last data filed at 2/23/2024 0832  Gross per 24 hour   Intake 3890 ml   Output 950 ml   Net 2940 ml       Last 3 Weights   02/20/24 0554 180 lb 3.2 oz (81.7 kg)   02/19/24 0445 189 lb 1.6 oz (85.8 kg)   02/18/24 0614 186 lb 1.6 oz (84.4 kg)   02/17/24 0403 181 lb 11.2 oz (82.4 kg)   02/16/24 0500 182 lb 3.2 oz (82.6 kg)   02/14/24 0500 185 lb 13.6 oz (84.3 kg)   02/13/24 0500 183 lb 3.2 oz (83.1 kg)   02/12/24 0400 180 lb 5.4 oz (81.8 kg)   02/11/24 0600 182 lb 5.1 oz (82.7 kg)   02/10/24 2142 171 lb 1.2 oz (77.6 kg)   02/10/24 2105 171 lb 1.2 oz (77.6 kg)   02/10/24 1642 164 lb 3.9 oz (74.5 kg)   01/27/24 0501 164 lb 3.2 oz (74.5 kg)   01/24/24 0549 158 lb (71.7 kg)   01/19/24 1112 160 lb (72.6 kg)   12/30/23 1216 155 lb (70.3 kg)   08/29/23 1357 153 lb (69.4 kg)       Exam   Gen: sleepy   Pulm: Lungs clear, normal respiratory effort, 1 L NC   CV: Heart with regular rate and rhythm  Abd: Abdomen soft, nontender, nondistended  Ext: right AKA with VAC in place   : jake     Data Review:       Labs:     Recent Labs   Lab 02/21/24  0531 02/22/24  0514 02/22/24  1343 02/23/24  0636   RBC 2.71* 2.76*  --  2.51*   HGB 7.8* 7.9* 8.3* 7.2*   HCT 22.9* 23.5* 26.1* 21.9*   MCV 84.5 85.1  --  87.3   MCH 28.8 28.6  --  28.7   MCHC 34.1 33.6  --  32.9   RDW 16.3* 16.3*  --  16.6*   NEPRELIM 16.39* 15.70*  --  13.79*   WBC 19.1* 18.0*  --  15.9*   .0* 487.0*  --  359.0          Recent Labs   Lab 02/21/24  0531 02/22/24  0514 02/23/24  0636   * 82 196*   BUN 45* 36* 27*   CREATSERUM 1.03* 0.97 0.99   EGFRCR 60 64 63   CA 7.7* 7.8* 7.2*   * 148* 147*   K 4.1 4.1 4.3   * 117* 119*   CO2 25.0 26.0 24.0       Recent Labs   Lab 02/17/24  0730 02/18/24  0632 02/19/24  0536   ALB 2.2* 2.2* 2.2*         Imaging:  CTA ABDOMEN/PELVIS LOWER EXT BILAT W RUNOFF (INR=08229)    Result Date: 2/21/2024  CONCLUSION:   Completely occluded right superficial femoral artery stent is unchanged in appearance since the prior exams.  Completely occluded left superficial femoral artery stent is new since 6/20/2023.  Multifocal areas of high-grade stenosis and narrowing seen involving the mid to distal superficial femoral artery with a focal area of complete occlusion within the distal left superficial femoral artery which is new since 6/20/2023.  Markedly limited evaluation of bilateral calf vessels due to extensive atherosclerotic calcification.  Large soft tissue ulceration and wounds seen within the anterior right pelvis and upper thigh and medial knee.  No abscess or drainable collection seen at this point.  Anasarca  Multiple other incidental findings as described in the body of the report.    Dictated by (CST): Elder Wagoner MD on 2/21/2024 at 8:26 AM     Finalized by (CST): Elder Wagoner MD on 2/21/2024 at 8:43 AM          US VENOUS DOPPLER LEG LEFT - DIAG IMG (CPT=93971)    Result Date: 2/21/2024  CONCLUSION: No evidence of left lower extremity DVT.   Dictated by (CST): Ron Chaudhari MD on 2/21/2024 at 6:15 AM     Finalized by (CST): Ron Chaudhari MD on 2/21/2024 at 6:15 AM             Meds:      insulin degludec  10 Units Subcutaneous Nightly    sodium chloride   Intravenous Once    furosemide  20 mg Intravenous Once    furosemide  20 mg Intravenous Once    insulin aspart  5 Units Subcutaneous TID CC    vancomycin  125 mg Oral Daily    HYDROcodone-acetaminophen  2 tablet Oral TID     pantoprazole  40 mg Oral QAM AC    amLODIPine  10 mg Oral QAM    carvedilol  25 mg Oral BID    hydrALAZINE  100 mg Oral Q8H    vancomycin  750 mg Intravenous Q36H    fluconazole  200 mg Oral Daily    magnesium sulfate  4 g Intravenous Once    insulin aspart  1-7 Units Subcutaneous TID CC    sodium hypochlorite   Topical 2 times per day    fluticasone furoate-vilanterol  1 puff Inhalation Daily    rosuvastatin  40 mg Oral Nightly    meropenem  500 mg Intravenous q12h       acetaminophen **OR** HYDROcodone-acetaminophen **OR** HYDROcodone-acetaminophen, ondansetron, metoclopramide, morphINE **OR** morphINE **OR** morphINE, HYDROmorphone, HYDROcodone-acetaminophen, HYDROcodone-acetaminophen, acetaminophen, morphINE, morphINE **OR** morphINE **OR** morphINE, hydrALAzine, ondansetron, metoclopramide, albuterol, ipratropium-albuterol, glucose **OR** glucose **OR** glucose-vitamin C **OR** dextrose **OR** glucose **OR** glucose **OR** glucose-vitamin C

## 2024-02-23 NOTE — PLAN OF CARE
Patient is very lethargic today. Patient arouses easily to name but falls back asleep as soon as you stop talking to her. Hgb 7.2 today. 1 unit of PRBCs transfused. Hgb 8.2 post transfusion. Wound care called to evaluate dressing for leaking. Wound care RN determined the wound vac dressing is intact. Patient updated on POC and verbalized understanding. Patient's daughter Tiffanie called several times for updates and verbalized understanding.     Problem: Patient Centered Care  Goal: Patient preferences are identified and integrated in the patient's plan of care  Description: Interventions:  - What would you like us to know as we care for you? From home alone  - Provide timely, complete, and accurate information to patient/family  - Incorporate patient and family knowledge, values, beliefs, and cultural backgrounds into the planning and delivery of care  - Encourage patient/family to participate in care and decision-making at the level they choose  - Honor patient and family perspectives and choices  Outcome: Progressing     Problem: Diabetes/Glucose Control  Goal: Glucose maintained within prescribed range  Description: INTERVENTIONS:  - Monitor Blood Glucose as ordered  - Assess for signs and symptoms of hyperglycemia and hypoglycemia  - Administer ordered medications to maintain glucose within target range  - Assess barriers to adequate nutritional intake and initiate nutrition consult as needed  - Instruct patient on self management of diabetes  Outcome: Progressing     Problem: Patient/Family Goals  Goal: Patient/Family Long Term Goal  Description: Patient's Long Term Goal: discharge    Interventions:  - Monitor labs and vital signs  - IV antibiotics  - Medication compliance  - Follow provider recommendations  - See additional Care Plan goals for specific interventions  Outcome: Progressing  Goal: Patient/Family Short Term Goal  Description: Patient's Short Term Goal: pain management    Interventions:   -  Reposition, elevate stump on pillows  -Medication compliance  - Follow provider recommendations  - See additional Care Plan goals for specific interventions  Outcome: Progressing     Problem: CARDIOVASCULAR - ADULT  Goal: Maintains optimal cardiac output and hemodynamic stability  Description: INTERVENTIONS:  - Monitor vital signs, rhythm, and trends  - Monitor for bleeding, hypotension and signs of decreased cardiac output  - Evaluate effectiveness of vasoactive medications to optimize hemodynamic stability  - Monitor arterial and/or venous puncture sites for bleeding and/or hematoma  - Assess quality of pulses, skin color and temperature  - Assess for signs of decreased coronary artery perfusion - ex. Angina  - Evaluate fluid balance, assess for edema, trend weights  Outcome: Progressing  Goal: Absence of cardiac arrhythmias or at baseline  Description: INTERVENTIONS:  - Continuous cardiac monitoring, monitor vital signs, obtain 12 lead EKG if indicated  - Evaluate effectiveness of antiarrhythmic and heart rate control medications as ordered  - Initiate emergency measures for life threatening arrhythmias  - Monitor electrolytes and administer replacement therapy as ordered  Outcome: Progressing     Problem: RESPIRATORY - ADULT  Goal: Achieves optimal ventilation and oxygenation  Description: INTERVENTIONS:  - Assess for changes in respiratory status  - Assess for changes in mentation and behavior  - Position to facilitate oxygenation and minimize respiratory effort  - Oxygen supplementation based on oxygen saturation or ABGs  - Provide Smoking Cessation handout, if applicable  - Encourage broncho-pulmonary hygiene including cough, deep breathe, Incentive Spirometry  - Assess the need for suctioning and perform as needed  - Assess and instruct to report SOB or any respiratory difficulty  - Respiratory Therapy support as indicated  - Manage/alleviate anxiety  - Monitor for signs/symptoms of CO2 retention  Outcome:  Progressing     Problem: METABOLIC/FLUID AND ELECTROLYTES - ADULT  Goal: Glucose maintained within prescribed range  Description: INTERVENTIONS:  - Monitor Blood Glucose as ordered  - Assess for signs and symptoms of hyperglycemia and hypoglycemia  - Administer ordered medications to maintain glucose within target range  - Assess barriers to adequate nutritional intake and initiate nutrition consult as needed  - Instruct patient on self management of diabetes  Outcome: Progressing  Goal: Electrolytes maintained within normal limits  Description: INTERVENTIONS:  - Monitor labs and rhythm and assess patient for signs and symptoms of electrolyte imbalances  - Administer electrolyte replacement as ordered  - Monitor response to electrolyte replacements, including rhythm and repeat lab results as appropriate  - Fluid restriction as ordered  - Instruct patient on fluid and nutrition restrictions as appropriate  Outcome: Progressing  Goal: Hemodynamic stability and optimal renal function maintained  Description: INTERVENTIONS:  - Monitor labs and assess for signs and symptoms of volume excess or deficit  - Monitor intake, output and patient weight  - Monitor urine specific gravity, serum osmolarity and serum sodium as indicated or ordered  - Monitor response to interventions for patient's volume status, including labs, urine output, blood pressure (other measures as available)  - Encourage oral intake as appropriate  - Instruct patient on fluid and nutrition restrictions as appropriate  Outcome: Progressing     Problem: PAIN - ADULT  Goal: Verbalizes/displays adequate comfort level or patient's stated pain goal  Description: INTERVENTIONS:  - Encourage pt to monitor pain and request assistance  - Assess pain using appropriate pain scale  - Administer analgesics based on type and severity of pain and evaluate response  - Implement non-pharmacological measures as appropriate and evaluate response  - Consider cultural and  social influences on pain and pain management  - Manage/alleviate anxiety  - Utilize distraction and/or relaxation techniques  - Monitor for opioid side effects  - Notify MD/LIP if interventions unsuccessful or patient reports new pain  - Anticipate increased pain with activity and pre-medicate as appropriate  Outcome: Progressing

## 2024-02-23 NOTE — PHYSICAL THERAPY NOTE
Physical Therapy Defer Note    Orders received and chart reviewed. Attempted to see patient for Physical Therapy services. Patient is not appropriate for therapy at this time secondary to per vascular surgery patient to be on bedrest 3 days S/P R AKA. Decision made to defer therapy following a discussion with the RN, will follow up once bedrest discontinued.     02/23/24 0910   VISIT TYPE   PT Inpatient Visit Type (Documentation Required) Attempted Evaluation  (bedrest 3 days S/P R AKA)     Will re-schedule visit as patient is medically able to participate.    Cat Randall, PT, DPT  Formerly [facility]  Inpatient Rehabilitation  Physical Therapy  [phone]

## 2024-02-23 NOTE — PROGRESS NOTES
02/23/24 0823   Wound 02/13/24 Groin Anterior;Proximal;Right;Upper   Date First Assessed/Time First Assessed: 02/13/24 1118   Present on Original Admission: Yes  Primary Wound Type: Incision  Location: Groin  Wound Location Orientation: Anterior;Proximal;Right;Upper  Wound Description (Comments): s/p multiple I & D w G...   Site Assessment ANGELO   Drainage Amount Moderate   Drainage Description Serosanguineous   Treatments Wound Vac - Neg Pressure   Wound Vac Brand KCI   Dressing Status Intact   Wound 02/22/24 Leg Right   Date First Assessed/Time First Assessed: 02/22/24 1154   Primary Wound Type: Incision  Location: Leg  Wound Location Orientation: Right  Wound Description (Comments): s/p aka w Guffud 2/22  Wound Outcome: Amputation   Site Assessment ANGELO   Treatments Wound Vac - Neg Pressure   Wound Vac Brand KCI   Dressing Status Intact   Wound 02/23/24 Buttocks Left;Right   Date First Assessed/Time First Assessed: 02/23/24 0823   Present on Original Admission: No  Primary Wound Type: Friction/Shear  Location: Buttocks  Wound Location Orientation: Left;Right   Wound Image    Site Assessment Clean;Excoriated;Fragile;Moist;Pink   Drainage Amount None   Treatments Cleansed;Topical (Barrier/Moisturizer/Ointment)   Dressing Open to air   Wound Depth (cm) 0.1 cm   Non-staged Wound Description Partial thickness   Lisbet-wound Assessment Clean;Intact   Wound Granulation Tissue Red;Pink   State of Healing Fully granulated   Wound Odor None   Negative Pressure Wound Therapy Groin Right   Placement Date: 02/13/24   Inserted by: Dr. LEWIS  Location: Groin  Wound Location Orientation: Right   Wound photographed/measured No   Machine Status (On) Yes   Site Assessment ANGELO   Lisbet-wound Assessment Dry;Fragile;Edema;Painful   Unit Type KCI ULTA   Dressing Type Silver impregnated foam  (purple prevena foam on stump)   Cycle Continuous;On   Target Pressure (mmHg) 125   Drainage Description Serosanguineous   Dressing Status Intact    Canister Changed No   Wound Follow Up   Follow up needed Yes     Pt seen for wound vac check. Pt is s/p aka and right groin I and D w Dr. Kessler 2/22. A prevena sponge was applied to the right stump incision and bridged to the right groin wound with silver sponge, connected to a KCI ulta vac. Per surgeon, adaptic was placed over the muscle flap. Plan to change the wound vac dressing on the right groin on Monday, and to remove the prevena sponge to the stump. Bedside Rn updated to wound plan. Pt was seen during her bed bath and some scattered friction and shear was noted to the bilateral buttocks, moisture barrier ointment applied.

## 2024-02-23 NOTE — PROGRESS NOTES
Vascular surgery note    Patient seen and examined.  Wound VAC holding suction.  No erythema, induration, fluctuance or drainage.  Denies any significant pain and states it is much better controlled post amputation.  Patient will remain bedrest.  Encourage patient to increase oral intake.  Wound VAC to remain in place this weekend.  Further management per hospitalist and other consulting services.

## 2024-02-24 NOTE — PROGRESS NOTES
[facility] Hospitalist Progress Note     CC: Hospital Follow up    PCP: Ernesto De Dios       Assessment/Plan:     Principal Problem:    Bypass graft mechanical complication, initial encounter (Formerly [facility])  Active Problems:    Hyperglycemia    Bypass graft mechanical complication (HCC)    Anemia, unspecified type    Acute kidney injury superimposed on CKD (HCC)    Uncontrolled type 2 diabetes mellitus with hyperglycemia (HCC)    Ms. Bowen is a 68 year old female with PMH sig for CKD, COPD, DM, HTN, HLD, PVD, and sickle cell anemia who was recently admitted last week for right lower extremity femoral to popliteal bypass who presented with bleeding from surgical site. Taken for emergent OR on 2/10/24, noted to be grossly infected, started on vancomycin/meropenem. Bcx with Klebsiella. BG elevated on admit, started on insulin gtt, endo consulted, now off insulin gtt. Kept intubated post op, pulm following. Polymicrobial infection on meropenum, IV vanco and now fluconazole.  Now S/P AKA on right on 2/22/24.       PAD sp RLE fem-pop bypass last week c/b bleeding fm surg site fm wound dehiscence  - s/p recent fem pop bypass 1/24/24 with Dr. Murrieta  - has been on ASA/plavix- held on admit  - Hg down to 6.0, s/p 3 units pRBC 2/10- watching close now, hgb stable currently no signs of bleeding   - vascular surgery consulted, s/p emergent OR 2/10, removal of infected graft, s/p debridement, bone patch angioplasty   - sp further debridement 2/13 and placement of wound vac  - back to OR 2/16   - s/p right AKA  2/22/24  - Pain meds PRN, gabapentin   - lasix PRN   - Some coolness and decreased pulse on left leg , CTA with occluded stent in femoral artery, discussed with vascular no further intervention at this time      Septic shock (now improved) fm post op wound infection, polymicrobial   - continue IV vancomycin and meropenem add fluconazole , ID consult following   - Bcx with klebsiella,   - 2/10 wound cx and 2/13 poly  microbial  - abx adjustment per ID f/u cx results  - ID notes appreciated- tenative plan for 6 weeks abx from from final surgical plans- final abx tbd pending cx data and surgical plans   - Now S/P right AKA on 2/22 final determination of treatment will need to be sorted per ID   - Keep joseph due to large leg wound     Metabolic acidosis- resolved   OMEGA on CKD3- at baseline now   Hypernatremia   - likely 2/2 LA from blood loss +/- dka on admit  - pH 7.24 on admit, LA 7.6 on admit, now normalized  - likely multifactorial, 2/2 LA, blood loss, ?DKA, CKD  - cr 1.7 on admit, previously 1.2 on discharge last week  - monitor Cr and UOP- improved   - renal consulted, s/p bicarb gtt, now on D5W  - Stable, Monitor    Acute blood loss anemia from surgical site   Chronic anemia  Sickle cell anemia  - Stable currently, trend CBC   - outpatient f/u  - Hg 8.9 on discharge last week  - Hg 6.0 on admission, s/p 4 units pRBC, last unit 2/23/34  - monitor hgb     DM2, hyperglycemia better now   - A1c 7.9   - Hold PO medications  - BG 500s in ED, pH 7.2  - insulin gtt ordered in ED  - increased Degludec 17 at bedtime and aspart 10 units with meals , endo following      Respiratory failure resolved   - remained intubated post op both fm aspiration event and metabolic acidosis  - pulm following  - extubated 2/14 pm  - Now on 1L NC - wean as able      Hypovolemic / septic Shock/ Hypotension- improved  Hx HTN  - BP stable now, hydral 100 Q8 hours   - Amlodipine 10 d, Coreg 25 BID   - Holding home lisinopril  - BP ok, watch and consider resuming pending BP       COPD  - chronic, stable  - resumed inhalers     ACP  - CODE- FULL   - POA- daughter and son     FN:  - IVF: complete   - Diet: Carb controlled diet      DVT Prophy: Hold for now due to bleeding   Lines: PIV     Daughter Tiffanie updated at length via phone 2/18/20 by Dr. Foote      Questions/concerns were discussed with patient and/or family by bedside.    Thank You,  Max Souza MD      Hospitalist with [facility]     Subjective:     Sleepy from pain meds, still has pain.  Tolerating diet so far but not eating much.     OBJECTIVE:    Blood pressure 104/51, pulse 64, temperature 99.3 °F (37.4 °C), resp. rate 19, height 5' 4.02\" (1.626 m), weight 174 lb 1.6 oz (79 kg), SpO2 96%, not currently breastfeeding.    Temp:  [98.7 °F (37.1 °C)-100.2 °F (37.9 °C)] 99.3 °F (37.4 °C)  Pulse:  [60-64] 64  Resp:  [16-19] 19  BP: ()/(45-70) 104/51  SpO2:  [90 %-100 %] 96 %      Intake/Output:    Intake/Output Summary (Last 24 hours) at 2/24/2024 1236  Last data filed at 2/24/2024 1152  Gross per 24 hour   Intake 1840.83 ml   Output 1700 ml   Net 140.83 ml       Last 3 Weights   02/24/24 0618 174 lb 1.6 oz (79 kg)   02/20/24 0554 180 lb 3.2 oz (81.7 kg)   02/19/24 0445 189 lb 1.6 oz (85.8 kg)   02/18/24 0614 186 lb 1.6 oz (84.4 kg)   02/17/24 0403 181 lb 11.2 oz (82.4 kg)   02/16/24 0500 182 lb 3.2 oz (82.6 kg)   02/14/24 0500 185 lb 13.6 oz (84.3 kg)   02/13/24 0500 183 lb 3.2 oz (83.1 kg)   02/12/24 0400 180 lb 5.4 oz (81.8 kg)   02/11/24 0600 182 lb 5.1 oz (82.7 kg)   02/10/24 2142 171 lb 1.2 oz (77.6 kg)   02/10/24 2105 171 lb 1.2 oz (77.6 kg)   02/10/24 1642 164 lb 3.9 oz (74.5 kg)   01/27/24 0501 164 lb 3.2 oz (74.5 kg)   01/24/24 0549 158 lb (71.7 kg)   01/19/24 1112 160 lb (72.6 kg)   12/30/23 1216 155 lb (70.3 kg)   08/29/23 1357 153 lb (69.4 kg)       Exam   Gen: sleepy   Pulm: Lungs clear, normal respiratory effort, 1 L NC   CV: Heart with regular rate and rhythm  Abd: Abdomen soft, nontender, nondistended  Ext: right AKA with VAC in place   : jake     Data Review:       Labs:     Recent Labs   Lab 02/22/24  0514 02/22/24  1343 02/23/24  0636 02/23/24  1620 02/24/24  0449   RBC 2.76*  --  2.51*  --  2.88*   HGB 7.9* 8.3* 7.2* 8.2* 8.6*   HCT 23.5* 26.1* 21.9*  --  25.1*   MCV 85.1  --  87.3  --  87.2   MCH 28.6  --  28.7  --  29.9   MCHC 33.6  --  32.9  --  34.3   RDW 16.3*  --   16.6*  --  16.2*   NEPRELIM 15.70*  --  13.79*  --  17.81*   WBC 18.0*  --  15.9*  --  20.0*   .0*  --  359.0  --  344.0         Recent Labs   Lab 02/22/24  0514 02/23/24  0636 02/24/24  0449   GLU 82 196* 155*   BUN 36* 27* 29*   CREATSERUM 0.97 0.99 1.19*   EGFRCR 64 63 50*   CA 7.8* 7.2* 6.9*   * 147* 146*   K 4.1 4.3 4.2   * 119* 116*   CO2 26.0 24.0 25.0       Recent Labs   Lab 02/18/24  0632 02/19/24  0536 02/24/24  0449   ALB 2.2* 2.2* 1.8*         Imaging:  No results found.      Meds:      insulin aspart  1-5 Units Subcutaneous TID CC    cholecalciferol  2,000 Units Oral Daily    [START ON 2/26/2024] calcitriol  0.25 mcg Oral Q MWF    insulin degludec  10 Units Subcutaneous Nightly    gabapentin  100 mg Oral TID    [Held by provider] insulin aspart  5 Units Subcutaneous TID CC    vancomycin  125 mg Oral Daily    HYDROcodone-acetaminophen  2 tablet Oral TID    pantoprazole  40 mg Oral QAM AC    amLODIPine  10 mg Oral QAM    carvedilol  25 mg Oral BID    hydrALAZINE  100 mg Oral Q8H    vancomycin  750 mg Intravenous Q36H    fluconazole  200 mg Oral Daily    magnesium sulfate  4 g Intravenous Once    sodium hypochlorite   Topical 2 times per day    fluticasone furoate-vilanterol  1 puff Inhalation Daily    rosuvastatin  40 mg Oral Nightly    meropenem  500 mg Intravenous q12h      dextrose 50 mL/hr at 02/24/24 1156     acetaminophen **OR** HYDROcodone-acetaminophen **OR** HYDROcodone-acetaminophen, ondansetron, metoclopramide, morphINE **OR** morphINE **OR** morphINE, HYDROmorphone, HYDROcodone-acetaminophen, HYDROcodone-acetaminophen, acetaminophen, morphINE, morphINE **OR** morphINE **OR** morphINE, hydrALAzine, ondansetron, metoclopramide, albuterol, ipratropium-albuterol, glucose **OR** glucose **OR** glucose-vitamin C **OR** dextrose **OR** glucose **OR** glucose **OR** glucose-vitamin C

## 2024-02-24 NOTE — PLAN OF CARE
Patient alert x3. Pain medications changed. Pain managed with scheduled norco. Patient refused some meds this shift. Md aware. Poor appetite at meal times. Able to drink supplements and assist with meals. Pascual intact. Pascual care done. Wound vac in place/intact. Remain on iv fluids d5 now at 50ml. Iv abx continued. VSS  Problem: Patient Centered Care  Goal: Patient preferences are identified and integrated in the patient's plan of care  Description: Interventions:  - What would you like us to know as we care for you? From home alone  - Provide timely, complete, and accurate information to patient/family  - Incorporate patient and family knowledge, values, beliefs, and cultural backgrounds into the planning and delivery of care  - Encourage patient/family to participate in care and decision-making at the level they choose  - Honor patient and family perspectives and choices  Outcome: Progressing     Problem: Diabetes/Glucose Control  Goal: Glucose maintained within prescribed range  Description: INTERVENTIONS:  - Monitor Blood Glucose as ordered  - Assess for signs and symptoms of hyperglycemia and hypoglycemia  - Administer ordered medications to maintain glucose within target range  - Assess barriers to adequate nutritional intake and initiate nutrition consult as needed  - Instruct patient on self management of diabetes  Outcome: Progressing     Problem: Patient/Family Goals  Goal: Patient/Family Long Term Goal  Description: Patient's Long Term Goal: discharge    Interventions:  - Monitor labs and vital signs  - IV antibiotics  - Medication compliance  - Follow provider recommendations  - See additional Care Plan goals for specific interventions  Outcome: Progressing  Goal: Patient/Family Short Term Goal  Description: Patient's Short Term Goal: pain management    Interventions:   - Reposition, elevate stump on pillows  -Medication compliance  - Follow provider recommendations  - See additional Care Plan goals for  specific interventions  Outcome: Progressing     Problem: CARDIOVASCULAR - ADULT  Goal: Maintains optimal cardiac output and hemodynamic stability  Description: INTERVENTIONS:  - Monitor vital signs, rhythm, and trends  - Monitor for bleeding, hypotension and signs of decreased cardiac output  - Evaluate effectiveness of vasoactive medications to optimize hemodynamic stability  - Monitor arterial and/or venous puncture sites for bleeding and/or hematoma  - Assess quality of pulses, skin color and temperature  - Assess for signs of decreased coronary artery perfusion - ex. Angina  - Evaluate fluid balance, assess for edema, trend weights  Outcome: Progressing  Goal: Absence of cardiac arrhythmias or at baseline  Description: INTERVENTIONS:  - Continuous cardiac monitoring, monitor vital signs, obtain 12 lead EKG if indicated  - Evaluate effectiveness of antiarrhythmic and heart rate control medications as ordered  - Initiate emergency measures for life threatening arrhythmias  - Monitor electrolytes and administer replacement therapy as ordered  Outcome: Progressing     Problem: RESPIRATORY - ADULT  Goal: Achieves optimal ventilation and oxygenation  Description: INTERVENTIONS:  - Assess for changes in respiratory status  - Assess for changes in mentation and behavior  - Position to facilitate oxygenation and minimize respiratory effort  - Oxygen supplementation based on oxygen saturation or ABGs  - Provide Smoking Cessation handout, if applicable  - Encourage broncho-pulmonary hygiene including cough, deep breathe, Incentive Spirometry  - Assess the need for suctioning and perform as needed  - Assess and instruct to report SOB or any respiratory difficulty  - Respiratory Therapy support as indicated  - Manage/alleviate anxiety  - Monitor for signs/symptoms of CO2 retention  Outcome: Progressing     Problem: METABOLIC/FLUID AND ELECTROLYTES - ADULT  Goal: Glucose maintained within prescribed range  Description:  INTERVENTIONS:  - Monitor Blood Glucose as ordered  - Assess for signs and symptoms of hyperglycemia and hypoglycemia  - Administer ordered medications to maintain glucose within target range  - Assess barriers to adequate nutritional intake and initiate nutrition consult as needed  - Instruct patient on self management of diabetes  Outcome: Progressing  Goal: Hemodynamic stability and optimal renal function maintained  Description: INTERVENTIONS:  - Monitor labs and assess for signs and symptoms of volume excess or deficit  - Monitor intake, output and patient weight  - Monitor urine specific gravity, serum osmolarity and serum sodium as indicated or ordered  - Monitor response to interventions for patient's volume status, including labs, urine output, blood pressure (other measures as available)  - Encourage oral intake as appropriate  - Instruct patient on fluid and nutrition restrictions as appropriate  Outcome: Progressing     Problem: PAIN - ADULT  Goal: Verbalizes/displays adequate comfort level or patient's stated pain goal  Description: INTERVENTIONS:  - Encourage pt to monitor pain and request assistance  - Assess pain using appropriate pain scale  - Administer analgesics based on type and severity of pain and evaluate response  - Implement non-pharmacological measures as appropriate and evaluate response  - Consider cultural and social influences on pain and pain management  - Manage/alleviate anxiety  - Utilize distraction and/or relaxation techniques  - Monitor for opioid side effects  - Notify MD/LIP if interventions unsuccessful or patient reports new pain  - Anticipate increased pain with activity and pre-medicate as appropriate  Outcome: Progressing

## 2024-02-24 NOTE — PROGRESS NOTES
[facility]    Nephrology Progress Note    Ava Bowen Attending:  Max Souza MD       SUBJECTIVE:     She states she is drinking a lot of water.  Denies cp/sob, leg swelling, urinary issues.    PHYSICAL EXAM:     Vital Signs: /60 (BP Location: Right arm)   Pulse 64   Temp 99.3 °F (37.4 °C)   Resp 16   Ht 162.6 cm (5' 4.02\")   Wt 174 lb 1.6 oz (79 kg)   LMP  (LMP Unknown)   SpO2 94%   BMI 29.87 kg/m²   Temp (24hrs), Av °F (37.2 °C), Min:98.3 °F (36.8 °C), Max:100.2 °F (37.9 °C)       Intake/Output Summary (Last 24 hours) at 2024 1152  Last data filed at 2024 0700  Gross per 24 hour   Intake 1840.83 ml   Output 1700 ml   Net 140.83 ml     Wt Readings from Last 3 Encounters:   24 174 lb 1.6 oz (79 kg)   24 164 lb 3.2 oz (74.5 kg)   23 153 lb (69.4 kg)     Gen - nad  HEENT - NCAT  CV - RRR  Resp - CTAB  Abd - soft   - no joseph  Ext - s/p R AKA, no LLE edema  MS - awake alert answering questions appropriately    LABORATORY DATA:     Lab Results   Component Value Date     (H) 2024    BUN 29 (H) 2024    BUNCREA 24.4 (H) 2024    CREATSERUM 1.19 (H) 2024    ANIONGAP 5 2024    GFRNAA 41 (L) 2021    GFRAA 48 (L) 2021    CA 6.9 (L) 2024    OSMOCALC 311 (H) 2024    ALKPHO 62 02/10/2024    AST 91 (H) 02/10/2024    ALT 38 02/10/2024    BILT 0.4 02/10/2024    TP 4.6 (L) 02/10/2024    ALB 1.8 (L) 2024    GLOBULIN 2.3 (L) 02/10/2024     (H) 2024    K 4.2 2024     (H) 2024    CO2 25.0 2024     Lab Results   Component Value Date    WBC 20.0 (H) 2024    RBC 2.88 (L) 2024    HGB 8.6 (L) 2024    HCT 25.1 (L) 2024    .0 2024    MPV 8.1 2018    MCV 87.2 2024    MCH 29.9 2024    MCHC 34.3 2024    RDW 16.2 (H) 2024    NEPRELIM 17.81 (H) 2024    NEUTABS 26.32 (H) 2024    LYMPHABS 0.85 (L)  02/13/2024    EOSABS 0.85 (H) 02/13/2024    BASABS 0.00 02/13/2024    NEUT 92 02/13/2024    LYMPH 3 02/13/2024    MON 1 02/13/2024    EOS 3 02/13/2024    BASO 0 02/13/2024    NEPERCENT 89.2 02/24/2024    LYPERCENT 5.7 02/24/2024    MOPERCENT 3.5 02/24/2024    EOPERCENT 0.5 02/24/2024    BAPERCENT 0.2 02/24/2024    NE 17.81 (H) 02/24/2024    LYMABS 1.14 02/24/2024    MOABSO 0.70 02/24/2024    EOABSO 0.09 02/24/2024    BAABSO 0.03 02/24/2024     Lab Results   Component Value Date    MALBP 123.00 09/12/2023    CREUR 60.10 02/07/2024     Lab Results   Component Value Date    COLORUR Light-Yellow 02/12/2024    CLARITY Turbid (A) 02/12/2024    SPECGRAVITY 1.018 02/12/2024    GLUUR 30 (A) 02/12/2024    BILUR Negative 02/12/2024    KETUR Negative 02/12/2024    BLOODURINE 3+ (A) 02/12/2024    PHURINE 5.5 02/12/2024    PROUR 70 (A) 02/12/2024    UROBILINOGEN Normal 02/12/2024    NITRITE Negative 02/12/2024    LEUUR 250 (A) 02/12/2024    WBCUR 21-50 (A) 02/12/2024    RBCUR >10 (A) 02/12/2024    EPIUR Few (A) 02/12/2024    BACUR None Seen 02/12/2024    HYLUR Present (A) 02/12/2024         IMAGING:     Reviewed.    MEDICATIONS:       Current Facility-Administered Medications   Medication Dose Route Frequency    insulin aspart (NovoLOG) 100 Units/mL FlexPen 1-5 Units  1-5 Units Subcutaneous TID CC    insulin degludec 100 units/mL flextouch 10 Units  10 Units Subcutaneous Nightly    gabapentin (Neurontin) cap 100 mg  100 mg Oral TID    dextrose 5% infusion   Intravenous Continuous    acetaminophen (Tylenol) tab 650 mg  650 mg Oral Q4H PRN    Or    HYDROcodone-acetaminophen (Norco) 5-325 MG per tab 1 tablet  1 tablet Oral Q4H PRN    Or    HYDROcodone-acetaminophen (Norco) 5-325 MG per tab 2 tablet  2 tablet Oral Q4H PRN    ondansetron (Zofran) 4 MG/2ML injection 4 mg  4 mg Intravenous Q6H PRN    metoclopramide (Reglan) 5 mg/mL injection 10 mg  10 mg Intravenous Q8H PRN    morphINE PF 2 MG/ML injection 1 mg  1 mg Intravenous Q2H PRN     Or    morphINE PF 2 MG/ML injection 2 mg  2 mg Intravenous Q2H PRN    Or    morphINE PF 4 MG/ML injection 4 mg  4 mg Intravenous Q2H PRN    [Held by provider] insulin aspart (NovoLOG) 100 Units/mL FlexPen 5 Units  5 Units Subcutaneous TID CC    vancomycin (Vancocin) cap 125 mg  125 mg Oral Daily    HYDROcodone-acetaminophen (Norco) 5-325 MG per tab 2 tablet  2 tablet Oral TID    HYDROmorphone (Dilaudid) 1 MG/ML injection 1 mg  1 mg Intravenous Q3H PRN    pantoprazole (Protonix) DR tab 40 mg  40 mg Oral QAM AC    amLODIPine (Norvasc) tab 10 mg  10 mg Oral QAM    carvedilol (Coreg) tab 25 mg  25 mg Oral BID    hydrALAZINE (Apresoline) tab 100 mg  100 mg Oral Q8H    vancomycin (Vancocin) 750 mg in sodium chloride 0.9% 250 mL IVPB-ADDV  750 mg Intravenous Q36H    fluconazole (Diflucan) tab 200 mg  200 mg Oral Daily    HYDROcodone-acetaminophen (Norco)  MG per tab 1 tablet  1 tablet Oral Q4H PRN    HYDROcodone-acetaminophen (Norco)  MG per tab 2 tablet  2 tablet Oral Q4H PRN    acetaminophen (Tylenol) tab 650 mg  650 mg Oral Q4H PRN    morphINE 20 mg/mL concentrated oral solution 5 mg  5 mg Oral Q4H PRN    magnesium sulfate 4 g/100mL IVPB premix 4 g  4 g Intravenous Once    morphINE PF 2 MG/ML injection 1 mg  1 mg Intravenous Q2H PRN    Or    morphINE PF 2 MG/ML injection 2 mg  2 mg Intravenous Q2H PRN    Or    morphINE PF 4 MG/ML injection 4 mg  4 mg Intravenous Q2H PRN    sodium hypochlorite (Dakin's) 0.125 % external solution   Topical 2 times per day    hydrALAzine (Apresoline) 20 mg/mL injection 10 mg  10 mg Intravenous Q4H PRN    ondansetron (Zofran) 4 MG/2ML injection 4 mg  4 mg Intravenous Q6H PRN    metoclopramide (Reglan) 5 mg/mL injection 5 mg  5 mg Intravenous Q8H PRN    albuterol (Ventolin HFA) 108 (90 Base) MCG/ACT inhaler 2 puff  2 puff Inhalation Q6H PRN    fluticasone furoate-vilanterol (Breo Ellipta) 200-25 MCG/ACT inhaler 1 puff  1 puff Inhalation Daily    ipratropium-albuterol (Duoneb)  0.5-2.5 (3) MG/3ML inhalation solution 3 mL  3 mL Nebulization Q6H PRN    rosuvastatin (Crestor) tab 40 mg  40 mg Oral Nightly    glucose (Dex4) 15 GM/59ML oral liquid 15 g  15 g Oral Q15 Min PRN    Or    glucose (Glutose) 40% oral gel 15 g  15 g Oral Q15 Min PRN    Or    glucose-vitamin C (Dex-4) chewable tab 4 tablet  4 tablet Oral Q15 Min PRN    Or    dextrose 50% injection 50 mL  50 mL Intravenous Q15 Min PRN    Or    glucose (Dex4) 15 GM/59ML oral liquid 30 g  30 g Oral Q15 Min PRN    Or    glucose (Glutose) 40% oral gel 30 g  30 g Oral Q15 Min PRN    Or    glucose-vitamin C (Dex-4) chewable tab 8 tablet  8 tablet Oral Q15 Min PRN    meropenem (Merrem) 500 mg in sodium chloride 0.9% 100 mL IVPB-MBP  500 mg Intravenous q12h       ASSESSMENT/PLAN:     68 year old female with PMH of COPD, DM2, HTN, HL, PVD, Sickle cell anemia, CKD stage 3, recent fem-pop artery bypass admitted with acute hemorrhage from incision site.  Patient is s/p OR for repair then underwent L AKA on 2/22.  Nephrology is consulted for OMEGA and metabolic acidosis.          OMEGA on CKD stage 3  - baseline around 1.2  - creatinine improved near baseline, had evidence of fluid overload then received lasix  - hold diuretics     Hypertension   - amlodipine, hydralazine, coreg  - weaned off nicardipine   - lisinopril held given recent OMEGA, no need to resume with current BPs  - cards following      Metabolic acidosis:  - resolved      Hypernatremia:  - wean D5 to 50 ml/h and encourage oral hydration     Hypokalemia/ hypomagnesemia:  - replete per protocol      Hypocalcemia: with serum albumin corrected calcium 8.6  -ionized calcium pending  - PTH  370.3; Vit D 20.5  - start cholecalciferol 2000 int units daily, calcitriol 0.25 mcg MWF    Will follow.    Thank you for allowing me to participate in the care of this patient. Please do not hesitate to call with questions or concerns.        Madelaine Burciaga MD  [facility]  [address] [place] [address]  [address]  [address]  [place] [address]    2/24/2024  11:52 AM

## 2024-02-24 NOTE — PLAN OF CARE
Bed locked in low position, belongings in reach, bed alarm on, call light in reach. Frequent rounding done, all patient needs met. Non-slip socks on/at bedside. Patient stating last night that she couldn't take any more PO medications after struggling to get the coreg swallowed - morphine given PRN see MAR. Wound vac 250ml output. New IV site inserted to RT FA as old extended line was leaking making sheets wet..    Problem: Diabetes/Glucose Control  Goal: Glucose maintained within prescribed range  Description: INTERVENTIONS:  - Monitor Blood Glucose as ordered  - Assess for signs and symptoms of hyperglycemia and hypoglycemia  - Administer ordered medications to maintain glucose within target range  - Assess barriers to adequate nutritional intake and initiate nutrition consult as needed  - Instruct patient on self management of diabetes  Outcome: Progressing     Problem: CARDIOVASCULAR - ADULT  Goal: Maintains optimal cardiac output and hemodynamic stability  Description: INTERVENTIONS:  - Monitor vital signs, rhythm, and trends  - Monitor for bleeding, hypotension and signs of decreased cardiac output  - Evaluate effectiveness of vasoactive medications to optimize hemodynamic stability  - Monitor arterial and/or venous puncture sites for bleeding and/or hematoma  - Assess quality of pulses, skin color and temperature  - Assess for signs of decreased coronary artery perfusion - ex. Angina  - Evaluate fluid balance, assess for edema, trend weights  Outcome: Progressing  Goal: Absence of cardiac arrhythmias or at baseline  Description: INTERVENTIONS:  - Continuous cardiac monitoring, monitor vital signs, obtain 12 lead EKG if indicated  - Evaluate effectiveness of antiarrhythmic and heart rate control medications as ordered  - Initiate emergency measures for life threatening arrhythmias  - Monitor electrolytes and administer replacement therapy as ordered  Outcome: Progressing     Problem: RESPIRATORY - ADULT  Goal:  Achieves optimal ventilation and oxygenation  Description: INTERVENTIONS:  - Assess for changes in respiratory status  - Assess for changes in mentation and behavior  - Position to facilitate oxygenation and minimize respiratory effort  - Oxygen supplementation based on oxygen saturation or ABGs  - Provide Smoking Cessation handout, if applicable  - Encourage broncho-pulmonary hygiene including cough, deep breathe, Incentive Spirometry  - Assess the need for suctioning and perform as needed  - Assess and instruct to report SOB or any respiratory difficulty  - Respiratory Therapy support as indicated  - Manage/alleviate anxiety  - Monitor for signs/symptoms of CO2 retention  Outcome: Progressing     Problem: METABOLIC/FLUID AND ELECTROLYTES - ADULT  Goal: Glucose maintained within prescribed range  Description: INTERVENTIONS:  - Monitor Blood Glucose as ordered  - Assess for signs and symptoms of hyperglycemia and hypoglycemia  - Administer ordered medications to maintain glucose within target range  - Assess barriers to adequate nutritional intake and initiate nutrition consult as needed  - Instruct patient on self management of diabetes  Outcome: Progressing  Goal: Electrolytes maintained within normal limits  Description: INTERVENTIONS:  - Monitor labs and rhythm and assess patient for signs and symptoms of electrolyte imbalances  - Administer electrolyte replacement as ordered  - Monitor response to electrolyte replacements, including rhythm and repeat lab results as appropriate  - Fluid restriction as ordered  - Instruct patient on fluid and nutrition restrictions as appropriate  Outcome: Progressing  Goal: Hemodynamic stability and optimal renal function maintained  Description: INTERVENTIONS:  - Monitor labs and assess for signs and symptoms of volume excess or deficit  - Monitor intake, output and patient weight  - Monitor urine specific gravity, serum osmolarity and serum sodium as indicated or ordered  -  Monitor response to interventions for patient's volume status, including labs, urine output, blood pressure (other measures as available)  - Encourage oral intake as appropriate  - Instruct patient on fluid and nutrition restrictions as appropriate  Outcome: Progressing     Problem: PAIN - ADULT  Goal: Verbalizes/displays adequate comfort level or patient's stated pain goal  Description: INTERVENTIONS:  - Encourage pt to monitor pain and request assistance  - Assess pain using appropriate pain scale  - Administer analgesics based on type and severity of pain and evaluate response  - Implement non-pharmacological measures as appropriate and evaluate response  - Consider cultural and social influences on pain and pain management  - Manage/alleviate anxiety  - Utilize distraction and/or relaxation techniques  - Monitor for opioid side effects  - Notify MD/LIP if interventions unsuccessful or patient reports new pain  - Anticipate increased pain with activity and pre-medicate as appropriate  Outcome: Progressing

## 2024-02-24 NOTE — PROGRESS NOTES
[facility] Infectious Disease  Progress Note    Ava Bowen Patient Status:  Inpatient    10/6/1955 MRN [id number]   Location [facility] 3W/SW Attending Max Souza MD   Hosp Day # 14 PCP Ernesto MIGUEL Lanre Bowen is a 68 year old female.   Chief Complaint   Patient presents with    Bleeding       HPI:      No new complaints  Pain better               REVIEW OF SYSTEMS:   A comprehensive 10 point review of systems was completed.  Pertinent positives and negatives noted in the the HPI.       Allergies:  Allergies   Allergen Reactions    Penicillins HIVES and ANGIOEDEMA     Hives on eyelids (occurred when pt was in her 20s). Tolerated amoxicillin per chart. Tolerates cephalosporins.        Current Meds:    Current Facility-Administered Medications:     insulin aspart (NovoLOG) 100 Units/mL FlexPen 1-5 Units, 1-5 Units, Subcutaneous, TID CC    insulin degludec 100 units/mL flextouch 10 Units, 10 Units, Subcutaneous, Nightly    gabapentin (Neurontin) cap 100 mg, 100 mg, Oral, TID    dextrose 5% infusion, , Intravenous, Continuous    acetaminophen (Tylenol) tab 650 mg, 650 mg, Oral, Q4H PRN **OR** HYDROcodone-acetaminophen (Norco) 5-325 MG per tab 1 tablet, 1 tablet, Oral, Q4H PRN **OR** HYDROcodone-acetaminophen (Norco) 5-325 MG per tab 2 tablet, 2 tablet, Oral, Q4H PRN    ondansetron (Zofran) 4 MG/2ML injection 4 mg, 4 mg, Intravenous, Q6H PRN    metoclopramide (Reglan) 5 mg/mL injection 10 mg, 10 mg, Intravenous, Q8H PRN    morphINE PF 2 MG/ML injection 1 mg, 1 mg, Intravenous, Q2H PRN **OR** morphINE PF 2 MG/ML injection 2 mg, 2 mg, Intravenous, Q2H PRN **OR** morphINE PF 4 MG/ML injection 4 mg, 4 mg, Intravenous, Q2H PRN    [Held by provider] insulin aspart (NovoLOG) 100 Units/mL FlexPen 5 Units, 5 Units, Subcutaneous, TID CC    vancomycin (Vancocin) cap 125 mg, 125 mg, Oral, Daily    HYDROcodone-acetaminophen (Norco) 5-325 MG per tab 2 tablet,  2 tablet, Oral, TID    HYDROmorphone (Dilaudid) 1 MG/ML injection 1 mg, 1 mg, Intravenous, Q3H PRN    pantoprazole (Protonix) DR tab 40 mg, 40 mg, Oral, QAM AC    amLODIPine (Norvasc) tab 10 mg, 10 mg, Oral, QAM    carvedilol (Coreg) tab 25 mg, 25 mg, Oral, BID    hydrALAZINE (Apresoline) tab 100 mg, 100 mg, Oral, Q8H    vancomycin (Vancocin) 750 mg in sodium chloride 0.9% 250 mL IVPB-ADDV, 750 mg, Intravenous, Q36H    fluconazole (Diflucan) tab 200 mg, 200 mg, Oral, Daily    HYDROcodone-acetaminophen (Norco)  MG per tab 1 tablet, 1 tablet, Oral, Q4H PRN    HYDROcodone-acetaminophen (Norco)  MG per tab 2 tablet, 2 tablet, Oral, Q4H PRN    acetaminophen (Tylenol) tab 650 mg, 650 mg, Oral, Q4H PRN    morphINE 20 mg/mL concentrated oral solution 5 mg, 5 mg, Oral, Q4H PRN    magnesium sulfate 4 g/100mL IVPB premix 4 g, 4 g, Intravenous, Once    morphINE PF 2 MG/ML injection 1 mg, 1 mg, Intravenous, Q2H PRN **OR** morphINE PF 2 MG/ML injection 2 mg, 2 mg, Intravenous, Q2H PRN **OR** morphINE PF 4 MG/ML injection 4 mg, 4 mg, Intravenous, Q2H PRN    sodium hypochlorite (Dakin's) 0.125 % external solution, , Topical, 2 times per day    hydrALAzine (Apresoline) 20 mg/mL injection 10 mg, 10 mg, Intravenous, Q4H PRN    ondansetron (Zofran) 4 MG/2ML injection 4 mg, 4 mg, Intravenous, Q6H PRN    metoclopramide (Reglan) 5 mg/mL injection 5 mg, 5 mg, Intravenous, Q8H PRN    albuterol (Ventolin HFA) 108 (90 Base) MCG/ACT inhaler 2 puff, 2 puff, Inhalation, Q6H PRN    fluticasone furoate-vilanterol (Breo Ellipta) 200-25 MCG/ACT inhaler 1 puff, 1 puff, Inhalation, Daily    ipratropium-albuterol (Duoneb) 0.5-2.5 (3) MG/3ML inhalation solution 3 mL, 3 mL, Nebulization, Q6H PRN    rosuvastatin (Crestor) tab 40 mg, 40 mg, Oral, Nightly    glucose (Dex4) 15 GM/59ML oral liquid 15 g, 15 g, Oral, Q15 Min PRN **OR** glucose (Glutose) 40% oral gel 15 g, 15 g, Oral, Q15 Min PRN **OR** glucose-vitamin C (Dex-4) chewable tab 4  tablet, 4 tablet, Oral, Q15 Min PRN **OR** dextrose 50% injection 50 mL, 50 mL, Intravenous, Q15 Min PRN **OR** glucose (Dex4) 15 GM/59ML oral liquid 30 g, 30 g, Oral, Q15 Min PRN **OR** glucose (Glutose) 40% oral gel 30 g, 30 g, Oral, Q15 Min PRN **OR** glucose-vitamin C (Dex-4) chewable tab 8 tablet, 8 tablet, Oral, Q15 Min PRN    meropenem (Merrem) 500 mg in sodium chloride 0.9% 100 mL IVPB-MBP, 500 mg, Intravenous, q12h   No current outpatient medications on file.        HISTORY:  Past Medical History:   Diagnosis Date    Anemia     Back pain     Back problem     Cataract     Chronic kidney disease (CKD)     COPD (chronic obstructive pulmonary disease) (HCC)     FEV 1 1.25 50%     Diabetes (HCC)     DM neuropathy    Essential hypertension     H/O angioplasty     2020    High blood pressure     High cholesterol     Neuropathy     Peripheral vascular disease (HCC)     PNA (pneumonia) 2018    Pulmonary emphysema (HCC)     Pulmonary nodule     4 mm RUL    Renal disorder     monitoring kidney labs    Sickle cell trait (HCC)     Sickle-cell anemia (HCC)     Tobacco abuse     Visual impairment       Past Surgical History:   Procedure Laterality Date    APPENDECTOMY      BACK SURGERY Right 2021    Right L3-4 extreme lateral interbody fusion, posterior decompression; LUMBAR LAMINECTOMY 1 LEVEL          x3    CHOLECYSTECTOMY      EXCIS LUMBAR DISK,ONE LEVEL          OTHER      bilateral leg stents  and         Social history and family history negative related to present illness except as above.    PHYSICAL EXAM:   /60 (BP Location: Right arm)   Pulse 64   Temp 99.3 °F (37.4 °C)   Resp 16   Ht 5' 4.02\" (1.626 m)   Wt 174 lb 1.6 oz (79 kg)   LMP  (LMP Unknown)   SpO2 94%   BMI 29.87 kg/m²   GENERAL:  Awake, alert, oriented x3. Non-tox, non-septic and in NAD.  HEENT:  Normocephalic, no scleral abnormalities.  Oropharynx clear, trachea ML.  NECK:  Supple, no masses, no  lymphadenopathy.  LUNGS:  Clear to auscultation b/l, no rhonchi, rales, or wheezes.  CARDIO: RRR S1/S2, no rubs, clicks, heaves, or murmurs.  GI:  Soft NT/ND, BS present x4 quadrants, no HSM.  EXTREMITIES:  wound covered  NEURO:  No focal neurologic deficits.  DERM:  Warm, dry, no rashes.    IMPRESSION/PLAN:        Postop complication of rt fem pop 1/24 bypass  Bacteremia wound drainage and needed debridement graft removal;see other notes for more details;two surgeries so far 2/13 and 2/16  Cultures mixed karla and candida too[candida in urine too]  Already on meropenem and vanc; added diflucan  Pain and rising wbc are negative signs; hopefully wbc will now normalize  Spoke with pt rn >35 min   s/p aka                 Recent Results (from the past 72 hour(s))   POCT Glucose    Collection Time: 02/21/24  1:12 PM   Result Value Ref Range    POC Glucose  142 (H) 70 - 99 mg/dL   POCT Glucose    Collection Time: 02/21/24  5:48 PM   Result Value Ref Range    POC Glucose  91 70 - 99 mg/dL   POCT Glucose    Collection Time: 02/21/24  8:55 PM   Result Value Ref Range    POC Glucose  159 (H) 70 - 99 mg/dL   Basic Metabolic Panel (8)    Collection Time: 02/22/24  5:14 AM   Result Value Ref Range    Glucose 82 70 - 99 mg/dL    Sodium 148 (H) 136 - 145 mmol/L    Potassium 4.1 3.5 - 5.1 mmol/L    Chloride 117 (H) 98 - 112 mmol/L    CO2 26.0 21.0 - 32.0 mmol/L    Anion Gap 5 0 - 18 mmol/L    BUN 36 (H) 9 - 23 mg/dL    Creatinine 0.97 0.55 - 1.02 mg/dL    BUN/CREA Ratio 37.1 (H) 10.0 - 20.0    Calcium, Total 7.8 (L) 8.7 - 10.4 mg/dL    Calculated Osmolality 313 (H) 275 - 295 mOsm/kg    eGFR-Cr 64 >=60 mL/min/1.73m2   CBC W/ DIFFERENTIAL    Collection Time: 02/22/24  5:14 AM   Result Value Ref Range    WBC 18.0 (H) 4.0 - 11.0 x10(3) uL    RBC 2.76 (L) 3.80 - 5.30 x10(6)uL    HGB 7.9 (L) 12.0 - 16.0 g/dL    HCT 23.5 (L) 35.0 - 48.0 %    MCV 85.1 80.0 - 100.0 fL    MCH 28.6 26.0 - 34.0 pg    MCHC 33.6 31.0 - 37.0 g/dL    RDW-SD 50.8  (H) 35.1 - 46.3 fL    RDW 16.3 (H) 11.0 - 15.0 %    .0 (H) 150.0 - 450.0 10(3)uL    Neutrophil Absolute Prelim 15.70 (H) 1.50 - 7.70 x10 (3) uL    Neutrophil Absolute 15.70 (H) 1.50 - 7.70 x10(3) uL    Lymphocyte Absolute 1.14 1.00 - 4.00 x10(3) uL    Monocyte Absolute 0.91 0.10 - 1.00 x10(3) uL    Eosinophil Absolute 0.13 0.00 - 0.70 x10(3) uL    Basophil Absolute 0.03 0.00 - 0.20 x10(3) uL    Immature Granulocyte Absolute 0.13 0.00 - 1.00 x10(3) uL    Neutrophil % 87.1 %    Lymphocyte % 6.3 %    Monocyte % 5.0 %    Eosinophil % 0.7 %    Basophil % 0.2 %    Immature Granulocyte % 0.7 %   POCT Glucose    Collection Time: 02/22/24  7:19 AM   Result Value Ref Range    POC Glucose  93 70 - 99 mg/dL   POCT Glucose    Collection Time: 02/22/24  1:39 PM   Result Value Ref Range    POC Glucose  72 70 - 99 mg/dL   Hemoglobin & Hematocrit    Collection Time: 02/22/24  1:43 PM   Result Value Ref Range    HGB 8.3 (L) 12.0 - 16.0 g/dL    HCT 26.1 (L) 35.0 - 48.0 %   POCT Glucose    Collection Time: 02/22/24  4:18 PM   Result Value Ref Range    POC Glucose  127 (H) 70 - 99 mg/dL   POCT Glucose    Collection Time: 02/22/24  8:55 PM   Result Value Ref Range    POC Glucose  167 (H) 70 - 99 mg/dL   Prepare RBC STAT    Collection Time: 02/23/24 12:40 AM   Result Value Ref Range    Blood Product [id number]     Unit Number [id number]     UNIT ABO A     UNIT RH POS     Product Status Presumed Transfused     Expiration Date 202403162359     Blood Type Barcode 6200     Unit Volume 350 ml   Prepare RBC STAT    Collection Time: 02/23/24 12:40 AM   Result Value Ref Range    Blood Product [id number]     Unit Number [id number]     UNIT ABO A     UNIT RH POS     Product Status Presumed Transfused     Expiration Date 202403162359     Blood Type Barcode 6200     Unit Volume 350 ml   Basic Metabolic Panel (8)    Collection Time: 02/23/24  6:36 AM   Result Value Ref Range    Glucose 196 (H) 70 - 99 mg/dL    Sodium 147 (H) 136 - 145  mmol/L    Potassium 4.3 3.5 - 5.1 mmol/L    Chloride 119 (H) 98 - 112 mmol/L    CO2 24.0 21.0 - 32.0 mmol/L    Anion Gap 4 0 - 18 mmol/L    BUN 27 (H) 9 - 23 mg/dL    Creatinine 0.99 0.55 - 1.02 mg/dL    BUN/CREA Ratio 27.3 (H) 10.0 - 20.0    Calcium, Total 7.2 (L) 8.7 - 10.4 mg/dL    Calculated Osmolality 315 (H) 275 - 295 mOsm/kg    eGFR-Cr 63 >=60 mL/min/1.73m2   CBC W/ DIFFERENTIAL    Collection Time: 02/23/24  6:36 AM   Result Value Ref Range    WBC 15.9 (H) 4.0 - 11.0 x10(3) uL    RBC 2.51 (L) 3.80 - 5.30 x10(6)uL    HGB 7.2 (L) 12.0 - 16.0 g/dL    HCT 21.9 (L) 35.0 - 48.0 %    MCV 87.3 80.0 - 100.0 fL    MCH 28.7 26.0 - 34.0 pg    MCHC 32.9 31.0 - 37.0 g/dL    RDW-SD 53.1 (H) 35.1 - 46.3 fL    RDW 16.6 (H) 11.0 - 15.0 %    .0 150.0 - 450.0 10(3)uL    Neutrophil Absolute Prelim 13.79 (H) 1.50 - 7.70 x10 (3) uL    Neutrophil Absolute 13.79 (H) 1.50 - 7.70 x10(3) uL    Lymphocyte Absolute 1.05 1.00 - 4.00 x10(3) uL    Monocyte Absolute 0.89 0.10 - 1.00 x10(3) uL    Eosinophil Absolute 0.02 0.00 - 0.70 x10(3) uL    Basophil Absolute 0.02 0.00 - 0.20 x10(3) uL    Immature Granulocyte Absolute 0.12 0.00 - 1.00 x10(3) uL    Neutrophil % 86.8 %    Lymphocyte % 6.6 %    Monocyte % 5.6 %    Eosinophil % 0.1 %    Basophil % 0.1 %    Immature Granulocyte % 0.8 %   POCT Glucose    Collection Time: 02/23/24 10:22 AM   Result Value Ref Range    POC Glucose  226 (H) 70 - 99 mg/dL   ABORH (Blood Type)    Collection Time: 02/23/24 11:07 AM   Result Value Ref Range    ABO BLOOD TYPE A     RH BLOOD TYPE Positive    Antibody Screen    Collection Time: 02/23/24 11:07 AM   Result Value Ref Range    Antibody Screen Negative    POCT Glucose    Collection Time: 02/23/24 12:31 PM   Result Value Ref Range    POC Glucose  126 (H) 70 - 99 mg/dL   Hemoglobin    Collection Time: 02/23/24  4:20 PM   Result Value Ref Range    HGB 8.2 (L) 12.0 - 16.0 g/dL   POCT Glucose    Collection Time: 02/23/24  5:11 PM   Result Value Ref Range     POC Glucose  113 (H) 70 - 99 mg/dL   POCT Glucose    Collection Time: 02/23/24  9:00 PM   Result Value Ref Range    POC Glucose  172 (H) 70 - 99 mg/dL   Prepare RBC Once    Collection Time: 02/24/24 12:42 AM   Result Value Ref Range    Blood Product [id number]     Unit Number [id number]     UNIT ABO A     UNIT RH POS     Product Status Presumed Transfused     Expiration Date 861969852254     Blood Type Barcode 6200     Unit Volume 350 ml   Basic Metabolic Panel (8)    Collection Time: 02/24/24  4:49 AM   Result Value Ref Range    Glucose 155 (H) 70 - 99 mg/dL    Sodium 146 (H) 136 - 145 mmol/L    Potassium 4.2 3.5 - 5.1 mmol/L    Chloride 116 (H) 98 - 112 mmol/L    CO2 25.0 21.0 - 32.0 mmol/L    Anion Gap 5 0 - 18 mmol/L    BUN 29 (H) 9 - 23 mg/dL    Creatinine 1.19 (H) 0.55 - 1.02 mg/dL    BUN/CREA Ratio 24.4 (H) 10.0 - 20.0    Calcium, Total 6.9 (L) 8.7 - 10.4 mg/dL    Calculated Osmolality 311 (H) 275 - 295 mOsm/kg    eGFR-Cr 50 (L) >=60 mL/min/1.73m2   CBC W/ DIFFERENTIAL    Collection Time: 02/24/24  4:49 AM   Result Value Ref Range    WBC 20.0 (H) 4.0 - 11.0 x10(3) uL    RBC 2.88 (L) 3.80 - 5.30 x10(6)uL    HGB 8.6 (L) 12.0 - 16.0 g/dL    HCT 25.1 (L) 35.0 - 48.0 %    MCV 87.2 80.0 - 100.0 fL    MCH 29.9 26.0 - 34.0 pg    MCHC 34.3 31.0 - 37.0 g/dL    RDW-SD 51.8 (H) 35.1 - 46.3 fL    RDW 16.2 (H) 11.0 - 15.0 %    .0 150.0 - 450.0 10(3)uL    Neutrophil Absolute Prelim 17.81 (H) 1.50 - 7.70 x10 (3) uL    Neutrophil Absolute 17.81 (H) 1.50 - 7.70 x10(3) uL    Lymphocyte Absolute 1.14 1.00 - 4.00 x10(3) uL    Monocyte Absolute 0.70 0.10 - 1.00 x10(3) uL    Eosinophil Absolute 0.09 0.00 - 0.70 x10(3) uL    Basophil Absolute 0.03 0.00 - 0.20 x10(3) uL    Immature Granulocyte Absolute 0.18 0.00 - 1.00 x10(3) uL    Neutrophil % 89.2 %    Lymphocyte % 5.7 %    Monocyte % 3.5 %    Eosinophil % 0.5 %    Basophil % 0.2 %    Immature Granulocyte % 0.9 %   Magnesium    Collection Time: 02/24/24  4:49 AM   Result  Value Ref Range    Magnesium 1.7 1.6 - 2.6 mg/dL   Phosphorus    Collection Time: 02/24/24  4:49 AM   Result Value Ref Range    Phosphorus 4.2 2.4 - 5.1 mg/dL   Albumin Serum    Collection Time: 02/24/24  4:49 AM   Result Value Ref Range    Albumin 1.8 (L) 3.2 - 4.8 g/dL   PTH, Intact    Collection Time: 02/24/24  8:47 AM   Result Value Ref Range    Pth Intact 370.3 (H) 18.5 - 88.0 pg/mL   POCT Glucose    Collection Time: 02/24/24  9:25 AM   Result Value Ref Range    POC Glucose  149 (H) 70 - 99 mg/dL         Noah Perez MD     CALL [facility] AT [phone] IF QUESTIONS OR CONCERNS  THANKS

## 2024-02-25 NOTE — PHYSICAL THERAPY NOTE
Reached out to surgical team this AM to clarify bedrest, per vascular MD pt to remain on bedrest until Tuesday 2/27. Will place pt on hold for therapy, please place new PT orders when medically stable to participate in out of bed mobility. Thank you.     Fela Cook, PT

## 2024-02-25 NOTE — PLAN OF CARE
Left inner heel appears purple in color, barely blanching at this time, leg elevated on a pillow with floating the heel.  Bed locked in low position, belongings in reach, bed alarm on, call light in reach. Frequent rounding done, all patient needs met. Non-slip socks on/at bedside. Family at bedside at 8pm, concerned for patient's mental status as Ava appears to think her \"lower pain\" is from having a baby. Explained to patient that she did not have a baby and showed her the wound vac and explained the progression to this point of her condition. She appeared to come back to the situation at hand and apologized for her confusion.     Vanco trough due this morning at 7:30am, spoke to [facility] and lab can not be drawn early so will draw morning labs after report is given and endorse to day shift RN to inform the daytime [facility] when the draw was sent to pharmacy in case morning vanco dose needs rescheduling.    Problem: Diabetes/Glucose Control  Goal: Glucose maintained within prescribed range  Description: INTERVENTIONS:  - Monitor Blood Glucose as ordered  - Assess for signs and symptoms of hyperglycemia and hypoglycemia  - Administer ordered medications to maintain glucose within target range  - Assess barriers to adequate nutritional intake and initiate nutrition consult as needed  - Instruct patient on self management of diabetes  Outcome: Progressing     Problem: CARDIOVASCULAR - ADULT  Goal: Maintains optimal cardiac output and hemodynamic stability  Description: INTERVENTIONS:  - Monitor vital signs, rhythm, and trends  - Monitor for bleeding, hypotension and signs of decreased cardiac output  - Evaluate effectiveness of vasoactive medications to optimize hemodynamic stability  - Monitor arterial and/or venous puncture sites for bleeding and/or hematoma  - Assess quality of pulses, skin color and temperature  - Assess for signs of decreased coronary artery perfusion - ex. Angina  - Evaluate fluid balance, assess for  edema, trend weights  Outcome: Progressing  Goal: Absence of cardiac arrhythmias or at baseline  Description: INTERVENTIONS:  - Continuous cardiac monitoring, monitor vital signs, obtain 12 lead EKG if indicated  - Evaluate effectiveness of antiarrhythmic and heart rate control medications as ordered  - Initiate emergency measures for life threatening arrhythmias  - Monitor electrolytes and administer replacement therapy as ordered  Outcome: Progressing     Problem: RESPIRATORY - ADULT  Goal: Achieves optimal ventilation and oxygenation  Description: INTERVENTIONS:  - Assess for changes in respiratory status  - Assess for changes in mentation and behavior  - Position to facilitate oxygenation and minimize respiratory effort  - Oxygen supplementation based on oxygen saturation or ABGs  - Provide Smoking Cessation handout, if applicable  - Encourage broncho-pulmonary hygiene including cough, deep breathe, Incentive Spirometry  - Assess the need for suctioning and perform as needed  - Assess and instruct to report SOB or any respiratory difficulty  - Respiratory Therapy support as indicated  - Manage/alleviate anxiety  - Monitor for signs/symptoms of CO2 retention  Outcome: Progressing     Problem: METABOLIC/FLUID AND ELECTROLYTES - ADULT  Goal: Glucose maintained within prescribed range  Description: INTERVENTIONS:  - Monitor Blood Glucose as ordered  - Assess for signs and symptoms of hyperglycemia and hypoglycemia  - Administer ordered medications to maintain glucose within target range  - Assess barriers to adequate nutritional intake and initiate nutrition consult as needed  - Instruct patient on self management of diabetes  Outcome: Progressing  Goal: Electrolytes maintained within normal limits  Description: INTERVENTIONS:  - Monitor labs and rhythm and assess patient for signs and symptoms of electrolyte imbalances  - Administer electrolyte replacement as ordered  - Monitor response to electrolyte replacements,  including rhythm and repeat lab results as appropriate  - Fluid restriction as ordered  - Instruct patient on fluid and nutrition restrictions as appropriate  Outcome: Progressing  Goal: Hemodynamic stability and optimal renal function maintained  Description: INTERVENTIONS:  - Monitor labs and assess for signs and symptoms of volume excess or deficit  - Monitor intake, output and patient weight  - Monitor urine specific gravity, serum osmolarity and serum sodium as indicated or ordered  - Monitor response to interventions for patient's volume status, including labs, urine output, blood pressure (other measures as available)  - Encourage oral intake as appropriate  - Instruct patient on fluid and nutrition restrictions as appropriate  Outcome: Progressing     Problem: PAIN - ADULT  Goal: Verbalizes/displays adequate comfort level or patient's stated pain goal  Description: INTERVENTIONS:  - Encourage pt to monitor pain and request assistance  - Assess pain using appropriate pain scale  - Administer analgesics based on type and severity of pain and evaluate response  - Implement non-pharmacological measures as appropriate and evaluate response  - Consider cultural and social influences on pain and pain management  - Manage/alleviate anxiety  - Utilize distraction and/or relaxation techniques  - Monitor for opioid side effects  - Notify MD/LIP if interventions unsuccessful or patient reports new pain  - Anticipate increased pain with activity and pre-medicate as appropriate  Outcome: Progressing

## 2024-02-25 NOTE — PROGRESS NOTES
[facility] Hospitalist Progress Note     CC: Hospital Follow up    PCP: Ernesto De Dios       Assessment/Plan:     Principal Problem:    Bypass graft mechanical complication, initial encounter ([facility])  Active Problems:    Hyperglycemia    Bypass graft mechanical complication (HCC)    Anemia, unspecified type    Acute kidney injury superimposed on CKD (HCC)    Uncontrolled type 2 diabetes mellitus with hyperglycemia (HCC)    Ms. Bowen is a 68 year old female with PMH sig for CKD, COPD, DM, HTN, HLD, PVD, and sickle cell anemia who was recently admitted last week for right lower extremity femoral to popliteal bypass who presented with bleeding from surgical site. Taken for emergent OR on 2/10/24, noted to be grossly infected, started on vancomycin/meropenem. Bcx with Klebsiella. BG elevated on admit, started on insulin gtt, endo consulted, now off insulin gtt. Kept intubated post op, pulm following. Polymicrobial infection on meropenum, IV vanco and now fluconazole.  Now S/P AKA on right on 2/22/24.       PAD sp RLE fem-pop bypass last week c/b bleeding fm surg site fm wound dehiscence  - s/p recent fem pop bypass 1/24/24 with Dr. Murrieta  - has been on ASA/plavix- held on admit  - Hg down to 6.0, s/p 3 units pRBC 2/10- watching close now, hgb stable currently no signs of bleeding   - vascular surgery consulted, s/p emergent OR 2/10, removal of infected graft, s/p debridement, bone patch angioplasty   - sp further debridement 2/13 and placement of wound vac  - back to OR 2/16   - s/p right AKA  2/22/24  - Pain meds PRN, gabapentin   - lasix PRN   - Some coolness and decreased pulse on left leg , CTA with occluded stent in femoral artery, discussed with vascular no further intervention at this time, left heel with dark discoloration     Septic shock (now improved) fm post op wound infection, polymicrobial   - continue IV vancomycin and meropenem add fluconazole , ID consult following   - Bcx with klebsiella,    - 2/10 wound cx and 2/13 poly microbial  - abx adjustment per ID f/u cx results  - ID notes appreciated- tenative plan for 6 weeks abx from from final surgical plans- final abx tbd pending cx data and surgical plans   - Now S/P right AKA on 2/22 final determination of treatment will need to be sorted per ID   - Keep joseph due to large leg wound     Metabolic acidosis- resolved   OMEGA on CKD3- at baseline now   Hypernatremia, resolved   - likely 2/2 LA from blood loss +/- dka on admit  - pH 7.24 on admit, LA 7.6 on admit, now normalized  - likely multifactorial, 2/2 LA, blood loss, ?DKA, CKD  - cr 1.7 on admit, previously 1.2 on discharge last week  - monitor Cr and UOP- improved   - renal consulted, s/p bicarb gtt, now on D5W low rate   - Stable, Monitor    Acute blood loss anemia from surgical site   Chronic anemia  Sickle cell anemia  - Stable currently, trend CBC   - outpatient f/u  - Hg 8.9 on discharge last week  - Hg 6.0 on admission, s/p 4 units pRBC, last unit 2/23/34  - monitor hgb     DM2, hyperglycemia better now   - A1c 7.9   - Hold PO medications  - BG 500s in ED, pH 7.2  - insulin gtt ordered in ED  - increased Degludec 17 at bedtime and aspart 10 units with meals , endo following      Respiratory failure resolved   - remained intubated post op both fm aspiration event and metabolic acidosis  - pulm following  - extubated 2/14 pm  - Now on 1L NC - wean as able      Hypovolemic / septic Shock/ Hypotension- improved  Hx HTN  - BP stable now, hydral 100 Q8 hours   - Amlodipine 10 d, Coreg 25 BID   - Holding home lisinopril  - BP ok, watch and consider resuming pending BP       COPD  - chronic, stable  - resumed inhalers     ACP  - CODE- FULL   - POA- daughter and son     FN:  - IVF: complete   - Diet: Carb controlled diet      DVT Prophy: Hold for now due to bleeding   Lines: PIV     Daughter Tiffanie updated at length via phone 2/18/20 by Dr. Foote      Questions/concerns were discussed with patient and/or  family by bedside.    Thank You,  Max Souza MD     Hospitalist with [facility]     Subjective:     Sleepy from pain meds.  Pain is better today for the first time.   Tolerating diet so far but not eating much.     OBJECTIVE:    Blood pressure 104/55, pulse 62, temperature 99.1 °F (37.3 °C), resp. rate 19, height 5' 4.02\" (1.626 m), weight 167 lb 9.6 oz (76 kg), SpO2 94%, not currently breastfeeding.    Temp:  [98.2 °F (36.8 °C)-100.1 °F (37.8 °C)] 99.1 °F (37.3 °C)  Pulse:  [62-71] 62  Resp:  [16-22] 19  BP: ()/(45-65) 104/55  SpO2:  [93 %-96 %] 94 %      Intake/Output:    Intake/Output Summary (Last 24 hours) at 2/25/2024 1017  Last data filed at 2/25/2024 0525  Gross per 24 hour   Intake 1614.67 ml   Output 1225 ml   Net 389.67 ml       Last 3 Weights   02/25/24 0525 167 lb 9.6 oz (76 kg)   02/24/24 0618 174 lb 1.6 oz (79 kg)   02/20/24 0554 180 lb 3.2 oz (81.7 kg)   02/19/24 0445 189 lb 1.6 oz (85.8 kg)   02/18/24 0614 186 lb 1.6 oz (84.4 kg)   02/17/24 0403 181 lb 11.2 oz (82.4 kg)   02/16/24 0500 182 lb 3.2 oz (82.6 kg)   02/14/24 0500 185 lb 13.6 oz (84.3 kg)   02/13/24 0500 183 lb 3.2 oz (83.1 kg)   02/12/24 0400 180 lb 5.4 oz (81.8 kg)   02/11/24 0600 182 lb 5.1 oz (82.7 kg)   02/10/24 2142 171 lb 1.2 oz (77.6 kg)   02/10/24 2105 171 lb 1.2 oz (77.6 kg)   02/10/24 1642 164 lb 3.9 oz (74.5 kg)   01/27/24 0501 164 lb 3.2 oz (74.5 kg)   01/24/24 0549 158 lb (71.7 kg)   01/19/24 1112 160 lb (72.6 kg)   12/30/23 1216 155 lb (70.3 kg)   08/29/23 1357 153 lb (69.4 kg)       Exam   Gen: sleepy   Pulm: Lungs clear, normal respiratory effort, 1 L NC   CV: Heart with regular rate and rhythm  Abd: Abdomen soft, nontender, nondistended  Ext: right AKA with VAC in place   : jake     Data Review:       Labs:     Recent Labs   Lab 02/23/24  0636 02/23/24  1620 02/24/24  0449 02/25/24  0758   RBC 2.51*  --  2.88* 2.91*   HGB 7.2* 8.2* 8.6* 8.2*   HCT 21.9*  --  25.1* 25.9*   MCV 87.3  --  87.2 89.0    MCH 28.7  --  29.9 28.2   MCHC 32.9  --  34.3 31.7   RDW 16.6*  --  16.2* 16.5*   NEPRELIM 13.79*  --  17.81* 17.83*   WBC 15.9*  --  20.0* 20.3*   .0  --  344.0 306.0         Recent Labs   Lab 02/23/24  0636 02/24/24  0449 02/25/24  0758   * 155* 137*   BUN 27* 29* 27*   CREATSERUM 0.99 1.19* 1.13*   EGFRCR 63 50* 54*   CA 7.2* 6.9* 7.2*   * 146* 141   K 4.3 4.2 4.3   * 116* 113*   CO2 24.0 25.0 25.0       Recent Labs   Lab 02/19/24  0536 02/24/24  0449 02/25/24  0758   ALB 2.2* 1.8* 1.8*         Imaging:  No results found.      Meds:      gabapentin  300 mg Oral TID    insulin aspart  1-5 Units Subcutaneous TID CC    cholecalciferol  2,000 Units Oral Daily    [START ON 2/26/2024] calcitriol  0.25 mcg Oral Q MWF    insulin degludec  10 Units Subcutaneous Nightly    [Held by provider] insulin aspart  5 Units Subcutaneous TID CC    vancomycin  125 mg Oral Daily    HYDROcodone-acetaminophen  2 tablet Oral TID    pantoprazole  40 mg Oral QAM AC    amLODIPine  10 mg Oral QAM    carvedilol  25 mg Oral BID    hydrALAZINE  100 mg Oral Q8H    vancomycin  750 mg Intravenous Q36H    fluconazole  200 mg Oral Daily    magnesium sulfate  4 g Intravenous Once    sodium hypochlorite   Topical 2 times per day    fluticasone furoate-vilanterol  1 puff Inhalation Daily    rosuvastatin  40 mg Oral Nightly    meropenem  500 mg Intravenous q12h      dextrose 50 mL/hr at 02/24/24 1912     acetaminophen **OR** HYDROcodone-acetaminophen **OR** HYDROcodone-acetaminophen, ondansetron, metoclopramide, HYDROmorphone, HYDROcodone-acetaminophen, HYDROcodone-acetaminophen, acetaminophen, morphINE, hydrALAzine, ondansetron, metoclopramide, albuterol, ipratropium-albuterol, glucose **OR** glucose **OR** glucose-vitamin C **OR** dextrose **OR** glucose **OR** glucose **OR** glucose-vitamin C

## 2024-02-25 NOTE — PROGRESS NOTES
[facility]    Nephrology Progress Note    Ava Bowen Attending:  Max Souza MD       SUBJECTIVE:     Sleepy after getting pain medication per her sister at bedside.  Patient denies any complaints.    PHYSICAL EXAM:     Vital Signs: /55 (BP Location: Right arm)   Pulse 62   Temp 99.1 °F (37.3 °C)   Resp 19   Ht 162.6 cm (5' 4.02\")   Wt 167 lb 9.6 oz (76 kg)   LMP  (LMP Unknown)   SpO2 94%   BMI 28.75 kg/m²   Temp (24hrs), Av °F (37.2 °C), Min:98.2 °F (36.8 °C), Max:100.1 °F (37.8 °C)       Intake/Output Summary (Last 24 hours) at 2024 1153  Last data filed at 2024 0525  Gross per 24 hour   Intake 1614.67 ml   Output 1225 ml   Net 389.67 ml     Wt Readings from Last 3 Encounters:   24 167 lb 9.6 oz (76 kg)   24 164 lb 3.2 oz (74.5 kg)   23 153 lb (69.4 kg)       Gen - nad  HEENT - NCAT  CV - RRR  Resp - CTAB  Abd - soft   - no joseph  Ext - s/p R AKA, no LLE edema  MS - awake alert answering questions appropriately       LABORATORY DATA:     Lab Results   Component Value Date     (H) 2024    BUN 27 (H) 2024    BUNCREA 23.9 (H) 2024    CREATSERUM 1.13 (H) 2024    ANIONGAP 3 2024    GFRNAA 41 (L) 2021    GFRAA 48 (L) 2021    CA 7.2 (L) 2024    OSMOCALC 299 (H) 2024    ALKPHO 62 02/10/2024    AST 91 (H) 02/10/2024    ALT 38 02/10/2024    BILT 0.4 02/10/2024    TP 4.6 (L) 02/10/2024    ALB 1.8 (L) 2024    GLOBULIN 2.3 (L) 02/10/2024     2024    K 4.3 2024     (H) 2024    CO2 25.0 2024     Lab Results   Component Value Date    WBC 20.3 (H) 2024    RBC 2.91 (L) 2024    HGB 8.2 (L) 2024    HCT 25.9 (L) 2024    .0 2024    MPV 8.1 2018    MCV 89.0 2024    MCH 28.2 2024    MCHC 31.7 2024    RDW 16.5 (H) 2024    NEPRELIM 17.83 (H) 2024    NEUTABS 26.32 (H) 2024    LYMPHABS  0.85 (L) 02/13/2024    EOSABS 0.85 (H) 02/13/2024    BASABS 0.00 02/13/2024    NEUT 92 02/13/2024    LYMPH 3 02/13/2024    MON 1 02/13/2024    EOS 3 02/13/2024    BASO 0 02/13/2024    NEPERCENT 87.9 02/25/2024    LYPERCENT 5.7 02/25/2024    MOPERCENT 5.1 02/25/2024    EOPERCENT 0.3 02/25/2024    BAPERCENT 0.1 02/25/2024    NE 17.83 (H) 02/25/2024    LYMABS 1.15 02/25/2024    MOABSO 1.04 (H) 02/25/2024    EOABSO 0.06 02/25/2024    BAABSO 0.03 02/25/2024     Lab Results   Component Value Date    MALBP 123.00 09/12/2023    CREUR 60.10 02/07/2024     Lab Results   Component Value Date    COLORUR Light-Yellow 02/12/2024    CLARITY Turbid (A) 02/12/2024    SPECGRAVITY 1.018 02/12/2024    GLUUR 30 (A) 02/12/2024    BILUR Negative 02/12/2024    KETUR Negative 02/12/2024    BLOODURINE 3+ (A) 02/12/2024    PHURINE 5.5 02/12/2024    PROUR 70 (A) 02/12/2024    UROBILINOGEN Normal 02/12/2024    NITRITE Negative 02/12/2024    LEUUR 250 (A) 02/12/2024    WBCUR 21-50 (A) 02/12/2024    RBCUR >10 (A) 02/12/2024    EPIUR Few (A) 02/12/2024    BACUR None Seen 02/12/2024    HYLUR Present (A) 02/12/2024         IMAGING:     Reviewed.    MEDICATIONS:       Current Facility-Administered Medications   Medication Dose Route Frequency    gabapentin (Neurontin) cap 300 mg  300 mg Oral TID    insulin aspart (NovoLOG) 100 Units/mL FlexPen 1-5 Units  1-5 Units Subcutaneous TID CC    cholecalciferol (VITAMIN D3) tab 2,000 Units  2,000 Units Oral Daily    [START ON 2/26/2024] calcitriol (Rocaltrol) cap 0.25 mcg  0.25 mcg Oral Q MWF    insulin degludec 100 units/mL flextouch 10 Units  10 Units Subcutaneous Nightly    dextrose 5% infusion   Intravenous Continuous    acetaminophen (Tylenol) tab 650 mg  650 mg Oral Q4H PRN    [Held by provider] insulin aspart (NovoLOG) 100 Units/mL FlexPen 5 Units  5 Units Subcutaneous TID CC    vancomycin (Vancocin) cap 125 mg  125 mg Oral Daily    HYDROcodone-acetaminophen (Norco) 5-325 MG per tab 2 tablet  2 tablet  Oral TID    HYDROmorphone (Dilaudid) 1 MG/ML injection 1 mg  1 mg Intravenous Q3H PRN    pantoprazole (Protonix) DR tab 40 mg  40 mg Oral QAM AC    amLODIPine (Norvasc) tab 10 mg  10 mg Oral QAM    carvedilol (Coreg) tab 25 mg  25 mg Oral BID    hydrALAZINE (Apresoline) tab 100 mg  100 mg Oral Q8H    vancomycin (Vancocin) 750 mg in sodium chloride 0.9% 250 mL IVPB-ADDV  750 mg Intravenous Q36H    fluconazole (Diflucan) tab 200 mg  200 mg Oral Daily    HYDROcodone-acetaminophen (Norco)  MG per tab 1 tablet  1 tablet Oral Q4H PRN    HYDROcodone-acetaminophen (Norco)  MG per tab 2 tablet  2 tablet Oral Q4H PRN    morphINE 20 mg/mL concentrated oral solution 5 mg  5 mg Oral Q4H PRN    sodium hypochlorite (Dakin's) 0.125 % external solution   Topical 2 times per day    hydrALAzine (Apresoline) 20 mg/mL injection 10 mg  10 mg Intravenous Q4H PRN    ondansetron (Zofran) 4 MG/2ML injection 4 mg  4 mg Intravenous Q6H PRN    metoclopramide (Reglan) 5 mg/mL injection 5 mg  5 mg Intravenous Q8H PRN    albuterol (Ventolin HFA) 108 (90 Base) MCG/ACT inhaler 2 puff  2 puff Inhalation Q6H PRN    fluticasone furoate-vilanterol (Breo Ellipta) 200-25 MCG/ACT inhaler 1 puff  1 puff Inhalation Daily    ipratropium-albuterol (Duoneb) 0.5-2.5 (3) MG/3ML inhalation solution 3 mL  3 mL Nebulization Q6H PRN    rosuvastatin (Crestor) tab 40 mg  40 mg Oral Nightly    glucose (Dex4) 15 GM/59ML oral liquid 15 g  15 g Oral Q15 Min PRN    Or    glucose (Glutose) 40% oral gel 15 g  15 g Oral Q15 Min PRN    Or    glucose-vitamin C (Dex-4) chewable tab 4 tablet  4 tablet Oral Q15 Min PRN    Or    dextrose 50% injection 50 mL  50 mL Intravenous Q15 Min PRN    Or    glucose (Dex4) 15 GM/59ML oral liquid 30 g  30 g Oral Q15 Min PRN    Or    glucose (Glutose) 40% oral gel 30 g  30 g Oral Q15 Min PRN    Or    glucose-vitamin C (Dex-4) chewable tab 8 tablet  8 tablet Oral Q15 Min PRN    meropenem (Merrem) 500 mg in sodium chloride 0.9% 100 mL  IVPB-MBP  500 mg Intravenous q12h       ASSESSMENT/PLAN:     68 year old female with PMH of COPD, DM2, HTN, HL, PVD, Sickle cell anemia, CKD stage 3, recent fem-pop artery bypass admitted with acute hemorrhage from incision site.  Patient is s/p OR for repair then underwent L AKA on 2/22.  Nephrology is consulted for OMEGA and metabolic acidosis.          OMEGA on CKD stage 3  - baseline around 1.2  - creatinine improved near baseline, had evidence of fluid overload then received lasix  - hold further diuretics     Hypertension   - amlodipine, hydralazine, coreg  - weaned off nicardipine   - lisinopril held given recent OMEGA, no need to resume with current BPs  - cards following      Metabolic acidosis:  - resolved      Hypernatremia:  - wean off D5; encourage oral hydration     Hypokalemia/ hypomagnesemia:  - replete per protocol      Hypocalcemia: with serum albumin corrected calcium 8.6  -ionized calcium pending  - PTH  370.3; Vit D 20.5  - start cholecalciferol 2000 int units daily, calcitriol 0.25 mcg MWF     Will follow.        Thank you for allowing me to participate in the care of this patient. Please do not hesitate to call with questions or concerns.        Madelaine Burciaga MD  [facility]  [address] [place] [address]    2/25/2024  11:53 AM

## 2024-02-25 NOTE — PROGRESS NOTES
[facility] Pharmacy Dosing Service      Follow Up Pharmacokinetic Consult for Vancomycin Dosing     Ava Bowen is a 68 year old female who is receiving vancomycin therapy for  infected arterial graft  . Patient is on day 16 of vancomycin and is currently receiving 750 mg IV every 36 hours. The current treatment and monitoring approach is non-AUC strategy.        Weight and Temperature:    Wt Readings from Last 1 Encounters:   24 76 kg (167 lb 9.6 oz)         Temp Readings from Last 1 Encounters:   24 99.1 °F (37.3 °C)      Labs:   Recent Labs   Lab 24  0636 24  0449 24  0758   CREATSERUM 0.99 1.19* 1.13*      Estimated Creatinine Clearance: 42.3 mL/min (A) (based on SCr of 1.13 mg/dL (H)).     Recent Labs   Lab 24  0636 24  0449 24  0758   WBC 15.9* 20.0* 20.3*        Vancomycin Levels:  Lab Results   Component Value Date/Time    VANCR 11.1 2024 05:43 AM    VANCT 13.2 2024 07:58 AM    VANCT 16.2 2024 06:08 PM       Corresponding 24 h-AUC: N/A     The Pharmacokinetic Target is:    Trough/random 10-15 mg/L    Renal Dosing Considerations:    None     Assessment/Plan:   Maintenance Regimen: Continue vancomycin 750 mg IV every 36 hours    Monitorin) Plan for vancomycin trough to be obtained in 5 - 7 days or sooner if renal function declines    2) Pharmacy will order SCr as clinically indicated to assess renal function.    3) Pharmacy will monitor for toxicity and efficacy, adjust vancomycin dose and/or frequency, and order vancomycin levels as appropriate per the Pharmacy and Therapeutics Committee approved protocol until discontinuation of the medication.       We appreciate the opportunity to assist in the care of this patient.     Leona BustillosD, [facility]: [phone]

## 2024-02-25 NOTE — PROGRESS NOTES
Bed rest until Tuesday per vascular surgery. Vascular md Justice called RN for updates informed md of DTI to left heel-Dr. Souza stated to tell him about the DTI. Foot off loaded.

## 2024-02-25 NOTE — PROGRESS NOTES
[facility] Infectious Disease  Progress Note    Ava Bowen Patient Status:  Inpatient    10/6/1955 MRN [id number]   Location [facility] 3W/SW Attending Max Souza MD   Hosp Day # 15 PCP Ernesto MIGUEL Lanre Bowen is a 68 year old female.   Chief Complaint   Patient presents with    Bleeding       HPI:      Pain meds helping               REVIEW OF SYSTEMS:   A comprehensive 10 point review of systems was completed.  Pertinent positives and negatives noted in the the HPI.       Allergies:  Allergies   Allergen Reactions    Penicillins HIVES and ANGIOEDEMA     Hives on eyelids (occurred when pt was in her 20s). Tolerated amoxicillin per chart. Tolerates cephalosporins.        Current Meds:    Current Facility-Administered Medications:     gabapentin (Neurontin) cap 300 mg, 300 mg, Oral, TID    insulin aspart (NovoLOG) 100 Units/mL FlexPen 1-5 Units, 1-5 Units, Subcutaneous, TID CC    cholecalciferol (VITAMIN D3) tab 2,000 Units, 2,000 Units, Oral, Daily    [START ON 2024] calcitriol (Rocaltrol) cap 0.25 mcg, 0.25 mcg, Oral, Q MWF    insulin degludec 100 units/mL flextouch 10 Units, 10 Units, Subcutaneous, Nightly    dextrose 5% infusion, , Intravenous, Continuous    acetaminophen (Tylenol) tab 650 mg, 650 mg, Oral, Q4H PRN **OR** [DISCONTINUED] HYDROcodone-acetaminophen (Norco) 5-325 MG per tab 1 tablet, 1 tablet, Oral, Q4H PRN **OR** [DISCONTINUED] HYDROcodone-acetaminophen (Norco) 5-325 MG per tab 2 tablet, 2 tablet, Oral, Q4H PRN    [Held by provider] insulin aspart (NovoLOG) 100 Units/mL FlexPen 5 Units, 5 Units, Subcutaneous, TID CC    vancomycin (Vancocin) cap 125 mg, 125 mg, Oral, Daily    HYDROcodone-acetaminophen (Norco) 5-325 MG per tab 2 tablet, 2 tablet, Oral, TID    HYDROmorphone (Dilaudid) 1 MG/ML injection 1 mg, 1 mg, Intravenous, Q3H PRN    pantoprazole (Protonix) DR tab 40 mg, 40 mg, Oral, QAM AC    amLODIPine (Norvasc) tab  10 mg, 10 mg, Oral, QAM    carvedilol (Coreg) tab 25 mg, 25 mg, Oral, BID    hydrALAZINE (Apresoline) tab 100 mg, 100 mg, Oral, Q8H    vancomycin (Vancocin) 750 mg in sodium chloride 0.9% 250 mL IVPB-ADDV, 750 mg, Intravenous, Q36H    fluconazole (Diflucan) tab 200 mg, 200 mg, Oral, Daily    HYDROcodone-acetaminophen (Norco)  MG per tab 1 tablet, 1 tablet, Oral, Q4H PRN    HYDROcodone-acetaminophen (Norco)  MG per tab 2 tablet, 2 tablet, Oral, Q4H PRN    morphINE 20 mg/mL concentrated oral solution 5 mg, 5 mg, Oral, Q4H PRN    sodium hypochlorite (Dakin's) 0.125 % external solution, , Topical, 2 times per day    hydrALAzine (Apresoline) 20 mg/mL injection 10 mg, 10 mg, Intravenous, Q4H PRN    ondansetron (Zofran) 4 MG/2ML injection 4 mg, 4 mg, Intravenous, Q6H PRN    metoclopramide (Reglan) 5 mg/mL injection 5 mg, 5 mg, Intravenous, Q8H PRN    albuterol (Ventolin HFA) 108 (90 Base) MCG/ACT inhaler 2 puff, 2 puff, Inhalation, Q6H PRN    fluticasone furoate-vilanterol (Breo Ellipta) 200-25 MCG/ACT inhaler 1 puff, 1 puff, Inhalation, Daily    ipratropium-albuterol (Duoneb) 0.5-2.5 (3) MG/3ML inhalation solution 3 mL, 3 mL, Nebulization, Q6H PRN    rosuvastatin (Crestor) tab 40 mg, 40 mg, Oral, Nightly    glucose (Dex4) 15 GM/59ML oral liquid 15 g, 15 g, Oral, Q15 Min PRN **OR** glucose (Glutose) 40% oral gel 15 g, 15 g, Oral, Q15 Min PRN **OR** glucose-vitamin C (Dex-4) chewable tab 4 tablet, 4 tablet, Oral, Q15 Min PRN **OR** dextrose 50% injection 50 mL, 50 mL, Intravenous, Q15 Min PRN **OR** glucose (Dex4) 15 GM/59ML oral liquid 30 g, 30 g, Oral, Q15 Min PRN **OR** glucose (Glutose) 40% oral gel 30 g, 30 g, Oral, Q15 Min PRN **OR** glucose-vitamin C (Dex-4) chewable tab 8 tablet, 8 tablet, Oral, Q15 Min PRN    meropenem (Merrem) 500 mg in sodium chloride 0.9% 100 mL IVPB-MBP, 500 mg, Intravenous, q12h   No current outpatient medications on file.        HISTORY:  Past Medical History:   Diagnosis Date     Anemia     Back pain     Back problem     Cataract     Chronic kidney disease (CKD)     COPD (chronic obstructive pulmonary disease) ([facility])     FEV 1 1.25 50%     Diabetes ([facility])     DM neuropathy    Essential hypertension     H/O angioplasty     2020    High blood pressure     High cholesterol     Neuropathy     Peripheral vascular disease (HCC)     PNA (pneumonia) 2018    Pulmonary emphysema ([facility])     Pulmonary nodule     4 mm RUL    Renal disorder     monitoring kidney labs    Sickle cell trait ([facility])     Sickle-cell anemia ([facility])     Tobacco abuse     Visual impairment       Past Surgical History:   Procedure Laterality Date    APPENDECTOMY      BACK SURGERY Right 2021    Right L3-4 extreme lateral interbody fusion, posterior decompression; LUMBAR LAMINECTOMY 1 LEVEL          x3    CHOLECYSTECTOMY      EXCIS LUMBAR DISK,ONE LEVEL          OTHER      bilateral leg stents  and         Social history and family history negative related to present illness except as above.    PHYSICAL EXAM:   /55 (BP Location: Right arm)   Pulse 62   Temp 99.1 °F (37.3 °C)   Resp 19   Ht 5' 4.02\" (1.626 m)   Wt 167 lb 9.6 oz (76 kg)   LMP  (LMP Unknown)   SpO2 94%   BMI 28.75 kg/m²   GENERAL:  Awake, alert, oriented x3. Non-tox, non-septic and in NAD.  HEENT:  Normocephalic, no scleral abnormalities.  Oropharynx clear, trachea ML.  NECK:  Supple, no masses, no lymphadenopathy.  LUNGS:  Clear to auscultation b/l, no rhonchi, rales, or wheezes.  CARDIO: RRR S1/S2, no rubs, clicks, heaves, or murmurs.  GI:  Soft NT/ND, BS present x4 quadrants, no HSM.  EXTREMITIES: wound covered  NEURO:  No focal neurologic deficits.  DERM:  Warm, dry, no rashes.    IMPRESSION/PLAN:        Postop complication of rt fem pop  bypass  Bacteremia wound drainage and needed debridement graft removal;see other notes for more details;two surgeries so far  and   Cultures mixed karla and candida  too[candida in urine too]  Already on meropenem and vanc; added diflucan  Pain and rising wbc are negative signs; hopefully wbc will now normalize; still quite high however  Spoke with pt   >35 min   s/p aka              Recent Results (from the past 72 hour(s))   POCT Glucose    Collection Time: 02/22/24  1:39 PM   Result Value Ref Range    POC Glucose  72 70 - 99 mg/dL   Hemoglobin & Hematocrit    Collection Time: 02/22/24  1:43 PM   Result Value Ref Range    HGB 8.3 (L) 12.0 - 16.0 g/dL    HCT 26.1 (L) 35.0 - 48.0 %   POCT Glucose    Collection Time: 02/22/24  4:18 PM   Result Value Ref Range    POC Glucose  127 (H) 70 - 99 mg/dL   POCT Glucose    Collection Time: 02/22/24  8:55 PM   Result Value Ref Range    POC Glucose  167 (H) 70 - 99 mg/dL   Prepare RBC STAT    Collection Time: 02/23/24 12:40 AM   Result Value Ref Range    Blood Product [id number]     Unit Number [id number]     UNIT ABO A     UNIT RH POS     Product Status Presumed Transfused     Expiration Date [id number]     Blood Type Barcode 6200     Unit Volume 350 ml   Prepare RBC STAT    Collection Time: 02/23/24 12:40 AM   Result Value Ref Range    Blood Product [id number]     Unit Number [id number]     UNIT ABO A     UNIT RH POS     Product Status Presumed Transfused     Expiration Date [id number]     Blood Type Barcode 6200     Unit Volume 350 ml   Basic Metabolic Panel (8)    Collection Time: 02/23/24  6:36 AM   Result Value Ref Range    Glucose 196 (H) 70 - 99 mg/dL    Sodium 147 (H) 136 - 145 mmol/L    Potassium 4.3 3.5 - 5.1 mmol/L    Chloride 119 (H) 98 - 112 mmol/L    CO2 24.0 21.0 - 32.0 mmol/L    Anion Gap 4 0 - 18 mmol/L    BUN 27 (H) 9 - 23 mg/dL    Creatinine 0.99 0.55 - 1.02 mg/dL    BUN/CREA Ratio 27.3 (H) 10.0 - 20.0    Calcium, Total 7.2 (L) 8.7 - 10.4 mg/dL    Calculated Osmolality 315 (H) 275 - 295 mOsm/kg    eGFR-Cr 63 >=60 mL/min/1.73m2   CBC W/ DIFFERENTIAL    Collection Time: 02/23/24  6:36 AM   Result Value Ref Range     WBC 15.9 (H) 4.0 - 11.0 x10(3) uL    RBC 2.51 (L) 3.80 - 5.30 x10(6)uL    HGB 7.2 (L) 12.0 - 16.0 g/dL    HCT 21.9 (L) 35.0 - 48.0 %    MCV 87.3 80.0 - 100.0 fL    MCH 28.7 26.0 - 34.0 pg    MCHC 32.9 31.0 - 37.0 g/dL    RDW-SD 53.1 (H) 35.1 - 46.3 fL    RDW 16.6 (H) 11.0 - 15.0 %    .0 150.0 - 450.0 10(3)uL    Neutrophil Absolute Prelim 13.79 (H) 1.50 - 7.70 x10 (3) uL    Neutrophil Absolute 13.79 (H) 1.50 - 7.70 x10(3) uL    Lymphocyte Absolute 1.05 1.00 - 4.00 x10(3) uL    Monocyte Absolute 0.89 0.10 - 1.00 x10(3) uL    Eosinophil Absolute 0.02 0.00 - 0.70 x10(3) uL    Basophil Absolute 0.02 0.00 - 0.20 x10(3) uL    Immature Granulocyte Absolute 0.12 0.00 - 1.00 x10(3) uL    Neutrophil % 86.8 %    Lymphocyte % 6.6 %    Monocyte % 5.6 %    Eosinophil % 0.1 %    Basophil % 0.1 %    Immature Granulocyte % 0.8 %   POCT Glucose    Collection Time: 02/23/24 10:22 AM   Result Value Ref Range    POC Glucose  226 (H) 70 - 99 mg/dL   ABORH (Blood Type)    Collection Time: 02/23/24 11:07 AM   Result Value Ref Range    ABO BLOOD TYPE A     RH BLOOD TYPE Positive    Antibody Screen    Collection Time: 02/23/24 11:07 AM   Result Value Ref Range    Antibody Screen Negative    POCT Glucose    Collection Time: 02/23/24 12:31 PM   Result Value Ref Range    POC Glucose  126 (H) 70 - 99 mg/dL   Hemoglobin    Collection Time: 02/23/24  4:20 PM   Result Value Ref Range    HGB 8.2 (L) 12.0 - 16.0 g/dL   POCT Glucose    Collection Time: 02/23/24  5:11 PM   Result Value Ref Range    POC Glucose  113 (H) 70 - 99 mg/dL   POCT Glucose    Collection Time: 02/23/24  9:00 PM   Result Value Ref Range    POC Glucose  172 (H) 70 - 99 mg/dL   Prepare RBC Once    Collection Time: 02/24/24 12:42 AM   Result Value Ref Range    Blood Product [id number]     Unit Number [id number]     UNIT ABO A     UNIT RH POS     Product Status Presumed Transfused     Expiration Date [id number]     Blood Type Barcode 6200     Unit Volume 350 ml   Basic  Metabolic Panel (8)    Collection Time: 02/24/24  4:49 AM   Result Value Ref Range    Glucose 155 (H) 70 - 99 mg/dL    Sodium 146 (H) 136 - 145 mmol/L    Potassium 4.2 3.5 - 5.1 mmol/L    Chloride 116 (H) 98 - 112 mmol/L    CO2 25.0 21.0 - 32.0 mmol/L    Anion Gap 5 0 - 18 mmol/L    BUN 29 (H) 9 - 23 mg/dL    Creatinine 1.19 (H) 0.55 - 1.02 mg/dL    BUN/CREA Ratio 24.4 (H) 10.0 - 20.0    Calcium, Total 6.9 (L) 8.7 - 10.4 mg/dL    Calculated Osmolality 311 (H) 275 - 295 mOsm/kg    eGFR-Cr 50 (L) >=60 mL/min/1.73m2   CBC W/ DIFFERENTIAL    Collection Time: 02/24/24  4:49 AM   Result Value Ref Range    WBC 20.0 (H) 4.0 - 11.0 x10(3) uL    RBC 2.88 (L) 3.80 - 5.30 x10(6)uL    HGB 8.6 (L) 12.0 - 16.0 g/dL    HCT 25.1 (L) 35.0 - 48.0 %    MCV 87.2 80.0 - 100.0 fL    MCH 29.9 26.0 - 34.0 pg    MCHC 34.3 31.0 - 37.0 g/dL    RDW-SD 51.8 (H) 35.1 - 46.3 fL    RDW 16.2 (H) 11.0 - 15.0 %    .0 150.0 - 450.0 10(3)uL    Neutrophil Absolute Prelim 17.81 (H) 1.50 - 7.70 x10 (3) uL    Neutrophil Absolute 17.81 (H) 1.50 - 7.70 x10(3) uL    Lymphocyte Absolute 1.14 1.00 - 4.00 x10(3) uL    Monocyte Absolute 0.70 0.10 - 1.00 x10(3) uL    Eosinophil Absolute 0.09 0.00 - 0.70 x10(3) uL    Basophil Absolute 0.03 0.00 - 0.20 x10(3) uL    Immature Granulocyte Absolute 0.18 0.00 - 1.00 x10(3) uL    Neutrophil % 89.2 %    Lymphocyte % 5.7 %    Monocyte % 3.5 %    Eosinophil % 0.5 %    Basophil % 0.2 %    Immature Granulocyte % 0.9 %   Magnesium    Collection Time: 02/24/24  4:49 AM   Result Value Ref Range    Magnesium 1.7 1.6 - 2.6 mg/dL   Phosphorus    Collection Time: 02/24/24  4:49 AM   Result Value Ref Range    Phosphorus 4.2 2.4 - 5.1 mg/dL   Albumin Serum    Collection Time: 02/24/24  4:49 AM   Result Value Ref Range    Albumin 1.8 (L) 3.2 - 4.8 g/dL   PTH, Intact    Collection Time: 02/24/24  8:47 AM   Result Value Ref Range    Pth Intact 370.3 (H) 18.5 - 88.0 pg/mL   POCT Glucose    Collection Time: 02/24/24  9:25 AM   Result  Value Ref Range    POC Glucose  149 (H) 70 - 99 mg/dL   POCT Glucose    Collection Time: 02/24/24  1:22 PM   Result Value Ref Range    POC Glucose  141 (H) 70 - 99 mg/dL   POCT Glucose    Collection Time: 02/24/24  5:39 PM   Result Value Ref Range    POC Glucose  170 (H) 70 - 99 mg/dL   POCT Glucose    Collection Time: 02/24/24  8:55 PM   Result Value Ref Range    POC Glucose  171 (H) 70 - 99 mg/dL   Vancomycin Trough, Serum    Collection Time: 02/25/24  7:58 AM   Result Value Ref Range    Vancomycin Trough 13.2 10.0 - 20.0 ug/mL   Renal Function Panel    Collection Time: 02/25/24  7:58 AM   Result Value Ref Range    Glucose 137 (H) 70 - 99 mg/dL    Sodium 141 136 - 145 mmol/L    Potassium 4.3 3.5 - 5.1 mmol/L    Chloride 113 (H) 98 - 112 mmol/L    CO2 25.0 21.0 - 32.0 mmol/L    Anion Gap 3 0 - 18 mmol/L    BUN 27 (H) 9 - 23 mg/dL    Creatinine 1.13 (H) 0.55 - 1.02 mg/dL    BUN/CREA Ratio 23.9 (H) 10.0 - 20.0    Calcium, Total 7.2 (L) 8.7 - 10.4 mg/dL    Calculated Osmolality 299 (H) 275 - 295 mOsm/kg    eGFR-Cr 54 (L) >=60 mL/min/1.73m2    Albumin 1.8 (L) 3.2 - 4.8 g/dL    Phosphorus 4.4 2.4 - 5.1 mg/dL   CBC W/ DIFFERENTIAL    Collection Time: 02/25/24  7:58 AM   Result Value Ref Range    WBC 20.3 (H) 4.0 - 11.0 x10(3) uL    RBC 2.91 (L) 3.80 - 5.30 x10(6)uL    HGB 8.2 (L) 12.0 - 16.0 g/dL    HCT 25.9 (L) 35.0 - 48.0 %    MCV 89.0 80.0 - 100.0 fL    MCH 28.2 26.0 - 34.0 pg    MCHC 31.7 31.0 - 37.0 g/dL    RDW-SD 54.3 (H) 35.1 - 46.3 fL    RDW 16.5 (H) 11.0 - 15.0 %    .0 150.0 - 450.0 10(3)uL    Neutrophil Absolute Prelim 17.83 (H) 1.50 - 7.70 x10 (3) uL    Neutrophil Absolute 17.83 (H) 1.50 - 7.70 x10(3) uL    Lymphocyte Absolute 1.15 1.00 - 4.00 x10(3) uL    Monocyte Absolute 1.04 (H) 0.10 - 1.00 x10(3) uL    Eosinophil Absolute 0.06 0.00 - 0.70 x10(3) uL    Basophil Absolute 0.03 0.00 - 0.20 x10(3) uL    Immature Granulocyte Absolute 0.18 0.00 - 1.00 x10(3) uL    Neutrophil % 87.9 %    Lymphocyte % 5.7 %     Monocyte % 5.1 %    Eosinophil % 0.3 %    Basophil % 0.1 %    Immature Granulocyte % 0.9 %   POCT Glucose    Collection Time: 02/25/24  8:41 AM   Result Value Ref Range    POC Glucose  167 (H) 70 - 99 mg/dL         Noah Perez MD     CALL [facility] AT [phone] IF QUESTIONS OR CONCERNS  THANKS

## 2024-02-25 NOTE — PROGRESS NOTES
[facility]    Progress Note    Ava Bowen Patient Status:  Inpatient    10/6/1955 MRN [id number]   Location [facility] 3W/SW Attending Edy Foote DO   Hosp Day # 15 PCP Ernesto De Dios     Subjective:  Doing okay  Appetite is poor    Diabetes History:  Type 2 DM  Patient follows with me in the diabetes clinic  A1c is 7.9 % ( 2024)  Home regimen:   Glipizide 5 mg BID  Ozempic to 2 mg weekly     A comprehensive 10 point review of systems was completed.  Pertinent positives and negatives noted in the the HPI.      Objective/Physical Exam:  Physical Exam:   Vital Signs:  Blood pressure 104/55, pulse 62, temperature 99.1 °F (37.3 °C), resp. rate 19, height 5' 4.02\" (1.626 m), weight 167 lb 9.6 oz (76 kg), SpO2 94%, not currently breastfeeding.      General Appearance:  alert, well developed, in no acute distress  Head: Atraumatic  Eyes:  normal conjunctivae, sclera., normal sclera and normal pupils  Throat/Neck: normal sound to voice. Normal hearing, normal speech  Respiratory:  Speaking in full sentences, non-labored. no increased work of breathing, no audible wheezing    Neuro: motor grossly intact, moving all extremities without difficulty  Psychiatric:  oriented to time, self, and place  Extremities: s/p R AKA      Labs:  Bg reviewed    Assessment/Plan:  Patient Active Problem List   Diagnosis    Anemia    Azotemia    Hypokalemia    Back pain with radiation    Type 2 diabetes mellitus without retinopathy (HCC)    Age-related nuclear cataract of both eyes    Glaucoma suspect of both eyes    Centrilobular emphysema (HCC)    PVD (peripheral vascular disease) (HCC)    Diabetic ulcer of left midfoot associated with type 2 diabetes mellitus, with fat layer exposed (HCC)    PAD (peripheral artery disease) (HCC)    Pre-op testing    Family history of glaucoma in sister    Primary hypertension    S/P laminectomy    Post-operative pain    Type 2 diabetes mellitus with hyperglycemia,  with long-term current use of insulin ([facility])    Myalgia    DDD (degenerative disc disease), lumbar    Failed back syndrome of lumbar spine    Chest pain    Hyperlipidemia    Urinary tract infection    Acute cystitis without hematuria    Ileitis    COVID-19    UTI (urinary tract infection)    Pyelonephritis    Hypernatremia    Hyperglycemia    Ureteral calculi    Hypoglycemia    Opioid dependence, uncomplicated ([facility])    Depression, recurrent ([facility])    Stage 3 chronic kidney disease, unspecified whether stage 3a or 3b CKD ([facility])    Bypass graft mechanical complication ([facility])    Bypass graft mechanical complication, initial encounter ([facility])    Anemia, unspecified type    Acute kidney injury superimposed on CKD ([facility])    Uncontrolled type 2 diabetes mellitus with hyperglycemia ([facility])       Type 2 DM  With PAD   S/p AKA     Dextrose at 100 ml/hr      PLAN:   Tresiba 10 units nightly  Novolog  CF with meals  Accuchecks ac and hs  Hypoglycemia protocol  Diabetic diet     Please let the endocrinology service know if there is any change in IV fluids since that will change insulin requirements.    We will follow        Luna Ludwig MD

## 2024-02-26 NOTE — PROGRESS NOTES
Vascular surgery progress note    Patient seen and examined.  VAC change performed earlier today.  Drowsy and thus we will decrease pain medications.  Will get heel protector for the left foot.  Dr. Murrieta to evaluate tomorrow.  Further management per hospitalist and other consulting services.

## 2024-02-26 NOTE — OPERATIVE REPORT
[facility]    PATIENT'S NAME: WALT LOU   ATTENDING PHYSICIAN: Max Souza MD   OPERATING PHYSICIAN: Kp Justice MD   PATIENT ACCOUNT#:   [id number]    LOCATION:  [address] [place] [address]  MEDICAL RECORD #:   [id number]       YOB: 1955  ADMISSION DATE:       02/10/2024      OPERATION DATE:  02/16/2024    OPERATIVE REPORT    PREOPERATIVE DIAGNOSIS:  Right groin necrotizing soft tissue infection.  POSTOPERATIVE DIAGNOSIS:  Right groin necrotizing soft tissue infection.  PROCEDURE:    1.   Versajet debridement of subcutaneous fat.  2.   Skin substitute placement with Kerecis graft, 7 x 10 SurgiClose mesh, 95 subcutaneous square cm of SurgiClose Micro, two 38 SurgiClose Micro, 250 subcutaneous cm SurgiClose mesh.     ASSISTANT:  FRED Garcia.    ESTIMATED BLOOD LOSS:  20 mL.    INDICATIONS:  This is a 68-year-old female who has had a complicated hospital course with necrotizing soft tissue infection of the groin after a fem-pop bypass.  We will proceed with ongoing debridement and skin substitute placement.     FINDINGS:    1.   Healthy tissues of groin and viable muscle flap.  2.   On closer inspection in the operating room, foot begin to have mottling and will need to discuss an above-knee amputation.  3.   Popliteal incision left open to drain.    OPERATIVE TECHNIQUE:  The patient was taken to the operating room, intubated.  She was prepped and draped in usual sterile fashion and the wound VAC was removed.  The tissues appeared healthy and we prepped her in the usual fashion.  A Versajet was used to debride the subcutaneous tissues.  We had healthy bleeding tissues and after achieving healthy bleeding tissues, we then used a series of Kerecis grafts to facilitate scarring on the muscle flap.  I then placed a 7 x 10 SurgiClose and sewed it directly onto the muscle flap to the subcutaneous tissues.  I then used one 95 subcutaneous square cm of SurgiClose Micro, and 238  SurgiClose Micro to fill the entire wound base and then sewed a 250 subcutaneous cm SurgiClose mesh on top of this.  We then placed an Adaptic and then placed a large wound VAC sponge.  I then looked at the popliteal incision.  The popliteal incision appears to have adequately drained, but now her foot is beginning to have mottling and unfortunately, due to her critical nature, we will need to discuss above knee amputation with the patient and her family.  At this time, the patient was returned to the ICU intubated with the wound VAC in place.      Dictated By Kp Justice MD  d: 02/25/2024 18:25:53  t: 02/26/2024 03:47:54  Job [id number]  MJG/

## 2024-02-26 NOTE — OPERATIVE REPORT
[facility]    PATIENT'S NAME: WALT LOU   ATTENDING PHYSICIAN: Max Souza MD   OPERATING PHYSICIAN: Kp Justice MD   PATIENT ACCOUNT#:   [id number]    LOCATION:  [address] [place] [address]  MEDICAL RECORD #:   [id number]       YOB: 1955  ADMISSION DATE:       02/10/2024      OPERATION DATE:  02/13/2024    OPERATIVE REPORT    Please note that this is her second operation of her admission.    PREOPERATIVE DIAGNOSIS:  Groin wound dehiscence and infection.  POSTOPERATIVE DIAGNOSIS:  Necrotizing soft tissue, skin and soft tissue infection.  PROCEDURE:    1.   Sharp and Versajet debridement of skin and subcutaneous fat of right abdominal wall, groin and thigh, 24 x 15 x 5 cm.  2.   Sharp debridement of subcutaneous fat and skin of popliteal incision, 10 x 4 cm.  3.   Removal of infected graft.  4.   Wound vacuum-assisted closure placement.    ASSISTANT:  FRED Garcia.    SPECIMEN:  Graft tissue and fluid for culture.    ESTIMATED BLOOD LOSS:  300 mL.    INDICATIONS:  This is a 68-year-old female who had an infected femoral artery exposure wound that presented with a ruptured anastomosis.  She had a complex repair on Saturday.  We are returning to the operating room for ongoing debridement.    FINDINGS:    1.   Debrided to healthy bleeding tissues in the groin.  2.   Muscle flap viable and secure.  3.   Gastrocnemius muscle viable responding to Bovie.  I had to leave 1 cm cuff of graft - backbleeding from popliteal artery.    OPERATIVE TECHNIQUE:  The patient was taken in the operating room and the Dakin packing was removed.  There was progressive necrosis of the skin.  We prepped and draped her in usual sterile fashion.  The sartorius muscle flap was viable and secure.  We then proceed with the sharp debridement of the skin and subcutaneous fat of right abdominal wall, groin, and thigh using a knife.  After the knife debridement, I then used the Versajet to debride some subcutaneous  tissues that were in the upper abdominal wall in the pannus area, and in the lateral aspect; and then Versajet to the entire wound to make sure that I had healthy bleeding tissues everywhere, which I achieved.  I then turned my attention to the popliteal incision.  There was some area of skin necrosis and I opened this incision and cut away the skin, and quickly identified the graft.  There was a significant amount of scar tissue at the popliteal artery and I placed a 1 cm cuff, transected the graft and oversewed it with 4-0 Prolene in 2 layers and left the cuff there.  I then had to debride the skin and subcutaneous tissues.  The muscles appeared relatively unhealthy but were viable and respond to Bovie electrocautery.  After debriding, I then achieved hemostasis in all wounds with Bovie electrocautery.  I then copiously irrigated the wounds with pulse lavage, 3 bottles of Irrisept and a bottle of Dakin solution.  All the wounds appeared healthy.  I packed the incision open with a iodoform strip and then placed a large wound VAC sponge in the groin, cutting it in multiple layers.  The patient was then taken to ICU in stable but critical condition.    Dictated By Kp Justice MD  d: 02/25/2024 18:20:36  t: 02/26/2024 03:02:45  Job [id number]  MJG/

## 2024-02-26 NOTE — OPERATIVE REPORT
[facility]    PATIENT'S NAME: WALT LOU   ATTENDING PHYSICIAN: Max Souza MD   OPERATING PHYSICIAN: Kp Justice MD   PATIENT ACCOUNT#:   [id number]    LOCATION:  [address] [place] [address]  MEDICAL RECORD #:   [id number]       YOB: 1955  ADMISSION DATE:       02/10/2024      OPERATION DATE:  02/22/2024    OPERATIVE REPORT    PREOPERATIVE DIAGNOSIS:  Groin wound dehiscence, necrotizing soft tissue infection, gangrene of right foot.  POSTOPERATIVE DIAGNOSIS:  Groin wound dehiscence, necrotizing soft tissue infection, gangrene of right foot.  PROCEDURE:    1.   Debridement of skin, subcutaneous fat, tissues; 24, x 15, x 4 cm with Versajet.  2.   Gracilis myoplasty.  3.   Right above-knee amputation.    ASSISTANT:  FRED Rodriguez.    SPECIMEN:  Right above-knee amputation specimen.    ESTIMATED BLOOD LOSS:  800 mL.    INDICATIONS:  This is a 68-year-old female who had a prolonged hospital course with a necrotizing soft tissue infection.  This is her fourth trip to the operating room.      FINDINGS:  Healthy tissues at amputation level.  Additional coverage of artery needed, therefore myoplasty was performed.    OPERATIVE TECHNIQUE:  Patient was taken to the operating room and placed under general anesthesia.  The wound VAC was removed and the majority of the Kerecis had incorporated on the lateral aspects of the wound but there was minimal coverage over the myoplasty.  Due to the depth of the wound, I determined that I would need additional muscle to try and fill the space and provide more coverage.  Although the artery was not exposed, I want to make sure that there was adequate coverage, and therefore, I planned to proceed with gracilis myoplasty.  I made a longitudinal incision on the medial thigh, going through skin, subcutaneous fat, and identified the gracilis muscle.  I fully mobilized the gracilis muscle.  There were 2 incisions in the thigh, so they would easily retroflex up into  the wound.  Having fully mobilized the gracilis muscle, I then debrided the skin and subcutaneous tissues with Versajet for total area of 24 x 15 x 4 cm.  I then disconnected the gracilis distally, retroflexed it under the sartorius flap, trying to layer the gracilis over the medial edge of the sartorius so there would be complete coverage and minimizing any risk if there was a possibility that the sartorius could detach and expose, it would be medially.  So I secured this area with the gracilis muscle using 2-0 Ethibond.  I then controlled any bleeding in the groin incision with Bovie electrocautery.  After achieving hemostasis, I then packed this wound off with Irrisept-soaked lap and then turned my attention to do the above-knee amputation.  From the tibial plateau, I measured the distance approximately 18 cm proximally and made this sarahi as where I would transect the femur and then performed an above-knee amputation.  I performed an anterior to posterior fishmouth flaps cutting through skin and subcutaneous fat, muscle down to the femur.  I then used the Khoury to strip the tissues off the femur and then transected the femur at 15 cm proximal to the tibial plateau.  I then softened the edges of the femur with a rasp and then copiously irrigated the wound with Irrisept, controlled all bleeding with Bovie electrocautery and silk pop-offs.  After achieving adequate hemostasis, we then pulse lavaged the wound and irrigated the wound with Irrisept.  I then closed the incision with interrupted Vicryls to reapproximate the deep tissues and closed the fascia with 2-0 Vicryls.  I could then close the skin with horizontal mattress nylon sutures and staples.  I then placed a Prevena dressing and a wound VAC in the groin incision.  I used 2 medium sponges and then used a Prevena to provide adequate coverage with the incision.  I turned on the wound VAC and there was an excellent seal.  Patient was then extubated and taken to  Recovery in stable condition.    Dictated By Kp Justice MD  d: 02/25/2024 18:32:24  t: 02/26/2024 04:10:47  [facility] [id number]  MJG/

## 2024-02-26 NOTE — PROGRESS NOTES
[facility] Infectious Disease  Progress Note    Ava Bowen Patient Status:  Inpatient    10/6/1955 MRN [id number]   Location [facility] 3W/SW Attending Max Souza MD   Hosp Day # 16 PCP Ernesto MIGUEL Lanre Bowen is a 68 year old female.   Chief Complaint   Patient presents with    Bleeding       HPI:      Voicing no new complaints               REVIEW OF SYSTEMS:   A comprehensive 10 point review of systems was completed.  Pertinent positives and negatives noted in the the HPI.       Allergies:  Allergies   Allergen Reactions    Penicillins HIVES and ANGIOEDEMA     Hives on eyelids (occurred when pt was in her 20s). Tolerated amoxicillin per chart. Tolerates cephalosporins.        Current Meds:    Current Facility-Administered Medications:     gabapentin (Neurontin) cap 300 mg, 300 mg, Oral, TID    insulin aspart (NovoLOG) 100 Units/mL FlexPen 1-5 Units, 1-5 Units, Subcutaneous, TID CC    cholecalciferol (VITAMIN D3) tab 2,000 Units, 2,000 Units, Oral, Daily    calcitriol (Rocaltrol) cap 0.25 mcg, 0.25 mcg, Oral, Q MWF    insulin degludec 100 units/mL flextouch 10 Units, 10 Units, Subcutaneous, Nightly    acetaminophen (Tylenol) tab 650 mg, 650 mg, Oral, Q4H PRN **OR** [DISCONTINUED] HYDROcodone-acetaminophen (Norco) 5-325 MG per tab 1 tablet, 1 tablet, Oral, Q4H PRN **OR** [DISCONTINUED] HYDROcodone-acetaminophen (Norco) 5-325 MG per tab 2 tablet, 2 tablet, Oral, Q4H PRN    [Held by provider] insulin aspart (NovoLOG) 100 Units/mL FlexPen 5 Units, 5 Units, Subcutaneous, TID CC    vancomycin (Vancocin) cap 125 mg, 125 mg, Oral, Daily    HYDROcodone-acetaminophen (Norco) 5-325 MG per tab 2 tablet, 2 tablet, Oral, TID    HYDROmorphone (Dilaudid) 1 MG/ML injection 1 mg, 1 mg, Intravenous, Q3H PRN    pantoprazole (Protonix) DR tab 40 mg, 40 mg, Oral, QAM AC    amLODIPine (Norvasc) tab 10 mg, 10 mg, Oral, QAM    carvedilol (Coreg) tab 25 mg, 25 mg,  Oral, BID    hydrALAZINE (Apresoline) tab 100 mg, 100 mg, Oral, Q8H    vancomycin (Vancocin) 750 mg in sodium chloride 0.9% 250 mL IVPB-ADDV, 750 mg, Intravenous, Q36H    fluconazole (Diflucan) tab 200 mg, 200 mg, Oral, Daily    HYDROcodone-acetaminophen (Norco)  MG per tab 1 tablet, 1 tablet, Oral, Q4H PRN    HYDROcodone-acetaminophen (Norco)  MG per tab 2 tablet, 2 tablet, Oral, Q4H PRN    morphINE 20 mg/mL concentrated oral solution 5 mg, 5 mg, Oral, Q4H PRN    sodium hypochlorite (Dakin's) 0.125 % external solution, , Topical, 2 times per day    hydrALAzine (Apresoline) 20 mg/mL injection 10 mg, 10 mg, Intravenous, Q4H PRN    ondansetron (Zofran) 4 MG/2ML injection 4 mg, 4 mg, Intravenous, Q6H PRN    metoclopramide (Reglan) 5 mg/mL injection 5 mg, 5 mg, Intravenous, Q8H PRN    albuterol (Ventolin HFA) 108 (90 Base) MCG/ACT inhaler 2 puff, 2 puff, Inhalation, Q6H PRN    fluticasone furoate-vilanterol (Breo Ellipta) 200-25 MCG/ACT inhaler 1 puff, 1 puff, Inhalation, Daily    ipratropium-albuterol (Duoneb) 0.5-2.5 (3) MG/3ML inhalation solution 3 mL, 3 mL, Nebulization, Q6H PRN    rosuvastatin (Crestor) tab 40 mg, 40 mg, Oral, Nightly    glucose (Dex4) 15 GM/59ML oral liquid 15 g, 15 g, Oral, Q15 Min PRN **OR** glucose (Glutose) 40% oral gel 15 g, 15 g, Oral, Q15 Min PRN **OR** glucose-vitamin C (Dex-4) chewable tab 4 tablet, 4 tablet, Oral, Q15 Min PRN **OR** dextrose 50% injection 50 mL, 50 mL, Intravenous, Q15 Min PRN **OR** glucose (Dex4) 15 GM/59ML oral liquid 30 g, 30 g, Oral, Q15 Min PRN **OR** glucose (Glutose) 40% oral gel 30 g, 30 g, Oral, Q15 Min PRN **OR** glucose-vitamin C (Dex-4) chewable tab 8 tablet, 8 tablet, Oral, Q15 Min PRN    meropenem (Merrem) 500 mg in sodium chloride 0.9% 100 mL IVPB-MBP, 500 mg, Intravenous, q12h   No current outpatient medications on file.        HISTORY:  Past Medical History:   Diagnosis Date    Anemia     Back pain     Back problem     Cataract     Chronic  kidney disease (CKD)     COPD (chronic obstructive pulmonary disease) ([facility])     FEV 1 1.25 50%     Diabetes ([facility])     DM neuropathy    Essential hypertension     H/O angioplasty     2020    High blood pressure     High cholesterol     Neuropathy     Peripheral vascular disease (HCC)     PNA (pneumonia) 2018    Pulmonary emphysema ([facility])     Pulmonary nodule     4 mm RUL    Renal disorder     monitoring kidney labs    Sickle cell trait (HCC)     Sickle-cell anemia ([facility])     Tobacco abuse     Visual impairment       Past Surgical History:   Procedure Laterality Date    APPENDECTOMY      BACK SURGERY Right 2021    Right L3-4 extreme lateral interbody fusion, posterior decompression; LUMBAR LAMINECTOMY 1 LEVEL          x3    CHOLECYSTECTOMY      EXCIS LUMBAR DISK,ONE LEVEL          OTHER      bilateral leg stents  and         Social history and family history negative related to present illness except as above.    PHYSICAL EXAM:   /49 (BP Location: Right arm)   Pulse 62   Temp 98.7 °F (37.1 °C) (Oral)   Resp 12   Ht 5' 4.02\" (1.626 m)   Wt 175 lb 11.2 oz (79.7 kg)   LMP  (LMP Unknown)   SpO2 91%   BMI 30.14 kg/m²   GENERAL:  Awake, alert, oriented x3. Non-tox, non-septic and in NAD.  HEENT:  Normocephalic, no scleral abnormalities.  Oropharynx clear, trachea ML.  NECK:  Supple, no masses, no lymphadenopathy.  LUNGS:  Clear to auscultation b/l, no rhonchi, rales, or wheezes.  CARDIO: RRR S1/S2, no rubs, clicks, heaves, or murmurs.  GI:  Soft NT/ND, BS present x4 quadrants, no HSM.  EXTREMITIES:  No edema, no clubbing, no cyanosis.wound vac in place  NEURO:  No focal neurologic deficits.  DERM:  Warm, dry, no rashes.    IMPRESSION/PLAN:        Postop complication of rt fem pop  bypass  Bacteremia wound drainage and needed debridement graft removal;see other notes for more details;two surgeries so far  and   Cultures mixed karla and candida too[candida in  urine too]  Already on meropenem and vanc; added diflucan  Pain and rising wbc are negative signs; hopefully wbc will now normalize; still quite high however but improved today  Spoke with pt   >35 min   s/p aka  2/22                  Recent Results (from the past 72 hour(s))   POCT Glucose    Collection Time: 02/23/24 12:31 PM   Result Value Ref Range    POC Glucose  126 (H) 70 - 99 mg/dL   Hemoglobin    Collection Time: 02/23/24  4:20 PM   Result Value Ref Range    HGB 8.2 (L) 12.0 - 16.0 g/dL   POCT Glucose    Collection Time: 02/23/24  5:11 PM   Result Value Ref Range    POC Glucose  113 (H) 70 - 99 mg/dL   POCT Glucose    Collection Time: 02/23/24  9:00 PM   Result Value Ref Range    POC Glucose  172 (H) 70 - 99 mg/dL   Prepare RBC Once    Collection Time: 02/24/24 12:42 AM   Result Value Ref Range    Blood Product [id number]     Unit Number [id number]     UNIT ABO A     UNIT RH POS     Product Status Presumed Transfused     Expiration Date [id number]     Blood Type Barcode 6200     Unit Volume 350 ml   Basic Metabolic Panel (8)    Collection Time: 02/24/24  4:49 AM   Result Value Ref Range    Glucose 155 (H) 70 - 99 mg/dL    Sodium 146 (H) 136 - 145 mmol/L    Potassium 4.2 3.5 - 5.1 mmol/L    Chloride 116 (H) 98 - 112 mmol/L    CO2 25.0 21.0 - 32.0 mmol/L    Anion Gap 5 0 - 18 mmol/L    BUN 29 (H) 9 - 23 mg/dL    Creatinine 1.19 (H) 0.55 - 1.02 mg/dL    BUN/CREA Ratio 24.4 (H) 10.0 - 20.0    Calcium, Total 6.9 (L) 8.7 - 10.4 mg/dL    Calculated Osmolality 311 (H) 275 - 295 mOsm/kg    eGFR-Cr 50 (L) >=60 mL/min/1.73m2   CBC W/ DIFFERENTIAL    Collection Time: 02/24/24  4:49 AM   Result Value Ref Range    WBC 20.0 (H) 4.0 - 11.0 x10(3) uL    RBC 2.88 (L) 3.80 - 5.30 x10(6)uL    HGB 8.6 (L) 12.0 - 16.0 g/dL    HCT 25.1 (L) 35.0 - 48.0 %    MCV 87.2 80.0 - 100.0 fL    MCH 29.9 26.0 - 34.0 pg    MCHC 34.3 31.0 - 37.0 g/dL    RDW-SD 51.8 (H) 35.1 - 46.3 fL    RDW 16.2 (H) 11.0 - 15.0 %    .0 150.0 -  450.0 10(3)uL    Neutrophil Absolute Prelim 17.81 (H) 1.50 - 7.70 x10 (3) uL    Neutrophil Absolute 17.81 (H) 1.50 - 7.70 x10(3) uL    Lymphocyte Absolute 1.14 1.00 - 4.00 x10(3) uL    Monocyte Absolute 0.70 0.10 - 1.00 x10(3) uL    Eosinophil Absolute 0.09 0.00 - 0.70 x10(3) uL    Basophil Absolute 0.03 0.00 - 0.20 x10(3) uL    Immature Granulocyte Absolute 0.18 0.00 - 1.00 x10(3) uL    Neutrophil % 89.2 %    Lymphocyte % 5.7 %    Monocyte % 3.5 %    Eosinophil % 0.5 %    Basophil % 0.2 %    Immature Granulocyte % 0.9 %   Magnesium    Collection Time: 02/24/24  4:49 AM   Result Value Ref Range    Magnesium 1.7 1.6 - 2.6 mg/dL   Phosphorus    Collection Time: 02/24/24  4:49 AM   Result Value Ref Range    Phosphorus 4.2 2.4 - 5.1 mg/dL   Albumin Serum    Collection Time: 02/24/24  4:49 AM   Result Value Ref Range    Albumin 1.8 (L) 3.2 - 4.8 g/dL   PTH, Intact    Collection Time: 02/24/24  8:47 AM   Result Value Ref Range    Pth Intact 370.3 (H) 18.5 - 88.0 pg/mL   POCT Glucose    Collection Time: 02/24/24  9:25 AM   Result Value Ref Range    POC Glucose  149 (H) 70 - 99 mg/dL   POCT Glucose    Collection Time: 02/24/24  1:22 PM   Result Value Ref Range    POC Glucose  141 (H) 70 - 99 mg/dL   POCT Glucose    Collection Time: 02/24/24  5:39 PM   Result Value Ref Range    POC Glucose  170 (H) 70 - 99 mg/dL   POCT Glucose    Collection Time: 02/24/24  8:55 PM   Result Value Ref Range    POC Glucose  171 (H) 70 - 99 mg/dL   Vancomycin Trough, Serum    Collection Time: 02/25/24  7:58 AM   Result Value Ref Range    Vancomycin Trough 13.2 10.0 - 20.0 ug/mL   Renal Function Panel    Collection Time: 02/25/24  7:58 AM   Result Value Ref Range    Glucose 137 (H) 70 - 99 mg/dL    Sodium 141 136 - 145 mmol/L    Potassium 4.3 3.5 - 5.1 mmol/L    Chloride 113 (H) 98 - 112 mmol/L    CO2 25.0 21.0 - 32.0 mmol/L    Anion Gap 3 0 - 18 mmol/L    BUN 27 (H) 9 - 23 mg/dL    Creatinine 1.13 (H) 0.55 - 1.02 mg/dL    BUN/CREA Ratio 23.9 (H)  10.0 - 20.0    Calcium, Total 7.2 (L) 8.7 - 10.4 mg/dL    Calculated Osmolality 299 (H) 275 - 295 mOsm/kg    eGFR-Cr 54 (L) >=60 mL/min/1.73m2    Albumin 1.8 (L) 3.2 - 4.8 g/dL    Phosphorus 4.4 2.4 - 5.1 mg/dL   CBC W/ DIFFERENTIAL    Collection Time: 02/25/24  7:58 AM   Result Value Ref Range    WBC 20.3 (H) 4.0 - 11.0 x10(3) uL    RBC 2.91 (L) 3.80 - 5.30 x10(6)uL    HGB 8.2 (L) 12.0 - 16.0 g/dL    HCT 25.9 (L) 35.0 - 48.0 %    MCV 89.0 80.0 - 100.0 fL    MCH 28.2 26.0 - 34.0 pg    MCHC 31.7 31.0 - 37.0 g/dL    RDW-SD 54.3 (H) 35.1 - 46.3 fL    RDW 16.5 (H) 11.0 - 15.0 %    .0 150.0 - 450.0 10(3)uL    Neutrophil Absolute Prelim 17.83 (H) 1.50 - 7.70 x10 (3) uL    Neutrophil Absolute 17.83 (H) 1.50 - 7.70 x10(3) uL    Lymphocyte Absolute 1.15 1.00 - 4.00 x10(3) uL    Monocyte Absolute 1.04 (H) 0.10 - 1.00 x10(3) uL    Eosinophil Absolute 0.06 0.00 - 0.70 x10(3) uL    Basophil Absolute 0.03 0.00 - 0.20 x10(3) uL    Immature Granulocyte Absolute 0.18 0.00 - 1.00 x10(3) uL    Neutrophil % 87.9 %    Lymphocyte % 5.7 %    Monocyte % 5.1 %    Eosinophil % 0.3 %    Basophil % 0.1 %    Immature Granulocyte % 0.9 %   POCT Glucose    Collection Time: 02/25/24  8:41 AM   Result Value Ref Range    POC Glucose  167 (H) 70 - 99 mg/dL   POCT Glucose    Collection Time: 02/25/24 12:04 PM   Result Value Ref Range    POC Glucose  154 (H) 70 - 99 mg/dL   POCT Glucose    Collection Time: 02/25/24  5:13 PM   Result Value Ref Range    POC Glucose  182 (H) 70 - 99 mg/dL   POCT Glucose    Collection Time: 02/25/24  8:59 PM   Result Value Ref Range    POC Glucose  208 (H) 70 - 99 mg/dL   Renal Function Panel    Collection Time: 02/26/24  5:31 AM   Result Value Ref Range    Glucose 125 (H) 70 - 99 mg/dL    Sodium 142 136 - 145 mmol/L    Potassium 4.4 3.5 - 5.1 mmol/L    Chloride 115 (H) 98 - 112 mmol/L    CO2 24.0 21.0 - 32.0 mmol/L    Anion Gap 3 0 - 18 mmol/L    BUN 26 (H) 9 - 23 mg/dL    Creatinine 1.11 (H) 0.55 - 1.02 mg/dL     BUN/CREA Ratio 23.4 (H) 10.0 - 20.0    Calcium, Total 7.2 (L) 8.7 - 10.4 mg/dL    Calculated Osmolality 300 (H) 275 - 295 mOsm/kg    eGFR-Cr 55 (L) >=60 mL/min/1.73m2    Albumin 1.7 (L) 3.2 - 4.8 g/dL    Phosphorus 4.5 2.4 - 5.1 mg/dL   CBC W/ DIFFERENTIAL    Collection Time: 02/26/24  5:31 AM   Result Value Ref Range    WBC 16.2 (H) 4.0 - 11.0 x10(3) uL    RBC 2.80 (L) 3.80 - 5.30 x10(6)uL    HGB 8.2 (L) 12.0 - 16.0 g/dL    HCT 25.0 (L) 35.0 - 48.0 %    MCV 89.3 80.0 - 100.0 fL    MCH 29.3 26.0 - 34.0 pg    MCHC 32.8 31.0 - 37.0 g/dL    RDW-SD 54.3 (H) 35.1 - 46.3 fL    RDW 16.3 (H) 11.0 - 15.0 %    .0 150.0 - 450.0 10(3)uL    Neutrophil Absolute Prelim 14.03 (H) 1.50 - 7.70 x10 (3) uL    Neutrophil Absolute 14.03 (H) 1.50 - 7.70 x10(3) uL    Lymphocyte Absolute 1.16 1.00 - 4.00 x10(3) uL    Monocyte Absolute 0.76 0.10 - 1.00 x10(3) uL    Eosinophil Absolute 0.06 0.00 - 0.70 x10(3) uL    Basophil Absolute 0.05 0.00 - 0.20 x10(3) uL    Immature Granulocyte Absolute 0.15 0.00 - 1.00 x10(3) uL    Neutrophil % 86.5 %    Lymphocyte % 7.2 %    Monocyte % 4.7 %    Eosinophil % 0.4 %    Basophil % 0.3 %    Immature Granulocyte % 0.9 %   POCT Glucose    Collection Time: 02/26/24  8:38 AM   Result Value Ref Range    POC Glucose  121 (H) 70 - 99 mg/dL         Noah Perez MD     CALL [facility] AT [phone] IF QUESTIONS OR CONCERNS  THANKS

## 2024-02-26 NOTE — PLAN OF CARE
Patient alert x3. More awake today. No iv pain meds given pain managed with po med. IV abx. Iv fluids dc'd. Wound vac intact- canister changed. Encouraged meals. Assisted patient with meals. Supplements given and patient able to tolerate them. Call light within reach bed alarm in place.  Problem: Patient Centered Care  Goal: Patient preferences are identified and integrated in the patient's plan of care  Description: Interventions:  - What would you like us to know as we care for you? From home alone  - Provide timely, complete, and accurate information to patient/family  - Incorporate patient and family knowledge, values, beliefs, and cultural backgrounds into the planning and delivery of care  - Encourage patient/family to participate in care and decision-making at the level they choose  - Honor patient and family perspectives and choices  Outcome: Progressing     Problem: Diabetes/Glucose Control  Goal: Glucose maintained within prescribed range  Description: INTERVENTIONS:  - Monitor Blood Glucose as ordered  - Assess for signs and symptoms of hyperglycemia and hypoglycemia  - Administer ordered medications to maintain glucose within target range  - Assess barriers to adequate nutritional intake and initiate nutrition consult as needed  - Instruct patient on self management of diabetes  Outcome: Progressing     Problem: Patient/Family Goals  Goal: Patient/Family Long Term Goal  Description: Patient's Long Term Goal: discharge    Interventions:  - Monitor labs and vital signs  - IV antibiotics  - Medication compliance  - Follow provider recommendations  - See additional Care Plan goals for specific interventions  Outcome: Progressing  Goal: Patient/Family Short Term Goal  Description: Patient's Short Term Goal: pain management    Interventions:   - Reposition, elevate stump on pillows  -Medication compliance  - Follow provider recommendations  - See additional Care Plan goals for specific interventions  Outcome:  Progressing     Problem: CARDIOVASCULAR - ADULT  Goal: Maintains optimal cardiac output and hemodynamic stability  Description: INTERVENTIONS:  - Monitor vital signs, rhythm, and trends  - Monitor for bleeding, hypotension and signs of decreased cardiac output  - Evaluate effectiveness of vasoactive medications to optimize hemodynamic stability  - Monitor arterial and/or venous puncture sites for bleeding and/or hematoma  - Assess quality of pulses, skin color and temperature  - Assess for signs of decreased coronary artery perfusion - ex. Angina  - Evaluate fluid balance, assess for edema, trend weights  Outcome: Progressing  Goal: Absence of cardiac arrhythmias or at baseline  Description: INTERVENTIONS:  - Continuous cardiac monitoring, monitor vital signs, obtain 12 lead EKG if indicated  - Evaluate effectiveness of antiarrhythmic and heart rate control medications as ordered  - Initiate emergency measures for life threatening arrhythmias  - Monitor electrolytes and administer replacement therapy as ordered  Outcome: Progressing     Problem: RESPIRATORY - ADULT  Goal: Achieves optimal ventilation and oxygenation  Description: INTERVENTIONS:  - Assess for changes in respiratory status  - Assess for changes in mentation and behavior  - Position to facilitate oxygenation and minimize respiratory effort  - Oxygen supplementation based on oxygen saturation or ABGs  - Provide Smoking Cessation handout, if applicable  - Encourage broncho-pulmonary hygiene including cough, deep breathe, Incentive Spirometry  - Assess the need for suctioning and perform as needed  - Assess and instruct to report SOB or any respiratory difficulty  - Respiratory Therapy support as indicated  - Manage/alleviate anxiety  - Monitor for signs/symptoms of CO2 retention  Outcome: Progressing     Problem: METABOLIC/FLUID AND ELECTROLYTES - ADULT  Goal: Glucose maintained within prescribed range  Description: INTERVENTIONS:  - Monitor Blood  Glucose as ordered  - Assess for signs and symptoms of hyperglycemia and hypoglycemia  - Administer ordered medications to maintain glucose within target range  - Assess barriers to adequate nutritional intake and initiate nutrition consult as needed  - Instruct patient on self management of diabetes  Outcome: Progressing  Goal: Electrolytes maintained within normal limits  Description: INTERVENTIONS:  - Monitor labs and rhythm and assess patient for signs and symptoms of electrolyte imbalances  - Administer electrolyte replacement as ordered  - Monitor response to electrolyte replacements, including rhythm and repeat lab results as appropriate  - Fluid restriction as ordered  - Instruct patient on fluid and nutrition restrictions as appropriate  Outcome: Progressing  Goal: Hemodynamic stability and optimal renal function maintained  Description: INTERVENTIONS:  - Monitor labs and assess for signs and symptoms of volume excess or deficit  - Monitor intake, output and patient weight  - Monitor urine specific gravity, serum osmolarity and serum sodium as indicated or ordered  - Monitor response to interventions for patient's volume status, including labs, urine output, blood pressure (other measures as available)  - Encourage oral intake as appropriate  - Instruct patient on fluid and nutrition restrictions as appropriate  Outcome: Progressing     Problem: PAIN - ADULT  Goal: Verbalizes/displays adequate comfort level or patient's stated pain goal  Description: INTERVENTIONS:  - Encourage pt to monitor pain and request assistance  - Assess pain using appropriate pain scale  - Administer analgesics based on type and severity of pain and evaluate response  - Implement non-pharmacological measures as appropriate and evaluate response  - Consider cultural and social influences on pain and pain management  - Manage/alleviate anxiety  - Utilize distraction and/or relaxation techniques  - Monitor for opioid side effects  -  Notify MD/LIP if interventions unsuccessful or patient reports new pain  - Anticipate increased pain with activity and pre-medicate as appropriate  Outcome: Progressing

## 2024-02-26 NOTE — OPERATIVE REPORT
[facility]    PATIENT'S NAME: WALT LOU   ATTENDING PHYSICIAN: Max Souza MD   OPERATING PHYSICIAN: Kp Justice MD   PATIENT ACCOUNT#:   [id number]    LOCATION:  [address] [place] [address]  MEDICAL RECORD #:   [id number]       YOB: 1955  ADMISSION DATE:       02/10/2024      OPERATION DATE:  02/10/2024    OPERATIVE REPORT    PREOPERATIVE DIAGNOSIS:  Anastomotic hemorrhage, right femoral-popliteal artery bypass; infected groin.  POSTOPERATIVE DIAGNOSIS:  Anastomotic hemorrhage, right femoral-popliteal artery bypass; infected groin.  PROCEDURE:    1.   Removal of infected graft.  2.   Debridement of right common femoral artery, removal of proximal superficial femoral artery stent rings that were kinking the profunda; profunda endarterectomy; thrombectomy of the external iliac artery, common femoral artery, and profunda.  3.   Bovine patch angioplasty of the common femoral artery and profunda.  4.   Debridement of skin and subcutaneous fat, total area 15 x 18 x 4.  5.   Sartorius myoplasty.  6.   Saphenous vein harvest; saphenous vein not adequate.    ASSISTANT:  Cat Chand MD.    SPECIMEN:  Multiple specimens for culture.    ESTIMATED BLOOD LOSS:  300 mL.    INDICATIONS:  This is a 68-year-old female who underwent a fem-pop bypass a few weeks ago.  She presented to the hospital after being found down by her daughter with a significant amount of blood on the floor in the bathroom.  I was called to the emergency room to evaluate, and she had significant hematoma in the groin.  There was obvious herald bleed, and we scheduled her for the operating room.     FINDINGS:    1.   Grossly infected, irrigated with Irrisept and Dakin's.  Wound left open and packed with Dakin's gauze.   2.   Multiple specimens sent for culture.  3.   Doppler profunda signal at completion.    OPERATIVE TECHNIQUE:  Patient was then taken emergently to the operating room and prepped and draped in the  usual sterile fashion.  Time-out performed.  The groin tissues were grossly infected and the skin had already split open.  The Vicryl sutures were falling apart from the bleeding and using my hands, I  the tissues and the subcutaneous tissues due to the infection were essentially falling apart and foul smelling.  I quickly got down to the artery.  There was no active bleeding from the artery.  There was no pulsatility in the artery concerning for thrombosis.  I dissected the external iliac and could see where the dissection was of the proximal profunda and the proximal SFA, and I encircled all of those with vessel loops.  She was systemically heparinized.  I then took off, cut the anastomosis of the PTFE bypass and there was thrombus in the artery.  I removed the thrombus.  The artery had already been endarterectomized in this area, and I made sure we had adequate inflow by opening up the external.  There was clot in the external iliac and I used the #5 Natalie and performed a Natalie embolectomy and removed this thrombus.  I then opened the profunda vessel loop and there was no backbleeding from the profunda.  I suspect that she likely thrombosed from a low-flow state, and I then had to dissect further down on the profunda and I then performed a longitudinal arteriotomy onto the profunda and extended onto where I had opened up, taken off the anastomosis.  There was a significant amount of scar tissue at the profunda ostium associated with an SFA stent that has extended across the ostium of the profunda.  I had to cut out the SFA stent and part of the anterior wall of the superficial femoral artery.  After endarterectomizing and removing the stent rings, I performed a patch repair.  I had already marked the patient prior to prepping her and dissected out the saphenous vein.  The saphenous vein in this area was not going to be adequate and due to the extreme nature of the situation, I elected to use a Bovine  pericardial patch.  A 2 x 10 bovine pericardial patch was opened and I fashioned this patch. I used a larger patch because of the area of the SFA that I had to cut out.  I had to create a longer area to patch and reach the defect that I had to create to get the stent out.  Using the 2 x 10 patch, I sew the patch in the usual fashion using 5-0 Prolene in the patch repair.  After the patch repair, I restored flow.  There was excellent flow through the profunda.  The wound was extremely foul smelling, and we were copiously irrigating the wound with Irrisept while we were working, and I also called for 2 bottles of quarter-strength Dakin's.  The Dakin's had arrived and after restoring flow, I then transected as much of the graft as I could identify and then allowed it to retract  into the thigh, and then began debriding the tissues.  Using a knife, I cut away the subcutaneous fat and skin that was grossly infected to what appeared to be healthy bleeding tissues and then passed this off as specimen.  I then used the Dakin's solution and copiously irrigated the wound with 2 bottles of Dakin's and then 2 additional bottles of Irrisept.  I felt that I had adequate margins of healthy tissues.  There was no obvious ongoing infection that I could appreciate, and she had healthy bleeding tissues.  I then performed a myoplasty.  I identified the sartorius and dissected it to its insertion on the pelvis and disconnected it and rotated it onto the bovine patch.  I used 2-0 Ethibond to secure it to the surrounding tissues and the inguinal ligament.  The defect in the wound was too big and I elected to pack it with a Dakin's-soaked gauze so I could evaluate it later and determine the plan for closure later on.  Patient was then taken to the ICU intubated, in critical condition.    Dictated By Kp Justice MD  d: 02/25/2024 18:14:01  t: 02/26/2024 03:30:44  Job [id number]  MJG/

## 2024-02-26 NOTE — PROGRESS NOTES
[facility]    Nephrology Progress Note    Ava Bowen Attending:  Max Souza MD       SUBJECTIVE:     Resting in bed not in acute distress.    PHYSICAL EXAM:     Vital Signs: BP 95/47 (BP Location: Right arm)   Pulse 62   Temp 98.4 °F (36.9 °C) (Axillary)   Resp 18   Ht 5' 4.02\" (1.626 m)   Wt 175 lb 11.2 oz (79.7 kg)   LMP  (LMP Unknown)   SpO2 96%   BMI 30.14 kg/m²   Temp (24hrs), Av.5 °F (36.9 °C), Min:98.3 °F (36.8 °C), Max:98.8 °F (37.1 °C)       Intake/Output Summary (Last 24 hours) at 2024 0936  Last data filed at 2024 0842  Gross per 24 hour   Intake 590 ml   Output 1600 ml   Net -1010 ml     Wt Readings from Last 3 Encounters:   24 175 lb 11.2 oz (79.7 kg)   24 164 lb 3.2 oz (74.5 kg)   23 153 lb (69.4 kg)       Gen - nad  HEENT - NCAT  CV - RRR  Resp - CTAB  Abd - soft  Ext - s/p R AKA, 1+ LLE edema  MS - awake alert answering questions appropriately       LABORATORY DATA:     Lab Results   Component Value Date     (H) 2024    BUN 26 (H) 2024    BUNCREA 23.4 (H) 2024    CREATSERUM 1.11 (H) 2024    ANIONGAP 3 2024    GFRNAA 41 (L) 2021    GFRAA 48 (L) 2021    CA 7.2 (L) 2024    OSMOCALC 300 (H) 2024    ALKPHO 62 02/10/2024    AST 91 (H) 02/10/2024    ALT 38 02/10/2024    BILT 0.4 02/10/2024    TP 4.6 (L) 02/10/2024    ALB 1.7 (L) 2024    GLOBULIN 2.3 (L) 02/10/2024     2024    K 4.4 2024     (H) 2024    CO2 24.0 2024     Lab Results   Component Value Date    WBC 16.2 (H) 2024    RBC 2.80 (L) 2024    HGB 8.2 (L) 2024    HCT 25.0 (L) 2024    .0 2024    MPV 8.1 2018    MCV 89.3 2024    MCH 29.3 2024    MCHC 32.8 2024    RDW 16.3 (H) 2024    NEPRELIM 14.03 (H) 2024    NEUTABS 26.32 (H) 2024    LYMPHABS 0.85 (L) 2024    EOSABS 0.85 (H) 2024    BASABS 0.00  02/13/2024    NEUT 92 02/13/2024    LYMPH 3 02/13/2024    MON 1 02/13/2024    EOS 3 02/13/2024    BASO 0 02/13/2024    NEPERCENT 86.5 02/26/2024    LYPERCENT 7.2 02/26/2024    MOPERCENT 4.7 02/26/2024    EOPERCENT 0.4 02/26/2024    BAPERCENT 0.3 02/26/2024    NE 14.03 (H) 02/26/2024    LYMABS 1.16 02/26/2024    MOABSO 0.76 02/26/2024    EOABSO 0.06 02/26/2024    BAABSO 0.05 02/26/2024     Lab Results   Component Value Date    MALBP 123.00 09/12/2023    CREUR 60.10 02/07/2024     Lab Results   Component Value Date    COLORUR Light-Yellow 02/12/2024    CLARITY Turbid (A) 02/12/2024    SPECGRAVITY 1.018 02/12/2024    GLUUR 30 (A) 02/12/2024    BILUR Negative 02/12/2024    KETUR Negative 02/12/2024    BLOODURINE 3+ (A) 02/12/2024    PHURINE 5.5 02/12/2024    PROUR 70 (A) 02/12/2024    UROBILINOGEN Normal 02/12/2024    NITRITE Negative 02/12/2024    LEUUR 250 (A) 02/12/2024    WBCUR 21-50 (A) 02/12/2024    RBCUR >10 (A) 02/12/2024    EPIUR Few (A) 02/12/2024    BACUR None Seen 02/12/2024    HYLUR Present (A) 02/12/2024         IMAGING:     Reviewed.    MEDICATIONS:             ASSESSMENT/PLAN:     68 year old female with PMH of COPD, DM2, HTN, HL, PVD, Sickle cell anemia, CKD stage 3, recent fem-pop artery bypass admitted with acute hemorrhage from incision site.  Patient is s/p OR for repair then underwent L AKA on 2/22.  Nephrology is consulted for OMEGA and metabolic acidosis.          OMEGA on CKD stage 3  - baseline around 1.2  - creatinine improved near baseline, had evidence of fluid overload then received lasix  - hold lasix today.     Hypertension   - amlodipine, hydralazine, coreg  - weaned off nicardipine   - lisinopril held given recent OMEGA, no need to resume with current BPs  - cards following      Metabolic acidosis:  - resolved      Hypernatremia:  - wean off D5; encourage oral hydration     Hypokalemia/ hypomagnesemia:  - replete per protocol      Hypocalcemia: with serum albumin corrected calcium  Normal  -ionized calcium pending  - PTH  370.3; Vit D 20.5  - Continue cholecalciferol 2000 int units daily, calcitriol 0.25 mcg MWF  - Consider endo. Consult OP.       Thank you for allowing me to participate in the care of this patient. Please do not hesitate to call with questions or concerns.    Adalberto Longoria MD  [facility]  Nephrology

## 2024-02-26 NOTE — PROGRESS NOTES
[facility]    Progress Note    Ava Bowen Patient Status:  Inpatient    10/6/1955 MRN [id number]   Location [facility] 3W/SW Attending Edy Foote DO   Hosp Day # 16 PCP Ernesto De Dios     Subjective:  Doing okay  Appetite is slightly better     Diabetes History:  Type 2 DM  Patient follows with me in the diabetes clinic  A1c is 7.9 % ( 2024)  Home regimen:   Glipizide 5 mg BID  Ozempic to 2 mg weekly     A comprehensive 10 point review of systems was completed.  Pertinent positives and negatives noted in the the HPI.      Objective/Physical Exam:  Physical Exam:   Vital Signs:  Blood pressure 95/47, pulse 62, temperature 98.4 °F (36.9 °C), temperature source Axillary, resp. rate 18, height 5' 4.02\" (1.626 m), weight 175 lb 11.2 oz (79.7 kg), SpO2 96%, not currently breastfeeding.      General Appearance:  alert, well developed, in no acute distress  Head: Atraumatic  Eyes:  normal conjunctivae, sclera., normal sclera and normal pupils  Throat/Neck: normal sound to voice. Normal hearing, normal speech  Respiratory:  Speaking in full sentences, non-labored. no increased work of breathing, no audible wheezing    Neuro: motor grossly intact, moving all extremities without difficulty  Psychiatric:  oriented to time, self, and place  Extremities: s/p R AKA      Labs:  Bg reviewed    Assessment/Plan:  Patient Active Problem List   Diagnosis    Anemia    Azotemia    Hypokalemia    Back pain with radiation    Type 2 diabetes mellitus without retinopathy (HCC)    Age-related nuclear cataract of both eyes    Glaucoma suspect of both eyes    Centrilobular emphysema (HCC)    PVD (peripheral vascular disease) (HCC)    Diabetic ulcer of left midfoot associated with type 2 diabetes mellitus, with fat layer exposed (HCC)    PAD (peripheral artery disease) (HCC)    Pre-op testing    Family history of glaucoma in sister    Primary hypertension    S/P laminectomy    Post-operative pain     Type 2 diabetes mellitus with hyperglycemia, with long-term current use of insulin ([facility])    Myalgia    DDD (degenerative disc disease), lumbar    Failed back syndrome of lumbar spine    Chest pain    Hyperlipidemia    Urinary tract infection    Acute cystitis without hematuria    Ileitis    COVID-19    UTI (urinary tract infection)    Pyelonephritis    Hypernatremia    Hyperglycemia    Ureteral calculi    Hypoglycemia    Opioid dependence, uncomplicated ([facility])    Depression, recurrent ([facility])    Stage 3 chronic kidney disease, unspecified whether stage 3a or 3b CKD ([facility])    Bypass graft mechanical complication ([facility])    Bypass graft mechanical complication, initial encounter ([facility])    Anemia, unspecified type    Acute kidney injury superimposed on CKD ([facility])    Uncontrolled type 2 diabetes mellitus with hyperglycemia ([facility])       Type 2 DM  With PAD   S/p AKA     Dextrose at 100 ml/hr      PLAN:   Tresiba 10 units nightly  Novolog  CF with meals  Accuchecks ac and hs  Hypoglycemia protocol  Diabetic diet     Please let the endocrinology service know if there is any change in IV fluids since that will change insulin requirements.    We will follow        Luna Ludwig MD

## 2024-02-26 NOTE — DIETARY NOTE
ADULT NUTRITION REASSESSMENT    Pt is at moderate nutrition risk.  Pt does not meet malnutrition criteria.      RECOMMENDATIONS TO MD: See Nutrition Intervention for oral nutritional supplement (ONS) specifics  and Pt may benefit from temporary nutrition support if po intake remains inadequate if in accordance with pt/family GOC and MD POC      ADMITTING DIAGNOSIS:  Hyperglycemia [R73.9]  Bypass graft mechanical complication, initial encounter ([facility]) [T82.398A]  Acute kidney injury superimposed on CKD  ([facility]) [N17.9, N18.9]  Anemia, unspecified type [D64.9]  PERTINENT PAST MEDICAL HISTORY:   Past Medical History:   Diagnosis Date    Anemia     Back pain     Back problem     Cataract     Chronic kidney disease (CKD)     COPD (chronic obstructive pulmonary disease) ([facility])     FEV 1 1.25 50% 2/20    Diabetes ([facility])     DM neuropathy    Essential hypertension     H/O angioplasty     07/08/2020    High blood pressure     High cholesterol     Neuropathy     Peripheral vascular disease ([facility])     PNA (pneumonia) 05/2018    Pulmonary emphysema ([facility])     Pulmonary nodule     4 mm RUL    Renal disorder     monitoring kidney labs    Sickle cell trait ([facility])     Sickle-cell anemia ([facility])     Tobacco abuse     Visual impairment      PATIENT STATUS: Initial 02/14/24: Pt admit for Bleeding from surgical site (recent right lower extremity femoral to popliteal bypass).  PMH sig for CKD, COPD, DM, HTN, HLD, PVD, sickle cell anemia.   Pt intubated on 2/10 and currently sedated on Propofol 11.6 ml/hr (306 kcals via lipids). S/p wound debridement 2/13 with wound vac in place and 2/16 plan for debridement of rt groin wound with graft placement. Renal service following for OMEGA and metabolic acidosis, s/p bicarb GTT      Pt assessed due to consult for tube feeding. Day#4 NPO. OG tube for EN access. +BM. Net I/O: +9.8L with edema on lasix. Pt overweight for ht using UBW as % of IBW, appears well nourished.   Pt screened at no nutrition risk on  admit/RN (no wt loss, no decline in po intake). RX tube feeds to meet initial nutrition needs during 1st week critical illness.   Update 02/19/24: RA completed per protocol. Chart reviewed, extubated 2/14 pm - diet initiated after SLP evaluation. Plan for right AKA on Thursday per discussion with RN. Poor oral intake noted per EMR review. Per PCT, pt not drinking ONS. Pt visited, reports does not have much of an appetite but that is not new. Pt reports getting ONS at home but has not received them much here. Provided flavor preferences. Encouraged increased energy and protein intake with ONS to help maximize nutrition. Pt in agreement to drink shakes and asked for one now. Adjusted ONS order.     Update 02/26/24: RA completed per protocol. Chart reviewed, POD#4 s/p right AKA. Intake remains minimal since last visit despite oral nutritional supplement (ONS). Discussion with RN, pt drowsy today but poor appetite at baseline. Pt visited, easily aroused by calling name. Pt reports she ate breakfast (20% per intake documentation) and lunch (40% per intake documentation). Intake appears slightly better today from previous few days since procedure. Pt reports drinking supplements. Encouraged increased energy and protein intake with ONS to help maximize nutrition. Adjusted (increased) ONS to help maximize nutrition.    **Pt may benefit from temporary nutrition support if po intake remains inadequate if in accordance with pt/family GOC and MD POC.**    FOOD/NUTRITION RELATED HISTORY:  Appetite: Poor  Intake: ~0--40% x13 meals documented since last visit 1 refusal noted 2/24. NPO 2/22 midnight to 1604. % x 4 ONS per documentation since last visit  Intake Meeting Needs: No, even with oral nutrition supplements (ONS) ordered  Percent Meals Eaten (last 6 days)       Date/Time Percent Meals Eaten (%)    02/20/24 1052 40 %    02/20/24 1546 20 %    02/20/24 1900 10 %    02/21/24 1140 0 %    02/22/24 0800 0 %    02/22/24 1200  0 %    02/22/24 1630 0 %    02/23/24 1604 0 %    02/23/24 1818 5 %     Percent Meals Eaten (%): 3 bites of mac and cheese and then started pocketing the food at 02/23/24 1818    02/24/24 1152 0 %     Percent Meals Eaten (%): pt refused at 02/24/24 1152    02/24/24 1334 10 %     Percent Meals Eaten (%): couple bites of chicken noodle soup at 02/24/24 1334    02/25/24 1029 10 %     Percent Meals Eaten (%): pt took a few bites of cereal with banana at 02/25/24 1029    02/25/24 1444 10 %    02/25/24 1820 15 %    02/26/24 1158 20 %    02/26/24 1418 40 %          Food Allergies: No Known Food Allergies (NKFA)  Cultural/Ethnic/Orthodox Preferences: Not Obtained    GASTROINTESTINAL: +BM 2/26/2024 - medium;soft and Swallow evaluation noted  U/O 700 ml (0.4 ml/kg/hr)    MEDICATIONS: reviewed IVF discontinued 2/25/24  Electrolyte replacement per protocol (cardiac)   gabapentin  300 mg Oral TID    insulin aspart  1-5 Units Subcutaneous TID CC    cholecalciferol  2,000 Units Oral Daily    calcitriol  0.25 mcg Oral Q MWF    insulin degludec  10 Units Subcutaneous Nightly    [Held by provider] insulin aspart  5 Units Subcutaneous TID CC    vancomycin  125 mg Oral Daily    HYDROcodone-acetaminophen  2 tablet Oral TID    pantoprazole  40 mg Oral QAM AC    amLODIPine  10 mg Oral QAM    carvedilol  25 mg Oral BID    hydrALAZINE  100 mg Oral Q8H    vancomycin  750 mg Intravenous Q36H    fluconazole  200 mg Oral Daily    sodium hypochlorite   Topical 2 times per day    fluticasone furoate-vilanterol  1 puff Inhalation Daily    rosuvastatin  40 mg Oral Nightly    meropenem  500 mg Intravenous q12h     LABS: reviewed A1c 7.9% on 2/10/24  POC Glucose reviewed  Recent Labs     02/24/24  0449 02/25/24  0758 02/26/24  0531   * 137* 125*   BUN 29* 27* 26*   CREATSERUM 1.19* 1.13* 1.11*   CA 6.9* 7.2* 7.2*   MG 1.7  --   --    * 141 142   K 4.2 4.3 4.4   * 113* 115*   CO2 25.0 25.0 24.0   PHOS 4.2 4.4 4.5   OSMOCALC 311*  299* 300*     NUTRITION RELATED PHYSICAL FINDINGS:  - Nutrition Focused Physical Exam (NFPE): well nourished per visual exam  2/22/24: Right AKA  - Fluid Accumulation: non-pitting Left Lower extremity, +1 Left Foot , and +2 Right Hand (s) see RN documentation for details  - Skin Integrity: Pressure Injury: Stage 1 on left heel, at risk, breakdown, reddened, surgical wound(s), and other wounds noted wound vac in place to right groin see RN documentation for details     ANTHROPOMETRICS:  HT: 162.6 cm (5' 4.02\")  WT: 79.7 kg (175 lb 11.2 oz)  - wt loss s/p right AKA  Last Visit Wt: 189# 2 oz on 2/19/24  Admit Wt: 171# 1 oz on 2/10/24  Adjusted BMI: Body mass index is 33.7 kg/m². S/p right AKA  BMI CLASSIFICATION: 30-34.9 kg/m2 - obesity class I  Adjusted IBW: 107 lbs s/p right AKA       164% IBW  Usual Body Wt: 158 lbs 1/24/24       111% UBW     WEIGHT HISTORY:  Patient Weight(s) for the past 336 hrs:   Weight   02/26/24 0606 79.7 kg (175 lb 11.2 oz)   02/25/24 0525 76 kg (167 lb 9.6 oz)   02/24/24 0618 79 kg (174 lb 1.6 oz)   02/20/24 0554 81.7 kg (180 lb 3.2 oz)   02/19/24 0445 85.8 kg (189 lb 1.6 oz)   02/18/24 0614 84.4 kg (186 lb 1.6 oz)   02/17/24 0403 82.4 kg (181 lb 11.2 oz)   02/16/24 0500 82.6 kg (182 lb 3.2 oz)   02/14/24 0500 84.3 kg (185 lb 13.6 oz)   02/13/24 0500 83.1 kg (183 lb 3.2 oz)     Wt Readings from Last 10 Encounters:   02/26/24 79.7 kg (175 lb 11.2 oz)   01/27/24 74.5 kg (164 lb 3.2 oz)   08/29/23 69.4 kg (153 lb)   08/21/23 72.6 kg (160 lb)   07/28/23 71.7 kg (158 lb)   07/15/23 71.7 kg (158 lb)   07/07/23 71.2 kg (157 lb)   06/26/23 75.8 kg (167 lb)   06/20/23 77.1 kg (170 lb)   06/06/23 80.3 kg (177 lb)     NUTRITION DIAGNOSIS/PROBLEM:   Inadequate oral intake related to Decreased ability to consume sufficient energy in the setting of intubation/OG in place as evidenced by 0 nutrition intake. (Intervention now TF start)     NUTRITION DIAGNOSIS PROGRESS:  No Improvement  (continue)    NUTRITION INTERVENTION:   NUTRITION PRESCRIPTION:   Estimated Nutrition needs: --dosing wt of 72 kg - wt taken on 8/21/23 (estimated UBW/dry wt)   Calories: 8817-3115 calories/day (24-25 calories per kg Dosing wt)   Protein: 73-97 g protein/day (1.5-2.0 g protein/kg  Adjusted Ideal Body Weight 48.6)  Fluid Needs: 30 ml/kg dosing wt= 2160 ml (maintenance) Adjust per clinical status.     - Diet:       Procedures    Carbohydrate controlled diet 1800 kcal/60 grams; Is Patient on Accuchecks? Yes      - Medical Food Supplements- Glucerna (220 calories and 10 grams protein) BID Chocolate mixed with Naveed twice daily, SF Shake (200 calories and 7 grams protein) BID  - Vitamin and mineral supplements: none  - Feeding assistance: meal set up  - Nutrition education: not appropriate at this time   - Coordination of nutrition care: collaboration with other providers  - Discharge and transfer of nutrition care to new setting or provider: monitor plans    MONITOR AND EVALUATE/NUTRITION GOALS:  - Food and Nutrient Intake:      Monitor: adequacy of PO intake and adequacy of supplement intake  - Food and Nutrient Administration:      Monitor: goc/family wishes regarding nutrition  - Anthropometric Measurement:    Monitor weight  - Nutrition Goals:      allow wt loss due to fluid losses, PO and supplement greater than 75% of needs, good supplement intake, minimize lean body mass loss, labs within acceptable limits, euglycemia, and support wound healing     DIETITIAN FOLLOW UP: RD to follow and monitor nutrition status    Erin Rosenberg MS, JUAN CARLOS, RDN, LDN  [id number]

## 2024-02-26 NOTE — PROGRESS NOTES
02/26/24 1127   Wound 02/22/24 Leg Right   Date First Assessed/Time First Assessed: 02/22/24 1154   Primary Wound Type: Incision  Location: Leg  Wound Location Orientation: Right  Wound Description (Comments): s/p aka w Guffud 2/22  Wound Outcome: Amputation   Wound Image    Site Assessment Clean;Dry;Fragile   Closure Approximated;Staples   Sutures Removed Intact No (Comment)   Drainage Amount None   Treatments Cleansed;Saline   Dressing ABD Pad;Xeroform   Dressing Changed Changed   Dressing Status Dressing Changed;Removed   Non-staged Wound Description Full thickness   Lisbet-wound Assessment Clean;Dry;Intact   Wound Odor None   Wound 02/23/24 Buttocks Left;Right   Date First Assessed/Time First Assessed: 02/23/24 0823   Present on Original Admission: No  Primary Wound Type: Friction/Shear  Location: Buttocks  Wound Location Orientation: Left;Right   Wound Image    Site Assessment Bleeding;Moist;Granulation tissue;Fragile;Red   Drainage Amount Scant   Drainage Description Serosanguineous   Treatments Cleansed;Topical (Barrier/Moisturizer/Ointment)   Dressing Aquacel Foam   Dressing Changed Changed   Dressing Status Dressing Changed;Removed   Wound Depth (cm) 0.1 cm   Non-staged Wound Description Partial thickness   Lisbet-wound Assessment Clean;Intact;Fragile   Wound Granulation Tissue Red   State of Healing Fully granulated   Wound Odor None   Wound 01/24/24 Leg Right;Upper   Date First Assessed/Time First Assessed: 01/24/24 0832   Present on Original Admission: No  Primary Wound Type: Incision  Location: Leg  Wound Location Orientation: Right;Upper   Wound Image     Site Assessment Clean;Fragile;Granulation tissue;Moist;Painful;Pink   Closure Not approximated   Sutures Removed Intact No (Comment)   Drainage Amount Small   Drainage Description Serous   Treatments Wound Vac - Neg Pressure   Wound Vac Brand KCI   Dressing Wound vac sponge   Dressing Changed Changed   Dressing Status Dressing Changed;Removed;Old  drainage   Wound Length (cm) 30 cm   Wound Width (cm) 15 cm   Wound Surface Area (cm^2) 450 cm^2   Wound Depth (cm) 7 cm   Wound Volume (cm^3) 3150 cm^3   Non-staged Wound Description Full thickness   Lisbet-wound Assessment Clean;Fragile;Moist   Wound Granulation Tissue Red   State of Healing Early/partial granulation   Wound Odor None   Exposed Structure Muscle   Undermining? Yes  (5.2cm)   Number of Undermines 1   Undermine 1 Start Position 11   Undermine 1 End Position 1   Wound 02/26/24 Heel Left   Date First Assessed/Time First Assessed: 02/26/24 1135   Location: Heel  Wound Location Orientation: Left   Wound Image    Site Assessment Clean;Dry;Fragile;Purple;Red   Closure Approximated   Drainage Amount None   Treatments Site Care   Dressing Open to air   Wound Length (cm) 3.2 cm   Wound Width (cm) 3 cm   Wound Surface Area (cm^2) 9.6 cm^2   Lisbet-wound Assessment Clean;Dry;Intact   Wound Bed Epithelium (%) 100 %   Wound Odor None   Pressure Injury Stage 1   Negative Pressure Wound Therapy Groin Right   Placement Date: 02/13/24   Inserted by: Dr. KESSLER  Location: Groin  Wound Location Orientation: Right   Wound photographed/measured Yes   Machine Status (On) Yes   Site Assessment Clean;Fragile;Granulation tissue;Moist;Pink   Lisbet-wound Assessment Clean;Fragile   Unit Type KCI ULTA   Dressing Type Black foam;Silver impregnated foam;Non-adherent   Number of Foam Pieces Used 3   Cycle Continuous;On   Target Pressure (mmHg) 125   Drainage Description Serous   Dressing Status Dressing Changed;Removed;Old drainage   Canister Changed No   Wound Follow Up   Follow up needed Yes   Pt seen after pain meds administered per bedside RN for wound vac dressing change. See above. Pt is s/p debridements and R AKA w Dr. Kessler on 2/22. The right stump wound is approximated w staples, no drainage noted, covered w xeroform and ABD. The right groin wound had adaptic applied to the wound base to protect muscle/sutures of flap. Silver  and black sponge were used, a patent seal was obtained at 125 mmHg. The left foot is cool, handheld doppler used, no pulses auscultated. There is a red area to the left medial heel, foot elevated on a pillow. Messaged Dr. Kessler regarding this finding.

## 2024-02-27 NOTE — PROGRESS NOTES
Vascular Surgery Progress Note    No acute events overnight. Patient remains lethargic although she is arousable and oriented. She is able to recognize me. VAC in place on the right leg, holding good suction. Labs and vitals reviewed. Left heel with DTI, no skin breakdown. Recommend strict heel offloading on the left given patient with known PAD and not currently stable for intervention on the left. Will plan VAC change tomorrow with wound care. May need to go back to the OR on Friday for washout.     Madiha Murrieta MD  02/27/24  12:08 PM

## 2024-02-27 NOTE — PROGRESS NOTES
[facility] Hospitalist Progress Note     CC: Hospital Follow up    PCP: Ernesto De Dios       Assessment/Plan:     Principal Problem:    Bypass graft mechanical complication, initial encounter ([facility])  Active Problems:    Hyperglycemia    Bypass graft mechanical complication (HCC)    Anemia, unspecified type    Acute kidney injury superimposed on CKD (HCC)    Uncontrolled type 2 diabetes mellitus with hyperglycemia (HCC)    Ms. Bowen is a 68 year old female with PMH sig for CKD, COPD, DM, HTN, HLD, PVD, and sickle cell anemia who was recently admitted last week for right lower extremity femoral to popliteal bypass who presented with bleeding from surgical site. Taken for emergent OR on 2/10/24, noted to be grossly infected, started on vancomycin/meropenem. Bcx with Klebsiella. BG elevated on admit, started on insulin gtt, endo consulted, now off insulin gtt. Kept intubated post op, pulm following. Polymicrobial infection on meropenum, IV vanco and now fluconazole.  Now S/P AKA on right on 2/22/24.       PAD sp RLE fem-pop bypass last week c/b bleeding fm surg site fm wound dehiscence  - s/p recent fem pop bypass 1/24/24 with Dr. Murrieta  - has been on ASA/plavix- held on admit  - Hg down to 6.0, s/p 3 units pRBC 2/10- watching close now, hgb stable currently no signs of bleeding   - vascular surgery consulted, s/p emergent OR 2/10, removal of infected graft, s/p debridement, bone patch angioplasty   - sp further debridement 2/13 and placement of wound vac  - back to OR 2/16   - s/p right AKA  2/22/24  - Pain meds PRN, gabapentin   - lasix PRN   - Some coolness and decreased pulse on left leg , CTA with occluded stent in femoral artery, discussed with vascular no further intervention at this time, left heel with dark discoloration, decreased doppler pulses on left foot      Septic shock (now improved) fm post op wound infection, polymicrobial   - continue IV vancomycin and meropenem add fluconazole , ID  consult following   - Leukocytosis down trending   - Bcx with klebsiella,   - 2/10 wound cx and 2/13 poly microbial  - abx adjustment per ID f/u cx results  - ID notes appreciated- tenative plan for 6 weeks abx from from final surgical plans- final abx tbd pending cx data and surgical plans   - Now S/P right AKA on 2/22 final determination of treatment will need to be sorted per ID   - Keep joseph due to large leg wound     Metabolic acidosis- resolved   OMEGA on CKD3- at baseline now   Hypernatremia, resolved   - likely 2/2 LA from blood loss +/- dka on admit  - pH 7.24 on admit, LA 7.6 on admit, now normalized  - likely multifactorial, 2/2 LA, blood loss, ?DKA, CKD  - cr 1.7 on admit, previously 1.2 on discharge last week  - monitor Cr and UOP- improved   - renal consulted, s/p bicarb gtt, now on D5W low rate   - Stable, Monitor  - discussed with nephrology     Acute blood loss anemia from surgical site   Chronic anemia  Sickle cell anemia  - Stable currently, trend CBC   - outpatient f/u  - Hg 8.9 on discharge last week  - Hg 6.0 on admission, s/p 4 units pRBC, last unit 2/23/34  - monitor hgb     DM2, hyperglycemia better now   - A1c 7.9   - Hold PO medications  - BG 500s in ED, pH 7.2  - insulin gtt ordered in ED  - increased Degludec 17 at bedtime and aspart 10 units with meals , endo following      Respiratory failure resolved   COPD  - remained intubated post op both fm aspiration event and metabolic acidosis  - pulm following  - extubated 2/14 pm  - Now on 1L NC - wean as able   - RRT called 2/26/24 with ABG showing hypoxemia   - enforced wearing O2    - continue home MDI      Hypovolemic / septic Shock/ Hypotension- improved  Hx HTN  - BP stable now, hydral 100 Q8 hours   - Amlodipine 10 d, Coreg 25 BID   - Holding home lisinopril  - BP ok, watch and consider resuming pending BP       ACP  - CODE- FULL   - POA- daughter and son     FN:  - IVF: complete   - Diet: Carb controlled diet      DVT Prophy: Hold for  now due to bleeding   Lines: PIV     Daughter Tiffanie updated 2/27/24     Questions/concerns were discussed with patient and/or family by bedside.    Thank You,  Max Souza MD     Hospitalist with [facility]     Subjective:   RRT called 2/26/24 for lethargy with ABG showing hypoxemia   More awake and oriented    Pain is better   Decreased gabapentin and doing ok  Tolerating diet so far but not eating much    OBJECTIVE:    Blood pressure 103/78, pulse 64, temperature 99.4 °F (37.4 °C), temperature source Oral, resp. rate 16, height 5' 4.02\" (1.626 m), weight 166 lb 11.2 oz (75.6 kg), SpO2 96%, not currently breastfeeding.    Temp:  [98.7 °F (37.1 °C)-99.6 °F (37.6 °C)] 99.4 °F (37.4 °C)  Pulse:  [63-64] 64  Resp:  [12-18] 16  BP: ()/(50-78) 103/78  SpO2:  [91 %-98 %] 96 %      Intake/Output:    Intake/Output Summary (Last 24 hours) at 2/27/2024 1138  Last data filed at 2/27/2024 0654  Gross per 24 hour   Intake 2053.17 ml   Output 1675 ml   Net 378.17 ml       Last 3 Weights   02/27/24 0547 166 lb 11.2 oz (75.6 kg)   02/26/24 0606 175 lb 11.2 oz (79.7 kg)   02/25/24 0525 167 lb 9.6 oz (76 kg)   02/24/24 0618 174 lb 1.6 oz (79 kg)   02/20/24 0554 180 lb 3.2 oz (81.7 kg)   02/19/24 0445 189 lb 1.6 oz (85.8 kg)   02/18/24 0614 186 lb 1.6 oz (84.4 kg)   02/17/24 0403 181 lb 11.2 oz (82.4 kg)   02/16/24 0500 182 lb 3.2 oz (82.6 kg)   02/14/24 0500 185 lb 13.6 oz (84.3 kg)   02/13/24 0500 183 lb 3.2 oz (83.1 kg)   02/12/24 0400 180 lb 5.4 oz (81.8 kg)   02/11/24 0600 182 lb 5.1 oz (82.7 kg)   02/10/24 2142 171 lb 1.2 oz (77.6 kg)   02/10/24 2105 171 lb 1.2 oz (77.6 kg)   02/10/24 1642 164 lb 3.9 oz (74.5 kg)   01/27/24 0501 164 lb 3.2 oz (74.5 kg)   01/24/24 0549 158 lb (71.7 kg)   01/19/24 1112 160 lb (72.6 kg)   12/30/23 1216 155 lb (70.3 kg)   08/29/23 1357 153 lb (69.4 kg)       Exam   Gen: more awake, NAD  Pulm: Lungs clear, normal respiratory effort, 1 L NC   CV: Heart with regular rate and  rhythm  Abd: Abdomen soft, nontender, nondistended  Ext: right AKA with VAC in place   : joseph     Data Review:       Labs:     Recent Labs   Lab 02/25/24  0758 02/26/24  0531 02/27/24  0359   RBC 2.91* 2.80* 2.76*   HGB 8.2* 8.2* 7.8*   HCT 25.9* 25.0* 24.8*   MCV 89.0 89.3 89.9   MCH 28.2 29.3 28.3   MCHC 31.7 32.8 31.5   RDW 16.5* 16.3* 16.9*   NEPRELIM 17.83* 14.03* 12.70*   WBC 20.3* 16.2* 14.6*   .0 270.0 262.0         Recent Labs   Lab 02/25/24 0758 02/26/24  0531 02/27/24  0359   * 125* 127*   BUN 27* 26* 24*   CREATSERUM 1.13* 1.11* 1.10*   EGFRCR 54* 55* 55*   CA 7.2* 7.2* 7.0*    142 144   K 4.3 4.4 5.0   * 115* 116*   CO2 25.0 24.0 24.0       Recent Labs   Lab 02/24/24  0449 02/25/24  0758 02/26/24  0531 02/27/24  0359   ALB 1.8* 1.8* 1.7* 1.7*         Imaging:  No results found.      Meds:      gabapentin  100 mg Oral TID    insulin aspart  1-5 Units Subcutaneous TID CC    cholecalciferol  2,000 Units Oral Daily    calcitriol  0.25 mcg Oral Q MWF    insulin degludec  10 Units Subcutaneous Nightly    [Held by provider] insulin aspart  5 Units Subcutaneous TID CC    vancomycin  125 mg Oral Daily    HYDROcodone-acetaminophen  2 tablet Oral TID    pantoprazole  40 mg Oral QAM AC    amLODIPine  10 mg Oral QAM    carvedilol  25 mg Oral BID    hydrALAZINE  100 mg Oral Q8H    vancomycin  750 mg Intravenous Q36H    fluconazole  200 mg Oral Daily    sodium hypochlorite   Topical 2 times per day    fluticasone furoate-vilanterol  1 puff Inhalation Daily    rosuvastatin  40 mg Oral Nightly    meropenem  500 mg Intravenous q12h      dextrose 5%-sodium chloride 0.45% 100 mL/hr at 02/27/24 0351       acetaminophen **OR** [DISCONTINUED] HYDROcodone-acetaminophen **OR** [DISCONTINUED] HYDROcodone-acetaminophen, HYDROmorphone, HYDROcodone-acetaminophen, HYDROcodone-acetaminophen, morphINE, hydrALAzine, ondansetron, metoclopramide, albuterol, ipratropium-albuterol, glucose **OR** glucose  **OR** glucose-vitamin C **OR** dextrose **OR** glucose **OR** glucose **OR** glucose-vitamin C

## 2024-02-27 NOTE — PROGRESS NOTES
02/27/24 1400   Negative Pressure Wound Therapy Groin Right   Placement Date: 02/13/24   Inserted by: Dr. LEWIS  Location: Groin  Wound Location Orientation: Right   Wound photographed/measured No   Machine Status (On) Yes   Site Assessment ANGELO   Lisbet-wound Assessment ANGELO   Unit Type KCI ULTA   Cycle Continuous;On   Target Pressure (mmHg) 125   Drainage Description Serous   Dressing Status Dressing Reinforced;New Drainage   Canister Changed No   Wound Follow Up   Follow up needed Yes     Pt was seen for leaking wound vac dressing. The stump incision has begun to weep into the vac dressing. Area cleansed and dried, drape reinforced, patent seal at 125 obtained. The stump was dressed with alginate and abd pad. Plan for dressing change tomorrow am. LUIS ALFREDO Murrieta. I will incorporate the staple line in the vac dressing at that time to manage drainage.

## 2024-02-27 NOTE — PROGRESS NOTES
[facility]    Nephrology Progress Note    Ava Bowen Attending:  Max Souza MD       SUBJECTIVE:     Resting on O2  Events overnight noted.    PHYSICAL EXAM:     Vital Signs: /78 (BP Location: Right arm)   Pulse 64   Temp 99.4 °F (37.4 °C) (Oral)   Resp 16   Ht 5' 4.02\" (1.626 m)   Wt 166 lb 11.2 oz (75.6 kg)   LMP  (LMP Unknown)   SpO2 96%   BMI 28.60 kg/m²   Temp (24hrs), Av.1 °F (37.3 °C), Min:98.7 °F (37.1 °C), Max:99.6 °F (37.6 °C)       Intake/Output Summary (Last 24 hours) at 2024 0946  Last data filed at 2024 0654  Gross per 24 hour   Intake 2053.17 ml   Output 1675 ml   Net 378.17 ml     Wt Readings from Last 3 Encounters:   24 166 lb 11.2 oz (75.6 kg)   24 164 lb 3.2 oz (74.5 kg)   23 153 lb (69.4 kg)       Gen - nad  HEENT - NCAT  CV - RRR  Resp - CTAB  Abd - soft  Ext - s/p R AKA, 1+ LLE edema  MS - awake alert answering questions appropriately       LABORATORY DATA:     Lab Results   Component Value Date     (H) 2024    BUN 24 (H) 2024    BUNCREA 21.8 (H) 2024    CREATSERUM 1.10 (H) 2024    ANIONGAP 4 2024    GFRNAA 41 (L) 2021    GFRAA 48 (L) 2021    CA 7.0 (L) 2024    OSMOCALC 304 (H) 2024    ALKPHO 62 02/10/2024    AST 91 (H) 02/10/2024    ALT 38 02/10/2024    BILT 0.4 02/10/2024    TP 4.6 (L) 02/10/2024    ALB 1.7 (L) 2024    GLOBULIN 2.3 (L) 02/10/2024     2024    K 5.0 2024     (H) 2024    CO2 24.0 2024     Lab Results   Component Value Date    WBC 14.6 (H) 2024    RBC 2.76 (L) 2024    HGB 7.8 (L) 2024    HCT 24.8 (L) 2024    .0 2024    MPV 8.1 2018    MCV 89.9 2024    MCH 28.3 2024    MCHC 31.5 2024    RDW 16.9 (H) 2024    NEPRELIM 12.70 (H) 2024    NEUTABS 26.32 (H) 2024    LYMPHABS 0.85 (L) 2024    EOSABS 0.85 (H) 2024    BASABS  0.00 02/13/2024    NEUT 92 02/13/2024    LYMPH 3 02/13/2024    MON 1 02/13/2024    EOS 3 02/13/2024    BASO 0 02/13/2024    NEPERCENT 86.8 02/27/2024    LYPERCENT 7.4 02/27/2024    MOPERCENT 4.3 02/27/2024    EOPERCENT 0.5 02/27/2024    BAPERCENT 0.2 02/27/2024    NE 12.70 (H) 02/27/2024    LYMABS 1.08 02/27/2024    MOABSO 0.63 02/27/2024    EOABSO 0.08 02/27/2024    BAABSO 0.03 02/27/2024     Lab Results   Component Value Date    MALBP 123.00 09/12/2023    CREUR 60.10 02/07/2024     Lab Results   Component Value Date    COLORUR Light-Yellow 02/12/2024    CLARITY Turbid (A) 02/12/2024    SPECGRAVITY 1.018 02/12/2024    GLUUR 30 (A) 02/12/2024    BILUR Negative 02/12/2024    KETUR Negative 02/12/2024    BLOODURINE 3+ (A) 02/12/2024    PHURINE 5.5 02/12/2024    PROUR 70 (A) 02/12/2024    UROBILINOGEN Normal 02/12/2024    NITRITE Negative 02/12/2024    LEUUR 250 (A) 02/12/2024    WBCUR 21-50 (A) 02/12/2024    RBCUR >10 (A) 02/12/2024    EPIUR Few (A) 02/12/2024    BACUR None Seen 02/12/2024    HYLUR Present (A) 02/12/2024         IMAGING:     Reviewed.    MEDICATIONS:             ASSESSMENT/PLAN:     68 year old female with PMH of COPD, DM2, HTN, HL, PVD, Sickle cell anemia, CKD stage 3, recent fem-pop artery bypass admitted with acute hemorrhage from incision site.  Patient is s/p OR for repair then underwent L AKA on 2/22.  Nephrology is consulted for OMEGA and metabolic acidosis.          OMEGA on CKD stage 3  - baseline around 1.2  - creatinine improved near baseline, had evidence of fluid overload then received lasix     Hypertension   - amlodipine, hydralazine, coreg  - weaned off nicardipine   - lisinopril held given recent OMEGA, no need to resume with current BPs  - cards following      Metabolic acidosis:  - resolved      Hypernatremia:  - wean off D5; encourage oral hydration     Hypokalemia/ hypomagnesemia:  - replete per protocol      Hypocalcemia: with serum albumin corrected calcium Normal  -ionized calcium  normal  - PTH  370.3; Vit D 20.5  - Continue cholecalciferol 2000 int units daily, calcitriol 0.25 mcg MWF  - Consider endo. Consult OP.       Thank you for allowing me to participate in the care of this patient. Please do not hesitate to call with questions or concerns.    Adalberto Longoria MD  [facility]  Nephrology

## 2024-02-27 NOTE — TELEPHONE ENCOUNTER
Routed to Dr. Torres for signature.     Shayna - Please consider reaching out to pt to possibly cancel appt? She is currently admitted in hospital.

## 2024-02-27 NOTE — PROGRESS NOTES
[facility]    Progress Note    Ava Bowen Patient Status:  Inpatient    10/6/1955 MRN [id number]   Location [facility] 3W/SW Attending Edy Foote DO   Hosp Day # 17 PCP Ernesto De Dios     Subjective:  Doing okay  Appetite is poor  Tresiba was held overnight and dextrose was started.    Diabetes History:  Type 2 DM  Patient follows with me in the diabetes clinic  A1c is 7.9 % ( 2024)  Home regimen:   Glipizide 5 mg BID  Ozempic to 2 mg weekly     A comprehensive 10 point review of systems was completed.  Pertinent positives and negatives noted in the the HPI.      Objective/Physical Exam:  Physical Exam:   Vital Signs:  Blood pressure 103/78, pulse 64, temperature 99.4 °F (37.4 °C), temperature source Oral, resp. rate 16, height 5' 4.02\" (1.626 m), weight 166 lb 11.2 oz (75.6 kg), SpO2 96%, not currently breastfeeding.      General Appearance:  alert, well developed, in no acute distress  Head: Atraumatic  Eyes:  normal conjunctivae, sclera., normal sclera and normal pupils  Throat/Neck: normal sound to voice. Normal hearing, normal speech  Respiratory:  Speaking in full sentences, non-labored. no increased work of breathing, no audible wheezing    Neuro: motor grossly intact, moving all extremities without difficulty  Psychiatric:  oriented to time, self, and place  Extremities: s/p R AKA      Labs:  Bg reviewed    Assessment/Plan:  Patient Active Problem List   Diagnosis    Anemia    Azotemia    Hypokalemia    Back pain with radiation    Type 2 diabetes mellitus without retinopathy (HCC)    Age-related nuclear cataract of both eyes    Glaucoma suspect of both eyes    Centrilobular emphysema (HCC)    PVD (peripheral vascular disease) (HCC)    Diabetic ulcer of left midfoot associated with type 2 diabetes mellitus, with fat layer exposed (HCC)    PAD (peripheral artery disease) ([facility])    Pre-op testing    Family history of glaucoma in sister    Primary hypertension    S/P  laminectomy    Post-operative pain    Type 2 diabetes mellitus with hyperglycemia, with long-term current use of insulin ([facility])    Myalgia    DDD (degenerative disc disease), lumbar    Failed back syndrome of lumbar spine    Chest pain    Hyperlipidemia    Urinary tract infection    Acute cystitis without hematuria    Ileitis    COVID-19    UTI (urinary tract infection)    Pyelonephritis    Hypernatremia    Hyperglycemia    Ureteral calculi    Hypoglycemia    Opioid dependence, uncomplicated ([facility])    Depression, recurrent ([facility])    Stage 3 chronic kidney disease, unspecified whether stage 3a or 3b CKD ([facility])    Bypass graft mechanical complication (HCC)    Bypass graft mechanical complication, initial encounter ([facility])    Anemia, unspecified type    Acute kidney injury superimposed on CKD ([facility])    Uncontrolled type 2 diabetes mellitus with hyperglycemia ([facility])       Type 2 DM  With PAD   S/p AKA       PLAN:   Continue to hold Tresiba  Stop dextrose since sugars are coming up  Novolog  CF with meals  Accuchecks ac and hs  Hypoglycemia protocol  Diabetic diet     Elevated parathyroid hormone level  Noted on blood work  Vitamin D is slightly low at 20.  She is on calcitriol and vitamin D  Calcium is on the lower side but she has a low albumin corrected calcium is around 8.1  It is possible that calcium will increase with normalization of vitamin D   She could have primary hyperparathyroidism.  She has slightly low GFR.  No known history of kidney stones.  Calcium at present is not more than a one-time the upper limit of normal  DEXA is not available this can be completed as an outpatient  As an outpatient will recommend monitoring of calcium, nuclear medicine parathyroid scan.  No  other acute intervention at this time       We will follow        Luna Ludwig MD

## 2024-02-27 NOTE — PROGRESS NOTES
Called by RN to evaluate patient in room 340 on my arrival patient lying in bed accompanied by family member, answering questions appropriately increasing alertness over the past few minutes as per RN.  ABG ordered earlier, mildly hypoxic will start O2 via nasal cannula.

## 2024-02-27 NOTE — PROGRESS NOTES
02/27/24 0900   Negative Pressure Wound Therapy Groin Right   Placement Date: 02/13/24   Inserted by: Dr. LEWIS  Location: Groin  Wound Location Orientation: Right   Wound photographed/measured No   Machine Status (On) Yes   Site Assessment ANGELO   Lisbet-wound Assessment ANGELO   Unit Type KCI ULTA   Cycle Continuous;On   Target Pressure (mmHg) 125   Drainage Description Serous   Dressing Status Clean;Dry;Intact;Dressing Reinforced   Canister Changed No   Wound Follow Up   Follow up needed Yes

## 2024-02-27 NOTE — PROGRESS NOTES
[facility] Infectious Disease  Progress Note    Ava Bowen Patient Status:  Inpatient    10/6/1955 MRN [id number]   Location [facility] 3W/SW Attending Max Souza MD   Hosp Day # 17 PCP Ernesto De Dios     Subjective:    Patient seen and examined resting in bed, tolerating the IV abx. States she wants to go home.    Review of Systems:  Review of systems reviewed and negative except as mentioned    Objective:  Blood pressure 103/78, pulse 64, temperature 99.4 °F (37.4 °C), temperature source Oral, resp. rate 16, height 5' 4.02\" (1.626 m), weight 166 lb 11.2 oz (75.6 kg), SpO2 96%, not currently breastfeeding.      Physical Exam:  General: Awake, alert, non-tox, NAD.  HEENT:  Oropharynx clear, trachea ML.  Heart: RRR S1S2 no murmurs.  Lungs: Essentially CTA b/l, no rhonchi, rales, wheezes.  Abdomen: Soft, NT/ND.  BS present.  No organomegaly.  Extremity: +right groin with wound vac in place s/p AKA  Neurological: No focal deficits.  Derm:  Warm, dry, free from rashes.  Left arm Midline    Lab Data Review:  Lab Results   Component Value Date    WBC 14.6 2024    HGB 7.8 2024    HCT 24.8 2024    .0 2024    CREATSERUM 1.10 2024    BUN 24 2024     2024    K 5.0 2024     2024    CO2 24.0 2024     2024    CA 7.0 2024    ALB 1.7 2024    MG 1.7 2024    PHOS 4.2 2024        Cultures:  Hospital Encounter on 02/10/24   1. Blood Culture     Status: None    Collection Time: 24 11:56 AM    Specimen: Blood,peripheral   Result Value Ref Range    Blood Culture Result No Growth 5 Days N/A   2. Tissue Aerobic Culture     Status: Abnormal    Collection Time: 24 11:42 AM    Specimen: Femoral graft; Tissue   Result Value Ref Range    Tissue Culture Result  N/A     Mixture of organisms suggestive of normal skin karla    Tissue Culture Result 1+ growth  Klebsiella pneumoniae (A) N/A    Tissue Smear No WBCs seen N/A    Tissue Smear No organisms seen N/A   3. Anaerobic Culture     Status: None    Collection Time: 02/13/24 11:42 AM    Specimen: Femoral graft; Tissue   Result Value Ref Range    Anaerobic Culture No Anaerobes isolated N/A   4. Aerobic Bacterial Culture     Status: Abnormal    Collection Time: 02/13/24 11:36 AM    Specimen: Calf, right; Body fluid, unspecified   Result Value Ref Range    Aerobic Culture Result  N/A     Mixture of organisms suggestive of normal skin karla    Aerobic Culture Result 1+ growth Klebsiella pneumoniae (A) N/A    Aerobic Smear 1+ WBCs seen N/A    Aerobic Smear No organisms seen N/A   5. Urine Culture, Routine     Status: Abnormal    Collection Time: 02/12/24  4:19 PM    Specimen: Urine, joseph catheter   Result Value Ref Range    Urine Culture 10,000 - 50,000 CFU/ML Candida albicans (A) N/A        Radiology:    CTA ABDOMEN/PELVIS LOWER EXT BILAT W RUNOFF (GLS=89196)    Result Date: 2/21/2024  CONCLUSION:   Completely occluded right superficial femoral artery stent is unchanged in appearance since the prior exams.  Completely occluded left superficial femoral artery stent is new since 6/20/2023.  Multifocal areas of high-grade stenosis and narrowing seen involving the mid to distal superficial femoral artery with a focal area of complete occlusion within the distal left superficial femoral artery which is new since 6/20/2023.  Markedly limited evaluation of bilateral calf vessels due to extensive atherosclerotic calcification.  Large soft tissue ulceration and wounds seen within the anterior right pelvis and upper thigh and medial knee.  No abscess or drainable collection seen at this point.  Anasarca  Multiple other incidental findings as described in the body of the report.    Dictated by (CST): Elder Wagoner MD on 2/21/2024 at 8:26 AM     Finalized by (CST): Elder Wagoner MD on 2/21/2024 at 8:43 AM          US VENOUS DOPPLER LEG  LEFT - DIAG IMG (CPT=93971)    Result Date: 2/21/2024  CONCLUSION: No evidence of left lower extremity DVT.   Dictated by (CST): Ron Chaudhari MD on 2/21/2024 at 6:15 AM     Finalized by (CST): Ron Chaudhari MD on 2/21/2024 at 6:15 AM           Assessment and Plan:    Acute gram negative sepsis presenting with a bleeding complication after recent fem-pop bypass 1/24/24  - s/p emergent OR where extensive debridement and patch was performed - noting infected graft material  - Blood culture positive for klebsiella on admission, repeats negative  - OR cultures from debridement with klebsiella, enterococcus, strep anginosus, e.coli, bacteroides, prevotella 2/10/24  - Return to OR 2/13/24 for debridement and removal of infected graft with VAC placement  - OR cultures from debridement with klebsiella, e.coli, candida, enterococcus on 2/13/24  - s/p OR debridement again on 2/16/24  - s/p debridement and right AKA on 2/22/24  - IV vancomycin, meropenem and PO fluconazole ongoing      Leukocytosis and low grade fevers ongoing  - Multifactorial due to the above  - trending down  - will trend     DM II  - per attending     Recommendations  - continue IV vancomycin, meropenem and PO fluconazole  - wound vac per surgery  - repeat labs tomorrow  - further recommendations to follow      If you have any questions or concerns please call [facility] Infectious Disease at [phone].     ZAIRA Dietz    2/27/2024  9:20 AM

## 2024-02-27 NOTE — PLAN OF CARE
Problem: Patient Centered Care  Goal: Patient preferences are identified and integrated in the patient's plan of care  Description: Interventions:  - What would you like us to know as we care for you? From home alone  - Provide timely, complete, and accurate information to patient/family  - Incorporate patient and family knowledge, values, beliefs, and cultural backgrounds into the planning and delivery of care  - Encourage patient/family to participate in care and decision-making at the level they choose  - Honor patient and family perspectives and choices  Outcome: Adequate for Discharge     Problem: Diabetes/Glucose Control  Goal: Glucose maintained within prescribed range  Description: INTERVENTIONS:  - Monitor Blood Glucose as ordered  - Assess for signs and symptoms of hyperglycemia and hypoglycemia  - Administer ordered medications to maintain glucose within target range  - Assess barriers to adequate nutritional intake and initiate nutrition consult as needed  - Instruct patient on self management of diabetes  Outcome: Adequate for Discharge     Problem: Patient/Family Goals  Goal: Patient/Family Long Term Goal  Description: Patient's Long Term Goal: discharge    Interventions:  - Monitor labs and vital signs  - IV antibiotics  - Medication compliance  - Follow provider recommendations  - See additional Care Plan goals for specific interventions  Outcome: Adequate for Discharge  Goal: Patient/Family Short Term Goal  Description: Patient's Short Term Goal: pain management    Interventions:   - Reposition, elevate stump on pillows  -Medication compliance  - Follow provider recommendations  - See additional Care Plan goals for specific interventions  Outcome: Adequate for Discharge     Problem: CARDIOVASCULAR - ADULT  Goal: Maintains optimal cardiac output and hemodynamic stability  Description: INTERVENTIONS:  - Monitor vital signs, rhythm, and trends  - Monitor for bleeding, hypotension and signs of  decreased cardiac output  - Evaluate effectiveness of vasoactive medications to optimize hemodynamic stability  - Monitor arterial and/or venous puncture sites for bleeding and/or hematoma  - Assess quality of pulses, skin color and temperature  - Assess for signs of decreased coronary artery perfusion - ex. Angina  - Evaluate fluid balance, assess for edema, trend weights  Outcome: Adequate for Discharge  Goal: Absence of cardiac arrhythmias or at baseline  Description: INTERVENTIONS:  - Continuous cardiac monitoring, monitor vital signs, obtain 12 lead EKG if indicated  - Evaluate effectiveness of antiarrhythmic and heart rate control medications as ordered  - Initiate emergency measures for life threatening arrhythmias  - Monitor electrolytes and administer replacement therapy as ordered  Outcome: Adequate for Discharge     Problem: RESPIRATORY - ADULT  Goal: Achieves optimal ventilation and oxygenation  Description: INTERVENTIONS:  - Assess for changes in respiratory status  - Assess for changes in mentation and behavior  - Position to facilitate oxygenation and minimize respiratory effort  - Oxygen supplementation based on oxygen saturation or ABGs  - Provide Smoking Cessation handout, if applicable  - Encourage broncho-pulmonary hygiene including cough, deep breathe, Incentive Spirometry  - Assess the need for suctioning and perform as needed  - Assess and instruct to report SOB or any respiratory difficulty  - Respiratory Therapy support as indicated  - Manage/alleviate anxiety  - Monitor for signs/symptoms of CO2 retention  Outcome: Adequate for Discharge     Problem: METABOLIC/FLUID AND ELECTROLYTES - ADULT  Goal: Glucose maintained within prescribed range  Description: INTERVENTIONS:  - Monitor Blood Glucose as ordered  - Assess for signs and symptoms of hyperglycemia and hypoglycemia  - Administer ordered medications to maintain glucose within target range  - Assess barriers to adequate nutritional intake  and initiate nutrition consult as needed  - Instruct patient on self management of diabetes  Outcome: Adequate for Discharge  Goal: Electrolytes maintained within normal limits  Description: INTERVENTIONS:  - Monitor labs and rhythm and assess patient for signs and symptoms of electrolyte imbalances  - Administer electrolyte replacement as ordered  - Monitor response to electrolyte replacements, including rhythm and repeat lab results as appropriate  - Fluid restriction as ordered  - Instruct patient on fluid and nutrition restrictions as appropriate  Outcome: Adequate for Discharge  Goal: Hemodynamic stability and optimal renal function maintained  Description: INTERVENTIONS:  - Monitor labs and assess for signs and symptoms of volume excess or deficit  - Monitor intake, output and patient weight  - Monitor urine specific gravity, serum osmolarity and serum sodium as indicated or ordered  - Monitor response to interventions for patient's volume status, including labs, urine output, blood pressure (other measures as available)  - Encourage oral intake as appropriate  - Instruct patient on fluid and nutrition restrictions as appropriate  Outcome: Adequate for Discharge     Problem: PAIN - ADULT  Goal: Verbalizes/displays adequate comfort level or patient's stated pain goal  Description: INTERVENTIONS:  - Encourage pt to monitor pain and request assistance  - Assess pain using appropriate pain scale  - Administer analgesics based on type and severity of pain and evaluate response  - Implement non-pharmacological measures as appropriate and evaluate response  - Consider cultural and social influences on pain and pain management  - Manage/alleviate anxiety  - Utilize distraction and/or relaxation techniques  - Monitor for opioid side effects  - Notify MD/LIP if interventions unsuccessful or patient reports new pain  - Anticipate increased pain with activity and pre-medicate as appropriate  Outcome: Adequate for  Discharge   Pt drowsy most of day, responds to verbal stimuli and follows commands. Left foot noted to be cool and pulses not detected with doppler. Attending and vascular surgeon notified. Heel protector boot applied. Encouraging meals when awake. Wound vac changed by wound nurses. No new skin breakdown. IV abx continued.

## 2024-02-27 NOTE — RESPIRATORY THERAPY NOTE
Per ABG placed pt on 2L NC. Nurse notified of change.     Latest Reference Range & Units 02/26/24 21:19   ABG PH 7.35 - 7.45  7.41   ABG PCO2 35 - 45 mm Hg 40   ABG PO2 80 - 100 mm Hg 54 (LL)   ABG HCO3 21.0 - 27.0 mEq/L 25.4   ABG O2 SATURATION 94.0 - 100.0 % 92.2 (L)   Blood Gas Base Excess -2.0 - 2.0 mmol/L 0.7   Oxygen Delivery Device  Room Air   Oxygen Content 15.0 - 23.0 Vol% 10.5 (L)   SAMPLE SITE  Right Radial   (LL): Data is critically low  (L): Data is abnormally low

## 2024-02-28 NOTE — PROGRESS NOTES
[facility]    Nephrology Progress Note    Ava Bowen Attending:  Max Souza MD       SUBJECTIVE:     Resting in bed  Not in acute distress.    PHYSICAL EXAM:     Vital Signs: /44 (BP Location: Right arm)   Pulse 65   Temp 98.6 °F (37 °C) (Axillary)   Resp 15   Ht 5' 4.02\" (1.626 m)   Wt 163 lb 3.2 oz (74 kg)   LMP  (LMP Unknown)   SpO2 94%   BMI 28.00 kg/m²   Temp (24hrs), Av.7 °F (37.1 °C), Min:98.1 °F (36.7 °C), Max:99.1 °F (37.3 °C)       Intake/Output Summary (Last 24 hours) at 2024 0930  Last data filed at 2024 0913  Gross per 24 hour   Intake 618 ml   Output 1100 ml   Net -482 ml     Wt Readings from Last 3 Encounters:   24 163 lb 3.2 oz (74 kg)   24 164 lb 3.2 oz (74.5 kg)   23 153 lb (69.4 kg)       Gen - nad  HEENT - NCAT  CV - RRR  Resp - CTAB  Abd - soft  Ext - s/p R AKA, 1+ LLE edema  MS - awake alert answering questions appropriately       LABORATORY DATA:     Lab Results   Component Value Date     (H) 2024    BUN 21 2024    BUNCREA 19.8 2024    CREATSERUM 1.06 (H) 2024    ANIONGAP 5 2024    GFRNAA 41 (L) 2021    GFRAA 48 (L) 2021    CA 7.2 (L) 2024    OSMOCALC 301 (H) 2024    ALKPHO 62 02/10/2024    AST 91 (H) 02/10/2024    ALT 38 02/10/2024    BILT 0.4 02/10/2024    TP 4.6 (L) 02/10/2024    ALB 1.7 (L) 2024    GLOBULIN 2.3 (L) 02/10/2024     2024    K 5.0 2024     (H) 2024    CO2 24.0 2024     Lab Results   Component Value Date    WBC 15.3 (H) 2024    RBC 2.82 (L) 2024    HGB 8.3 (L) 2024    HCT 25.2 (L) 2024    .0 2024    MPV 8.1 2018    MCV 89.4 2024    MCH 29.4 2024    MCHC 32.9 2024    RDW 16.2 (H) 2024    NEPRELIM 13.52 (H) 2024    NEUTABS 26.32 (H) 2024    LYMPHABS 0.85 (L) 2024    EOSABS 0.85 (H) 2024    BASABS 0.00 2024     NEUT 92 02/13/2024    LYMPH 3 02/13/2024    MON 1 02/13/2024    EOS 3 02/13/2024    BASO 0 02/13/2024    NEPERCENT 88.4 02/28/2024    LYPERCENT 6.7 02/28/2024    MOPERCENT 3.3 02/28/2024    EOPERCENT 0.4 02/28/2024    BAPERCENT 0.1 02/28/2024    NE 13.52 (H) 02/28/2024    LYMABS 1.02 02/28/2024    MOABSO 0.50 02/28/2024    EOABSO 0.06 02/28/2024    BAABSO 0.02 02/28/2024     Lab Results   Component Value Date    MALBP 123.00 09/12/2023    CREUR 60.10 02/07/2024     Lab Results   Component Value Date    COLORUR Light-Yellow 02/12/2024    CLARITY Turbid (A) 02/12/2024    SPECGRAVITY 1.018 02/12/2024    GLUUR 30 (A) 02/12/2024    BILUR Negative 02/12/2024    KETUR Negative 02/12/2024    BLOODURINE 3+ (A) 02/12/2024    PHURINE 5.5 02/12/2024    PROUR 70 (A) 02/12/2024    UROBILINOGEN Normal 02/12/2024    NITRITE Negative 02/12/2024    LEUUR 250 (A) 02/12/2024    WBCUR 21-50 (A) 02/12/2024    RBCUR >10 (A) 02/12/2024    EPIUR Few (A) 02/12/2024    BACUR None Seen 02/12/2024    HYLUR Present (A) 02/12/2024         IMAGING:     Reviewed.    MEDICATIONS:             ASSESSMENT/PLAN:     68 year old female with PMH of COPD, DM2, HTN, HL, PVD, Sickle cell anemia, CKD stage 3, recent fem-pop artery bypass admitted with acute hemorrhage from incision site.  Patient is s/p OR for repair then underwent L AKA on 2/22.  Nephrology is consulted for OMEGA and metabolic acidosis.          OMEGA on CKD stage 3  - baseline around 1.2  - creatinine improved near baseline, had evidence of fluid overload then received lasix     Hypertension   - amlodipine, hydralazine, coreg  - weaned off nicardipine   - lisinopril held given recent OMEGA, no need to resume with current BPs  - cards following      Metabolic acidosis:  - resolved      Hypernatremia:  - wean off D5; encourage oral hydration     Hypokalemia/ hypomagnesemia:  - resolved.     Hypocalcemia: with serum albumin corrected calcium Normal  -ionized calcium normal  - PTH  370.3; Vit D  20.5  - Continue cholecalciferol 2000 int units daily, calcitriol 0.25 mcg MWF  - Endocrinology on Consult.     Will sign off at this time please call with questions.    Adalberto Longoria MD  [facility]  Nephrology

## 2024-02-28 NOTE — PROGRESS NOTES
[facility] Infectious Disease  Progress Note    Ava Bowen Patient Status:  Inpatient    10/6/1955 MRN [id number]   Location [facility] 3W/SW Attending Edy Foote DO   Hosp Day # 18 PCP Ernesto MIGUEL Lanre Bowen is a 68 year old female.   Chief Complaint   Patient presents with    Bleeding       HPI:      No new complaints               REVIEW OF SYSTEMS:   A comprehensive 10 point review of systems was completed.  Pertinent positives and negatives noted in the the HPI.       Allergies:  Allergies   Allergen Reactions    Penicillins HIVES and ANGIOEDEMA     Hives on eyelids (occurred when pt was in her 20s). Tolerated amoxicillin per chart. Tolerates cephalosporins.        Current Meds:    Current Facility-Administered Medications:     gabapentin (Neurontin) cap 100 mg, 100 mg, Oral, TID    insulin aspart (NovoLOG) 100 Units/mL FlexPen 1-5 Units, 1-5 Units, Subcutaneous, TID CC    cholecalciferol (VITAMIN D3) tab 2,000 Units, 2,000 Units, Oral, Daily    calcitriol (Rocaltrol) cap 0.25 mcg, 0.25 mcg, Oral, Q MWF    acetaminophen (Tylenol) tab 650 mg, 650 mg, Oral, Q4H PRN **OR** [DISCONTINUED] HYDROcodone-acetaminophen (Norco) 5-325 MG per tab 1 tablet, 1 tablet, Oral, Q4H PRN **OR** [DISCONTINUED] HYDROcodone-acetaminophen (Norco) 5-325 MG per tab 2 tablet, 2 tablet, Oral, Q4H PRN    [Held by provider] insulin aspart (NovoLOG) 100 Units/mL FlexPen 5 Units, 5 Units, Subcutaneous, TID CC    vancomycin (Vancocin) cap 125 mg, 125 mg, Oral, Daily    HYDROmorphone (Dilaudid) 1 MG/ML injection 1 mg, 1 mg, Intravenous, Q3H PRN    pantoprazole (Protonix) DR tab 40 mg, 40 mg, Oral, QAM AC    amLODIPine (Norvasc) tab 10 mg, 10 mg, Oral, QAM    carvedilol (Coreg) tab 25 mg, 25 mg, Oral, BID    hydrALAZINE (Apresoline) tab 100 mg, 100 mg, Oral, Q8H    vancomycin (Vancocin) 750 mg in sodium chloride 0.9% 250 mL IVPB-ADDV, 750 mg, Intravenous, Q36H     fluconazole (Diflucan) tab 200 mg, 200 mg, Oral, Daily    HYDROcodone-acetaminophen (Norco)  MG per tab 1 tablet, 1 tablet, Oral, Q4H PRN    HYDROcodone-acetaminophen (Norco)  MG per tab 2 tablet, 2 tablet, Oral, Q4H PRN    morphINE 20 mg/mL concentrated oral solution 5 mg, 5 mg, Oral, Q4H PRN    sodium hypochlorite (Dakin's) 0.125 % external solution, , Topical, 2 times per day    hydrALAzine (Apresoline) 20 mg/mL injection 10 mg, 10 mg, Intravenous, Q4H PRN    ondansetron (Zofran) 4 MG/2ML injection 4 mg, 4 mg, Intravenous, Q6H PRN    metoclopramide (Reglan) 5 mg/mL injection 5 mg, 5 mg, Intravenous, Q8H PRN    albuterol (Ventolin HFA) 108 (90 Base) MCG/ACT inhaler 2 puff, 2 puff, Inhalation, Q6H PRN    fluticasone furoate-vilanterol (Breo Ellipta) 200-25 MCG/ACT inhaler 1 puff, 1 puff, Inhalation, Daily    ipratropium-albuterol (Duoneb) 0.5-2.5 (3) MG/3ML inhalation solution 3 mL, 3 mL, Nebulization, Q6H PRN    rosuvastatin (Crestor) tab 40 mg, 40 mg, Oral, Nightly    glucose (Dex4) 15 GM/59ML oral liquid 15 g, 15 g, Oral, Q15 Min PRN **OR** glucose (Glutose) 40% oral gel 15 g, 15 g, Oral, Q15 Min PRN **OR** glucose-vitamin C (Dex-4) chewable tab 4 tablet, 4 tablet, Oral, Q15 Min PRN **OR** dextrose 50% injection 50 mL, 50 mL, Intravenous, Q15 Min PRN **OR** glucose (Dex4) 15 GM/59ML oral liquid 30 g, 30 g, Oral, Q15 Min PRN **OR** glucose (Glutose) 40% oral gel 30 g, 30 g, Oral, Q15 Min PRN **OR** glucose-vitamin C (Dex-4) chewable tab 8 tablet, 8 tablet, Oral, Q15 Min PRN    meropenem (Merrem) 500 mg in sodium chloride 0.9% 100 mL IVPB-MBP, 500 mg, Intravenous, q12h   Current Outpatient Medications   Medication Sig Dispense Refill    carvedilol 12.5 MG Oral Tab Take 2 tablets (25 mg total) by mouth 2 (two) times daily with meals. 180 tablet 0        HISTORY:  Past Medical History:   Diagnosis Date    Anemia     Back pain     Back problem     Cataract     Chronic kidney disease (CKD)     COPD  (chronic obstructive pulmonary disease) ([facility])     FEV 1 1.25 50%     Diabetes (HCC)     DM neuropathy    Essential hypertension     H/O angioplasty     2020    High blood pressure     High cholesterol     Neuropathy     Peripheral vascular disease (HCC)     PNA (pneumonia) 2018    Pulmonary emphysema ([facility])     Pulmonary nodule     4 mm RUL    Renal disorder     monitoring kidney labs    Sickle cell trait (HCC)     Sickle-cell anemia (HCC)     Tobacco abuse     Visual impairment       Past Surgical History:   Procedure Laterality Date    APPENDECTOMY      BACK SURGERY Right 2021    Right L3-4 extreme lateral interbody fusion, posterior decompression; LUMBAR LAMINECTOMY 1 LEVEL          x3    CHOLECYSTECTOMY      EXCIS LUMBAR DISK,ONE LEVEL          OTHER      bilateral leg stents  and         Social history and family history negative related to present illness except as above.    PHYSICAL EXAM:   /44 (BP Location: Right arm)   Pulse 65   Temp 98.6 °F (37 °C) (Axillary)   Resp 15   Ht 5' 4.02\" (1.626 m)   Wt 163 lb 3.2 oz (74 kg)   LMP  (LMP Unknown)   SpO2 94%   BMI 28.00 kg/m²   GENERAL:  chronically ill  HEENT:  Normocephalic, no scleral abnormalities.  Oropharynx clear, trachea ML.  NECK:  Supple, no masses, no lymphadenopathy.  LUNGS:  Clear to auscultation b/l, no rhonchi, rales, or wheezes.  CARDIO: RRR S1/S2, no rubs, clicks, heaves, or murmurs.  GI:  Soft NT/ND, BS present x4 quadrants, no HSM.  EXTREMITIES:  No edema, no clubbing, no cyanosis.  NEURO:  No focal neurologic deficits.  DERM:  deep open thigh wound tracking into groin photo viewed    IMPRESSION/PLAN:        Postop complication of rt fem pop  bypass  Bacteremia wound drainage and needed debridement graft removal;see other notes for more details;two surgeries so far  and  and  aka  Cultures mixed karla and candida too [candida in urine too]  Already on meropenem and vanc; added  diflucan  Pain and rising wbc are negative signs; hopefully wbc will now normalize; still quite high however and suspect due to impressive open wound as described[see photo]; may need further surgical debridement per notes    >35 min                   Recent Results (from the past 72 hour(s))   POCT Glucose    Collection Time: 02/25/24 12:04 PM   Result Value Ref Range    POC Glucose  154 (H) 70 - 99 mg/dL   POCT Glucose    Collection Time: 02/25/24  5:13 PM   Result Value Ref Range    POC Glucose  182 (H) 70 - 99 mg/dL   POCT Glucose    Collection Time: 02/25/24  8:59 PM   Result Value Ref Range    POC Glucose  208 (H) 70 - 99 mg/dL   Renal Function Panel    Collection Time: 02/26/24  5:31 AM   Result Value Ref Range    Glucose 125 (H) 70 - 99 mg/dL    Sodium 142 136 - 145 mmol/L    Potassium 4.4 3.5 - 5.1 mmol/L    Chloride 115 (H) 98 - 112 mmol/L    CO2 24.0 21.0 - 32.0 mmol/L    Anion Gap 3 0 - 18 mmol/L    BUN 26 (H) 9 - 23 mg/dL    Creatinine 1.11 (H) 0.55 - 1.02 mg/dL    BUN/CREA Ratio 23.4 (H) 10.0 - 20.0    Calcium, Total 7.2 (L) 8.7 - 10.4 mg/dL    Calculated Osmolality 300 (H) 275 - 295 mOsm/kg    eGFR-Cr 55 (L) >=60 mL/min/1.73m2    Albumin 1.7 (L) 3.2 - 4.8 g/dL    Phosphorus 4.5 2.4 - 5.1 mg/dL   CBC W/ DIFFERENTIAL    Collection Time: 02/26/24  5:31 AM   Result Value Ref Range    WBC 16.2 (H) 4.0 - 11.0 x10(3) uL    RBC 2.80 (L) 3.80 - 5.30 x10(6)uL    HGB 8.2 (L) 12.0 - 16.0 g/dL    HCT 25.0 (L) 35.0 - 48.0 %    MCV 89.3 80.0 - 100.0 fL    MCH 29.3 26.0 - 34.0 pg    MCHC 32.8 31.0 - 37.0 g/dL    RDW-SD 54.3 (H) 35.1 - 46.3 fL    RDW 16.3 (H) 11.0 - 15.0 %    .0 150.0 - 450.0 10(3)uL    Neutrophil Absolute Prelim 14.03 (H) 1.50 - 7.70 x10 (3) uL    Neutrophil Absolute 14.03 (H) 1.50 - 7.70 x10(3) uL    Lymphocyte Absolute 1.16 1.00 - 4.00 x10(3) uL    Monocyte Absolute 0.76 0.10 - 1.00 x10(3) uL    Eosinophil Absolute 0.06 0.00 - 0.70 x10(3) uL    Basophil Absolute 0.05 0.00 - 0.20 x10(3) uL     Immature Granulocyte Absolute 0.15 0.00 - 1.00 x10(3) uL    Neutrophil % 86.5 %    Lymphocyte % 7.2 %    Monocyte % 4.7 %    Eosinophil % 0.4 %    Basophil % 0.3 %    Immature Granulocyte % 0.9 %   POCT Glucose    Collection Time: 02/26/24  8:38 AM   Result Value Ref Range    POC Glucose  121 (H) 70 - 99 mg/dL   POCT Glucose    Collection Time: 02/26/24 12:08 PM   Result Value Ref Range    POC Glucose  106 (H) 70 - 99 mg/dL   POCT Glucose    Collection Time: 02/26/24  5:31 PM   Result Value Ref Range    POC Glucose  91 70 - 99 mg/dL   POCT Glucose    Collection Time: 02/26/24  8:01 PM   Result Value Ref Range    POC Glucose  85 70 - 99 mg/dL   Arterial blood gas    Collection Time: 02/26/24  9:19 PM   Result Value Ref Range    Sample Site Right Radial     ABG pH 7.41 7.35 - 7.45    ABG pCO2 40 35 - 45 mm Hg    ABG PO2 54 (LL) 80 - 100 mm Hg    ABG HCO3 25.4 21.0 - 27.0 mEq/L    Blood Gas Base Excess 0.7 -2.0 - 2.0 mmol/L    ABG O2 Saturation 92.2 (L) 94.0 - 100.0 %    Oxygen Content 10.5 (L) 15.0 - 23.0 Vol%    Oxygen Delivery Device Room Air     Modified Allens Test Positive     Puncture Charge Yes     Blood Gas Analyzer [id number] ED    POCT Glucose    Collection Time: 02/26/24 11:57 PM   Result Value Ref Range    POC Glucose  119 (H) 70 - 99 mg/dL   Renal Function Panel    Collection Time: 02/27/24  3:59 AM   Result Value Ref Range    Glucose 127 (H) 70 - 99 mg/dL    Sodium 144 136 - 145 mmol/L    Potassium 5.0 3.5 - 5.1 mmol/L    Chloride 116 (H) 98 - 112 mmol/L    CO2 24.0 21.0 - 32.0 mmol/L    Anion Gap 4 0 - 18 mmol/L    BUN 24 (H) 9 - 23 mg/dL    Creatinine 1.10 (H) 0.55 - 1.02 mg/dL    BUN/CREA Ratio 21.8 (H) 10.0 - 20.0    Calcium, Total 7.0 (L) 8.7 - 10.4 mg/dL    Calculated Osmolality 304 (H) 275 - 295 mOsm/kg    eGFR-Cr 55 (L) >=60 mL/min/1.73m2    Albumin 1.7 (L) 3.2 - 4.8 g/dL    Phosphorus 4.2 2.4 - 5.1 mg/dL   Magnesium    Collection Time: 02/27/24  3:59 AM   Result Value Ref Range    Magnesium 1.7 1.6  - 2.6 mg/dL   CBC W/ DIFFERENTIAL    Collection Time: 02/27/24  3:59 AM   Result Value Ref Range    WBC 14.6 (H) 4.0 - 11.0 x10(3) uL    RBC 2.76 (L) 3.80 - 5.30 x10(6)uL    HGB 7.8 (L) 12.0 - 16.0 g/dL    HCT 24.8 (L) 35.0 - 48.0 %    MCV 89.9 80.0 - 100.0 fL    MCH 28.3 26.0 - 34.0 pg    MCHC 31.5 31.0 - 37.0 g/dL    RDW-SD 55.8 (H) 35.1 - 46.3 fL    RDW 16.9 (H) 11.0 - 15.0 %    .0 150.0 - 450.0 10(3)uL    Neutrophil Absolute Prelim 12.70 (H) 1.50 - 7.70 x10 (3) uL    Neutrophil Absolute 12.70 (H) 1.50 - 7.70 x10(3) uL    Lymphocyte Absolute 1.08 1.00 - 4.00 x10(3) uL    Monocyte Absolute 0.63 0.10 - 1.00 x10(3) uL    Eosinophil Absolute 0.08 0.00 - 0.70 x10(3) uL    Basophil Absolute 0.03 0.00 - 0.20 x10(3) uL    Immature Granulocyte Absolute 0.11 0.00 - 1.00 x10(3) uL    Neutrophil % 86.8 %    Lymphocyte % 7.4 %    Monocyte % 4.3 %    Eosinophil % 0.5 %    Basophil % 0.2 %    Immature Granulocyte % 0.8 %   POCT Glucose    Collection Time: 02/27/24  4:05 AM   Result Value Ref Range    POC Glucose  150 (H) 70 - 99 mg/dL   POCT Glucose    Collection Time: 02/27/24  9:10 AM   Result Value Ref Range    POC Glucose  239 (H) 70 - 99 mg/dL   POCT Glucose    Collection Time: 02/27/24  2:02 PM   Result Value Ref Range    POC Glucose  204 (H) 70 - 99 mg/dL   POCT Glucose    Collection Time: 02/27/24  3:24 PM   Result Value Ref Range    POC Glucose  214 (H) 70 - 99 mg/dL   POCT Glucose    Collection Time: 02/27/24  6:01 PM   Result Value Ref Range    POC Glucose  161 (H) 70 - 99 mg/dL   POCT Glucose    Collection Time: 02/27/24  9:44 PM   Result Value Ref Range    POC Glucose  168 (H) 70 - 99 mg/dL   Magnesium    Collection Time: 02/28/24  5:36 AM   Result Value Ref Range    Magnesium 1.8 1.6 - 2.6 mg/dL   Renal Function Panel    Collection Time: 02/28/24  5:36 AM   Result Value Ref Range    Glucose 166 (H) 70 - 99 mg/dL    Sodium 142 136 - 145 mmol/L    Potassium 5.0 3.5 - 5.1 mmol/L    Chloride 113 (H) 98 - 112  mmol/L    CO2 24.0 21.0 - 32.0 mmol/L    Anion Gap 5 0 - 18 mmol/L    BUN 21 9 - 23 mg/dL    Creatinine 1.06 (H) 0.55 - 1.02 mg/dL    BUN/CREA Ratio 19.8 10.0 - 20.0    Calcium, Total 7.2 (L) 8.7 - 10.4 mg/dL    Calculated Osmolality 301 (H) 275 - 295 mOsm/kg    eGFR-Cr 58 (L) >=60 mL/min/1.73m2    Albumin 1.7 (L) 3.2 - 4.8 g/dL    Phosphorus 3.6 2.4 - 5.1 mg/dL   CBC W/ DIFFERENTIAL    Collection Time: 02/28/24  5:36 AM   Result Value Ref Range    WBC 15.3 (H) 4.0 - 11.0 x10(3) uL    RBC 2.82 (L) 3.80 - 5.30 x10(6)uL    HGB 8.3 (L) 12.0 - 16.0 g/dL    HCT 25.2 (L) 35.0 - 48.0 %    MCV 89.4 80.0 - 100.0 fL    MCH 29.4 26.0 - 34.0 pg    MCHC 32.9 31.0 - 37.0 g/dL    RDW-SD 53.8 (H) 35.1 - 46.3 fL    RDW 16.2 (H) 11.0 - 15.0 %    .0 150.0 - 450.0 10(3)uL    Neutrophil Absolute Prelim 13.52 (H) 1.50 - 7.70 x10 (3) uL    Neutrophil Absolute 13.52 (H) 1.50 - 7.70 x10(3) uL    Lymphocyte Absolute 1.02 1.00 - 4.00 x10(3) uL    Monocyte Absolute 0.50 0.10 - 1.00 x10(3) uL    Eosinophil Absolute 0.06 0.00 - 0.70 x10(3) uL    Basophil Absolute 0.02 0.00 - 0.20 x10(3) uL    Immature Granulocyte Absolute 0.17 0.00 - 1.00 x10(3) uL    Neutrophil % 88.4 %    Lymphocyte % 6.7 %    Monocyte % 3.3 %    Eosinophil % 0.4 %    Basophil % 0.1 %    Immature Granulocyte % 1.1 %   POCT Glucose    Collection Time: 02/28/24  8:14 AM   Result Value Ref Range    POC Glucose  248 (H) 70 - 99 mg/dL         Noah Perez MD     CALL [facility] AT [phone] IF QUESTIONS OR CONCERNS  THANKS

## 2024-02-28 NOTE — PROGRESS NOTES
[facility]    Progress Note    Ava Bowen Patient Status:  Inpatient    10/6/1955 MRN [id number]   Location [facility] 3W/SW Attending Edy Foote DO   Hosp Day # 18 PCP Ernesto De Dios     Subjective:  In pain, getting  wound care  Bg are going up  Appetite is variable    Diabetes History:  Type 2 DM  Patient follows with me in the diabetes clinic  A1c is 7.9 % ( 2024)  Home regimen:   Glipizide 5 mg BID  Ozempic to 2 mg weekly     A comprehensive 10 point review of systems was completed.  Pertinent positives and negatives noted in the the HPI.      Objective/Physical Exam:  Physical Exam:   Vital Signs:  Blood pressure 105/44, pulse 65, temperature 98.6 °F (37 °C), temperature source Axillary, resp. rate 15, height 5' 4.02\" (1.626 m), weight 163 lb 3.2 oz (74 kg), SpO2 94%, not currently breastfeeding.      General Appearance:  alert,  in pain  Head: Atraumatic  Eyes:  normal conjunctivae, sclera., normal sclera and normal pupils  Throat/Neck: normal sound to voice. Normal hearing, normal speech  Respiratory:  Speaking in full sentences, non-labored. no increased work of breathing, no audible wheezing    Neuro: motor grossly intact, moving all extremities without difficulty  Psychiatric:  oriented to time, self, and place  Extremities: s/p R AKA      Labs:  Bg reviewed    Assessment/Plan:  Patient Active Problem List   Diagnosis    Anemia    Azotemia    Hypokalemia    Back pain with radiation    Type 2 diabetes mellitus without retinopathy (HCC)    Age-related nuclear cataract of both eyes    Glaucoma suspect of both eyes    Centrilobular emphysema (HCC)    PVD (peripheral vascular disease) (HCC)    Diabetic ulcer of left midfoot associated with type 2 diabetes mellitus, with fat layer exposed (HCC)    PAD (peripheral artery disease) (HCC)    Pre-op testing    Family history of glaucoma in sister    Primary hypertension    S/P laminectomy    Post-operative pain    Type 2  diabetes mellitus with hyperglycemia, with long-term current use of insulin ([facility])    Myalgia    DDD (degenerative disc disease), lumbar    Failed back syndrome of lumbar spine    Chest pain    Hyperlipidemia    Urinary tract infection    Acute cystitis without hematuria    Ileitis    COVID-19    UTI (urinary tract infection)    Pyelonephritis    Hypernatremia    Hyperglycemia    Ureteral calculi    Hypoglycemia    Opioid dependence, uncomplicated ([facility])    Depression, recurrent (HCC)    Stage 3 chronic kidney disease, unspecified whether stage 3a or 3b CKD ([facility])    Bypass graft mechanical complication ([facility])    Bypass graft mechanical complication, initial encounter ([facility])    Anemia, unspecified type    Acute kidney injury superimposed on CKD ([facility])    Uncontrolled type 2 diabetes mellitus with hyperglycemia ([facility])       Type 2 DM  With PAD   S/p AKA       PLAN:   Resume tresiba 8 units daily  Novolog  CF with meals  Accuchecks ac and hs  Hypoglycemia protocol  Diabetic diet     Elevated parathyroid hormone level  Noted on blood work  Vitamin D is slightly low at 20.  She is on calcitriol and vitamin D  Calcium is on the lower side but she has a low albumin corrected calcium is around 8.1  It is possible that calcium will increase with normalization of vitamin D   She could have primary hyperparathyroidism.  She has slightly low GFR.  No known history of kidney stones.  Calcium at present is not more than a one-time the upper limit of normal  DEXA is not available this can be completed as an outpatient  As an outpatient will recommend monitoring of calcium, nuclear medicine parathyroid scan.  No  other acute intervention at this time       We will follow        Luna Ludwig MD

## 2024-02-28 NOTE — PROGRESS NOTES
[facility] Hospitalist Progress Note     CC: Hospital Follow up    PCP: Ernesto De Dios       Assessment/Plan:     Principal Problem:    Bypass graft mechanical complication, initial encounter ([facility], located within [facility])  Active Problems:    Hyperglycemia    Bypass graft mechanical complication (HCC)    Anemia, unspecified type    Acute kidney injury superimposed on CKD (HCC)    Uncontrolled type 2 diabetes mellitus with hyperglycemia (HCC)    Ms. Bowen is a 68 year old female with PMH sig for CKD, COPD, DM, HTN, HLD, PVD, and sickle cell anemia who was recently admitted last week for right lower extremity femoral to popliteal bypass who presented with bleeding from surgical site. Taken for emergent OR on 2/10/24, noted to be grossly infected, started on vancomycin/meropenem. Bcx with Klebsiella. BG elevated on admit, started on insulin gtt, endo consulted, now off insulin gtt. Kept intubated post op, pulm following. Polymicrobial infection on meropenum, IV vanco and now fluconazole.  Now S/P AKA on right on 2/22/24. Had some confusion after procedure now much better as of 2/28.       PAD sp RLE fem-pop bypass last week c/b bleeding fm surg site fm wound dehiscence  - s/p recent fem pop bypass 1/24/24 with Dr. Murrieta  - has been on ASA/plavix- held on admit  - Hg down to 6.0, s/p 3 units pRBC 2/10- watching close now, hgb stable currently no signs of bleeding   - vascular surgery consulted, s/p emergent OR 2/10, removal of infected graft, s/p debridement, bone patch angioplasty   - sp further debridement 2/13 and placement of wound vac  - back to OR 2/16   - s/p right AKA  2/22/24  - Pain meds PRN, gabapentin   - lasix PRN   - Some coolness and decreased pulse on left leg , CTA with occluded stent in femoral artery, discussed with vascular no further intervention at this time, left heel with dark discoloration, decreased doppler pulses on left foot    -Vascular following to decide on next best steps     Septic shock (now  improved) fm post op wound infection, polymicrobial   - continue IV vancomycin and meropenem add fluconazole , ID consult following   - Leukocytosis down trending   - Bcx with klebsiella,   - 2/10 wound cx and 2/13 poly microbial  - abx adjustment per ID f/u cx results  - ID notes appreciated- tenative plan for 6 weeks abx from from final surgical plans- final abx tbd pending cx data and surgical plans   - Now S/P right AKA on 2/22 final determination of treatment will need to be sorted per ID   - Keep joseph due to large leg wound and now wound vac     Metabolic acidosis- resolved   OMEGA on CKD3- at baseline now   Hypernatremia, resolved   - likely 2/2 LA from blood loss +/- dka on admit  - pH 7.24 on admit, LA 7.6 on admit, now normalized  - likely multifactorial, 2/2 LA, blood loss, ?DKA, CKD  - cr 1.7 on admit, previously 1.2 on discharge last week  - monitor Cr and UOP- improved   - discussed with nephrology     Acute blood loss anemia from surgical site   Chronic anemia  Sickle cell anemia  - Stable currently, trend CBC   - outpatient f/u  - Hg 8.9 on discharge last week  - Hg 6.0 on admission, s/p 4 units pRBC, last unit 2/23/34  - monitor hgb     DM2, hyperglycemia better now   - A1c 7.9   - Hold PO medications  - BG 500s in ED, pH 7.2  - insulin gtt ordered in ED  Endo following now on Degludec 8 units and aspart CF insulin only      Respiratory failure resolved   COPD  - remained intubated post op both fm aspiration event and metabolic acidosis  - pulm following  - extubated 2/14 pm  - Now on 2L NC - wean as able   - RRT called 2/26/24 with ABG showing hypoxemia   - enforced wearing O2    - continue home MDI      Hypovolemic / septic Shock/ Hypotension- improved  Hx HTN  - BP stable now, hydral 100 Q8 hours   - Amlodipine 10 d, Coreg 25 BID   - Holding home lisinopril  - BP ok, watch and consider resuming pending BP       ACP  - CODE- FULL   - POA- daughter and son     FN:  - IVF: complete   - Diet: Carb  controlled diet      DVT Prophy: we  will resume lovenox daily , watch for bleeding   Lines: PIV     Daughter Tiffanie last updated 2/27/24     Questions/concerns were discussed with patient and/or family by bedside.    Thank You,  Edy Foote DO     Hospitalist with [facility]     Subjective:   More alert today, denies pain, no fevers or chills, no nausea, no SOB     OBJECTIVE:    Blood pressure 105/44, pulse 65, temperature 98.6 °F (37 °C), temperature source Axillary, resp. rate 15, height 5' 4.02\" (1.626 m), weight 163 lb 3.2 oz (74 kg), SpO2 94%, not currently breastfeeding.    Temp:  [98.1 °F (36.7 °C)-99.1 °F (37.3 °C)] 98.6 °F (37 °C)  Pulse:  [65-68] 65  Resp:  [15-18] 15  BP: (103-115)/(44-88) 105/44  SpO2:  [93 %-99 %] 94 %      Intake/Output:    Intake/Output Summary (Last 24 hours) at 2/28/2024 1235  Last data filed at 2/28/2024 1103  Gross per 24 hour   Intake 618 ml   Output 1300 ml   Net -682 ml       Last 3 Weights   02/28/24 0456 163 lb 3.2 oz (74 kg)   02/27/24 0547 166 lb 11.2 oz (75.6 kg)   02/26/24 0606 175 lb 11.2 oz (79.7 kg)   02/25/24 0525 167 lb 9.6 oz (76 kg)   02/24/24 0618 174 lb 1.6 oz (79 kg)   02/20/24 0554 180 lb 3.2 oz (81.7 kg)   02/19/24 0445 189 lb 1.6 oz (85.8 kg)   02/18/24 0614 186 lb 1.6 oz (84.4 kg)   02/17/24 0403 181 lb 11.2 oz (82.4 kg)   02/16/24 0500 182 lb 3.2 oz (82.6 kg)   02/14/24 0500 185 lb 13.6 oz (84.3 kg)   02/13/24 0500 183 lb 3.2 oz (83.1 kg)   02/12/24 0400 180 lb 5.4 oz (81.8 kg)   02/11/24 0600 182 lb 5.1 oz (82.7 kg)   02/10/24 2142 171 lb 1.2 oz (77.6 kg)   02/10/24 2105 171 lb 1.2 oz (77.6 kg)   02/10/24 1642 164 lb 3.9 oz (74.5 kg)   01/27/24 0501 164 lb 3.2 oz (74.5 kg)   01/24/24 0549 158 lb (71.7 kg)   01/19/24 1112 160 lb (72.6 kg)   12/30/23 1216 155 lb (70.3 kg)   08/29/23 1357 153 lb (69.4 kg)       Exam   Gen: awake, NAD  Pulm: Lungs clear, normal respiratory effort, 2 L NC   CV: Heart with regular rate and rhythm  Abd: Abdomen soft,  nontender, nondistended  Ext: right AKA with VAC in place , left leg with some coolness to touch   : jake     Data Review:       Labs:     Recent Labs   Lab 02/26/24  0531 02/27/24  0359 02/28/24  0536   RBC 2.80* 2.76* 2.82*   HGB 8.2* 7.8* 8.3*   HCT 25.0* 24.8* 25.2*   MCV 89.3 89.9 89.4   MCH 29.3 28.3 29.4   MCHC 32.8 31.5 32.9   RDW 16.3* 16.9* 16.2*   NEPRELIM 14.03* 12.70* 13.52*   WBC 16.2* 14.6* 15.3*   .0 262.0 249.0         Recent Labs   Lab 02/26/24  0531 02/27/24  0359 02/28/24  0536   * 127* 166*   BUN 26* 24* 21   CREATSERUM 1.11* 1.10* 1.06*   EGFRCR 55* 55* 58*   CA 7.2* 7.0* 7.2*    144 142   K 4.4 5.0 5.0   * 116* 113*   CO2 24.0 24.0 24.0       Recent Labs   Lab 02/24/24  0449 02/25/24  0758 02/26/24  0531 02/27/24  0359 02/28/24  0536   ALB 1.8* 1.8* 1.7* 1.7* 1.7*         Imaging:  No results found.      Meds:      insulin degludec  8 Units Subcutaneous Daily    gabapentin  100 mg Oral TID    insulin aspart  1-5 Units Subcutaneous TID CC    cholecalciferol  2,000 Units Oral Daily    calcitriol  0.25 mcg Oral Q MWF    vancomycin  125 mg Oral Daily    pantoprazole  40 mg Oral QAM AC    amLODIPine  10 mg Oral QAM    carvedilol  25 mg Oral BID    hydrALAZINE  100 mg Oral Q8H    vancomycin  750 mg Intravenous Q36H    fluconazole  200 mg Oral Daily    sodium hypochlorite   Topical 2 times per day    fluticasone furoate-vilanterol  1 puff Inhalation Daily    rosuvastatin  40 mg Oral Nightly    meropenem  500 mg Intravenous q12h           acetaminophen **OR** [DISCONTINUED] HYDROcodone-acetaminophen **OR** [DISCONTINUED] HYDROcodone-acetaminophen, HYDROmorphone, HYDROcodone-acetaminophen, HYDROcodone-acetaminophen, morphINE, hydrALAzine, ondansetron, metoclopramide, albuterol, ipratropium-albuterol, glucose **OR** glucose **OR** glucose-vitamin C **OR** dextrose **OR** glucose **OR** glucose **OR** glucose-vitamin C

## 2024-02-28 NOTE — TELEPHONE ENCOUNTER
I put new order for carvedilol  
Rufina from [facility] pharmacy has a printed faxed RX that was sent to them in December  RX for Carvediolol 12.5mg but there are no directions on the RX  Please confirm what dose and directions are to be for this medication.     They are asking if new RX can be sent to them or if we can call them back with RX directions and quantity.     Appears patient may be in patients, multiple doses on file for patient.     Please advise       
No significant past surgical history

## 2024-02-28 NOTE — PROGRESS NOTES
02/28/24 0723   Wound 02/22/24 Leg Right   Date First Assessed/Time First Assessed: 02/22/24 1154   Primary Wound Type: Incision  Location: Leg  Wound Location Orientation: Right  Wound Description (Comments): s/p aka w Guffud 2/22  Wound Outcome: Amputation   Wound Image    Wound 01/24/24 Leg Right;Upper   Date First Assessed/Time First Assessed: 01/24/24 0832   Present on Original Admission: No  Primary Wound Type: Incision  Location: Leg  Wound Location Orientation: Right;Upper   Wound Image    Site Assessment Clean;Fragile;Granulation tissue;Moist;Painful;Pink;Red   Closure Not approximated   Sutures Removed Intact No (Comment)   Drainage Amount Small   Drainage Description Serous   Treatments Cleansed;Saline;Wound Vac - Neg Pressure   Wound Vac Brand KCI   Dressing Wound vac sponge  (mepitel to stump, adaptic to groin)   Dressing Changed Changed   Dressing Status Dressing Changed;Removed;Old drainage   Non-staged Wound Description Full thickness   Lisbet-wound Assessment Clean;Fragile;Edema;Moist   Wound Granulation Tissue Pink;Red   Wound Odor None   Exposed Structure Muscle   Negative Pressure Wound Therapy Groin Right   Placement Date: 02/13/24   Inserted by: Dr. LEWIS  Location: Groin  Wound Location Orientation: Right   Wound photographed/measured Yes   Machine Status (On) Yes   Site Assessment Clean;Fragile;Edema;Granulation tissue;Moist;Painful;Pink;Red   Lisbet-wound Assessment Clean;Edema;Fragile;Moist   Unit Type KCI ULTA   Dressing Type Silver impregnated foam;Non-adherent   Number of Foam Pieces Used 2   Cycle Continuous;On   Target Pressure (mmHg) 125   Drainage Description Serous   Dressing Status Dressing Changed;Removed;Old drainage   Canister Changed No   Wound Follow Up   Follow up needed Yes     Pt seen after receiving po norco per bedside RN. Dr. Murrieta was present to assess wound. See above for clean groin wound, weeping stump wound. Adaptic lined the groin wound, mepitel one protected stump  staples/sutures. 2 silver sponges were used in the dressing, patent seal obtained at 125. Next wound vac dressing change is planned for Friday 3/1. Bedside RN updated to wound care plan.

## 2024-02-28 NOTE — PROGRESS NOTES
[facility]    Progress Note    Ava Bowen Patient Status:  Inpatient    10/6/1955 MRN [id number]   Location [facility] 3W/SW Attending Edy Foote DO   Hosp Day # 19 PCP Ernesto De Dios     Subjective:  In pain, getting  wound care  Bg low this morning  Appetite is variable    Diabetes History:  Type 2 DM  Patient follows with me in the diabetes clinic  A1c is 7.9 % ( 2024)  Home regimen:   Glipizide 5 mg BID  Ozempic to 2 mg weekly     A comprehensive 10 point review of systems was completed.  Pertinent positives and negatives noted in the the HPI.      Objective/Physical Exam:  Physical Exam:   Vital Signs:  Blood pressure 94/49, pulse 56, temperature 98.6 °F (37 °C), temperature source Oral, resp. rate 15, height 5' 4.02\" (1.626 m), weight 163 lb 2.3 oz (74 kg), SpO2 94%, not currently breastfeeding.      General Appearance:  alert  Head: Atraumatic  Eyes:  normal conjunctivae, sclera., normal sclera and normal pupils  Throat/Neck: normal sound to voice. Normal hearing, normal speech  Respiratory:  Speaking in full sentences, non-labored. no increased work of breathing, no audible wheezing    Neuro: motor grossly intact, moving all extremities without difficulty  Psychiatric:  oriented to time, self, and place  Extremities: s/p R AKA      Labs:  Bg reviewed    Assessment/Plan:  Patient Active Problem List   Diagnosis    Anemia    Azotemia    Hypokalemia    Back pain with radiation    Type 2 diabetes mellitus without retinopathy (HCC)    Age-related nuclear cataract of both eyes    Glaucoma suspect of both eyes    Centrilobular emphysema (HCC)    PVD (peripheral vascular disease) (HCC)    Diabetic ulcer of left midfoot associated with type 2 diabetes mellitus, with fat layer exposed (HCC)    PAD (peripheral artery disease) (HCC)    Pre-op testing    Family history of glaucoma in sister    Primary hypertension    S/P laminectomy    Post-operative pain    Type 2 diabetes  mellitus with hyperglycemia, with long-term current use of insulin ([facility])    Myalgia    DDD (degenerative disc disease), lumbar    Failed back syndrome of lumbar spine    Chest pain    Hyperlipidemia    Urinary tract infection    Acute cystitis without hematuria    Ileitis    COVID-19    UTI (urinary tract infection)    Pyelonephritis    Hypernatremia    Hyperglycemia    Ureteral calculi    Hypoglycemia    Opioid dependence, uncomplicated (HCC)    Depression, recurrent (HCC)    Stage 3 chronic kidney disease, unspecified whether stage 3a or 3b CKD ([facility])    Bypass graft mechanical complication (HCC)    Bypass graft mechanical complication, initial encounter ([facility])    Anemia, unspecified type    Acute kidney injury superimposed on CKD ([facility])    Uncontrolled type 2 diabetes mellitus with hyperglycemia ([facility])       Type 2 DM  With PAD   S/p AKA       PLAN:   HOLD tresiba  Novolog  CF with meals  Accuchecks ac and hs  Hypoglycemia protocol  Diabetic diet     Elevated parathyroid hormone level  Noted on blood work  Vitamin D is slightly low at 20.  She is on calcitriol and vitamin D  Calcium is on the lower side but she has a low albumin corrected calcium is around 8.1  It is possible that calcium will increase with normalization of vitamin D   She could have primary hyperparathyroidism.  She has slightly low GFR.  No known history of kidney stones.  Calcium at present is not more than a one-time the upper limit of normal  DEXA is not available this can be completed as an outpatient  As an outpatient will recommend monitoring of calcium, nuclear medicine parathyroid scan.  No  other acute intervention at this time       We will follow        Luna Ludwig MD

## 2024-02-29 NOTE — PROGRESS NOTES
02/29/24 0800   Negative Pressure Wound Therapy Groin Right   Placement Date: 02/13/24   Inserted by: Dr. LEWIS  Location: Groin  Wound Location Orientation: Right   Wound photographed/measured No   Machine Status (On) Yes   Site Assessment ANGELO   Lisbet-wound Assessment ANGELO   Unit Type KCI ULTA   Cycle Continuous;On   Target Pressure (mmHg) 125   Drainage Description Serous   Dressing Status Intact   Canister Changed No   Wound Follow Up   Follow up needed Yes

## 2024-02-29 NOTE — PLAN OF CARE
Dressings minus wound vac changed overnight. Wound vac canister changed. Norco PRN administered. Vital signs stable.    Problem: PAIN - ADULT  Goal: Verbalizes/displays adequate comfort level or patient's stated pain goal  Description: INTERVENTIONS:  - Encourage pt to monitor pain and request assistance  - Assess pain using appropriate pain scale  - Administer analgesics based on type and severity of pain and evaluate response  - Implement non-pharmacological measures as appropriate and evaluate response  - Consider cultural and social influences on pain and pain management  - Manage/alleviate anxiety  - Utilize distraction and/or relaxation techniques  - Monitor for opioid side effects  - Notify MD/LIP if interventions unsuccessful or patient reports new pain  - Anticipate increased pain with activity and pre-medicate as appropriate  Outcome: Progressing     Problem: SAFETY ADULT - FALL  Goal: Free from fall injury  Description: INTERVENTIONS:  - Assess pt frequently for physical needs  - Identify cognitive and physical deficits and behaviors that affect risk of falls.  - [place] fall precautions as indicated by assessment.  - Educate pt/family on patient safety including physical limitations  - Instruct pt to call for assistance with activity based on assessment  - Modify environment to reduce risk of injury  - Provide assistive devices as appropriate  - Consider OT/PT consult to assist with strengthening/mobility  - Encourage toileting schedule  Outcome: Progressing     Problem: SKIN/TISSUE INTEGRITY - ADULT  Goal: Skin integrity remains intact  Description: INTERVENTIONS  - Assess and document risk factors for pressure ulcer development  - Assess and document skin integrity  - Monitor for areas of redness and/or skin breakdown  - Initiate interventions, skin care algorithm/standards of care as needed  Outcome: Progressing  Goal: Incision(s), wounds(s) or drain site(s) healing without S/S of  infection  Description: INTERVENTIONS:  - Assess and document risk factors for pressure ulcer development  - Assess and document skin integrity  - Assess and document dressing/incision, wound bed, drain sites and surrounding tissue  - Implement wound care per orders  - Initiate isolation precautions as appropriate  - Initiate Pressure Ulcer prevention bundle as indicated  Outcome: Progressing  Goal: Oral mucous membranes remain intact  Description: INTERVENTIONS  - Assess oral mucosa and hygiene practices  - Implement preventative oral hygiene regimen  - Implement oral medicated treatments as ordered  Outcome: Progressing     Problem: RESPIRATORY - ADULT  Goal: Achieves optimal ventilation and oxygenation  Description: INTERVENTIONS:  - Assess for changes in respiratory status  - Assess for changes in mentation and behavior  - Position to facilitate oxygenation and minimize respiratory effort  - Oxygen supplementation based on oxygen saturation or ABGs  - Provide Smoking Cessation handout, if applicable  - Encourage broncho-pulmonary hygiene including cough, deep breathe, Incentive Spirometry  - Assess the need for suctioning and perform as needed  - Assess and instruct to report SOB or any respiratory difficulty  - Respiratory Therapy support as indicated  - Manage/alleviate anxiety  - Monitor for signs/symptoms of CO2 retention  Outcome: Progressing

## 2024-02-29 NOTE — PROGRESS NOTES
[facility]  part of [facility] Infectious Disease Progress Note    Ava Bowen Patient Status:  Inpatient    10/6/1955 MRN [id number]   Location [facility] 3W/SW Attending Edy Foote DO   Hosp Day # 19 PCP Ernesto De Dios     Subjective:  Pt with no new complaints.  Pt states she is fine.     Objective:    Allergies:  Allergies   Allergen Reactions    Penicillins HIVES and ANGIOEDEMA     Hives on eyelids (occurred when pt was in her 20s). Tolerated amoxicillin per chart. Tolerates cephalosporins.       Medications:    Current Facility-Administered Medications:     enoxaparin (Lovenox) 40 MG/0.4ML SUBQ injection 40 mg, 40 mg, Subcutaneous, Daily    gabapentin (Neurontin) cap 100 mg, 100 mg, Oral, TID    insulin aspart (NovoLOG) 100 Units/mL FlexPen 1-5 Units, 1-5 Units, Subcutaneous, TID CC    cholecalciferol (VITAMIN D3) tab 2,000 Units, 2,000 Units, Oral, Daily    calcitriol (Rocaltrol) cap 0.25 mcg, 0.25 mcg, Oral, Q MWF    acetaminophen (Tylenol) tab 650 mg, 650 mg, Oral, Q4H PRN **OR** [DISCONTINUED] HYDROcodone-acetaminophen (Norco) 5-325 MG per tab 1 tablet, 1 tablet, Oral, Q4H PRN **OR** [DISCONTINUED] HYDROcodone-acetaminophen (Norco) 5-325 MG per tab 2 tablet, 2 tablet, Oral, Q4H PRN    vancomycin (Vancocin) cap 125 mg, 125 mg, Oral, Daily    HYDROmorphone (Dilaudid) 1 MG/ML injection 1 mg, 1 mg, Intravenous, Q3H PRN    pantoprazole (Protonix) DR tab 40 mg, 40 mg, Oral, QAM AC    amLODIPine (Norvasc) tab 10 mg, 10 mg, Oral, QAM    carvedilol (Coreg) tab 25 mg, 25 mg, Oral, BID    hydrALAZINE (Apresoline) tab 100 mg, 100 mg, Oral, Q8H    vancomycin (Vancocin) 750 mg in sodium chloride 0.9% 250 mL IVPB-ADDV, 750 mg, Intravenous, Q36H    fluconazole (Diflucan) tab 200 mg, 200 mg, Oral, Daily    HYDROcodone-acetaminophen (Norco)  MG per tab 1 tablet, 1 tablet, Oral, Q4H PRN    HYDROcodone-acetaminophen (Norco)  MG per tab 2 tablet, 2 tablet, Oral, Q4H  PRN    morphINE 20 mg/mL concentrated oral solution 5 mg, 5 mg, Oral, Q4H PRN    sodium hypochlorite (Dakin's) 0.125 % external solution, , Topical, 2 times per day    hydrALAzine (Apresoline) 20 mg/mL injection 10 mg, 10 mg, Intravenous, Q4H PRN    ondansetron (Zofran) 4 MG/2ML injection 4 mg, 4 mg, Intravenous, Q6H PRN    metoclopramide (Reglan) 5 mg/mL injection 5 mg, 5 mg, Intravenous, Q8H PRN    albuterol (Ventolin HFA) 108 (90 Base) MCG/ACT inhaler 2 puff, 2 puff, Inhalation, Q6H PRN    fluticasone furoate-vilanterol (Breo Ellipta) 200-25 MCG/ACT inhaler 1 puff, 1 puff, Inhalation, Daily    ipratropium-albuterol (Duoneb) 0.5-2.5 (3) MG/3ML inhalation solution 3 mL, 3 mL, Nebulization, Q6H PRN    rosuvastatin (Crestor) tab 40 mg, 40 mg, Oral, Nightly    glucose (Dex4) 15 GM/59ML oral liquid 15 g, 15 g, Oral, Q15 Min PRN **OR** glucose (Glutose) 40% oral gel 15 g, 15 g, Oral, Q15 Min PRN **OR** glucose-vitamin C (Dex-4) chewable tab 4 tablet, 4 tablet, Oral, Q15 Min PRN **OR** dextrose 50% injection 50 mL, 50 mL, Intravenous, Q15 Min PRN **OR** glucose (Dex4) 15 GM/59ML oral liquid 30 g, 30 g, Oral, Q15 Min PRN **OR** glucose (Glutose) 40% oral gel 30 g, 30 g, Oral, Q15 Min PRN **OR** glucose-vitamin C (Dex-4) chewable tab 8 tablet, 8 tablet, Oral, Q15 Min PRN    meropenem (Merrem) 500 mg in sodium chloride 0.9% 100 mL IVPB-MBP, 500 mg, Intravenous, q12h    Physical Exam:  General: Alert, orientated x3.  Cooperative.  Lethargic. No apparent distress.  Vital Signs:  Blood pressure 94/49, pulse 56, temperature 98.7 °F (37.1 °C), temperature source Oral, resp. rate 15, height 5' 4.02\" (1.626 m), weight 163 lb 2.3 oz (74 kg), SpO2 94%, not currently breastfeeding.   Temp (24hrs), Av.9 °F (37.2 °C), Min:98.6 °F (37 °C), Max:99.4 °F (37.4 °C)      HEENT: Exam is unremarkable.  Without scleral icterus.  Mucous membranes are moist. PERRLA.  Oropharynx is clear.  Neck: No tenderness to palpitation.  Full range of  motion to flexion and extension, lateral rotation and lateral flexion of cervical spine.  No JVD. Supple.   Lungs: Clear to auscultation bilaterally.  Cardiac: Regular rate and rhythm. No murmur.  Abdomen:  Soft, non-distended, non-tender, with no rebound or guarding.   Extremities:  No lower extremity edema noted.  Without clubbing or cyanosis.    Skin: RLE wrapped.  Neurologic: Cranial nerves are grossly intact.  Motor strength and sensory examination is grossly normal.  No focal neurologic deficit.      Assessment/Plan:    1.  Gram negative sepsis  -blood cultures with k. Pneumoniae on 2/10  -source post-op infection  -hx of fem-pop bypass on 1/24/24, complicated by dehiscence  -s/p I&D with patch on 2/10, cultures with klebsiella, enterococcus, s anginosus, e coli, bacteroides and prevotella  -s/p I&D on 2/13 with removal of graft, cultures with klebsiella, e coli, enterococcus and candida   and 2/16  -s/p I&D and R AKA on 2/22  -plans for I&D on 3/1 with vascular  -on IV meropenem, vancomycin and fluconazole  2.  Leukocytosis  -improving overall at 15.3k  3.  Hx of sickle cell anemia    If you have any questions or concerns please call [facility] Infectious Disease at [phone].     KARINE Tena

## 2024-02-29 NOTE — PROGRESS NOTES
[facility] Hospitalist Progress Note     CC: Hospital Follow up    PCP: Ernesto De Dios       Assessment/Plan:     Principal Problem:    Bypass graft mechanical complication, initial encounter ([facility])  Active Problems:    Hyperglycemia    Bypass graft mechanical complication (HCC)    Anemia, unspecified type    Acute kidney injury superimposed on CKD (HCC)    Uncontrolled type 2 diabetes mellitus with hyperglycemia (HCC)    Ms. Bowen is a 68 year old female with PMH sig for CKD, COPD, DM, HTN, HLD, PVD, and sickle cell anemia who was recently admitted last week for right lower extremity femoral to popliteal bypass who presented with bleeding from surgical site. Taken for emergent OR on 2/10/24, noted to be grossly infected, started on vancomycin/meropenem. Bcx with Klebsiella. BG elevated on admit, started on insulin gtt, endo consulted, now off insulin gtt. Kept intubated post op, pulm following. Polymicrobial infection on meropenum, IV vanco and now fluconazole.  Now S/P AKA on right on 2/22/24. Had some confusion after procedure now much better as of 2/28.       PAD sp RLE fem-pop bypass last week c/b bleeding fm surg site fm wound dehiscence  - s/p recent fem pop bypass 1/24/24 with Dr. Murrieta  - has been on ASA/plavix- held on admit  - Hg down to 6.0, s/p 3 units pRBC 2/10- watching close now, hgb stable currently no signs of bleeding   - vascular surgery consulted, s/p emergent OR 2/10, removal of infected graft, s/p debridement, bone patch angioplasty   - sp further debridement 2/13 and placement of wound vac  - back to OR 2/16   - s/p right AKA  2/22/24  - Pain meds PRN, gabapentin   - lasix PRN   - Some coolness and decreased pulse on left leg , CTA with occluded stent in femoral artery, discussed with vascular no further intervention at this time, left heel with dark discoloration, decreased doppler pulses on left foot    -Vascular following plan for washout again on Friday 3/1/24    Septic  shock (now improved) fm post op wound infection, polymicrobial   - continue IV vancomycin and meropenem add fluconazole , ID consult following   - Leukocytosis down trending   - Bcx with klebsiella,   - 2/10 wound cx and 2/13 poly microbial  - abx adjustment per ID f/u cx results  - ID notes appreciated- tenative plan for 6 weeks abx from from final surgical plans- final abx tbd pending cx data and surgical plans   - Now S/P right AKA on 2/22 final determination of treatment will need to be sorted per ID   - Keep joseph due to large leg wound and now wound vac     Metabolic acidosis- resolved   OMEGA on CKD3- at baseline now   Hypernatremia, resolved   - likely 2/2 LA from blood loss +/- dka on admit  - pH 7.24 on admit, LA 7.6 on admit, now normalized  - likely multifactorial, 2/2 LA, blood loss, ?DKA, CKD  - cr 1.7 on admit, previously 1.2 on discharge last week  - monitor Cr and UOP- improved   - discussed with nephrology     Acute blood loss anemia from surgical site   Chronic anemia  Sickle cell anemia  - Stable currently, trend CBC   - outpatient f/u  - Hg 8.9 on discharge last week  - Hg 6.0 on admission, s/p 4 units pRBC, last unit 2/23/34  - monitor hgb     DM2, hyperglycemia better now   - A1c 7.9   - Hold PO medications  - BG 500s in ED, pH 7.2  - insulin gtt ordered in ED  Endo following now off insulin      Respiratory failure resolved   COPD  - remained intubated post op both fm aspiration event and metabolic acidosis  - pulm following  - extubated 2/14 pm  - Now on 2L NC - wean as able   - RRT called 2/26/24 with ABG showing hypoxemia   - enforced wearing O2    - continue home MDI      Hypovolemic / septic Shock/ Hypotension- improved  Hx HTN  - BP stable now, hydral 100 Q8 hours   - Amlodipine 10 d, Coreg 25 BID   - Holding home lisinopril  - BP ok, watch and consider resuming pending BP       ACP  - CODE- FULL   - POA- daughter and son     FN:  - IVF: complete   - Diet: Carb controlled diet      DVT  Prophy: we  will resume lovenox daily , watch for bleeding   Lines: PIV     Daughter Tiffanie last updated 2/27/24     Questions/concerns were discussed with patient and/or family by bedside.    Thank You,  Edy Foote DO     Hospitalist with [facility]     Subjective:   Feels ok, no pain in legs, no nausea, no vomiting, no SOB     OBJECTIVE:    Blood pressure 97/53, pulse 56, temperature 98.4 °F (36.9 °C), temperature source Oral, resp. rate 16, height 5' 4.02\" (1.626 m), weight 163 lb 2.3 oz (74 kg), SpO2 95%, not currently breastfeeding.    Temp:  [98.4 °F (36.9 °C)-99.4 °F (37.4 °C)] 98.4 °F (36.9 °C)  Pulse:  [56-62] 56  Resp:  [15-18] 16  BP: ()/(49-72) 97/53  SpO2:  [94 %-95 %] 95 %      Intake/Output:    Intake/Output Summary (Last 24 hours) at 2/29/2024 1603  Last data filed at 2/29/2024 1500  Gross per 24 hour   Intake 800 ml   Output 925 ml   Net -125 ml       Last 3 Weights   02/29/24 0315 163 lb 2.3 oz (74 kg)   02/28/24 0456 163 lb 3.2 oz (74 kg)   02/27/24 0547 166 lb 11.2 oz (75.6 kg)   02/26/24 0606 175 lb 11.2 oz (79.7 kg)   02/25/24 0525 167 lb 9.6 oz (76 kg)   02/24/24 0618 174 lb 1.6 oz (79 kg)   02/20/24 0554 180 lb 3.2 oz (81.7 kg)   02/19/24 0445 189 lb 1.6 oz (85.8 kg)   02/18/24 0614 186 lb 1.6 oz (84.4 kg)   02/17/24 0403 181 lb 11.2 oz (82.4 kg)   02/16/24 0500 182 lb 3.2 oz (82.6 kg)   02/14/24 0500 185 lb 13.6 oz (84.3 kg)   02/13/24 0500 183 lb 3.2 oz (83.1 kg)   02/12/24 0400 180 lb 5.4 oz (81.8 kg)   02/11/24 0600 182 lb 5.1 oz (82.7 kg)   02/10/24 2142 171 lb 1.2 oz (77.6 kg)   02/10/24 2105 171 lb 1.2 oz (77.6 kg)   02/10/24 1642 164 lb 3.9 oz (74.5 kg)   01/27/24 0501 164 lb 3.2 oz (74.5 kg)   01/24/24 0549 158 lb (71.7 kg)   01/19/24 1112 160 lb (72.6 kg)   12/30/23 1216 155 lb (70.3 kg)   08/29/23 1357 153 lb (69.4 kg)       Exam   Gen: awake, NAD  Pulm: Lungs clear, normal respiratory effort, 2 L NC   CV: Heart with regular rate and rhythm  Abd: Abdomen soft,  nontender, nondistended  Ext: right AKA with VAC in place , left leg with some coolness to touch   : joseph     Data Review:       Labs:     Recent Labs   Lab 02/26/24  0531 02/27/24  0359 02/28/24  0536 02/29/24  0550   RBC 2.80* 2.76* 2.82* 2.78*   HGB 8.2* 7.8* 8.3* 7.7*   HCT 25.0* 24.8* 25.2* 25.7*   MCV 89.3 89.9 89.4 92.4   MCH 29.3 28.3 29.4 27.7   MCHC 32.8 31.5 32.9 30.0*   RDW 16.3* 16.9* 16.2* 17.2*   NEPRELIM 14.03* 12.70* 13.52*  --    WBC 16.2* 14.6* 15.3* 12.8*   .0 262.0 249.0 260.0         Recent Labs   Lab 02/27/24  0359 02/28/24  0536 02/29/24  0550   * 166* 94   BUN 24* 21 23   CREATSERUM 1.10* 1.06* 1.08*   EGFRCR 55* 58* 57*   CA 7.0* 7.2* 7.2*    142 144   K 5.0 5.0 5.2*   * 113* 116*   CO2 24.0 24.0 25.0       Recent Labs   Lab 02/24/24  0449 02/25/24  0758 02/26/24  0531 02/27/24  0359 02/28/24  0536   ALB 1.8* 1.8* 1.7* 1.7* 1.7*         Imaging:  No results found.      Meds:      enoxaparin  40 mg Subcutaneous Daily    gabapentin  100 mg Oral TID    insulin aspart  1-5 Units Subcutaneous TID CC    cholecalciferol  2,000 Units Oral Daily    calcitriol  0.25 mcg Oral Q MWF    vancomycin  125 mg Oral Daily    pantoprazole  40 mg Oral QAM AC    amLODIPine  10 mg Oral QAM    carvedilol  25 mg Oral BID    hydrALAZINE  100 mg Oral Q8H    vancomycin  750 mg Intravenous Q36H    fluconazole  200 mg Oral Daily    sodium hypochlorite   Topical 2 times per day    fluticasone furoate-vilanterol  1 puff Inhalation Daily    rosuvastatin  40 mg Oral Nightly    meropenem  500 mg Intravenous q12h           acetaminophen **OR** [DISCONTINUED] HYDROcodone-acetaminophen **OR** [DISCONTINUED] HYDROcodone-acetaminophen, HYDROmorphone, HYDROcodone-acetaminophen, HYDROcodone-acetaminophen, morphINE, hydrALAzine, ondansetron, metoclopramide, albuterol, ipratropium-albuterol, glucose **OR** glucose **OR** glucose-vitamin C **OR** dextrose **OR** glucose **OR** glucose **OR**  glucose-vitamin C

## 2024-02-29 NOTE — PLAN OF CARE
Patient is alert 2-3. Lethargic at times. Poor appetite.  On 2L NC. Complains of some discomfort to R upper leg. Plan for washout/debrievement tomorrow.   Problem: PAIN - ADULT  Goal: Verbalizes/displays adequate comfort level or patient's stated pain goal  Description: INTERVENTIONS:  - Encourage pt to monitor pain and request assistance  - Assess pain using appropriate pain scale  - Administer analgesics based on type and severity of pain and evaluate response  - Implement non-pharmacological measures as appropriate and evaluate response  - Consider cultural and social influences on pain and pain management  - Manage/alleviate anxiety  - Utilize distraction and/or relaxation techniques  - Monitor for opioid side effects  - Notify MD/LIP if interventions unsuccessful or patient reports new pain  - Anticipate increased pain with activity and pre-medicate as appropriate  Outcome: Not Progressing     Problem: SKIN/TISSUE INTEGRITY - ADULT  Goal: Incision(s), wounds(s) or drain site(s) healing without S/S of infection  Description: INTERVENTIONS:  - Assess and document risk factors for pressure ulcer development  - Assess and document skin integrity  - Assess and document dressing/incision, wound bed, drain sites and surrounding tissue  - Implement wound care per orders  - Initiate isolation precautions as appropriate  - Initiate Pressure Ulcer prevention bundle as indicated  Outcome: Not Progressing

## 2024-03-01 NOTE — PROGRESS NOTES
[facility] Hospitalist Progress Note     CC: Hospital Follow up    PCP: Ernesto De Dios       Assessment/Plan:     Principal Problem:    Bypass graft mechanical complication, initial encounter ([facility])  Active Problems:    Hyperglycemia    Bypass graft mechanical complication (HCC)    Anemia, unspecified type    Acute kidney injury superimposed on CKD (HCC)    Uncontrolled type 2 diabetes mellitus with hyperglycemia (HCC)    Ms. Bowen is a 68 year old female with PMH sig for CKD, COPD, DM, HTN, HLD, PVD, and sickle cell anemia who was recently admitted last week for right lower extremity femoral to popliteal bypass who presented with bleeding from surgical site. Taken for emergent OR on 2/10/24, noted to be grossly infected, started on vancomycin/meropenem. Bcx with Klebsiella. BG elevated on admit, started on insulin gtt, endo consulted, now off insulin gtt. Kept intubated post op, pulm following. Polymicrobial infection on meropenum, IV vanco and now fluconazole.  Now S/P AKA on right on 2/22/24. Had some confusion after procedure now much better as of 2/28.       PAD sp RLE fem-pop bypass last week c/b bleeding fm surg site fm wound dehiscence  - s/p recent fem pop bypass 1/24/24 with Dr. Murrieta  - has been on ASA/plavix- held on admit  - Hg down to 6.0, s/p 3 units pRBC 2/10- watching close now, hgb stable currently no signs of bleeding   - vascular surgery consulted, s/p emergent OR 2/10, removal of infected graft, s/p debridement, bone patch angioplasty   - sp further debridement 2/13 and placement of wound vac  - back to OR 2/16   - s/p right AKA  2/22/24  - Pain meds PRN, gabapentin   - lasix PRN   - Some coolness and decreased pulse on left leg , CTA with occluded stent in femoral artery, discussed with vascular no further intervention at this time, left heel with dark discoloration, decreased doppler pulses on left foot    Needs additional wash out, now scheduled for Monday 3/4    -Hypotension  and recurrent hyperkalemia, AMS  Lokelma today   Fluid bolus and resume lasix per renal  Reconsult renal   Did try some narcan this am   Repeat cultures per ID   Repeat C diff       Septic shock (now improved) fm post op wound infection, polymicrobial   - continue IV vancomycin and meropenem add fluconazole , ID consult following   - Leukocytosis down trending   - Bcx with klebsiella,   - 2/10 wound cx and 2/13 poly microbial  - abx adjustment per ID f/u cx results  - ID notes appreciated- tenative plan for 6 weeks abx from from final surgical plans- final abx tbd pending cx data and surgical plans   - Now S/P right AKA on 2/22 final determination of treatment will need to be sorted per ID   - Keep joseph due to large leg wound and now wound vac     Metabolic acidosis- resolved   OMEGA on CKD3- at baseline now   Hypernatremia, resolved   - likely 2/2 LA from blood loss +/- dka on admit  - pH 7.24 on admit, LA 7.6 on admit, now normalized  - likely multifactorial, 2/2 LA, blood loss, ?DKA, CKD  - cr 1.7 on admit, previously 1.2 on discharge last week  - monitor Cr and UOP- improved   - discussed with nephrology     Acute blood loss anemia from surgical site   Chronic anemia  Sickle cell anemia  - Stable currently, trend CBC   - outpatient f/u  - Hg 8.9 on discharge last week  - Hg 6.0 on admission, s/p 4 units pRBC, last unit 2/23/34  - monitor hgb     DM2, hyperglycemia better now   - A1c 7.9   - Hold PO medications  - BG 500s in ED, pH 7.2  - insulin gtt ordered in ED  Endo following now off insulin   Watch PO intake      Respiratory failure resolved   COPD  - remained intubated post op both fm aspiration event and metabolic acidosis  - pulm following  - extubated 2/14 pm  - Now on 2L NC - wean as able   - RRT called 2/26/24 with ABG showing hypoxemia   - enforced wearing O2    - continue home MDI      Hypovolemic / septic Shock/ Hypotension- improved  Hx HTN  BP low, holding meds with low BP   - BP stable now, hydral  100 Q8 hours   - Amlodipine 10 d, Coreg 25 BID   - Holding home lisinopril        ACP  - CODE- FULL   - POA- daughter and son     FN:  - IVF: complete   - Diet: Carb controlled diet      DVT Prophy: we  will resume lovenox daily , watch for bleeding   Lines: PIV     Daughter Tiffanie updated as needed    Questions/concerns were discussed with patient and/or family by bedside.    Thank You,  Edy Foote DO     Hospitalist with [facility]     Subjective:   Sleep today, unable to full communicate     OBJECTIVE:    Blood pressure (!) 84/49, pulse 57, temperature 98.3 °F (36.8 °C), temperature source Axillary, resp. rate 20, height 5' 4.02\" (1.626 m), weight 168 lb 14 oz (76.6 kg), SpO2 93%, not currently breastfeeding.    Temp:  [98.3 °F (36.8 °C)-98.6 °F (37 °C)] 98.3 °F (36.8 °C)  Pulse:  [55-58] 57  Resp:  [16-22] 20  BP: ()/(42-51) 84/49  SpO2:  [91 %-99 %] 93 %      Intake/Output:    Intake/Output Summary (Last 24 hours) at 3/1/2024 1317  Last data filed at 3/1/2024 0700  Gross per 24 hour   Intake 650 ml   Output 500 ml   Net 150 ml       Last 3 Weights   03/01/24 0451 168 lb 14 oz (76.6 kg)   02/29/24 0315 163 lb 2.3 oz (74 kg)   02/28/24 0456 163 lb 3.2 oz (74 kg)   02/27/24 0547 166 lb 11.2 oz (75.6 kg)   02/26/24 0606 175 lb 11.2 oz (79.7 kg)   02/25/24 0525 167 lb 9.6 oz (76 kg)   02/24/24 0618 174 lb 1.6 oz (79 kg)   02/20/24 0554 180 lb 3.2 oz (81.7 kg)   02/19/24 0445 189 lb 1.6 oz (85.8 kg)   02/18/24 0614 186 lb 1.6 oz (84.4 kg)   02/17/24 0403 181 lb 11.2 oz (82.4 kg)   02/16/24 0500 182 lb 3.2 oz (82.6 kg)   02/14/24 0500 185 lb 13.6 oz (84.3 kg)   02/13/24 0500 183 lb 3.2 oz (83.1 kg)   02/12/24 0400 180 lb 5.4 oz (81.8 kg)   02/11/24 0600 182 lb 5.1 oz (82.7 kg)   02/10/24 2142 171 lb 1.2 oz (77.6 kg)   02/10/24 2105 171 lb 1.2 oz (77.6 kg)   02/10/24 1642 164 lb 3.9 oz (74.5 kg)   01/27/24 0501 164 lb 3.2 oz (74.5 kg)   01/24/24 0549 158 lb (71.7 kg)   01/19/24 1112 160 lb (72.6 kg)    12/30/23 1216 155 lb (70.3 kg)   08/29/23 1357 153 lb (69.4 kg)       Exam   Gen: Sleepy lethargic   Pulm: Lungs clear, normal respiratory effort, 2 L NC   CV: Heart with regular rate and rhythm  Abd: Abdomen soft, nontender, nondistended  Ext: right AKA with VAC in place   : jake     Data Review:       Labs:     Recent Labs   Lab 02/26/24  0531 02/27/24  0359 02/28/24  0536 02/29/24  0550 03/01/24  0658   RBC 2.80* 2.76* 2.82* 2.78* 2.86*   HGB 8.2* 7.8* 8.3* 7.7* 8.0*   HCT 25.0* 24.8* 25.2* 25.7* 26.4*   MCV 89.3 89.9 89.4 92.4 92.3   MCH 29.3 28.3 29.4 27.7 28.0   MCHC 32.8 31.5 32.9 30.0* 30.3*   RDW 16.3* 16.9* 16.2* 17.2* 17.4*   NEPRELIM 14.03* 12.70* 13.52*  --   --    WBC 16.2* 14.6* 15.3* 12.8* 13.5*   .0 262.0 249.0 260.0 194.0         Recent Labs   Lab 02/28/24  0536 02/29/24  0550 03/01/24  0658   * 94 115*   BUN 21 23 22   CREATSERUM 1.06* 1.08* 1.18*   EGFRCR 58* 57* 51*   CA 7.2* 7.2* 6.9*    144 144   K 5.0 5.2* 5.8*   * 116* 116*   CO2 24.0 25.0 22.0       Recent Labs   Lab 02/24/24  0449 02/25/24  0758 02/26/24  0531 02/27/24  0359 02/28/24  0536   ALB 1.8* 1.8* 1.7* 1.7* 1.7*         Imaging:  No results found.      Meds:      enoxaparin  40 mg Subcutaneous Daily    gabapentin  100 mg Oral TID    insulin aspart  1-5 Units Subcutaneous TID CC    cholecalciferol  2,000 Units Oral Daily    calcitriol  0.25 mcg Oral Q MWF    vancomycin  125 mg Oral Daily    pantoprazole  40 mg Oral QAM AC    carvedilol  25 mg Oral BID    hydrALAZINE  100 mg Oral Q8H    vancomycin  750 mg Intravenous Q36H    fluconazole  200 mg Oral Daily    sodium hypochlorite   Topical 2 times per day    fluticasone furoate-vilanterol  1 puff Inhalation Daily    rosuvastatin  40 mg Oral Nightly    meropenem  500 mg Intravenous q12h      sodium chloride 83 mL/hr at 03/01/24 1218         acetaminophen **OR** [DISCONTINUED] HYDROcodone-acetaminophen **OR** [DISCONTINUED] HYDROcodone-acetaminophen,  HYDROmorphone, HYDROcodone-acetaminophen, HYDROcodone-acetaminophen, morphINE, hydrALAzine, ondansetron, metoclopramide, albuterol, ipratropium-albuterol, glucose **OR** glucose **OR** glucose-vitamin C **OR** dextrose **OR** glucose **OR** glucose **OR** glucose-vitamin C

## 2024-03-01 NOTE — PROGRESS NOTES
Critical Care Progress Note     Assessment / Plan:  Sepsis - source likely postoperative wound infection, UA also dirty  - IV fluids, will give another liter of LR over 2 hours  - vasopressors if needed  - broad spectrum antibiotics as below  - check lactate  - chest x-ray  - follow up repeat cultures  Acute respiratory failure - due to pulmonary edema, CPD, and atelectasis  - wean O2 as able, presently 2 LPM  - repeat chest x-ray  - IS  PAD s/p RLE fem-pop bypass s/b wound dehiscence and postoperative wound infection  - on vancomycin, meropenem, and fluconazole  - ID following  - vascular surgery following  - planning washout Monday 3/4  COPD - no evidence of exacerbation  - bronchodilator protocol  OMEGA on CKD, hyperkalemia  - per nephrology  DM  - insulin per endo  FEN  - NPO  Ppx  - lovenox  Dispo  - transferring to ICU, will follow    Discussed with patient and Dr. Foote    Critical care time: 38 minutes    Mumtaz Mittal MD  Pulmonary & Critical Care Medicine  [facility]      Subjective:  Hypotensive today  Lethargic but arouses to voice  Being transferred back to ICU  Reports pain in her leg    Objective:  Vitals:    03/01/24 1046 03/01/24 1219 03/01/24 1324 03/01/24 1346   BP: 103/51 (!) 84/49 91/39 (!) 88/53   BP Location: Right arm Right arm     Pulse:  57     Resp: 22 20     Temp:  98.3 °F (36.8 °C)     TempSrc:  Axillary     SpO2: 94% 93%     Weight:       Height:         Physical Exam:  General: laying in bed  Respiratory: clear bilaterally, normal effort  Cardiovascular: regular rate and rhythm, no m/r/g  Abdomen: soft, NTND  Extremities: RLE AKA wound site with wound vac,  Mental status: drowsy but arouses to voice    Medications:  Reviewed in EMR    Lab Data:  Reviewed in EMR    Imaging:  I independently visualized all relevant chest imaging in PACS and agree with radiology interpretation except where noted.

## 2024-03-01 NOTE — PLAN OF CARE
Tylenol administered for pain overnight. 500mL fluid bolus given this AM for low BP. Plan surgery for wounds today.    Problem: PAIN - ADULT  Goal: Verbalizes/displays adequate comfort level or patient's stated pain goal  Description: INTERVENTIONS:  - Encourage pt to monitor pain and request assistance  - Assess pain using appropriate pain scale  - Administer analgesics based on type and severity of pain and evaluate response  - Implement non-pharmacological measures as appropriate and evaluate response  - Consider cultural and social influences on pain and pain management  - Manage/alleviate anxiety  - Utilize distraction and/or relaxation techniques  - Monitor for opioid side effects  - Notify MD/LIP if interventions unsuccessful or patient reports new pain  - Anticipate increased pain with activity and pre-medicate as appropriate  Outcome: Progressing     Problem: SAFETY ADULT - FALL  Goal: Free from fall injury  Description: INTERVENTIONS:  - Assess pt frequently for physical needs  - Identify cognitive and physical deficits and behaviors that affect risk of falls.  - [place] fall precautions as indicated by assessment.  - Educate pt/family on patient safety including physical limitations  - Instruct pt to call for assistance with activity based on assessment  - Modify environment to reduce risk of injury  - Provide assistive devices as appropriate  - Consider OT/PT consult to assist with strengthening/mobility  - Encourage toileting schedule  Outcome: Progressing     Problem: SKIN/TISSUE INTEGRITY - ADULT  Goal: Skin integrity remains intact  Description: INTERVENTIONS  - Assess and document risk factors for pressure ulcer development  - Assess and document skin integrity  - Monitor for areas of redness and/or skin breakdown  - Initiate interventions, skin care algorithm/standards of care as needed  Outcome: Progressing  Goal: Incision(s), wounds(s) or drain site(s) healing without S/S of  infection  Description: INTERVENTIONS:  - Assess and document risk factors for pressure ulcer development  - Assess and document skin integrity  - Assess and document dressing/incision, wound bed, drain sites and surrounding tissue  - Implement wound care per orders  - Initiate isolation precautions as appropriate  - Initiate Pressure Ulcer prevention bundle as indicated  Outcome: Progressing  Goal: Oral mucous membranes remain intact  Description: INTERVENTIONS  - Assess oral mucosa and hygiene practices  - Implement preventative oral hygiene regimen  - Implement oral medicated treatments as ordered  Outcome: Progressing     Problem: RESPIRATORY - ADULT  Goal: Achieves optimal ventilation and oxygenation  Description: INTERVENTIONS:  - Assess for changes in respiratory status  - Assess for changes in mentation and behavior  - Position to facilitate oxygenation and minimize respiratory effort  - Oxygen supplementation based on oxygen saturation or ABGs  - Provide Smoking Cessation handout, if applicable  - Encourage broncho-pulmonary hygiene including cough, deep breathe, Incentive Spirometry  - Assess the need for suctioning and perform as needed  - Assess and instruct to report SOB or any respiratory difficulty  - Respiratory Therapy support as indicated  - Manage/alleviate anxiety  - Monitor for signs/symptoms of CO2 retention  Outcome: Progressing

## 2024-03-01 NOTE — PROGRESS NOTES
Vascular Surgery Progress Note    Patient hypotensive and lethargic this AM. Currently getting fluid resuscitated. Debridement rescheduled for Monday pm.     Madiha Murrieta MD  03/01/24  12:16 PM

## 2024-03-01 NOTE — PROGRESS NOTES
[facility]    Progress Note    Ava Bowen Patient Status:  Inpatient    10/6/1955 MRN [id number]   Location [facility] 3W/SW Attending Edy Foote DO   Hosp Day # 20 PCP Ernesto De Dios     Subjective:  Sleeping this AM.  BG levels stable.  Hypoglycemia yesterday after Tresiba.     Diabetes History:  Type 2 DM  Patient follows with me in the diabetes clinic  A1c is 7.9 % ( 2024)  Home regimen:   Glipizide 5 mg BID  Ozempic to 2 mg weekly     A comprehensive 10 point review of systems was completed.  Pertinent positives and negatives noted in the the HPI.      Objective/Physical Exam:  Physical Exam:   Vital Signs:  Blood pressure (!) 76/47, pulse 56, temperature 98.3 °F (36.8 °C), temperature source Oral, resp. rate 16, height 5' 4.02\" (1.626 m), weight 168 lb 14 oz (76.6 kg), SpO2 99%, not currently breastfeeding.      General Appearance:  alert  Head: Atraumatic  Eyes:  normal conjunctivae, sclera., normal sclera and normal pupils  Throat/Neck: normal sound to voice. Normal hearing, normal speech  Respiratory:  Speaking in full sentences, non-labored. no increased work of breathing, no audible wheezing    Neuro: motor grossly intact, moving all extremities without difficulty  Psychiatric:  oriented to time, self, and place  Extremities: s/p R AKA      Labs:  Bg reviewed    Assessment/Plan:  Patient Active Problem List   Diagnosis    Anemia    Azotemia    Hypokalemia    Back pain with radiation    Type 2 diabetes mellitus without retinopathy (HCC)    Age-related nuclear cataract of both eyes    Glaucoma suspect of both eyes    Centrilobular emphysema (HCC)    PVD (peripheral vascular disease) (HCC)    Diabetic ulcer of left midfoot associated with type 2 diabetes mellitus, with fat layer exposed (HCC)    PAD (peripheral artery disease) (HCC)    Pre-op testing    Family history of glaucoma in sister    Primary hypertension    S/P laminectomy    Post-operative pain    Type 2  diabetes mellitus with hyperglycemia, with long-term current use of insulin (HCC)    Myalgia    DDD (degenerative disc disease), lumbar    Failed back syndrome of lumbar spine    Chest pain    Hyperlipidemia    Urinary tract infection    Acute cystitis without hematuria    Ileitis    COVID-19    UTI (urinary tract infection)    Pyelonephritis    Hypernatremia    Hyperglycemia    Ureteral calculi    Hypoglycemia    Opioid dependence, uncomplicated (HCC)    Depression, recurrent (HCC)    Stage 3 chronic kidney disease, unspecified whether stage 3a or 3b CKD ([facility])    Bypass graft mechanical complication (HCC)    Bypass graft mechanical complication, initial encounter ([facility])    Anemia, unspecified type    Acute kidney injury superimposed on CKD ([facility])    Uncontrolled type 2 diabetes mellitus with hyperglycemia (HCC)       Type 2 DM  With PAD   S/p AKA     PLAN:   HOLD tresiba  Novolog  CF with meals  Accuchecks ac and hs  Hypoglycemia protocol  Diabetic diet     Elevated parathyroid hormone level  Please see Dr. Ludwig note regarding plan for outpatient follow up      We will follow    Whitney Poon MD

## 2024-03-01 NOTE — PROGRESS NOTES
03/01/24 1325   Wound 02/22/24 Leg Right;Upper   Date First Assessed/Time First Assessed: 02/22/24 1154   Present on Original Admission: No  Primary Wound Type: Incision  Location: Leg  Wound Location Orientation: Right;Upper  Wound Description (Comments): s/p aka w Guffud 2/22  Wound Outcome: Amput...   Wound Image    Wound 01/24/24 Groin Right;Upper   Date First Assessed/Time First Assessed: 01/24/24 0832   Present on Original Admission: No  Primary Wound Type: Incision  Location: Groin  Wound Location Orientation: Right;Upper   Wound Image    Site Assessment Fragile;Granulation tissue;Moist;Painful;Pink;Tan;Red   Closure Not approximated   Sutures Removed Intact No (Comment)   Drainage Amount Moderate   Drainage Description Serous   Treatments Wound Vac - Neg Pressure   Wound Vac Brand KCI   Dressing Wound vac sponge   Dressing Changed Changed   Dressing Status Dressing Changed;Removed;Old drainage   Non-staged Wound Description Full thickness   Lisbet-wound Assessment Clean;Fragile;Edema;Moist;Painful   Wound Granulation Tissue [place]   State of Healing Non-healing;Undermining   Wound Odor None   Exposed Structure Muscle   Wound 02/26/24 Heel Left   Date First Assessed/Time First Assessed: 02/26/24 1135   Present on Original Admission: No  Location: Heel  Wound Location Orientation: Left  Wound Description (Comments): ischemic changes   Wound Image    Site Assessment Clean;Dry;Intact   Closure Approximated   Drainage Amount None   Treatments Site Care   Dressing Aquacel Foam   Dressing Changed Reinforced   Dressing Status Clean;Dry;Intact   Wound 03/01/24 Leg Left;Lower;Lateral   Date First Assessed/Time First Assessed: 03/01/24 1204   Present on Original Admission: No  Primary Wound Type: Other (comment)  Location: Leg  Wound Location Orientation: Left;Lower;Lateral  Wound Description (Comments): popped ischemic blister   Wound Image    Site Assessment Clean;Edema;Fragile;Moist;Painful;Pink   Closure Not  approximated   Drainage Amount Scant   Drainage Description Serous   Treatments Cleansed;Saline   Dressing Aquacel Foam;Xeroform   Dressing Changed Changed   Dressing Status Dressing Changed;Removed;Old drainage   Wound Depth (cm) 0.1 cm   Non-staged Wound Description Partial thickness   Lisbet-wound Assessment Clean;Dry;Intact   Wound Granulation Tissue Pink;Red   State of Healing Fully granulated   Wound Odor None   Negative Pressure Wound Therapy Groin Right   Placement Date: 02/13/24   Inserted by: Dr. LEWIS  Location: Groin  Wound Location Orientation: Right   Wound photographed/measured Yes   Machine Status (On) Yes   Site Assessment Clean;Fragile;Granulation tissue;Moist;Painful;Pink;Red   Lisbet-wound Assessment Fragile;Edema;Moist;Painful   Unit Type KCI ULTA   Dressing Type Silver impregnated foam;Non-adherent   Number of Foam Pieces Used 3   Cycle Continuous;On   Target Pressure (mmHg) 125   Drainage Description Serous   Dressing Status Dressing Changed;Removed;Old drainage   Canister Changed No   Wound Follow Up   Follow up needed Yes     Pt seen for wound vac dressing change. Plan is for OR on Monday w Dr. Murrieta. 2 whole Silver sponge were used, wound lined w adaptic, staples on the stump protected with mepitel one. The left lateral calf wound is new, popped blister-see above. The left heel ischemic discoloration noted on previous assessment this week has resolved. Boot applied to left foot.

## 2024-03-01 NOTE — PROGRESS NOTES
[facility]  part of [facility] Infectious Disease  Progress Note    Ava Bowen Patient Status:  Inpatient    10/6/1955 MRN [id number]   Location [facility] 3W/SW Attending Edy Foote DO   Hosp Day # 20 PCP Ernesto De Dios     vAa Bowen is a 68 year old female.   Chief Complaint   Patient presents with    Bleeding       HPI:      Lo bp; lethargic                 REVIEW OF SYSTEMS:   A comprehensive 10 point review of systems was completed.  Pertinent positives and negatives noted in the the HPI.       Allergies:  Allergies   Allergen Reactions    Penicillins HIVES and ANGIOEDEMA     Hives on eyelids (occurred when pt was in her 20s). Tolerated amoxicillin per chart. Tolerates cephalosporins.        Current Meds:    Current Facility-Administered Medications:     sodium chloride 0.9% infusion, , Intravenous, Continuous    enoxaparin (Lovenox) 40 MG/0.4ML SUBQ injection 40 mg, 40 mg, Subcutaneous, Daily    gabapentin (Neurontin) cap 100 mg, 100 mg, Oral, TID    insulin aspart (NovoLOG) 100 Units/mL FlexPen 1-5 Units, 1-5 Units, Subcutaneous, TID CC    cholecalciferol (VITAMIN D3) tab 2,000 Units, 2,000 Units, Oral, Daily    calcitriol (Rocaltrol) cap 0.25 mcg, 0.25 mcg, Oral, Q MWF    acetaminophen (Tylenol) tab 650 mg, 650 mg, Oral, Q4H PRN **OR** [DISCONTINUED] HYDROcodone-acetaminophen (Norco) 5-325 MG per tab 1 tablet, 1 tablet, Oral, Q4H PRN **OR** [DISCONTINUED] HYDROcodone-acetaminophen (Norco) 5-325 MG per tab 2 tablet, 2 tablet, Oral, Q4H PRN    vancomycin (Vancocin) cap 125 mg, 125 mg, Oral, Daily    HYDROmorphone (Dilaudid) 1 MG/ML injection 1 mg, 1 mg, Intravenous, Q3H PRN    pantoprazole (Protonix) DR tab 40 mg, 40 mg, Oral, QAM AC    carvedilol (Coreg) tab 25 mg, 25 mg, Oral, BID    hydrALAZINE (Apresoline) tab 100 mg, 100 mg, Oral, Q8H    vancomycin (Vancocin) 750 mg in sodium chloride 0.9% 250 mL IVPB-ADDV, 750 mg, Intravenous,  Q36H    fluconazole (Diflucan) tab 200 mg, 200 mg, Oral, Daily    HYDROcodone-acetaminophen (Norco)  MG per tab 1 tablet, 1 tablet, Oral, Q4H PRN    HYDROcodone-acetaminophen (Norco)  MG per tab 2 tablet, 2 tablet, Oral, Q4H PRN    morphINE 20 mg/mL concentrated oral solution 5 mg, 5 mg, Oral, Q4H PRN    sodium hypochlorite (Dakin's) 0.125 % external solution, , Topical, 2 times per day    hydrALAzine (Apresoline) 20 mg/mL injection 10 mg, 10 mg, Intravenous, Q4H PRN    ondansetron (Zofran) 4 MG/2ML injection 4 mg, 4 mg, Intravenous, Q6H PRN    metoclopramide (Reglan) 5 mg/mL injection 5 mg, 5 mg, Intravenous, Q8H PRN    albuterol (Ventolin HFA) 108 (90 Base) MCG/ACT inhaler 2 puff, 2 puff, Inhalation, Q6H PRN    fluticasone furoate-vilanterol (Breo Ellipta) 200-25 MCG/ACT inhaler 1 puff, 1 puff, Inhalation, Daily    ipratropium-albuterol (Duoneb) 0.5-2.5 (3) MG/3ML inhalation solution 3 mL, 3 mL, Nebulization, Q6H PRN    rosuvastatin (Crestor) tab 40 mg, 40 mg, Oral, Nightly    glucose (Dex4) 15 GM/59ML oral liquid 15 g, 15 g, Oral, Q15 Min PRN **OR** glucose (Glutose) 40% oral gel 15 g, 15 g, Oral, Q15 Min PRN **OR** glucose-vitamin C (Dex-4) chewable tab 4 tablet, 4 tablet, Oral, Q15 Min PRN **OR** dextrose 50% injection 50 mL, 50 mL, Intravenous, Q15 Min PRN **OR** glucose (Dex4) 15 GM/59ML oral liquid 30 g, 30 g, Oral, Q15 Min PRN **OR** glucose (Glutose) 40% oral gel 30 g, 30 g, Oral, Q15 Min PRN **OR** glucose-vitamin C (Dex-4) chewable tab 8 tablet, 8 tablet, Oral, Q15 Min PRN    meropenem (Merrem) 500 mg in sodium chloride 0.9% 100 mL IVPB-MBP, 500 mg, Intravenous, q12h   Current Outpatient Medications   Medication Sig Dispense Refill    carvedilol 12.5 MG Oral Tab Take 2 tablets (25 mg total) by mouth 2 (two) times daily with meals. 180 tablet 0        HISTORY:  Past Medical History:   Diagnosis Date    Anemia     Back pain     Back problem     Cataract     Chronic kidney disease (CKD)     COPD  (chronic obstructive pulmonary disease) (HCC)     FEV 1 1.25 50%     Diabetes (HCC)     DM neuropathy    Essential hypertension     H/O angioplasty     2020    High blood pressure     High cholesterol     Neuropathy     Peripheral vascular disease (HCC)     PNA (pneumonia) 2018    Pulmonary emphysema (HCC)     Pulmonary nodule     4 mm RUL    Renal disorder     monitoring kidney labs    Sickle cell trait (HCC)     Sickle-cell anemia (HCC)     Tobacco abuse     Visual impairment       Past Surgical History:   Procedure Laterality Date    APPENDECTOMY      BACK SURGERY Right 2021    Right L3-4 extreme lateral interbody fusion, posterior decompression; LUMBAR LAMINECTOMY 1 LEVEL          x3    CHOLECYSTECTOMY      EXCIS LUMBAR DISK,ONE LEVEL          OTHER      bilateral leg stents  and         Social history and family history negative related to present illness except as above.    PHYSICAL EXAM:   BP (!) 84/49 (BP Location: Right arm)   Pulse 57   Temp 98.3 °F (36.8 °C) (Axillary)   Resp 20   Ht 5' 4.02\" (1.626 m)   Wt 168 lb 14 oz (76.6 kg)   LMP  (LMP Unknown)   SpO2 93%   BMI 28.97 kg/m²   GENERAL:  Awake, alert, oriented x3. Non-tox, non-septic and in NAD.  HEENT:  Normocephalic, no scleral abnormalities.  Oropharynx clear, trachea ML.  NECK:  Supple, no masses, no lymphadenopathy.  LUNGS:  Clear to auscultation b/l, no rhonchi, rales, or wheezes.  CARDIO: RRR S1/S2, no rubs, clicks, heaves, or murmurs.  GI:  Soft NT/ND, BS present x4 quadrants, no HSM.  EXTREMITIES:  No edema, no clubbing, no cyanosis.  NEURO:  No focal neurologic deficits.  DERM:  Wound not viewed    IMPRESSION/PLAN:        Postop complications of rt fem pop  bypass; now hypotension  Bacteremia wound drainage and needed debridement graft removal;see other notes for more details;two surgeries so far  and  and  aka  Cultures mixed karla and candida too [candida in urine too]  Already  on meropenem and vanc; added diflucan  Pain and rising wbc are negative signs; hopefully wbc will now normalize; still quite high however and suspect due to impressive open wound as described[see photo];  further surgical debridement per notes now delayed; check blood c/s urine    >35 min                   Recent Results (from the past 72 hour(s))   POCT Glucose    Collection Time: 02/27/24  2:02 PM   Result Value Ref Range    POC Glucose  204 (H) 70 - 99 mg/dL   POCT Glucose    Collection Time: 02/27/24  3:24 PM   Result Value Ref Range    POC Glucose  214 (H) 70 - 99 mg/dL   POCT Glucose    Collection Time: 02/27/24  6:01 PM   Result Value Ref Range    POC Glucose  161 (H) 70 - 99 mg/dL   POCT Glucose    Collection Time: 02/27/24  9:44 PM   Result Value Ref Range    POC Glucose  168 (H) 70 - 99 mg/dL   Magnesium    Collection Time: 02/28/24  5:36 AM   Result Value Ref Range    Magnesium 1.8 1.6 - 2.6 mg/dL   Renal Function Panel    Collection Time: 02/28/24  5:36 AM   Result Value Ref Range    Glucose 166 (H) 70 - 99 mg/dL    Sodium 142 136 - 145 mmol/L    Potassium 5.0 3.5 - 5.1 mmol/L    Chloride 113 (H) 98 - 112 mmol/L    CO2 24.0 21.0 - 32.0 mmol/L    Anion Gap 5 0 - 18 mmol/L    BUN 21 9 - 23 mg/dL    Creatinine 1.06 (H) 0.55 - 1.02 mg/dL    BUN/CREA Ratio 19.8 10.0 - 20.0    Calcium, Total 7.2 (L) 8.7 - 10.4 mg/dL    Calculated Osmolality 301 (H) 275 - 295 mOsm/kg    eGFR-Cr 58 (L) >=60 mL/min/1.73m2    Albumin 1.7 (L) 3.2 - 4.8 g/dL    Phosphorus 3.6 2.4 - 5.1 mg/dL   CBC W/ DIFFERENTIAL    Collection Time: 02/28/24  5:36 AM   Result Value Ref Range    WBC 15.3 (H) 4.0 - 11.0 x10(3) uL    RBC 2.82 (L) 3.80 - 5.30 x10(6)uL    HGB 8.3 (L) 12.0 - 16.0 g/dL    HCT 25.2 (L) 35.0 - 48.0 %    MCV 89.4 80.0 - 100.0 fL    MCH 29.4 26.0 - 34.0 pg    MCHC 32.9 31.0 - 37.0 g/dL    RDW-SD 53.8 (H) 35.1 - 46.3 fL    RDW 16.2 (H) 11.0 - 15.0 %    .0 150.0 - 450.0 10(3)uL    Neutrophil Absolute Prelim 13.52 (H) 1.50  - 7.70 x10 (3) uL    Neutrophil Absolute 13.52 (H) 1.50 - 7.70 x10(3) uL    Lymphocyte Absolute 1.02 1.00 - 4.00 x10(3) uL    Monocyte Absolute 0.50 0.10 - 1.00 x10(3) uL    Eosinophil Absolute 0.06 0.00 - 0.70 x10(3) uL    Basophil Absolute 0.02 0.00 - 0.20 x10(3) uL    Immature Granulocyte Absolute 0.17 0.00 - 1.00 x10(3) uL    Neutrophil % 88.4 %    Lymphocyte % 6.7 %    Monocyte % 3.3 %    Eosinophil % 0.4 %    Basophil % 0.1 %    Immature Granulocyte % 1.1 %   POCT Glucose    Collection Time: 02/28/24  8:14 AM   Result Value Ref Range    POC Glucose  248 (H) 70 - 99 mg/dL   POCT Glucose    Collection Time: 02/28/24 12:06 PM   Result Value Ref Range    POC Glucose  173 (H) 70 - 99 mg/dL   POCT Glucose    Collection Time: 02/28/24  1:48 PM   Result Value Ref Range    POC Glucose  154 (H) 70 - 99 mg/dL   POCT Glucose    Collection Time: 02/28/24  5:26 PM   Result Value Ref Range    POC Glucose  138 (H) 70 - 99 mg/dL   POCT Glucose    Collection Time: 02/28/24  9:02 PM   Result Value Ref Range    POC Glucose  97 70 - 99 mg/dL   Magnesium    Collection Time: 02/29/24  5:50 AM   Result Value Ref Range    Magnesium 1.8 1.6 - 2.6 mg/dL   CBC, Platelet; No Differential    Collection Time: 02/29/24  5:50 AM   Result Value Ref Range    WBC 12.8 (H) 4.0 - 11.0 x10(3) uL    RBC 2.78 (L) 3.80 - 5.30 x10(6)uL    HGB 7.7 (L) 12.0 - 16.0 g/dL    HCT 25.7 (L) 35.0 - 48.0 %    MCV 92.4 80.0 - 100.0 fL    MCH 27.7 26.0 - 34.0 pg    MCHC 30.0 (L) 31.0 - 37.0 g/dL    RDW 17.2 (H) 11.0 - 15.0 %    RDW-SD 58.6 (H) 35.1 - 46.3 fL    .0 150.0 - 450.0 10(3)uL   Basic Metabolic Panel (8)    Collection Time: 02/29/24  5:50 AM   Result Value Ref Range    Glucose 94 70 - 99 mg/dL    Sodium 144 136 - 145 mmol/L    Potassium 5.2 (H) 3.5 - 5.1 mmol/L    Chloride 116 (H) 98 - 112 mmol/L    CO2 25.0 21.0 - 32.0 mmol/L    Anion Gap 3 0 - 18 mmol/L    BUN 23 9 - 23 mg/dL    Creatinine 1.08 (H) 0.55 - 1.02 mg/dL    BUN/CREA Ratio 21.3 (H)  10.0 - 20.0    Calcium, Total 7.2 (L) 8.7 - 10.4 mg/dL    Calculated Osmolality 301 (H) 275 - 295 mOsm/kg    eGFR-Cr 57 (L) >=60 mL/min/1.73m2   POCT Glucose    Collection Time: 02/29/24  8:26 AM   Result Value Ref Range    POC Glucose  63 (L) 70 - 99 mg/dL   POCT Glucose    Collection Time: 02/29/24  8:52 AM   Result Value Ref Range    POC Glucose  65 (L) 70 - 99 mg/dL   POCT Glucose    Collection Time: 02/29/24  9:18 AM   Result Value Ref Range    POC Glucose  74 70 - 99 mg/dL   POCT Glucose    Collection Time: 02/29/24  9:42 AM   Result Value Ref Range    POC Glucose  86 70 - 99 mg/dL   POCT Glucose    Collection Time: 02/29/24 11:28 AM   Result Value Ref Range    POC Glucose  139 (H) 70 - 99 mg/dL   POCT Glucose    Collection Time: 02/29/24  1:26 PM   Result Value Ref Range    POC Glucose  232 (H) 70 - 99 mg/dL   POCT Glucose    Collection Time: 02/29/24  4:58 PM   Result Value Ref Range    POC Glucose  200 (H) 70 - 99 mg/dL   POCT Glucose    Collection Time: 02/29/24  6:45 PM   Result Value Ref Range    POC Glucose  178 (H) 70 - 99 mg/dL   POCT Glucose    Collection Time: 02/29/24 10:04 PM   Result Value Ref Range    POC Glucose  158 (H) 70 - 99 mg/dL   POCT Glucose    Collection Time: 03/01/24  4:50 AM   Result Value Ref Range    POC Glucose  131 (H) 70 - 99 mg/dL   Magnesium    Collection Time: 03/01/24  6:58 AM   Result Value Ref Range    Magnesium 1.9 1.6 - 2.6 mg/dL   Basic Metabolic Panel (8)    Collection Time: 03/01/24  6:58 AM   Result Value Ref Range    Glucose 115 (H) 70 - 99 mg/dL    Sodium 144 136 - 145 mmol/L    Potassium 5.8 (H) 3.5 - 5.1 mmol/L    Chloride 116 (H) 98 - 112 mmol/L    CO2 22.0 21.0 - 32.0 mmol/L    Anion Gap 6 0 - 18 mmol/L    BUN 22 9 - 23 mg/dL    Creatinine 1.18 (H) 0.55 - 1.02 mg/dL    BUN/CREA Ratio 18.6 10.0 - 20.0    Calcium, Total 6.9 (L) 8.7 - 10.4 mg/dL    Calculated Osmolality 302 (H) 275 - 295 mOsm/kg    eGFR-Cr 51 (L) >=60 mL/min/1.73m2   CBC, Platelet; No  Differential    Collection Time: 03/01/24  6:58 AM   Result Value Ref Range    WBC 13.5 (H) 4.0 - 11.0 x10(3) uL    RBC 2.86 (L) 3.80 - 5.30 x10(6)uL    HGB 8.0 (L) 12.0 - 16.0 g/dL    HCT 26.4 (L) 35.0 - 48.0 %    MCV 92.3 80.0 - 100.0 fL    MCH 28.0 26.0 - 34.0 pg    MCHC 30.3 (L) 31.0 - 37.0 g/dL    RDW 17.4 (H) 11.0 - 15.0 %    RDW-SD 59.4 (H) 35.1 - 46.3 fL    .0 150.0 - 450.0 10(3)uL   ABORH (Blood Type)    Collection Time: 03/01/24  6:58 AM   Result Value Ref Range    ABO BLOOD TYPE A     RH BLOOD TYPE Positive    Antibody Screen    Collection Time: 03/01/24  6:58 AM   Result Value Ref Range    Antibody Screen Negative    CK (Creatine Kinase) (Not Creatinine)    Collection Time: 03/01/24  6:58 AM   Result Value Ref Range    CK 3,736 (H) 34 - 145 U/L   POCT Glucose    Collection Time: 03/01/24 11:48 AM   Result Value Ref Range    POC Glucose  107 (H) 70 - 99 mg/dL         Noah Perez MD     CALL [facility] AT [phone] IF QUESTIONS OR CONCERNS  THANKS

## 2024-03-01 NOTE — PROGRESS NOTES
[facility]    Nephrology Progress Note    Ava Bowen Attending:  Max Souza MD       SUBJECTIVE:     Asked to see patient again for hyperkalemia.  Patient weak hypotensive sleepy awakible and confused.    PHYSICAL EXAM:     Vital Signs: /51 (BP Location: Right arm)   Pulse 55   Temp 98.5 °F (36.9 °C) (Axillary)   Resp 22   Ht 5' 4.02\" (1.626 m)   Wt 168 lb 14 oz (76.6 kg)   LMP  (LMP Unknown)   SpO2 94%   BMI 28.97 kg/m²   Temp (24hrs), Av.4 °F (36.9 °C), Min:98.3 °F (36.8 °C), Max:98.6 °F (37 °C)       Intake/Output Summary (Last 24 hours) at 3/1/2024 1054  Last data filed at 3/1/2024 0700  Gross per 24 hour   Intake 900 ml   Output 850 ml   Net 50 ml     Wt Readings from Last 3 Encounters:   24 168 lb 14 oz (76.6 kg)   24 164 lb 3.2 oz (74.5 kg)   23 153 lb (69.4 kg)       Gen - confused.  HEENT - NCAT  CV - RRR  Resp - CTAB  Abd - soft  Ext - s/p R AKA, 1+ LLE edema  MS - Confused awakible.       LABORATORY DATA:     Lab Results   Component Value Date     (H) 2024    BUN 22 2024    BUNCREA 18.6 2024    CREATSERUM 1.18 (H) 2024    ANIONGAP 6 2024    GFRNAA 41 (L) 2021    GFRAA 48 (L) 2021    CA 6.9 (L) 2024    OSMOCALC 302 (H) 2024    ALKPHO 62 02/10/2024    AST 91 (H) 02/10/2024    ALT 38 02/10/2024    BILT 0.4 02/10/2024    TP 4.6 (L) 02/10/2024    ALB 1.7 (L) 2024    GLOBULIN 2.3 (L) 02/10/2024     2024    K 5.8 (H) 2024     (H) 2024    CO2 22.0 2024     Lab Results   Component Value Date    WBC 13.5 (H) 2024    RBC 2.86 (L) 2024    HGB 8.0 (L) 2024    HCT 26.4 (L) 2024    .0 2024    MPV 8.1 2018    MCV 92.3 2024    MCH 28.0 2024    MCHC 30.3 (L) 2024    RDW 17.4 (H) 2024    NEPRELIM 13.52 (H) 2024    NEUTABS 26.32 (H) 2024    LYMPHABS 0.85 (L) 2024    EOSABS 0.85  (H) 02/13/2024    BASABS 0.00 02/13/2024    NEUT 92 02/13/2024    LYMPH 3 02/13/2024    MON 1 02/13/2024    EOS 3 02/13/2024    BASO 0 02/13/2024    NEPERCENT 88.4 02/28/2024    LYPERCENT 6.7 02/28/2024    MOPERCENT 3.3 02/28/2024    EOPERCENT 0.4 02/28/2024    BAPERCENT 0.1 02/28/2024    NE 13.52 (H) 02/28/2024    LYMABS 1.02 02/28/2024    MOABSO 0.50 02/28/2024    EOABSO 0.06 02/28/2024    BAABSO 0.02 02/28/2024     Lab Results   Component Value Date    MALBP 123.00 09/12/2023    CREUR 60.10 02/07/2024     Lab Results   Component Value Date    COLORUR Light-Yellow 02/12/2024    CLARITY Turbid (A) 02/12/2024    SPECGRAVITY 1.018 02/12/2024    GLUUR 30 (A) 02/12/2024    BILUR Negative 02/12/2024    KETUR Negative 02/12/2024    BLOODURINE 3+ (A) 02/12/2024    PHURINE 5.5 02/12/2024    PROUR 70 (A) 02/12/2024    UROBILINOGEN Normal 02/12/2024    NITRITE Negative 02/12/2024    LEUUR 250 (A) 02/12/2024    WBCUR 21-50 (A) 02/12/2024    RBCUR >10 (A) 02/12/2024    EPIUR Few (A) 02/12/2024    BACUR None Seen 02/12/2024    HYLUR Present (A) 02/12/2024         IMAGING:     Reviewed.    MEDICATIONS:             ASSESSMENT/PLAN:     68 year old female with PMH of COPD, DM2, HTN, HL, PVD, Sickle cell anemia, CKD stage 3, recent fem-pop artery bypass admitted with acute hemorrhage from incision site.  Patient is s/p OR for repair then underwent L AKA on 2/22.  Nephrology is consulted for OMEGA and metabolic acidosis.       Hyperkalemia:  - Suspect intravascular depletion  - agree with NS bolus  - Start on NS@83cc/hr.  - lokelma 10g now   - check Ck levels.  - Repeat K levels this afternoon.     OMEGA on CKD stage 3  - baseline around 1.2  - creatinine improved near baseline, had evidence of fluid overload then received lasix  - check cystatin C levels.     Hypertension   - amlodipine, hydralazine, coreg     Metabolic acidosis:  - resolved      Hypernatremia:  - wean off D5; encourage oral hydration       Hypocalcemia: with serum  albumin corrected calcium Normal  -ionized calcium normal  - PTH  370.3; Vit D 20.5  - Continue cholecalciferol 2000 int units daily, calcitriol 0.25 mcg MWF  - Endocrinology on Consult.       Adalberto Longoria MD  [facility]  Nephrology

## 2024-03-01 NOTE — PROGRESS NOTES
03/01/24 1000   Negative Pressure Wound Therapy Groin Right   Placement Date: 02/13/24   Inserted by: Dr. LEWIS  Location: Groin  Wound Location Orientation: Right   Wound photographed/measured No   Machine Status (On) Yes   Site Assessment ANGELO   Lisbet-wound Assessment ANGELO   Unit Type KCI ULTA   Cycle Continuous;On   Target Pressure (mmHg) 125   Drainage Description Serous   Dressing Status Intact   Canister Changed No   Wound Follow Up   Follow up needed Yes     Pt is on the schedule for OR today with Dr. Murrieta. Plan is for placement of keracis graft. Next wound vac change per Dr. Murrieta's order.

## 2024-03-01 NOTE — PLAN OF CARE
Patient is alert and oriented times 2. On 2L NC. Pascual for wound purposes. LR bolus switched to .9NS per pulmonology. Wound vac in place for AKA. Plan was for wound washout/ debrivement. However, BP's have been low and will be transferring to CCU. Report given.    Problem: PAIN - ADULT  Goal: Verbalizes/displays adequate comfort level or patient's stated pain goal  Description: INTERVENTIONS:  - Encourage pt to monitor pain and request assistance  - Assess pain using appropriate pain scale  - Administer analgesics based on type and severity of pain and evaluate response  - Implement non-pharmacological measures as appropriate and evaluate response  - Consider cultural and social influences on pain and pain management  - Manage/alleviate anxiety  - Utilize distraction and/or relaxation techniques  - Monitor for opioid side effects  - Notify MD/LIP if interventions unsuccessful or patient reports new pain  - Anticipate increased pain with activity and pre-medicate as appropriate  Outcome: Not Progressing     Problem: SAFETY ADULT - FALL  Goal: Free from fall injury  Description: INTERVENTIONS:  - Assess pt frequently for physical needs  - Identify cognitive and physical deficits and behaviors that affect risk of falls.  - [place] fall precautions as indicated by assessment.  - Educate pt/family on patient safety including physical limitations  - Instruct pt to call for assistance with activity based on assessment  - Modify environment to reduce risk of injury  - Provide assistive devices as appropriate  - Consider OT/PT consult to assist with strengthening/mobility  - Encourage toileting schedule  Outcome: Not Progressing     Problem: SKIN/TISSUE INTEGRITY - ADULT  Goal: Skin integrity remains intact  Description: INTERVENTIONS  - Assess and document risk factors for pressure ulcer development  - Assess and document skin integrity  - Monitor for areas of redness and/or skin breakdown  - Initiate interventions, skin  care algorithm/standards of care as needed  Outcome: Not Progressing

## 2024-03-02 NOTE — SLP NOTE
ADULT SWALLOWING EVALUATION    ASSESSMENT    ASSESSMENT/OVERALL IMPRESSION:    Proper PPE worn. Hands sanitized upon entrance/exit Pt room.       This new BSE ordered 2/2 stroke alert on 3/02/24. Pt admitted 2/10/24 with bypass graft mechanical complication. PMH includes DM, COPD, CKD, PNA. Currently, Pt NPO.    BSE 2/15/24 rec solid/thin liquids.     CXR 3/01/24:  CONCLUSION:    Patchy left lower lobe airspace opacity may be secondary to atelectasis or pneumonia.    Mild interstitial edema has improved but not resolved.       CT Brain 3/02/24:  1. No acute intracranial process.   2. Stable mild chronic microangiopathic ischemic changes with large vessel calcific atherosclerosis.   3. Lesser incidental findings as above.     Pt alert, on 2L/min 02 NC, afebrile and assessed in bed- leaning to (L) despite multiple attempts to reposition (after consulting with RN). Pt agreed to accept oral trials. No verbal or non-verbal indication of pain. Vocal quality/intensity weak. . Oral motor exam revealed overall reduced labial and lingual skills for rate, ROM and strength. Pt edentulous (\"dentures at home\"). Pt was fed pureed, mildly thick and thin liquids. Bilabial seal reduced with anterior loss on (L) with liquids.  anterior loss. Lingual skills slow for bolus and preparation of pureed trials; increased ARABELLA. Oral cavity grossly clear post swallows.     Pharyngeal response appeared delayed per hyolaryngeal elevation to completion (considered weak in strength/rise to palpation). No overt CSA on pureed nor mildly-thick liquids (no throat clearing, no coughing, no SOB and clear vocal quality); weak throat clearing S/P controlled thin liquids. Overall, swallow function appeared weak and with reduced coordination. Sp02 ~95% during this assessment.        IMPRESSIONS:    Pt presents with mild to moderate oral dysphagia and probable pharyngeal dysfunction. Collaborated with RN regarding Pt's swallowing plan of care. BSE  results/recommendations discussed with Pt. Pt with fair understanding; would benefit from additional reinforcement. Swallowing precautions written on white board in room for reinforcement/carry-over of skills for Pt, family and staff. Call light within Pt's reach upon SLP discharge from room.          PLAN:    To f/u x4 sessions to ensure safe intake of diet and reinforce swallowing/aspiration precautions. To monitor for new CXR results. Diet upgrade as tolerated. VFSS IF appropriate. Family education as available.      RECOMMENDATIONS   Diet Recommendations - Solids: Puree  Diet Recommendations - Liquids: Nectar thick liquids/ Mildly thick      Compensatory Strategies Recommended: No straws  Aspiration Precautions: Upright position;Slow rate;Small bites and sips;No straw  Medication Administration Recommendations: Crushed in puree  Treatment Plan/Recommendations: Aspiration precautions    HISTORY   MEDICAL HISTORY  Reason for Referral: Stroke protocol    Problem List  Principal Problem:    Bypass graft mechanical complication, initial encounter ([facility])  Active Problems:    Hyperglycemia    Bypass graft mechanical complication ([facility])    Anemia, unspecified type    Acute kidney injury superimposed on CKD ([facility])    Uncontrolled type 2 diabetes mellitus with hyperglycemia ([facility])      Past Medical History  Past Medical History:   Diagnosis Date    Anemia     Back pain     Back problem     Cataract     Chronic kidney disease (CKD)     COPD (chronic obstructive pulmonary disease) ([facility])     FEV 1 1.25 50% 2/20    Diabetes ([facility])     DM neuropathy    Essential hypertension     H/O angioplasty     07/08/2020    High blood pressure     High cholesterol     Neuropathy     Peripheral vascular disease ([facility])     PNA (pneumonia) 05/2018    Pulmonary emphysema ([facility])     Pulmonary nodule     4 mm RUL    Renal disorder     monitoring kidney labs    Sickle cell trait (HCC)     Sickle-cell anemia (HCC)     Tobacco abuse     Visual impairment         Prior Living Situation: Home with support  Diet Prior to Admission: Regular;Thin liquids  Precautions: Aspiration    Patient/Family Goals: to eat    SWALLOWING HISTORY  Current Diet Consistency: NPO    OBJECTIVE   ORAL MOTOR EXAMINATION  Dentition: Edentulous  Symmetry:  (reduced)  Strength:  (overall reduced)  Tone: Within Functional Limits  Range of Motion:  (overall reduced)  Rate of Motion: Reduced    Voice Quality: Weak  Respiratory Status: Supplemental O2;Nasal cannula  Consistencies Trialed: Thin liquids;Nectar thick liquids;Puree  Method of Presentation: Staff/Clinician assistance;Spoon;Cup  Patient Positioning: Leaning to right;Upright    Oral Phase of Swallow: Impaired  Bolus Retrieval: Intact  Bilabial Seal: Impaired  Bolus Formation: Impaired  Bolus Propulsion: Impaired  Mastication:  (N/A)  Retention: Intact    Pharyngeal Phase of Swallow: Impaired  Laryngeal Elevation Timing: Appears impaired  Laryngeal Elevation Strength: Appears impaired  Laryngeal Elevation Coordination: Appears impaired  (Please note: Silent aspiration cannot be evaluated clinically. Videofluoroscopic Swallow Study is required to rule-out silent aspiration.)    GOALS  Goal #1 The patient will tolerate pureed consistency and MILDLY-THICK liquids without overt signs or symptoms of aspiration with 100 % accuracy over 2 session(s).  In Progress   Goal #2 The patient/family/caregiver will demonstrate understanding and implementation of aspiration precautions and swallow strategies independently over 4 session(s).    In Progress   Goal #3 The patient will utilize compensatory strategies as outlined by  BSSE (clinical evaluation) including Slow rate, Small bites, Small sips, No straws, Upright 90 degrees with 1:1 assistance 90 % of the time across 4 sessions.  In Progress   Goal #4 The patient will tolerate trial upgrade of pureed consistency and THIN liquids without overt signs or symptoms of aspiration with 100 % accuracy over 2  session(s).    In Progress   FOLLOW UP  Treatment Plan/Recommendations: Aspiration precautions  Number of Visits to Meet Established Goals: 4  Follow Up Needed (Documentation Required): Yes  SLP Follow-up Date: 03/03/24    Thank you for your referral.   If you have any questions, please contact   Anna Reynolds M.S. [facility]/SLP  Speech-Language Pathologist  [facility]  #[phone]

## 2024-03-02 NOTE — SPIRITUAL CARE NOTE
Spiritual Care Visit Note    Patient Name: Ava Bowen Date of Spiritual Care Visit: 24   : 10/6/1955 Primary Dx: Bypass graft mechanical complication, initial encounter (HCC)       Referred By:RRT    Spiritual Care Taxonomy:      Visit Type/Summary:     - Spiritual Care:  remains available as needed for follow up.    Spiritual Care support can be requested via an [facility] consult. For urgent/immediate needs, please contact the On Call  at: [place]: ext [phone]    Chaplain Tara.

## 2024-03-02 NOTE — SIGNIFICANT EVENT
[facility]     Ava Bowen Patient Status:  Inpatient    10/6/1955 MRN [id number]   Location [facility] 2W/SW Attending Edy Foote,    Hosp Day # 21 PCP Ernesto De Dios     Reason for RRT: Stroke alert.  Per RN stroke alert called as patient was not able to move her left leg.  Per RN Mike he was told by previous day nurse she was not able to move her left leg yesterday during the day.  She does move leg in pain but not able to lift or move leg/toes on her own.    Physical Exam:    BP 92/54 (BP Location: Right arm)   Pulse 56   Temp 96.6 °F (35.9 °C) (Temporal)   Resp 10   Ht 5' 4.02\" (1.626 m)   Wt 168 lb 14 oz (76.6 kg)   LMP  (LMP Unknown)   SpO2 95%   BMI 28.97 kg/m²   General:  alert, awake  Respiratory: normal respiratory effort  Cardiovascular: +s1s2, rrr  Abdomen: soft  Neurologic: left leg with decreased movement - unable to lift or wiggle toes  Extremities: rt aka with wound vac    Diagnostic Data:      XR CHEST AP PORTABLE  (CPT=71045)    Result Date: 3/1/2024  CONCLUSION:   Patchy left lower lobe airspace opacity may be secondary to atelectasis or pneumonia.  Mild interstitial edema has improved but not resolved.     Dictated by (CST): Elder Wagoner MD on 3/01/2024 at 3:46 PM     Finalized by (CST): Elder Wagoner MD on 3/01/2024 at 3:48 PM           Labs:     Lab Results   Component Value Date    WBC 18.2 2024    WBC 18.2 2024    HGB 7.7 2024    HGB 7.7 2024    HCT 25.1 2024    HCT 25.1 2024    .0 2024    .0 2024    K 5.5 2024             ASSESSMENT / PLAN:     Left leg paresis: unable to clearly determine when this began - per RN from yesterdays day shift she was not able to move it during the day yesterday either; stroke alert called; plan for stat ct head; discussed with Dr Huynh from neurology; further recs pending ct recs    Critical care time: 45 minutes    Shannon  MD Mike  3/2/2024

## 2024-03-02 NOTE — PROGRESS NOTES
Pulmonary/Critical Care Progress Note        NAME: Ava Bowen - ROOM: [address] [place], [place] [address]A - MRN: [id number] - Age: 68 year old - : 10/6/1955  Date of Admission: 2/10/2024  3:39 PM  Admission Diagnosis: Hyperglycemia [R73.9]  Bypass graft mechanical complication, initial encounter ([facility]) [T82.398A]  Acute kidney injury superimposed on CKD ([facility]) [N17.9, N18.9]  Anemia, unspecified type [D64.9]    Past Medical History:   Diagnosis Date    Anemia     Back pain     Back problem     Cataract     Chronic kidney disease (CKD)     COPD (chronic obstructive pulmonary disease) ([facility])     FEV 1 1.25 50%     Diabetes ([facility])     DM neuropathy    Essential hypertension     H/O angioplasty     2020    High blood pressure     High cholesterol     Neuropathy     Peripheral vascular disease ([facility])     PNA (pneumonia) 2018    Pulmonary emphysema ([facility])     Pulmonary nodule     4 mm RUL    Renal disorder     monitoring kidney labs    Sickle cell trait ([facility])     Sickle-cell anemia ([facility])     Tobacco abuse     Visual impairment            SUBJECTIVE: per RN patient not able to move left leg    OBJECTIVE:    Oxygen Therapy  SpO2: 95 %  O2 Device: Nasal cannula  FiO2 (%): 40 %  O2 Flow Rate (L/min): 2 L/min  Pulse Oximetry Type: Intermittent                Wt Readings from Last 3 Encounters:   24 168 lb 14 oz (76.6 kg)   24 164 lb 3.2 oz (74.5 kg)   23 153 lb (69.4 kg)         Intake/Output Summary (Last 24 hours) at 3/2/2024 0707  Last data filed at 3/2/2024 0500  Gross per 24 hour   Intake 2641 ml   Output 825 ml   Net 1816 ml       Scheduled Medication:   sodium zirconium cyclosilicate  10 g Oral Once    enoxaparin  40 mg Subcutaneous Daily    gabapentin  100 mg Oral TID    insulin aspart  1-5 Units Subcutaneous TID CC    cholecalciferol  2,000 Units Oral Daily    calcitriol  0.25 mcg Oral Q MWF    vancomycin  125 mg Oral Daily    pantoprazole  40 mg Oral QAM AC    carvedilol  25 mg Oral BID    [Held by provider]  hydrALAZINE  100 mg Oral Q8H    vancomycin  750 mg Intravenous Q36H    fluconazole  200 mg Oral Daily    sodium hypochlorite   Topical 2 times per day    fluticasone furoate-vilanterol  1 puff Inhalation Daily    [Held by provider] rosuvastatin  40 mg Oral Nightly    meropenem  500 mg Intravenous q12h     Continuous Infusing Medication:   sodium chloride 83 mL/hr at 03/01/24 1218    norepinephrine 11 mcg/min (03/02/24 0530)     PRN Medication:acetaminophen **OR** [DISCONTINUED] HYDROcodone-acetaminophen **OR** [DISCONTINUED] HYDROcodone-acetaminophen, HYDROmorphone, HYDROcodone-acetaminophen, HYDROcodone-acetaminophen, morphINE, hydrALAzine, ondansetron, metoclopramide, albuterol, ipratropium-albuterol, glucose **OR** glucose **OR** glucose-vitamin C **OR** dextrose **OR** glucose **OR** glucose **OR** glucose-vitamin C     Blood pressure 92/54, pulse 56, temperature 96.6 °F (35.9 °C), temperature source Temporal, resp. rate 10, height 5' 4.02\" (1.626 m), weight 168 lb 14 oz (76.6 kg), SpO2 95%, not currently breastfeeding.  General:  Alert and following commands    Neck:  supple   Lungs:   Clear to auscultation bilaterally.   Heart:  Regular    Abdomen:   Soft, non-tender, non distended   Extremities:  Right amputation, left + edema, poor pulses    Skin:  No rashes or lesions   Neurologic:  Patient not able to move left leg         Recent Labs   Lab 02/26/24  0531 02/27/24  0359 02/28/24  0536 02/29/24  0550 03/01/24  0658   RBC 2.80* 2.76* 2.82* 2.78* 2.86*   HGB 8.2* 7.8* 8.3* 7.7* 8.0*   HCT 25.0* 24.8* 25.2* 25.7* 26.4*   MCV 89.3 89.9 89.4 92.4 92.3   MCH 29.3 28.3 29.4 27.7 28.0   MCHC 32.8 31.5 32.9 30.0* 30.3*   RDW 16.3* 16.9* 16.2* 17.2* 17.4*   NEPRELIM 14.03* 12.70* 13.52*  --   --    WBC 16.2* 14.6* 15.3* 12.8* 13.5*   .0 262.0 249.0 260.0 194.0         Recent Labs   Lab 02/28/24  0536 02/29/24  0550 03/01/24  0658 03/01/24  1256 03/01/24  1944   * 94 115*  --   --    BUN 21 23 22  --    --    CREATSERUM 1.06* 1.08* 1.18*  --   --    EGFRCR 58* 57* 51*  --   --    CA 7.2* 7.2* 6.9*  --   --     144 144  --   --    K 5.0 5.2* 5.8* 5.9* 5.5*   * 116* 116*  --   --    CO2 24.0 25.0 22.0  --   --      Lab Results   Component Value Date    INR 1.51 (H) 02/10/2024    INR 1.09 08/17/2021    INR 1.00 05/03/2020         Imaging: chest xray   Impression   CONCLUSION:     Patchy left lower lobe airspace opacity may be secondary to atelectasis or pneumonia.     Mild interstitial edema has improved but not resolved.              Imaging personally reviewed by me      ASSESSMENT/PLAN:    Left leg paresis - this is new on discussion with nursing staff, vascular and hospitalist.  -code stroke called   Sepsis - source likely postoperative wound infection. Doubt pneumonia.  Urine culture pending.   On pressors   - wean pressors as tolerated  -back off on IVF as she appears overloaded on chest xray and the lactic is low   Acute hypoxemic  respiratory failure - due to pulmonary edema, COPD, and atelectasis.  On nasal cannula O2 at 2 liters   - wean O2 as able  PAD s/p RLE fem-pop bypass s/b wound dehiscence and postoperative wound infection  - ID following and managing antibiotics   - vascular surgery following  - planning washout Monday 3/4  COPD - no evidence of exacerbation  - bronchodilator protocol  OMEGA on CKD, hyperkalemia  - per nephrology  DM  - insulin per endo  FEN  - NPO  Ppx  - lovenox  Advanced directives -  -full code   Dispo - critically ill and at risk for worsening   -will follow   Case discussed with Dr. Foote, Dr. Justice, ICU APN and nursing staff       Critical Care Time 40 minutes

## 2024-03-02 NOTE — PROGRESS NOTES
Vascular Surgery Progress Note    Patient transferred to ICU yesterday due to persistent hypotension. She is able to answer simple yes/no questions but continues to be significantly lethargic. Code stroke called this AM due to patient not being able to move her left leg or wiggle her toes. She can squeeze on the left and right hands. Initial CT of the head is negative. CK significantly elevated on labs as well. Discussed with Dr. Foote and patients daughter on the phone. Will plan to continue treatment for now. Will consult palliative care for goals of care discussion. Daughter is agreeable to the plan.     Madiha Murrieta MD  03/02/24  8:54 AM

## 2024-03-02 NOTE — PROGRESS NOTES
[facility] Creatine Kinase (CK) Monitoring    Subjective:  Ava Bowen is currently receiving daptomycin.     Per [facility] protocol, pharmacy will order a baseline and weekly CK and notify the provider who ordered daptomycin if CK >=5 x ULN (with or without symptoms of myopathy).    Objective:  [facility] Upper Limits of Normal (ULN):  Sex ULN (U/L) 5 x ULN (U/L) 10 x ULN (U/L)   Female 192 960 1,920   Male 308 1,540 3,080     Patient's Recent CK Results:  Lab Results   Component Value Date    CK 6,440 (HH) 03/02/2024    CK 3,736 (H) 03/01/2024    CK 35 08/21/2023       Assessment:  The patient's most recent >/= 10 x ULN.   The patient is experiencing signs and symptoms of myopathy.    Plan:  Provider (Dr. Cid) notified, CK >=5 x ULN.  2.   Recommend to monitor CK closely with new addition of Dapto.     If provider was notified, discussed the above recommendations and provider agreed with the plan.    Kolton Layne, PharmD  3/2/2024  10:59 AM

## 2024-03-02 NOTE — PLAN OF CARE
Pt A&OX2-3 with intermittent confusion. Levo gtt cont. Wound vac remains in place. Pulses in left leg minimal with doppler. Patient unable to move left leg, MD aware. Pain treated with prns. NPO due to fail of RN bed side swallow, speech consulted. Bed locked in lowest position, call light within reach. Safety maintained.   Problem: SAFETY ADULT - FALL  Goal: Free from fall injury  Description: INTERVENTIONS:  - Assess pt frequently for physical needs  - Identify cognitive and physical deficits and behaviors that affect risk of falls.  - [place] fall precautions as indicated by assessment.  - Educate pt/family on patient safety including physical limitations  - Instruct pt to call for assistance with activity based on assessment  - Modify environment to reduce risk of injury  - Provide assistive devices as appropriate  - Consider OT/PT consult to assist with strengthening/mobility  - Encourage toileting schedule  Outcome: Progressing     Problem: SKIN/TISSUE INTEGRITY - ADULT  Goal: Oral mucous membranes remain intact  Description: INTERVENTIONS  - Assess oral mucosa and hygiene practices  - Implement preventative oral hygiene regimen  - Implement oral medicated treatments as ordered  Outcome: Progressing     Problem: RESPIRATORY - ADULT  Goal: Achieves optimal ventilation and oxygenation  Description: INTERVENTIONS:  - Assess for changes in respiratory status  - Assess for changes in mentation and behavior  - Position to facilitate oxygenation and minimize respiratory effort  - Oxygen supplementation based on oxygen saturation or ABGs  - Provide Smoking Cessation handout, if applicable  - Encourage broncho-pulmonary hygiene including cough, deep breathe, Incentive Spirometry  - Assess the need for suctioning and perform as needed  - Assess and instruct to report SOB or any respiratory difficulty  - Respiratory Therapy support as indicated  - Manage/alleviate anxiety  - Monitor for signs/symptoms of CO2  retention  Outcome: Progressing

## 2024-03-02 NOTE — PROGRESS NOTES
Endocrine Note    Reviewed BG levels from the past 24 hours which are overall stable.       Type 2 DM  With PAD   S/p AKA     PLAN:   HOLD tresiba  Novolog  CF with meals  Accuchecks ac and hs  Hypoglycemia protocol  Diabetic diet     Elevated parathyroid hormone level  Please see Dr. Ludwig note regarding plan for outpatient follow up      We will follow    Whitney Poon MD

## 2024-03-02 NOTE — PLAN OF CARE
Stroke alert called at 0700 by night shift RN Estrella due to patient not moving left leg and lethargy, last known well time was prior to shift report, NIH 13, neuro exam performed, performed CT Scan, no acute changes on CT per the radiologist, pulmonary aware, primary attending Dr. Qamar jones, neurology consulted but no new orders, charge nurse at bedside, vascular services came and assessed left leg, no new orders at this time, will continue plan of care per Dr. Murrieta, follow up NIH 11 a few hours later, pt less lethargic, speech evaluated and recommended pureed with mildly thick, will keep NPO at this time due to pt condition      Problem: PAIN - ADULT  Goal: Verbalizes/displays adequate comfort level or patient's stated pain goal  Description: INTERVENTIONS:  - Encourage pt to monitor pain and request assistance  - Assess pain using appropriate pain scale  - Administer analgesics based on type and severity of pain and evaluate response  - Implement non-pharmacological measures as appropriate and evaluate response  - Consider cultural and social influences on pain and pain management  - Manage/alleviate anxiety  - Utilize distraction and/or relaxation techniques  - Monitor for opioid side effects  - Notify MD/LIP if interventions unsuccessful or patient reports new pain  - Anticipate increased pain with activity and pre-medicate as appropriate  Outcome: Progressing     Problem: SAFETY ADULT - FALL  Goal: Free from fall injury  Description: INTERVENTIONS:  - Assess pt frequently for physical needs  - Identify cognitive and physical deficits and behaviors that affect risk of falls.  - Sophia fall precautions as indicated by assessment.  - Educate pt/family on patient safety including physical limitations  - Instruct pt to call for assistance with activity based on assessment  - Modify environment to reduce risk of injury  - Provide assistive devices as appropriate  - Consider OT/PT consult to assist with  strengthening/mobility  - Encourage toileting schedule  Outcome: Progressing     Problem: SKIN/TISSUE INTEGRITY - ADULT  Goal: Skin integrity remains intact  Description: INTERVENTIONS  - Assess and document risk factors for pressure ulcer development  - Assess and document skin integrity  - Monitor for areas of redness and/or skin breakdown  - Initiate interventions, skin care algorithm/standards of care as needed  Outcome: Progressing  Goal: Incision(s), wounds(s) or drain site(s) healing without S/S of infection  Description: INTERVENTIONS:  - Assess and document risk factors for pressure ulcer development  - Assess and document skin integrity  - Assess and document dressing/incision, wound bed, drain sites and surrounding tissue  - Implement wound care per orders  - Initiate isolation precautions as appropriate  - Initiate Pressure Ulcer prevention bundle as indicated  Outcome: Progressing  Goal: Oral mucous membranes remain intact  Description: INTERVENTIONS  - Assess oral mucosa and hygiene practices  - Implement preventative oral hygiene regimen  - Implement oral medicated treatments as ordered  Outcome: Progressing     Problem: RESPIRATORY - ADULT  Goal: Achieves optimal ventilation and oxygenation  Description: INTERVENTIONS:  - Assess for changes in respiratory status  - Assess for changes in mentation and behavior  - Position to facilitate oxygenation and minimize respiratory effort  - Oxygen supplementation based on oxygen saturation or ABGs  - Provide Smoking Cessation handout, if applicable  - Encourage broncho-pulmonary hygiene including cough, deep breathe, Incentive Spirometry  - Assess the need for suctioning and perform as needed  - Assess and instruct to report SOB or any respiratory difficulty  - Respiratory Therapy support as indicated  - Manage/alleviate anxiety  - Monitor for signs/symptoms of CO2 retention  Outcome: Progressing

## 2024-03-02 NOTE — PROGRESS NOTES
[facility]    Nephrology Progress Note    Ava Bowen Attending:  Max Souza MD       SUBJECTIVE:     Lethargic this AM  Overnight events noted  CK levels up to 6K today  K levels 5.6 this AM    PHYSICAL EXAM:     Vital Signs: BP 94/59   Pulse 58   Temp 96.6 °F (35.9 °C)   Resp 16   Ht 5' 4.02\" (1.626 m)   Wt 168 lb 14 oz (76.6 kg)   LMP  (LMP Unknown)   SpO2 100%   BMI 28.97 kg/m²   Temp (24hrs), Av.9 °F (36.1 °C), Min:96.6 °F (35.9 °C), Max:97.7 °F (36.5 °C)       Intake/Output Summary (Last 24 hours) at 3/2/2024 1316  Last data filed at 3/2/2024 0500  Gross per 24 hour   Intake 2641 ml   Output 825 ml   Net 1816 ml     Wt Readings from Last 3 Encounters:   24 168 lb 14 oz (76.6 kg)   24 164 lb 3.2 oz (74.5 kg)   23 153 lb (69.4 kg)       Gen - confused.  HEENT - NCAT  CV - RRR  Resp - CTAB  Abd - soft  Ext - s/p R AKA, 1+ LLE edema  MS - Confused awakible.       LABORATORY DATA:     Lab Results   Component Value Date     (H) 2024     (H) 2024    BUN 22 2024    BUN 22 2024    BUNCREA 18.8 2024    BUNCREA 18.8 2024    CREATSERUM 1.17 (H) 2024    CREATSERUM 1.17 (H) 2024    ANIONGAP 5 2024    ANIONGAP 5 2024    GFRNAA 41 (L) 2021    GFRAA 48 (L) 2021    CA 6.4 (L) 2024    CA 6.4 (L) 2024    OSMOCALC 302 (H) 2024    OSMOCALC 302 (H) 2024    ALKPHO 1,532 (H) 2024     (H) 2024    ALT 76 (H) 2024    BILT 0.3 2024    TP 4.5 (L) 2024    ALB 1.7 (L) 2024    GLOBULIN 2.8 2024     2024     2024    K 5.6 (H) 2024    K 5.6 (H) 2024     (H) 2024     (H) 2024    CO2 22.0 2024    CO2 22.0 2024     Lab Results   Component Value Date    WBC 18.2 (H) 2024    WBC 18.2 (H) 2024    RBC 2.67 (L) 2024    RBC 2.67 (L) 2024    HGB  7.7 (L) 03/02/2024    HGB 7.7 (L) 03/02/2024    HCT 25.1 (L) 03/02/2024    HCT 25.1 (L) 03/02/2024    .0 03/02/2024    .0 03/02/2024    MPV 8.1 05/19/2018    MCV 94.0 03/02/2024    MCV 94.0 03/02/2024    MCH 28.8 03/02/2024    MCH 28.8 03/02/2024    MCHC 30.7 (L) 03/02/2024    MCHC 30.7 (L) 03/02/2024    RDW 17.5 (H) 03/02/2024    RDW 17.5 (H) 03/02/2024    NEPRELIM 16.96 (H) 03/02/2024    NEUTABS 26.32 (H) 02/13/2024    LYMPHABS 0.85 (L) 02/13/2024    EOSABS 0.85 (H) 02/13/2024    BASABS 0.00 02/13/2024    NEUT 92 02/13/2024    LYMPH 3 02/13/2024    MON 1 02/13/2024    EOS 3 02/13/2024    BASO 0 02/13/2024    NEPERCENT 93.4 03/02/2024    LYPERCENT 3.1 03/02/2024    MOPERCENT 2.4 03/02/2024    EOPERCENT 0.1 03/02/2024    BAPERCENT 0.1 03/02/2024    NE 16.96 (H) 03/02/2024    LYMABS 0.57 (L) 03/02/2024    MOABSO 0.43 03/02/2024    EOABSO 0.01 03/02/2024    BAABSO 0.02 03/02/2024     Lab Results   Component Value Date    MALBP 123.00 09/12/2023    CREUR 60.10 02/07/2024     Lab Results   Component Value Date    COLORUR Yellow 03/01/2024    CLARITY Turbid (A) 03/01/2024    SPECGRAVITY 1.018 03/01/2024    GLUUR Normal 03/01/2024    BILUR Negative 03/01/2024    KETUR Negative 03/01/2024    BLOODURINE 3+ (A) 03/01/2024    PHURINE 6.0 03/01/2024    PROUR 70 (A) 03/01/2024    UROBILINOGEN 4 (A) 03/01/2024    NITRITE Negative 03/01/2024    LEUUR 500 (A) 03/01/2024    WBCUR >50 (A) 03/01/2024    RBCUR 3-5 (A) 03/01/2024    EPIUR Few (A) 03/01/2024    BACUR 1+ (A) 03/01/2024    HYLUR Present (A) 02/12/2024         IMAGING:     Reviewed.    MEDICATIONS:             ASSESSMENT/PLAN:     68 year old female with PMH of COPD, DM2, HTN, HL, PVD, Sickle cell anemia, CKD stage 3, recent fem-pop artery bypass admitted with acute hemorrhage from incision site.  Patient is s/p OR for repair then underwent L AKA on 2/22.  Nephrology is consulted for OMEGA and metabolic acidosis.       Hyperkalemia:  - from elevated CK levels  and volume depletion.  - Continue IVF as able  - if becomes overloaded ok to give lasix.  - lokelma 10grams every day.  - I do worry that the Ck levels are arising from tissue necrosis, if surgical treatment plan then can do RRT as a bridge otherwise if no surgical intervention and Ck continues to rise then would be a poor candidate for RRT.  - Discussed with Dr. Murrieta      OMEGA on CKD stage 3  - baseline around 1.2  - creatinine improved near baseline, had evidence of fluid overload then received lasix  - check cystatin C levels.     Hypertension   - amlodipine, hydralazine, coreg     Metabolic acidosis:  - resolved     Hypocalcemia: with serum albumin corrected calcium Normal  -ionized calcium normal  - PTH  370.3; Vit D 20.5  - Continue cholecalciferol 2000 int units daily, calcitriol 0.25 mcg MWF  - Endocrinology on Consult.       Adalberto Longoria MD  [facility]  Nephrology

## 2024-03-03 NOTE — PROGRESS NOTES
Pulmonary/Critical Care Progress Note        NAME: Ava Bowen - ROOM: [address] [place], [place] [address] - MRN: [id number] - Age: 68 year old - : 10/6/1955  Date of Admission: 2/10/2024  3:39 PM  Admission Diagnosis: Hyperglycemia [R73.9]  Bypass graft mechanical complication, initial encounter ([facility]) [T82.398A]  Acute kidney injury superimposed on CKD ([facility]) [N17.9, N18.9]  Anemia, unspecified type [D64.9]    Past Medical History:   Diagnosis Date    Anemia     Back pain     Back problem     Cataract     Chronic kidney disease (CKD)     COPD (chronic obstructive pulmonary disease) ([facility])     FEV 1 1.25 50%     Diabetes ([facility])     DM neuropathy    Essential hypertension     H/O angioplasty     2020    High blood pressure     High cholesterol     Neuropathy     Peripheral vascular disease ([facility])     PNA (pneumonia) 2018    Pulmonary emphysema ([facility])     Pulmonary nodule     4 mm RUL    Renal disorder     monitoring kidney labs    Sickle cell trait ([facility])     Sickle-cell anemia ([facility])     Tobacco abuse     Visual impairment            SUBJECTIVE: no new events, on pressors     OBJECTIVE:    Oxygen Therapy  SpO2: 99 %  O2 Device: Nasal cannula  FiO2 (%): 40 %  O2 Flow Rate (L/min): 3 L/min  Pulse Oximetry Type: Continuous                Wt Readings from Last 3 Encounters:   24 168 lb 14 oz (76.6 kg)   24 164 lb 3.2 oz (74.5 kg)   23 153 lb (69.4 kg)         Intake/Output Summary (Last 24 hours) at 3/3/2024 0728  Last data filed at 3/3/2024 0600  Gross per 24 hour   Intake 2157.8 ml   Output 525 ml   Net 1632.8 ml       Scheduled Medication:   magnesium sulfate  2 g Intravenous Once    calcium gluconate  1 g Intravenous Once    DAPTOmycin  10 mg/kg Intravenous Q24H    enoxaparin  40 mg Subcutaneous Daily    [Held by provider] gabapentin  100 mg Oral TID    insulin aspart  1-5 Units Subcutaneous TID CC    cholecalciferol  2,000 Units Oral Daily    calcitriol  0.25 mcg Oral Q MWF    vancomycin  125 mg Oral Daily     pantoprazole  40 mg Oral QAM AC    carvedilol  25 mg Oral BID    [Held by provider] hydrALAZINE  100 mg Oral Q8H    fluconazole  200 mg Oral Daily    sodium hypochlorite   Topical 2 times per day    fluticasone furoate-vilanterol  1 puff Inhalation Daily    [Held by provider] rosuvastatin  40 mg Oral Nightly    meropenem  500 mg Intravenous q12h     Continuous Infusing Medication:   norepinephrine 16 mcg/min (03/03/24 0318)    sodium chloride 50 mL/hr at 03/02/24 1322     PRN Medication:acetaminophen **OR** [DISCONTINUED] HYDROcodone-acetaminophen **OR** [DISCONTINUED] HYDROcodone-acetaminophen, HYDROmorphone, HYDROcodone-acetaminophen, HYDROcodone-acetaminophen, morphINE, hydrALAzine, ondansetron, metoclopramide, albuterol, ipratropium-albuterol, glucose **OR** glucose **OR** glucose-vitamin C **OR** dextrose **OR** glucose **OR** glucose **OR** glucose-vitamin C     Blood pressure (!) 85/56, pulse 56, temperature 96.6 °F (35.9 °C), temperature source Temporal, resp. rate 20, height 5' 4.02\" (1.626 m), weight 168 lb 14 oz (76.6 kg), SpO2 99%, not currently breastfeeding.  General:  Awake but lethargic    Neck:  supple   Lungs:   Clear to auscultation bilaterally.   Heart:  Regular    Abdomen:   Soft, non-tender, non distended   Extremities:  Right amputation, left + edema, poor pulses    Skin:  No rashes or lesions   Neurologic:  Patient not able to move left leg, not oriented          Recent Labs   Lab 02/27/24  0359 02/28/24  0536 02/29/24  0550 03/01/24  0658 03/02/24  0702 03/03/24  0437   RBC 2.76* 2.82*   < > 2.86* 2.67*  2.67* 2.66*   HGB 7.8* 8.3*   < > 8.0* 7.7*  7.7* 7.4*   HCT 24.8* 25.2*   < > 26.4* 25.1*  25.1* 25.8*   MCV 89.9 89.4   < > 92.3 94.0  94.0 97.0   MCH 28.3 29.4   < > 28.0 28.8  28.8 27.8   MCHC 31.5 32.9   < > 30.3* 30.7*  30.7* 28.7*   RDW 16.9* 16.2*   < > 17.4* 17.5*  17.5* 18.1*   NEPRELIM 12.70* 13.52*  --   --  16.96*  --    WBC 14.6* 15.3*   < > 13.5* 18.2*  18.2* 19.5*    .0 249.0   < > 194.0 191.0  191.0 211.0    < > = values in this interval not displayed.         Recent Labs   Lab 03/01/24  0658 03/01/24  1256 03/02/24  0702 03/02/24  1858 03/03/24  0437   *  --  119*  119*  --  354*  354*   BUN 22  --  22  22  --  21  21   CREATSERUM 1.18*  --  1.17*  1.17*  --  1.37*  1.37*   EGFRCR 51*  --  51*  51*  --  43*  43*   CA 6.9*  --  6.4*  6.4*  --  5.9*  5.9*     --  144  144  --  134*  134*   K 5.8*   < > 5.6*  5.6* 5.3* 5.2*  5.2*   *  --  117*  117*  --  109  109   CO2 22.0  --  22.0  22.0  --  20.0*  20.0*    < > = values in this interval not displayed.     Lab Results   Component Value Date    INR 1.51 (H) 02/10/2024    INR 1.09 08/17/2021    INR 1.00 05/03/2020             ASSESSMENT/PLAN:    Left leg paresis - code stroke called yesterday.  CT head no acute stroke   Sepsis - source likely postoperative wound infection. Doubt pneumonia.  Urine culture pending.   On pressors   - wean pressors as tolerated  Acute hypoxemic  respiratory failure - due to pulmonary edema, COPD, and atelectasis.  On nasal cannula O2 at 3 liters   - wean O2 as able  PAD s/p RLE fem-pop bypass s/b wound dehiscence and postoperative wound infection  - ID following and managing antibiotics   - vascular surgery following  - planning washout Monday 3/4  COPD - no evidence of exacerbation  - bronchodilator protocol  OMEGA on CKD, hyperkalemia  - per nephrology  DM  - insulin per endo  FEN  - NPO  Ppx  - lovenox  Advanced directives -  -full code  -palliative being consulted    Dispo - critically ill and at risk for worsening   -will follow       Critical Care Time 35 minutes

## 2024-03-03 NOTE — PROGRESS NOTES
[facility] Hospitalist Progress Note     CC: Hospital Follow up    PCP: Ernesto De Dios       Assessment/Plan:     Principal Problem:    Bypass graft mechanical complication, initial encounter ([facility])  Active Problems:    Hyperglycemia    Bypass graft mechanical complication (HCC)    Anemia, unspecified type    Acute kidney injury superimposed on CKD (HCC)    Uncontrolled type 2 diabetes mellitus with hyperglycemia (HCC)    Ms. Bowen is a 68 year old female with PMH sig for CKD, COPD, DM, HTN, HLD, PVD, and sickle cell anemia who was recently admitted last week for right lower extremity femoral to popliteal bypass who presented with bleeding from surgical site. Taken for emergent OR on 2/10/24, noted to be grossly infected, started on vancomycin/meropenem. Bcx with Klebsiella. BG elevated on admit, started on insulin gtt, endo consulted, now off insulin gtt. Kept intubated post op, pulm following. Polymicrobial infection on meropenum, IV vanco and now fluconazole.  Now S/P AKA on right on 2/22/24. Had some confusion after procedure now much better as of 2/28.       Severe Sepsis with Septic shock secondary to VRE bacteremia   Seems to be from VRE bacteremia - ID aware, dapto started- stop Vanco  Worsening today, now on two pressors, ZCK elevated  Patient nearly obtunded today   NG tube in place   Know polymicrobial right limb infection, S/P multiple revision surgeries and amputation on right AKA   Past Klebsiella in blood as well , also with polymicro infection of wound   ID following   Continue meropeneum and fluconazole   Repeat blood cultures per ID  Currently on levophed - wean if able   Fluids per renal service   Also with rising CK levels maybe from tissue necrosis related to pressors   - 2/10 wound cx and 2/13 poly microbial  - abx adjustment per ID f/u cx results  - ID notes appreciated- tenative plan for 6 weeks abx from from final surgical plans- final abx tbd pending cx data and surgical plans    -white blood cell count is trending up again today   Patient critically ill high risk for further decompensation and death     Severe PAD sp RLE fem-pop bypass last week c/b bleeding fm surg site fm wound dehiscence  - s/p recent fem pop bypass 1/24/24 with Dr. Murrieta  - has been on ASA/plavix- held on admit  - Hg down to 6.0, s/p 3 units pRBC 2/10- watching close now, hgb stable currently no signs of bleeding   - vascular surgery consulted, s/p emergent OR 2/10, removal of infected graft, s/p debridement, bone patch angioplasty   - sp further debridement 2/13 and placement of wound vac  - back to OR 2/16   - s/p right AKA  2/22/24  - Pain meds PRN, gabapentin   - lasix PRN   - Some coolness and decreased pulse on left leg , CTA with occluded stent in femoral artery, discussed with vascular no further intervention at this time, left heel with dark discoloration, decreased doppler pulses on left foot    Needs additional wash out, now scheduled for Monday 3/4 probably will need ot be on hold  Discussed with vascular they also are recommending palliative care    Goals of care discussion, advanced care planning   Discussed with Eleonora , explained the situation and the nearly futile nature of expected clinical course  They understand and have agreed to DNAR Select measures at this time   Chart updated   Palliative care consulted as well, Hospice may be Appropriate        -Recurrent hyperkalemia, AMS  -Likely related to sepsis and CK elevation from tissue necrosis  Lokelma today   Fluid bolus and resume lasix per renal  Reconsult renal   Did try some narcan this am   Repeat cultures per ID   Repeat C diff     MOEGA on CKD3-  Woprsening metabolic derangements acidosis, hyperkalemia and hypocalcemia   Renal following   - discussed with nephrology     Acute blood loss anemia from surgical site   Chronic anemia  Sickle cell anemia  - Stable currently, trend CBC   - outpatient f/u  - Hg 8.9 on discharge last  week  - Hg 6.0 on admission, s/p 4 units pRBC, last unit 2/23/34  - monitor hgb-has remained on the low side but stable     DM2, hyperglycemia now that on tube feeds  - A1c 7.9   - Hold PO medications  - BG 500s in ED, pH 7.2  - insulin gtt ordered in ED  Endo following  will need to consider resuming and adjusting insulin dose        Respiratory failure resolved   COPD  - remained intubated post op both fm aspiration event and metabolic acidosis  - pulm following  - extubated 2/14 pm  - Now on 2L NC - wean as able   - RRT called 2/26/24 with ABG showing hypoxemia   - enforced wearing O2    - continue home MDI   -Watch volume status closely as could decompensate further from a respiratory standpoint      Left leg weakness?  New vs acute  CODE stroke called this morning   Neuro consulted   Head CT without acute change   Maybe from overwhelming sepsis and AMS   Seems to be from probably muscle necrosis and severe PAD      ACP  - CODE- FULL   - POA- daughter and son     FN:  - IVF: complete   - Diet: Carb controlled diet      DVT Prophy: resumed lovenox daily   Lines: PIV     Daughter Tiffanie updated as above     Questions/concerns were discussed with patient and/or family by bedside.    Critcial care time 40 minutes     Thank You,  Edy Foote DO     Hospitalist with [facility]     Subjective:     Awake but unable to communicate     OBJECTIVE:    Blood pressure (!) 84/48, pulse 54, temperature 96.8 °F (36 °C), resp. rate 24, height 5' 4.02\" (1.626 m), weight 168 lb 14 oz (76.6 kg), SpO2 97%, not currently breastfeeding.    Temp:  [96.5 °F (35.8 °C)-96.8 °F (36 °C)] 96.8 °F (36 °C)  Pulse:  [] 54  Resp:  [10-24] 24  BP: ()/(43-68) 84/48  SpO2:  [86 %-100 %] 97 %      Intake/Output:    Intake/Output Summary (Last 24 hours) at 3/3/2024 0934  Last data filed at 3/3/2024 0600  Gross per 24 hour   Intake 2157.8 ml   Output 525 ml   Net 1632.8 ml       Last 3 Weights   03/01/24 0451 168 lb 14 oz (76.6  kg)   02/29/24 0315 163 lb 2.3 oz (74 kg)   02/28/24 0456 163 lb 3.2 oz (74 kg)   02/27/24 0547 166 lb 11.2 oz (75.6 kg)   02/26/24 0606 175 lb 11.2 oz (79.7 kg)   02/25/24 0525 167 lb 9.6 oz (76 kg)   02/24/24 0618 174 lb 1.6 oz (79 kg)   02/20/24 0554 180 lb 3.2 oz (81.7 kg)   02/19/24 0445 189 lb 1.6 oz (85.8 kg)   02/18/24 0614 186 lb 1.6 oz (84.4 kg)   02/17/24 0403 181 lb 11.2 oz (82.4 kg)   02/16/24 0500 182 lb 3.2 oz (82.6 kg)   02/14/24 0500 185 lb 13.6 oz (84.3 kg)   02/13/24 0500 183 lb 3.2 oz (83.1 kg)   02/12/24 0400 180 lb 5.4 oz (81.8 kg)   02/11/24 0600 182 lb 5.1 oz (82.7 kg)   02/10/24 2142 171 lb 1.2 oz (77.6 kg)   02/10/24 2105 171 lb 1.2 oz (77.6 kg)   02/10/24 1642 164 lb 3.9 oz (74.5 kg)   01/27/24 0501 164 lb 3.2 oz (74.5 kg)   01/24/24 0549 158 lb (71.7 kg)   01/19/24 1112 160 lb (72.6 kg)   12/30/23 1216 155 lb (70.3 kg)   08/29/23 1357 153 lb (69.4 kg)       Exam   Gen: Sleepy lethargic , NG tube   Pulm: increased effort, increased work of breathing   CV: RRR   Abd: Abdomen soft, nontender, nondistended  Ext: right AKA with VAC in place , left leg with edema cool to touch, not able to following commands today   : joseph     Data Review:       Labs:     Recent Labs   Lab 02/27/24  0359 02/28/24  0536 02/29/24  0550 03/01/24  0658 03/02/24  0702 03/03/24  0437   RBC 2.76* 2.82*   < > 2.86* 2.67*  2.67* 2.66*   HGB 7.8* 8.3*   < > 8.0* 7.7*  7.7* 7.4*   HCT 24.8* 25.2*   < > 26.4* 25.1*  25.1* 25.8*   MCV 89.9 89.4   < > 92.3 94.0  94.0 97.0   MCH 28.3 29.4   < > 28.0 28.8  28.8 27.8   MCHC 31.5 32.9   < > 30.3* 30.7*  30.7* 28.7*   RDW 16.9* 16.2*   < > 17.4* 17.5*  17.5* 18.1*   NEPRELIM 12.70* 13.52*  --   --  16.96*  --    WBC 14.6* 15.3*   < > 13.5* 18.2*  18.2* 19.5*   .0 249.0   < > 194.0 191.0  191.0 211.0    < > = values in this interval not displayed.         Recent Labs   Lab 03/01/24  0658 03/01/24  1256 03/02/24  0702 03/02/24  1858 03/03/24  0437   *  --   119*  119*  --  354*  354*   BUN 22  --  22  22  --  21  21   CREATSERUM 1.18*  --  1.17*  1.17*  --  1.37*  1.37*   EGFRCR 51*  --  51*  51*  --  43*  43*   CA 6.9*  --  6.4*  6.4*  --  5.9*  5.9*     --  144  144  --  134*  134*   K 5.8*   < > 5.6*  5.6* 5.3* 5.2*  5.2*   *  --  117*  117*  --  109  109   CO2 22.0  --  22.0  22.0  --  20.0*  20.0*    < > = values in this interval not displayed.       Recent Labs   Lab 02/26/24  0531 02/27/24  0359 02/28/24  0536 03/02/24  0702 03/03/24  0437   ALT  --   --   --  76*  --    AST  --   --   --  580*  --    ALB 1.7* 1.7* 1.7* 1.7* 1.5*         Imaging:  XR CHEST AP PORTABLE  (CPT=71045)    Result Date: 3/2/2024  PROCEDURE: XR CHEST AP PORTABLE  (CPT=71045) TIME: 1504  COMPARISON: [facility], CTA ABDOMEN/PELVIS LOWER EXT BILAT W RUNOFF (YVK=78707), 2/21/2024, 6:05 AM.  [facility], XR CHEST AP PORTABLE (CPT=71045), 3/01/2024, 3:25 PM.  INDICATIONS: Feeding tube placement  TECHNIQUE:   Single view.   Findings and impression:  Dobbhoff in the mid stomach  Moderate size left basilar opacity, significantly increased from prior    Dictated by (CST): Ronak Leonard MD on 3/02/2024 at 3:24 PM     Finalized by (CST): Ronak Leonard MD on 3/02/2024 at 3:25 PM          CT STROKE BRAIN (NO IV)(CPT=70450)    Result Date: 3/2/2024  CONCLUSION:  1. No acute intracranial process. 2. Stable mild chronic microangiopathic ischemic changes with large vessel calcific atherosclerosis. 3. Lesser incidental findings as above.  This report was called immediately at 7:44 hours to the floor and discussed with the patient's nurse, JESSIE Moore.   Elm-remote    Dictated by (CST): Melvin Adair MD on 3/02/2024 at 7:42 AM     Finalized by (CST): Melvin Adair MD on 3/02/2024 at 7:46 AM          XR CHEST AP PORTABLE  (CPT=71045)    Result Date: 3/1/2024  CONCLUSION:   Patchy left lower lobe airspace opacity may be secondary to  atelectasis or pneumonia.  Mild interstitial edema has improved but not resolved.     Dictated by (CST): Elder Wagoner MD on 3/01/2024 at 3:46 PM     Finalized by (CST): Elder Wagoner MD on 3/01/2024 at 3:48 PM             Meds:      magnesium sulfate  2 g Intravenous Once    calcium gluconate  1 g Intravenous Once    vancomycin  125 mg Per NG Tube Daily    insulin aspart  1-5 Units Subcutaneous q6h    sodium chloride  500 mL Intravenous Once    DAPTOmycin  10 mg/kg Intravenous Q24H    enoxaparin  40 mg Subcutaneous Daily    [Held by provider] gabapentin  100 mg Oral TID    cholecalciferol  2,000 Units Oral Daily    calcitriol  0.25 mcg Oral Q MWF    pantoprazole  40 mg Oral QAM AC    [Held by provider] carvedilol  25 mg Oral BID    [Held by provider] hydrALAZINE  100 mg Oral Q8H    fluconazole  200 mg Oral Daily    sodium hypochlorite   Topical 2 times per day    fluticasone furoate-vilanterol  1 puff Inhalation Daily    [Held by provider] rosuvastatin  40 mg Oral Nightly    meropenem  500 mg Intravenous q12h      norepinephrine 20 mcg/min (03/03/24 0846)    vasopressin (Vasostrict) 20 Units in sodium chloride 0.9% 100 mL infusion for septic shock      sodium chloride 50 mL/hr at 03/02/24 1322         acetaminophen **OR** [DISCONTINUED] HYDROcodone-acetaminophen **OR** [DISCONTINUED] HYDROcodone-acetaminophen, HYDROmorphone, HYDROcodone-acetaminophen, HYDROcodone-acetaminophen, morphINE, hydrALAzine, ondansetron, metoclopramide, albuterol, ipratropium-albuterol, glucose **OR** glucose **OR** glucose-vitamin C **OR** dextrose **OR** glucose **OR** glucose **OR** glucose-vitamin C

## 2024-03-03 NOTE — PROGRESS NOTES
[facility]    Nephrology Progress Note    Ava Bowen Attending:  Max Souza MD       SUBJECTIVE:     Lethargic this AM    PHYSICAL EXAM:     Vital Signs: BP (!) 78/49   Pulse 52   Temp 96.8 °F (36 °C)   Resp 22   Ht 5' 4.02\" (1.626 m)   Wt 168 lb 14 oz (76.6 kg)   LMP  (LMP Unknown)   SpO2 94%   BMI 28.97 kg/m²   Temp (24hrs), Av.7 °F (35.9 °C), Min:96.5 °F (35.8 °C), Max:96.8 °F (36 °C)       Intake/Output Summary (Last 24 hours) at 3/3/2024 1056  Last data filed at 3/3/2024 0600  Gross per 24 hour   Intake 2157.8 ml   Output 525 ml   Net 1632.8 ml     Wt Readings from Last 3 Encounters:   24 168 lb 14 oz (76.6 kg)   24 164 lb 3.2 oz (74.5 kg)   23 153 lb (69.4 kg)       Gen - lethargic  HEENT - NCAT  CV - RRR  Resp - diminished breath sounds.  Abd - soft  Ext - s/p R AKA, 1+ LLE edema  MS - lethargic difficulty to arouse       LABORATORY DATA:     Lab Results   Component Value Date     (H) 2024     (H) 2024    BUN 21 2024    BUN 21 2024    BUNCREA 15.3 2024    BUNCREA 15.3 2024    CREATSERUM 1.37 (H) 2024    CREATSERUM 1.37 (H) 2024    ANIONGAP 5 2024    ANIONGAP 5 2024    GFRNAA 41 (L) 2021    GFRAA 48 (L) 2021    CA 5.9 (LL) 2024    CA 5.9 (LL) 2024    OSMOCALC 295 2024    OSMOCALC 295 2024    ALKPHO 1,532 (H) 2024     (H) 2024    ALT 76 (H) 2024    BILT 0.3 2024    TP 4.5 (L) 2024    ALB 1.5 (L) 2024    GLOBULIN 2.8 2024     (L) 2024     (L) 2024    K 5.2 (H) 2024    K 5.2 (H) 2024     2024     2024    CO2 20.0 (L) 2024    CO2 20.0 (L) 2024     Lab Results   Component Value Date    WBC 19.5 (H) 2024    RBC 2.66 (L) 2024    HGB 7.4 (L) 2024    HCT 25.8 (L) 2024    .0 2024    MPV 8.1  05/19/2018    MCV 97.0 03/03/2024    MCH 27.8 03/03/2024    MCHC 28.7 (L) 03/03/2024    RDW 18.1 (H) 03/03/2024    NEPRELIM 16.96 (H) 03/02/2024    NEUTABS 26.32 (H) 02/13/2024    LYMPHABS 0.85 (L) 02/13/2024    EOSABS 0.85 (H) 02/13/2024    BASABS 0.00 02/13/2024    NEUT 92 02/13/2024    LYMPH 3 02/13/2024    MON 1 02/13/2024    EOS 3 02/13/2024    BASO 0 02/13/2024    NEPERCENT 93.4 03/02/2024    LYPERCENT 3.1 03/02/2024    MOPERCENT 2.4 03/02/2024    EOPERCENT 0.1 03/02/2024    BAPERCENT 0.1 03/02/2024    NE 16.96 (H) 03/02/2024    LYMABS 0.57 (L) 03/02/2024    MOABSO 0.43 03/02/2024    EOABSO 0.01 03/02/2024    BAABSO 0.02 03/02/2024     Lab Results   Component Value Date    MALBP 123.00 09/12/2023    CREUR 60.10 02/07/2024     Lab Results   Component Value Date    COLORUR Yellow 03/01/2024    CLARITY Turbid (A) 03/01/2024    SPECGRAVITY 1.018 03/01/2024    GLUUR Normal 03/01/2024    BILUR Negative 03/01/2024    KETUR Negative 03/01/2024    BLOODURINE 3+ (A) 03/01/2024    PHURINE 6.0 03/01/2024    PROUR 70 (A) 03/01/2024    UROBILINOGEN 4 (A) 03/01/2024    NITRITE Negative 03/01/2024    LEUUR 500 (A) 03/01/2024    WBCUR >50 (A) 03/01/2024    RBCUR 3-5 (A) 03/01/2024    EPIUR Few (A) 03/01/2024    BACUR 1+ (A) 03/01/2024    HYLUR Present (A) 02/12/2024         IMAGING:     Reviewed.    MEDICATIONS:             ASSESSMENT/PLAN:     68 year old female with PMH of COPD, DM2, HTN, HL, PVD, Sickle cell anemia, CKD stage 3, recent fem-pop artery bypass admitted with acute hemorrhage from incision site.  Patient is s/p OR for repair then underwent L AKA on 2/22.  Nephrology is consulted for OMEGA and metabolic acidosis.       Hyperkalemia:  - from elevated CK levels.  - Continue IVF if able  - Give lasix as needed for volume overload.  - lokelma 10grams every day.  - I do worry that the Ck levels are arising from tissue necrosis, if surgical treatment plan then can do RRT as a bridge otherwise if no surgical intervention  and CK continues to rise then would be a poor candidate for RRT.  - Discussed with Dr. Murrieta      OMEGA on CKD stage 3  - baseline around 1.2  - worsening OMEGA in the setting of hypotension and prerenal OMEGA.  - check cystatin C levels.     Hypertension   - off BP medications now with shock off BP medications.  - pressors as per critical care.     Metabolic acidosis:  - resolved     Hypocalcemia: with  low serum albumin   - corrected calcium 7.9  - 1 gram of calcium gluconate.       Adalberto Longoria MD  [facility]  Nephrology

## 2024-03-03 NOTE — PROGRESS NOTES
Endocrine Note    Reviewed BG levels from the past 24 hours.  Significant hyperglycemia on AM labs but repeat accucheck was lower.  Will continue CF for today.  Previously patient developed hypoglycemia with Degludec dosing.  Will follow.

## 2024-03-03 NOTE — PROGRESS NOTES
[facility]  part of [facility] Infectious Disease  Progress Note    Ava Bowen Patient Status:  Inpatient    10/6/1955 MRN [id number]   Location [facility] 2W/SW Attending Edy Foote, DO   Hosp Day # 22 PCP Ernesto De Dios     Subjective:  Patient seen/examined.  She is quite lethargic and not interactive today.  No F/C.  Clinical course reviewed.    Objective:  Blood pressure (!) 84/48, pulse 54, temperature 96.8 °F (36 °C), resp. rate 24, height 5' 4.02\" (1.626 m), weight 168 lb 14 oz (76.6 kg), SpO2 97%, not currently breastfeeding.    Intake/Output:    Intake/Output Summary (Last 24 hours) at 3/3/2024 1003  Last data filed at 3/3/2024 0600  Gross per 24 hour   Intake 2157.8 ml   Output 525 ml   Net 1632.8 ml       Physical Exam:  General: Awake, alert, non-tox, NAD.  HEENT:  Oropharynx clear, trachea ML.  Heart: RRR S1S2 no murmurs.  Lungs: Essentially CTA b/l, no rhonchi, rales, wheezes.  Abdomen: Soft, NT/ND.  BS present.  No organomegaly.  Extremity: No edema.  Neurological: No focal deficits.  Derm:  Warm, dry, free from rashes.    Lab Data Review:  Lab Results   Component Value Date    WBC 19.5 2024    HGB 7.4 2024    HCT 25.8 2024    .0 2024    CREATSERUM 1.37 2024    CREATSERUM 1.37 2024    BUN 21 2024    BUN 21 2024     2024     2024    K 5.2 2024    K 5.2 2024     2024     2024    CO2 20.0 2024    CO2 20.0 2024     2024     2024    CA 5.9 2024    CA 5.9 2024    ALB 1.5 2024    MG 1.8 2024    PHOS 5.0 2024    CK 11,900 2024      Cultures:  Blood cultures + finegoldia 24  Negative blood cultures thereafter until 3/1/24 blood cultures + VRE    24 tissue cultures with finegoldia, klebsiella, e.coli, candida,  enterococcus    Radiology:  Reviewed    Antibiotics Reviewed:  Meropenem  Daptomycin  P.o. vancomycin    Assessment and Plan:    Polymicrobial sepsis in this woman with multiple medical issues   - Initially with finegoldia sepsis earlier this hospital stay, now with blood cultures showing enterococcus  - h/o fem-pop bypass 1/24/24 complicated by wound dehiscence  - s/p I&D on 2/13/24 with graft removal, polymicrobial cultures as above  - s/p I&D with R AKA 2/22/24  - Now on IV daptomycin in spite of high CPK, unfortunately CPK even higher today so we will try linezolid for now  - Meropenem ongoing as well    2.  Leukocytosis due to the above  - At 19K, will trend    3.  Elevated CPK  - Up to 11,900 today, worsening    4.  Disposition - inpatient.  Continue IV meropenem and will try IV linezolid instead of daptomycin.  We may need to re-challenge with daptomycin if needed after CPKs fall.  Trending temps and WBCs.  Trending CPK.  Supporitve care ongoing, will follow.    Dayana Cid DO, [facility] Infectious Disease  [phone]    3/3/2024  10:03 AM

## 2024-03-03 NOTE — PLAN OF CARE
Changed code status to DNR, placed on bipap, added vasopressor       Problem: RESPIRATORY - ADULT  Goal: Achieves optimal ventilation and oxygenation  Description: INTERVENTIONS:  - Assess for changes in respiratory status  - Assess for changes in mentation and behavior  - Position to facilitate oxygenation and minimize respiratory effort  - Oxygen supplementation based on oxygen saturation or ABGs  - Provide Smoking Cessation handout, if applicable  - Encourage broncho-pulmonary hygiene including cough, deep breathe, Incentive Spirometry  - Assess the need for suctioning and perform as needed  - Assess and instruct to report SOB or any respiratory difficulty  - Respiratory Therapy support as indicated  - Manage/alleviate anxiety  - Monitor for signs/symptoms of CO2 retention  Outcome: Not Progressing     Problem: Safety Risk - Non-Violent Restraints  Goal: Patient will remain free from self-harm  Description: INTERVENTIONS:  - Apply the least restrictive restraint to prevent harm  - Notify patient and family of reasons restraints applied  - Assess for any contributing factors to confusion (electrolyte disturbances, delirium, medications)  - Discontinue any unnecessary medical devices as soon as possible  - Assess the patient's physical comfort, circulation, skin condition, hydration, nutrition and elimination needs   - Reorient and redirection as needed  - Assess for the need to continue restraints  Outcome: Not Progressing

## 2024-03-03 NOTE — PLAN OF CARE
Blood cultures positive with gram positive cocci and VRE, pharmacy concerned with high CK and administering daptomyocin but was able to clarify order with ID, added restraints, inserted PICC, Inserted NG tube, will start tube feeds soon       Problem: Safety Risk - Non-Violent Restraints  Goal: Patient will remain free from self-harm  Description: INTERVENTIONS:  - Apply the least restrictive restraint to prevent harm  - Notify patient and family of reasons restraints applied  - Assess for any contributing factors to confusion (electrolyte disturbances, delirium, medications)  - Discontinue any unnecessary medical devices as soon as possible  - Assess the patient's physical comfort, circulation, skin condition, hydration, nutrition and elimination needs   - Reorient and redirection as needed  - Assess for the need to continue restraints  Outcome: Progressing

## 2024-03-03 NOTE — PROGRESS NOTES
Patient critically ill  I spoke with daughter  Continue current dnr plan for today   Will await palliative care evaluation tomorrow before considering de-escalating further   Continue pressors as needed, antibiotics and medical management  No intubation , no chest compression

## 2024-03-04 PROBLEM — Z71.89 GOALS OF CARE, COUNSELING/DISCUSSION: Status: ACTIVE | Noted: 2024-01-01

## 2024-03-04 PROBLEM — Z51.5 PALLIATIVE CARE ENCOUNTER: Status: ACTIVE | Noted: 2024-01-01

## 2024-03-04 NOTE — PROGRESS NOTES
[facility] Hospitalist Progress Note     CC: Hospital Follow up    PCP: Ernesto De Dios       Assessment/Plan:   Ms. Bowen is a 68 year old female with PMH sig for CKD, COPD, DM, HTN, HLD, PVD, and sickle cell anemia who was recently admitted for right lower extremity femoral to popliteal bypass who presented with bleeding from surgical site. Taken for emergent OR on 2/10/24, noted to be grossly infected, started on vancomycin/meropenem. Bcx with Klebsiella. Polymicrobial infection, now with septic shock. S/P AKA on right on 2/22/24.      Severe Sepsis with Septic shock secondary to VRE bacteremia   Seems to be from VRE bacteremia  Metabolic encephalopathy, from infection and shock   Known polymicrobial right limb infection, S/P multiple revision surgeries and amputation on right AKA   -requiring max amount of levophed, and also on vasopressin   -urine output low, almost anuric  -poor prognosis  -palliative care now consulted  -recommend hospice at this time, I do not see meaningful recovery at this time.   -continue to address goals of care    Severe PAD sp RLE fem-pop bypass last week c/b bleeding from surgical site from wound dehiscence  - s/p recent fem pop bypass 1/24/24 with Dr. Murrieta  - vascular surgery consulted, s/p emergent OR 2/10, removal of infected graft, s/p debridement, bone patch angioplasty   - sp further debridement 2/13 and placement of wound vac  - back to OR 2/16   - s/p right AKA  2/22/24    Goals of care discussion, advanced care planning   Discussed with Tiffanie and Sheeba , explained the situation and the nearly futile nature of expected clinical course  They understand and have agreed to DNAR Select measures at this time   Chart updated   Palliative care consulted as well, Hospice may be Appropriate      OMEGA on CKD  -worsening renal failure  -poor candidate for dialysis, hemodynamically not stable    Acute blood loss anemia from surgical site   Chronic anemia  Sickle cell  anemia  - hgb-has remained on the low side but stable     DM2, hyperglycemia now that on tube feeds  - A1c 7.9     Goals of care: palliative care has met with family. Decision will be to focus now on comfort.     CODE- DNR/Comfort  POA- daughter and son     Yenifer Diana DO  [facility] Hospitalist      Subjective:     Awake but not responsive. Looks very ill. On two pressors max'd.     OBJECTIVE:    Blood pressure 96/57, pulse 55, temperature 97.7 °F (36.5 °C), temperature source Temporal, resp. rate 22, height 5' 4.02\" (1.626 m), weight 178 lb 2.1 oz (80.8 kg), SpO2 92%, not currently breastfeeding.    Temp:  [96.6 °F (35.9 °C)-97.7 °F (36.5 °C)] 97.7 °F (36.5 °C)  Pulse:  [28-84] 55  Resp:  [14-32] 22  BP: ()/(27-97) 96/57  SpO2:  [87 %-100 %] 92 %  FiO2 (%):  [50 %-80 %] 70 %      Intake/Output:    Intake/Output Summary (Last 24 hours) at 3/4/2024 1405  Last data filed at 3/4/2024 1200  Gross per 24 hour   Intake 3336.8 ml   Output 750 ml   Net 2586.8 ml       Last 3 Weights   03/04/24 0600 178 lb 2.1 oz (80.8 kg)   03/01/24 0451 168 lb 14 oz (76.6 kg)   02/29/24 0315 163 lb 2.3 oz (74 kg)   02/28/24 0456 163 lb 3.2 oz (74 kg)   02/27/24 0547 166 lb 11.2 oz (75.6 kg)   02/26/24 0606 175 lb 11.2 oz (79.7 kg)   02/25/24 0525 167 lb 9.6 oz (76 kg)   02/24/24 0618 174 lb 1.6 oz (79 kg)   02/20/24 0554 180 lb 3.2 oz (81.7 kg)   02/19/24 0445 189 lb 1.6 oz (85.8 kg)   02/18/24 0614 186 lb 1.6 oz (84.4 kg)   02/17/24 0403 181 lb 11.2 oz (82.4 kg)   02/16/24 0500 182 lb 3.2 oz (82.6 kg)   02/14/24 0500 185 lb 13.6 oz (84.3 kg)   02/13/24 0500 183 lb 3.2 oz (83.1 kg)   02/12/24 0400 180 lb 5.4 oz (81.8 kg)   02/11/24 0600 182 lb 5.1 oz (82.7 kg)   02/10/24 2142 171 lb 1.2 oz (77.6 kg)   02/10/24 2105 171 lb 1.2 oz (77.6 kg)   02/10/24 1642 164 lb 3.9 oz (74.5 kg)   01/27/24 0501 164 lb 3.2 oz (74.5 kg)   01/24/24 0549 158 lb (71.7 kg)   01/19/24 1112 160 lb (72.6 kg)   12/30/23 1216 155 lb (70.3 kg)    08/29/23 1357 153 lb (69.4 kg)       Exam   Gen: Sleepy lethargic   CV: RRR   Abd: Abdomen soft  Ext: right AKA with VAC in place with draining and leaking fluid , left leg with edema cool to touch    Data Review:       Labs:     Recent Labs   Lab 02/27/24  0359 02/28/24  0536 02/29/24  0550 03/02/24  0702 03/03/24  0437 03/04/24  0501   RBC 2.76* 2.82*   < > 2.67*  2.67* 2.66* 2.79*   HGB 7.8* 8.3*   < > 7.7*  7.7* 7.4* 8.2*   HCT 24.8* 25.2*   < > 25.1*  25.1* 25.8* 26.4*   MCV 89.9 89.4   < > 94.0  94.0 97.0 94.6   MCH 28.3 29.4   < > 28.8  28.8 27.8 29.4   MCHC 31.5 32.9   < > 30.7*  30.7* 28.7* 31.1   RDW 16.9* 16.2*   < > 17.5*  17.5* 18.1* 17.7*   NEPRELIM 12.70* 13.52*  --  16.96*  --   --    WBC 14.6* 15.3*   < > 18.2*  18.2* 19.5* 20.6*   .0 249.0   < > 191.0  191.0 211.0 212.0    < > = values in this interval not displayed.         Recent Labs   Lab 03/02/24  0702 03/02/24  1858 03/03/24  0437 03/04/24  0501   *  119*  --  354*  354* 208*  208*   BUN 22  22  --  21  21 23  23   CREATSERUM 1.17*  1.17*  --  1.37*  1.37* 1.61*  1.61*   EGFRCR 51*  51*  --  43*  43* 35*  35*   CA 6.4*  6.4*  --  5.9*  5.9* 5.8*  5.8*     144  --  134*  134* 134*  134*   K 5.6*  5.6* 5.3* 5.2*  5.2* 5.4*  5.4*   *  117*  --  109  109 113*  113*   CO2 22.0  22.0  --  20.0*  20.0* 17.0*  17.0*       Recent Labs   Lab 02/27/24  0359 02/28/24  0536 03/02/24  0702 03/03/24  0437 03/04/24  0501   ALT  --   --  76*  --   --    AST  --   --  580*  --   --    ALB 1.7* 1.7* 1.7* 1.5* 1.5*         Imaging:  XR CHEST AP PORTABLE  (CPT=71045)    Result Date: 3/2/2024  PROCEDURE: XR CHEST AP PORTABLE  (CPT=71045) TIME: 1504  COMPARISON: [facility], CTA ABDOMEN/PELVIS LOWER EXT BILAT W RUNOFF (SWU=61828), 2/21/2024, 6:05 AM.  [facility], XR CHEST AP PORTABLE (CPT=71045), 3/01/2024, 3:25 PM.  INDICATIONS: Feeding tube placement  TECHNIQUE:    Single view.   Findings and impression:  Dobbhoff in the mid stomach  Moderate size left basilar opacity, significantly increased from prior    Dictated by (CST): Ronak Leonard MD on 3/02/2024 at 3:24 PM     Finalized by (CST): Ronak Leonard MD on 3/02/2024 at 3:25 PM          CT STROKE BRAIN (NO IV)(CPT=70450)    Result Date: 3/2/2024  CONCLUSION:  1. No acute intracranial process. 2. Stable mild chronic microangiopathic ischemic changes with large vessel calcific atherosclerosis. 3. Lesser incidental findings as above.  This report was called immediately at 7:44 hours to the floor and discussed with the patient's nurse, JESSIE Moore.   [facility]m-remote    Dictated by (CST): Melvin Adair MD on 3/02/2024 at 7:42 AM     Finalized by (CST): Melvin Adair MD on 3/02/2024 at 7:46 AM          XR CHEST AP PORTABLE  (CPT=71045)    Result Date: 3/1/2024  CONCLUSION:   Patchy left lower lobe airspace opacity may be secondary to atelectasis or pneumonia.  Mild interstitial edema has improved but not resolved.     Dictated by (CST): Elder Wagoner MD on 3/01/2024 at 3:46 PM     Finalized by (CST): Elder Wagoner MD on 3/01/2024 at 3:48 PM             Meds:      insulin degludec  5 Units Subcutaneous Daily    sodium zirconium cyclosilicate  10 g Oral Q8H    vancomycin  125 mg Per NG Tube Daily    insulin aspart  1-5 Units Subcutaneous q6h    linezolid  600 mg Intravenous Q12H    enoxaparin  40 mg Subcutaneous Daily    [Held by provider] gabapentin  100 mg Oral TID    cholecalciferol  2,000 Units Oral Daily    calcitriol  0.25 mcg Oral Q MWF    pantoprazole  40 mg Oral QAM AC    [Held by provider] carvedilol  25 mg Oral BID    [Held by provider] hydrALAZINE  100 mg Oral Q8H    fluconazole  200 mg Oral Daily    sodium hypochlorite   Topical 2 times per day    fluticasone furoate-vilanterol  1 puff Inhalation Daily    [Held by provider] rosuvastatin  40 mg Oral Nightly    meropenem  500 mg Intravenous q12h      sodium chloride  100 mL/hr at 03/04/24 0930    vasopressin (Vasostrict) 20 Units in sodium chloride 0.9% 100 mL infusion for septic shock 0.03 Units/min (03/04/24 8186)    norepinephrine 30 mcg/min (03/04/24 1114)         acetaminophen **OR** [DISCONTINUED] HYDROcodone-acetaminophen **OR** [DISCONTINUED] HYDROcodone-acetaminophen, HYDROmorphone, HYDROcodone-acetaminophen, HYDROcodone-acetaminophen, morphINE, hydrALAzine, ondansetron, metoclopramide, albuterol, ipratropium-albuterol, glucose **OR** glucose **OR** glucose-vitamin C **OR** dextrose **OR** glucose **OR** glucose **OR** glucose-vitamin C

## 2024-03-04 NOTE — PAYOR COMM NOTE
--------------  CONTINUED STAY REVIEW----CLINICAL UPDATES FOR 3/3 3/4---PATIENT IS STILL IN HOUSE      Payor: [employer]  Subscriber #:  [id number]  Authorization Number: [id number] / [id number]    Admit date: 2/10/24  Admit time:  9:20 PM    Admitting Physician: Edy Foote DO  Attending Physician:  Yenifer Diana DO  Primary Care Physician: Ernesto De Dios    3/3   [facility] Hospitalist Progress Note      CC: Hospital Follow up     PCP: Ernesto De Dios         Assessment/Plan:      Principal Problem:    Bypass graft mechanical complication, initial encounter (HCC)  Active Problems:    Hyperglycemia    Bypass graft mechanical complication (HCC)    Anemia, unspecified type    Acute kidney injury superimposed on CKD (HCC)    Uncontrolled type 2 diabetes mellitus with hyperglycemia (HCC)     Ms. Bowen is a 68 year old female with PMH sig for CKD, COPD, DM, HTN, HLD, PVD, and sickle cell anemia who was recently admitted last week for right lower extremity femoral to popliteal bypass who presented with bleeding from surgical site. Taken for emergent OR on 2/10/24, noted to be grossly infected, started on vancomycin/meropenem. Bcx with Klebsiella. BG elevated on admit, started on insulin gtt, endo consulted, now off insulin gtt. Kept intubated post op, pulm following. Polymicrobial infection on meropenum, IV vanco and now fluconazole.  Now S/P AKA on right on 2/22/24. Had some confusion after procedure now much better as of 2/28.       Severe Sepsis with Septic shock secondary to VRE bacteremia   Seems to be from VRE bacteremia - ID aware, dapto started- stop Vanco  Worsening today, now on two pressors, ZCK elevated  Patient nearly obtunded today   NG tube in place   Know polymicrobial right limb infection, S/P multiple revision surgeries and amputation on right AKA   Past Klebsiella in blood as well , also with polymicro infection of wound   ID following   Continue meropeneum and fluconazole    Repeat blood cultures per ID  Currently on levophed - wean if able   Fluids per renal service   Also with rising CK levels maybe from tissue necrosis related to pressors   - 2/10 wound cx and 2/13 poly microbial  - abx adjustment per ID f/u cx results  - ID notes appreciated- tenative plan for 6 weeks abx from from final surgical plans- final abx tbd pending cx data and surgical plans   -white blood cell count is trending up again today   Patient critically ill high risk for further decompensation and death      Severe PAD sp RLE fem-pop bypass last week c/b bleeding fm surg site fm wound dehiscence  - s/p recent fem pop bypass 1/24/24 with Dr. Murrieta  - has been on ASA/plavix- held on admit  - Hg down to 6.0, s/p 3 units pRBC 2/10- watching close now, hgb stable currently no signs of bleeding   - vascular surgery consulted, s/p emergent OR 2/10, removal of infected graft, s/p debridement, bone patch angioplasty   - sp further debridement 2/13 and placement of wound vac  - back to OR 2/16   - s/p right AKA  2/22/24  - Pain meds PRN, gabapentin   - lasix PRN   - Some coolness and decreased pulse on left leg , CTA with occluded stent in femoral artery, discussed with vascular no further intervention at this time, left heel with dark discoloration, decreased doppler pulses on left foot    Needs additional wash out, now scheduled for Monday 3/4 probably will need ot be on hold  Discussed with vascular they also are recommending palliative care     Goals of care discussion, advanced care planning   Discussed with Eleonora , explained the situation and the nearly futile nature of expected clinical course  They understand and have agreed to DNAR Select measures at this time   Chart updated   Palliative care consulted as well, Hospice may be Appropriate         -Recurrent hyperkalemia, AMS  -Likely related to sepsis and CK elevation from tissue necrosis  Lokelma today   Fluid bolus and resume lasix per  renal  Reconsult renal   Did try some narcan this am   Repeat cultures per ID   Repeat C diff      OMEGA on CKD3-  Woprsening metabolic derangements acidosis, hyperkalemia and hypocalcemia   Renal following   - discussed with nephrology      Acute blood loss anemia from surgical site   Chronic anemia  Sickle cell anemia  - Stable currently, trend CBC   - outpatient f/u  - Hg 8.9 on discharge last week  - Hg 6.0 on admission, s/p 4 units pRBC, last unit 2/23/34  - monitor hgb-has remained on the low side but stable     DM2, hyperglycemia now that on tube feeds  - A1c 7.9   - Hold PO medications  - BG 500s in ED, pH 7.2  - insulin gtt ordered in ED  Endo following  will need to consider resuming and adjusting insulin dose         Respiratory failure resolved   COPD  - remained intubated post op both fm aspiration event and metabolic acidosis  - pulm following  - extubated 2/14 pm  - Now on 2L NC - wean as able   - RRT called 2/26/24 with ABG showing hypoxemia   - enforced wearing O2    - continue home MDI   -Watch volume status closely as could decompensate further from a respiratory standpoint      Left leg weakness?  New vs acute  CODE stroke called this morning   Neuro consulted   Head CT without acute change   Maybe from overwhelming sepsis and AMS   Seems to be from probably muscle necrosis and severe PAD        ACP  - CODE- FULL   - POA- daughter and son     FN:  - IVF: complete   - Diet: Carb controlled diet      DVT Prophy: resumed lovenox daily   Lines: PIV     Daughter Tiffanie updated as above      Questions/concerns were discussed with patient and/or family by bedside.     Critcial care time 40 minutes      Thank You,  Edy Foote DO      Hospitalist with [facility]      Subjective:      Awake but unable to communicate      OBJECTIVE:     Blood pressure (!) 84/48, pulse 54, temperature 96.8 °F (36 °C), resp. rate 24, height 5' 4.02\" (1.626 m), weight 168 lb 14 oz (76.6 kg), SpO2 97%, not currently  breastfeeding.     Temp:  [96.5 °F (35.8 °C)-96.8 °F (36 °C)] 96.8 °F (36 °C)  Pulse:  [] 54  Resp:  [10-24] 24  BP: ()/(43-68) 84/48  SpO2:  [86 %-100 %] 97 %        Intake/Output:     Intake/Output Summary (Last 24 hours) at 3/3/2024 0934  Last data filed at 3/3/2024 0600      Gross per 24 hour   Intake 2157.8 ml   Output 525 ml   Net 1632.8 ml              Last 3 Weights   03/01/24 0451 168 lb 14 oz (76.6 kg)   02/29/24 0315 163 lb 2.3 oz (74 kg)   02/28/24 0456 163 lb 3.2 oz (74 kg)   02/27/24 0547 166 lb 11.2 oz (75.6 kg)   02/26/24 0606 175 lb 11.2 oz (79.7 kg)   02/25/24 0525 167 lb 9.6 oz (76 kg)   02/24/24 0618 174 lb 1.6 oz (79 kg)   02/20/24 0554 180 lb 3.2 oz (81.7 kg)   02/19/24 0445 189 lb 1.6 oz (85.8 kg)   02/18/24 0614 186 lb 1.6 oz (84.4 kg)   02/17/24 0403 181 lb 11.2 oz (82.4 kg)   02/16/24 0500 182 lb 3.2 oz (82.6 kg)   02/14/24 0500 185 lb 13.6 oz (84.3 kg)   02/13/24 0500 183 lb 3.2 oz (83.1 kg)   02/12/24 0400 180 lb 5.4 oz (81.8 kg)   02/11/24 0600 182 lb 5.1 oz (82.7 kg)   02/10/24 2142 171 lb 1.2 oz (77.6 kg)   02/10/24 2105 171 lb 1.2 oz (77.6 kg)   02/10/24 1642 164 lb 3.9 oz (74.5 kg)   01/27/24 0501 164 lb 3.2 oz (74.5 kg)   01/24/24 0549 158 lb (71.7 kg)   01/19/24 1112 160 lb (72.6 kg)   12/30/23 1216 155 lb (70.3 kg)   08/29/23 1357 153 lb (69.4 kg)         Exam   Gen: Sleepy lethargic , NG tube   Pulm: increased effort, increased work of breathing   CV: RRR   Abd: Abdomen soft, nontender, nondistended  Ext: right AKA with VAC in place , left leg with edema cool to touch, not able to following commands today   : joseph      Data Review:       Labs:               Recent Labs   Lab 02/27/24  0359 02/28/24  0536 02/29/24  0550 03/01/24  0658 03/02/24  0702 03/03/24  0437   RBC 2.76* 2.82*   < > 2.86* 2.67*  2.67* 2.66*   HGB 7.8* 8.3*   < > 8.0* 7.7*  7.7* 7.4*   HCT 24.8* 25.2*   < > 26.4* 25.1*  25.1* 25.8*   MCV 89.9 89.4   < > 92.3 94.0  94.0 97.0   MCH 28.3 29.4    < > 28.0 28.8  28.8 27.8   MCHC 31.5 32.9   < > 30.3* 30.7*  30.7* 28.7*   RDW 16.9* 16.2*   < > 17.4* 17.5*  17.5* 18.1*   NEPRELIM 12.70* 13.52*  --   --  16.96*  --    WBC 14.6* 15.3*   < > 13.5* 18.2*  18.2* 19.5*   .0 249.0   < > 194.0 191.0  191.0 211.0    < > = values in this interval not displayed.                    Recent Labs   Lab 03/01/24  0658 03/01/24  1256 03/02/24  0702 03/02/24  1858 03/03/24  0437   *  --  119*  119*  --  354*  354*   BUN 22  --  22  22  --  21  21   CREATSERUM 1.18*  --  1.17*  1.17*  --  1.37*  1.37*   EGFRCR 51*  --  51*  51*  --  43*  43*   CA 6.9*  --  6.4*  6.4*  --  5.9*  5.9*     --  144  144  --  134*  134*   K 5.8*   < > 5.6*  5.6* 5.3* 5.2*  5.2*   *  --  117*  117*  --  109  109   CO2 22.0  --  22.0  22.0  --  20.0*  20.0*    < > = values in this interval not displayed.                 Recent Labs   Lab 02/26/24  0531 02/27/24  0359 02/28/24  0536 03/02/24  0702 03/03/24  0437   ALT  --   --   --  76*  --    AST  --   --   --  580*  --    ALB 1.7* 1.7* 1.7* 1.7* 1.5*            Imaging:  XR CHEST AP PORTABLE  (CPT=71045)     Result Date: 3/2/2024  PROCEDURE:         XR CHEST AP PORTABLE  (CPT=71045) TIME:        1504  COMPARISON:      [facility], CTA ABDOMEN/PELVIS LOWER EXT BILAT W RUNOFF (ESM=55256), 2/21/2024, 6:05 AM.  [facility], XR CHEST AP PORTABLE (CPT=71045), 3/01/2024, 3:25 PM.  INDICATIONS:      Feeding tube placement  TECHNIQUE:     Single view.   Findings and impression:  Dobbhoff in the mid stomach  Moderate size left basilar opacity, significantly increased from prior    Dictated by (CST): Ronak Leonard MD on 3/02/2024 at 3:24 PM     Finalized by (CST): Ronak Leonard MD on 3/02/2024 at 3:25 PM           CT STROKE BRAIN (NO IV)(CPT=70450)     Result Date: 3/2/2024  CONCLUSION:         1. No acute intracranial process. 2. Stable mild chronic microangiopathic ischemic changes  with large vessel calcific atherosclerosis. 3. Lesser incidental findings as above.  This report was called immediately at 7:44 hours to the floor and discussed with the patient's nurse, JESSIE Moore.   Elm-remote    Dictated by (CST): Melvin Adair MD on 3/02/2024 at 7:42 AM     Finalized by (CST): Melvin Adair MD on 3/02/2024 at 7:46 AM           XR CHEST AP PORTABLE  (CPT=71045)     Result Date: 3/1/2024  CONCLUSION:          Patchy left lower lobe airspace opacity may be secondary to atelectasis or pneumonia.  Mild interstitial edema has improved but not resolved.     Dictated by (CST): Elder Wagoner MD on 3/01/2024 at 3:46 PM     Finalized by (CST): Elder Wagoner MD on 3/01/2024 at 3:48 PM               Meds:       magnesium sulfate  2 g Intravenous Once    calcium gluconate  1 g Intravenous Once    vancomycin  125 mg Per NG Tube Daily    insulin aspart  1-5 Units Subcutaneous q6h    sodium chloride  500 mL Intravenous Once    DAPTOmycin  10 mg/kg Intravenous Q24H    enoxaparin  40 mg Subcutaneous Daily    [Held by provider] gabapentin  100 mg Oral TID    cholecalciferol  2,000 Units Oral Daily    calcitriol  0.25 mcg Oral Q MWF    pantoprazole  40 mg Oral QAM AC    [Held by provider] carvedilol  25 mg Oral BID    [Held by provider] hydrALAZINE  100 mg Oral Q8H    fluconazole  200 mg Oral Daily    sodium hypochlorite   Topical 2 times per day    fluticasone furoate-vilanterol  1 puff Inhalation Daily    [Held by provider] rosuvastatin  40 mg Oral Nightly    meropenem  500 mg Intravenous q12h       norepinephrine 20 mcg/min (03/03/24 0846)    vasopressin (Vasostrict) 20 Units in sodium chloride 0.9% 100 mL infusion for septic shock      sodium chloride 50 mL/hr at 03/02/24 1322            acetaminophen **OR** [DISCONTINUED] HYDROcodone-acetaminophen **OR** [DISCONTINUED] HYDROcodone-acetaminophen, HYDROmorphone, HYDROcodone-acetaminophen, HYDROcodone-acetaminophen, morphINE, hydrALAzine, ondansetron,  metoclopramide, albuterol, ipratropium-albuterol, glucose **OR** glucose **OR** glucose-vitamin C **OR** dextrose **OR** glucose **OR** glucose **OR** glucose-vitamin C                       3/4  [facility] Hospitalist Progress Note      CC: Hospital Follow up     PCP: Ernesto De Dios         Assessment/Plan:   Ms. Bowen is a 68 year old female with PMH sig for CKD, COPD, DM, HTN, HLD, PVD, and sickle cell anemia who was recently admitted for right lower extremity femoral to popliteal bypass who presented with bleeding from surgical site. Taken for emergent OR on 2/10/24, noted to be grossly infected, started on vancomycin/meropenem. Bcx with Klebsiella. Polymicrobial infection, now with septic shock. S/P AKA on right on 2/22/24.      Severe Sepsis with Septic shock secondary to VRE bacteremia   Seems to be from VRE bacteremia  Metabolic encephalopathy, from infection and shock   Known polymicrobial right limb infection, S/P multiple revision surgeries and amputation on right AKA   -requiring max amount of levophed, and also on vasopressin   -urine output low, almost anuric  -poor prognosis  -palliative care now consulted  -recommend hospice at this time, I do not see meaningful recovery at this time.   -continue to address goals of care     Severe PAD sp RLE fem-pop bypass last week c/b bleeding from surgical site from wound dehiscence  - s/p recent fem pop bypass 1/24/24 with Dr. Murrieta  - vascular surgery consulted, s/p emergent OR 2/10, removal of infected graft, s/p debridement, bone patch angioplasty   - sp further debridement 2/13 and placement of wound vac  - back to OR 2/16   - s/p right AKA  2/22/24     Goals of care discussion, advanced care planning   Discussed with Tiffanie and Sheeba , explained the situation and the nearly futile nature of expected clinical course  They understand and have agreed to DNAR Select measures at this time   Chart updated   Palliative care consulted as well,  Hospice may be Appropriate      OMEGA on CKD  -worsening renal failure  -poor candidate for dialysis, hemodynamically not stable     Acute blood loss anemia from surgical site   Chronic anemia  Sickle cell anemia  - hgb-has remained on the low side but stable     DM2, hyperglycemia now that on tube feeds  - A1c 7.9      Goals of care: palliative care has met with family. Decision will be to focus now on comfort.      CODE- DNR/Comfort  POA- daughter and son     Yenifer Diana DO  [facility] Hospitalist       Subjective:      Awake but not responsive. Looks very ill. On two pressors max'shayla      OBJECTIVE:     Blood pressure 96/57, pulse 55, temperature 97.7 °F (36.5 °C), temperature source Temporal, resp. rate 22, height 5' 4.02\" (1.626 m), weight 178 lb 2.1 oz (80.8 kg), SpO2 92%, not currently breastfeeding.     Temp:  [96.6 °F (35.9 °C)-97.7 °F (36.5 °C)] 97.7 °F (36.5 °C)  Pulse:  [28-84] 55  Resp:  [14-32] 22  BP: ()/(27-97) 96/57  SpO2:  [87 %-100 %] 92 %  FiO2 (%):  [50 %-80 %] 70 %        Intake/Output:     Intake/Output Summary (Last 24 hours) at 3/4/2024 1405  Last data filed at 3/4/2024 1200      Gross per 24 hour   Intake 3336.8 ml   Output 750 ml   Net 2586.8 ml              Last 3 Weights   03/04/24 0600 178 lb 2.1 oz (80.8 kg)   03/01/24 0451 168 lb 14 oz (76.6 kg)   02/29/24 0315 163 lb 2.3 oz (74 kg)   02/28/24 0456 163 lb 3.2 oz (74 kg)   02/27/24 0547 166 lb 11.2 oz (75.6 kg)   02/26/24 0606 175 lb 11.2 oz (79.7 kg)   02/25/24 0525 167 lb 9.6 oz (76 kg)   02/24/24 0618 174 lb 1.6 oz (79 kg)   02/20/24 0554 180 lb 3.2 oz (81.7 kg)   02/19/24 0445 189 lb 1.6 oz (85.8 kg)   02/18/24 0614 186 lb 1.6 oz (84.4 kg)   02/17/24 0403 181 lb 11.2 oz (82.4 kg)   02/16/24 0500 182 lb 3.2 oz (82.6 kg)   02/14/24 0500 185 lb 13.6 oz (84.3 kg)   02/13/24 0500 183 lb 3.2 oz (83.1 kg)   02/12/24 0400 180 lb 5.4 oz (81.8 kg)   02/11/24 0600 182 lb 5.1 oz (82.7 kg)   02/10/24 2142 171 lb 1.2 oz (77.6 kg)    02/10/24 2105 171 lb 1.2 oz (77.6 kg)   02/10/24 1642 164 lb 3.9 oz (74.5 kg)   01/27/24 0501 164 lb 3.2 oz (74.5 kg)   01/24/24 0549 158 lb (71.7 kg)   01/19/24 1112 160 lb (72.6 kg)   12/30/23 1216 155 lb (70.3 kg)   08/29/23 1357 153 lb (69.4 kg)         Exam   Gen: Sleepy lethargic   CV: RRR   Abd: Abdomen soft  Ext: right AKA with VAC in place with draining and leaking fluid , left leg with edema cool to touch     Data Review:       Labs:               Recent Labs   Lab 02/27/24  0359 02/28/24  0536 02/29/24  0550 03/02/24  0702 03/03/24  0437 03/04/24  0501   RBC 2.76* 2.82*   < > 2.67*  2.67* 2.66* 2.79*   HGB 7.8* 8.3*   < > 7.7*  7.7* 7.4* 8.2*   HCT 24.8* 25.2*   < > 25.1*  25.1* 25.8* 26.4*   MCV 89.9 89.4   < > 94.0  94.0 97.0 94.6   MCH 28.3 29.4   < > 28.8  28.8 27.8 29.4   MCHC 31.5 32.9   < > 30.7*  30.7* 28.7* 31.1   RDW 16.9* 16.2*   < > 17.5*  17.5* 18.1* 17.7*   NEPRELIM 12.70* 13.52*  --  16.96*  --   --    WBC 14.6* 15.3*   < > 18.2*  18.2* 19.5* 20.6*   .0 249.0   < > 191.0  191.0 211.0 212.0    < > = values in this interval not displayed.                   Recent Labs   Lab 03/02/24  0702 03/02/24  1858 03/03/24  0437 03/04/24  0501   *  119*  --  354*  354* 208*  208*   BUN 22  22  --  21  21 23  23   CREATSERUM 1.17*  1.17*  --  1.37*  1.37* 1.61*  1.61*   EGFRCR 51*  51*  --  43*  43* 35*  35*   CA 6.4*  6.4*  --  5.9*  5.9* 5.8*  5.8*     144  --  134*  134* 134*  134*   K 5.6*  5.6* 5.3* 5.2*  5.2* 5.4*  5.4*   *  117*  --  109  109 113*  113*   CO2 22.0  22.0  --  20.0*  20.0* 17.0*  17.0*                 Recent Labs   Lab 02/27/24  0359 02/28/24  0536 03/02/24  0702 03/03/24  0437 03/04/24  0501   ALT  --   --  76*  --   --    AST  --   --  580*  --   --    ALB 1.7* 1.7* 1.7* 1.5* 1.5*            Imaging:  XR CHEST AP PORTABLE  (CPT=71045)     Result Date: 3/2/2024  PROCEDURE:         XR CHEST AP PORTABLE  (CPT=71045)  TIME:        1504  COMPARISON:      [facility], CTA ABDOMEN/PELVIS LOWER EXT BILAT W RUNOFF (MBH=97788), 2/21/2024, 6:05 AM.  [facility], XR CHEST AP PORTABLE (CPT=71045), 3/01/2024, 3:25 PM.  INDICATIONS:      Feeding tube placement  TECHNIQUE:     Single view.   Findings and impression:  Dobbhoff in the mid stomach  Moderate size left basilar opacity, significantly increased from prior    Dictated by (CST): Ronak Leonard MD on 3/02/2024 at 3:24 PM     Finalized by (CST): Ronak Leonard MD on 3/02/2024 at 3:25 PM           CT STROKE BRAIN (NO IV)(CPT=70450)     Result Date: 3/2/2024  CONCLUSION:         1. No acute intracranial process. 2. Stable mild chronic microangiopathic ischemic changes with large vessel calcific atherosclerosis. 3. Lesser incidental findings as above.  This report was called immediately at 7:44 hours to the floor and discussed with the patient's nurse, JESSIE Moore.   Elm-remote    Dictated by (CST): Melvin Adair MD on 3/02/2024 at 7:42 AM     Finalized by (CST): Melvin Adair MD on 3/02/2024 at 7:46 AM           XR CHEST AP PORTABLE  (CPT=71045)     Result Date: 3/1/2024  CONCLUSION:          Patchy left lower lobe airspace opacity may be secondary to atelectasis or pneumonia.  Mild interstitial edema has improved but not resolved.     Dictated by (CST): Elder Wagoner MD on 3/01/2024 at 3:46 PM     Finalized by (CST): Elder Wagoner MD on 3/01/2024 at 3:48 PM               Meds:       insulin degludec  5 Units Subcutaneous Daily    sodium zirconium cyclosilicate  10 g Oral Q8H    vancomycin  125 mg Per NG Tube Daily    insulin aspart  1-5 Units Subcutaneous q6h    linezolid  600 mg Intravenous Q12H    enoxaparin  40 mg Subcutaneous Daily    [Held by provider] gabapentin  100 mg Oral TID    cholecalciferol  2,000 Units Oral Daily    calcitriol  0.25 mcg Oral Q MWF    pantoprazole  40 mg Oral QAM AC    [Held by provider] carvedilol  25 mg Oral BID     [Held by provider] hydrALAZINE  100 mg Oral Q8H    fluconazole  200 mg Oral Daily    sodium hypochlorite   Topical 2 times per day    fluticasone furoate-vilanterol  1 puff Inhalation Daily    [Held by provider] rosuvastatin  40 mg Oral Nightly    meropenem  500 mg Intravenous q12h       sodium chloride 100 mL/hr at 03/04/24 0930    vasopressin (Vasostrict) 20 Units in sodium chloride 0.9% 100 mL infusion for septic shock 0.03 Units/min (03/04/24 0456)    norepinephrine 30 mcg/min (03/04/24 1114)            acetaminophen **OR** [DISCONTINUED] HYDROcodone-acetaminophen **OR** [DISCONTINUED] HYDROcodone-acetaminophen, HYDROmorphone, HYDROcodone-acetaminophen, HYDROcodone-acetaminophen, morphINE, hydrALAzine, ondansetron, metoclopramide, albuterol, ipratropium-albuterol, glucose **OR** glucose **OR** glucose-vitamin C **OR** dextrose **OR** glucose **OR** glucose **OR** glucose-vitamin C           MEDICATIONS ADMINISTERED IN LAST 1 DAY:  enoxaparin (Lovenox) 40 MG/0.4ML SUBQ injection 40 mg       Date Action Dose Route User    3/4/2024 0751 Given 40 mg Subcutaneous (Left Lower Abdomen) Domi Beltran RN          fluconazole (Diflucan) tab 200 mg       Date Action Dose Route User    3/4/2024 0750 Given 200 mg Oral Domi Beltran RN          insulin aspart (NovoLOG) 100 Units/mL FlexPen 1-5 Units       Date Action Dose Route User    3/4/2024 1217 Given 2 Units Subcutaneous (Bilateral Lower Abdomen) Domi Beltran RN    3/4/2024 0608 Given 2 Units Subcutaneous (Left Lower Abdomen) Dakota Jones RN    3/4/2024 0014 Given 2 Units Subcutaneous (Left Lower Abdomen) Dakota Jones RN    3/3/2024 1808 Given 2 Units Subcutaneous (Left Lower Abdomen) Oscar Coughlin RN          insulin degludec 100 units/mL flextouch 5 Units       Date Action Dose Route User    3/4/2024 0929 Given 5 Units Subcutaneous (Left Lower Abdomen) Domi Beltran RN          linezolid (Zyvox) 600 mg/300mL IVPB premix 600 mg       Date Action Dose  Route User    3/4/2024 0930 New Bag 600 mg Intravenous Domi eBltran, JESSIE    3/3/2024 2158 New Bag 600 mg Intravenous Dakota Jones, RN          meropenem (Merrem) 500 mg in sodium chloride 0.9% 100 mL IVPB-MBP       Date Action Dose Route User    3/4/2024 0409 New Bag 500 mg Intravenous Dakota Jones, RN    3/3/2024 1755 New Bag 500 mg Intravenous Oscar Coughlin RN          morphINE PF 2 MG/ML injection 2 mg       Date Action Dose Route User    3/4/2024 1435 Given 2 mg Intravenous Domi Beltran, JESSIE          norepinephrine (Levophed) 32 mg in dextrose 5% 250 mL infusion       Date Action Dose Route User    3/4/2024 1114 Rate/Dose Change 30 mcg/min Intravenous Domi Beltran, JESSIE    3/4/2024 1113 Rate/Dose Change 29 mcg/min Intravenous Domi Beltran, RN    3/4/2024 1112 Rate/Dose Change 28 mcg/min Intravenous Domi Beltran, RN    3/4/2024 1111 Rate/Dose Change 27 mcg/min Intravenous Domi Beltran RN    3/4/2024 1110 Rate/Dose Change 26 mcg/min Intravenous Domi Beltran, JESSIE    3/4/2024 1020 New Bag 25 mcg/min Intravenous Domi Beltran, JESSIE    3/4/2024 1005 Rate/Dose Change 25 mcg/min Intravenous Domi Beltran, JESSIE    3/4/2024 1004 Rate/Dose Change 24 mcg/min Intravenous Domi Beltran, JESSIE    3/4/2024 1003 Rate/Dose Change 23 mcg/min Intravenous Domi Beltran RN    3/4/2024 1002 Rate/Dose Change 22 mcg/min Intravenous oDmi Beltran, JESSIE    3/4/2024 1000 Rate/Dose Change 21 mcg/min Intravenous Domi Beltran, JESSIE    3/4/2024 0949 Rate/Dose Change 20 mcg/min Intravenous Domi Beltran, RN    3/4/2024 0948 Rate/Dose Change 19 mcg/min Intravenous Domi Beltran, JESSIE    3/4/2024 0947 Rate/Dose Change 18 mcg/min Intravenous Domi Beltran RN    3/4/2024 0946 Rate/Dose Change 17 mcg/min Intravenous Domi Beltran, RN    3/4/2024 0945 Rate/Dose Change 16 mcg/min Intravenous Domi Beltran, RN    3/4/2024 0930 Rate/Dose Change 15 mcg/min Intravenous Domi Beltran, JESSIE    3/4/2024 0920 Rate/Dose Change 20 mcg/min Intravenous Devin  JESSIE Duron    3/4/2024 0900 Rate/Dose Change 25 mcg/min Intravenous Domi Beltran RN    3/3/2024 1813 New Bag 30 mcg/min Intravenous Oscar Coughlin RN          pantoprazole (Protonix) DR tab 40 mg       Date Action Dose Route User    3/4/2024 0608 Given 40 mg Oral Dakota Jones RN          sodium chloride 0.9% infusion       Date Action Dose Route User    3/4/2024 0930 Rate/Dose Change (none) Intravenous Domi Beltran RN          sodium zirconium cyclosilicate (Lokelma) oral packet 10 g       Date Action Dose Route User    3/4/2024 0953 Given 10 g Oral Domi Beltran RN          vancomycin (Firvanq) 50 mg/mL oral solution 125 mg       Date Action Dose Route User    3/4/2024 0751 Given 125 mg Per NG Tube Domi Beltran RN          vasopressin (Vasostrict) 20 Units in sodium chloride 0.9% 100 mL infusion for septic shock       Date Action Dose Route User    3/4/2024 0456 New Bag 0.03 Units/min Intravenous Dakota Jones RN    3/4/2024 0409 Rate/Dose Verify 0.03 Units/min Intravenous Dakota Jones RN    3/3/2024 1854 New Bag 0.03 Units/min Intravenous Oscar Coughlin RN          cholecalciferol (VITAMIN D3) tab 2,000 Units       Date Action Dose Route User    3/4/2024 0751 Given 2,000 Units Oral Domi Beltran RN            Vitals (last day)       Date/Time Temp Pulse Resp BP SpO2 Weight O2 Device O2 Flow Rate (L/min) [facility]    03/04/24 1514 -- 0 0 -- -- -- -- -- OM    03/04/24 1400 -- 56 22 92/68 100 % -- Bi-PAP -- MW    03/04/24 1300 -- 55 22 96/57 -- -- -- -- OM    03/04/24 1230 -- 69 23 95/66 -- -- -- -- MW    03/04/24 1200 -- 55 14 89/55 92 % -- Bi-PAP -- MW    03/04/24 1115 -- 61 24 86/56 100 % -- -- -- MW    03/04/24 1110 -- -- 23 61/52 98 % -- -- -- MW    03/04/24 1110 -- 59 -- -- -- -- -- -- SP    03/04/24 1100 -- 63 24 125/92 98 % -- -- -- MW    03/04/24 1100 -- -- -- -- -- -- Bi-PAP -- OM    03/04/24 1020 -- 65 23 122/97 97 % -- -- -- MW    03/04/24 1010 -- 64 20 79/67 98 % -- -- -- MW     03/04/24 1000 -- 62 22 102/67 94 % -- -- -- MW    03/04/24 1000 -- -- -- -- -- -- Bi-PAP -- OM    03/04/24 0951 -- 64 29 134/85 -- -- -- -- MW    03/04/24 0950 -- 65 30 134/85 95 % -- -- -- MW    03/04/24 0945 -- 54 32 47/27 97 % -- -- -- MW    03/04/24 0930 -- 57 23 128/82 96 % -- -- -- MW    03/04/24 0920 -- 58 21 137/81 94 % -- -- -- MW    03/04/24 0913 -- -- -- -- 92 % -- -- -- MW    03/04/24 0900 -- 57 26 141/87 100 % -- -- -- MW    03/04/24 0900 -- -- -- -- -- -- Bi-PAP -- OM    03/04/24 0800 97.7 °F (36.5 °C) 57 18 100/67 97 % -- Bi-PAP -- MW    03/04/24 0600 -- -- -- -- -- 178 lb 2.1 oz -- -- MS    03/04/24 0500 -- 84 24 89/70 93 % -- Bi-PAP -- MS    03/04/24 0400 96.6 °F (35.9 °C) 74 18 92/67 88 % -- Bi-PAP -- MS    03/04/24 0300 -- 74 24 93/63 91 % -- Bi-PAP -- MS    03/04/24 0200 -- 66 21 98/66 90 % -- Bi-PAP -- MS    03/04/24 0100 -- 60 24 99/69 93 % -- Bi-PAP -- MS    03/04/24 0000 96.9 °F (36.1 °C) 64 24 91/70 93 % -- Bi-PAP -- MS    03/03/24 2300 -- 62 22 116/57 93 % -- Bi-PAP -- MS    03/03/24 2200 -- 58 24 94/58 90 % -- -- -- MS    03/03/24 2100 -- 59 22 100/65 93 % -- -- -- MS    03/03/24 2000 97 °F (36.1 °C) 57 23 118/58 94 % -- -- -- MS    03/03/24 1900 -- 54 19 115/60 96 % -- -- -- DG    03/03/24 1830 -- 54 19 103/59 94 % -- -- -- DG    03/03/24 1800 -- 53 22 108/60 96 % -- -- -- DG    03/03/24 1730 -- 53 24 101/60 95 % -- -- -- DG    03/03/24 1700 -- 53 21 100/59 96 % -- -- -- DG    03/03/24 1630 -- 54 22 101/59 96 % -- -- -- DG    03/03/24 1600 97.2 °F (36.2 °C) 55 22 102/57 97 % -- -- -- DG    03/03/24 1530 -- 55 25 106/57 -- -- -- -- DG    03/03/24 1500 -- 56 25 113/52 -- -- -- -- DG    03/03/24 1430 -- 28 25 105/55 87 % -- -- -- DG    03/03/24 1415 -- -- -- -- -- -- Bi-PAP -- DG    03/03/24 1400 -- 46 25 85/51 81 % -- -- -- DG    03/03/24 1345 -- 49 23 88/57 82 % -- -- -- DG    03/03/24 1330 -- 48 25 95/53 84 % -- -- -- DG    03/03/24 1315 -- 49 24 87/56 85 % -- -- -- DG    03/03/24 1300 --  50 24 94/54 88 % -- -- -- DG    03/03/24 1245 -- 52 23 84/56 85 % -- -- -- DG    03/03/24 1230 -- 53 22 91/58 88 % -- -- -- DG    03/03/24 1215 -- 51 21 95/55 92 % -- -- -- DG    03/03/24 1200 97 °F (36.1 °C) 52 25 88/52 91 % -- -- -- DG    03/03/24 1145 -- 51 20 91/56 93 % -- -- -- DG    03/03/24 1130 -- 51 22 94/51 94 % -- -- -- DG    03/03/24 1115 -- 52 25 77/52 93 % -- -- -- DG    03/03/24 1100 -- 50 20 79/47 91 % -- Nasal cannula 4 L/min DG    03/03/24 1055 -- 51 22 80/52 92 % -- -- -- DG    03/03/24 1045 -- 51 23 81/50 93 % -- -- -- DG    03/03/24 1015 -- 52 22 78/49 94 % -- -- -- DG    03/03/24 1000 -- 51 21 79/50 95 % -- -- -- DG    03/03/24 0930 -- 53 22 93/58 96 % -- -- -- DG    03/03/24 0900 -- 54 24 84/48 97 % -- -- -- DG    03/03/24 0830 -- 56 21 82/53 98 % -- -- -- DG    03/03/24 0800 96.8 °F (36 °C) 55 24 89/50 96 % -- -- -- DG    03/03/24 0730 -- 54 23 88/53 98 % -- -- -- DG    03/03/24 0700 -- 53 20 83/49 96 % -- -- -- DG    03/03/24 0600 -- 56 20 85/56 99 % -- Nasal cannula 3 L/min NA    03/03/24 0530 -- 56 18 92/55 98 % -- Nasal cannula 3 L/min NA    03/03/24 0500 -- 56 21 86/55 98 % -- Nasal cannula 3 L/min NA    03/03/24 0400 96.6 °F (35.9 °C) 56 18 89/52 98 % -- Nasal cannula 3 L/min NA    03/03/24 0300 -- 55 17 87/49 99 % -- Nasal cannula 3 L/min NA    03/03/24 0200 -- 54 18 88/59 100 % -- Nasal cannula 3 L/min NA    03/03/24 0100 -- 53 15 91/55 99 % -- Nasal cannula 3 L/min NA    03/03/24 0000 96.8 °F (36 °C) 53 14 100/57 97 % -- Nasal cannula 3 L/min NA          CIWA Scores (since admission)       None          Blood Transfusion Record       Product Unit Status Volume Start End            Transfuse RBC       24  [id number]  [id number] Completed 02/23/24 1458 270.83 mL 02/23/24 1208 02/23/24 1418       24  [id number]  [id number] Completed 02/22/24 1344 350 mL 02/22/24 1229 02/22/24 1239       24  [id number]  [id number] Completed 02/22/24 1344 350 mL 02/22/24 1207 02/22/24 1223        24 [id number]  [id number] Completed 02/19/24 1114 272.5 mL 02/19/24 0911 02/19/24 1100       24  [id number]  [id number] Completed 02/14/24 1328 357.5 mL 02/14/24 1105 02/14/24 1328       24  [id number]  [id number] Completed 02/11/24 2031 295 mL 02/11/24 1842 02/11/24 2030       24  [id number]  [id number] Completed 02/10/24 2200 350 mL 02/10/24 1855 02/10/24 1905       24  [id number]  [id number] Completed 02/10/24 2200 350 mL 02/10/24 1813 02/10/24 1823       24  [id number]  [id number] Completed 02/10/24 2200 350 mL 02/10/24 1758 02/10/24 1808                Procedures:      Plan:

## 2024-03-04 NOTE — PROGRESS NOTES
[facility]    Nephrology Progress Note      SUBJECTIVE:     On BiPAP. Awake, not answering questions   On levophed and vasopressin         PHYSICAL EXAM:     Vital Signs: /67 (BP Location: Right arm)   Pulse 57   Temp 97.7 °F (36.5 °C) (Temporal)   Resp 18   Ht 5' 4.02\" (1.626 m)   Wt 178 lb 2.1 oz (80.8 kg)   LMP  (LMP Unknown)   SpO2 97%   BMI 30.56 kg/m²   Temp (24hrs), Av.1 °F (36.2 °C), Min:96.6 °F (35.9 °C), Max:97.7 °F (36.5 °C)       Intake/Output Summary (Last 24 hours) at 3/4/2024 0914  Last data filed at 3/4/2024 0800  Gross per 24 hour   Intake 3336.8 ml   Output 650 ml   Net 2686.8 ml     Wt Readings from Last 3 Encounters:   24 178 lb 2.1 oz (80.8 kg)   24 164 lb 3.2 oz (74.5 kg)   23 153 lb (69.4 kg)       Gen - NAD, on BIPAP   HEENT - NCAT, DHT in place   CV - RRR  Resp - diminished at the bases    Abd - soft, ND  : joseph in place   Ext - R AKA,  LLE edema noted, edema noted - hands    MS - lethargic, opens eyes.  Not answering questions            LABORATORY DATA:     Recent Labs   Lab 24  0702 24  1858 24  0437 24  0501   *  119*  --  354*  354* 208*  208*   BUN 22  22  --  21   23  23   CREATSERUM 1.17*  1.17*  --  1.37*  1.37* 1.61*  1.61*   EGFRCR 51*  51*  --  43*  43* 35*  35*   CA 6.4*  6.4*  --  5.9*  5.9* 5.8*  5.8*     144  --  134*  134* 134*  134*   K 5.6*  5.6* 5.3* 5.2*  5.2* 5.4*  5.4*   *  117*  --  109  109 113*  113*   CO2 22.0  22.0  --  20.0*  20.0* 17.0*  17.0*     Recent Labs   Lab 24  0359 24  0536 24  0550 24  0702 24  0437 24  0501   RBC 2.76* 2.82*   < > 2.67*  2.67* 2.66* 2.79*   HGB 7.8* 8.3*   < > 7.7*  7.7* 7.4* 8.2*   HCT 24.8* 25.2*   < > 25.1*  25.1* 25.8* 26.4*   MCV 89.9 89.4   < > 94.0  94.0 97.0 94.6   MCH 28.3 29.4   < > 28.8  28.8 27.8 29.4   MCHC 31.5 32.9   < > 30.7*  30.7* 28.7* 31.1   RDW  16.9* 16.2*   < > 17.5*  17.5* 18.1* 17.7*   NEPRELIM 12.70* 13.52*  --  16.96*  --   --    WBC 14.6* 15.3*   < > 18.2*  18.2* 19.5* 20.6*   .0 249.0   < > 191.0  191.0 211.0 212.0    < > = values in this interval not displayed.           IMAGING:     Reviewed.    MEDICATIONS:          insulin degludec  5 Units Subcutaneous Daily    vancomycin  125 mg Per NG Tube Daily    insulin aspart  1-5 Units Subcutaneous q6h    linezolid  600 mg Intravenous Q12H    enoxaparin  40 mg Subcutaneous Daily    [Held by provider] gabapentin  100 mg Oral TID    cholecalciferol  2,000 Units Oral Daily    calcitriol  0.25 mcg Oral Q MWF    pantoprazole  40 mg Oral QAM AC    [Held by provider] carvedilol  25 mg Oral BID    [Held by provider] hydrALAZINE  100 mg Oral Q8H    fluconazole  200 mg Oral Daily    sodium hypochlorite   Topical 2 times per day    fluticasone furoate-vilanterol  1 puff Inhalation Daily    [Held by provider] rosuvastatin  40 mg Oral Nightly    meropenem  500 mg Intravenous q12h           ASSESSMENT/PLAN:     68 year old female with PMH of COPD, DM2, HTN, HL, PVD, Sickle cell anemia, CKD stage 3, recent fem-pop artery bypass admitted with acute hemorrhage from incision site.  Patient is s/p OR for repair then underwent R  AKA on 2/22.  Nephrology is consulted for OMEGA and metabolic acidosis.       Hyperkalemia:  - worsening, suspect due to worsening CK level and acidosis  - lokelma 10g every 8 hours x 3 doses   - repeat labs this afternoon     Elevated CK level   - concern for tissue necrosis   - worsening, await CK level from today   - NS at 100/hr, IV lasix if needed for fluid overload   - trend CK levels      OMEGA on CKD stage 3   - baseline around 1.2  - sCr worsening to 1.61, UOP declining    - suspect ATN from shock and possible heme pigment injury from elevated CK level   - IVFs as above   - follow renal fxn and I/Os   - palliative care meeting today to discuss GOC     Metabolic acidosis:  -  worsening, trend labs     Hypocalcemia: with  low serum albumin   - corrected calcium ~ 7.8  - s/p 1 gram of calcium gluconate 3/3    Hyperphosphatemia   - due to OMEGA  - follow phos levels     Hyponatremia   - corrected Na 137 (gluc 208)  - follow Na level     Shock   - vasopressors per ICU  - maintain adequate renal perfusion.      Acute respiratory failure   - BIPAP per pulm     Dispo: palliative care meeting planned for today     Dw Dr. Feliz and RN     We will continue to follow      Kelsea Gaston MD  [facility]

## 2024-03-04 NOTE — PLAN OF CARE
Problem: Safety Risk - Non-Violent Restraints  Goal: Patient will remain free from self-harm  Description: INTERVENTIONS:  - Apply the least restrictive restraint to prevent harm  - Notify patient and family of reasons restraints applied  - Assess for any contributing factors to confusion (electrolyte disturbances, delirium, medications)  - Discontinue any unnecessary medical devices as soon as possible  - Assess the patient's physical comfort, circulation, skin condition, hydration, nutrition and elimination needs   - Reorient and redirection as needed  - Assess for the need to continue restraints  3/4/2024 0346 by Dakota Jones, RN  Outcome: Progressing  3/3/2024 2039 by Dakota Jones, RN  Outcome: Progressing     Problem: PAIN - ADULT  Goal: Verbalizes/displays adequate comfort level or patient's stated pain goal  Description: INTERVENTIONS:  - Encourage pt to monitor pain and request assistance  - Assess pain using appropriate pain scale  - Administer analgesics based on type and severity of pain and evaluate response  - Implement non-pharmacological measures as appropriate and evaluate response  - Consider cultural and social influences on pain and pain management  - Manage/alleviate anxiety  - Utilize distraction and/or relaxation techniques  - Monitor for opioid side effects  - Notify MD/LIP if interventions unsuccessful or patient reports new pain  - Anticipate increased pain with activity and pre-medicate as appropriate  Outcome: Progressing     Problem: SAFETY ADULT - FALL  Goal: Free from fall injury  Description: INTERVENTIONS:  - Assess pt frequently for physical needs  - Identify cognitive and physical deficits and behaviors that affect risk of falls.  - [place] fall precautions as indicated by assessment.  - Educate pt/family on patient safety including physical limitations  - Instruct pt to call for assistance with activity based on assessment  - Modify environment to reduce risk of  injury  - Provide assistive devices as appropriate  - Consider OT/PT consult to assist with strengthening/mobility  - Encourage toileting schedule  Outcome: Progressing     Problem: SKIN/TISSUE INTEGRITY - ADULT  Goal: Skin integrity remains intact  Description: INTERVENTIONS  - Assess and document risk factors for pressure ulcer development  - Assess and document skin integrity  - Monitor for areas of redness and/or skin breakdown  - Initiate interventions, skin care algorithm/standards of care as needed  Outcome: Progressing  Goal: Incision(s), wounds(s) or drain site(s) healing without S/S of infection  Description: INTERVENTIONS:  - Assess and document risk factors for pressure ulcer development  - Assess and document skin integrity  - Assess and document dressing/incision, wound bed, drain sites and surrounding tissue  - Implement wound care per orders  - Initiate isolation precautions as appropriate  - Initiate Pressure Ulcer prevention bundle as indicated  Outcome: Progressing  Goal: Oral mucous membranes remain intact  Description: INTERVENTIONS  - Assess oral mucosa and hygiene practices  - Implement preventative oral hygiene regimen  - Implement oral medicated treatments as ordered  Outcome: Progressing     Problem: RESPIRATORY - ADULT  Goal: Achieves optimal ventilation and oxygenation  Description: INTERVENTIONS:  - Assess for changes in respiratory status  - Assess for changes in mentation and behavior  - Position to facilitate oxygenation and minimize respiratory effort  - Oxygen supplementation based on oxygen saturation or ABGs  - Provide Smoking Cessation handout, if applicable  - Encourage broncho-pulmonary hygiene including cough, deep breathe, Incentive Spirometry  - Assess the need for suctioning and perform as needed  - Assess and instruct to report SOB or any respiratory difficulty  - Respiratory Therapy support as indicated  - Manage/alleviate anxiety  - Monitor for signs/symptoms of CO2  retention  Outcome: Progressing

## 2024-03-04 NOTE — PROGRESS NOTES
[facility]  part of [facility] Infectious Disease  Progress Note    Ava Bowen Patient Status:  Inpatient    10/6/1955 MRN [id number]   Location [facility] 2W/SW Attending Yenifer Diana DO   Hosp Day # 23 PCP Ernesto MIGUEL Lanre Bowen is a 68 year old female.   Chief Complaint   Patient presents with    Bleeding       HPI:      Bipap pressor support               REVIEW OF SYSTEMS:   A comprehensive 10 point review of systems was completed.  Pertinent positives and negatives noted in the the HPI.       Allergies:  Allergies   Allergen Reactions    Penicillins HIVES and ANGIOEDEMA     Hives on eyelids (occurred when pt was in her 20s). Tolerated amoxicillin per chart. Tolerates cephalosporins.        Current Meds:    Current Facility-Administered Medications:     insulin degludec 100 units/mL flextouch 5 Units, 5 Units, Subcutaneous, Daily    sodium zirconium cyclosilicate (Lokelma) oral packet 10 g, 10 g, Oral, Q8H    sodium chloride 0.9% infusion, , Intravenous, Continuous    vancomycin (Firvanq) 50 mg/mL oral solution 125 mg, 125 mg, Per NG Tube, Daily    insulin aspart (NovoLOG) 100 Units/mL FlexPen 1-5 Units, 1-5 Units, Subcutaneous, q6h    vasopressin (Vasostrict) 20 Units in sodium chloride 0.9% 100 mL infusion for septic shock, 0.01-0.03 Units/min, Intravenous, Continuous    linezolid (Zyvox) 600 mg/300mL IVPB premix 600 mg, 600 mg, Intravenous, Q12H    norepinephrine (Levophed) 32 mg in dextrose 5% 250 mL infusion, 0.5-30 mcg/min, Intravenous, Continuous    enoxaparin (Lovenox) 40 MG/0.4ML SUBQ injection 40 mg, 40 mg, Subcutaneous, Daily    [Held by provider] gabapentin (Neurontin) cap 100 mg, 100 mg, Oral, TID    cholecalciferol (VITAMIN D3) tab 2,000 Units, 2,000 Units, Oral, Daily    calcitriol (Rocaltrol) cap 0.25 mcg, 0.25 mcg, Oral, Q MWF    acetaminophen (Tylenol) tab 650 mg, 650 mg, Oral, Q4H PRN **OR** [DISCONTINUED]  HYDROcodone-acetaminophen (Norco) 5-325 MG per tab 1 tablet, 1 tablet, Oral, Q4H PRN **OR** [DISCONTINUED] HYDROcodone-acetaminophen (Norco) 5-325 MG per tab 2 tablet, 2 tablet, Oral, Q4H PRN    HYDROmorphone (Dilaudid) 1 MG/ML injection 1 mg, 1 mg, Intravenous, Q3H PRN    pantoprazole (Protonix) DR tab 40 mg, 40 mg, Oral, QAM AC    [Held by provider] carvedilol (Coreg) tab 25 mg, 25 mg, Oral, BID    [Held by provider] hydrALAZINE (Apresoline) tab 100 mg, 100 mg, Oral, Q8H    fluconazole (Diflucan) tab 200 mg, 200 mg, Oral, Daily    HYDROcodone-acetaminophen (Norco)  MG per tab 1 tablet, 1 tablet, Oral, Q4H PRN    HYDROcodone-acetaminophen (Norco)  MG per tab 2 tablet, 2 tablet, Oral, Q4H PRN    morphINE 20 mg/mL concentrated oral solution 5 mg, 5 mg, Oral, Q4H PRN    sodium hypochlorite (Dakin's) 0.125 % external solution, , Topical, 2 times per day    hydrALAzine (Apresoline) 20 mg/mL injection 10 mg, 10 mg, Intravenous, Q4H PRN    ondansetron (Zofran) 4 MG/2ML injection 4 mg, 4 mg, Intravenous, Q6H PRN    metoclopramide (Reglan) 5 mg/mL injection 5 mg, 5 mg, Intravenous, Q8H PRN    albuterol (Ventolin HFA) 108 (90 Base) MCG/ACT inhaler 2 puff, 2 puff, Inhalation, Q6H PRN    fluticasone furoate-vilanterol (Breo Ellipta) 200-25 MCG/ACT inhaler 1 puff, 1 puff, Inhalation, Daily    ipratropium-albuterol (Duoneb) 0.5-2.5 (3) MG/3ML inhalation solution 3 mL, 3 mL, Nebulization, Q6H PRN    [Held by provider] rosuvastatin (Crestor) tab 40 mg, 40 mg, Oral, Nightly    glucose (Dex4) 15 GM/59ML oral liquid 15 g, 15 g, Oral, Q15 Min PRN **OR** glucose (Glutose) 40% oral gel 15 g, 15 g, Oral, Q15 Min PRN **OR** glucose-vitamin C (Dex-4) chewable tab 4 tablet, 4 tablet, Oral, Q15 Min PRN **OR** dextrose 50% injection 50 mL, 50 mL, Intravenous, Q15 Min PRN **OR** glucose (Dex4) 15 GM/59ML oral liquid 30 g, 30 g, Oral, Q15 Min PRN **OR** glucose (Glutose) 40% oral gel 30 g, 30 g, Oral, Q15 Min PRN **OR**  glucose-vitamin C (Dex-4) chewable tab 8 tablet, 8 tablet, Oral, Q15 Min PRN    meropenem (Merrem) 500 mg in sodium chloride 0.9% 100 mL IVPB-MBP, 500 mg, Intravenous, q12h   Current Outpatient Medications   Medication Sig Dispense Refill    carvedilol 12.5 MG Oral Tab Take 2 tablets (25 mg total) by mouth 2 (two) times daily with meals. 180 tablet 0        HISTORY:  Past Medical History:   Diagnosis Date    Anemia     Back pain     Back problem     Cataract     Chronic kidney disease (CKD)     COPD (chronic obstructive pulmonary disease) (HCC)     FEV 1 1.25 50%     Diabetes (HCC)     DM neuropathy    Essential hypertension     H/O angioplasty     2020    High blood pressure     High cholesterol     Neuropathy     Peripheral vascular disease (HCC)     PNA (pneumonia) 2018    Pulmonary emphysema ([facility])     Pulmonary nodule     4 mm RUL    Renal disorder     monitoring kidney labs    Sickle cell trait (HCC)     Sickle-cell anemia (HCC)     Tobacco abuse     Visual impairment       Past Surgical History:   Procedure Laterality Date    APPENDECTOMY      BACK SURGERY Right 2021    Right L3-4 extreme lateral interbody fusion, posterior decompression; LUMBAR LAMINECTOMY 1 LEVEL          x3    CHOLECYSTECTOMY      EXCIS LUMBAR DISK,ONE LEVEL          OTHER      bilateral leg stents  and         Social history and family history negative related to present illness except as above.    PHYSICAL EXAM:   BP (!) 86/56   Pulse 61   Temp 97.7 °F (36.5 °C) (Temporal)   Resp 24   Ht 5' 4.02\" (1.626 m)   Wt 178 lb 2.1 oz (80.8 kg)   LMP  (LMP Unknown)   SpO2 100%   BMI 30.56 kg/m²   GENERAL:  ill, not alert  HEENT:  Normocephalic, no scleral abnormalities.  Oropharynx not seen, trachea ML.  NECK:  Supple, no masses, no lymphadenopathy.  LUNGS:  diminished bs  CARDIO: RRR S1/S2, no rubs, clicks, heaves, or murmurs.  GI:  Some distension  EXTREMITIES:  No edema, no clubbing, no  cyanosis.  NEURO:  No focal neurologic deficits.  DERM:  Wound not viewed    IMPRESSION/PLAN:        Postop complications of rt fem pop 1/24 bypass; now hypotension/ septic shock;maybe cardiogenic too  Bacteremia wound drainage and needed debridement graft removal;see other notes for more details;two surgeries so far 2/13 and 2/16 and 2/22 aka  Cultures mixed karla and candida too [candida in urine too]  Already on meropenem and vanc[changed to cubicin then zyvox];  diflucan  Repeat cultures with enterococcus bacteremia  Minimal urine output  Prognosis appears nil; spoke with rn    >35 min            Recent Results (from the past 72 hour(s))   Urinalysis with Culture Reflex    Collection Time: 03/01/24 12:54 PM    Specimen: Urine, joseph catheter   Result Value Ref Range    Urine Color Yellow Yellow    Clarity Urine Turbid (A) Clear    Spec Gravity 1.018 1.005 - 1.030    Glucose Urine Normal Normal mg/dL    Bilirubin Urine Negative Negative    Ketones Urine Negative Negative mg/dL    Blood Urine 3+ (A) Negative    pH Urine 6.0 5.0 - 8.0    Protein Urine 70 (A) Negative mg/dL    Urobilinogen Urine 4 (A) Normal    Nitrite Urine Negative Negative    Leukocyte Esterase Urine 500 (A) Negative    WBC Urine >50 (A) 0 - 5 /HPF    RBC Urine 3-5 (A) 0 - 2 /HPF    Bacteria Urine 1+ (A) None Seen /HPF    Squamous Epi. Cells Few (A) None Seen /HPF    Renal Tubular Epithelial Cells None Seen None Seen /HPF    Transitional Cells Few (A) None Seen /HPF    Yeast Urine None Seen None Seen /HPF    WBC Clump Present (A) None Seen /HPF   Urine Culture, Routine    Collection Time: 03/01/24 12:54 PM    Specimen: Urine, joseph catheter   Result Value Ref Range    Urine Culture No Growth at 18-24 hrs.    Potassium    Collection Time: 03/01/24 12:56 PM   Result Value Ref Range    Potassium 5.9 (H) 3.5 - 5.1 mmol/L   Blood Culture    Collection Time: 03/01/24 12:56 PM    Specimen: Blood,peripheral   Result Value Ref Range    Blood Culture  Result Enterococcus faecium VRE (A)     Blood Smear Gram positive cocci in chains (A)        Susceptibility    Enterococcus faecium VRE -  (no method available)     Vancomycin >16 Resistant      Daptomycin* 1 Susceptible Dose Dependent       * Consultation with an infectious disease specialist is recommended     Linezolid 1 Sensitive      Ampicillin >8 Resistant      Quinupristin + Dalfopristin 0.5 Sensitive    Blood Culture PCR    Collection Time: 03/01/24 12:56 PM    Specimen: Blood,peripheral   Result Value Ref Range    Enterococcus faecium by PCR   Detected (A) Not Detected    Afia/B (Vancomycin-resistance gene) by PCR Detected (A) Not Detected    PCR Comment     Blood Culture    Collection Time: 03/01/24 12:57 PM    Specimen: Blood,peripheral   Result Value Ref Range    Blood Culture Result No Growth 2 Days    Lactic Acid, Plasma    Collection Time: 03/01/24  3:04 PM   Result Value Ref Range    Lactic Acid 1.6 0.5 - 2.0 mmol/L   POCT Glucose    Collection Time: 03/01/24  4:33 PM   Result Value Ref Range    POC Glucose  123 (H) 70 - 99 mg/dL   Potassium    Collection Time: 03/01/24  7:44 PM   Result Value Ref Range    Potassium 5.5 (H) 3.5 - 5.1 mmol/L   POCT Glucose    Collection Time: 03/01/24  9:10 PM   Result Value Ref Range    POC Glucose  111 (H) 70 - 99 mg/dL   POCT Glucose    Collection Time: 03/02/24  5:22 AM   Result Value Ref Range    POC Glucose  114 (H) 70 - 99 mg/dL   Basic Metabolic Panel (8)    Collection Time: 03/02/24  7:02 AM   Result Value Ref Range    Glucose 119 (H) 70 - 99 mg/dL    Sodium 144 136 - 145 mmol/L    Potassium 5.6 (H) 3.5 - 5.1 mmol/L    Chloride 117 (H) 98 - 112 mmol/L    CO2 22.0 21.0 - 32.0 mmol/L    Anion Gap 5 0 - 18 mmol/L    BUN 22 9 - 23 mg/dL    Creatinine 1.17 (H) 0.55 - 1.02 mg/dL    BUN/CREA Ratio 18.8 10.0 - 20.0    Calcium, Total 6.4 (L) 8.7 - 10.4 mg/dL    Calculated Osmolality 302 (H) 275 - 295 mOsm/kg    eGFR-Cr 51 (L) >=60 mL/min/1.73m2   CBC, Platelet; No  Differential    Collection Time: 03/02/24  7:02 AM   Result Value Ref Range    WBC 18.2 (H) 4.0 - 11.0 x10(3) uL    RBC 2.67 (L) 3.80 - 5.30 x10(6)uL    HGB 7.7 (L) 12.0 - 16.0 g/dL    HCT 25.1 (L) 35.0 - 48.0 %    MCV 94.0 80.0 - 100.0 fL    MCH 28.8 26.0 - 34.0 pg    MCHC 30.7 (L) 31.0 - 37.0 g/dL    RDW 17.5 (H) 11.0 - 15.0 %    RDW-SD 61.4 (H) 35.1 - 46.3 fL    .0 150.0 - 450.0 10(3)uL   Vancomycin Trough, Serum    Collection Time: 03/02/24  7:02 AM   Result Value Ref Range    Vancomycin Trough 15.5 10.0 - 20.0 ug/mL   Magnesium    Collection Time: 03/02/24  7:02 AM   Result Value Ref Range    Magnesium 1.9 1.6 - 2.6 mg/dL   Comp Metabolic Panel (14)    Collection Time: 03/02/24  7:02 AM   Result Value Ref Range    Glucose 119 (H) 70 - 99 mg/dL    Sodium 144 136 - 145 mmol/L    Potassium 5.6 (H) 3.5 - 5.1 mmol/L    Chloride 117 (H) 98 - 112 mmol/L    CO2 22.0 21.0 - 32.0 mmol/L    Anion Gap 5 0 - 18 mmol/L    BUN 22 9 - 23 mg/dL    Creatinine 1.17 (H) 0.55 - 1.02 mg/dL    BUN/CREA Ratio 18.8 10.0 - 20.0    Calcium, Total 6.4 (L) 8.7 - 10.4 mg/dL    Calculated Osmolality 302 (H) 275 - 295 mOsm/kg    eGFR-Cr 51 (L) >=60 mL/min/1.73m2    ALT 76 (H) 10 - 49 U/L     (H) <=34 U/L    Alkaline Phosphatase 1,532 (H) 55 - 142 U/L    Bilirubin, Total 0.3 0.2 - 1.1 mg/dL    Total Protein 4.5 (L) 5.7 - 8.2 g/dL    Albumin 1.7 (L) 3.2 - 4.8 g/dL    Globulin  2.8 2.8 - 4.4 g/dL    A/G Ratio 0.6 (L) 1.0 - 2.0   Phosphorus    Collection Time: 03/02/24  7:02 AM   Result Value Ref Range    Phosphorus 4.8 2.4 - 5.1 mg/dL   CBC W/ DIFFERENTIAL    Collection Time: 03/02/24  7:02 AM   Result Value Ref Range    WBC 18.2 (H) 4.0 - 11.0 x10(3) uL    RBC 2.67 (L) 3.80 - 5.30 x10(6)uL    HGB 7.7 (L) 12.0 - 16.0 g/dL    HCT 25.1 (L) 35.0 - 48.0 %    MCV 94.0 80.0 - 100.0 fL    MCH 28.8 26.0 - 34.0 pg    MCHC 30.7 (L) 31.0 - 37.0 g/dL    RDW-SD 61.4 (H) 35.1 - 46.3 fL    RDW 17.5 (H) 11.0 - 15.0 %    .0 150.0 - 450.0  10(3)uL    Neutrophil Absolute Prelim 16.96 (H) 1.50 - 7.70 x10 (3) uL    Neutrophil Absolute 16.96 (H) 1.50 - 7.70 x10(3) uL    Lymphocyte Absolute 0.57 (L) 1.00 - 4.00 x10(3) uL    Monocyte Absolute 0.43 0.10 - 1.00 x10(3) uL    Eosinophil Absolute 0.01 0.00 - 0.70 x10(3) uL    Basophil Absolute 0.02 0.00 - 0.20 x10(3) uL    Immature Granulocyte Absolute 0.17 0.00 - 1.00 x10(3) uL    Neutrophil % 93.4 %    Lymphocyte % 3.1 %    Monocyte % 2.4 %    Eosinophil % 0.1 %    Basophil % 0.1 %    Immature Granulocyte % 0.9 %   CK (Creatine Kinase) (Not Creatinine)    Collection Time: 03/02/24  7:02 AM   Result Value Ref Range    CK 6,440 (HH) 34 - 145 U/L   POCT Glucose    Collection Time: 03/02/24  7:18 AM   Result Value Ref Range    POC Glucose  129 (H) 70 - 99 mg/dL   POCT Glucose    Collection Time: 03/02/24 12:12 PM   Result Value Ref Range    POC Glucose  145 (H) 70 - 99 mg/dL   POCT Glucose    Collection Time: 03/02/24  6:03 PM   Result Value Ref Range    POC Glucose  173 (H) 70 - 99 mg/dL   Potassium    Collection Time: 03/02/24  6:58 PM   Result Value Ref Range    Potassium 5.3 (H) 3.5 - 5.1 mmol/L   POCT Glucose    Collection Time: 03/02/24  9:05 PM   Result Value Ref Range    POC Glucose  169 (H) 70 - 99 mg/dL   Basic Metabolic Panel (8)    Collection Time: 03/03/24  4:37 AM   Result Value Ref Range    Glucose 354 (H) 70 - 99 mg/dL    Sodium 134 (L) 136 - 145 mmol/L    Potassium 5.2 (H) 3.5 - 5.1 mmol/L    Chloride 109 98 - 112 mmol/L    CO2 20.0 (L) 21.0 - 32.0 mmol/L    Anion Gap 5 0 - 18 mmol/L    BUN 21 9 - 23 mg/dL    Creatinine 1.37 (H) 0.55 - 1.02 mg/dL    BUN/CREA Ratio 15.3 10.0 - 20.0    Calcium, Total 5.9 (LL) 8.7 - 10.4 mg/dL    Calculated Osmolality 295 275 - 295 mOsm/kg    eGFR-Cr 43 (L) >=60 mL/min/1.73m2   CBC, Platelet; No Differential    Collection Time: 03/03/24  4:37 AM   Result Value Ref Range    WBC 19.5 (H) 4.0 - 11.0 x10(3) uL    RBC 2.66 (L) 3.80 - 5.30 x10(6)uL    HGB 7.4 (L) 12.0 -  16.0 g/dL    HCT 25.8 (L) 35.0 - 48.0 %    MCV 97.0 80.0 - 100.0 fL    MCH 27.8 26.0 - 34.0 pg    MCHC 28.7 (L) 31.0 - 37.0 g/dL    RDW 18.1 (H) 11.0 - 15.0 %    RDW-SD 64.5 (H) 35.1 - 46.3 fL    .0 150.0 - 450.0 10(3)uL   Renal Function Panel    Collection Time: 03/03/24  4:37 AM   Result Value Ref Range    Glucose 354 (H) 70 - 99 mg/dL    Sodium 134 (L) 136 - 145 mmol/L    Potassium 5.2 (H) 3.5 - 5.1 mmol/L    Chloride 109 98 - 112 mmol/L    CO2 20.0 (L) 21.0 - 32.0 mmol/L    Anion Gap 5 0 - 18 mmol/L    BUN 21 9 - 23 mg/dL    Creatinine 1.37 (H) 0.55 - 1.02 mg/dL    BUN/CREA Ratio 15.3 10.0 - 20.0    Calcium, Total 5.9 (LL) 8.7 - 10.4 mg/dL    Calculated Osmolality 295 275 - 295 mOsm/kg    eGFR-Cr 43 (L) >=60 mL/min/1.73m2    Albumin 1.5 (L) 3.2 - 4.8 g/dL    Phosphorus 5.0 2.4 - 5.1 mg/dL   Magnesium    Collection Time: 03/03/24  4:37 AM   Result Value Ref Range    Magnesium 1.8 1.6 - 2.6 mg/dL   CK (Creatine Kinase) (Not Creatinine)    Collection Time: 03/03/24  4:37 AM   Result Value Ref Range    CK 11,900 (HH) 34 - 145 U/L   POCT Glucose    Collection Time: 03/03/24  5:51 AM   Result Value Ref Range    POC Glucose  186 (H) 70 - 99 mg/dL   POCT Glucose    Collection Time: 03/03/24  1:50 PM   Result Value Ref Range    POC Glucose  267 (H) 70 - 99 mg/dL   POCT Glucose    Collection Time: 03/03/24  5:58 PM   Result Value Ref Range    POC Glucose  220 (H) 70 - 99 mg/dL   POCT Glucose    Collection Time: 03/03/24 11:02 PM   Result Value Ref Range    POC Glucose  244 (H) 70 - 99 mg/dL   Basic Metabolic Panel (8)    Collection Time: 03/04/24  5:01 AM   Result Value Ref Range    Glucose 208 (H) 70 - 99 mg/dL    Sodium 134 (L) 136 - 145 mmol/L    Potassium 5.4 (H) 3.5 - 5.1 mmol/L    Chloride 113 (H) 98 - 112 mmol/L    CO2 17.0 (L) 21.0 - 32.0 mmol/L    Anion Gap 4 0 - 18 mmol/L    BUN 23 9 - 23 mg/dL    Creatinine 1.61 (H) 0.55 - 1.02 mg/dL    BUN/CREA Ratio 14.3 10.0 - 20.0    Calcium, Total 5.8 (LL) 8.7 -  10.4 mg/dL    Calculated Osmolality 288 275 - 295 mOsm/kg    eGFR-Cr 35 (L) >=60 mL/min/1.73m2   CBC, Platelet; No Differential    Collection Time: 03/04/24  5:01 AM   Result Value Ref Range    WBC 20.6 (H) 4.0 - 11.0 x10(3) uL    RBC 2.79 (L) 3.80 - 5.30 x10(6)uL    HGB 8.2 (L) 12.0 - 16.0 g/dL    HCT 26.4 (L) 35.0 - 48.0 %    MCV 94.6 80.0 - 100.0 fL    MCH 29.4 26.0 - 34.0 pg    MCHC 31.1 31.0 - 37.0 g/dL    RDW 17.7 (H) 11.0 - 15.0 %    RDW-SD 62.0 (H) 35.1 - 46.3 fL    .0 150.0 - 450.0 10(3)uL   Magnesium    Collection Time: 03/04/24  5:01 AM   Result Value Ref Range    Magnesium 2.1 1.6 - 2.6 mg/dL   Renal Function Panel    Collection Time: 03/04/24  5:01 AM   Result Value Ref Range    Glucose 208 (H) 70 - 99 mg/dL    Sodium 134 (L) 136 - 145 mmol/L    Potassium 5.4 (H) 3.5 - 5.1 mmol/L    Chloride 113 (H) 98 - 112 mmol/L    CO2 17.0 (L) 21.0 - 32.0 mmol/L    Anion Gap 4 0 - 18 mmol/L    BUN 23 9 - 23 mg/dL    Creatinine 1.61 (H) 0.55 - 1.02 mg/dL    BUN/CREA Ratio 14.3 10.0 - 20.0    Calcium, Total 5.8 (LL) 8.7 - 10.4 mg/dL    Calculated Osmolality 288 275 - 295 mOsm/kg    eGFR-Cr 35 (L) >=60 mL/min/1.73m2    Albumin 1.5 (L) 3.2 - 4.8 g/dL    Phosphorus 5.4 (H) 2.4 - 5.1 mg/dL   CK (Creatine Kinase) (Not Creatinine)    Collection Time: 03/04/24  5:01 AM   Result Value Ref Range    CK 14,772 (HH) 34 - 145 U/L   POCT Glucose    Collection Time: 03/04/24  5:11 AM   Result Value Ref Range    POC Glucose  206 (H) 70 - 99 mg/dL         Noah Perez MD     CALL [facility] AT [phone] IF QUESTIONS OR CONCERNS  THANKS

## 2024-03-04 NOTE — DIETARY NOTE
ADULT NUTRITION REASSESSMENT    Pt is at moderate nutrition risk.  Pt does not meet malnutrition criteria.      RECOMMENDATIONS TO MD:  Pt now noted to be comfort status. Discussed with RN- states no longer wanting EN support. Will discontinue tube feeding consult.     ADMITTING DIAGNOSIS:  Hyperglycemia [R73.9]  Bypass graft mechanical complication, initial encounter ([facility]) [T82.398A]  Acute kidney injury superimposed on CKD  ([facility]) [N17.9, N18.9]  Anemia, unspecified type [D64.9]  PERTINENT PAST MEDICAL HISTORY:   Past Medical History:   Diagnosis Date    Anemia     Back pain     Back problem     Cataract     Chronic kidney disease (CKD)     COPD (chronic obstructive pulmonary disease) ([facility])     FEV 1 1.25 50% 2/20    Diabetes ([facility])     DM neuropathy    Essential hypertension     H/O angioplasty     07/08/2020    High blood pressure     High cholesterol     Neuropathy     Peripheral vascular disease ([facility])     PNA (pneumonia) 05/2018    Pulmonary emphysema ([facility])     Pulmonary nodule     4 mm RUL    Renal disorder     monitoring kidney labs    Sickle cell trait ([facility])     Sickle-cell anemia ([facility])     Tobacco abuse     Visual impairment      PATIENT STATUS: Initial 02/14/24: Pt admit for Bleeding from surgical site (recent right lower extremity femoral to popliteal bypass).  PMH sig for CKD, COPD, DM, HTN, HLD, PVD, sickle cell anemia.   Pt intubated on 2/10 and currently sedated on Propofol 11.6 ml/hr (306 kcals via lipids). S/p wound debridement 2/13 with wound vac in place and 2/16 plan for debridement of rt groin wound with graft placement. Renal service following for OMEGA and metabolic acidosis, s/p bicarb GTT      Pt assessed due to consult for tube feeding. Day#4 NPO. OG tube for EN access. +BM. Net I/O: +9.8L with edema on lasix. Pt overweight for ht using UBW as % of IBW, appears well nourished.   Pt screened at no nutrition risk on admit/RN (no wt loss, no decline in po intake). RX tube feeds to meet initial  nutrition needs during 1st week critical illness.   Update 02/19/24: RA completed per protocol. Chart reviewed, extubated 2/14 pm - diet initiated after SLP evaluation. Plan for right AKA on Thursday per discussion with RN. Poor oral intake noted per EMR review. Per PCT, pt not drinking ONS. Pt visited, reports does not have much of an appetite but that is not new. Pt reports getting ONS at home but has not received them much here. Provided flavor preferences. Encouraged increased energy and protein intake with ONS to help maximize nutrition. Pt in agreement to drink shakes and asked for one now. Adjusted ONS order.     Update 02/26/24: RA completed per protocol. Chart reviewed, POD#4 s/p right AKA. Intake remains minimal since last visit despite oral nutritional supplement (ONS). Discussion with RN, pt drowsy today but poor appetite at baseline. Pt visited, easily aroused by calling name. Pt reports she ate breakfast (20% per intake documentation) and lunch (40% per intake documentation). Intake appears slightly better today from previous few days since procedure. Pt reports drinking supplements. Encouraged increased energy and protein intake with ONS to help maximize nutrition. Adjusted (increased) ONS to help maximize nutrition.    **Pt may benefit from temporary nutrition support if po intake remains inadequate if in accordance with pt/family GOC and MD POC.**    Update 3/4/24: Consulted for EN support again on 3/2. Pt not metabolically stable for TF to begin- abnormal labs and electrolytes. On continuous Bipap. NG is in place. Levophed maxed at 30mcg/min. Also on vasopressin. Palliative mtg scheduled for today. RN states pt has now been made comfort/DNR status. No further plans for EN support. RN will remove TF consult. Per MD chart note pt continues to decline. Pt is awake laying in bed, not answering questions. Code stroke called on 3/2. SLP downgraded texture to pureed w/nectar thick liquids, but pt has been  NPO since 3/1 suspect d/t lethargy and following code stroke. RD to remain available for further intervention as needed. Monitor for GOC.     FOOD/NUTRITION RELATED HISTORY:  Appetite: Poor  Intake:  poor  over the past week  Intake Meeting Needs: No, even with oral nutrition supplements (ONS) ordered  Percent Meals Eaten (last 6 days)       Date/Time Percent Meals Eaten (%)    02/28/24 0913 50 %    02/29/24 0836 20 %    02/29/24 1900 0 %          Food Allergies: No Known Food Allergies ([facility])  Cultural/Ethnic/Zoroastrianism Preferences: Not Obtained    GASTROINTESTINAL: +BM 3/1 and Swallow evaluation noted  UOP poor- not fully recorded last few days    MEDICATIONS: reviewed    sodium chloride 100 mL/hr at 03/04/24 0930    vasopressin ([facility]) 20 Units in sodium chloride 0.9% 100 mL infusion for septic shock 0.03 Units/min (03/04/24 0456)    norepinephrine 15 mcg/min (03/04/24 0930)      insulin degludec  5 Units Subcutaneous Daily    sodium zirconium cyclosilicate  10 g Oral Q8H    vancomycin  125 mg Per NG Tube Daily    insulin aspart  1-5 Units Subcutaneous q6h    linezolid  600 mg Intravenous Q12H    enoxaparin  40 mg Subcutaneous Daily    [Held by provider] gabapentin  100 mg Oral TID    cholecalciferol  2,000 Units Oral Daily    calcitriol  0.25 mcg Oral Q MWF    pantoprazole  40 mg Oral QAM AC    [Held by provider] carvedilol  25 mg Oral BID    [Held by provider] hydrALAZINE  100 mg Oral Q8H    fluconazole  200 mg Oral Daily    sodium hypochlorite   Topical 2 times per day    fluticasone furoate-vilanterol  1 puff Inhalation Daily    [Held by provider] rosuvastatin  40 mg Oral Nightly    meropenem  500 mg Intravenous q12h     LABS: reviewed Hyperglycemia today and yesterday. Abnormal electrolytes  Recent Labs     03/02/24  0702 03/02/24  1858 03/03/24  0437 03/04/24  0501   *  119*  --  354*  354* 208*  208*   BUN 22  22  --  21  21 23  23   CREATSERUM 1.17*  1.17*  --  1.37*  1.37* 1.61*   1.61*   CA 6.4*  6.4*  --  5.9*  5.9* 5.8*  5.8*   MG 1.9  --  1.8 2.1     144  --  134*  134* 134*  134*   K 5.6*  5.6* 5.3* 5.2*  5.2* 5.4*  5.4*   *  117*  --  109  109 113*  113*   CO2 22.0  22.0  --  20.0*  20.0* 17.0*  17.0*   PHOS 4.8  --  5.0 5.4*   OSMOCALC 302*  302*  --  295  295 288  288     NUTRITION RELATED PHYSICAL FINDINGS:  - Nutrition Focused Physical Exam (NFPE): well nourished per visual exam  2/22/24: Right AKA  - Fluid Accumulation: non-pitting Left Foot , Right Hand (s), and generalized and +1 Left Lower extremity and perineal  see RN documentation for details  - Skin Integrity:  Right AKA, friction/shear to left buttock, stage 1 PI to left heel, stage or skin tear to lower back, blister to left leg, large deep open wound to right thigh up to groin s/p debridement.   Wound vac in place to right groin wound see RN documentation for details     ANTHROPOMETRICS:  HT: 162.6 cm (5' 4.02\")  WT: 80.8 kg (178 lb 2.1 oz)  - wt loss s/p right AKA  Last Visit Wt: 189# 2 oz on 2/19/24  Admit Wt: 171# 1 oz on 2/10/24  Adjusted BMI: Body mass index is 33.7 kg/m². S/p right AKA  BMI CLASSIFICATION: 30-34.9 kg/m2 - obesity class I  Adjusted IBW: 107 lbs s/p right AKA       164% IBW  Usual Body Wt: 158 lbs 1/24/24       111% UBW     WEIGHT HISTORY:  Patient Weight(s) for the past 336 hrs:   Weight   03/04/24 0600 80.8 kg (178 lb 2.1 oz)   03/01/24 0451 76.6 kg (168 lb 14 oz)   02/29/24 0315 74 kg (163 lb 2.3 oz)   02/28/24 0456 74 kg (163 lb 3.2 oz)   02/27/24 0547 75.6 kg (166 lb 11.2 oz)   02/26/24 0606 79.7 kg (175 lb 11.2 oz)   02/25/24 0525 76 kg (167 lb 9.6 oz)   02/24/24 0618 79 kg (174 lb 1.6 oz)   02/20/24 0554 81.7 kg (180 lb 3.2 oz)     Wt Readings from Last 10 Encounters:   03/04/24 80.8 kg (178 lb 2.1 oz)   01/27/24 74.5 kg (164 lb 3.2 oz)   08/29/23 69.4 kg (153 lb)   08/21/23 72.6 kg (160 lb)   07/28/23 71.7 kg (158 lb)   07/15/23 71.7 kg (158 lb)   07/07/23 71.2 kg  (157 lb)   06/26/23 75.8 kg (167 lb)   06/20/23 77.1 kg (170 lb)   06/06/23 80.3 kg (177 lb)     NUTRITION DIAGNOSIS/PROBLEM:   Inadequate oral intake related to Decreased ability to consume sufficient energy in the setting of intubation/OG in place as evidenced by 0 nutrition intake. (Intervention now TF start)     NUTRITION DIAGNOSIS PROGRESS:  No Improvement (continue)- poor PO intakes during admission. No intake x 3 days with no active diet. Has not been metabolically stable for EN. Made comfort today.     NUTRITION INTERVENTION:   NUTRITION PRESCRIPTION:   Estimated Nutrition needs: --dosing wt of 72 kg - wt taken on 8/21/23 (estimated UBW/dry wt)   Calories: 1468-4793 calories/day (24-25 calories per kg Dosing wt)   Protein: 73-97 g protein/day (1.5-2.0 g protein/kg  Adjusted Ideal Body Weight 48.6)  Fluid Needs: 30 ml/kg dosing wt= 8161-3337 ml (maintenance) Adjust per clinical status.     - Diet:   No orders of the defined types were placed in this encounter.     - Medical Food Supplements- NPO  - Vitamin and mineral supplements: none  - Feeding assistance: meal set up and feed. NPO at this time  - Nutrition education: not appropriate at this time   - Coordination of nutrition care: collaboration with other providers  - Discharge and transfer of nutrition care to new setting or provider: monitor plans    MONITOR AND EVALUATE/NUTRITION GOALS:  - Food and Nutrient Intake:      Monitor: for PO initiation  - Food and Nutrient Administration:      Monitor:  monitor GOC  - Anthropometric Measurement:    Monitor weight  - Nutrition Goals:    halt wt loss, diet initiation vs nutrition support within 24-48 hrs, labs within acceptable limits, euglycemia, support wound healing, and monitor fluid status       DIETITIAN FOLLOW UP: RD to follow and monitor nutrition status      Federica Sellers RD, LDN  Clinical Dietitian  P: [phone]

## 2024-03-04 NOTE — PROGRESS NOTES
Ava oBwen Patient Status:  Inpatient    10/6/1955 MRN [id number]   Location [facility] 2W/SW Attending Yenifer Diana DO   Hosp Day # 23 PCP Ernesto De Dios     Critical Care Progress Note      Assessment/Plan:  Septic Shock - due to postoperative polymicrobial wound infection and now c/b enterococcal bacteremia. Doubt pneumonia.  Urine culture negative.    - ID following; continue meropenem +  linezolid  - wean pressors as tolerated  - Vascular surgery following for wound; see below  Acute hypoxemic  respiratory failure - due to pulmonary edema, COPD, and atelectasis. - BIPAP as needed  - wean O2 as able  PAD s/p RLE fem-pop bypass s/b wound dehiscence and postoperative wound infection  - ID following and managing antibiotics   - vascular surgery following  - holding on further surgery until palliative care meets with patient/family  COPD - no evidence of exacerbation  - bronchodilator protocol  Acute renal failure with hyperkalemia - multifactorial from sepsis and hypotension, worsened by rising CK levels and rhabdo  - per nephrology  - Cont lasix  - Start IVF per nephrology for rhabdo and high CK levels  - RRT only if patient is to proceed with definitive surgical debridement & washout  Rhabdomyolysis  - suspect this is from tissue necrosis but with previous daptomycin use, possible CK elevation from this  - ID changed daptomycin --> linezolid (3/3/24)  - CK pending today, if not downtrending needs definitive surgical management  DM  - Endocrinology following  Left leg paresis  - CT head negative for acute stroke  FEN  - NPO  Ppx  - lovenox  Advanced directives -  -full code  -palliative being consulted    Dispo - critically ill and at risk for worsening   -will follow       Critical Care Time: 45 minutes    DO Yudith CastilloCount includes the [facility]  Pulmonary & Critical Care Medicine      Subjective:  Patient remains on bipap and high pressor requirements overnight. No definitive plans for  surgical debridement/washout today.  Still very lethargic.    Objective:    Medications:   vancomycin  125 mg Per NG Tube Daily    insulin aspart  1-5 Units Subcutaneous q6h    linezolid  600 mg Intravenous Q12H    enoxaparin  40 mg Subcutaneous Daily    [Held by provider] gabapentin  100 mg Oral TID    cholecalciferol  2,000 Units Oral Daily    calcitriol  0.25 mcg Oral Q MWF    pantoprazole  40 mg Oral QAM AC    [Held by provider] carvedilol  25 mg Oral BID    [Held by provider] hydrALAZINE  100 mg Oral Q8H    fluconazole  200 mg Oral Daily    sodium hypochlorite   Topical 2 times per day    fluticasone furoate-vilanterol  1 puff Inhalation Daily    [Held by provider] rosuvastatin  40 mg Oral Nightly    meropenem  500 mg Intravenous q12h       FiO2 (%):  [50 %] 50 %    Intake/Output Summary (Last 24 hours) at 3/4/2024 0745  Last data filed at 3/4/2024 0600  Gross per 24 hour   Intake 3336.8 ml   Output 550 ml   Net 2786.8 ml       BP (!) 89/70 (BP Location: Right arm)   Pulse 84   Temp 96.6 °F (35.9 °C) (Temporal)   Resp 24   Ht 5' 4.02\" (1.626 m)   Wt 178 lb 2.1 oz (80.8 kg)   LMP  (LMP Unknown)   SpO2 93%   BMI 30.56 kg/m²   Physical Exam:   General: bipap, lethargic   HEENT: lips, mucosa, tongue normal. Mallampati 3   Lungs: clear b/l   Chest wall: No tenderness or deformity.   Heart: Regular rate and rhythm, normal S1S2, no murmur.   Abdomen: soft, non-tender, non-distended, positive BS.   Extremity: No clubbing or cyanosis, wound dressing LLE   Skin: No rashes or lesions.    Recent Labs   Lab 03/02/24  0702 03/03/24  0437 03/04/24  0501   RBC 2.67*  2.67*   < > 2.79*   HGB 7.7*  7.7*   < > 8.2*   HCT 25.1*  25.1*   < > 26.4*   MCV 94.0  94.0   < > 94.6   MCH 28.8  28.8   < > 29.4   MCHC 30.7*  30.7*   < > 31.1   RDW 17.5*  17.5*   < > 17.7*   NEPRELIM 16.96*  --   --    WBC 18.2*  18.2*   < > 20.6*   .0  191.0   < > 212.0    < > = values in this interval not displayed.     Recent Labs    Lab 03/02/24  0702 03/02/24  1858 03/03/24  0437 03/04/24  0501   *  119*  --  354*  354* 208*  208*   BUN 22  22  --  21  21 23  23   CREATSERUM 1.17*  1.17*  --  1.37*  1.37* 1.61*  1.61*   CA 6.4*  6.4*  --  5.9*  5.9* 5.8*  5.8*   ALB 1.7*  --  1.5* 1.5*     144  --  134*  134* 134*  134*   K 5.6*  5.6* 5.3* 5.2*  5.2* 5.4*  5.4*   *  117*  --  109  109 113*  113*   CO2 22.0  22.0  --  20.0*  20.0* 17.0*  17.0*   ALKPHO 1,532*  --   --   --    *  --   --   --    ALT 76*  --   --   --    BILT 0.3  --   --   --    TP 4.5*  --   --   --      Recent Labs   Lab 02/26/24  2119   ABGPHT 7.41   OYXLLX3V 40   AQOYR6C 54*   ABGHCO3 25.4   SITE Right Radial     No results for input(s): \"BNP\" in the last 168 hours.  Recent Labs   Lab 03/01/24  0658 03/02/24  0702 03/03/24  0437   CK 3,736* 6,440* 11,900*       Imaging: I independently visualized all relevant chest imaging in PACS, agree with radiology interpretation except where noted.

## 2024-03-04 NOTE — CONSULTS
[facility]  part of [facility]  Palliative Care Initial Consult Note    Ava Bowen Patient Status:  Inpatient    10/6/1955 MRN [id number]   Location [facility] 2W/SW Attending Yenifer Diana, DO   Hosp Day # 23 PCP Ernesto De Dios     Date of Consult: 3/4/2024  Patient seen at: [facility] Inpatient    The  Cures Act makes medical notes like these available to patients in the interest of transparency. Please be advised this is a medical document. Medical documents are intended to carry relevant information, facts as evident, and the clinical opinion of the practitioner. The medical note is intended as peer to peer communication and may appear blunt or direct. It is written in medical language and may contain abbreviations or verbiage that are unfamiliar.     Reason for Consultation: Consult ordered by:: Dr. Foote for evaluation of Palliative Care needs and goals of care discussion.    Subjective     History of Present Illness: Ava Bowen is a 68 year old female with history of CKD, COPD, DM, HTN, HLD, PVD and sickle cell anemia who was recently admitted for right lower extremity femoral/popliteal bypass patient presented on 2/10/2024 with bleeding from the surgical site and hypotension. Work up in our hospital revealed hemorrhage from recent surgical site, acute blood loss anemia, hypotension, PAD, s/p recent fem pop bypass 24 with Dr. Murrieta. Hospitalization has been complicated by Polymicrobial right limb infection, S/P multiple revision surgeries and amputation on right AKA 24, sepsis with respiratory failure.     History was obtained from Epic and pts family.      This is the second hospitalization in the past 2 months.    Today is day #23 of hospitalization.     When I entered the room, the patient was in the bed she nods her head yes and no, she is on Bipap for her respiratory failure, maxed out on 2 pressors for hypotension. She appears very  ill.      Palliative Care symptom needs assessed:     ROS limited due to pt is in respiratory failure with sepsis and on Bipap    Dyspnea: +dyspnea  Current O2 therapy: Bipap  Pain Present: nods head no to question of pain  Non-verbal signs of pain present: NO  Appetite: NPO    Palliative Care Social History:   Marital Status:   Children: 2 children a dtr and a son  Living Situation Prior to Admit: pt lives with her son Juancarlos and sister Xuan  Is Patient Confused: ANGELO  Occupational History: retired  from [employer]    Substance History:  Smoking history: former smoker quit 5 yrs ago  Hx of Substance Use/Abuse: No    Spiritual Assessment:   [employer] [place] Not Listed  spiritual care following    Past Medical History/Past Surgical History: obtained from [facility] [employer]  Past Medical History:   Diagnosis Date    Anemia     Back pain     Back problem     Cataract     Chronic kidney disease (CKD)     COPD (chronic obstructive pulmonary disease) (HCC)     FEV 1 1.25 50%     Diabetes (HCC)     DM neuropathy    Essential hypertension     H/O angioplasty     2020    High blood pressure     High cholesterol     Neuropathy     Peripheral vascular disease (HCC)     PNA (pneumonia) 2018    Pulmonary emphysema (HCC)     Pulmonary nodule     4 mm RUL    Renal disorder     monitoring kidney labs    Sickle cell trait (HCC)     Sickle-cell anemia (HCC)     Tobacco abuse     Visual impairment      Past Surgical History:   Procedure Laterality Date    APPENDECTOMY      BACK SURGERY Right 2021    Right L3-4 extreme lateral interbody fusion, posterior decompression; LUMBAR LAMINECTOMY 1 LEVEL          x3    CHOLECYSTECTOMY      EXCIS LUMBAR DISK,ONE LEVEL      2015    OTHER      bilateral leg stents  and        Nutritional Status:  BMI:30 Weight: 178lbs  Weight changes: ANGELO  Current Appetite: NPO  Dysphagia: ANGELO    Family History: obtained from [employer]  Family History   Problem Relation Age of Onset     Diabetes Mother         Passed from DM complications    Diabetes Sister         Had kidney transplant due to complications of DM    Kidney Disease Sister         Kidney failure secondary to diabetes; received kidney transplant in 2004    Glaucoma Sister     Diabetes Brother     Sickle Cell Son         Cause of death    Macular degeneration Neg        Allergies:  Allergies   Allergen Reactions    Penicillins HIVES and ANGIOEDEMA     Hives on eyelids (occurred when pt was in her 20s). Tolerated amoxicillin per chart. Tolerates cephalosporins.       Medications:     Current Facility-Administered Medications:     insulin degludec 100 units/mL flextouch 5 Units, 5 Units, Subcutaneous, Daily    sodium zirconium cyclosilicate (Lokelma) oral packet 10 g, 10 g, Oral, Q8H    sodium chloride 0.9% infusion, , Intravenous, Continuous    vancomycin (Firvanq) 50 mg/mL oral solution 125 mg, 125 mg, Per NG Tube, Daily    insulin aspart (NovoLOG) 100 Units/mL FlexPen 1-5 Units, 1-5 Units, Subcutaneous, q6h    vasopressin (Vasostrict) 20 Units in sodium chloride 0.9% 100 mL infusion for septic shock, 0.01-0.03 Units/min, Intravenous, Continuous    linezolid (Zyvox) 600 mg/300mL IVPB premix 600 mg, 600 mg, Intravenous, Q12H    norepinephrine (Levophed) 32 mg in dextrose 5% 250 mL infusion, 0.5-30 mcg/min, Intravenous, Continuous    enoxaparin (Lovenox) 40 MG/0.4ML SUBQ injection 40 mg, 40 mg, Subcutaneous, Daily    [Held by provider] gabapentin (Neurontin) cap 100 mg, 100 mg, Oral, TID    cholecalciferol (VITAMIN D3) tab 2,000 Units, 2,000 Units, Oral, Daily    calcitriol (Rocaltrol) cap 0.25 mcg, 0.25 mcg, Oral, Q MWF    acetaminophen (Tylenol) tab 650 mg, 650 mg, Oral, Q4H PRN **OR** [DISCONTINUED] HYDROcodone-acetaminophen (Norco) 5-325 MG per tab 1 tablet, 1 tablet, Oral, Q4H PRN **OR** [DISCONTINUED] HYDROcodone-acetaminophen (Norco) 5-325 MG per tab 2 tablet, 2 tablet, Oral, Q4H PRN    HYDROmorphone (Dilaudid) 1 MG/ML  injection 1 mg, 1 mg, Intravenous, Q3H PRN    pantoprazole (Protonix) DR tab 40 mg, 40 mg, Oral, QAM AC    [Held by provider] carvedilol (Coreg) tab 25 mg, 25 mg, Oral, BID    [Held by provider] hydrALAZINE (Apresoline) tab 100 mg, 100 mg, Oral, Q8H    fluconazole (Diflucan) tab 200 mg, 200 mg, Oral, Daily    HYDROcodone-acetaminophen (Norco)  MG per tab 1 tablet, 1 tablet, Oral, Q4H PRN    HYDROcodone-acetaminophen (Norco)  MG per tab 2 tablet, 2 tablet, Oral, Q4H PRN    morphINE 20 mg/mL concentrated oral solution 5 mg, 5 mg, Oral, Q4H PRN    sodium hypochlorite (Dakin's) 0.125 % external solution, , Topical, 2 times per day    hydrALAzine (Apresoline) 20 mg/mL injection 10 mg, 10 mg, Intravenous, Q4H PRN    ondansetron (Zofran) 4 MG/2ML injection 4 mg, 4 mg, Intravenous, Q6H PRN    metoclopramide (Reglan) 5 mg/mL injection 5 mg, 5 mg, Intravenous, Q8H PRN    albuterol (Ventolin HFA) 108 (90 Base) MCG/ACT inhaler 2 puff, 2 puff, Inhalation, Q6H PRN    fluticasone furoate-vilanterol (Breo Ellipta) 200-25 MCG/ACT inhaler 1 puff, 1 puff, Inhalation, Daily    ipratropium-albuterol (Duoneb) 0.5-2.5 (3) MG/3ML inhalation solution 3 mL, 3 mL, Nebulization, Q6H PRN    [Held by provider] rosuvastatin (Crestor) tab 40 mg, 40 mg, Oral, Nightly    glucose (Dex4) 15 GM/59ML oral liquid 15 g, 15 g, Oral, Q15 Min PRN **OR** glucose (Glutose) 40% oral gel 15 g, 15 g, Oral, Q15 Min PRN **OR** glucose-vitamin C (Dex-4) chewable tab 4 tablet, 4 tablet, Oral, Q15 Min PRN **OR** dextrose 50% injection 50 mL, 50 mL, Intravenous, Q15 Min PRN **OR** glucose (Dex4) 15 GM/59ML oral liquid 30 g, 30 g, Oral, Q15 Min PRN **OR** glucose (Glutose) 40% oral gel 30 g, 30 g, Oral, Q15 Min PRN **OR** glucose-vitamin C (Dex-4) chewable tab 8 tablet, 8 tablet, Oral, Q15 Min PRN    meropenem (Merrem) 500 mg in sodium chloride 0.9% 100 mL IVPB-MBP, 500 mg, Intravenous, q12h    Functional Status History:  Falls: no  ADLs: prior to  hospitalization pt was living with her son and sister Pat in a house, she used a walker or cane to ambulate, was cognitively intact and fairly independent with her adl's.     Palliative Performance Scale:   Prior to admission: (pt/family reported) 50 %  Observed during hospitalization: 10 %  % Ambulation Activity Level Self-Care Intake Consciousness   100 Full  Normal  No Disease Full Normal Full   90 Full  Normal  Some Disease Full Normal Full   80 Full  Normal w/effort  Some Disease Full Normal or reduced Full   70 Reduced  Can't Perform Job  Some Disease Full Normal or reduced Full   60 Reduced  Can't Perform Hobby   Significant Disease Occ Assist Normal or reduced Full or confused   50 Mainly sit/lie Can't do any work  Extensive Disease Partial Assist Normal or reduced Full or confused   40 Mainly in bed Can't do any work  Extensive Disease Mainly Assist Normal or reduced Full or confused   30 Bed Bound Can't do any work  Extensive Disease Max Assist  Total Care Reduced  Drowsy/confused   20 Bed Bound Can't do any work  Extensive Disease Max Assist  Total Care Minimal  Drowsy/confused   10 Bed Bound Can't do any work  Extensive Disease Max Assist  Total Care Mouth Care  Drowsy/confused   0 Death        Objective      Vital Signs:  Blood pressure 96/57, pulse 55, temperature 97.7 °F (36.5 °C), temperature source Temporal, resp. rate 22, height 5' 4.02\" (1.626 m), weight 178 lb 2.1 oz (80.8 kg), SpO2 92%, not currently breastfeeding.  Body mass index is 30.56 kg/m².  Present Level of pain: pt nods head no to experiencing pain  Non-verbal signs of pain present: No    General: pt is in the bed she nods her head yes and no unable to determine alertness as pt is lethargic and on Bipap, pt is on 2 pressors for hypotension.  HEENT: ANGELO pt is on Bipap, dobhoff in nare.  Cardiac: bradycardic regular rate and rythm.  Lungs: diminished throughout, + dyspnea.  Abdomen: Soft, non-tender, hypoactive bowel sounds, no rebound or  guarding  Extremities: right aka with wound vac present, left leg cool to touch.   Neurologic: lethargic, nods head yes and no to some questions ANGELO if alert and oriented.  Skin: Warm and dry.    Hematology:  Lab Results   Component Value Date    WBC 20.6 (H) 03/04/2024    HGB 8.2 (L) 03/04/2024    HCT 26.4 (L) 03/04/2024    .0 03/04/2024       Coags:  Lab Results   Component Value Date    INR 1.51 (H) 02/10/2024    PTT 30.3 02/10/2024       Chemistry:  Lab Results   Component Value Date    CREATSERUM 1.61 (H) 03/04/2024    CREATSERUM 1.61 (H) 03/04/2024    BUN 23 03/04/2024    BUN 23 03/04/2024     (L) 03/04/2024     (L) 03/04/2024    K 5.4 (H) 03/04/2024    K 5.4 (H) 03/04/2024     (H) 03/04/2024     (H) 03/04/2024    CO2 17.0 (L) 03/04/2024    CO2 17.0 (L) 03/04/2024     (H) 03/04/2024     (H) 03/04/2024    CA 5.8 (LL) 03/04/2024    CA 5.8 (LL) 03/04/2024    ALB 1.5 (L) 03/04/2024    ALKPHO 1,532 (H) 03/02/2024    BILT 0.3 03/02/2024    TP 4.5 (L) 03/02/2024     (H) 03/02/2024    ALT 76 (H) 03/02/2024    DDIMER 1.45 (H) 11/08/2019    MG 2.1 03/04/2024    PHOS 5.4 (H) 03/04/2024    TROP <0.045 11/08/2019       Imaging:  XR CHEST AP PORTABLE  (CPT=71045)    Result Date: 3/2/2024  PROCEDURE: XR CHEST AP PORTABLE  (CPT=71045) TIME: 1504  COMPARISON: [facility], CTA ABDOMEN/PELVIS LOWER EXT BILAT W RUNOFF (KOD=69695), 2/21/2024, 6:05 AM.  [facility], XR CHEST AP PORTABLE (CPT=71045), 3/01/2024, 3:25 PM.  INDICATIONS: Feeding tube placement  TECHNIQUE:   Single view.   Findings and impression:  Dobbhoff in the mid stomach  Moderate size left basilar opacity, significantly increased from prior    Dictated by (CST): Ronak Leonard MD on 3/02/2024 at 3:24 PM     Finalized by (CST): Ronak Leonard MD on 3/02/2024 at 3:25 PM             Summary of Discussion      I discussed reason for palliative care consultation with pts sister Cheryle, dtr  Tiffanie in family lounge and in pts room.    I differentiated the palliative treatment-focus model versus the hospice comfort-focused philosophy of care. I informed the patient/family that having palliative care support does not limit medical treatment options or decisions to those who wish to continue curative or restorative medical therapies. I discussed the benefits of palliative care to include assistance with arising symptom management needs, an extra layer of support, to ensure GOC are respected throughout healthcare continuum, and assist with transition to hospice care when appropriate.      Outpatient/Community Palliative Care Services:  Usually visit once per 4 weeks  Focus on GOC and symptom management   Palliative Care criteria:  Not altered by prognosis   Does not limit curative or restorative therapies      Outpatient Hospice services:  24/7 phone triage services   RN visit one or more times per week depending on need  Home health aid to assist in ADLs/hygiene   Hospice criteria:  Less than six-month prognosis   Must forego most life-prolonging  measures/treatments   Focus solely on comfort   Must sign onto hospice benefit with agency     Prognostic awareness/understanding:   Pts dtr Tiffanie discussed she has spoken with and has been updated by physicians in regards to her mothers sepsis, MSOF and overall poor prognosis. Tiffanie and Cheryle wish at this time to focus on Ava's comfort. Discussed meeting with the hospice team, Tiffanie stated she would like her mother to be comfort measures only and does not wish to speak with hospice team at this time. Tiffanie asked for Bipap to be removed so her children can come in and see Ava without being frightened by medical equipment. I discussed use of morphine for comfort and dyspnea symptoms and will apply 02 NC for comfort. Tiffanie and Cheryle were agreeable to this. We discussed turning off the pressors and family are in agreement to full comfort measures.    Inquired of the family when they would want to initiate comfort measures, dtr Tiffanie stated she would like to initiate comfort measures today.     We discussed current clinical condition and overall prognosis per clinical team.     I provided emotional support to the pt and family who are coping as best they can at this difficult time.     Advance Care Planning counseling and discussion:   I confirmed that patient's HC surrogate is pts dtr Tiffanie Bowen.   Tiffanie and the family expressed wishes for DNAR/comfort measures at this time.      Assessment and Recommendations    -Acute hypoxemic respiratory failure-due to pulmonary edema, COPD and atelectasis  -Severe Sepsis with Septic shock secondary to VRE bacteremia   -Polymicrobial right limb infection, S/P multiple revision surgeries and amputation on right AKA   -Severe PAD sp RLE fem-pop bypass last week c/b bleeding fm surg site fm wound dehiscence  - s/p recent fem pop bypass 1/24/24 with Dr. Murrieta  - Hg down to 6.0, s/p 3 units pRBC 2/10- watching close now, hgb stable currently no signs of bleeding   - vascular surgery consulted, s/p emergent OR 2/10, removal of infected graft, s/p debridement, bone patch angioplasty   - sp further debridement 2/13 and placement of wound vac  - back to OR 2/16   Some coolness and decreased pulse on left leg , CTA with occluded stent in femoral artery, discussed with vascular no further intervention at this time, left heel with dark discoloration, decreased doppler pulses on left foot - s/p right AKA  2/22/24  -Acute renal failure on CKD  -h/o sickle cell anemia    Goals of care counseling  -see above for details  -Pt is DNAR/Comfort measures only  -Agreeable to palliative care following  -Dispo: inpt death is expected  -family did not wish for hospice, they wish for PC to manage comfort care at this time.   - for prayer and spiritual support  -comfort care order set entered  -Provided emotional support to pt/family  who are coping as best they can at this difficult time.     Advance care planning  -see above for details  -Pt's dtr Tiffanie Bowen is HC surrogate [phone], second contact is pts sister Sheeba Bowen [phone]    Palliative Performance Scale 10%    Discussed today's visit with Dr. Diana, Dr. Justice, Iva RN and Domi RN    Palliative Care Follow Up: Palliative care team will follow for comfort focused care management.  Feel free to contact our team with any questions or concerns.    Thank you for allowing Palliative Care services to participate in the care of Ava Bowen.    A total of 55 minutes were spent on this consult, which included all of the following: chart review, direct face to face contact, history taking, physical examination, counseling and coordinating care, and documentation.     Chiquita Perez, APRN [id number]  3/4/2024  2:39PM  Palliative Care Services

## 2024-03-04 NOTE — SPIRITUAL CARE NOTE
Spiritual Care Visit Note    Patient Name: Ava Bowen Date of Spiritual Care Visit: 24   : 10/6/1955 Primary Dx: Bypass graft mechanical complication, initial encounter ([facility])       Referred By: Referral From: Nurse    Spiritual Care Taxonomy:    Intended Effects: Demonstrate caring and concern    Methods: Demonstrate acceptance;Offer support    Interventions: Acknowledge current situation;Active listening;Brielle    Visit Type/Summary:    Writer met with family at bedside after pt death. Family was grieving and shared about the pt. Once all family was bedside, family requested prayer which writer provided. Writer gave family bereavement information and explained materials.     Spiritual Care support can be requested via an Epic consult. For urgent/immediate needs, please contact the On Call  at: [place]: ext [phone]    Bianka Mckeon MA, MA   Chaplain Resident  [facility], Spiritual Care Department   On-Call  via EPIC consult

## 2024-03-04 NOTE — PROGRESS NOTES
03/04/24 0800   VISIT TYPE   SLP Inpatient Visit Type (Documentation Required) Attempted Treatment   FOLLOW UP/PLAN   Follow Up Needed (Documentation Required) Yes   SLP Follow-up Date 03/05/24     SLP attempt this AM. Pt currently NPO with NG in place and on continuous Bipap. SLP to follow up as respiratory status improves and as pt appropriate.    Thank you.    Trina Brandt M.S. CCC-SLP  Speech Language Pathologist  Phone Number Ext. [phone]

## 2024-03-04 NOTE — WOUND PROGRESS NOTE
Attempted to change wound vac dressing, spoke with JESSIE Enrique and was informed patient is on comfort care. Dr. Murrieta notified, ordered to keep the right groin wound vac on until further notice.

## 2024-03-04 NOTE — PLAN OF CARE
Patients family decided to go with comfort care. Family requested Bipap to be removed and pressers turned off. Patient  1514 with family at bedside. Two RN's pronounced no spontaneous respirations, no carotid pulse or heartbeat. Care team notified.      Problem: PAIN - ADULT  Goal: Verbalizes/displays adequate comfort level or patient's stated pain goal  Description: INTERVENTIONS:  - Encourage pt to monitor pain and request assistance  - Assess pain using appropriate pain scale  - Administer analgesics based on type and severity of pain and evaluate response  - Implement non-pharmacological measures as appropriate and evaluate response  - Consider cultural and social influences on pain and pain management  - Manage/alleviate anxiety  - Utilize distraction and/or relaxation techniques  - Monitor for opioid side effects  - Notify MD/LIP if interventions unsuccessful or patient reports new pain  - Anticipate increased pain with activity and pre-medicate as appropriate  3/4/2024 1557 by Domi Beltran, RN  Outcome: Completed       Problem: SAFETY ADULT - FALL  Goal: Free from fall injury  Description: INTERVENTIONS:  - Assess pt frequently for physical needs  - Identify cognitive and physical deficits and behaviors that affect risk of falls.  - [place] fall precautions as indicated by assessment.  - Educate pt/family on patient safety including physical limitations  - Instruct pt to call for assistance with activity based on assessment  - Modify environment to reduce risk of injury  - Provide assistive devices as appropriate  - Consider OT/PT consult to assist with strengthening/mobility  - Encourage toileting schedule  3/4/2024 0417 by Domi Beltran, RN  Outcome: Completed    Problem: SKIN/TISSUE INTEGRITY - ADULT  Goal: Skin integrity remains intact  Description: INTERVENTIONS  - Assess and document risk factors for pressure ulcer development  - Assess and document skin integrity  - Monitor for areas of  redness and/or skin breakdown  - Initiate interventions, skin care algorithm/standards of care as needed  3/4/2024 1557 by Domi Beltran RN  Outcome: Completed    Goal: Incision(s), wounds(s) or drain site(s) healing without S/S of infection  Description: INTERVENTIONS:  - Assess and document risk factors for pressure ulcer development  - Assess and document skin integrity  - Assess and document dressing/incision, wound bed, drain sites and surrounding tissue  - Implement wound care per orders  - Initiate isolation precautions as appropriate  - Initiate Pressure Ulcer prevention bundle as indicated  3/4/2024 1557 by Domi Beltran RN  Outcome: Completed  3/4/2024 0914 by Domi Beltran RN  Outcome: Progressing  Goal: Oral mucous membranes remain intact  Description: INTERVENTIONS  - Assess oral mucosa and hygiene practices  - Implement preventative oral hygiene regimen  - Implement oral medicated treatments as ordered  3/4/2024 1557 by Domi Beltran RN  Outcome: Completed    Problem: RESPIRATORY - ADULT  Goal: Achieves optimal ventilation and oxygenation  Description: INTERVENTIONS:  - Assess for changes in respiratory status  - Assess for changes in mentation and behavior  - Position to facilitate oxygenation and minimize respiratory effort  - Oxygen supplementation based on oxygen saturation or ABGs  - Provide Smoking Cessation handout, if applicable  - Encourage broncho-pulmonary hygiene including cough, deep breathe, Incentive Spirometry  - Assess the need for suctioning and perform as needed  - Assess and instruct to report SOB or any respiratory difficulty  - Respiratory Therapy support as indicated  - Manage/alleviate anxiety  - Monitor for signs/symptoms of CO2 retention  3/4/2024 1557 by Domi Beltran RN  Outcome: Completed

## 2024-03-04 NOTE — PROGRESS NOTES
Vascular Surgery Progress Note    Patient continues to decline overnight. More alert this AM but now maxed on norepi and vasopressin. Large sacral decubitus ulcer noted this AM with nursing care.   Will cancel OR for this afternoon given continuous decline   Palliative meeting today     Madiha Murrieta MD  03/04/24  8:17 AM

## 2024-03-04 NOTE — DISCHARGE SUMMARY
General Medicine Discharge Summary     Patient ID:  Ava Bowen  68 year old  10/6/1955    Admit date: 2/10/2024    Discharge date and time: 3/4/23    Attending Physician: Yenifer Diana DO     Primary Care Physician: Ernesto De Dios     Discharge Diagnoses:  Severe Sepsis with Septic shock secondary to VRE bacteremia   Seems to be from VRE bacteremia  Metabolic encephalopathy, from infection and shock   Known polymicrobial right limb infection, S/P multiple revision surgeries and amputation on right AKA   Severe PAD sp RLE fem-pop bypass last week c/b bleeding from surgical site from wound dehiscence  OMEGA on CKD  Acute blood loss anemia from surgical site   Chronic anemia  Sickle cell anemia  DM2    Discharge Condition:        Hospital Course:    Ms. Bowen is a 68 year old female with PMH sig for CKD, COPD, DM, HTN, HLD, PVD, and sickle cell anemia who was recently admitted for right lower extremity femoral to popliteal bypass who presented with bleeding from surgical site. Taken for emergent OR on 2/10/24, noted to be grossly infected, started on vancomycin/meropenem. Bcx with Klebsiella. Polymicrobial infection, now with septic shock. S/P AKA on right on 24.      Severe Sepsis with Septic shock secondary to VRE bacteremia   Seems to be from VRE bacteremia  Metabolic encephalopathy, from infection and shock   Known polymicrobial right limb infection, S/P multiple revision surgeries and amputation on right AKA     Severe PAD sp RLE fem-pop bypass last week c/b bleeding from surgical site from wound dehiscence  - s/p recent fem pop bypass 24 with Dr. Murrieta  - vascular surgery consulted, s/p emergent OR 2/10, removal of infected graft, s/p debridement, bone patch angioplasty   - s/p further debridement  and placement of wound vac  - back to OR    - s/p right AKA  24     OMEGA on CKD  -worsening renal failure  -poor candidate  for dialysis, hemodynamically not stable     Acute blood loss anemia from surgical site   Chronic anemia  Sickle cell anemia    DM2, hyperglycemia now that on tube feeds    Goals of care discussion, advanced care planning   -after further goals of care discussions, and palliative care conference, comfort care was initiated due to her poor prognosis on 3/4/24      Consults: IP CONSULT TO VASCULAR SURGERY  IP CONSULT TO VASCULAR SURGERY  IP CONSULT TO PULMONOLOGY  IP CONSULT TO ENDOCRINOLOGY  [facility] CONSULT TO DIABETES EDUCATION  IP CONSULT TO PHARMACY  IP CONSULT TO HOSPITALIST  IP CONSULT TO RESPIRATORY CARE  IP CONSULT TO PULMONOLOGY  CONSULT TO WOUND OSTOMY  IP CONSULT TO NEPHROLOGY  IP CONSULT TO INFECTIOUS DISEASE  IP CONSULT TO CARDIOLOGY  CONSULT TO WOUND OSTOMY  IP CONSULT TO FOOD AND NUTRITION SERVICES  CONSULT TO WOUND OSTOMY  IP CONSULT TO VASCULAR ACCESS TEAM  IP CONSULT TO RESPIRATORY CARE  IP CONSULT TO SPIRITUAL CARE  IP CONSULT TO RESPIRATORY CARE  CONSULT TO WOUND OSTOMY  IP CONSULT TO VASCULAR ACCESS TEAM  IP CONSULT TO VASCULAR ACCESS TEAM  IP CONSULT TO PULMONOLOGY  IP CONSULT TO FOOD AND NUTRITION SERVICES  IP CONSULT TO SPIRITUAL CARE    Operative Procedures: Procedure(s) (LRB):  Right leg debridement with VAC placement (Right)     Code Status: DNAR/Comfort Care    Total time coordinating care for discharge: Less than 30 minutes    Yenifer Diana DO

## 2024-03-05 LAB
CYSTATIN C: 2.96 MG/L
EGFR: 17 ML/MIN/1.73

## 2024-03-06 NOTE — PAYOR COMM NOTE
--------------  DISCHARGE REVIEW    Payor: [employer]  Subscriber #:  [id number]  Authorization Number: [id number]    Admit date: 2/10/24  Admit time:   9:20 PM  Discharge Date: 3/4/2024  5:32 PM     Admitting Physician: Edy Foote DO  Attending Physician:  Tia att. providers found  Primary Care Physician: Ernesto De Dios          Discharge Summary Notes        Discharge Summary signed by Yenifer Diana DO at 3/5/2024 12:40 PM       Author: Yenifer Diana DO Specialty: HOSPITALIST Author Type: Physician    Filed: 3/5/2024 12:40 PM Date of Service: 3/4/2024  3:20 PM Status: Signed    : Yenifer Diana DO (Physician)                                                              General Medicine Discharge Summary     Patient ID:  Ava Bowen  68 year old  10/6/1955    Admit date: 2/10/2024    Discharge date and time: 3/4/23    Attending Physician: Yenifer Diana DO     Primary Care Physician: Ernesto De Dios     Discharge Diagnoses:  Severe Sepsis with Septic shock secondary to VRE bacteremia   Seems to be from VRE bacteremia  Metabolic encephalopathy, from infection and shock   Known polymicrobial right limb infection, S/P multiple revision surgeries and amputation on right AKA   Severe PAD sp RLE fem-pop bypass last week c/b bleeding from surgical site from wound dehiscence  OMEGA on CKD  Acute blood loss anemia from surgical site   Chronic anemia  Sickle cell anemia  DM2    Discharge Condition:        Hospital Course:    Ms. Bowen is a 68 year old female with PMH sig for CKD, COPD, DM, HTN, HLD, PVD, and sickle cell anemia who was recently admitted for right lower extremity femoral to popliteal bypass who presented with bleeding from surgical site. Taken for emergent OR on 2/10/24, noted to be grossly infected, started on vancomycin/meropenem. Bcx with Klebsiella. Polymicrobial infection, now with septic shock. S/P AKA on right on 24.      Severe Sepsis with Septic shock secondary to  VRE bacteremia   Seems to be from VRE bacteremia  Metabolic encephalopathy, from infection and shock   Known polymicrobial right limb infection, S/P multiple revision surgeries and amputation on right AKA     Severe PAD sp RLE fem-pop bypass last week c/b bleeding from surgical site from wound dehiscence  - s/p recent fem pop bypass 1/24/24 with Dr. Murrieta  - vascular surgery consulted, s/p emergent OR 2/10, removal of infected graft, s/p debridement, bone patch angioplasty   - s/p further debridement 2/13 and placement of wound vac  - back to OR 2/16   - s/p right AKA  2/22/24     OMEGA on CKD  -worsening renal failure  -poor candidate for dialysis, hemodynamically not stable     Acute blood loss anemia from surgical site   Chronic anemia  Sickle cell anemia    DM2, hyperglycemia now that on tube feeds    Goals of care discussion, advanced care planning   -after further goals of care discussions, and palliative care conference, comfort care was initiated due to her poor prognosis on 3/4/24      Consults: IP CONSULT TO VASCULAR SURGERY  IP CONSULT TO VASCULAR SURGERY  IP CONSULT TO PULMONOLOGY  IP CONSULT TO ENDOCRINOLOGY  [facility] CONSULT TO DIABETES EDUCATION  IP CONSULT TO PHARMACY  IP CONSULT TO HOSPITALIST  IP CONSULT TO RESPIRATORY CARE  IP CONSULT TO PULMONOLOGY  CONSULT TO WOUND OSTOMY  IP CONSULT TO NEPHROLOGY  IP CONSULT TO INFECTIOUS DISEASE  IP CONSULT TO CARDIOLOGY  CONSULT TO WOUND OSTOMY  IP CONSULT TO FOOD AND NUTRITION SERVICES  CONSULT TO WOUND OSTOMY  IP CONSULT TO VASCULAR ACCESS TEAM  IP CONSULT TO RESPIRATORY CARE  IP CONSULT TO SPIRITUAL CARE  IP CONSULT TO RESPIRATORY CARE  CONSULT TO WOUND OSTOMY  IP CONSULT TO VASCULAR ACCESS TEAM  IP CONSULT TO VASCULAR ACCESS TEAM  IP CONSULT TO PULMONOLOGY  IP CONSULT TO FOOD AND NUTRITION SERVICES  IP CONSULT TO SPIRITUAL CARE    Operative Procedures: Procedure(s) (LRB):  Right leg debridement with VAC placement (Right)     Code Status: DNAR/Comfort  Care    Total time coordinating care for discharge: Less than 30 minutes    Yenifer Diana DO      Electronically signed by Yenifer Diana DO on 3/5/2024 12:40 PM         REVIEWER COMMENTS

## 2024-03-12 NOTE — LETTER
10/22/2020          To Whom It May Concern:    Aimee Primrose is currently under my medical care . She is ok to go back to work on 10/27/2020        If you require additional information please contact our office.         Sincerely,    Mamadou Wallace MD
English

## 2024-04-10 RX ORDER — CARVEDILOL 12.5 MG/1
25 TABLET ORAL DAILY
Qty: 180 TABLET | Refills: 1 | OUTPATIENT
Start: 2024-04-10

## 2024-04-30 NOTE — PLAN OF CARE
Problem: PAIN - ADULT  Goal: Verbalizes/displays adequate comfort level or patient's stated pain goal  Description: INTERVENTIONS:  - Encourage pt to monitor pain and request assistance  - Assess pain using appropriate pain scale  - Administer analgesics based on type and severity of pain and evaluate response  - Implement non-pharmacological measures as appropriate and evaluate response  - Consider cultural and social influences on pain and pain management  - Manage/alleviate anxiety  - Utilize distraction and/or relaxation techniques  - Monitor for opioid side effects  - Notify MD/LIP if interventions unsuccessful or patient reports new pain  - Anticipate increased pain with activity and pre-medicate as appropriate  Outcome: Progressing     Problem: SAFETY ADULT - FALL  Goal: Free from fall injury  Description: INTERVENTIONS:  - Assess pt frequently for physical needs  - Identify cognitive and physical deficits and behaviors that affect risk of falls.  - [place] fall precautions as indicated by assessment.  - Educate pt/family on patient safety including physical limitations  - Instruct pt to call for assistance with activity based on assessment  - Modify environment to reduce risk of injury  - Provide assistive devices as appropriate  - Consider OT/PT consult to assist with strengthening/mobility  - Encourage toileting schedule  2/28/2024 1504 by Arline Geiger, RN  Outcome: Progressing  2/28/2024 1503 by Arline Geiger, RN  Outcome: Progressing     Problem: SKIN/TISSUE INTEGRITY - ADULT  Goal: Skin integrity remains intact  Description: INTERVENTIONS  - Assess and document risk factors for pressure ulcer development  - Assess and document skin integrity  - Monitor for areas of redness and/or skin breakdown  - Initiate interventions, skin care algorithm/standards of care as needed  Outcome: Not Progressing  Goal: Incision(s), wounds(s) or drain site(s) healing without S/S of  Monitor pain    infection  Description: INTERVENTIONS:  - Assess and document risk factors for pressure ulcer development  - Assess and document skin integrity  - Assess and document dressing/incision, wound bed, drain sites and surrounding tissue  - Implement wound care per orders  - Initiate isolation precautions as appropriate  - Initiate Pressure Ulcer prevention bundle as indicated  Outcome: Progressing  Goal: Oral mucous membranes remain intact  Description: INTERVENTIONS  - Assess oral mucosa and hygiene practices  - Implement preventative oral hygiene regimen  - Implement oral medicated treatments as ordered  Outcome: Progressing     Problem: RESPIRATORY - ADULT  Goal: Achieves optimal ventilation and oxygenation  Description: INTERVENTIONS:  - Assess for changes in respiratory status  - Assess for changes in mentation and behavior  - Position to facilitate oxygenation and minimize respiratory effort  - Oxygen supplementation based on oxygen saturation or ABGs  - Provide Smoking Cessation handout, if applicable  - Encourage broncho-pulmonary hygiene including cough, deep breathe, Incentive Spirometry  - Assess the need for suctioning and perform as needed  - Assess and instruct to report SOB or any respiratory difficulty  - Respiratory Therapy support as indicated  - Manage/alleviate anxiety  - Monitor for signs/symptoms of CO2 retention  Outcome: Progressing

## 2025-02-07 NOTE — TELEPHONE ENCOUNTER
Pharm asking for 90 supply of doxepin. Please advise on pended rx. [FreeTextEntry1] : continue wound care and off loading Spent 20 minutes for patient care and medical decision making.

## 2025-05-29 NOTE — PROGRESS NOTES
CGM Option Review    Start Time: 10:54am  End Time: 12:00pm    Indication: Pt is T2D followed by Dr Abdirizak Whiting. At last appointment AM 7/15/2023, it was noted pt was previously seen in hospital r/t hypoglycemia due to higher dose of Glipizide, Lantus, and Ozempic. Advised to resume Ozempic 1mg/weekly and hold Glipizide (already off Lantus). Appointment today to review BG and CGM options. A1C: 6.5% (7/15/2023)    Current Diabetes Medication:  Ozempic 1mg/weekly ()  (Stopped Glipizide 5mg BID 7/15/2023)    Blood Sugar Intake:   While off of Glipizide 400-500 mg/dl    Started taking Glipizide 5 mg BID on : 300-400 mg/dl  : 300-400 mg/dl  Today   Fastin mg/dl   *No low blood sugars    Insurance: Medicare    Reviewed the following:  Dexcom G7  - Out of pocket can range around $80 per month depending on coverage (can use [employer])  - Sensor/transmitter combined as one piece and is disposable after use  - Sensor is size of a quarter  - Can link to smart phone (if compatible) or   - Do not need to scan; BG goes automatically to phone or   - Change sensor every 10-10.5 days  - Alerts for high and low  - Recommended to wear on back of arm  - Small, one piece, easier application, and more accurate blood sugar readings than Dexcom G6     Plan:  - Since being off Glipizide and Ozempic just restarted, elevated blood sugars  - Blood sugars have improved but remain elevated since pt restarted Glipizide 5mg BID and Ozempic is further along in system  - Reviewed plan with Raman Palmer; advised to continue Glipizide 5mg BID  - Started Dexcom G7 with  as phone is not compatible with [employer] or [employer]    SN: [id number]  - Pt to return in 1 week for download  - Advised to reach out if noticing low blood sugars <90 mg/dl  - Target blood sugar under 200 mg/dl  - Refer to TE 2023 for Dexcom G7 sensor/ process    Follow Up:  2023 CDCES  2023  [address] No

## (undated) DIAGNOSIS — Z01.818 PRE-OP TESTING: Primary | ICD-10-CM

## (undated) DEVICE — GAMMEX® PI HYBRID SIZE 6, STERILE POWDER-FREE SURGICAL GLOVE, POLYISOPRENE AND NEOPRENE BLEND: Brand: GAMMEX

## (undated) DEVICE — SUT PERMA- 3-0 18IN SH NABSRB BLK CR 26MM

## (undated) DEVICE — SUT PERMA- 2-0 30IN SH NABSRB BLK L26MM 1/

## (undated) DEVICE — YANKAUER,FLEXIBLE HANDLE,REGLR CAPACITY: Brand: MEDLINE INDUSTRIES, INC.

## (undated) DEVICE — PACK,UNIVERSAL,NO GOWNS: Brand: MEDLINE

## (undated) DEVICE — SPONGE LAP W18XL18IN WHT COT 4 PLY FLD STRUNG

## (undated) DEVICE — PROXIMATE RH ROTATING HEAD SKIN STAPLERS (35 WIDE) CONTAINS 35 STAINLESS STEEL STAPLES: Brand: PROXIMATE

## (undated) DEVICE — GAMMEX® PI HYBRID SIZE 8, STERILE POWDER-FREE SURGICAL GLOVE, POLYISOPRENE AND NEOPRENE BLEND: Brand: GAMMEX

## (undated) DEVICE — SUT VCRL 3-0 27IN FS-1 ABSRB UD L24MM 3/8 CIR

## (undated) DEVICE — HANDLE SUR BLU PLAS LT FLX SLIP ON ST DISP

## (undated) DEVICE — FORCEPS BP 10.5IN ISCL 1MM

## (undated) DEVICE — INTENDED TO BE USED TO OCCLUDE, RETRACT AND IDENTIFY ARTERIES, VEINS, TENDONS AND NERVES IN SURGICAL PROCEDURES: Brand: STERION®  VESSEL LOOP

## (undated) DEVICE — PREMIERPRO LAP SPONGE 18"X18" STERILE, 5/PK: Brand: PREMIERPRO

## (undated) DEVICE — GOWN SURG AERO BLUE PERF LG

## (undated) DEVICE — EXOFIN TISSUE ADHESIVE 1.0ML

## (undated) DEVICE — BONE WAX W31G

## (undated) DEVICE — BANDAGE COHESIVE W4INXL5YD TAN E POR SLF ADH

## (undated) DEVICE — SUTURE PROL SZ 5-0 L24IN NONABSORB BLU

## (undated) DEVICE — SKIN PREP TRAY 4 COMPARTM TRAY: Brand: MEDLINE INDUSTRIES, INC.

## (undated) DEVICE — DRESSING NEG PRSS M 18X12.5X3.3CM SIL

## (undated) DEVICE — LAMINECTOMY: Brand: MEDLINE INDUSTRIES, INC.

## (undated) DEVICE — KIT ACCESS MAXCESS 4

## (undated) DEVICE — Device

## (undated) DEVICE — STERILE TETRA-FLEX CF, ELASTIC BANDAGE LATEX FREE 6IN X5.5 YD: Brand: TETRA-FLEX™CF

## (undated) DEVICE — CLIP INT USE SM TI LIG HORZ

## (undated) DEVICE — GAMMEX® PI HYBRID SIZE 6.5, STERILE POWDER-FREE SURGICAL GLOVE, POLYISOPRENE AND NEOPRENE BLEND: Brand: GAMMEX

## (undated) DEVICE — WRAP COOLING BACK W/NO PILLOW

## (undated) DEVICE — STERILE ULTRASOUND GEL, SAFEWRAP: Brand: ECOVUE

## (undated) DEVICE — SUT PROL 5-0 36IN C-1 NABSRB BLU 13MM 3/8 CIR

## (undated) DEVICE — CLIP SM INTNL HRZN TI LGT LTWT

## (undated) DEVICE — SUTURE PROL SZ 5-0 L36IN NONABSORBABLE BLU .

## (undated) DEVICE — OR TOWEL, 17" X 26" STERILE, BLUE: Brand: PREMIERPRO

## (undated) DEVICE — SUTURE SILK 3-0 SA64H

## (undated) DEVICE — TRAY CATH 16FR F INCL BARDX IC COMPLT CARE

## (undated) DEVICE — SUTURE VICRYL 0 CT-2

## (undated) DEVICE — CONTAINER,SPECIMEN,OR STERILE,4OZ: Brand: MEDLINE

## (undated) DEVICE — DRAPE SHEET LG

## (undated) DEVICE — C-ARMOR C-ARM EQUIPMENT COVERS CLEAR STERILE UNIVERSAL FIT 12 PER CASE: Brand: C-ARMOR

## (undated) DEVICE — STERILE TETRA-FLEX CF, ELASTIC BANDAGEE, 3" X 5.5YD: Brand: TETRA-FLEX™CF

## (undated) DEVICE — SUTURE SILK 4-0 SA63H

## (undated) DEVICE — ABSORBABLE HEMOSTAT (OXIDIZED REGENERATED CELLULOSE): Brand: SURGICEL

## (undated) DEVICE — DRESSING HYDRCOLL W3.25XL6IN TRNSLUC

## (undated) DEVICE — TRAY CATH 16FR F INCLUDE BARDX IC COMPLT CARE

## (undated) DEVICE — SUTURE COAT VCRL 2-0 27IN ABSRB VLT 36MM CT-1

## (undated) DEVICE — GAMMEX® PI HYBRID SIZE 7, STERILE POWDER-FREE SURGICAL GLOVE, POLYISOPRENE AND NEOPRENE BLEND: Brand: GAMMEX

## (undated) DEVICE — VERSAJET II HYDROSURGERY SYSTEM HANDSET, 45DEG 14MM EXACT: Brand: VERSAJET

## (undated) DEVICE — 450 ML BOTTLE OF 0.05% CHLORHEXIDINE GLUCONATE IN 99.95% STERILE WATER FOR IRRIGATION, USP AND APPLICATOR.: Brand: IRRISEPT ANTIMICROBIAL WOUND LAVAGE

## (undated) DEVICE — SUT CHRM GUT 3-0 27IN SH ABSRB UD 26MM 1/2

## (undated) DEVICE — SUCTION CANISTER, 3000CC,SAFELINER: Brand: DEROYAL

## (undated) DEVICE — SUTURE VICRYL 3-0 J761D

## (undated) DEVICE — INDICATED FOR SURGICAL CLAMPING DURING CARDIOVASCULAR PERIPHERAL VASCULAR, AND GENERAL SURGERY.: Brand: SOFT/FIBRA® SPRING CLIP

## (undated) DEVICE — SUTURE VCRL SZ 2-0 L18IN ABSRB VLT L26MM CT-2

## (undated) DEVICE — SUTURE PROL SZ 7-0 L24IN NONABSORBABLE BLU .

## (undated) DEVICE — CANISTER NEG PRSS 500ML W/ GEL INFOVAC

## (undated) DEVICE — SUT ETHLN 3-0 30IN FS-1 NABSRB BLK 24MM 3/8 C

## (undated) DEVICE — PACK CDS LOWER EXTREMITY

## (undated) DEVICE — SUT SILK 2-0 BLK 17-18 45CM

## (undated) DEVICE — SUT ETHBND XL 2-0 30IN CT-1 NABSRB GRN 36MM 1

## (undated) DEVICE — PROXIMATE SKIN STAPLERS (35 WIDE) CONTAINS 35 STAINLESS STEEL STAPLES (FIXED HEAD): Brand: PROXIMATE

## (undated) DEVICE — PEN: MARKING STD PT 100/CS: Brand: MEDICAL ACTION INDUSTRIES

## (undated) DEVICE — MICRO KOVER: Brand: UNBRANDED

## (undated) DEVICE — SUTURE VCRL SZ 2-0 L36IN ABSRB UD L36MM CT-1

## (undated) DEVICE — SOLUTION IRRIG 1000ML 0.9% NACL USP BTL

## (undated) DEVICE — SPK10186 WILSON TABLE KIT: Brand: SPK10186 WILSON TABLE KIT

## (undated) DEVICE — ENCORE® LATEX MICRO SIZE 6.5, STERILE LATEX POWDER-FREE SURGICAL GLOVE: Brand: ENCORE

## (undated) DEVICE — FLOSEAL HEMOSTATIC MATRIX, 5ML: Brand: FLOSEAL HEMOSTATIC MATRIX

## (undated) DEVICE — SUT COAT VCRL 3-0 27IN SH ABSRB UD 26MM 1/2

## (undated) DEVICE — SUTURE MONOCRYL 4-0 PS-2

## (undated) DEVICE — GAMMEX® PI HYBRID SIZE 7.5, STERILE POWDER-FREE SURGICAL GLOVE, POLYISOPRENE AND NEOPRENE BLEND: Brand: GAMMEX

## (undated) DEVICE — KIT HEMSTAT MTRX 8ML PORCINE GEL HUM THROM

## (undated) DEVICE — 11.1-M5 MULTIMODALITY KIT 5

## (undated) DEVICE — SUT PROL 6-0 30IN BV-1 NABSRB BLU 9.3MM 3/8 C

## (undated) DEVICE — ABDOMINAL PAD: Brand: CURITY

## (undated) DEVICE — TRAP MCS 40ML 5IN PLS SCR CAP

## (undated) DEVICE — PREMIUM WET SKIN PREP TRAY: Brand: MEDLINE INDUSTRIES, INC.

## (undated) DEVICE — HANDPIECE SET WITH HIGH FLOW TIP AND SUCTION TUBE: Brand: INTERPULSE

## (undated) DEVICE — DRAIN INCS 5/8IN 12IN PNRS RBR

## (undated) DEVICE — STOCKINETTE,IMPERVIOUS,12X48,TABS: Brand: MEDLINE

## (undated) DEVICE — 3M™ IOBAN™ 2 ANTIMICROBIAL INCISE DRAPE 6650EZ: Brand: IOBAN™ 2

## (undated) DEVICE — CLIP LIG M BLU TI HRT SHP WRE HORZ 600 PER BX

## (undated) DEVICE — DRAPE ORTH 60IN X 70IN POLY FLM ANCIL U RSST

## (undated) DEVICE — SUTURE PROL 7-0 30IN NABSRB BLU 9.3MM BV-1

## (undated) DEVICE — CLAMP INSERT: Brand: SOFT/TRACTION CLAMP INSERTS

## (undated) DEVICE — PRECISION MATCH HEAD

## (undated) DEVICE — DERMATOME BLADES: Brand: DERMATOME

## (undated) DEVICE — DISPOSABLE SLIM BIPOLAR FORCEPS, NON-STICK,: Brand: SPETZLER-MALIS

## (undated) DEVICE — APPLICATOR SKIN PREP 26ML HI LT ORNG 2% CHG

## (undated) DEVICE — BLADE 24 SS SRG STRL

## (undated) DEVICE — SNAP KOVER: Brand: UNBRANDED

## (undated) DEVICE — SUTURE PROL SZ 6-0 L24IN NONABSORBABLE BLU

## (undated) DEVICE — BANDAGE,GAUZE,BULKEE II,4.5"X4.1YD,STRL: Brand: MEDLINE

## (undated) DEVICE — SUT VCRL 2-0 36IN CT-1 ABSRB UD L36MM 1/2 CIR

## (undated) DEVICE — CV: Brand: MEDLINE INDUSTRIES, INC.

## (undated) DEVICE — ADHESIVE LIQ 2/3ML VI MASTISOL

## (undated) DEVICE — SUTURE PERMAHAND SZ 2-0 L12X18IN

## (undated) DEVICE — STANDARD HYPODERMIC NEEDLE,POLYPROPYLENE HUB: Brand: MONOJECT

## (undated) DEVICE — SOLUTION IV 1000ML 0.9% NACL PRESERVATIVE

## (undated) DEVICE — SUTURE PROL 6-0 30IN NABSRB BLU 13MM C-1 3/8

## (undated) DEVICE — SUT PROL 6-0 30IN C-1 NABSRB BLU 13MM 3/8 CIR

## (undated) DEVICE — SUT PROL 7-0 30IN BV-1 NABSRB BLU 9.3MM 3/8 C

## (undated) DEVICE — STERILE TETRA-FLEX CF, ELASTIC BANDAGE, 4" X 5.5YD: Brand: TETRA-FLEX™CF

## (undated) DEVICE — GAUZE,SPONGE,FLUFF,6"X6.75",STRL,5/TRAY: Brand: MEDLINE

## (undated) DEVICE — SHEET,DRAPE,70X100,STERILE: Brand: MEDLINE

## (undated) DEVICE — SUT PROL 4-0 36IN RB-1 NABSRB BLU 17MM 1/2 CI

## (undated) DEVICE — CURITY NON-ADHERENT STRIPS: Brand: CURITY

## (undated) DEVICE — SUTURE VCRL SZ 3-0 L27IN ABSRB UD L26MM SH

## (undated) DEVICE — ANTERIOR POSTERIOR ADD ON PACK: Brand: MEDLINE INDUSTRIES, INC.

## (undated) DEVICE — SUTURE ETHBND XL 2-0 30IN NABSRB GRN 26MM SH 1/2 CIR

## (undated) DEVICE — SUT ETHLN 3-0 18IN PS-2 NABSRB BLK 19MM 3/8 C

## (undated) DEVICE — DRESSING SURG W9XL25CM SIL SP CVR WTRPRF VIR

## (undated) DEVICE — SOL  .9 1000ML BTL

## (undated) DEVICE — ENCORE® LATEX ACCLAIM SIZE 8, STERILE LATEX POWDER-FREE SURGICAL GLOVE: Brand: ENCORE

## (undated) DEVICE — DRESSING NEG PRSS L GRANUFOAM SENSATRAC

## (undated) DEVICE — INTENDED FOR TISSUE SEPARATION, AND OTHER PROCEDURES THAT REQUIRE A SHARP SURGICAL BLADE TO PUNCTURE OR CUT.: Brand: BARD-PARKER ® STAINLESS STEEL BLADES

## (undated) DEVICE — SUTURE PERMAHAND SZ 3-0 L30IN NONABSORBABLE .

## (undated) DEVICE — DEPRESSOR TNG L6IN WOOD

## (undated) DEVICE — KIT SPNL XLIF NRVSN DIL M5

## (undated) DEVICE — DRESSING WET L16XW3IN OIL EMUL N ADH CURAD

## (undated) DEVICE — IMPERVIOUS STOCKINETTE: Brand: DEROYAL

## (undated) DEVICE — UNDYED BRAIDED (POLYGLACTIN 910), SYNTHETIC ABSORBABLE SUTURE: Brand: COATED VICRYL

## (undated) DEVICE — CHLORAPREP 26ML APPLICATOR

## (undated) DEVICE — OR TOWEL, 17" X 26" STERILE, WHITE: Brand: PREMIERPRO

## (undated) NOTE — MR AVS SNAPSHOT
After Visit Summary   11/26/2019    Frankey Plank    MRN: [id number]           Visit Information     Date & Time  11/26/2019 10:00 AM Provider  Tatiana Sidhu MD [facility], [facility], [address] [place], [place] [address].  Phone  [phone] Need for vaccination   [[id number]]             We Ordered the Following     Normal Orders This Visit    BEHAV CHNG SMOKING GR THAN 3 UP TO 10 MIN [43005 CPT(R)]     PNEUMOCOCCAL IMM (PNEUMOVAX) [48256 CPT(R)]     THINPREP PAP WITH HPV REFLEX REQUEST B [PYA893 1. In your Internet browser, go to [email or web address]  2. Click on the Activate your Account if you have an activation code in the box under the *New User? section. 3. Enter your [employer] Activation Code exactly as it appears below.  You will help us do so. For more information on [employer], please visit [email or web address]                   DO YOU KNOW WHERE TO GO? Injury & illness are never convenient.  If you are dealing with a   non-emergency, consider your o

## (undated) NOTE — LETTER
[address] [place] [address]      Authorization for Surgical Operation and Procedure     Date:___________                                                                                                         Time:_______ 4.   Should the need arise during my operation or immediate post-operative period, I also consent to the administration of blood and/or blood products.   Further, I understand that despite careful testing and screening of blood or blood products by janette 8.   I recognize that in the event my procedure results in extended X-Ray/fluoroscopy time, I may develop a skin reaction. 9.  If I have a Do Not Attempt Resuscitation (DNAR) order in place, that status will be suspended while in the operating room, proc STATEMENT OF PHYSICIAN My signature below affirms that prior to the time of the procedure; I have explained to the patient and/or his/her legal representative, the risks and benefits involved in the proposed treatment and any reasonable alternative to the

## (undated) NOTE — IP AVS SNAPSHOT
Patient Demographics     Address  [facility] [place] [address] Phone  [phone] [facility])  [phone] (Work)  [phone] (Mobile) *Preferred* E-mail Address  [email or web address] "[email or web address]      Emergency Contact(s)     Name Relation Home Work Your A1C level is greater than 8%.   It is recommended that you attend an outpatient diabetes education program.  Please discuss with your Primary Care Provider at your next visit to obtain a referral.  If you wish to make an appointment at [facility] 1 each by Does not apply route 4 (four) times daily. glipiZIDE 5 MG Tabs  Commonly known as: GLUCOTROL  Next dose due: Before dinner      TAKE 1 TABLET (5 MG TOTAL) BY MOUTH 2 (TWO) TIMES DAILY BEFORE MEALS.    Kumar Kimball MD         [facility] Action Reason Comments    [id number] 0.9% NaCl infusion 09/14/21 1126 New Bag      [id number] HYDROcodone-acetaminophen (NORCO)  MG per tab 1-2 tablet 09/13/21 1749 Given      [id number] HYDROcodone-acetaminophen (NORCO)  MG per tab 1-2 tablet 09/ flexpen 3 Units 09/13/21 1750 Given      [id number] Insulin Aspart Pen (NOVOLOG) 100 UNIT/ML flexpen 3 Units 09/14/21 0830 Given      [id number] insulin detemir (LEVEMIR) 100 UNIT/ML flextouch 40 Units 09/13/21 2200 Given              Recent Vital Signs Lab (Pending sale to [facility])   GFR, Non- 41 >=60 [facility] (Pending sale to [facility])   GFR, -American 48 >=60 [facility])            Testing Performed By     Lab - Abbreviation Name Director Address Valid Date Range    [address] Lab (Pending sale to [facility]) [facility] How and CGA. Gait: Pt ambulated [de-identified]' with RW and CGA. Pt ambulates with shuffled gait, R LE ER, decreased R LE stance time, report of R thigh pain, and required multiple rest breaks during ambulation.  Pt required mod verbal and visual cues for erect posture a treatment. AM-PAC '6-Clicks' INPATIENT SHORT FORM - BASIC MOBILITY  How much difficulty does the patient currently have. ..  -   Turning over in bed (including adjusting bedclothes, sheets and blankets)?: A Little   -   Sitting down on and standing instructions provided to patient in preparation for discharge. Goal #5   Current Status In progress   Goal #6 Patient will demonstrate compliance toward post operative spinal precautions 100% of time to optimize healing.     Goal #6  Current Status In pro acute rehab due to increased level of assist needed with transfers. End of session:  Pt finished session up in chair. Call light in reach, chair alarm on, RN notified of pt's functional mobility.  Recommended to RN to use one assist.   The patient's Patsy the side of the bed?: A Lot   How much help from another person does the patient currently need. ..   -   Moving to and from a bed to a chair (including a wheelchair)?: A Little   -   Need to walk in hospital room?: A Little   -   Climbing 3-5 steps with a Author: Amrik Delgado PT Service: Clinical Therapist Author Type: Physical Therapist    Filed: 9/11/2021  2:18 PM Date of Service: 9/11/2021  2:17 PM Status: Signed    : Amrik Delgado PT (Physical Therapist)       Patient declined PM session.  Will fo

## (undated) NOTE — LETTER
[place] [address]  Authorization for Invasive Procedures  Date: 02/15/2024           Time: 0950    I hereby authorize Right groin debridement, washout, possible kerecis placement, wound vac placement , my physician and his/her assistants (if applicable), which may include medical students, residents, and/or fellows, to perform the following surgical operation/ procedure and administer such anesthesia as may be determined necessary by my physician: Flavio Taveras MD  on Ava Bowen  2.   I recognize that during the surgical operation/procedure, unforeseen conditions may necessitate additional or different procedures than those listed above.  I, therefore, further authorize and request that the above-named surgeon, assistants, or designees perform such procedures as are, in their judgment, necessary and desirable.    3.   My surgeon/physician has discussed prior to my surgery the potential benefits, risks and side effects of this procedure; the likelihood of achieving goals; and potential problems that might occur during recuperation.  They also discussed reasonable alternatives to the procedure, including risks, benefits, and side effects related to the alternatives and risks related to not receiving this procedure.  I have had all my questions answered and I acknowledge that no guarantee has been made as to the result that may be obtained.    4.   Should the need arise during my operation/procedure, which includes change of level of care prior to discharge, I also consent to the administration of blood and/or blood products.  Further, I understand that despite careful testing and screening of blood or blood products by collecting agencies, I may still be subject to ill effects as a result of receiving a blood transfusion and/or blood products.  The following are some, but not all, of the potential risks that can occur: fever and allergic reactions, hemolytic reactions,  transmission of diseases such as Hepatitis, AIDS and Cytomegalovirus (CMV) and fluid overload.  In the event that I wish to have an autologous transfusion of my own blood, or a directed donor transfusion, I will discuss this with my physician.   Check only if Refusing Blood or Blood Products  I understand refusal of blood or blood products as deemed necessary by my physician may have serious consequences to my condition to include possible death. I hereby assume responsibility for my refusal and release the hospital, its personnel, and my physicians from any responsibility for the consequences of my refusal.         o  Refuse         5.   I authorize the use of any specimen, organs, tissues, body parts or foreign objects that may be removed from my body during the operation/procedure for diagnosis, research or teaching purposes and their subsequent disposal by hospital authorities.  I also authorize the release of specimen test results and/or written reports to my treating physician on the hospital medical staff or other referring or consulting physicians involved in my care, at the discretion of the Pathologist or my treating physician.    6.   I consent to the photographing or videotaping of the operations or procedures to be performed, including appropriate portions of my body for medical, scientific, or educational purposes, provided my identity is not revealed by the pictures or by descriptive texts accompanying them.  If the procedure has been photographed/videotaped, the surgeon will obtain the original picture, image, videotape or CD.  The hospital will not be responsible for storage, release or maintenance of the picture, image, tape or CD.    7.   I consent to the presence of a  or observers in the operating room as deemed necessary by my physician or their designees.    8.   I recognize that in the event my procedure results in extended X-Ray/fluoroscopy time, I may develop a skin  reaction.    9. If I have a Do Not Attempt Resuscitation (DNAR) order in place, that status will be suspended while in the operating room, procedural suite, and during the recovery period unless otherwise explicitly stated by me (or a person authorized to consent on my behalf). The surgeon or my attending physician will determine when the applicable recovery period ends for purposes of reinstating the DNAR order.  10. Patients having a sterilization procedure: I understand that if the procedure is successful the results will be permanent and it will therefore be impossible for me to inseminate, conceive, or bear children.  I also understand that the procedure is intended to result in sterility, although the result has not been guaranteed.   11. I acknowledge that my physician has explained sedation/analgesia administration to me including the risk and benefits I consent to the administration of sedation/analgesia as may be necessary or desirable in the judgment of my physician.    I CERTIFY THAT I HAVE READ AND FULLY UNDERSTAND THE ABOVE CONSENT TO OPERATION and/or OTHER PROCEDURE.        ____________________________________       _________________________________      ______________________________  Signature of Patient         Signature of Responsible Person        Printed Name of Responsible Person        ____________________________________      _________________________________      ______________________________       Signature of Witness          Relationship to Patient                       Date                                       Time    Patient Name: Ava Bowen     : 10/6/1955                 Printed: February 15, 2024      Medical Record #: [id number]                      Page 1 of 2          STATEMENT OF PHYSICIAN My signature below affirms that prior to the time of the procedure; I have explained to the patient and/or his/her legal representative, the risks and benefits involved in the  proposed treatment and any reasonable alternative to the proposed treatment. I have also explained the risks and benefits involved in refusal of the proposed treatment and alternatives to the proposed treatment and have answered the patient's questions. If I have a significant financial interest in a co-management agreement or a significant financial interest in any product or implant, or other significant relationship used in this procedure/surgery, I have disclosed this and had a discussion with my patient.     _______________________________________________________________ _____________________________  (Signature of Physician)                                                                                         (Date)                                   (Time)    Patient Name: Ava Bowen     : 10/6/1955                 Printed: February 15, 2024      Medical Record #: [id number]                      Page 2 of 2

## (undated) NOTE — LETTER
[facility], [place]  Authorization for Invasive Procedures  1.  I hereby authorize Dr. Nayely Nash , my physician and whomever may be designated as the doctor's assistant, to perform the following operation and/or procedure:  Right Lower 4. Should the need arise during my operation or immediate post-operative period; I also consent to the administration of blood and/or blood products.  Further, I understand that despite careful testing and screening of blood and blood products, I may still 9. Patients having a sterilization procedure: I understand that if the procedure is successful the results will be permanent and it will therefore be impossible for me to inseminate, conceive or bear children.  I also understand that the procedure is intend

## (undated) NOTE — ED AVS SNAPSHOT
Ana Oneil   MRN: [id number]    Department:  [facility] Emergency Department   Date of Visit:  9/30/2018           Disclosure     Insurance plans vary and the physician(s) referred by the ER may not be covered by your plan.  Please contact CARE PHYSICIAN AT ONCE OR RETURN IMMEDIATELY TO THE EMERGENCY DEPARTMENT. If you have been prescribed any medication(s), please fill your prescription right away and begin taking the medication(s) as directed.   If you believe that any of the medications

## (undated) NOTE — LETTER
[facility]  Authorization for Invasive Procedures  1.  I hereby authorize Dr. Brennan Gavin and Dr Jodie Platt, my physician and whomever may be designated as the doctor's assistant, to perform the following operation and/or procedu following are some, but not all, of the potential risks that can occur: fever and allergic reactions, hemolytic reactions, transmission of disease such as hepatitis, AIDS, cytomegalovirus (CMV), and flluid overload.  In the event that I wish to have autolog administration of sedation/analgesia as may be necessary or desirable in the judgment of my physician.      Signature of Patient:  ________________________________________________ Date: _________Time: _________    Responsible person in case of minor or unco

## (undated) NOTE — LETTER
[facility]     PICC INSERTION CONSENT     I agree to have a Peripherally Inserted Central Catheter (PICC) placed in my arm.   1. The PICC insertion procedure, care, maintenance, risks, benefits, and complications have been explained to me by my physician, ________________________, and I understand them.   2. I understand that this may not be the only way I can receive my medication. I understand that my physician has determined that the PICC would be the safest and most effective means of administering my medication at this time. If there are other options of giving medication into my veins those options have been explained to me by my physician and I have chosen this one.   3. I realize a nurse who has been specially trained and certified by the hospital and ’s representative to insert a PICC will perform this procedure. My catheter will be inserted by _____________________________.   4. I have been informed by my doctor of the nature and purpose of this procedure and the risks involved and the possibility of complications. I realize that this is an invasive procedure and has certain risks such as air embolism (air entering the catheter or my vein), arterial puncture (a tearing of one of my arteries), infection, irregular heartbeat and venous thrombosis (a blood clot in a vein) nerve injury and fracture of the catheter with or without migration.   5. In order to numb the area where the line will be placed, a small amount of anesthetic medication will be injected as ordered by my physician.   6. I understand that while the catheter will be placed in my upper arm the end of the catheter will come to rest in an area near my heart.     7. The person performing this procedure has discussed the potential benefits, risks, and side effects of the PICC; the likelihood of achieving goals; and potential problems that might occur during recuperation. They also discussed reasonable alternatives to  the PICC, including risks, benefits, and side effects related to the alternatives and risks related to not receiving this procedure.    8.  I have expressed any questions about this procedure to my physician or the PICC Proceduralist and he/she has answered them.  I certify that I have read and understand this consent to the insertion of a PICC.      _________________________________________________________   Date     Time     Patient/Guardian Signature       ____________________________________   Printed name of Patient/Guardian          ________________________________________________________________    Date        Time                   Witnessing RN Signature      Patient Name: Ava Bowen     : 10/6/1955                 Printed: 2024     Medical Record #: [id number]

## (undated) NOTE — LETTER
11/26/2019          To Whom It May Concern:    Shanice Henriqueznora is currently under my medical care and  She was seen at my office tod  She may return to work on December 3 , 2019      If you require additional information please contact our office.

## (undated) NOTE — LETTER
[facility] 5SW/SE  [address] [address]  Dept: [phone]  Loc: [phone]    19  ? Re: Melissa Lew  : 10/6/1955    ?   To Whom It May Concern:    Melissa Lew was admitted to [facility] from /

## (undated) NOTE — LETTER
[facility]  [address], [place], [place]  Authorization for Surgical Operation and Procedure                                                                                           I hereby authorize Kp Justice MD, my physician and his/her assistants (if applicable), which may include medical students, residents, and/or fellows, to perform the following surgical operation/ procedure and administer such anesthesia as may be determined necessary by my physician: Operation/Procedure name (s) debridement of right groin wound skin and subcutaneous fat on Ava Bowen   2.   I recognize that during the surgical operation/procedure, unforeseen conditions may necessitate additional or different procedures than those listed above.  I, therefore, further authorize and request that the above-named surgeon, assistants, or designees perform such procedures as are, in their judgment, necessary and desirable.    3.   My surgeon/physician has discussed prior to my surgery the potential benefits, risks and side effects of this procedure; the likelihood of achieving goals; and potential problems that might occur during recuperation.  They also discussed reasonable alternatives to the procedure, including risks, benefits, and side effects related to the alternatives and risks related to not receiving this procedure.  I have had all my questions answered and I acknowledge that no guarantee has been made as to the result that may be obtained.    4.   Should the need arise during my operation/procedure, which includes change of level of care prior to discharge, I also consent to the administration of blood and/or blood products.  Further, I understand that despite careful testing and screening of blood or blood products by collecting agencies, I may still be subject to ill effects as a result of receiving a blood transfusion and/or blood products.  The following are some, but not all, of the potential  risks that can occur: fever and allergic reactions, hemolytic reactions, transmission of diseases such as Hepatitis, AIDS and Cytomegalovirus (CMV) and fluid overload.  In the event that I wish to have an autologous transfusion of my own blood, or a directed donor transfusion, I will discuss this with my physician.  Check only if Refusing Blood or Blood Products  I understand refusal of blood or blood products as deemed necessary by my physician may have serious consequences to my condition to include possible death. I hereby assume responsibility for my refusal and release the hospital, its personnel, and my physicians from any responsibility for the consequences of my refusal.    o  Refuse   5.   I authorize the use of any specimen, organs, tissues, body parts or foreign objects that may be removed from my body during the operation/procedure for diagnosis, research or teaching purposes and their subsequent disposal by hospital authorities.  I also authorize the release of specimen test results and/or written reports to my treating physician on the hospital medical staff or other referring or consulting physicians involved in my care, at the discretion of the Pathologist or my treating physician.    6.   I consent to the photographing or videotaping of the operations or procedures to be performed, including appropriate portions of my body for medical, scientific, or educational purposes, provided my identity is not revealed by the pictures or by descriptive texts accompanying them.  If the procedure has been photographed/videotaped, the surgeon will obtain the original picture, image, videotape or CD.  The hospital will not be responsible for storage, release or maintenance of the picture, image, tape or CD.    7.   I consent to the presence of a  or observers in the operating room as deemed necessary by my physician or their designees.    8.   I recognize that in the event my procedure results in  extended X-Ray/fluoroscopy time, I may develop a skin reaction.    9. If I have a Do Not Attempt Resuscitation (DNAR) order in place, that status will be suspended while in the operating room, procedural suite, and during the recovery period unless otherwise explicitly stated by me (or a person authorized to consent on my behalf). The surgeon or my attending physician will determine when the applicable recovery period ends for purposes of reinstating the DNAR order.  10. Patients having a sterilization procedure: I understand that if the procedure is successful the results will be permanent and it will therefore be impossible for me to inseminate, conceive, or bear children.  I also understand that the procedure is intended to result in sterility, although the result has not been guaranteed.   11. I acknowledge that my physician has explained sedation/analgesia administration to me including the risk and benefits I consent to the administration of sedation/analgesia as may be necessary or desirable in the judgment of my physician.    I CERTIFY THAT I HAVE READ AND FULLY UNDERSTAND THE ABOVE CONSENT TO OPERATION and/or OTHER PROCEDURE.     _________________________________________ _________________________________     ___________________________________  Signature of Patient     Signature of Responsible Person                   Printed Name of Responsible Person                              _________________________________________ ______________________________        ___________________________________  Signature of Witness         Date  Time         Relationship to Patient    STATEMENT OF PHYSICIAN My signature below affirms that prior to the time of the procedure; I have explained to the patient and/or his/her legal representative, the risks and benefits involved in the proposed treatment and any reasonable alternative to the proposed treatment. I have also explained the risks and benefits involved in refusal of  the proposed treatment and alternatives to the proposed treatment and have answered the patient's questions. If I have a significant financial interest in a co-management agreement or a significant financial interest in any product or implant, or other significant relationship used in this procedure/surgery, I have disclosed this and had a discussion with my patient.     _______________________________________________________________ _____________________________  (Signature of Physician)                                                                                         (Date)                                   (Time)  Patient Name: Ava HYMAN Jessi    : 10/6/1955   Printed: 2024      Medical Record #: [id number]                                              Page 1 of 1

## (undated) NOTE — LETTER
[facility] 3W/SW  [address] [facility] [address]  [phone]    Blood Transfusion Consent    In the course of your treatment, it may become necessary to administer a transfusion of blood or blood components. This form provides basic information concerning this procedure and, if signed by you, authorizes its administration. By signing this form, you agree that all of your questions about the administration of blood or blood products have been answered by the ordering medical professional or designee.    Description of Procedure  Blood is introduced into one of your veins, commonly in the arm, using a sterilized disposable needle. The amount of blood transfused, and whether the transfusion will be of blood or blood components is a judgement the physician will make based on your particular needs.    Risks  The transfusion is a common procedure of low risk.  MINOR AND TEMPORARY REACTIONS ARE NOT UNCOMMON, including a slight bruise, swelling or local reaction in the area where the needle pierces your skin, or a nonserious reaction to the transfused material itself, including headache, fever or mild skin reaction, such as rash.  Serious reactions are possible, though very unlikely, and include severe allergic reaction (shock) and destruction (hemolysis) of transfused blood cells.  Infectious diseases which are known to be transmitted by blood transfusion include certain types of viral Hepatitis(liver infection from a virus), Human Immunodeficiency Virus (HIV-1,2) infection, a viral infection known to cause Acquired Immunodeficiency Syndrome (AIDS), as well as certain other bacterial, viral, and parasitic diseases. While a minimal risk of acquiring an infectious disease from transfused blood exists, in accordance with the Federal and State law, all due care has been taken in donor selection and testing to avoid transmission of disease.    Alternatives  If loss of blood poses serious threats during your  treatment, THERE IS NO EFFECTIVE ALTERNATIVE TO BLOOD TRANSFUSION. However, if you have any further questions on this matter, your provider will fully explain the alternatives to you if it has not already been done.    I, ______________________________, have read/had read to me the above. I understand the matters bearing on the decision whether or not to authorize a transfusion of blood or blood components. I have no questions which have not been answered to my full satisfaction. I hereby consent to such transfusion as my physician may deem necessary or advisable in the course of my treatment.    ______________________________________________                    ___________________________  (Signature of Patient or Responsible party in case of minor,                 (Printed Name of Patient or incompetent, or unconscious patient)              Responsible Party)    ___________________________               _____________________  (Relationship to Patient if not self)                                    (Date and Time)    __________________________                                                           ______________________              (Signature of Witness)               (Printed Name of Witness)     Language line ()    Telephone/Verbal/Video Consent    __________________________                     ____________________  (Signature of 2nd Witness           (Printed Name of 2nd  Telephone/Verbal/Video Consent)           Witness)    Patient Name: Ava Bowen     : 10/6/1955                 Printed: 2024     Medical Record #: [id number]      Rev: 2023

## (undated) NOTE — LETTER
5/5/2020          To Whom It May Concern:    Hattie Avitia is currently under my medical care and may not return to work at this time. Please excuse Heron Ledezma from 5/3/2020- 5/11/2020. She may return to work on 5/11/2020.   Activity is restricted as fo

## (undated) NOTE — LETTER
[facility]  part of [facility]     PICC INSERTION CONSENT     I agree to have a Peripherally Inserted Central Catheter (PICC) placed in my arm.   1. The PICC insertion procedure, care, maintenance, risks, benefits, and complications have been explained to me by my physician, ________________________, and I understand them.   2. I understand that this may not be the only way I can receive my medication. I understand that my physician has determined that the PICC would be the safest and most effective means of administering my medication at this time. If there are other options of giving medication into my veins those options have been explained to me by my physician and I have chosen this one.   3. I realize a nurse who has been specially trained and certified by the hospital and ’s representative to insert a PICC will perform this procedure. My catheter will be inserted by _____________________________.   4. I have been informed by my doctor of the nature and purpose of this procedure and the risks involved and the possibility of complications. I realize that this is an invasive procedure and has certain risks such as air embolism (air entering the catheter or my vein), arterial puncture (a tearing of one of my arteries), infection, irregular heartbeat and venous thrombosis (a blood clot in a vein) nerve injury and fracture of the catheter with or without migration.   5. In order to numb the area where the line will be placed, a small amount of anesthetic medication will be injected as ordered by my physician.   6. I understand that while the catheter will be placed in my upper arm the end of the catheter will come to rest in an area near my heart.     7. The person performing this procedure has discussed the potential benefits, risks, and side effects of the PICC; the likelihood of achieving goals; and potential problems that might occur during recuperation. They also discussed  reasonable alternatives to the PICC, including risks, benefits, and side effects related to the alternatives and risks related to not receiving this procedure.    8.  I have expressed any questions about this procedure to my physician or the PICC Proceduralist and he/she has answered them.  I certify that I have read and understand this consent to the insertion of a PICC.      _________________________________________________________   Date     Time     Patient/Guardian Signature       ____________________________________   Printed name of Patient/Guardian          ________________________________________________________________    Date        Time                   Witnessing RN Signature      Patient Name: Ava Bowen     : 10/6/1955                 Printed: 2024     Medical Record #: [id number]

## (undated) NOTE — LETTER
[facility]  Administration of Anesthesia  Ava PENNINGTON agree to be cared for by a physician anesthesiologist alone and/or with a nurse anesthetist, who is specially trained to monitor me and give me medicine to put me to sleep or keep me comfortable during my procedure    I understand that my anesthesiologist and/or anesthetist is not an employee or agent of [facility] or [employer]. He or she works for [facility]    As the patient asking for anesthesia services, I agree to:  Allow the anesthesiologist (anesthesia doctor) to give me medicine and do additional procedures as necessary. Some examples are: Starting or using an “IV” to give me medicine, fluids or blood during my procedure, and having a breathing tube placed to help me breathe when I’m asleep (intubation). In the event that my heart stops working properly, I understand that my anesthesiologist will make every effort to sustain my life, unless otherwise directed by [facility] Do Not Resuscitate documents.  Tell my anesthesia doctor before my procedure:  If I am pregnant.  The last time that I ate or drank.  iii. All of the medicines I take (including prescriptions, herbal supplements, and pills I can buy without a prescription (including street drugs/illegal medications). Failure to inform my anesthesiologist about these medicines may increase my risk of anesthetic complications.  iv.If I am allergic to anything or have had a reaction to anesthesia before.  I understand how the anesthesia medicine will help me (benefits).  I understand that with any type of anesthesia medicine there are risks:  The most common risks are: nausea, vomiting, sore throat, muscle soreness, damage to my eyes, mouth, or teeth (from breathing tube placement).  Rare risks include: remembering what happened during my procedure, allergic reactions to medications, injury to my airway, heart, lungs, vision, nerves, or  muscles and in extremely rare instances death.  My doctor has explained to me other choices available to me for my care (alternatives).  Pregnant Patients (“epidural”):  I understand that the risks of having an epidural (medicine given into my back to help control pain during labor), include itching, low blood pressure, difficulty urinating, headache or slowing of the baby’s heart. Very rare risks include infection, bleeding, seizure, irregular heart rhythms and nerve injury.  Regional Anesthesia (“spinal”, “epidural”, & “nerve blocks”):  I understand that rare but potential complications include headache, bleeding, infection, seizure, irregular heart rhythms, and nerve injury.    _____________________________________________________________________________  Patient (or Representative) Signature/Relationship to Patient  Date   Time    _____________________________________________________________________________   Name (if used)    Language/Organization   Time    _____________________________________________________________________________  Nurse Anesthetist Signature     Date   Time  _____________________________________________________________________________  Anesthesiologist Signature     Date   Time  I have discussed the procedure and information above with the patient (or patient’s representative) and answered their questions. The patient or their representative has agreed to have anesthesia services.    _____________________________________________________________________________  Witness        Date   Time  I have verified that the signature is that of the patient or patient’s representative, and that it was signed before the procedure  Patient Name: Ava Bowen     : 10/6/1955                 Printed: 2/10/2024 at 4:58 PM    Medical Record #: [id number]                                            Page 1 of 1  ----------ANESTHESIA CONSENT----------

## (undated) NOTE — LETTER
To Whom It May Concern:    Diana Hernadez has been under our care regarding ongoing medical issues. Because of this, she has been required to restrict her physical activities.     She may resume her usual activities, including work, on 1/18/21 with the

## (undated) NOTE — LETTER
May 4, 2021    America Perez MD  [address] [address]     Patient: Yamilex Dixon   YOB: 1955   Date of Visit: 5/4/2021       Dear Dr. Elena Miles MD:    Thank you for referring Will Alanis to me for evaluation.  Here is • Diabetes Brother    • Sickle Cell Son         Cause of death   • Macular degeneration Neg        Social History: Social History    Tobacco Use      Smoking status: Former Smoker        Packs/day: 1.00        Years: 30.00        Pack years: 30        Ty Pen-injector Inject 16 Units into the skin daily.  (Patient taking differently: Inject 38 Units into the skin nightly.  ) 1 pen 1   • Albuterol Sulfate  (90 Base) MCG/ACT Inhalation Aero Soln Inhale 1 puff into the lungs every 4 (four) hours as neede Movement       Right Left     Full, Ortho Full, Ortho          Dilation     Both eyes: 1.0% Mydriacyl and 2.5% Yaya Synephrine @ 10:02 AM          Dilation #2     Both eyes: 1.0% Mydriacyl and 2.5% Yaya Synephrine @ 10:02 AM            Slit Lamp and Fundus E history of glaucoma in sister  Patient has 2 sisters with glaucoma. Age-related nuclear cataract of both eyes  Discussed mild cataracts in both eyes that are not affecting vision and are not surgical at this time.     Patient declines glasses at this ti

## (undated) NOTE — LETTER
Date & Time: 9/30/2018, 5:43 PM  Patient: Berna Gowers  Encounter Provider(s):    Maria C Wheat MD       To Whom It May Concern:    Berna Gowers was seen and treated in our department on 9/30/2018. She may return to work on 10/2.     If you ha

## (undated) NOTE — LETTER
[facility]  [address], [place], [place]  Authorization for Surgical Operation and Procedure                                                                                           I hereby authorize Kp Justice MD, my physician and his/her assistants (if applicable), which may include medical students, residents, and/or fellows, to perform the following surgical operation/ procedure and administer such anesthesia as may be determined necessary by my physician: Operation/Procedure name (s) right above knee amputation, possible gracilis myoplasty on Avayolis Bowen   2.   I recognize that during the surgical operation/procedure, unforeseen conditions may necessitate additional or different procedures than those listed above.  I, therefore, further authorize and request that the above-named surgeon, assistants, or designees perform such procedures as are, in their judgment, necessary and desirable.    3.   My surgeon/physician has discussed prior to my surgery the potential benefits, risks and side effects of this procedure; the likelihood of achieving goals; and potential problems that might occur during recuperation.  They also discussed reasonable alternatives to the procedure, including risks, benefits, and side effects related to the alternatives and risks related to not receiving this procedure.  I have had all my questions answered and I acknowledge that no guarantee has been made as to the result that may be obtained.    4.   Should the need arise during my operation/procedure, which includes change of level of care prior to discharge, I also consent to the administration of blood and/or blood products.  Further, I understand that despite careful testing and screening of blood or blood products by collecting agencies, I may still be subject to ill effects as a result of receiving a blood transfusion and/or blood products.  The following are some, but not all, of the potential  risks that can occur: fever and allergic reactions, hemolytic reactions, transmission of diseases such as Hepatitis, AIDS and Cytomegalovirus (CMV) and fluid overload.  In the event that I wish to have an autologous transfusion of my own blood, or a directed donor transfusion, I will discuss this with my physician.  Check only if Refusing Blood or Blood Products  I understand refusal of blood or blood products as deemed necessary by my physician may have serious consequences to my condition to include possible death. I hereby assume responsibility for my refusal and release the hospital, its personnel, and my physicians from any responsibility for the consequences of my refusal.    o  Refuse   5.   I authorize the use of any specimen, organs, tissues, body parts or foreign objects that may be removed from my body during the operation/procedure for diagnosis, research or teaching purposes and their subsequent disposal by hospital authorities.  I also authorize the release of specimen test results and/or written reports to my treating physician on the hospital medical staff or other referring or consulting physicians involved in my care, at the discretion of the Pathologist or my treating physician.    6.   I consent to the photographing or videotaping of the operations or procedures to be performed, including appropriate portions of my body for medical, scientific, or educational purposes, provided my identity is not revealed by the pictures or by descriptive texts accompanying them.  If the procedure has been photographed/videotaped, the surgeon will obtain the original picture, image, videotape or CD.  The hospital will not be responsible for storage, release or maintenance of the picture, image, tape or CD.    7.   I consent to the presence of a  or observers in the operating room as deemed necessary by my physician or their designees.    8.   I recognize that in the event my procedure results in  extended X-Ray/fluoroscopy time, I may develop a skin reaction.    9. If I have a Do Not Attempt Resuscitation (DNAR) order in place, that status will be suspended while in the operating room, procedural suite, and during the recovery period unless otherwise explicitly stated by me (or a person authorized to consent on my behalf). The surgeon or my attending physician will determine when the applicable recovery period ends for purposes of reinstating the DNAR order.  10. Patients having a sterilization procedure: I understand that if the procedure is successful the results will be permanent and it will therefore be impossible for me to inseminate, conceive, or bear children.  I also understand that the procedure is intended to result in sterility, although the result has not been guaranteed.   11. I acknowledge that my physician has explained sedation/analgesia administration to me including the risk and benefits I consent to the administration of sedation/analgesia as may be necessary or desirable in the judgment of my physician.    I CERTIFY THAT I HAVE READ AND FULLY UNDERSTAND THE ABOVE CONSENT TO OPERATION and/or OTHER PROCEDURE.     _________________________________________ _________________________________     ___________________________________  Signature of Patient     Signature of Responsible Person                   Printed Name of Responsible Person                              _________________________________________ ______________________________        ___________________________________  Signature of Witness         Date  Time         Relationship to Patient    STATEMENT OF PHYSICIAN My signature below affirms that prior to the time of the procedure; I have explained to the patient and/or his/her legal representative, the risks and benefits involved in the proposed treatment and any reasonable alternative to the proposed treatment. I have also explained the risks and benefits involved in refusal of  the proposed treatment and alternatives to the proposed treatment and have answered the patient's questions. If I have a significant financial interest in a co-management agreement or a significant financial interest in any product or implant, or other significant relationship used in this procedure/surgery, I have disclosed this and had a discussion with my patient.     _______________________________________________________________ _____________________________  (Signature of Physician)                                                                                         (Date)                                   (Time)  Patient Name: Ava HYMAN Jessi    : 10/6/1955   Printed: 2024      Medical Record #: [id number]                                              Page 1 of 1

## (undated) NOTE — LETTER
[facility]  Administration of Anesthesia  Ava PENNINGTON agree to be cared for by a physician anesthesiologist alone and/or with a nurse anesthetist, who is specially trained to monitor me and give me medicine to put me to sleep or keep me comfortable during my procedure    I understand that my anesthesiologist and/or anesthetist is not an employee or agent of [facility] or [employer]. He or she works for [facility]    As the patient asking for anesthesia services, I agree to:  Allow the anesthesiologist (anesthesia doctor) to give me medicine and do additional procedures as necessary. Some examples are: Starting or using an “IV” to give me medicine, fluids or blood during my procedure, and having a breathing tube placed to help me breathe when I’m asleep (intubation). In the event that my heart stops working properly, I understand that my anesthesiologist will make every effort to sustain my life, unless otherwise directed by [facility] Do Not Resuscitate documents.  Tell my anesthesia doctor before my procedure:  If I am pregnant.  The last time that I ate or drank.  iii. All of the medicines I take (including prescriptions, herbal supplements, and pills I can buy without a prescription (including street drugs/illegal medications). Failure to inform my anesthesiologist about these medicines may increase my risk of anesthetic complications.  iv.If I am allergic to anything or have had a reaction to anesthesia before.  I understand how the anesthesia medicine will help me (benefits).  I understand that with any type of anesthesia medicine there are risks:  The most common risks are: nausea, vomiting, sore throat, muscle soreness, damage to my eyes, mouth, or teeth (from breathing tube placement).  Rare risks include: remembering what happened during my procedure, allergic reactions to medications, injury to my airway, heart, lungs, vision, nerves, or  muscles and in extremely rare instances death.  My doctor has explained to me other choices available to me for my care (alternatives).  Pregnant Patients (“epidural”):  I understand that the risks of having an epidural (medicine given into my back to help control pain during labor), include itching, low blood pressure, difficulty urinating, headache or slowing of the baby’s heart. Very rare risks include infection, bleeding, seizure, irregular heart rhythms and nerve injury.  Regional Anesthesia (“spinal”, “epidural”, & “nerve blocks”):  I understand that rare but potential complications include headache, bleeding, infection, seizure, irregular heart rhythms, and nerve injury.    _____________________________________________________________________________  Patient (or Representative) Signature/Relationship to Patient  Date   Time    _____________________________________________________________________________   Name (if used)    Language/Organization   Time    _____________________________________________________________________________  Nurse Anesthetist Signature     Date   Time  _____________________________________________________________________________  Anesthesiologist Signature     Date   Time  I have discussed the procedure and information above with the patient (or patient’s representative) and answered their questions. The patient or their representative has agreed to have anesthesia services.    _____________________________________________________________________________  Witness        Date   Time  I have verified that the signature is that of the patient or patient’s representative, and that it was signed before the procedure  Patient Name: Ava Bowen     : 10/6/1955                 Printed: 2024 at 6:07 PM    Medical Record #: [id number]                                            Page 1 of 1  ----------ANESTHESIA CONSENT----------

## (undated) NOTE — ED AVS SNAPSHOT
Benigno Felix   MRN: [id number]    Department:  [facility]   Date of Visit:  4/25/2019           Disclosure     Insurance plans vary and the physician(s) referred by the ER may not be covered by your plan.  Please contac CARE PHYSICIAN AT ONCE OR RETURN IMMEDIATELY TO THE EMERGENCY DEPARTMENT. If you have been prescribed any medication(s), please fill your prescription right away and begin taking the medication(s) as directed.   If you believe that any of the medications

## (undated) NOTE — Clinical Note
FYI: TCM outreach completed. Scheduled TCM/HFU for 11-14-19. Pt also requesting refill of Iron script. Will discuss at appt if she needs to continue Lisinopril and HCTZ (listed on d/c instructions to continue but pt wanted to confirm with PCP).

## (undated) NOTE — LETTER
[facility]  [address], [place], [place]  Authorization for Surgical Operation and Procedure                                                                                           I hereby authorize Kp Justice MD, my physician and his/her assistants (if applicable), which may include medical students, residents, and/or fellows, to perform the following surgical operation/ procedure and administer such anesthesia as may be determined necessary by my physician: Operation/Procedure name (s) EXPLORATION AND CONTROL OF RIGHT FEMORAL BLEEDING on Ava D Bowen   2.   I recognize that during the surgical operation/procedure, unforeseen conditions may necessitate additional or different procedures than those listed above.  I, therefore, further authorize and request that the above-named surgeon, assistants, or designees perform such procedures as are, in their judgment, necessary and desirable.    3.   My surgeon/physician has discussed prior to my surgery the potential benefits, risks and side effects of this procedure; the likelihood of achieving goals; and potential problems that might occur during recuperation.  They also discussed reasonable alternatives to the procedure, including risks, benefits, and side effects related to the alternatives and risks related to not receiving this procedure.  I have had all my questions answered and I acknowledge that no guarantee has been made as to the result that may be obtained.    4.   Should the need arise during my operation/procedure, which includes change of level of care prior to discharge, I also consent to the administration of blood and/or blood products.  Further, I understand that despite careful testing and screening of blood or blood products by collecting agencies, I may still be subject to ill effects as a result of receiving a blood transfusion and/or blood products.  The following are some, but not all, of the potential risks that  can occur: fever and allergic reactions, hemolytic reactions, transmission of diseases such as Hepatitis, AIDS and Cytomegalovirus (CMV) and fluid overload.  In the event that I wish to have an autologous transfusion of my own blood, or a directed donor transfusion, I will discuss this with my physician.  Check only if Refusing Blood or Blood Products  I understand refusal of blood or blood products as deemed necessary by my physician may have serious consequences to my condition to include possible death. I hereby assume responsibility for my refusal and release the hospital, its personnel, and my physicians from any responsibility for the consequences of my refusal.    o  Refuse   5.   I authorize the use of any specimen, organs, tissues, body parts or foreign objects that may be removed from my body during the operation/procedure for diagnosis, research or teaching purposes and their subsequent disposal by hospital authorities.  I also authorize the release of specimen test results and/or written reports to my treating physician on the hospital medical staff or other referring or consulting physicians involved in my care, at the discretion of the Pathologist or my treating physician.    6.   I consent to the photographing or videotaping of the operations or procedures to be performed, including appropriate portions of my body for medical, scientific, or educational purposes, provided my identity is not revealed by the pictures or by descriptive texts accompanying them.  If the procedure has been photographed/videotaped, the surgeon will obtain the original picture, image, videotape or CD.  The hospital will not be responsible for storage, release or maintenance of the picture, image, tape or CD.    7.   I consent to the presence of a  or observers in the operating room as deemed necessary by my physician or their designees.    8.   I recognize that in the event my procedure results in extended  X-Ray/fluoroscopy time, I may develop a skin reaction.    9. If I have a Do Not Attempt Resuscitation (DNAR) order in place, that status will be suspended while in the operating room, procedural suite, and during the recovery period unless otherwise explicitly stated by me (or a person authorized to consent on my behalf). The surgeon or my attending physician will determine when the applicable recovery period ends for purposes of reinstating the DNAR order.  10. Patients having a sterilization procedure: I understand that if the procedure is successful the results will be permanent and it will therefore be impossible for me to inseminate, conceive, or bear children.  I also understand that the procedure is intended to result in sterility, although the result has not been guaranteed.   11. I acknowledge that my physician has explained sedation/analgesia administration to me including the risk and benefits I consent to the administration of sedation/analgesia as may be necessary or desirable in the judgment of my physician.    I CERTIFY THAT I HAVE READ AND FULLY UNDERSTAND THE ABOVE CONSENT TO OPERATION and/or OTHER PROCEDURE.     _________________________________________ _________________________________     ___________________________________  Signature of Patient     Signature of Responsible Person                   Printed Name of Responsible Person                              _________________________________________ ______________________________        ___________________________________  Signature of Witness         Date  Time         Relationship to Patient    STATEMENT OF PHYSICIAN My signature below affirms that prior to the time of the procedure; I have explained to the patient and/or his/her legal representative, the risks and benefits involved in the proposed treatment and any reasonable alternative to the proposed treatment. I have also explained the risks and benefits involved in refusal of the  proposed treatment and alternatives to the proposed treatment and have answered the patient's questions. If I have a significant financial interest in a co-management agreement or a significant financial interest in any product or implant, or other significant relationship used in this procedure/surgery, I have disclosed this and had a discussion with my patient.     _______________________________________________________________ _____________________________  (Signature of Physician)                                                                                         (Date)                                   (Time)  Patient Name: Ava HYMAN Jessi    : 10/6/1955   Printed: 2/10/2024      Medical Record #: [id number]                                              Page 1 of 1

## (undated) NOTE — LETTER
[address], [place]  Authorization for Invasive Procedures  1.  I hereby authorize  Dr. Rebecca Hernandez and Dr. Gale Cox , my physician and whomever may be designated as the doctor's assistant, to perform the following operation and/or proc following are some, but not all, of the potential risks that can occur: fever and allergic reactions, hemolytic reactions, transmission of disease such as hepatitis, AIDS, cytomegalovirus (CMV), and flluid overload.  In the event that I wish to have autolog administration of sedation/analgesia as may be necessary or desirable in the judgment of my physician.      Signature of Patient:  ________________________________________________ Date: _________Time: _________    Responsible person in case of minor or unco

## (undated) NOTE — IP AVS SNAPSHOT
[facility]            (For Outpatient Use Only) Initial Admit Date: 9/9/2021   Inpt/Obs Admit Date: Inpt: 9/9/21 / Obs: N/A   Discharge Date:    Ignacia Yi:  [de-identified]   MRN: [de-identified]   CSN: [id number]   CEID: [id number]        [facility] Number:  Insurance Type:    Subscriber Name:  Subscriber :    Subscriber ID:  Pt Rel to Subscriber:    Hospital Account Financial Class: Eileen Brittle [facility]    2021

## (undated) NOTE — LETTER
August 20, 2019         Yudith Amor MD  [address] [facility]  [place] [address]      Patient: Salome Vieyra   YOB: 1955   Date of Visit: 8/20/2019       Dear Dr. Yi Manley MD,    I saw your patient, Salome Vieyra, on 8/20/2019.  Encl

## (undated) NOTE — LETTER
October 15, 2019    Bella Ortiz MD  [address] [facility] [address]     Patient: Jennifer Monet   YOB: 1955   Date of Visit: 10/15/2019       Dear Dr. Luz Oneil MD:    Thank you for referring Ana Oneil to me for evaluation.  H Tobacco comment: per pt, 1-2 cigarettes per day    Alcohol use: Not Currently      Frequency: Never      Comment: socially    Drug use: No      Medications:  gabapentin 100 MG Oral Cap, Take 1 capsule (100 mg total) by mouth 3 (three) times daily. , Dis Dilation     Both eyes:  1.0% Mydriacyl and 2.5% Yaya Synephrine @ 2:49 PM            Slit Lamp and Fundus Exam     Slit Lamp Exam       Right Left    Lids/Lashes Dermatochalasis, Meibomian gland dysfunction Dermatochalasis, Meibomian gland dysfunction Discussed with patient that she is a glaucoma suspect based on increased cupping of the optic nerves in both eyes and family history of 2 sisters with glaucoma. Retinal photos taken today to document optic nerves. Glaucoma diagnostic testing ordered.   Trudy Phalen

## (undated) NOTE — LETTER
[place] [address]  Authorization for Invasive Procedures  Date: ***           Time: ***    {[facility] ivs consent:60170}  [facility] 2W/SW  [address]  [facility] [address]  [phone]    Blood Transfusion Consent    In the course of your treatment, it may become necessary to administer a transfusion of blood or blood components. This form provides basic information concerning this procedure and, if signed by you, authorizes its administration. By signing this form, you agree that all of your questions about the administration of blood or blood products have been answered by the ordering medical professional or designee.    Description of Procedure  Blood is introduced into one of your veins, commonly in the arm, using a sterilized disposable needle. The amount of blood transfused, and whether the transfusion will be of blood or blood components is a judgement the physician will make based on your particular needs.    Risks  The transfusion is a common procedure of low risk.  MINOR AND TEMPORARY REACTIONS ARE NOT UNCOMMON, including a slight bruise, swelling or local reaction in the area where the needle pierces your skin, or a nonserious reaction to the transfused material itself, including headache, fever or mild skin reaction, such as rash.  Serious reactions are possible, though very unlikely, and include severe allergic reaction (shock) and destruction (hemolysis) of transfused blood cells.  Infectious diseases which are known to be transmitted by blood transfusion include certain types of viral Hepatitis(liver infection from a virus), Human Immunodeficiency Virus (HIV-1,2) infection, a viral infection known to cause Acquired Immunodeficiency Syndrome (AIDS), as well as certain other bacterial, viral, and parasitic diseases. While a minimal risk of acquiring an infectious disease from transfused blood exists, in accordance with the Federal and State law, all due care has  been taken in donor selection and testing to avoid transmission of disease.    Alternatives  If loss of blood poses serious threats during your treatment, THERE IS NO EFFECTIVE ALTERNATIVE TO BLOOD TRANSFUSION. However, if you have any further questions on this matter, your provider will fully explain the alternatives to you if it has not already been done.    I, ______________________________, have read/had read to me the above. I understand the matters bearing on the decision whether or not to authorize a transfusion of blood or blood components. I have no questions which have not been answered to my full satisfaction. I hereby consent to such transfusion as my physician may deem necessary or advisable in the course of my treatment.    ______________________________________________                    ___________________________  (Signature of Patient or Responsible party in case of minor,                 (Printed Name of Patient or incompetent, or unconscious patient)              Responsible Party)    ___________________________               _____________________  (Relationship to Patient if not self)                                    (Date and Time)    __________________________                                                           ______________________              (Signature of Witness)               (Printed Name of Witness)     Language line ()    Telephone/Verbal/Video Consent    __________________________                     ____________________  (Signature of 2nd Witness           (Printed Name of 2nd  Telephone/Verbal/Video Consent)           Witness)    Patient Name: Ava Bowen     : 10/6/1955                 Printed: 2024     Medical Record #: [id number]      Rev: 2023

## (undated) NOTE — LETTER
2023      No Recipients     Patient: Negrito Aguirre   YOB: 1955   Date of Visit: 2023       Dear Dr. Ivan Kulkarni Recipients: Thank you for referring Shavonne Real to me for evaluation. Here is my assessment and plan of care:    Negrito Aguirre is a 79year old female. HPI:     HPI    Patient is here for a diabetic exam and photos. She complains of blurry vision at distance and near (usually only when her BS is high). Pt has been a diabetic for 24 years       Pt's diabetes is currently controlled by pills  Pt checks BS once a week  Pt's last blood sugar was  186 this morning  Last HA1C was 6.5 on 23  Endocrinologist: Dr. Zoë Andrade  Last edited by Kumar Soliman OT on 2023  3:42 PM.        Patient History:  Past Medical History:   Diagnosis Date    Anemia     Back pain     Chronic kidney disease (CKD)     COPD (chronic obstructive pulmonary disease) (HCC)     FEV 1 1.25 50%     Diabetes ([address]     DM neuropathy    Essential hypertension     H/O angioplasty     2020    High blood pressure     High cholesterol     Peripheral vascular disease (HCC)     PNA (pneumonia) 2018    Pulmonary nodule     4 mm RUL    Renal disorder     monitoring kidney labs    Sickle cell trait ([address]     Sickle-cell anemia ([address]     Tobacco abuse        Surgical History: Negrito Aguirre has a past surgical history that includes excis lumbar disk,one level ();  (x3); other (bilateral leg stents  and ); and back surgery (Right, 2021) (Right L3-4 extreme lateral interbody fusion, posterior decompression; LUMBAR LAMINECTOMY 1 LEVEL).     Family History   Problem Relation Age of Onset    Diabetes Mother         Passed from DM complications    Diabetes Sister         Had kidney transplant due to complications of DM    Kidney Disease Sister         Kidney failure secondary to diabetes; received kidney transplant in     Glaucoma Sister     Diabetes Brother     Sickle Cell Son         Cause of death    Macular degeneration Neg        Social History:   Social History     Socioeconomic History    Marital status:    Tobacco Use    Smoking status: Former     Packs/day: 1.00     Years: 30.00     Additional pack years: 0.00     Total pack years: 30.00     Types: Cigarettes     Quit date: 2019     Years since quittin.1    Smokeless tobacco: Never   Vaping Use    Vaping Use: Never used   Substance and Sexual Activity    Alcohol use: Not Currently     Comment: socially    Drug use: No   Social History Narrative    [employer]           Medications:  Current Outpatient Medications   Medication Sig Dispense Refill    glipiZIDE 10 MG Oral Tab Take 1 tablet (10 mg total) by mouth 2 (two) times daily. HYDROcodone-acetaminophen 5-325 MG Oral Tab Take 1 tablet by mouth every 4 (four) hours as needed for Pain (MAX 6/DAY). 180 tablet 0    HYDROcodone-acetaminophen  MG Oral Tab Take 1 tablet by mouth every 4 (four) hours as needed for Pain (max 6/day). 180 tablet 0    [START ON 2024] HYDROcodone-acetaminophen  MG Oral Tab Take 1 tablet by mouth every 4 (four) hours as needed for Pain (max 6/day). 180 tablet 0    albuterol 108 (90 Base) MCG/ACT Inhalation Aero Soln Inhale 2 puffs into the lungs every 6 (six) hours as needed for Wheezing or Shortness of Breath. 8.5 each 2    carvedilol 12.5 MG Oral Tab Take 2 tablets (25 mg total) by mouth daily. 180 tablet 1    D-5000 125 MCG (5000 UT) Oral Tab Take 1 tablet (5,000 Units total) by mouth daily. clopidogrel 75 MG Oral Tab Take 1 tablet (75 mg total) by mouth daily. 90 tablet 1    carvedilol 3.125 MG Oral Tab Take 2 tablets (6.25 mg total) by mouth 2 (two) times daily with meals. estradiol 0.1 MG/GM Vaginal Cream Apply a small amount to the perimeatal tissue every other day. 45 g 2    amLODIPine 10 MG Oral Tab Take 1 tablet (10 mg total) by mouth daily.  90 tablet 0 LISINOPRIL 40 MG Oral Tab TAKE 1 TABLET(40 MG) BY MOUTH DAILY 90 tablet 1    hydrALAZINE 100 MG Oral Tab Take 1 tablet (100 mg total) by mouth every 8 (eight) hours. 270 tablet 1    ATORVASTATIN 20 MG Oral Tab TAKE 1 TABLET(20 MG) BY MOUTH EVERY NIGHT 90 tablet 0    Budesonide-Formoterol Fumarate 160-4.5 MCG/ACT Inhalation Aerosol Inhale 2 puffs into the lungs 2 (two) times daily. Rinse mouth with water, gargle, and spit to follow. 1 each 2    ipratropium-albuterol 0.5-2.5 (3) MG/3ML Inhalation Solution Take 3 mL by nebulization every 6 (six) hours as needed (shortness of breath refractory to [facility]). 100 each 0    Blood Glucose Monitoring Suppl (CONTOUR NEXT MONITOR) w/Device Does not apply Kit 1 each by Does not apply route as needed. 1 kit 0    aspirin 81 MG Oral Tab EC Take 1 tablet (81 mg total) by mouth every other day. BD PEN NEEDLE PACO U/F 32G X 4 MM Does not apply Misc 1 each by Does not apply route 4 (four) times daily. Allergies: Allergies   Allergen Reactions    Penicillins HIVES and ANGIOEDEMA     Hives on eyelids (occurred when pt was in her 25s). Tolerated amoxicillin per chart. Tolerates cephalosporins.        ROS:       PHYSICAL EXAM:     Base Eye Exam       Visual Acuity (Snellen - Linear)         Right Left    Dist sc 20/50 20/150    Dist ph sc 20/40 20/50    Near cc 20/30 20/70   NVA checked with +2.75 in phoropter             Tonometry (Applanation, 3:53 PM)         Right Left    Pressure 16 16              Pachymetry (10/30/2019)         Right Left    Thickness 545/+0 550/+0              Pupils         Pupils    Right PERRL    Left PERRL              Visual Fields         Left Right     Full Full              Extraocular Movement         Right Left     Full, Ortho Full, Ortho              Dilation       Both eyes: 1.0% Mydriacyl and 2.5% Yaya Synephrine @ 3:54 PM              Dilation #2       Both eyes: 1.0% Mydriacyl and 2.5% Yaya Synephrine @ 3:54 PM                  Slit Lamp and Fundus Exam       Slit Lamp Exam         Right Left    Lids/Lashes Dermatochalasis, Meibomian gland dysfunction Dermatochalasis, Meibomian gland dysfunction    Conjunctiva/Sclera ocular melanosis  ocular melanosis     Cornea Clear Clear    Anterior Chamber Deep and quiet Deep and quiet    Iris Normal Normal    Lens 2+ Nuclear sclerosis, 1+ Cortical cataract inferiorly 1+ anterior cortical nasallty 2+ Nuclear sclerosis, 1+ anterior Cortical cataract inferiorly    Vitreous Vitreous floaters Clear              Fundus Exam         Right Left    Disc Sloping margin, sloping inferior margin and inferior crescent Sloping margin, sloping inferior margin, inferior crescent, tilted disc    C/D Ratio 0.6 0.5    Macula Normal- no BDR Normal- no BDR    Vessels Normal Normal    Periphery Normal Normal                  Refraction       Wearing Rx         Sphere Cylinder    Right +2.75 Sphere    Left +2.75 Sphere      Type: forgot-OTC reading only              Manifest Refraction (Auto)         Sphere Cylinder [facility] Add Near [place]    Right -1.75 +0.50 080       Left -2.25 +0.50 155                 Manifest Refraction #2         Sphere Cylinder [facility] Add Near [place]    Right -1.50 +0.75 010 20/30 +2.75 20/20    Left -2.25 Sphere  20/50 +2.75 20/30              Final Rx         Sphere Cylinder [facility] Add Near [place]    Right -1.50 +0.75 010 20/30 +2.75 20/20    Left -2.25 Sphere  20/50 +2.75 20/30      Type: Progressive bifocal                     ASSESSMENT/PLAN:     Diagnoses and Plan:     Type 2 diabetes mellitus without retinopathy ([facility]  Diabetes type II: no background of retinopathy, no signs of neovascularization noted. Discussed ocular and systemic benefits of blood sugar control. Diagnosis and treatment discussed in detail with patient. Glaucoma suspect of both eyes  Discussed with patient that she is a glaucoma suspect based on increased cupping of the optic nerves in both eyes.    Retinal photos taken today to document optic nerves. Glaucoma diagnostic testing ordered. Will not start medication, but will continue to observe. Patient verbalized understanding. Will see patient for next available visual field and OCT with no MD, if glaucoma diagnostics are normal will see patient in 1 year for a diabetic exam    Age-related nuclear cataract of both eyes  Discussed moderate cataracts in both eyes that are affecting vision but are not surgical at this time. Family history of glaucoma in sister  Patient has 2 sisters with glaucoma. Orders Placed This Encounter   Procedures    Fundus Photos - OU - Both Eyes    OCT, Optic Nerve - OU - Both Eyes    Sharp Visual Field - OU - Both Eyes       Meds This Visit:  Requested Prescriptions      No prescriptions requested or ordered in this encounter        Follow up instructions:  No follow-ups on file. 12/28/2023  Scribed by: Nicole Silva MD        If you have questions, please do not hesitate to call me. I look forward to following Basil Howard along with you.     Sincerely,        Nicole Silva MD        CC:   No Recipients    Document electronically generated by: Nicole Silva MD